# Patient Record
Sex: FEMALE | Race: WHITE | NOT HISPANIC OR LATINO | Employment: UNEMPLOYED | URBAN - NONMETROPOLITAN AREA
[De-identification: names, ages, dates, MRNs, and addresses within clinical notes are randomized per-mention and may not be internally consistent; named-entity substitution may affect disease eponyms.]

---

## 2017-01-17 LAB
EXTERNAL ABO GROUPING: NORMAL
EXTERNAL ANTIBODY SCREEN: NEGATIVE
EXTERNAL CHLAMYDIA SCREEN: NEGATIVE
EXTERNAL GONORRHEA SCREEN: NEGATIVE
EXTERNAL GROUP B STREP ANTIGEN: NORMAL
EXTERNAL HEMATOCRIT: 37 %
EXTERNAL HEMOGLOBIN: 11.4 G/DL
EXTERNAL HEPATITIS B SURFACE ANTIGEN: NEGATIVE
EXTERNAL RH FACTOR: POSITIVE
EXTERNAL RUBELLA QUALITATIVE: NORMAL
EXTERNAL SYPHILIS RPR SCREEN: NORMAL
HIV1 AB SPEC QL IA.RAPID: NEGATIVE

## 2017-02-09 ENCOUNTER — TRANSCRIBE ORDERS (OUTPATIENT)
Dept: ADMINISTRATIVE | Facility: HOSPITAL | Age: 27
End: 2017-02-09

## 2017-02-09 DIAGNOSIS — R06.02 SOB (SHORTNESS OF BREATH): Primary | ICD-10-CM

## 2017-02-10 ENCOUNTER — HOSPITAL ENCOUNTER (OUTPATIENT)
Dept: CARDIOLOGY | Facility: HOSPITAL | Age: 27
Discharge: HOME | End: 2017-02-10
Attending: OBSTETRICS & GYNECOLOGY

## 2017-02-10 DIAGNOSIS — R06.02 SOB (SHORTNESS OF BREATH): ICD-10-CM

## 2017-02-11 LAB
BH CV ECHO MEAS - % LVPW THICK: 332.1 %
BH CV ECHO MEAS - AO ROOT AREA (BSA CORRECTED): 1.6
BH CV ECHO MEAS - AO ROOT AREA: 7.8 CM^2
BH CV ECHO MEAS - AO ROOT DIAM: 3.2 CM
BH CV ECHO MEAS - BSA(HAYCOCK): 2.1 M^2
BH CV ECHO MEAS - BSA: 2 M^2
BH CV ECHO MEAS - BZI_BMI: 34.1 KILOGRAMS/M^2
BH CV ECHO MEAS - BZI_METRIC_HEIGHT: 165.1 CM
BH CV ECHO MEAS - BZI_METRIC_WEIGHT: 93 KG
BH CV ECHO MEAS - CONTRAST EF 4CH: 67.3 ML/M^2
BH CV ECHO MEAS - EDV(CUBED): 175.3 ML
BH CV ECHO MEAS - EDV(MOD-SP4): 110 ML
BH CV ECHO MEAS - EDV(TEICH): 153.4 ML
BH CV ECHO MEAS - EF(CUBED): 87.3 %
BH CV ECHO MEAS - EF(MOD-SP4): 67.3 %
BH CV ECHO MEAS - EF(TEICH): 80.5 %
BH CV ECHO MEAS - ESV(CUBED): 22.2 ML
BH CV ECHO MEAS - ESV(MOD-SP4): 36 ML
BH CV ECHO MEAS - ESV(TEICH): 29.9 ML
BH CV ECHO MEAS - FS: 49.8 %
BH CV ECHO MEAS - IVS/LVPW: 0.85
BH CV ECHO MEAS - IVSD: 0.78 CM
BH CV ECHO MEAS - LA DIMENSION: 3.2 CM
BH CV ECHO MEAS - LA/AO: 1
BH CV ECHO MEAS - LV DIASTOLIC VOL/BSA (35-75): 55 ML/M^2
BH CV ECHO MEAS - LV MASS(C)D: 177.8 GRAMS
BH CV ECHO MEAS - LV MASS(C)DI: 88.9 GRAMS/M^2
BH CV ECHO MEAS - LV SYSTOLIC VOL/BSA (12-30): 18 ML/M^2
BH CV ECHO MEAS - LVIDD: 5.6 CM
BH CV ECHO MEAS - LVIDS: 2.8 CM
BH CV ECHO MEAS - LVLD AP4: 7.9 CM
BH CV ECHO MEAS - LVLS AP4: 6.5 CM
BH CV ECHO MEAS - LVOT AREA (M): 2 CM^2
BH CV ECHO MEAS - LVOT AREA: 2.1 CM^2
BH CV ECHO MEAS - LVOT DIAM: 1.6 CM
BH CV ECHO MEAS - LVPWD: 0.92 CM
BH CV ECHO MEAS - LVPWS: 4 CM
BH CV ECHO MEAS - MV A MAX VEL: 83.4 CM/SEC
BH CV ECHO MEAS - MV E MAX VEL: 78.5 CM/SEC
BH CV ECHO MEAS - MV E/A: 0.94
BH CV ECHO MEAS - PA ACC SLOPE: 771 CM/SEC^2
BH CV ECHO MEAS - PA ACC TIME: 0.13 SEC
BH CV ECHO MEAS - PA PR(ACCEL): 18.8 MMHG
BH CV ECHO MEAS - SI(CUBED): 76.6 ML/M^2
BH CV ECHO MEAS - SI(MOD-SP4): 37 ML/M^2
BH CV ECHO MEAS - SI(TEICH): 61.8 ML/M^2
BH CV ECHO MEAS - SV(CUBED): 153 ML
BH CV ECHO MEAS - SV(MOD-SP4): 74 ML
BH CV ECHO MEAS - SV(TEICH): 123.6 ML

## 2017-02-14 ENCOUNTER — HOSPITAL ENCOUNTER (OUTPATIENT)
Dept: RESPIRATORY THERAPY | Facility: HOSPITAL | Age: 27
Discharge: HOME | End: 2017-02-14
Attending: OBSTETRICS & GYNECOLOGY | Admitting: OBSTETRICS & GYNECOLOGY

## 2017-02-14 ENCOUNTER — TRANSCRIBE ORDERS (OUTPATIENT)
Dept: ADMINISTRATIVE | Facility: HOSPITAL | Age: 27
End: 2017-02-14

## 2017-02-14 ENCOUNTER — TRANSCRIBE ORDERS (OUTPATIENT)
Dept: WOMENS IMAGING | Facility: HOSPITAL | Age: 27
End: 2017-02-14

## 2017-02-14 DIAGNOSIS — O99.282 MTHFR DEFICIENCY COMPLICATING PREGNANCY, SECOND TRIMESTER (HCC): ICD-10-CM

## 2017-02-14 DIAGNOSIS — O09.292 PRIOR PREGNANCY WITH FETAL DEMISE AND CURRENT PREGNANCY, SECOND TRIMESTER: Primary | ICD-10-CM

## 2017-02-14 DIAGNOSIS — E72.12 MTHFR DEFICIENCY COMPLICATING PREGNANCY, SECOND TRIMESTER (HCC): ICD-10-CM

## 2017-02-14 DIAGNOSIS — R55 BLACKOUT: Primary | ICD-10-CM

## 2017-02-14 DIAGNOSIS — R55 BLACKOUT: ICD-10-CM

## 2017-02-14 PROCEDURE — 93226 XTRNL ECG REC<48 HR SCAN A/R: CPT

## 2017-02-14 PROCEDURE — 93225 XTRNL ECG REC<48 HRS REC: CPT

## 2017-02-17 PROCEDURE — 93227 XTRNL ECG REC<48 HR R&I: CPT | Performed by: INTERNAL MEDICINE

## 2017-02-28 ENCOUNTER — APPOINTMENT (OUTPATIENT)
Dept: LAB | Facility: HOSPITAL | Age: 27
End: 2017-02-28

## 2017-02-28 ENCOUNTER — HOSPITAL ENCOUNTER (OUTPATIENT)
Dept: WOMENS IMAGING | Facility: HOSPITAL | Age: 27
Discharge: HOME | End: 2017-02-28
Admitting: OBSTETRICS & GYNECOLOGY

## 2017-02-28 ENCOUNTER — OFFICE VISIT (OUTPATIENT)
Dept: OBSTETRICS AND GYNECOLOGY | Facility: HOSPITAL | Age: 27
End: 2017-02-28

## 2017-02-28 VITALS — BODY MASS INDEX: 35.96 KG/M2 | SYSTOLIC BLOOD PRESSURE: 122 MMHG | WEIGHT: 203 LBS | DIASTOLIC BLOOD PRESSURE: 64 MMHG

## 2017-02-28 DIAGNOSIS — O09.292 PRIOR PREGNANCY WITH FETAL DEMISE AND CURRENT PREGNANCY, SECOND TRIMESTER: ICD-10-CM

## 2017-02-28 DIAGNOSIS — E72.12 MTHFR DEFICIENCY COMPLICATING PREGNANCY, SECOND TRIMESTER (HCC): ICD-10-CM

## 2017-02-28 DIAGNOSIS — Z34.92 NORMAL IUP (INTRAUTERINE PREGNANCY) ON PRENATAL ULTRASOUND, SECOND TRIMESTER: Primary | ICD-10-CM

## 2017-02-28 DIAGNOSIS — O99.282 MTHFR DEFICIENCY COMPLICATING PREGNANCY, SECOND TRIMESTER (HCC): ICD-10-CM

## 2017-02-28 PROCEDURE — 86777 TOXOPLASMA ANTIBODY: CPT | Performed by: OBSTETRICS & GYNECOLOGY

## 2017-02-28 PROCEDURE — 36415 COLL VENOUS BLD VENIPUNCTURE: CPT | Performed by: OBSTETRICS & GYNECOLOGY

## 2017-02-28 PROCEDURE — 99241 PR OFFICE CONSULTATION NEW/ESTAB PATIENT 15 MIN: CPT | Performed by: OBSTETRICS & GYNECOLOGY

## 2017-02-28 PROCEDURE — 86644 CMV ANTIBODY: CPT | Performed by: OBSTETRICS & GYNECOLOGY

## 2017-02-28 PROCEDURE — 86778 TOXOPLASMA ANTIBODY IGM: CPT | Performed by: OBSTETRICS & GYNECOLOGY

## 2017-02-28 PROCEDURE — 76811 OB US DETAILED SNGL FETUS: CPT | Performed by: OBSTETRICS & GYNECOLOGY

## 2017-02-28 PROCEDURE — 86645 CMV ANTIBODY IGM: CPT | Performed by: OBSTETRICS & GYNECOLOGY

## 2017-02-28 PROCEDURE — 76811 OB US DETAILED SNGL FETUS: CPT

## 2017-02-28 RX ORDER — FOLIC ACID 1 MG/1
4 TABLET ORAL DAILY
COMMUNITY
End: 2018-09-21

## 2017-02-28 RX ORDER — HEPARIN SODIUM 5000 [USP'U]/.5ML
7500 INJECTION, SOLUTION INTRAVENOUS; SUBCUTANEOUS 2 TIMES DAILY
COMMUNITY
End: 2017-09-13

## 2017-02-28 RX ORDER — PROMETHAZINE HYDROCHLORIDE 12.5 MG/1
12.5 TABLET ORAL EVERY 6 HOURS PRN
COMMUNITY
End: 2017-05-11 | Stop reason: HOSPADM

## 2017-02-28 NOTE — PROGRESS NOTES
Documentation of the ultrasound findings, images, and interpretations as well as consultation notes will be available in the patient's ViewPoint report located in the chart review imaging tab in [employer].

## 2017-03-01 LAB
CMV IGG SERPL IA-ACNC: 3.1 U/ML (ref 0–0.59)
CMV IGM SERPL IA-ACNC: <30 AU/ML (ref 0–29.9)
LABORATORY COMMENT REPORT: NORMAL
T GONDII IGG SERPL IA-ACNC: <3 IU/ML (ref 0–7.1)
T GONDII IGM SER IA-ACNC: <3 AU/ML (ref 0–7.9)

## 2017-03-02 LAB
B19V IGG SER IA-ACNC: 7 INDEX (ref 0–0.8)
B19V IGM SER IA-ACNC: 0.3 INDEX (ref 0–0.8)

## 2017-03-03 ENCOUNTER — TELEPHONE (OUTPATIENT)
Dept: WOMENS IMAGING | Facility: HOSPITAL | Age: 27
End: 2017-03-03

## 2017-03-03 NOTE — TELEPHONE ENCOUNTER
Notified pt of CMV, Parvo and toxoplasmosis labs being normal. Also notified pt that per Evi in reference lab that CF screen specimen was mistakenly placed in spinner and ruined and will need redraw. Pt v/u and willing to get lab redrawn. Pt asked if it can be done at [facility] office. Informed pt that this office will call to set up lab draw by [facility]. Pt v/u and agreeable. Per [facility]'s office-pt had negative CF screen in 2013. Pt notified on no new specimen needed.

## 2017-03-03 NOTE — TELEPHONE ENCOUNTER
"----- Message from Pramod Dominguez MD sent at 3/2/2017  9:49 AM EST -----  CMV past infection: \"immune\"  Toxoplasma, no serologic evidence of maternal infection.  "

## 2017-03-18 ENCOUNTER — HOSPITAL ENCOUNTER (OUTPATIENT)
Facility: HOSPITAL | Age: 27
Discharge: HOME | End: 2017-03-18
Attending: OBSTETRICS & GYNECOLOGY | Admitting: OBSTETRICS & GYNECOLOGY

## 2017-03-18 ENCOUNTER — HOSPITAL ENCOUNTER (EMERGENCY)
Facility: HOSPITAL | Age: 27
Discharge: ANOTHER HOSPITAL | End: 2017-03-18

## 2017-03-18 VITALS
SYSTOLIC BLOOD PRESSURE: 121 MMHG | HEART RATE: 100 BPM | HEIGHT: 63 IN | WEIGHT: 203 LBS | OXYGEN SATURATION: 99 % | TEMPERATURE: 97.9 F | BODY MASS INDEX: 35.97 KG/M2 | RESPIRATION RATE: 20 BRPM | DIASTOLIC BLOOD PRESSURE: 81 MMHG

## 2017-03-18 VITALS
SYSTOLIC BLOOD PRESSURE: 127 MMHG | BODY MASS INDEX: 35.97 KG/M2 | HEART RATE: 91 BPM | HEIGHT: 63 IN | WEIGHT: 203 LBS | RESPIRATION RATE: 18 BRPM | TEMPERATURE: 98.3 F | DIASTOLIC BLOOD PRESSURE: 87 MMHG

## 2017-03-18 PROBLEM — Z34.90 PREGNANT: Status: ACTIVE | Noted: 2017-03-18

## 2017-03-18 LAB
ANISOCYTOSIS BLD QL: ABNORMAL
BACTERIA UR QL AUTO: ABNORMAL /HPF
BILIRUB UR QL STRIP: ABNORMAL
CLARITY UR: CLEAR
COLOR UR: ABNORMAL
DEPRECATED RDW RBC AUTO: 45 FL (ref 37–54)
ELLIPTOCYTES BLD QL SMEAR: ABNORMAL
ERYTHROCYTE [DISTWIDTH] IN BLOOD BY AUTOMATED COUNT: 15.6 % (ref 11.5–14.5)
GLUCOSE UR STRIP-MCNC: NEGATIVE MG/DL
HCT VFR BLD AUTO: 33.2 % (ref 37–47)
HGB BLD-MCNC: 10.2 G/DL (ref 12–16)
HGB UR QL STRIP.AUTO: NEGATIVE
HYALINE CASTS UR QL AUTO: ABNORMAL /LPF
HYPOCHROMIA BLD QL: ABNORMAL
KETONES UR QL STRIP: ABNORMAL
LEUKOCYTE ESTERASE UR QL STRIP.AUTO: ABNORMAL
LYMPHOCYTES # BLD MANUAL: 0.54 10*3/MM3 (ref 1–3)
LYMPHOCYTES NFR BLD MANUAL: 3 % (ref 0–10)
LYMPHOCYTES NFR BLD MANUAL: 7 % (ref 21–51)
MCH RBC QN AUTO: 24.9 PG (ref 27–33)
MCHC RBC AUTO-ENTMCNC: 30.7 G/DL (ref 33–37)
MCV RBC AUTO: 81.2 FL (ref 80–94)
MONOCYTES # BLD AUTO: 0.23 10*3/MM3 (ref 0.1–0.9)
NEUTROPHILS # BLD AUTO: 6.89 10*3/MM3 (ref 1.4–6.5)
NEUTROPHILS NFR BLD MANUAL: 90 % (ref 30–70)
NITRITE UR QL STRIP: NEGATIVE
PH UR STRIP.AUTO: <=5 [PH] (ref 5–8)
PLAT MORPH BLD: NORMAL
PLATELET # BLD AUTO: 218 10*3/MM3 (ref 130–400)
PMV BLD AUTO: 11.3 FL (ref 6–10)
PROT UR QL STRIP: NEGATIVE
RBC # BLD AUTO: 4.09 10*6/MM3 (ref 4.2–5.4)
RBC # UR: ABNORMAL /HPF
REF LAB TEST METHOD: ABNORMAL
SP GR UR STRIP: 1.03 (ref 1–1.03)
SQUAMOUS #/AREA URNS HPF: ABNORMAL /HPF
UROBILINOGEN UR QL STRIP: ABNORMAL
WBC NRBC COR # BLD: 7.65 10*3/MM3 (ref 4.5–12.5)
WBC UR QL AUTO: ABNORMAL /HPF

## 2017-03-18 PROCEDURE — G0463 HOSPITAL OUTPT CLINIC VISIT: HCPCS

## 2017-03-18 PROCEDURE — 81001 URINALYSIS AUTO W/SCOPE: CPT | Performed by: OBSTETRICS & GYNECOLOGY

## 2017-03-18 PROCEDURE — 99201: CPT

## 2017-03-18 PROCEDURE — 85027 COMPLETE CBC AUTOMATED: CPT | Performed by: OBSTETRICS & GYNECOLOGY

## 2017-03-18 PROCEDURE — P9612 CATHETERIZE FOR URINE SPEC: HCPCS

## 2017-03-18 PROCEDURE — 85007 BL SMEAR W/DIFF WBC COUNT: CPT | Performed by: OBSTETRICS & GYNECOLOGY

## 2017-03-22 ENCOUNTER — TRANSCRIBE ORDERS (OUTPATIENT)
Dept: ADMINISTRATIVE | Facility: HOSPITAL | Age: 27
End: 2017-03-22

## 2017-03-22 ENCOUNTER — HOSPITAL ENCOUNTER (OUTPATIENT)
Dept: CARDIOLOGY | Facility: HOSPITAL | Age: 27
Discharge: HOME | End: 2017-03-22
Attending: OBSTETRICS & GYNECOLOGY | Admitting: OBSTETRICS & GYNECOLOGY

## 2017-03-22 DIAGNOSIS — O09.92 SUPERVISION OF HIGH RISK PREGNANCY IN SECOND TRIMESTER: ICD-10-CM

## 2017-03-22 DIAGNOSIS — O09.92 SUPERVISION OF HIGH RISK PREGNANCY IN SECOND TRIMESTER: Primary | ICD-10-CM

## 2017-03-22 PROCEDURE — 93970 EXTREMITY STUDY: CPT | Performed by: RADIOLOGY

## 2017-03-22 PROCEDURE — 93970 EXTREMITY STUDY: CPT

## 2017-04-11 ENCOUNTER — HOSPITAL ENCOUNTER (OUTPATIENT)
Dept: WOMENS IMAGING | Facility: HOSPITAL | Age: 27
Discharge: HOME | End: 2017-04-11
Attending: OBSTETRICS & GYNECOLOGY | Admitting: OBSTETRICS & GYNECOLOGY

## 2017-04-11 ENCOUNTER — OFFICE VISIT (OUTPATIENT)
Dept: OBSTETRICS AND GYNECOLOGY | Facility: HOSPITAL | Age: 27
End: 2017-04-11

## 2017-04-11 VITALS — DIASTOLIC BLOOD PRESSURE: 55 MMHG | WEIGHT: 207 LBS | SYSTOLIC BLOOD PRESSURE: 113 MMHG | BODY MASS INDEX: 36.67 KG/M2

## 2017-04-11 DIAGNOSIS — O09.899 PREVIOUS DEEP VEIN THROMBOSIS (DVT) AFFECTING PREGNANCY: ICD-10-CM

## 2017-04-11 DIAGNOSIS — O09.299 PREVIOUS STILLBIRTH OR DEMISE, ANTEPARTUM, UNSPECIFIED TRIMESTER: Primary | ICD-10-CM

## 2017-04-11 DIAGNOSIS — Z86.718 PREVIOUS DEEP VEIN THROMBOSIS (DVT) AFFECTING PREGNANCY: ICD-10-CM

## 2017-04-11 DIAGNOSIS — Z34.92 NORMAL IUP (INTRAUTERINE PREGNANCY) ON PRENATAL ULTRASOUND, SECOND TRIMESTER: ICD-10-CM

## 2017-04-11 DIAGNOSIS — Z34.90 PREGNANCY, UNSPECIFIED GESTATIONAL AGE: ICD-10-CM

## 2017-04-11 PROCEDURE — 76816 OB US FOLLOW-UP PER FETUS: CPT

## 2017-04-11 PROCEDURE — 76816 OB US FOLLOW-UP PER FETUS: CPT | Performed by: OBSTETRICS & GYNECOLOGY

## 2017-05-04 LAB — EXTERNAL GTT 1 HOUR: 124

## 2017-05-11 ENCOUNTER — HOSPITAL ENCOUNTER (OUTPATIENT)
Facility: HOSPITAL | Age: 27
Discharge: HOME | End: 2017-05-11
Attending: OBSTETRICS & GYNECOLOGY | Admitting: OBSTETRICS & GYNECOLOGY

## 2017-05-11 ENCOUNTER — APPOINTMENT (OUTPATIENT)
Dept: ULTRASOUND IMAGING | Facility: HOSPITAL | Age: 27
End: 2017-05-11

## 2017-05-11 VITALS
RESPIRATION RATE: 16 BRPM | SYSTOLIC BLOOD PRESSURE: 121 MMHG | HEIGHT: 63 IN | HEART RATE: 101 BPM | TEMPERATURE: 97.9 F | DIASTOLIC BLOOD PRESSURE: 71 MMHG | WEIGHT: 211.3 LBS | BODY MASS INDEX: 37.44 KG/M2

## 2017-05-11 LAB
BACTERIA UR QL AUTO: ABNORMAL /HPF
BASOPHILS # BLD AUTO: 0.02 10*3/MM3 (ref 0–0.3)
BASOPHILS NFR BLD AUTO: 0.2 % (ref 0–2)
BILIRUB UR QL STRIP: NEGATIVE
CLARITY UR: ABNORMAL
COLOR UR: ABNORMAL
DEPRECATED RDW RBC AUTO: 43.1 FL (ref 37–54)
EOSINOPHIL # BLD AUTO: 0.07 10*3/MM3 (ref 0–0.7)
EOSINOPHIL NFR BLD AUTO: 0.8 % (ref 0–5)
ERYTHROCYTE [DISTWIDTH] IN BLOOD BY AUTOMATED COUNT: 15.6 % (ref 11.5–14.5)
GLUCOSE UR STRIP-MCNC: NEGATIVE MG/DL
HCT VFR BLD AUTO: 29.9 % (ref 37–47)
HGB BLD-MCNC: 9.1 G/DL (ref 12–16)
HGB UR QL STRIP.AUTO: ABNORMAL
HYALINE CASTS UR QL AUTO: ABNORMAL /LPF
IMM GRANULOCYTES # BLD: 0.01 10*3/MM3 (ref 0–0.03)
IMM GRANULOCYTES NFR BLD: 0.1 % (ref 0–0.5)
KETONES UR QL STRIP: ABNORMAL
LEUKOCYTE ESTERASE UR QL STRIP.AUTO: NEGATIVE
LYMPHOCYTES # BLD AUTO: 1.96 10*3/MM3 (ref 1–3)
LYMPHOCYTES NFR BLD AUTO: 23.1 % (ref 21–51)
MCH RBC QN AUTO: 24.1 PG (ref 27–33)
MCHC RBC AUTO-ENTMCNC: 30.4 G/DL (ref 33–37)
MCV RBC AUTO: 79.1 FL (ref 80–94)
MONOCYTES # BLD AUTO: 0.71 10*3/MM3 (ref 0.1–0.9)
MONOCYTES NFR BLD AUTO: 8.4 % (ref 0–10)
MUCOUS THREADS URNS QL MICRO: ABNORMAL /HPF
NEUTROPHILS # BLD AUTO: 5.72 10*3/MM3 (ref 1.4–6.5)
NEUTROPHILS NFR BLD AUTO: 67.4 % (ref 30–70)
NITRITE UR QL STRIP: NEGATIVE
PH UR STRIP.AUTO: 5.5 [PH] (ref 5–8)
PLATELET # BLD AUTO: 241 10*3/MM3 (ref 130–400)
PMV BLD AUTO: 10.7 FL (ref 6–10)
PROT UR QL STRIP: ABNORMAL
RBC # BLD AUTO: 3.78 10*6/MM3 (ref 4.2–5.4)
RBC # UR: ABNORMAL /HPF
REF LAB TEST METHOD: ABNORMAL
SP GR UR STRIP: >1.03 (ref 1–1.03)
SQUAMOUS #/AREA URNS HPF: ABNORMAL /HPF
UROBILINOGEN UR QL STRIP: ABNORMAL
WBC NRBC COR # BLD: 8.49 10*3/MM3 (ref 4.5–12.5)
WBC UR QL AUTO: ABNORMAL /HPF

## 2017-05-11 PROCEDURE — G0463 HOSPITAL OUTPT CLINIC VISIT: HCPCS

## 2017-05-11 PROCEDURE — P9612 CATHETERIZE FOR URINE SPEC: HCPCS

## 2017-05-11 PROCEDURE — 81001 URINALYSIS AUTO W/SCOPE: CPT | Performed by: OBSTETRICS & GYNECOLOGY

## 2017-05-11 PROCEDURE — 85025 COMPLETE CBC W/AUTO DIFF WBC: CPT | Performed by: OBSTETRICS & GYNECOLOGY

## 2017-05-11 PROCEDURE — 76819 FETAL BIOPHYS PROFIL W/O NST: CPT

## 2017-05-11 PROCEDURE — 59025 FETAL NON-STRESS TEST: CPT

## 2017-05-11 PROCEDURE — 76819 FETAL BIOPHYS PROFIL W/O NST: CPT | Performed by: RADIOLOGY

## 2017-06-06 ENCOUNTER — OFFICE VISIT (OUTPATIENT)
Dept: OBSTETRICS AND GYNECOLOGY | Facility: HOSPITAL | Age: 27
End: 2017-06-06

## 2017-06-06 ENCOUNTER — HOSPITAL ENCOUNTER (OUTPATIENT)
Dept: WOMENS IMAGING | Facility: HOSPITAL | Age: 27
Discharge: HOME | End: 2017-06-06
Attending: OBSTETRICS & GYNECOLOGY | Admitting: OBSTETRICS & GYNECOLOGY

## 2017-06-06 VITALS — DIASTOLIC BLOOD PRESSURE: 66 MMHG | BODY MASS INDEX: 37.8 KG/M2 | SYSTOLIC BLOOD PRESSURE: 118 MMHG | WEIGHT: 213.4 LBS

## 2017-06-06 DIAGNOSIS — Z86.718 PREVIOUS DEEP VEIN THROMBOSIS (DVT) AFFECTING PREGNANCY: Primary | ICD-10-CM

## 2017-06-06 DIAGNOSIS — Z86.718 PREVIOUS DEEP VEIN THROMBOSIS (DVT) AFFECTING PREGNANCY: ICD-10-CM

## 2017-06-06 DIAGNOSIS — O09.899 PREVIOUS DEEP VEIN THROMBOSIS (DVT) AFFECTING PREGNANCY: Primary | ICD-10-CM

## 2017-06-06 DIAGNOSIS — Z34.90 PREGNANCY, UNSPECIFIED GESTATIONAL AGE: ICD-10-CM

## 2017-06-06 DIAGNOSIS — O09.899 PREVIOUS DEEP VEIN THROMBOSIS (DVT) AFFECTING PREGNANCY: ICD-10-CM

## 2017-06-06 DIAGNOSIS — O09.299 PREVIOUS STILLBIRTH OR DEMISE, ANTEPARTUM, UNSPECIFIED TRIMESTER: ICD-10-CM

## 2017-06-06 PROCEDURE — 76816 OB US FOLLOW-UP PER FETUS: CPT | Performed by: OBSTETRICS & GYNECOLOGY

## 2017-06-06 PROCEDURE — 76816 OB US FOLLOW-UP PER FETUS: CPT

## 2017-06-06 RX ORDER — PANTOPRAZOLE SODIUM 40 MG/1
40 TABLET, DELAYED RELEASE ORAL DAILY
COMMUNITY
End: 2017-09-13

## 2017-06-06 NOTE — PROGRESS NOTES
Denies problems. To see Dr. Posey next week. States she had abnormal glucose labs early this pregnancy and Dr. Posey does a blood test every 2 weeks but she does not have to do fingerstick glucose testing.

## 2017-06-22 ENCOUNTER — HOSPITAL ENCOUNTER (OUTPATIENT)
Facility: HOSPITAL | Age: 27
Discharge: HOME | End: 2017-06-22
Attending: OBSTETRICS & GYNECOLOGY | Admitting: OBSTETRICS & GYNECOLOGY

## 2017-06-22 VITALS
OXYGEN SATURATION: 99 % | DIASTOLIC BLOOD PRESSURE: 66 MMHG | BODY MASS INDEX: 37.74 KG/M2 | WEIGHT: 213 LBS | TEMPERATURE: 98.6 F | HEART RATE: 100 BPM | HEIGHT: 63 IN | SYSTOLIC BLOOD PRESSURE: 114 MMHG | RESPIRATION RATE: 20 BRPM

## 2017-06-22 PROBLEM — O26.899 ABDOMINAL PAIN AFFECTING PREGNANCY: Status: ACTIVE | Noted: 2017-06-22

## 2017-06-22 PROBLEM — R10.9 ABDOMINAL PAIN AFFECTING PREGNANCY: Status: ACTIVE | Noted: 2017-06-22

## 2017-06-22 PROCEDURE — 59025 FETAL NON-STRESS TEST: CPT

## 2017-07-01 ENCOUNTER — HOSPITAL ENCOUNTER (OUTPATIENT)
Facility: HOSPITAL | Age: 27
Discharge: HOME | End: 2017-07-02
Attending: OBSTETRICS & GYNECOLOGY | Admitting: OBSTETRICS & GYNECOLOGY

## 2017-07-01 LAB
BILIRUB UR QL STRIP: NEGATIVE
CLARITY UR: CLEAR
COLOR UR: YELLOW
GLUCOSE UR STRIP-MCNC: NEGATIVE MG/DL
HGB UR QL STRIP.AUTO: NEGATIVE
KETONES UR QL STRIP: NEGATIVE
LEUKOCYTE ESTERASE UR QL STRIP.AUTO: NEGATIVE
NITRITE UR QL STRIP: NEGATIVE
PH UR STRIP.AUTO: 6 [PH] (ref 5–8)
PROT UR QL STRIP: NEGATIVE
SP GR UR STRIP: 1.01 (ref 1–1.03)
UROBILINOGEN UR QL STRIP: NORMAL

## 2017-07-01 PROCEDURE — 81003 URINALYSIS AUTO W/O SCOPE: CPT | Performed by: OBSTETRICS & GYNECOLOGY

## 2017-07-02 VITALS
DIASTOLIC BLOOD PRESSURE: 68 MMHG | BODY MASS INDEX: 38.09 KG/M2 | HEIGHT: 63 IN | RESPIRATION RATE: 20 BRPM | HEART RATE: 91 BPM | WEIGHT: 215 LBS | TEMPERATURE: 97.1 F | SYSTOLIC BLOOD PRESSURE: 121 MMHG

## 2017-07-02 PROCEDURE — G0463 HOSPITAL OUTPT CLINIC VISIT: HCPCS

## 2017-07-02 PROCEDURE — 59025 FETAL NON-STRESS TEST: CPT

## 2017-07-02 PROCEDURE — P9612 CATHETERIZE FOR URINE SPEC: HCPCS

## 2017-07-02 NOTE — NURSING NOTE
35/0 pt arrives to unit via wc with c/o uc's and back pain. Pt denies lof and bleeding. Pt taken to rm 231. Pt placed on monitors. Cath ua completed. Reactive NST noted. SVE performed. UA results WNL. Dr. Posey notified. Order received to recheck cervix and may discharge if no change noted. Pt left unit ambulatory at 0022.

## 2017-07-02 NOTE — NON STRESS TEST
Theresa Maya, a  at 35w1d with an SWAPNA of 2017, by Other [employer]asis, was seen at [facility] LABOR DELIVERY for a nonstress test.    Chief Complaint   Patient presents with   • Contractions     began this am. worsening at . denies lof/bleeding. reports dfm.   • Back Pain     2030. intermittent.       Interpretation A  Nonstress Test Interpretation A: Reactive (17 : Lori Navarro RN)  Comments A: verified by CHIRAG Mcghee RN (17 : Lori Navarro RN)  Lori Navarro RN  12:26 AM  17

## 2017-07-08 ENCOUNTER — HOSPITAL ENCOUNTER (OUTPATIENT)
Facility: HOSPITAL | Age: 27
Discharge: HOME | End: 2017-07-08
Attending: OBSTETRICS & GYNECOLOGY | Admitting: OBSTETRICS & GYNECOLOGY

## 2017-07-08 PROBLEM — Z36.89 NON-STRESS TEST REACTIVE: Status: ACTIVE | Noted: 2017-07-08

## 2017-07-08 PROCEDURE — 59025 FETAL NON-STRESS TEST: CPT

## 2017-07-08 PROCEDURE — G0463 HOSPITAL OUTPT CLINIC VISIT: HCPCS

## 2017-07-08 NOTE — NON STRESS TEST
Theresa Maya, a  at 36w0d with an SWAPNA of 2017, by Other Basis, was seen at [facility] LABOR DELIVERY for a nonstress test.    Chief Complaint   Patient presents with   • Non-stress Test     abd pain       Interpretation A  Nonstress Test Interpretation A: Reactive (17 1422 : Jina Stein RN)   Glenna collett rn

## 2017-07-11 ENCOUNTER — ANESTHESIA (OUTPATIENT)
Dept: LABOR AND DELIVERY | Facility: HOSPITAL | Age: 27
End: 2017-07-11

## 2017-07-11 ENCOUNTER — HOSPITAL ENCOUNTER (INPATIENT)
Facility: HOSPITAL | Age: 27
LOS: 2 days | Discharge: HOME | End: 2017-07-13
Attending: OBSTETRICS & GYNECOLOGY | Admitting: OBSTETRICS & GYNECOLOGY

## 2017-07-11 ENCOUNTER — ANESTHESIA EVENT (OUTPATIENT)
Dept: LABOR AND DELIVERY | Facility: HOSPITAL | Age: 27
End: 2017-07-11

## 2017-07-11 PROBLEM — Z37.9 NORMAL LABOR: Status: ACTIVE | Noted: 2017-07-11

## 2017-07-11 LAB
A1 MICROGLOB PLACENTAL VAG QL: NEGATIVE
ABO GROUP BLD: NORMAL
BLD GP AB SCN SERPL QL: NEGATIVE
DEPRECATED RDW RBC AUTO: 44.7 FL (ref 37–54)
ERYTHROCYTE [DISTWIDTH] IN BLOOD BY AUTOMATED COUNT: 17.2 % (ref 11.5–14.5)
HCT VFR BLD AUTO: 29.6 % (ref 37–47)
HGB BLD-MCNC: 8.6 G/DL (ref 12–16)
MCH RBC QN AUTO: 21.7 PG (ref 27–33)
MCHC RBC AUTO-ENTMCNC: 29.1 G/DL (ref 33–37)
MCV RBC AUTO: 74.6 FL (ref 80–94)
PLATELET # BLD AUTO: 174 10*3/MM3 (ref 130–400)
PMV BLD AUTO: 12 FL (ref 6–10)
RBC # BLD AUTO: 3.97 10*6/MM3 (ref 4.2–5.4)
RH BLD: POSITIVE
WBC NRBC COR # BLD: 7.97 10*3/MM3 (ref 4.5–12.5)

## 2017-07-11 PROCEDURE — 85027 COMPLETE CBC AUTOMATED: CPT | Performed by: OBSTETRICS & GYNECOLOGY

## 2017-07-11 PROCEDURE — C1755 CATHETER, INTRASPINAL: HCPCS | Performed by: ANESTHESIOLOGY

## 2017-07-11 PROCEDURE — 86901 BLOOD TYPING SEROLOGIC RH(D): CPT | Performed by: OBSTETRICS & GYNECOLOGY

## 2017-07-11 PROCEDURE — 25010000003 CEFAZOLIN PER 500 MG: Performed by: OBSTETRICS & GYNECOLOGY

## 2017-07-11 PROCEDURE — 59025 FETAL NON-STRESS TEST: CPT

## 2017-07-11 PROCEDURE — C1755 CATHETER, INTRASPINAL: HCPCS

## 2017-07-11 PROCEDURE — 86850 RBC ANTIBODY SCREEN: CPT | Performed by: OBSTETRICS & GYNECOLOGY

## 2017-07-11 PROCEDURE — 86900 BLOOD TYPING SEROLOGIC ABO: CPT | Performed by: OBSTETRICS & GYNECOLOGY

## 2017-07-11 PROCEDURE — 84112 EVAL AMNIOTIC FLUID PROTEIN: CPT | Performed by: OBSTETRICS & GYNECOLOGY

## 2017-07-11 RX ORDER — ACETAMINOPHEN 325 MG/1
650 TABLET ORAL EVERY 4 HOURS PRN
Status: DISCONTINUED | OUTPATIENT
Start: 2017-07-11 | End: 2017-07-13 | Stop reason: HOSPADM

## 2017-07-11 RX ORDER — DOCUSATE SODIUM 100 MG/1
100 CAPSULE, LIQUID FILLED ORAL DAILY
Status: DISCONTINUED | OUTPATIENT
Start: 2017-07-12 | End: 2017-07-13 | Stop reason: HOSPADM

## 2017-07-11 RX ORDER — ROPIVACAINE HYDROCHLORIDE 2 MG/ML
14 INJECTION, SOLUTION EPIDURAL; INFILTRATION; PERINEURAL CONTINUOUS
Status: DISCONTINUED | OUTPATIENT
Start: 2017-07-11 | End: 2017-07-11

## 2017-07-11 RX ORDER — ZOLPIDEM TARTRATE 5 MG/1
5 TABLET ORAL NIGHTLY PRN
Status: DISCONTINUED | OUTPATIENT
Start: 2017-07-11 | End: 2017-07-13 | Stop reason: HOSPADM

## 2017-07-11 RX ORDER — ONDANSETRON 2 MG/ML
4 INJECTION INTRAMUSCULAR; INTRAVENOUS EVERY 6 HOURS PRN
Status: DISCONTINUED | OUTPATIENT
Start: 2017-07-11 | End: 2017-07-11 | Stop reason: HOSPADM

## 2017-07-11 RX ORDER — IBUPROFEN 600 MG/1
600 TABLET ORAL 3 TIMES DAILY
Status: DISCONTINUED | OUTPATIENT
Start: 2017-07-11 | End: 2017-07-11 | Stop reason: SDUPTHER

## 2017-07-11 RX ORDER — SODIUM CHLORIDE 0.9 % (FLUSH) 0.9 %
1-10 SYRINGE (ML) INJECTION AS NEEDED
Status: DISCONTINUED | OUTPATIENT
Start: 2017-07-11 | End: 2017-07-11 | Stop reason: HOSPADM

## 2017-07-11 RX ORDER — PANTOPRAZOLE SODIUM 40 MG/1
40 TABLET, DELAYED RELEASE ORAL DAILY
Status: DISCONTINUED | OUTPATIENT
Start: 2017-07-11 | End: 2017-07-12

## 2017-07-11 RX ORDER — BUPIVACAINE HYDROCHLORIDE 2.5 MG/ML
INJECTION, SOLUTION EPIDURAL; INFILTRATION; INTRACAUDAL
Status: DISCONTINUED
Start: 2017-07-11 | End: 2017-07-13 | Stop reason: HOSPADM

## 2017-07-11 RX ORDER — PRENATAL VIT/IRON FUM/FOLIC AC 27MG-0.8MG
1 TABLET ORAL DAILY
Status: DISCONTINUED | OUTPATIENT
Start: 2017-07-11 | End: 2017-07-12

## 2017-07-11 RX ORDER — TERBUTALINE SULFATE 1 MG/ML
0.25 INJECTION, SOLUTION SUBCUTANEOUS AS NEEDED
Status: DISCONTINUED | OUTPATIENT
Start: 2017-07-11 | End: 2017-07-11 | Stop reason: HOSPADM

## 2017-07-11 RX ORDER — PROMETHAZINE HYDROCHLORIDE 12.5 MG/1
12.5 SUPPOSITORY RECTAL EVERY 6 HOURS PRN
Status: DISCONTINUED | OUTPATIENT
Start: 2017-07-11 | End: 2017-07-11 | Stop reason: HOSPADM

## 2017-07-11 RX ORDER — ONDANSETRON 4 MG/1
4 TABLET, ORALLY DISINTEGRATING ORAL EVERY 6 HOURS PRN
Status: DISCONTINUED | OUTPATIENT
Start: 2017-07-11 | End: 2017-07-13 | Stop reason: HOSPADM

## 2017-07-11 RX ORDER — FOLIC ACID 1 MG/1
4 TABLET ORAL DAILY
Status: DISCONTINUED | OUTPATIENT
Start: 2017-07-11 | End: 2017-07-12

## 2017-07-11 RX ORDER — CLINDAMYCIN PHOSPHATE 900 MG/50ML
900 INJECTION INTRAVENOUS EVERY 8 HOURS
Status: DISCONTINUED | OUTPATIENT
Start: 2017-07-11 | End: 2017-07-11

## 2017-07-11 RX ORDER — BUPIVACAINE HYDROCHLORIDE 2.5 MG/ML
INJECTION, SOLUTION EPIDURAL; INFILTRATION; INTRACAUDAL AS NEEDED
Status: DISCONTINUED | OUTPATIENT
Start: 2017-07-11 | End: 2017-07-11 | Stop reason: SURG

## 2017-07-11 RX ORDER — METHYLERGONOVINE MALEATE 0.2 MG/ML
200 INJECTION INTRAVENOUS AS NEEDED
Status: DISCONTINUED | OUTPATIENT
Start: 2017-07-11 | End: 2017-07-13 | Stop reason: HOSPADM

## 2017-07-11 RX ORDER — EPHEDRINE SULFATE 50 MG/ML
10 INJECTION, SOLUTION INTRAVENOUS
Status: DISCONTINUED | OUTPATIENT
Start: 2017-07-11 | End: 2017-07-11 | Stop reason: HOSPADM

## 2017-07-11 RX ORDER — BISACODYL 10 MG
10 SUPPOSITORY, RECTAL RECTAL DAILY PRN
Status: DISCONTINUED | OUTPATIENT
Start: 2017-07-12 | End: 2017-07-13 | Stop reason: HOSPADM

## 2017-07-11 RX ORDER — LANOLIN 100 %
OINTMENT (GRAM) TOPICAL
Status: DISCONTINUED | OUTPATIENT
Start: 2017-07-11 | End: 2017-07-13 | Stop reason: HOSPADM

## 2017-07-11 RX ORDER — MISOPROSTOL 100 UG/1
600 TABLET ORAL AS NEEDED
Status: DISCONTINUED | OUTPATIENT
Start: 2017-07-11 | End: 2017-07-13 | Stop reason: HOSPADM

## 2017-07-11 RX ORDER — IBUPROFEN 800 MG/1
800 TABLET ORAL EVERY 8 HOURS SCHEDULED
Status: DISCONTINUED | OUTPATIENT
Start: 2017-07-11 | End: 2017-07-13 | Stop reason: HOSPADM

## 2017-07-11 RX ORDER — PROMETHAZINE HYDROCHLORIDE 25 MG/1
12.5 TABLET ORAL EVERY 6 HOURS PRN
Status: DISCONTINUED | OUTPATIENT
Start: 2017-07-11 | End: 2017-07-11 | Stop reason: HOSPADM

## 2017-07-11 RX ORDER — PROMETHAZINE HYDROCHLORIDE 25 MG/ML
12.5 INJECTION, SOLUTION INTRAMUSCULAR; INTRAVENOUS EVERY 6 HOURS PRN
Status: DISCONTINUED | OUTPATIENT
Start: 2017-07-11 | End: 2017-07-11 | Stop reason: HOSPADM

## 2017-07-11 RX ORDER — ONDANSETRON 4 MG/1
4 TABLET, FILM COATED ORAL EVERY 6 HOURS PRN
Status: DISCONTINUED | OUTPATIENT
Start: 2017-07-11 | End: 2017-07-11 | Stop reason: HOSPADM

## 2017-07-11 RX ORDER — FAMOTIDINE 10 MG/ML
20 INJECTION, SOLUTION INTRAVENOUS ONCE AS NEEDED
Status: DISCONTINUED | OUTPATIENT
Start: 2017-07-11 | End: 2017-07-11 | Stop reason: HOSPADM

## 2017-07-11 RX ORDER — HYDROCODONE BITARTRATE AND ACETAMINOPHEN 5; 325 MG/1; MG/1
1 TABLET ORAL EVERY 4 HOURS PRN
Status: DISCONTINUED | OUTPATIENT
Start: 2017-07-11 | End: 2017-07-13 | Stop reason: HOSPADM

## 2017-07-11 RX ORDER — ROPIVACAINE HYDROCHLORIDE 2 MG/ML
INJECTION, SOLUTION EPIDURAL; INFILTRATION; PERINEURAL
Status: DISCONTINUED
Start: 2017-07-11 | End: 2017-07-13 | Stop reason: HOSPADM

## 2017-07-11 RX ORDER — MISOPROSTOL 100 UG/1
TABLET ORAL
Status: COMPLETED
Start: 2017-07-11 | End: 2017-07-11

## 2017-07-11 RX ORDER — SODIUM CHLORIDE 0.9 % (FLUSH) 0.9 %
1-10 SYRINGE (ML) INJECTION AS NEEDED
Status: DISCONTINUED | OUTPATIENT
Start: 2017-07-11 | End: 2017-07-13 | Stop reason: HOSPADM

## 2017-07-11 RX ORDER — METOCLOPRAMIDE 10 MG/1
10 TABLET ORAL ONCE
Status: DISCONTINUED | OUTPATIENT
Start: 2017-07-11 | End: 2017-07-13 | Stop reason: HOSPADM

## 2017-07-11 RX ORDER — LIDOCAINE HYDROCHLORIDE 10 MG/ML
5 INJECTION, SOLUTION INFILTRATION; PERINEURAL AS NEEDED
Status: DISCONTINUED | OUTPATIENT
Start: 2017-07-11 | End: 2017-07-11 | Stop reason: HOSPADM

## 2017-07-11 RX ORDER — CARBOPROST TROMETHAMINE 250 UG/ML
250 INJECTION, SOLUTION INTRAMUSCULAR AS NEEDED
Status: DISCONTINUED | OUTPATIENT
Start: 2017-07-11 | End: 2017-07-13 | Stop reason: HOSPADM

## 2017-07-11 RX ORDER — HEPARIN SODIUM 5000 [USP'U]/ML
7500 INJECTION, SOLUTION INTRAVENOUS; SUBCUTANEOUS 2 TIMES DAILY
Status: CANCELLED | OUTPATIENT
Start: 2017-07-11

## 2017-07-11 RX ORDER — ONDANSETRON 2 MG/ML
4 INJECTION INTRAMUSCULAR; INTRAVENOUS EVERY 6 HOURS PRN
Status: DISCONTINUED | OUTPATIENT
Start: 2017-07-11 | End: 2017-07-13 | Stop reason: HOSPADM

## 2017-07-11 RX ORDER — ACETAMINOPHEN 325 MG/1
650 TABLET ORAL EVERY 4 HOURS PRN
Status: DISCONTINUED | OUTPATIENT
Start: 2017-07-11 | End: 2017-07-11 | Stop reason: HOSPADM

## 2017-07-11 RX ORDER — ONDANSETRON 4 MG/1
4 TABLET, FILM COATED ORAL EVERY 6 HOURS PRN
Status: DISCONTINUED | OUTPATIENT
Start: 2017-07-11 | End: 2017-07-13 | Stop reason: HOSPADM

## 2017-07-11 RX ORDER — SODIUM CHLORIDE, SODIUM LACTATE, POTASSIUM CHLORIDE, CALCIUM CHLORIDE 600; 310; 30; 20 MG/100ML; MG/100ML; MG/100ML; MG/100ML
125 INJECTION, SOLUTION INTRAVENOUS CONTINUOUS
Status: DISCONTINUED | OUTPATIENT
Start: 2017-07-11 | End: 2017-07-12

## 2017-07-11 RX ORDER — ONDANSETRON 4 MG/1
4 TABLET, ORALLY DISINTEGRATING ORAL EVERY 6 HOURS PRN
Status: DISCONTINUED | OUTPATIENT
Start: 2017-07-11 | End: 2017-07-11 | Stop reason: HOSPADM

## 2017-07-11 RX ORDER — ONDANSETRON 2 MG/ML
4 INJECTION INTRAMUSCULAR; INTRAVENOUS ONCE AS NEEDED
Status: DISCONTINUED | OUTPATIENT
Start: 2017-07-11 | End: 2017-07-11 | Stop reason: HOSPADM

## 2017-07-11 RX ORDER — OXYTOCIN/RINGER'S LACTATE 20/1000 ML
2-20 PLASTIC BAG, INJECTION (ML) INTRAVENOUS
Status: DISCONTINUED | OUTPATIENT
Start: 2017-07-11 | End: 2017-07-11 | Stop reason: HOSPADM

## 2017-07-11 RX ADMIN — IBUPROFEN 800 MG: 800 TABLET ORAL at 21:27

## 2017-07-11 RX ADMIN — CEFAZOLIN 1 G: 1 INJECTION, POWDER, FOR SOLUTION INTRAMUSCULAR; INTRAVENOUS; PARENTERAL at 16:57

## 2017-07-11 RX ADMIN — CEFAZOLIN SODIUM 2 G: 2 SOLUTION INTRAVENOUS at 10:38

## 2017-07-11 RX ADMIN — OXYTOCIN 2 MILLI-UNITS/MIN: 10 INJECTION INTRAVENOUS at 14:26

## 2017-07-11 RX ADMIN — SODIUM CHLORIDE, POTASSIUM CHLORIDE, SODIUM LACTATE AND CALCIUM CHLORIDE 125 ML/HR: 600; 310; 30; 20 INJECTION, SOLUTION INTRAVENOUS at 10:12

## 2017-07-11 RX ADMIN — SODIUM CHLORIDE, POTASSIUM CHLORIDE, SODIUM LACTATE AND CALCIUM CHLORIDE 125 ML/HR: 600; 310; 30; 20 INJECTION, SOLUTION INTRAVENOUS at 11:55

## 2017-07-11 RX ADMIN — MISOPROSTOL 600 MCG: 100 TABLET ORAL at 18:35

## 2017-07-11 RX ADMIN — BUPIVACAINE HYDROCHLORIDE 10 ML: 2.5 INJECTION, SOLUTION EPIDURAL; INFILTRATION; INTRACAUDAL; PERINEURAL at 11:46

## 2017-07-11 RX ADMIN — HYDROCODONE BITARTRATE AND ACETAMINOPHEN 1 TABLET: 5; 325 TABLET ORAL at 22:33

## 2017-07-11 NOTE — L&D DELIVERY NOTE
Francesco  Vaginal Delivery Note    Delivery     Delivery: Vaginal, Spontaneous Delivery     YOB: 2017    Time of Birth: 6:17 PM      Anesthesia: Epidural     Delivering clinician: Kris Burciaga    Forceps?   No   Vacuum? No    Shoulder dystocia present: No        Delivery narrative:      Infant    Findings: male  infant     Infant observations: Weight: No birth weight on file.   Length:    in  Observations/Comments:         Apgars:    @ 1 minute /       @ 5 minutes   Infant Name:      Placenta, Cord, and Fluid    Placenta delivered     at        Cord:    present.   Nuchal Cord?  no   Cord blood obtained:                     Repair    Episiotomy: Not recorded    Lacerations: No   Estimated Blood Loss:   300 mls.   Suture used for repair:      Complications  none    Disposition  Mother to postpartum in stable condition.    Kris Burciaga DO  07/11/17  6:38 PM

## 2017-07-11 NOTE — PLAN OF CARE
Problem: Patient Care Overview (Adult)  Goal: Plan of Care Review  Outcome: Ongoing (interventions implemented as appropriate)    07/11/17 0945 07/11/17 1408   Patient Care Overview   Progress --  progress toward functional goals as expected   Outcome Evaluation   Outcome Summary/Follow up Plan --  cont. monitoring pt and baby, mother and baby tolerating labor   Coping/Psychosocial Response Interventions   Plan Of Care Reviewed With patient;spouse --        Goal: Adult Individualization and Mutuality  Outcome: Ongoing (interventions implemented as appropriate)    07/11/17 1408   Individualization   Patient Specific Goals safe delivery and healthy baby   Patient Specific Interventions cont monitoring       Goal: Discharge Needs Assessment  Outcome: Ongoing (interventions implemented as appropriate)    07/11/17 0912   Discharge Needs Assessment   Concerns To Be Addressed no discharge needs identified   Readmission Within The Last 30 Days no previous admission in last 30 days   Equipment Needed After Discharge none   Discharge Disposition home or self-care   Current Health   Anticipated Changes Related to Illness none   Living Environment   Transportation Available car   Self-Care   Equipment Currently Used at Home none         Problem: Labor (Cervical Ripen, Induct, Augment) (Adult,Obstetrics,Pediatric)  Goal: Signs and Symptoms of Listed Potential Problems Will be Absent or Manageable (Labor)  Outcome: Ongoing (interventions implemented as appropriate)    07/11/17 1408   Labor (Cervical Ripen, Induct, Augment)   Problems Assessed (Labor) all   Problems Present (Labor) none

## 2017-07-11 NOTE — NON STRESS TEST
Theresa Maya, a  at 36w3d with an SWAPNA of 2017, by Other Basis, was seen at [facility] LABOR DELIVERY for a nonstress test.    Chief Complaint   Patient presents with   • Contractions   • Vaginal Pressure   • Leaking of Fluid     Since yesterday   • Decreased Fetal Movement   • Vaginal Bleeding     yesterday        Interpretation A  Nonstress Test Interpretation A: Reactive (17 0433 : Dulce Sagastume RN)  Comments A: Verified by HIPOLITO Connolly RN (17 : Dulce Sagastume, RICKY)        Reason for test: OB Triage (Vagiinal bleeding, Contractions, LOF, & Decreased fetal moveement)  Date of Test: 2017  Time frame of test: 20 minutes  RN NST Interpretation: Reactive

## 2017-07-11 NOTE — ANESTHESIA PROCEDURE NOTES
Labor Epidural    Patient location during procedure: OB  Start Time: 7/11/2017 11:42 AM  Stop Time: 7/11/2017 11:53 AM  Performed By  Anesthesiologist: VERONICA CAMPBELL  Preanesthetic Checklist  Completed: patient identified, site marked, surgical consent, pre-op evaluation, timeout performed, IV checked, risks and benefits discussed and monitors and equipment checked  Epidural Block Prep:  Pt Position:sitting  Sterile Tech:gloves, mask, sterile barrier and cap  Prep:povidone-iodine 7.5% surgical scrub  Monitoring:blood pressure monitoring  Epidural Block Procedure:  Approach:midline  Guidance:landmark technique and palpation technique  Location:L3-L4  Needle Type:Tuohy  Needle Gauge:17 G  Loss of Resistance Medium: air  Loss of Resistance: 9cm  Cath Depth at skin:11 cm  Paresthesia: none  Aspiration:negative  Test Dose:negative  Post Assessment:  Dressing:occlusive dressing applied and secured with tape  Pt Tolerance:patient tolerated the procedure well with no apparent complications  Complications:no

## 2017-07-11 NOTE — NURSING NOTE
G8, P3 patient of Dr. Posey GA=36+3 arrived to L&D unit via wheelchair with complaints of vaginal pressure, contractions, LOF, and vaginal bleeding since yesterday, also complaints of decreased fetal movement patient triaged and taken to room 230

## 2017-07-11 NOTE — ANESTHESIA PREPROCEDURE EVALUATION
Anesthesia Evaluation     NPO Solid Status: > 8 hours  NPO Liquid Status: > 8 hours     Airway   Mallampati: II  TM distance: >3 FB  Neck ROM: full  Dental - normal exam     Pulmonary - normal exam   Cardiovascular - normal exam    (+) DVT,       Neuro/Psych  (+) headaches,    GI/Hepatic/Renal/Endo    (+)  GERD,     Musculoskeletal     Abdominal  - normal exam   Substance History      OB/GYN          Other                                        Anesthesia Plan    ASA 3     epidural     Anesthetic plan and risks discussed with patient.

## 2017-07-12 LAB
BASOPHILS # BLD AUTO: 0.02 10*3/MM3 (ref 0–0.3)
BASOPHILS NFR BLD AUTO: 0.2 % (ref 0–2)
DEPRECATED RDW RBC AUTO: 44.4 FL (ref 37–54)
EOSINOPHIL # BLD AUTO: 0.08 10*3/MM3 (ref 0–0.7)
EOSINOPHIL NFR BLD AUTO: 0.8 % (ref 0–5)
ERYTHROCYTE [DISTWIDTH] IN BLOOD BY AUTOMATED COUNT: 17 % (ref 11.5–14.5)
HCT VFR BLD AUTO: 27.1 % (ref 37–47)
HGB BLD-MCNC: 7.8 G/DL (ref 12–16)
IMM GRANULOCYTES # BLD: 0.02 10*3/MM3 (ref 0–0.03)
IMM GRANULOCYTES NFR BLD: 0.2 % (ref 0–0.5)
LYMPHOCYTES # BLD AUTO: 1.81 10*3/MM3 (ref 1–3)
LYMPHOCYTES NFR BLD AUTO: 19 % (ref 21–51)
MCH RBC QN AUTO: 21.7 PG (ref 27–33)
MCHC RBC AUTO-ENTMCNC: 28.8 G/DL (ref 33–37)
MCV RBC AUTO: 75.5 FL (ref 80–94)
MONOCYTES # BLD AUTO: 0.97 10*3/MM3 (ref 0.1–0.9)
MONOCYTES NFR BLD AUTO: 10.2 % (ref 0–10)
NEUTROPHILS # BLD AUTO: 6.61 10*3/MM3 (ref 1.4–6.5)
NEUTROPHILS NFR BLD AUTO: 69.6 % (ref 30–70)
PLATELET # BLD AUTO: 194 10*3/MM3 (ref 130–400)
PMV BLD AUTO: 11.8 FL (ref 6–10)
RBC # BLD AUTO: 3.59 10*6/MM3 (ref 4.2–5.4)
WBC NRBC COR # BLD: 9.51 10*3/MM3 (ref 4.5–12.5)

## 2017-07-12 PROCEDURE — 25010000002 ENOXAPARIN PER 10 MG: Performed by: OBSTETRICS & GYNECOLOGY

## 2017-07-12 PROCEDURE — 25010000002 IRON SUCROSE PER 1 MG: Performed by: PHYSICIAN ASSISTANT

## 2017-07-12 PROCEDURE — 85025 COMPLETE CBC W/AUTO DIFF WBC: CPT | Performed by: OBSTETRICS & GYNECOLOGY

## 2017-07-12 RX ADMIN — HYDROCODONE BITARTRATE AND ACETAMINOPHEN 1 TABLET: 5; 325 TABLET ORAL at 08:30

## 2017-07-12 RX ADMIN — IBUPROFEN 800 MG: 800 TABLET ORAL at 14:45

## 2017-07-12 RX ADMIN — IBUPROFEN 800 MG: 800 TABLET ORAL at 05:02

## 2017-07-12 RX ADMIN — IRON SUCROSE 100 MG: 20 INJECTION, SOLUTION INTRAVENOUS at 11:22

## 2017-07-12 RX ADMIN — IBUPROFEN 800 MG: 800 TABLET ORAL at 21:02

## 2017-07-12 RX ADMIN — DOCUSATE SODIUM 100 MG: 100 CAPSULE, LIQUID FILLED ORAL at 08:30

## 2017-07-12 RX ADMIN — ENOXAPARIN SODIUM 40 MG: 40 INJECTION SUBCUTANEOUS at 08:30

## 2017-07-12 NOTE — PLAN OF CARE
Problem: Patient Care Overview (Adult)  Goal: Plan of Care Review  Outcome: Ongoing (interventions implemented as appropriate)    07/12/17 0337   Patient Care Overview   Progress progress toward functional goals as expected   Coping/Psychosocial Response Interventions   Plan Of Care Reviewed With patient       Goal: Adult Individualization and Mutuality  Outcome: Ongoing (interventions implemented as appropriate)  Goal: Discharge Needs Assessment  Outcome: Ongoing (interventions implemented as appropriate)    Problem: Postpartum, Vaginal Delivery (Adult)  Goal: Signs and Symptoms of Listed Potential Problems Will be Absent or Manageable (Postpartum, Vaginal Delivery)  Outcome: Ongoing (interventions implemented as appropriate)

## 2017-07-12 NOTE — H&P
H&P updated. The patient was examined and pt presented with labor and was found to have cervical change from 3-5 cm.  No change in prenatal course.  GBS unknown.  Baby with reassuring status.  Will progress with labor and anticipate delivery.

## 2017-07-12 NOTE — PAYOR COMM NOTE
"CONTACT:  RUT NARVAEZ RN, BSN  UTILIZATION MANAGEMENT DEPT.  [facility]  [address]  [place], [address]  PHONE:  [phone]  FAX: [phone]    REQUEST FOR INPATIENT AUTHORIZATION. PLEASE FAX AUTHORIZATION -[phone]    PT ADMITTED 17, DELIVERED VAGINALLY 17, WELL BABY BOY, REGULAR NURSERY, WEIGHT 2944 GRAMS, APGARS 9/9, EDC 17, GA 36/3,     Theresa Maya (26 y.o. Female)     Date of Birth Social Security Number Address Home Phone MRN    1990  [address]  [place] [address] [phone] [id number]    Temple Marital Status          Spiritism        Admission Date Admission Type Admitting Provider Attending Provider Department, Room/Bed    17 Elective Kris Burciaga DO Thompson, Misty Danielle, DO [facility], W243/    Discharge Date Discharge Disposition Discharge Destination                      Attending Provider: Tika Neff DO     Allergies:  Penicillins, Latex    Isolation:  None   Infection:  None   Code Status:  FULL    Ht:  63\" (160 cm)   Wt:  215 lb (97.5 kg)    Admission Cmt:  None   Principal Problem:  None                Active Insurance as of 2017     Primary Coverage     Payor Plan Insurance Group Employer/Plan Group    [employer]HE[employer] BLUE CROSS ANTHEM BLUE CROSS BLUE SHIELD PPO [id number]     Payor Plan Address Payor Plan Phone Number Effective From Effective To    [address] [phone] [address]     [place] [address]       Subscriber Name Subscriber Birth Date Member ID       [place]JEFF[place] 1991 [id number]           Secondary Coverage     Payor Plan Insurance Group Employer/Plan Group    [facility] MEDICAID      Payor Plan Address Payor Plan Phone Number Effective From Effective To    [address] [address] [phone] 2016     [place] [address]       Subscriber Name Subscriber Birth Date Member ID       THERESA MAYA 1990 [id number]           "       Emergency Contacts      (Rel.) Home Phone Work Phone Mobile Phone    Juve Maya (Spouse) [phone] -- --               History & Physical      H&P filed by Florentin Tavarez MD at 7/11/2017 10:31 AM      Scan on 7/11/2017 : [employer]  07/11/2017 (below)              Electronically signed by Wesley, Scans Incoming at 7/11/2017 10:31 AM           Operative/Procedure Notes (all)      Kris Burciaga DO at 7/11/2017  6:38 PM  Version 1 of 1         [facility]  Vaginal Delivery Note    Delivery     Delivery: Vaginal, Spontaneous Delivery     YOB: 2017    Time of Birth: 6:17 PM      Anesthesia: Epidural     Delivering clinician: Kris Burciaga    Forceps?   No   Vacuum? No    Shoulder dystocia present: No        Delivery narrative:      Infant    Findings: male  infant     Infant observations: Weight: No birth weight on file.   Length:    in  Observations/Comments:         Apgars:    @ 1 minute /       @ 5 minutes   Infant Name:      Placenta, Cord, and Fluid    Placenta delivered     at        Cord:    present.   Nuchal Cord?  no   Cord blood obtained:                     Repair    Episiotomy: Not recorded    Lacerations: No   Estimated Blood Loss:   300 mls.   Suture used for repair:      Complications  none    Disposition  Mother to postpartum in stable condition.    Kris Burciaga DO  07/11/17  6:38 PM         Electronically signed by Kris Burciaga DO at 7/11/2017  6:39 PM           Physician Progress Notes (all)      JONEL Tran at 7/12/2017  6:56 AM  Version 1 of 1          [facility]  Vaginal Delivery Progress Note    Subjective   Subjective  Postpartum Day 1: Vaginal Delivery    The patient feels well.  Her pain is well controlled with nonsteroidal anti-inflammatory drugs.   She is ambulating well.  Patient describes her bleeding as moderate lochia.    Breastfeeding: declines.    Objective     Objective   Vital Signs Range  "for the last 24 hours  Temperature: Temp:  [96.6 °F (35.9 °C)-98.5 °F (36.9 °C)] 98.5 °F (36.9 °C)   Temp Source: Temp src: Oral   BP: BP: ()/(51-85) 128/76   Pulse: Heart Rate:  [] 112   Respirations: Resp:  [18] 18   Weight:       Admit Height:  Height: 63\" (160 cm)    Physical Exam:  General:  no acute distresss.  Abdomen: Fundus: appropriate, firm, non tender  Extremities: normal, atraumatic, no cyanosis, and trace edema.       [unfilled]       Lab Results   Component Value Date    ABO A 07/11/2017    RH Positive 07/11/2017        Lab Results   Component Value Date    HGB 8.6 (L) 07/11/2017    HCT 29.6 (L) 07/11/2017         Assessment/Plan   Assessment & Plan  Active Problems:    Pregnancy    Normal labor      Theresa Maya is Day 1  post-partum  Vaginal, Spontaneous Delivery    .      Plan:  Continue current care.      JONEL Tran  7/12/2017  6:56 AM       Electronically signed by JONEL Tran at 7/12/2017  6:57 AM        "

## 2017-07-12 NOTE — PROGRESS NOTES
"MRAK Maya  Vaginal Delivery Progress Note    Subjective   Subjective  Postpartum Day 1: Vaginal Delivery    The patient feels well.  Her pain is moderated controlled with nonsteroidal anti-inflammatory drugs.   +++ cramping especially after breastfeeding educated on norco as needed.  She is ambulating well.  Patient describes her bleeding as moderate lochia. Denies dizziness + fatigue denies SOA no palpitation 1+ LE edema    Breastfeeding: declines.    Objective     Objective   Vital Signs Range for the last 24 hours  Temperature: Temp:  [96.6 °F (35.9 °C)-98.5 °F (36.9 °C)] 98.5 °F (36.9 °C)   Temp Source: Temp src: Oral   BP: BP: ()/(51-85) 128/76   Pulse: Heart Rate:  [] 112   Respirations: Resp:  [18] 18   Weight:       Admit Height:  Height: 63\" (160 cm)    Physical Exam:  General:  no acute distresss.  Abdomen: Fundus: appropriate, firm, non tender  Extremities: normal, atraumatic, no cyanosis, and trace edema.       [unfilled]       Lab Results   Component Value Date    ABO A 07/11/2017    RH Positive 07/11/2017        Lab Results   Component Value Date    HGB 7.8 (L) 07/12/2017    HCT 27.1 (L) 07/12/2017         Assessment/Plan   Assessment & Plan  Active Problems:    Pregnancy    Normal labor      Theresa Maya is Day 1  post-partum  Vaginal, Spontaneous Delivery    .  Pt came in 8.6 dropped to 7.8 will start venofer today and tomorrow prior to d/c home.     Plan:  Continue current care.      JONEL Tran  7/12/2017  9:05 AM    "

## 2017-07-13 VITALS
TEMPERATURE: 97 F | SYSTOLIC BLOOD PRESSURE: 135 MMHG | HEIGHT: 63 IN | HEART RATE: 83 BPM | WEIGHT: 215 LBS | BODY MASS INDEX: 38.09 KG/M2 | RESPIRATION RATE: 18 BRPM | OXYGEN SATURATION: 100 % | DIASTOLIC BLOOD PRESSURE: 72 MMHG

## 2017-07-13 PROBLEM — Z37.9 NORMAL LABOR: Status: RESOLVED | Noted: 2017-07-11 | Resolved: 2017-07-13

## 2017-07-13 PROCEDURE — 25010000002 ENOXAPARIN PER 10 MG: Performed by: OBSTETRICS & GYNECOLOGY

## 2017-07-13 RX ORDER — FERROUS SULFATE 325(65) MG
325 TABLET ORAL 2 TIMES DAILY WITH MEALS
Qty: 60 TABLET | Refills: 1 | Status: SHIPPED | OUTPATIENT
Start: 2017-07-13 | End: 2017-09-13

## 2017-07-13 RX ORDER — FERROUS SULFATE 325(65) MG
325 TABLET ORAL 2 TIMES DAILY WITH MEALS
Status: DISCONTINUED | OUTPATIENT
Start: 2017-07-13 | End: 2017-07-13 | Stop reason: HOSPADM

## 2017-07-13 RX ORDER — IBUPROFEN 800 MG/1
800 TABLET ORAL EVERY 8 HOURS PRN
Qty: 90 TABLET | Refills: 0 | Status: SHIPPED | OUTPATIENT
Start: 2017-07-13 | End: 2017-09-13

## 2017-07-13 RX ADMIN — HYDROCODONE BITARTRATE AND ACETAMINOPHEN 1 TABLET: 5; 325 TABLET ORAL at 13:40

## 2017-07-13 RX ADMIN — FERROUS SULFATE TAB 325 MG (65 MG ELEMENTAL FE) 325 MG: 325 (65 FE) TAB at 11:44

## 2017-07-13 RX ADMIN — IBUPROFEN 800 MG: 800 TABLET ORAL at 05:09

## 2017-07-13 RX ADMIN — HYDROCODONE BITARTRATE AND ACETAMINOPHEN 1 TABLET: 5; 325 TABLET ORAL at 03:54

## 2017-07-13 RX ADMIN — HYDROCODONE BITARTRATE AND ACETAMINOPHEN 1 TABLET: 5; 325 TABLET ORAL at 08:28

## 2017-07-13 RX ADMIN — ENOXAPARIN SODIUM 40 MG: 40 INJECTION SUBCUTANEOUS at 08:22

## 2017-07-13 RX ADMIN — DOCUSATE SODIUM 100 MG: 100 CAPSULE, LIQUID FILLED ORAL at 08:22

## 2017-07-13 RX ADMIN — IBUPROFEN 800 MG: 800 TABLET ORAL at 13:39

## 2017-07-13 NOTE — PLAN OF CARE
Problem: Patient Care Overview (Adult)  Goal: Plan of Care Review  Outcome: Outcome(s) achieved Date Met:  07/13/17 07/13/17 0941   Patient Care Overview   Progress progress toward functional goals as expected   Outcome Evaluation   Outcome Summary/Follow up Plan patient is doing well, being discharged home able to care for self and infant.    Coping/Psychosocial Response Interventions   Plan Of Care Reviewed With patient       Goal: Adult Individualization and Mutuality  Outcome: Outcome(s) achieved Date Met:  07/13/17  Goal: Discharge Needs Assessment  Outcome: Outcome(s) achieved Date Met:  07/13/17    Problem: Postpartum, Vaginal Delivery (Adult)  Goal: Signs and Symptoms of Listed Potential Problems Will be Absent or Manageable (Postpartum, Vaginal Delivery)  Outcome: Outcome(s) achieved Date Met:  07/13/17

## 2017-07-13 NOTE — DISCHARGE SUMMARY
Discharge Summary: Vaginal Delivery     Admit Date: 2017  4:06 AM    Admit Diagnosis: Pregnancy [Z33.1]  Normal labor [O80, Z37.9]    Date of Discharge:  2017    Discharge Diagnosis: Same        Hospital Course  Patient is a 26 y.o. female, G 8, now P 1,1,4,4. S/P . PPD#2. Patient doing well. No complaints. Patient tolerating a regular diet and ambulating without difficulty. Will discharge to home today.     Procedures Performed  Normal Spontaneous Vaginal Delivery         Vital Signs  Temp:  [97.7 °F (36.5 °C)] 97.7 °F (36.5 °C)  Heart Rate:  [73] 73  Resp:  [16] 16  BP: (122)/(73) 122/73    Review of Systems    The following systems were reviewed and negative;  ENT, respiratory, cardiovascular, gastrointestinal, genitourinary, breast, endocrine and allergies / immunologic.      Physical Exam:      General Appearance:    Alert, cooperative, in no acute distress   Head:    Normocephalic, without obvious abnormality, atraumatic   Eyes:            Lids and lashes normal, conjunctivae and sclerae normal, no   icterus, no pallor, corneas clear, PERRLA   Ears:    Ears appear intact with no abnormalities noted   Throat:   No oral lesions, no thrush, oral mucosa moist   Neck:   No adenopathy, supple, trachea midline, no thyromegaly, no     carotid bruit, no JVD   Back:     No kyphosis present, no scoliosis present, no skin lesions,       erythema or scars, no tenderness to percussion or                   palpation,   range of motion normal   Lungs:     Clear to auscultation,respirations regular, even and                   unlabored    Heart:    Regular rhythm and normal rate, normal S1 and S2, no            murmur, no gallop, no rub, no click   Breast Exam:    Deferred   Abdomen:     Normal bowel sounds, no masses, no organomegaly, soft        non-tender, non-distended, no guarding, no rebound                 tenderness   Genitalia:    Deferred   Extremities:   Moves all extremities well, no edema, no  cyanosis, no              redness   Pulses:   Pulses palpable and equal bilaterally   Skin:   No bleeding, bruising or rash   Lymph nodes:   No palpable adenopathy   Neurologic:   Cranial nerves 2 - 12 grossly intact, sensation intact, DTR        present and equal bilaterally             Condition on Discharge:  Stable    Urine Output Good    Discharge Diet: Regular    Discharge Medications  Motrin 800 mg q 6 #30    Activity at Discharge: No driving x 2 weeks. Nothing per vagina until cleared by a physician. Patient expected to return to work or school in 6 weeks.     Follow-up Appointments  Patient will follow up with Dr. Posey in 2 weeks.        Leo Posey MD.   07/13/17  8:15 AM

## 2017-07-13 NOTE — PLAN OF CARE
Problem: Patient Care Overview (Adult)  Goal: Plan of Care Review  Outcome: Ongoing (interventions implemented as appropriate)    07/13/17 0527   Patient Care Overview   Progress progress toward functional goals as expected   Coping/Psychosocial Response Interventions   Plan Of Care Reviewed With patient       Goal: Adult Individualization and Mutuality  Outcome: Ongoing (interventions implemented as appropriate)  Goal: Discharge Needs Assessment  Outcome: Ongoing (interventions implemented as appropriate)    Problem: Postpartum, Vaginal Delivery (Adult)  Goal: Signs and Symptoms of Listed Potential Problems Will be Absent or Manageable (Postpartum, Vaginal Delivery)  Outcome: Ongoing (interventions implemented as appropriate)

## 2017-07-14 NOTE — PAYOR COMM NOTE
"CONTACT:  RUT NARVAEZ RN, BSN  UTILIZATION MANAGEMENT DEPT.  [facility]  [address]  [place], [address]  PHONE:  [phone]  FAX: [phone]    MOM AND BABY D/C'D HOME 7/13/17. REFER TO AUTH # [id number]    Theresa Maya (26 y.o. Female)     Date of Birth Social Security Number Address Home Phone MRN    1990  [address] [facility] [address]  [place] [address] [phone] [id number]    [employer] Marital Status          [employer]        Admission Date Admission Type Admitting Provider Attending Provider Department, Room/Bed    7/11/17 Elective Kris Burciaga,   Psychiatric, W243/1    Discharge Date Discharge Disposition Discharge Destination        7/13/2017 Home or Self Care Home            Attending Provider: (none)    Allergies:  Penicillins, Latex    Isolation:  None   Infection:  None   Code Status:  Prior    Ht:  63\" (160 cm)   Wt:  215 lb (97.5 kg)    Admission Cmt:  None   Principal Problem:  None                Active Insurance as of 7/11/2017     Primary Coverage     Payor Plan Insurance Group Employer/Plan Group    [employer]HEM BLUE CROSS ANTHEM BLUE CROSS BLUE SHIELD PPO [id number]     Payor Plan Address Payor Plan Phone Number Effective From Effective To    [address] [phone] 9/19/2016     [place] [address]       Subscriber Name Subscriber Birth Date Member ID       JEFFREY MAYA 1/19/1991 [id number]           Secondary Coverage     Payor Plan Insurance Group Employer/Plan Group    [employer] [place] MEDICAID      Payor Plan Address Payor Plan Phone Number Effective From Effective To    [address] [address] [phone] 6/1/2016     [place] [address]       Subscriber Name Subscriber Birth Date Member ID       THERESA MAYA 1990 [id number]                 Emergency Contacts      (Rel.) Home Phone Work Phone Mobile Phone    Jeffrey Maya (Spouse) [phone] -- --               Discharge Summary      Leo Posey " III, MD at 2017  8:15 AM          Discharge Summary: Vaginal Delivery     Admit Date: 2017  4:06 AM    Admit Diagnosis: Pregnancy [Z33.1]  Normal labor [O80, Z37.9]    Date of Discharge:  2017    Discharge Diagnosis: Same        Hospital Course  Patient is a 26 y.o. female, G 8, now P 1,1,4,4. S/P . PPD#2. Patient doing well. No complaints. Patient tolerating a regular diet and ambulating without difficulty. Will discharge to home today.     Procedures Performed  Normal Spontaneous Vaginal Delivery         Vital Signs  Temp:  [97.7 °F (36.5 °C)] 97.7 °F (36.5 °C)  Heart Rate:  [73] 73  Resp:  [16] 16  BP: (122)/(73) 122/73    Review of Systems    The following systems were reviewed and negative;  ENT, respiratory, cardiovascular, gastrointestinal, genitourinary, breast, endocrine and allergies / immunologic.      Physical Exam:      General Appearance:    Alert, cooperative, in no acute distress   Head:    Normocephalic, without obvious abnormality, atraumatic   Eyes:            Lids and lashes normal, conjunctivae and sclerae normal, no   icterus, no pallor, corneas clear, PERRLA   Ears:    Ears appear intact with no abnormalities noted   Throat:   No oral lesions, no thrush, oral mucosa moist   Neck:   No adenopathy, supple, trachea midline, no thyromegaly, no     carotid bruit, no JVD   Back:     No kyphosis present, no scoliosis present, no skin lesions,       erythema or scars, no tenderness to percussion or                   palpation,   range of motion normal   Lungs:     Clear to auscultation,respirations regular, even and                   unlabored    Heart:    Regular rhythm and normal rate, normal S1 and S2, no            murmur, no gallop, no rub, no click   Breast Exam:    Deferred   Abdomen:     Normal bowel sounds, no masses, no organomegaly, soft        non-tender, non-distended, no guarding, no rebound                 tenderness   Genitalia:    Deferred   Extremities:   Moves all  extremities well, no edema, no cyanosis, no              redness   Pulses:   Pulses palpable and equal bilaterally   Skin:   No bleeding, bruising or rash   Lymph nodes:   No palpable adenopathy   Neurologic:   Cranial nerves 2 - 12 grossly intact, sensation intact, DTR        present and equal bilaterally             Condition on Discharge:  Stable    Urine Output Good    Discharge Diet: Regular    Discharge Medications  Motrin 800 mg q 6 #30    Activity at Discharge: No driving x 2 weeks. Nothing per vagina until cleared by a physician. Patient expected to return to work or school in 6 weeks.     Follow-up Appointments  Patient will follow up with Dr. Posey in 2 weeks.        Leo Posey MD.   07/13/17  8:15 AM               Electronically signed by Leo Posey III, MD at 7/13/2017  8:16 AM

## 2017-09-13 ENCOUNTER — APPOINTMENT (OUTPATIENT)
Dept: PREADMISSION TESTING | Facility: HOSPITAL | Age: 27
End: 2017-09-13

## 2017-09-13 VITALS — BODY MASS INDEX: 35.97 KG/M2 | WEIGHT: 203 LBS | HEIGHT: 63 IN

## 2017-09-13 DIAGNOSIS — Z30.2 STERILIZATION: ICD-10-CM

## 2017-09-13 LAB
B-HCG UR QL: NEGATIVE
BASOPHILS # BLD AUTO: 0.02 10*3/MM3 (ref 0–0.3)
BASOPHILS NFR BLD AUTO: 0.4 % (ref 0–2)
DEPRECATED RDW RBC AUTO: 56.6 FL (ref 37–54)
EOSINOPHIL # BLD AUTO: 0.1 10*3/MM3 (ref 0–0.7)
EOSINOPHIL NFR BLD AUTO: 2.1 % (ref 0–5)
ERYTHROCYTE [DISTWIDTH] IN BLOOD BY AUTOMATED COUNT: 19.2 % (ref 11.5–14.5)
HCT VFR BLD AUTO: 36.9 % (ref 37–47)
HGB BLD-MCNC: 11.2 G/DL (ref 12–16)
IMM GRANULOCYTES # BLD: 0.01 10*3/MM3 (ref 0–0.03)
IMM GRANULOCYTES NFR BLD: 0.2 % (ref 0–0.5)
LYMPHOCYTES # BLD AUTO: 1.49 10*3/MM3 (ref 1–3)
LYMPHOCYTES NFR BLD AUTO: 31.3 % (ref 21–51)
MCH RBC QN AUTO: 24.9 PG (ref 27–33)
MCHC RBC AUTO-ENTMCNC: 30.4 G/DL (ref 33–37)
MCV RBC AUTO: 82 FL (ref 80–94)
MONOCYTES # BLD AUTO: 0.42 10*3/MM3 (ref 0.1–0.9)
MONOCYTES NFR BLD AUTO: 8.8 % (ref 0–10)
NEUTROPHILS # BLD AUTO: 2.72 10*3/MM3 (ref 1.4–6.5)
NEUTROPHILS NFR BLD AUTO: 57.2 % (ref 30–70)
PLATELET # BLD AUTO: 297 10*3/MM3 (ref 130–400)
PMV BLD AUTO: 11 FL (ref 6–10)
RBC # BLD AUTO: 4.5 10*6/MM3 (ref 4.2–5.4)
WBC NRBC COR # BLD: 4.76 10*3/MM3 (ref 4.5–12.5)

## 2017-09-13 PROCEDURE — 85025 COMPLETE CBC W/AUTO DIFF WBC: CPT | Performed by: OBSTETRICS & GYNECOLOGY

## 2017-09-13 PROCEDURE — 81025 URINE PREGNANCY TEST: CPT | Performed by: OBSTETRICS & GYNECOLOGY

## 2017-09-13 NOTE — DISCHARGE INSTRUCTIONS
0930---9/15/17  ARRIVAL TIME  TAKE the following medications the morning of surgery:  All heart or blood pressure medications    HOLD all diabetic medications the morning of surgery as ordered by physician.    Please discontinue all blood thinners and anticoagulants (except aspirin) prior to surgery as per your surgeon and cardiologist instructions.  Aspirin may be continued up to the day prior to surgery.     CHLORHEXIDINE CLOTHS GIVEN WITH INSTRUCTIONS AND FORM TO RETURN TO HOSPITAL    General Instructions:  · Do not eat or drink after midnight: includes water, mints, or gum. You may brush your teeth.  Dental appliances that are removable must be taken out day of surgery.  · Do not smoke, chew tobacco, or drink alcohol.  · Bring medications in original bottles, any inhalers and if applicable your C-PAP/BI-PAP machine.  · Bring any papers given to you in the doctor's office.  · Wear clean comfortable clothes and socks.  · Do not wear contact lenses or make-up. Bring a case for your glasses if applicable.  · Bring crutches or walker if applicable.  · Leave all other valuables and jewelry at home.    If you were given a blood bank ID arm band remember to bring it with you the day of surgery.    Preventing a Surgical Site Infection:  Shower on the morning of surgery using a fresh bar of anti-bacterial soap (such as Dial) and clean washcloth. Dry with a clean towel and dress in clean clothing.  For 2 to 3 days before surgery, avoid shaving with a razor near where you will have surgery because the razor can irritate skin and make it easier to develop an infection. Ask your surgeon if you will be receiving antibiotics prior to surgery.  Make sure you, your family, and all healthcare providers clear their hands with soap and water or an alcohol-based hand  before caring for you or your wound.  If at all possible, quit smoking as many days before surgery as you can.    Day of surgery:  Upon arrival, a Pre-op nurse  and Anesthesiologist will review your health history, obtain vital signs, and answer questions you may have. The only belongings needed at this time will be your home medications and if applicable your C-PAP/BI-PAP machine. If you are staying overnight your family can leave the rest of your belongings in the car and bring them to your room later. A Pre-op nurse will start an IV and you may receive medication in preparation for surgery, including something to help you relax. Your family will be able to see you in the Pre-op area. While you are in surgery your family should notify the waiting room  if they leave the waiting room area and provide a contact phone number.    Please be aware that surgery does come with discomfort. We want to make every effort to control your discomfort so please discuss any uncontrolled symptoms with your nurse. Your doctor will most likely have prescribed pain medications.    If you are going home after surgery you will receive individualized written care instructions before being discharged. A responsible adult must drive you to and from the hospital on the day of surgery and stay with you for 24 hours.    If you are staying overnight following surgery, you will be transported to your hospital room following the recovery period.  [facility] has all private rooms.    If you have any questions please call Pre-Admission Testing at [phone].  Deductibles and co-payments are collected on the day of service. Please be prepared to pay the required co-pay, deductible or deposit on the day of service as defined by your plan.

## 2017-09-15 ENCOUNTER — HOSPITAL ENCOUNTER (OUTPATIENT)
Facility: HOSPITAL | Age: 27
Setting detail: HOSPITAL OUTPATIENT SURGERY
Discharge: HOME | End: 2017-09-15
Attending: OBSTETRICS & GYNECOLOGY | Admitting: OBSTETRICS & GYNECOLOGY

## 2017-09-15 ENCOUNTER — ANESTHESIA (OUTPATIENT)
Dept: PERIOP | Facility: HOSPITAL | Age: 27
End: 2017-09-15

## 2017-09-15 ENCOUNTER — ANESTHESIA EVENT (OUTPATIENT)
Dept: PERIOP | Facility: HOSPITAL | Age: 27
End: 2017-09-15

## 2017-09-15 VITALS
DIASTOLIC BLOOD PRESSURE: 88 MMHG | SYSTOLIC BLOOD PRESSURE: 134 MMHG | OXYGEN SATURATION: 98 % | HEIGHT: 63 IN | BODY MASS INDEX: 36.14 KG/M2 | TEMPERATURE: 98.2 F | RESPIRATION RATE: 16 BRPM | HEART RATE: 59 BPM | WEIGHT: 204 LBS

## 2017-09-15 DIAGNOSIS — Z30.2 STERILIZATION: ICD-10-CM

## 2017-09-15 PROCEDURE — 25010000002 FENTANYL CITRATE (PF) 100 MCG/2ML SOLUTION: Performed by: NURSE ANESTHETIST, CERTIFIED REGISTERED

## 2017-09-15 PROCEDURE — 25010000002 DEXAMETHASONE PER 1 MG: Performed by: NURSE ANESTHETIST, CERTIFIED REGISTERED

## 2017-09-15 PROCEDURE — 25010000002 MIDAZOLAM PER 1 MG: Performed by: NURSE ANESTHETIST, CERTIFIED REGISTERED

## 2017-09-15 PROCEDURE — 25010000002 NEOSTIGMINE 10 MG/10ML SOLUTION: Performed by: NURSE ANESTHETIST, CERTIFIED REGISTERED

## 2017-09-15 PROCEDURE — 25010000002 PROPOFOL 10 MG/ML EMULSION: Performed by: NURSE ANESTHETIST, CERTIFIED REGISTERED

## 2017-09-15 PROCEDURE — 25010000002 ONDANSETRON PER 1 MG: Performed by: NURSE ANESTHETIST, CERTIFIED REGISTERED

## 2017-09-15 DEVICE — CLIP FALLOP FILSHIE TI PR STRL BX/20: Type: IMPLANTABLE DEVICE | Site: FALLOPIAN TUBE | Status: FUNCTIONAL

## 2017-09-15 RX ORDER — OXYCODONE HYDROCHLORIDE AND ACETAMINOPHEN 5; 325 MG/1; MG/1
1 TABLET ORAL ONCE AS NEEDED
Status: DISCONTINUED | OUTPATIENT
Start: 2017-09-15 | End: 2017-09-15 | Stop reason: HOSPADM

## 2017-09-15 RX ORDER — SODIUM CHLORIDE 0.9 % (FLUSH) 0.9 %
1-10 SYRINGE (ML) INJECTION AS NEEDED
Status: DISCONTINUED | OUTPATIENT
Start: 2017-09-15 | End: 2017-09-15 | Stop reason: HOSPADM

## 2017-09-15 RX ORDER — NEOSTIGMINE METHYLSULFATE 1 MG/ML
INJECTION, SOLUTION INTRAVENOUS AS NEEDED
Status: DISCONTINUED | OUTPATIENT
Start: 2017-09-15 | End: 2017-09-15 | Stop reason: SURG

## 2017-09-15 RX ORDER — GLYCOPYRROLATE 0.2 MG/ML
INJECTION INTRAMUSCULAR; INTRAVENOUS AS NEEDED
Status: DISCONTINUED | OUTPATIENT
Start: 2017-09-15 | End: 2017-09-15 | Stop reason: SURG

## 2017-09-15 RX ORDER — MIDAZOLAM HYDROCHLORIDE 1 MG/ML
INJECTION INTRAMUSCULAR; INTRAVENOUS AS NEEDED
Status: DISCONTINUED | OUTPATIENT
Start: 2017-09-15 | End: 2017-09-15 | Stop reason: SURG

## 2017-09-15 RX ORDER — LIDOCAINE HYDROCHLORIDE 20 MG/ML
INJECTION, SOLUTION INFILTRATION; PERINEURAL AS NEEDED
Status: DISCONTINUED | OUTPATIENT
Start: 2017-09-15 | End: 2017-09-15 | Stop reason: SURG

## 2017-09-15 RX ORDER — SODIUM CHLORIDE, SODIUM LACTATE, POTASSIUM CHLORIDE, CALCIUM CHLORIDE 600; 310; 30; 20 MG/100ML; MG/100ML; MG/100ML; MG/100ML
125 INJECTION, SOLUTION INTRAVENOUS CONTINUOUS
Status: DISCONTINUED | OUTPATIENT
Start: 2017-09-15 | End: 2017-09-15 | Stop reason: HOSPADM

## 2017-09-15 RX ORDER — MAGNESIUM HYDROXIDE 1200 MG/15ML
LIQUID ORAL AS NEEDED
Status: DISCONTINUED | OUTPATIENT
Start: 2017-09-15 | End: 2017-09-15 | Stop reason: HOSPADM

## 2017-09-15 RX ORDER — ROCURONIUM BROMIDE 10 MG/ML
INJECTION, SOLUTION INTRAVENOUS AS NEEDED
Status: DISCONTINUED | OUTPATIENT
Start: 2017-09-15 | End: 2017-09-15 | Stop reason: SURG

## 2017-09-15 RX ORDER — ONDANSETRON 2 MG/ML
4 INJECTION INTRAMUSCULAR; INTRAVENOUS ONCE AS NEEDED
Status: DISCONTINUED | OUTPATIENT
Start: 2017-09-15 | End: 2017-09-15 | Stop reason: HOSPADM

## 2017-09-15 RX ORDER — PROPOFOL 10 MG/ML
VIAL (ML) INTRAVENOUS AS NEEDED
Status: DISCONTINUED | OUTPATIENT
Start: 2017-09-15 | End: 2017-09-15 | Stop reason: SURG

## 2017-09-15 RX ORDER — FENTANYL CITRATE 50 UG/ML
50 INJECTION, SOLUTION INTRAMUSCULAR; INTRAVENOUS
Status: DISCONTINUED | OUTPATIENT
Start: 2017-09-15 | End: 2017-09-15 | Stop reason: HOSPADM

## 2017-09-15 RX ORDER — ONDANSETRON 2 MG/ML
INJECTION INTRAMUSCULAR; INTRAVENOUS AS NEEDED
Status: DISCONTINUED | OUTPATIENT
Start: 2017-09-15 | End: 2017-09-15 | Stop reason: SURG

## 2017-09-15 RX ORDER — FENTANYL CITRATE 50 UG/ML
INJECTION, SOLUTION INTRAMUSCULAR; INTRAVENOUS AS NEEDED
Status: DISCONTINUED | OUTPATIENT
Start: 2017-09-15 | End: 2017-09-15 | Stop reason: SURG

## 2017-09-15 RX ORDER — IPRATROPIUM BROMIDE AND ALBUTEROL SULFATE 2.5; .5 MG/3ML; MG/3ML
3 SOLUTION RESPIRATORY (INHALATION) ONCE AS NEEDED
Status: DISCONTINUED | OUTPATIENT
Start: 2017-09-15 | End: 2017-09-15 | Stop reason: HOSPADM

## 2017-09-15 RX ORDER — MEPERIDINE HYDROCHLORIDE 25 MG/ML
12.5 INJECTION INTRAMUSCULAR; INTRAVENOUS; SUBCUTANEOUS
Status: DISCONTINUED | OUTPATIENT
Start: 2017-09-15 | End: 2017-09-15 | Stop reason: HOSPADM

## 2017-09-15 RX ORDER — DEXAMETHASONE SODIUM PHOSPHATE 10 MG/ML
INJECTION INTRAMUSCULAR; INTRAVENOUS AS NEEDED
Status: DISCONTINUED | OUTPATIENT
Start: 2017-09-15 | End: 2017-09-15 | Stop reason: SURG

## 2017-09-15 RX ADMIN — LIDOCAINE HYDROCHLORIDE 60 MG: 20 INJECTION, SOLUTION INFILTRATION; PERINEURAL at 10:37

## 2017-09-15 RX ADMIN — FENTANYL CITRATE 50 MCG: 50 INJECTION INTRAMUSCULAR; INTRAVENOUS at 10:59

## 2017-09-15 RX ADMIN — ONDANSETRON 4 MG: 2 INJECTION, SOLUTION INTRAMUSCULAR; INTRAVENOUS at 10:37

## 2017-09-15 RX ADMIN — ROCURONIUM BROMIDE 20 MG: 10 INJECTION INTRAVENOUS at 10:37

## 2017-09-15 RX ADMIN — FENTANYL CITRATE 50 MCG: 50 INJECTION INTRAMUSCULAR; INTRAVENOUS at 11:01

## 2017-09-15 RX ADMIN — MEPERIDINE HYDROCHLORIDE 12.5 MG: 25 INJECTION, SOLUTION INTRAMUSCULAR; INTRAVENOUS; SUBCUTANEOUS at 11:16

## 2017-09-15 RX ADMIN — FENTANYL CITRATE 100 MCG: 50 INJECTION INTRAMUSCULAR; INTRAVENOUS at 10:34

## 2017-09-15 RX ADMIN — PROPOFOL 150 MG: 10 INJECTION, EMULSION INTRAVENOUS at 10:37

## 2017-09-15 RX ADMIN — MIDAZOLAM HYDROCHLORIDE 2 MG: 1 INJECTION, SOLUTION INTRAMUSCULAR; INTRAVENOUS at 10:34

## 2017-09-15 RX ADMIN — DEXAMETHASONE SODIUM PHOSPHATE 4 MG: 10 INJECTION INTRAMUSCULAR; INTRAVENOUS at 10:37

## 2017-09-15 RX ADMIN — GLYCOPYRROLATE 0.4 MG: 0.2 INJECTION, SOLUTION INTRAMUSCULAR; INTRAVENOUS at 10:53

## 2017-09-15 RX ADMIN — NEOSTIGMINE METHYLSULFATE 3 MG: 1 INJECTION, SOLUTION INTRAVENOUS at 10:53

## 2017-09-15 RX ADMIN — SODIUM CHLORIDE, POTASSIUM CHLORIDE, SODIUM LACTATE AND CALCIUM CHLORIDE 125 ML/HR: 600; 310; 30; 20 INJECTION, SOLUTION INTRAVENOUS at 10:03

## 2017-09-15 NOTE — ANESTHESIA PROCEDURE NOTES
Airway  Urgency: elective    Date/Time: 9/15/2017 10:38 AM  Airway not difficult    General Information and Staff    Patient location during procedure: OR  Anesthesiologist: VERONICA CAMPBELL  CRNA: YADIEL ROBERT    Indications and Patient Condition  Indications for airway management: airway protection    Preoxygenated: yes  MILS maintained throughout  Mask difficulty assessment: 0 - not attempted    Final Airway Details  Final airway type: endotracheal airway      Successful airway: ETT  Cuffed: yes   Successful intubation technique: direct laryngoscopy  Facilitating devices/methods: intubating stylet  Endotracheal tube insertion site: oral  Blade: Tonia  Blade size: #3  ETT size: 7.0 mm  Cormack-Lehane Classification: grade I - full view of glottis  Placement verified by: chest auscultation, capnometry and palpation of cuff   Cuff volume (mL): 10  Measured from: lips  ETT to lips (cm): 21  Number of attempts at approach: 1    Additional Comments  Dentition and lips same as preop

## 2017-09-15 NOTE — H&P (VIEW-ONLY)
Chief complaint Undesired Fertility    History of Present Illness: Patient is a 27 y.o. female who presents for a laparoscopic BTL       Past Medical History:   Diagnosis Date   • Abnormal Pap smear of cervix    • Acid reflux    • Allergic    • Anxiety    • Clotting disorder    • Deep vein thrombosis    • Depression    • Gallbladder abscess    • Hyperemesis gravidarum    • Migraines    • Ovarian cyst    • PMS (premenstrual syndrome)    • Recurrent pregnancy loss, antepartum condition or complication    • Urinary tract infection    Missed Ab x5  MTHFR  G7, P2, A5    Past Surgical History:   Procedure Laterality Date   • CHOLECYSTECTOMY     • NASAL RECONSTRUCTION Bilateral 06/2009   • SINUS SURGERY         Family History   Problem Relation Age of Onset   • Asthma Mother    • Thyroid disease Mother    • Lupus Sister    • Ovarian cancer Sister    • Asthma Sister    • Lung cancer Paternal Grandmother    • Breast cancer Maternal Grandmother    • Lung cancer Maternal Grandmother    • Asthma Maternal Grandmother    • Hypertension Maternal Grandmother    • Lupus Maternal Grandmother    • Thyroid disease Maternal Grandmother    • Hypertension Maternal Grandfather        Social History   Substance Use Topics   • Smoking status: Never Smoker   • Smokeless tobacco: Never Used   • Alcohol use No         (Not in a hospital admission)    Allergies:  Penicillins and Latex      Vital Signs  Height 63 in  Weight 200 lbs  BMI 35  /80  Pulse 71  Temp 97.7  Pulse Ox 98    Review of Systems    The following systems were reviewed and negative;  ENT, respiratory, cardiovascular, gastrointestinal, genitourinary, breast, endocrine and allergies / immunologic.      Physical Exam:      General Appearance:    Alert, cooperative, in no acute distress   Head:    Normocephalic, without obvious abnormality, atraumatic   Eyes:            Lids and lashes normal, conjunctivae and sclerae normal, no   icterus, no pallor, corneas clear, PERRLA    Ears:    Ears appear intact with no abnormalities noted   Throat:   No oral lesions, no thrush, oral mucosa moist   Neck:   No adenopathy, supple, trachea midline, no thyromegaly, no     carotid bruit, no JVD   Back:     No kyphosis present, no scoliosis present, no skin lesions,       erythema or scars, no tenderness to percussion or                   palpation,   range of motion normal   Lungs:     Clear to auscultation,respirations regular, even and                   unlabored    Heart:    Regular rhythm and normal rate, normal S1 and S2, no            murmur, no gallop, no rub, no click   Breast Exam:    Deferred   Abdomen:     Normal bowel sounds, no masses, no organomegaly, soft        non-tender, non-distended, no guarding, no rebound                 tenderness   Genitalia:    Deferred   Extremities:   Moves all extremities well, no edema, no cyanosis, no              redness   Pulses:   Pulses palpable and equal bilaterally   Skin:   No bleeding, bruising or rash   Lymph nodes:   No palpable adenopathy   Neurologic:   Cranial nerves 2 - 12 grossly intact, sensation intact, DTR        present and equal bilaterally         Assessment: Patient is a 27 y.o. female who presents with undesired fertility.    Plan of Care: Proceed with laparoscopic BTL      Leo Posey III, MD  08/16/17  5:43 PM

## 2017-09-15 NOTE — PLAN OF CARE
Problem: Patient Care Overview (Adult)  Goal: Plan of Care Review  Outcome: Ongoing (interventions implemented as appropriate)    09/15/17 1007   Coping/Psychosocial Response Interventions   Plan Of Care Reviewed With patient   Patient Care Overview   Progress progress toward functional goals as expected

## 2017-09-15 NOTE — ANESTHESIA PREPROCEDURE EVALUATION
Anesthesia Evaluation     NPO Solid Status: > 8 hours  NPO Liquid Status: > 8 hours     Airway   Mallampati: II  TM distance: >3 FB  Neck ROM: full  Dental - normal exam     Pulmonary - normal exam   Cardiovascular - normal exam    (+) DVT,       Neuro/Psych  (+) headaches,    GI/Hepatic/Renal/Endo    (+)  GERD,     Musculoskeletal     Abdominal  - normal exam   Substance History      OB/GYN          Other                                        Anesthesia Plan    ASA 3     general     intravenous induction   Anesthetic plan and risks discussed with patient.

## 2017-09-15 NOTE — PLAN OF CARE
Problem: Patient Care Overview (Adult)  Goal: Discharge Needs Assessment  Outcome: Ongoing (interventions implemented as appropriate)    09/15/17 1006   Discharge Needs Assessment   Concerns To Be Addressed no discharge needs identified   Readmission Within The Last 30 Days no previous admission in last 30 days   Equipment Needed After Discharge none   Discharge Disposition home or self-care   Current Health   Anticipated Changes Related to Illness none   Self-Care   Equipment Currently Used at Home none   Living Environment   Transportation Available car

## 2017-09-15 NOTE — OP NOTE
Bilateral Tubal Ligation Operation Note    Theresa Maya  9/15/2017    Pre-op Diagnosis:   Z30.2    Post-op Diagnosis:     Same    Procedure(s):  BILATERAL TUBAL FALLOPE FILSHIE CLIPPING LAPAROSCOPIC     Surgeon(s):  Leo Posey III, MD    Anesthesia: General    Staff:   Circulator: Brooklyn Simon RN  Scrub Person: Sun Wooten  Assistant: Sallie Lynn CSA    Estimated Blood Loss: *No blood loss documented*    Specimens:                * No orders in the log *      Procedure     The patient was prepped and draped in normal sterile fashion. Umbillical incision was made with a knife and carried down to the underlying facia. The facia was nicked. A nonbladed trocar was placed under direct visualization. A suprapubic port was also placed. Two Filshie clips were placed on each fallopian tube. Both tubes were noted to be totally occluded. The fascia was closed with 0 Dexon. The skin was closed with 4-0 Monocryl. The patient tolerated the procedure and went to recovery room in stable condition.         Leo Posey III, MD     Date: 9/15/2017  Time: 11:07 AM

## 2017-09-15 NOTE — ANESTHESIA POSTPROCEDURE EVALUATION
Patient: Theresa Maya    Procedure Summary     Date Anesthesia Start Anesthesia Stop Room / Location    09/15/17 1034 1101  [facility] OR 04 / [facility] OR       Procedure Diagnosis Surgeon Provider    BILATERAL TUBAL FALLOPE FILSHIE CLIPPING LAPAROSCOPIC (Bilateral Vagina) (Z30.2) MD Jerry Calderón III, MD          Anesthesia Type: general  Last vitals  BP        Temp        Pulse       Resp        SpO2          Post Anesthesia Care and Evaluation    Patient location during evaluation: bedside  Patient participation: complete - patient participated  Level of consciousness: awake and alert  Pain score: 1  Pain management: adequate  Airway patency: patent  Anesthetic complications: No anesthetic complications  PONV Status: none  Cardiovascular status: acceptable  Respiratory status: acceptable  Hydration status: acceptable

## 2017-09-15 NOTE — PLAN OF CARE
Problem: Perioperative Period (Adult)  Goal: Signs and Symptoms of Listed Potential Problems Will be Absent or Manageable (Perioperative Period)  Outcome: Ongoing (interventions implemented as appropriate)    09/15/17 1006   Perioperative Period   Problems Assessed (Perioperative Period) pain   Problems Present (Perioperative Period) none

## 2017-10-07 ENCOUNTER — OFFICE VISIT (OUTPATIENT)
Dept: RETAIL CLINIC | Facility: CLINIC | Age: 27
End: 2017-10-07

## 2017-10-07 VITALS — BODY MASS INDEX: 36.25 KG/M2 | HEIGHT: 63 IN | TEMPERATURE: 98.2 F | WEIGHT: 204.6 LBS

## 2017-10-07 DIAGNOSIS — J02.9 SORE THROAT: Primary | ICD-10-CM

## 2017-10-07 LAB
EXPIRATION DATE: NORMAL
INTERNAL CONTROL: NORMAL
Lab: NORMAL
S PYO AG THROAT QL: NEGATIVE

## 2017-10-07 PROCEDURE — 87880 STREP A ASSAY W/OPTIC: CPT | Performed by: NURSE PRACTITIONER

## 2017-10-07 PROCEDURE — 99213 OFFICE O/P EST LOW 20 MIN: CPT | Performed by: NURSE PRACTITIONER

## 2017-10-07 RX ORDER — FLUTICASONE PROPIONATE 50 MCG
2 SPRAY, SUSPENSION (ML) NASAL DAILY
Qty: 1 BOTTLE | Refills: 0 | Status: SHIPPED | OUTPATIENT
Start: 2017-10-07 | End: 2017-11-06

## 2017-10-07 NOTE — PROGRESS NOTES
"Subjective   Theresa Maya is a 27 y.o. female.   Chief Complaint   Patient presents with   • Sore Throat      Sore Throat    This is a new problem. The current episode started in the past 7 days (3-4 days). Associated symptoms include congestion and coughing. Pertinent negatives include no shortness of breath. She has tried nothing for the symptoms.        Theresa presents to [facility] with cc of sore throat  and congestionfor 3-4 days and low grade fever for 2 days.  Reviewed PMFSH.  See ROS.        The following portions of the patient's history were reviewed and updated as appropriate: allergies, current medications, past family history, past medical history, past social history, past surgical history and problem list.    Review of Systems   Constitutional: Negative for fatigue.   HENT: Positive for congestion and sore throat.    Respiratory: Positive for cough. Negative for shortness of breath.    Cardiovascular: Negative for chest pain.   Endocrine: Negative for cold intolerance.   Skin: Negative for rash.   Hematological: Negative for adenopathy.     Temp 98.2 °F (36.8 °C)  Ht 63\" (160 cm)  Wt 204 lb 9.6 oz (92.8 kg)  LMP 09/18/2016  Breastfeeding? Yes Comment: has a 3 month old  BMI 36.24 kg/m2    Objective     Current Outpatient Prescriptions:   •  folic acid (FOLVITE) 1 MG tablet, Take 4 mg by mouth Daily., Disp: , Rfl:   •  Prenatal Vit-Fe Fumarate-FA (PRENATAL PLUS PO), Take 1 tablet by mouth daily., Disp: , Rfl:   •  fluticasone (FLONASE) 50 MCG/ACT nasal spray, 2 sprays into each nostril Daily for 30 days., Disp: 1 bottle, Rfl: 0  Allergies   Allergen Reactions   • Penicillins Anaphylaxis   • Latex Itching       Physical Exam   Constitutional: She is oriented to person, place, and time. She appears well-developed and well-nourished. No distress.   HENT:   Head: Normocephalic.   Right Ear: Tympanic membrane and external ear normal.   Left Ear: Tympanic membrane and external ear normal.   Nose: " Mucosal edema present. Right sinus exhibits no maxillary sinus tenderness and no frontal sinus tenderness. Left sinus exhibits no maxillary sinus tenderness and no frontal sinus tenderness.   Mouth/Throat: Uvula is midline and mucous membranes are normal. Posterior oropharyngeal erythema present. No oropharyngeal exudate. Tonsils are 2+ on the right. Tonsils are 2+ on the left. No tonsillar exudate.   Eyes: Conjunctivae and EOM are normal. Pupils are equal, round, and reactive to light.   Neck: Normal range of motion. Neck supple.   Cardiovascular: Normal rate, regular rhythm and normal heart sounds.    Pulmonary/Chest: Effort normal and breath sounds normal. No respiratory distress.   Abdominal: Soft. Bowel sounds are normal.   Musculoskeletal: Normal range of motion.   Lymphadenopathy:     She has no cervical adenopathy.   Neurological: She is alert and oriented to person, place, and time.   Skin: Skin is warm and dry. No rash noted.   Psychiatric: She has a normal mood and affect. Her behavior is normal. Judgment and thought content normal.   Nursing note and vitals reviewed.      Assessment/Plan   Theresa was seen today for sore throat.    Diagnoses and all orders for this visit:    Sore throat  -     fluticasone (FLONASE) 50 MCG/ACT nasal spray; 2 sprays into each nostril Daily for 30 days.  -     POCT rapid strep A      Results for orders placed or performed in visit on 10/07/17   POCT rapid strep A   Result Value Ref Range    Rapid Strep A Screen Negative Negative, VALID, INVALID, Not Performed    Internal Control Passed Passed    Lot Number [id number]     Expiration Date 2/19

## 2017-10-07 NOTE — PATIENT INSTRUCTIONS
Sore Throat  A sore throat is pain, burning, irritation, or scratchiness in the throat. When you have a sore throat, you may feel pain or tenderness in your throat when you swallow or talk.  Many things can cause a sore throat, including:  · An infection.  · Seasonal allergies.  · Dryness in the air.  · Irritants, such as smoke or pollution.  · Gastroesophageal reflux disease (GERD).  · A tumor.  A sore throat is often the first sign of another sickness. It may happen with other symptoms, such as coughing, sneezing, fever, and swollen neck glands. Most sore throats go away without medical treatment.  HOME CARE INSTRUCTIONS  · Take over-the-counter medicines only as told by your health care provider.  · Drink enough fluids to keep your urine clear or pale yellow.  · Rest as needed.  · To help with pain, try:    Sipping warm liquids, such as broth, herbal tea, or warm water.    Eating or drinking cold or frozen liquids, such as frozen ice pops.    Gargling with a salt-water mixture 3-4 times a day or as needed. To make a salt-water mixture, completely dissolve ½-1 tsp of salt in 1 cup of warm water.    Sucking on hard candy or throat lozenges.    Putting a cool-mist humidifier in your bedroom at night to moisten the air.    Sitting in the bathroom with the door closed for 5-10 minutes while you run hot water in the shower.  · Do not use any tobacco products, such as cigarettes, chewing tobacco, and e-cigarettes. If you need help quitting, ask your health care provider.  SEEK MEDICAL CARE IF:  · You have a fever for more than 2-3 days.  · You have symptoms that last (are persistent) for more than 2-3 days.  · Your throat does not get better within 7 days.  · You have a fever and your symptoms suddenly get worse.  SEEK IMMEDIATE MEDICAL CARE IF:  · You have difficulty breathing.  · You cannot swallow fluids, soft foods, or your saliva.  · You have increased swelling in your throat or neck.  · You have persistent nausea  and vomiting.     This information is not intended to replace advice given to you by your health care provider. Make sure you discuss any questions you have with your health care provider.     Document Released: 01/25/2006 Document Revised: 04/10/2017 Document Reviewed: 10/07/2016  [employer]vier Interactive Patient Education ©2017 [employer]

## 2017-11-02 ENCOUNTER — OFFICE VISIT (OUTPATIENT)
Dept: RETAIL CLINIC | Facility: CLINIC | Age: 27
End: 2017-11-02

## 2017-11-02 VITALS
TEMPERATURE: 97.8 F | BODY MASS INDEX: 36.28 KG/M2 | RESPIRATION RATE: 18 BRPM | HEART RATE: 92 BPM | OXYGEN SATURATION: 98 % | WEIGHT: 204.8 LBS

## 2017-11-02 DIAGNOSIS — R05.9 COUGH: ICD-10-CM

## 2017-11-02 DIAGNOSIS — J01.00 ACUTE MAXILLARY SINUSITIS, RECURRENCE NOT SPECIFIED: Primary | ICD-10-CM

## 2017-11-02 PROCEDURE — 99213 OFFICE O/P EST LOW 20 MIN: CPT | Performed by: NURSE PRACTITIONER

## 2017-11-02 RX ORDER — ALBUTEROL SULFATE 90 UG/1
2 AEROSOL, METERED RESPIRATORY (INHALATION) EVERY 4 HOURS PRN
Qty: 1 INHALER | Refills: 0 | Status: SHIPPED | OUTPATIENT
Start: 2017-11-02 | End: 2017-12-02

## 2017-11-02 RX ORDER — CEPHALEXIN 500 MG/1
500 CAPSULE ORAL 3 TIMES DAILY
Qty: 30 CAPSULE | Refills: 0 | Status: SHIPPED | OUTPATIENT
Start: 2017-11-02 | End: 2017-11-12

## 2017-11-02 NOTE — PATIENT INSTRUCTIONS
Sinusitis, Adult  Sinusitis is soreness and inflammation of your sinuses. Sinuses are hollow spaces in the bones around your face. They are located:  · Around your eyes.  · In the middle of your forehead.  · Behind your nose.  · In your cheekbones.  Your sinuses and nasal passages are lined with a stringy fluid (mucus). Mucus normally drains out of your sinuses. When your nasal tissues get inflamed or swollen, the mucus can get trapped or blocked so air cannot flow through your sinuses. This lets bacteria, viruses, and funguses grow, and that leads to infection.  HOME CARE  Medicines  · Take, use, or apply over-the-counter and prescription medicines only as told by your doctor. These may include nasal sprays.  · If you were prescribed an antibiotic medicine, take it as told by your doctor. Do not stop taking the antibiotic even if you start to feel better.  Hydrate and Humidify  · Drink enough water to keep your pee (urine) clear or pale yellow.  · Use a cool mist humidifier to keep the humidity level in your home above 50%.  · Breathe in steam for 10-15 minutes, 3-4 times a day or as told by your doctor. You can do this in the bathroom while a hot shower is running.  · Try not to spend time in cool or dry air.  Rest  · Rest as much as possible.    · Sleep with your head raised (elevated).  · Make sure to get enough sleep each night.  General Instructions  · Put a warm, moist washcloth on your face 3-4 times a day or as told by your doctor. This will help with discomfort.  · Wash your hands often with soap and water. If there is no soap and water, use hand .  · Do not smoke. Avoid being around people who are smoking (secondhand smoke).  · Keep all follow-up visits as told by your doctor. This is important.  GET HELP IF:  · You have a fever.  · Your symptoms get worse.  · Your symptoms do not get better within 10 days.  GET HELP RIGHT AWAY IF:  · You have a very bad headache.  · You cannot stop throwing up  (vomiting).  · You have pain or swelling around your face or eyes.  · You have trouble seeing.  · You feel confused.  · Your neck is stiff.  · You have trouble breathing.     This information is not intended to replace advice given to you by your health care provider. Make sure you discuss any questions you have with your health care provider.     Document Released: 06/05/2009 Document Revised: 04/10/2017 Document Reviewed: 10/12/2016  [employer] Interactive Patient Education ©2017 [employer]    Cough, Adult  A cough helps to clear your throat and lungs. A cough may last only 2-3 weeks (acute), or it may last longer than 8 weeks (chronic). Many different things can cause a cough. A cough may be a sign of an illness or another medical condition.  HOME CARE  · Pay attention to any changes in your cough.  · Take medicines only as told by your doctor.    If you were prescribed an antibiotic medicine, take it as told by your doctor. Do not stop taking it even if you start to feel better.    Talk with your doctor before you try using a cough medicine.  · Drink enough fluid to keep your pee (urine) clear or pale yellow.  · If the air is dry, use a cold steam vaporizer or humidifier in your home.  · Stay away from things that make you cough at work or at home.  · If your cough is worse at night, try using extra pillows to raise your head up higher while you sleep.  · Do not smoke, and try not to be around smoke. If you need help quitting, ask your doctor.  · Do not have caffeine.  · Do not drink alcohol.  · Rest as needed.  GET HELP IF:  · You have new problems (symptoms).  · You cough up yellow fluid (pus).  · Your cough does not get better after 2-3 weeks, or your cough gets worse.  · Medicine does not help your cough and you are not sleeping well.  · You have pain that gets worse or pain that is not helped with medicine.  · You have a fever.  · You are losing weight and you do not know why.  · You have night  sweats.  GET HELP RIGHT AWAY IF:  · You cough up blood.  · You have trouble breathing.  · Your heartbeat is very fast.     This information is not intended to replace advice given to you by your health care provider. Make sure you discuss any questions you have with your health care provider.     Document Released: 08/30/2012 Document Revised: 09/07/2016 Document Reviewed: 02/24/2016  [employer] Interactive Patient Education ©2017 [employer] Inc.

## 2017-11-02 NOTE — PROGRESS NOTES
Subjective   Theresa Maya is a 27 y.o. female.   Chief Complaint   Patient presents with   • URI      URI    This is a new problem. The current episode started in the past 7 days. The problem has been gradually worsening. There has been no fever. Associated symptoms include congestion, coughing, headaches and sinus pain. She has tried nothing for the symptoms. The treatment provided no relief.        The following portions of the patient's history were reviewed and updated as appropriate: allergies, current medications, past family history, past medical history, past social history, past surgical history and problem list.    Review of Systems   Constitutional: Positive for fatigue.   HENT: Positive for congestion, postnasal drip, sinus pain and sinus pressure.    Eyes: Negative.    Respiratory: Positive for cough and chest tightness. Negative for shortness of breath.    Gastrointestinal: Negative.    Genitourinary: Negative.    Skin: Negative.    Allergic/Immunologic: Negative.    Neurological: Positive for headaches.   Psychiatric/Behavioral: Negative.    All other systems reviewed and are negative.      Objective   Allergies   Allergen Reactions   • Penicillins Anaphylaxis   • Latex Itching       Physical Exam   Constitutional: She is oriented to person, place, and time. She appears well-developed and well-nourished.   HENT:   Nose: Mucosal edema present. Right sinus exhibits maxillary sinus tenderness. Left sinus exhibits maxillary sinus tenderness.   Mouth/Throat: Oropharynx is clear and moist.   Eyes: Pupils are equal, round, and reactive to light.   Neck: Neck supple.   Cardiovascular: Normal rate and regular rhythm.    Pulmonary/Chest: Effort normal and breath sounds normal. She has no wheezes. She has no rhonchi.   Coarse/tight breath sounds   Abdominal: Soft. Bowel sounds are normal.   Musculoskeletal: Normal range of motion.   Neurological: She is alert and oriented to person, place, and time.    Skin: Skin is warm and dry.   Psychiatric: She has a normal mood and affect.   Vitals reviewed.      Assessment/Plan   Theresa was seen today for uri.    Diagnoses and all orders for this visit:    Acute maxillary sinusitis, recurrence not specified    Cough    Other orders  -     cephalexin (KEFLEX) 500 MG capsule; Take 1 capsule by mouth 3 (Three) Times a Day for 10 days.  -     albuterol (PROVENTIL HFA;VENTOLIN HFA) 108 (90 Base) MCG/ACT inhaler; Inhale 2 puffs Every 4 (Four) Hours As Needed for Wheezing or Shortness of Air (cough) for up to 30 days.                 This document has been electronically signed by PHYLICIA Avendano November 2, 2017 4:16 PM

## 2018-01-27 ENCOUNTER — APPOINTMENT (OUTPATIENT)
Dept: GENERAL RADIOLOGY | Facility: HOSPITAL | Age: 28
End: 2018-01-27

## 2018-01-27 ENCOUNTER — HOSPITAL ENCOUNTER (EMERGENCY)
Facility: HOSPITAL | Age: 28
Discharge: HOME | End: 2018-01-27
Admitting: NURSE PRACTITIONER

## 2018-01-27 VITALS
SYSTOLIC BLOOD PRESSURE: 132 MMHG | TEMPERATURE: 97.6 F | OXYGEN SATURATION: 98 % | BODY MASS INDEX: 40.48 KG/M2 | WEIGHT: 220 LBS | DIASTOLIC BLOOD PRESSURE: 84 MMHG | RESPIRATION RATE: 18 BRPM | HEART RATE: 99 BPM | HEIGHT: 62 IN

## 2018-01-27 DIAGNOSIS — M54.32 SCIATICA OF LEFT SIDE: Primary | ICD-10-CM

## 2018-01-27 LAB
ALBUMIN SERPL-MCNC: 4.4 G/DL (ref 3.5–5)
ALBUMIN/GLOB SERPL: 1.6 G/DL (ref 1.5–2.5)
ALP SERPL-CCNC: 71 U/L (ref 35–104)
ALT SERPL W P-5'-P-CCNC: 20 U/L (ref 10–36)
ANION GAP SERPL CALCULATED.3IONS-SCNC: 4 MMOL/L (ref 3.6–11.2)
AST SERPL-CCNC: 17 U/L (ref 10–30)
BACTERIA UR QL AUTO: ABNORMAL /HPF
BASOPHILS # BLD AUTO: 0.04 10*3/MM3 (ref 0–0.3)
BASOPHILS NFR BLD AUTO: 0.7 % (ref 0–2)
BILIRUB SERPL-MCNC: 0.2 MG/DL (ref 0.2–1.8)
BILIRUB UR QL STRIP: NEGATIVE
BUN BLD-MCNC: 11 MG/DL (ref 7–21)
BUN/CREAT SERPL: 17.2 (ref 7–25)
CALCIUM SPEC-SCNC: 8.8 MG/DL (ref 7.7–10)
CHLORIDE SERPL-SCNC: 107 MMOL/L (ref 99–112)
CLARITY UR: ABNORMAL
CO2 SERPL-SCNC: 29 MMOL/L (ref 24.3–31.9)
COLOR UR: YELLOW
CREAT BLD-MCNC: 0.64 MG/DL (ref 0.43–1.29)
DEPRECATED RDW RBC AUTO: 46.1 FL (ref 37–54)
EOSINOPHIL # BLD AUTO: 0.1 10*3/MM3 (ref 0–0.7)
EOSINOPHIL NFR BLD AUTO: 1.7 % (ref 0–5)
ERYTHROCYTE [DISTWIDTH] IN BLOOD BY AUTOMATED COUNT: 15.2 % (ref 11.5–14.5)
GFR SERPL CREATININE-BSD FRML MDRD: 111 ML/MIN/1.73
GLOBULIN UR ELPH-MCNC: 2.8 GM/DL
GLUCOSE BLD-MCNC: 97 MG/DL (ref 70–110)
GLUCOSE UR STRIP-MCNC: NEGATIVE MG/DL
HCT VFR BLD AUTO: 36.2 % (ref 37–47)
HGB BLD-MCNC: 11.2 G/DL (ref 12–16)
HGB UR QL STRIP.AUTO: NEGATIVE
HYALINE CASTS UR QL AUTO: ABNORMAL /LPF
IMM GRANULOCYTES # BLD: 0 10*3/MM3 (ref 0–0.03)
IMM GRANULOCYTES NFR BLD: 0 % (ref 0–0.5)
KETONES UR QL STRIP: NEGATIVE
LEUKOCYTE ESTERASE UR QL STRIP.AUTO: ABNORMAL
LYMPHOCYTES # BLD AUTO: 1.78 10*3/MM3 (ref 1–3)
LYMPHOCYTES NFR BLD AUTO: 30.5 % (ref 21–51)
MCH RBC QN AUTO: 25.8 PG (ref 27–33)
MCHC RBC AUTO-ENTMCNC: 30.9 G/DL (ref 33–37)
MCV RBC AUTO: 83.4 FL (ref 80–94)
MONOCYTES # BLD AUTO: 0.51 10*3/MM3 (ref 0.1–0.9)
MONOCYTES NFR BLD AUTO: 8.7 % (ref 0–10)
NEUTROPHILS # BLD AUTO: 3.41 10*3/MM3 (ref 1.4–6.5)
NEUTROPHILS NFR BLD AUTO: 58.4 % (ref 30–70)
NITRITE UR QL STRIP: NEGATIVE
OSMOLALITY SERPL CALC.SUM OF ELEC: 278.7 MOSM/KG (ref 273–305)
PH UR STRIP.AUTO: 7.5 [PH] (ref 5–8)
PLATELET # BLD AUTO: 238 10*3/MM3 (ref 130–400)
PMV BLD AUTO: 10.6 FL (ref 6–10)
POTASSIUM BLD-SCNC: 3.8 MMOL/L (ref 3.5–5.3)
PROT SERPL-MCNC: 7.2 G/DL (ref 6–8)
PROT UR QL STRIP: NEGATIVE
RBC # BLD AUTO: 4.34 10*6/MM3 (ref 4.2–5.4)
RBC # UR: ABNORMAL /HPF
REF LAB TEST METHOD: ABNORMAL
SODIUM BLD-SCNC: 140 MMOL/L (ref 135–153)
SP GR UR STRIP: 1.02 (ref 1–1.03)
SQUAMOUS #/AREA URNS HPF: ABNORMAL /HPF
UROBILINOGEN UR QL STRIP: ABNORMAL
WBC NRBC COR # BLD: 5.84 10*3/MM3 (ref 4.5–12.5)
WBC UR QL AUTO: ABNORMAL /HPF

## 2018-01-27 PROCEDURE — 85025 COMPLETE CBC W/AUTO DIFF WBC: CPT | Performed by: NURSE PRACTITIONER

## 2018-01-27 PROCEDURE — 72110 X-RAY EXAM L-2 SPINE 4/>VWS: CPT | Performed by: RADIOLOGY

## 2018-01-27 PROCEDURE — 87086 URINE CULTURE/COLONY COUNT: CPT | Performed by: NURSE PRACTITIONER

## 2018-01-27 PROCEDURE — 99283 EMERGENCY DEPT VISIT LOW MDM: CPT

## 2018-01-27 PROCEDURE — 72110 X-RAY EXAM L-2 SPINE 4/>VWS: CPT

## 2018-01-27 PROCEDURE — 81001 URINALYSIS AUTO W/SCOPE: CPT | Performed by: NURSE PRACTITIONER

## 2018-01-27 PROCEDURE — 80053 COMPREHEN METABOLIC PANEL: CPT | Performed by: NURSE PRACTITIONER

## 2018-01-27 PROCEDURE — 36415 COLL VENOUS BLD VENIPUNCTURE: CPT

## 2018-01-27 RX ORDER — PREDNISONE 20 MG/1
40 TABLET ORAL DAILY
Qty: 10 TABLET | Refills: 0 | Status: SHIPPED | OUTPATIENT
Start: 2018-01-27 | End: 2018-09-21

## 2018-01-27 RX ORDER — ACETAMINOPHEN AND CODEINE PHOSPHATE 300; 30 MG/1; MG/1
1-2 TABLET ORAL EVERY 4 HOURS PRN
Qty: 16 TABLET | Refills: 0 | Status: SHIPPED | OUTPATIENT
Start: 2018-01-27 | End: 2018-09-21

## 2018-01-29 LAB — BACTERIA SPEC AEROBE CULT: NORMAL

## 2018-08-09 ENCOUNTER — APPOINTMENT (OUTPATIENT)
Dept: CT IMAGING | Facility: HOSPITAL | Age: 28
End: 2018-08-09

## 2018-08-09 ENCOUNTER — APPOINTMENT (OUTPATIENT)
Dept: GENERAL RADIOLOGY | Facility: HOSPITAL | Age: 28
End: 2018-08-09

## 2018-08-09 ENCOUNTER — HOSPITAL ENCOUNTER (EMERGENCY)
Facility: HOSPITAL | Age: 28
Discharge: HOME | End: 2018-08-09
Attending: EMERGENCY MEDICINE | Admitting: EMERGENCY MEDICINE

## 2018-08-09 VITALS
RESPIRATION RATE: 16 BRPM | HEART RATE: 62 BPM | TEMPERATURE: 98.2 F | SYSTOLIC BLOOD PRESSURE: 102 MMHG | WEIGHT: 237 LBS | BODY MASS INDEX: 41.99 KG/M2 | OXYGEN SATURATION: 98 % | DIASTOLIC BLOOD PRESSURE: 62 MMHG | HEIGHT: 63 IN
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
## 2018-08-09 DIAGNOSIS — M79.18 MUSCULOSKELETAL PAIN: Primary | ICD-10-CM

## 2018-08-09 LAB
ALBUMIN SERPL-MCNC: 4.4 G/DL (ref 3.5–5)
ALBUMIN/GLOB SERPL: 1.4 G/DL (ref 1.5–2.5)
ALP SERPL-CCNC: 73 U/L (ref 35–104)
ALT SERPL W P-5'-P-CCNC: 27 U/L (ref 10–36)
AMYLASE SERPL-CCNC: 48 U/L (ref 28–100)
ANION GAP SERPL CALCULATED.3IONS-SCNC: 7.3 MMOL/L (ref 3.6–11.2)
AST SERPL-CCNC: 25 U/L (ref 10–30)
BASOPHILS # BLD AUTO: 0.03 10*3/MM3 (ref 0–0.3)
BASOPHILS NFR BLD AUTO: 0.5 % (ref 0–2)
BILIRUB SERPL-MCNC: 0.2 MG/DL (ref 0.2–1.8)
BUN BLD-MCNC: 12 MG/DL (ref 7–21)
BUN/CREAT SERPL: 13.2 (ref 7–25)
CALCIUM SPEC-SCNC: 9.4 MG/DL (ref 7.7–10)
CHLORIDE SERPL-SCNC: 107 MMOL/L (ref 99–112)
CO2 SERPL-SCNC: 25.7 MMOL/L (ref 24.3–31.9)
CREAT BLD-MCNC: 0.91 MG/DL (ref 0.43–1.29)
DEPRECATED RDW RBC AUTO: 44 FL (ref 37–54)
EOSINOPHIL # BLD AUTO: 0.08 10*3/MM3 (ref 0–0.7)
EOSINOPHIL NFR BLD AUTO: 1.3 % (ref 0–5)
ERYTHROCYTE [DISTWIDTH] IN BLOOD BY AUTOMATED COUNT: 14.6 % (ref 11.5–14.5)
GFR SERPL CREATININE-BSD FRML MDRD: 74 ML/MIN/1.73
GLOBULIN UR ELPH-MCNC: 3.2 GM/DL
GLUCOSE BLD-MCNC: 92 MG/DL (ref 70–110)
HCG SERPL QL: NEGATIVE
HCT VFR BLD AUTO: 39.5 % (ref 37–47)
HGB BLD-MCNC: 12.7 G/DL (ref 12–16)
IMM GRANULOCYTES # BLD: 0.01 10*3/MM3 (ref 0–0.03)
IMM GRANULOCYTES NFR BLD: 0.2 % (ref 0–0.5)
LIPASE SERPL-CCNC: 56 U/L (ref 13–60)
LYMPHOCYTES # BLD AUTO: 1.71 10*3/MM3 (ref 1–3)
LYMPHOCYTES NFR BLD AUTO: 27.4 % (ref 21–51)
MCH RBC QN AUTO: 26.9 PG (ref 27–33)
MCHC RBC AUTO-ENTMCNC: 32.2 G/DL (ref 33–37)
MCV RBC AUTO: 83.7 FL (ref 80–94)
MONOCYTES # BLD AUTO: 0.44 10*3/MM3 (ref 0.1–0.9)
MONOCYTES NFR BLD AUTO: 7.1 % (ref 0–10)
NEUTROPHILS # BLD AUTO: 3.97 10*3/MM3 (ref 1.4–6.5)
NEUTROPHILS NFR BLD AUTO: 63.5 % (ref 30–70)
OSMOLALITY SERPL CALC.SUM OF ELEC: 278.8 MOSM/KG (ref 273–305)
PLATELET # BLD AUTO: 266 10*3/MM3 (ref 130–400)
PMV BLD AUTO: 11.8 FL (ref 6–10)
POTASSIUM BLD-SCNC: 4 MMOL/L (ref 3.5–5.3)
PROT SERPL-MCNC: 7.6 G/DL (ref 6–8)
RBC # BLD AUTO: 4.72 10*6/MM3 (ref 4.2–5.4)
SODIUM BLD-SCNC: 140 MMOL/L (ref 135–153)
WBC NRBC COR # BLD: 6.24 10*3/MM3 (ref 4.5–12.5)

## 2018-08-09 PROCEDURE — 85025 COMPLETE CBC W/AUTO DIFF WBC: CPT | Performed by: PHYSICIAN ASSISTANT

## 2018-08-09 PROCEDURE — 72125 CT NECK SPINE W/O DYE: CPT

## 2018-08-09 PROCEDURE — 73610 X-RAY EXAM OF ANKLE: CPT

## 2018-08-09 PROCEDURE — 73630 X-RAY EXAM OF FOOT: CPT | Performed by: RADIOLOGY

## 2018-08-09 PROCEDURE — 74177 CT ABD & PELVIS W/CONTRAST: CPT

## 2018-08-09 PROCEDURE — 25010000002 IOPAMIDOL 61 % SOLUTION: Performed by: EMERGENCY MEDICINE

## 2018-08-09 PROCEDURE — 84703 CHORIONIC GONADOTROPIN ASSAY: CPT | Performed by: PHYSICIAN ASSISTANT

## 2018-08-09 PROCEDURE — 96374 THER/PROPH/DIAG INJ IV PUSH: CPT

## 2018-08-09 PROCEDURE — 71275 CT ANGIOGRAPHY CHEST: CPT | Performed by: RADIOLOGY

## 2018-08-09 PROCEDURE — 73030 X-RAY EXAM OF SHOULDER: CPT

## 2018-08-09 PROCEDURE — 25010000002 KETOROLAC TROMETHAMINE PER 15 MG

## 2018-08-09 PROCEDURE — 99284 EMERGENCY DEPT VISIT MOD MDM: CPT

## 2018-08-09 PROCEDURE — 72128 CT CHEST SPINE W/O DYE: CPT

## 2018-08-09 PROCEDURE — 74177 CT ABD & PELVIS W/CONTRAST: CPT | Performed by: RADIOLOGY

## 2018-08-09 PROCEDURE — 83690 ASSAY OF LIPASE: CPT | Performed by: PHYSICIAN ASSISTANT

## 2018-08-09 PROCEDURE — 73630 X-RAY EXAM OF FOOT: CPT

## 2018-08-09 PROCEDURE — 73090 X-RAY EXAM OF FOREARM: CPT | Performed by: RADIOLOGY

## 2018-08-09 PROCEDURE — 70450 CT HEAD/BRAIN W/O DYE: CPT | Performed by: RADIOLOGY

## 2018-08-09 PROCEDURE — 73610 X-RAY EXAM OF ANKLE: CPT | Performed by: RADIOLOGY

## 2018-08-09 PROCEDURE — 72125 CT NECK SPINE W/O DYE: CPT | Performed by: RADIOLOGY

## 2018-08-09 PROCEDURE — 25010000002 ONDANSETRON PER 1 MG: Performed by: PHYSICIAN ASSISTANT

## 2018-08-09 PROCEDURE — 82150 ASSAY OF AMYLASE: CPT | Performed by: PHYSICIAN ASSISTANT

## 2018-08-09 PROCEDURE — 96375 TX/PRO/DX INJ NEW DRUG ADDON: CPT

## 2018-08-09 PROCEDURE — 80053 COMPREHEN METABOLIC PANEL: CPT | Performed by: PHYSICIAN ASSISTANT

## 2018-08-09 PROCEDURE — 25010000002 HYDROMORPHONE PER 4 MG

## 2018-08-09 PROCEDURE — 73090 X-RAY EXAM OF FOREARM: CPT

## 2018-08-09 PROCEDURE — 96361 HYDRATE IV INFUSION ADD-ON: CPT

## 2018-08-09 PROCEDURE — 73110 X-RAY EXAM OF WRIST: CPT

## 2018-08-09 PROCEDURE — 0 IOPAMIDOL PER 1 ML: Performed by: EMERGENCY MEDICINE

## 2018-08-09 PROCEDURE — 72131 CT LUMBAR SPINE W/O DYE: CPT | Performed by: RADIOLOGY

## 2018-08-09 PROCEDURE — 25010000002 MORPHINE PER 10 MG: Performed by: EMERGENCY MEDICINE

## 2018-08-09 PROCEDURE — 70450 CT HEAD/BRAIN W/O DYE: CPT

## 2018-08-09 PROCEDURE — 73030 X-RAY EXAM OF SHOULDER: CPT | Performed by: RADIOLOGY

## 2018-08-09 PROCEDURE — 71275 CT ANGIOGRAPHY CHEST: CPT

## 2018-08-09 PROCEDURE — 73110 X-RAY EXAM OF WRIST: CPT | Performed by: RADIOLOGY

## 2018-08-09 PROCEDURE — 72128 CT CHEST SPINE W/O DYE: CPT | Performed by: RADIOLOGY

## 2018-08-09 PROCEDURE — 72131 CT LUMBAR SPINE W/O DYE: CPT

## 2018-08-09 RX ORDER — SODIUM CHLORIDE 0.9 % (FLUSH) 0.9 %
10 SYRINGE (ML) INJECTION AS NEEDED
Status: DISCONTINUED | OUTPATIENT
Start: 2018-08-09 | End: 2018-08-10 | Stop reason: HOSPADM

## 2018-08-09 RX ORDER — HYDROMORPHONE HCL 110MG/55ML
PATIENT CONTROLLED ANALGESIA SYRINGE INTRAVENOUS
Status: COMPLETED
Start: 2018-08-09 | End: 2018-08-09

## 2018-08-09 RX ORDER — KETOROLAC TROMETHAMINE 30 MG/ML
30 INJECTION, SOLUTION INTRAMUSCULAR; INTRAVENOUS ONCE
Status: DISCONTINUED | OUTPATIENT
Start: 2018-08-09 | End: 2018-08-10 | Stop reason: HOSPADM

## 2018-08-09 RX ORDER — IBUPROFEN 800 MG/1
800 TABLET ORAL EVERY 6 HOURS PRN
Qty: 28 TABLET | Refills: 0 | Status: SHIPPED | OUTPATIENT
Start: 2018-08-09 | End: 2018-09-21

## 2018-08-09 RX ORDER — ONDANSETRON 2 MG/ML
4 INJECTION INTRAMUSCULAR; INTRAVENOUS ONCE
Status: COMPLETED | OUTPATIENT
Start: 2018-08-09 | End: 2018-08-09

## 2018-08-09 RX ORDER — KETOROLAC TROMETHAMINE 30 MG/ML
INJECTION, SOLUTION INTRAMUSCULAR; INTRAVENOUS
Status: COMPLETED
Start: 2018-08-09 | End: 2018-08-09

## 2018-08-09 RX ORDER — HYDROCODONE BITARTRATE AND ACETAMINOPHEN 5; 325 MG/1; MG/1
1 TABLET ORAL EVERY 6 HOURS PRN
Qty: 12 TABLET | Refills: 0 | Status: SHIPPED | OUTPATIENT
Start: 2018-08-09 | End: 2018-09-21

## 2018-08-09 RX ORDER — ORPHENADRINE CITRATE 100 MG/1
100 TABLET, EXTENDED RELEASE ORAL 2 TIMES DAILY PRN
Qty: 14 TABLET | Refills: 0 | Status: SHIPPED | OUTPATIENT
Start: 2018-08-09 | End: 2018-09-21

## 2018-08-09 RX ORDER — SODIUM CHLORIDE 9 MG/ML
125 INJECTION, SOLUTION INTRAVENOUS CONTINUOUS
Status: DISCONTINUED | OUTPATIENT
Start: 2018-08-09 | End: 2018-08-10 | Stop reason: HOSPADM

## 2018-08-09 RX ORDER — HYDROMORPHONE HYDROCHLORIDE 1 MG/ML
0.5 INJECTION, SOLUTION INTRAMUSCULAR; INTRAVENOUS; SUBCUTANEOUS ONCE
Status: DISCONTINUED | OUTPATIENT
Start: 2018-08-09 | End: 2018-08-10 | Stop reason: HOSPADM

## 2018-08-09 RX ADMIN — KETOROLAC TROMETHAMINE 30 MG: 60 INJECTION, SOLUTION INTRAMUSCULAR at 20:33

## 2018-08-09 RX ADMIN — HYDROMORPHONE HYDROCHLORIDE 0.5 MG: 2 INJECTION, SOLUTION INTRAMUSCULAR; INTRAVENOUS; SUBCUTANEOUS at 20:33

## 2018-08-09 RX ADMIN — MORPHINE SULFATE 4 MG: 4 INJECTION, SOLUTION INTRAMUSCULAR; INTRAVENOUS at 18:03

## 2018-08-09 RX ADMIN — SODIUM CHLORIDE 125 ML/HR: 9 INJECTION, SOLUTION INTRAVENOUS at 18:04

## 2018-08-09 RX ADMIN — ONDANSETRON 4 MG: 2 INJECTION, SOLUTION INTRAMUSCULAR; INTRAVENOUS at 18:03

## 2018-08-09 RX ADMIN — IOPAMIDOL 100 ML: 755 INJECTION, SOLUTION INTRAVENOUS at 20:00

## 2018-08-09 RX ADMIN — IOPAMIDOL 100 ML: 612 INJECTION, SOLUTION INTRAVENOUS at 20:01

## 2018-08-09 NOTE — ED PROVIDER NOTES
Subjective   28-year-old female presents to the ED today due to a fall.  She states approximately 30 minutes ago she fell down approximately 16 stairs.  She states she missed a step which caused her to fall.  She denies any loss of consciousness.  She is complaining of a headache with neck pain and lower back pain.  She states her left hand feels tingling.  She is also having decreased sensation to both legs from the knees down.  She states she is hurting in the anterior lower chest and upper abdomen.  She is complaining of left forearm and wrist pain.  She is also complaining of left ankle pain.  She was ambulatory on scene.        History provided by:  Patient  Fall   Mechanism of injury: fall    Incident location:  Home  Time since incident:  30 minutes  Arrived directly from scene: yes    Fall:     Fall occurred:  Down stairs    Height of fall:  16 stairs    Impact surface:  Hard floor  Suspicion of alcohol use: no    Suspicion of drug use: no    Prior to arrival data:     Patient ambulatory at scene: yes      Blood loss:  None    Responsiveness at scene:  Alert    Orientation at scene:  Person, place, situation and time    Loss of consciousness: no      Amnesic to event: no    Associated symptoms: abdominal pain, back pain, chest pain, headaches and neck pain    Associated symptoms: no blindness, no difficulty breathing, no hearing loss, no loss of consciousness, no nausea, no seizures and no vomiting        Review of Systems   Constitutional: Negative.    HENT: Negative for hearing loss.    Eyes: Negative.  Negative for blindness.   Respiratory: Negative for shortness of breath and wheezing.    Cardiovascular: Positive for chest pain.   Gastrointestinal: Positive for abdominal pain. Negative for nausea and vomiting.   Genitourinary: Negative.    Musculoskeletal: Positive for arthralgias, back pain, myalgias and neck pain.   Skin: Negative.    Neurological: Positive for headaches. Negative for seizures and loss  of consciousness.   Psychiatric/Behavioral: Negative.    All other systems reviewed and are negative.      Past Medical History:   Diagnosis Date   • Abnormal Pap smear of cervix    • Acid reflux    • Allergic    • Anxiety    • Clotting disorder (CMS/HCC)    • Deep vein thrombosis (CMS/HCC)    • Depression    • Gallbladder abscess    • Hyperemesis gravidarum    • Migraines    • MTHFR deficiency complicating pregnancy (CMS/HCC)    • Ovarian cyst    • PMS (premenstrual syndrome)    • Recurrent pregnancy loss, antepartum condition or complication     had a [facility] and lost the pregnancy at 6 months   • Sinusitis    • Strep pharyngitis    • Urinary tract infection        Allergies   Allergen Reactions   • Penicillins Anaphylaxis   • Latex Itching       Past Surgical History:   Procedure Laterality Date   • ABDOMINAL SURGERY     • CHOLECYSTECTOMY  06/2016   • NASAL RECONSTRUCTION Bilateral 06/2009   • SINUS SURGERY Bilateral 06/2016   • TUBAL ABDOMINAL LIGATION Bilateral 09/15/2017   • TUBAL COAGULATION LAPAROSCOPIC Bilateral 9/15/2017    Procedure: BILATERAL TUBAL FALLOPE FILSHIE CLIPPING LAPAROSCOPIC;  Surgeon: Leo Posey III, MD;  Location: [facility];  Service:    • VAGINAL DELIVERY  14; 16; 17    female,male, male       Family History   Problem Relation Age of Onset   • Asthma Mother    • Thyroid disease Mother    • Lupus Sister    • Ovarian cancer Sister    • Asthma Sister    • Lung cancer Paternal Grandmother    • Breast cancer Maternal Grandmother    • Lung cancer Maternal Grandmother    • Asthma Maternal Grandmother    • Hypertension Maternal Grandmother    • Lupus Maternal Grandmother    • Thyroid disease Maternal Grandmother    • Hypertension Maternal Grandfather        Social History     Social History   • Marital status:      Social History Main Topics   • Smoking status: Never Smoker   • Smokeless tobacco: Never Used   • Alcohol use No   • Drug use: No   • Sexual activity: Yes     Partners: Male      Birth control/ protection: Surgical      Comment: -Juve, and has 3 children-homemaker     Other Topics Concern   • Not on file           Objective   Physical Exam   Constitutional: She is oriented to person, place, and time. She appears well-developed and well-nourished. No distress. Cervical collar in place.   HENT:   Head: Normocephalic and atraumatic.   Right Ear: External ear normal.   Left Ear: External ear normal.   Nose: Nose normal.   Mouth/Throat: Oropharynx is clear and moist.   Scalp is mildly tender to palpation, no hematomas or abrasions or laceration   Eyes: Pupils are equal, round, and reactive to light. Conjunctivae and EOM are normal.   Neck: No tracheal deviation present.   Mildly tender to C5-7 area with palpation   Cardiovascular: Normal rate, regular rhythm, normal heart sounds and intact distal pulses.    Pulmonary/Chest: Effort normal and breath sounds normal. No respiratory distress. She has no wheezes. She exhibits tenderness (mild to lower anterior chest wall).   Abdominal: Soft. Bowel sounds are normal. She exhibits no distension. There is tenderness (mild to upper abdomen). There is no rebound and no guarding.   Musculoskeletal: She exhibits tenderness (left shoulder, forearm and wrist, left ankle). She exhibits no deformity.   She has full ROM of all extremities   Neurological: She is alert and oriented to person, place, and time. No cranial nerve deficit or sensory deficit. She exhibits normal muscle tone. Coordination normal.   Skin: Skin is warm and dry. Capillary refill takes less than 2 seconds.   Psychiatric: She has a normal mood and affect. Her behavior is normal. Judgment and thought content normal.   Nursing note and vitals reviewed.      Procedures           ED Course  ED Course as of Aug 09 2251   Thu Aug 09, 2018   2021 Per [facility], no acute intracranial hemorrhage or other acute CT abnormality. Opacification of a right ethmoid air cell is noted. No air fluid levels  are seen in the sinuses. CT Head Without Contrast [TK]   2025 Per [facility], no acute findings. CT Cervical Spine Without Contrast [TK]   2025 Per [facility], no acute fracture or subluxation. No acute CT abnormality is revealed. CT Lumbar Spine Without Contrast [TK]   2035 Per [facility], no acute fracture or subluxation or other acute CT findings. CT Thoracic Spine Without Contrast [TK]   2119 Per [facility], there is no acute CT findings. CT Trauma Chest [TK]   2233 Still awaiting CT abdomen. Called CT, suppose to fax report over.  [TK]   2247 Per [facility], no acute findings. CT Abdomen Pelvis With Contrast [TK]      ED Course User Index  [TK] Tim Finley PA-C                  MDM  Number of Diagnoses or Management Options  Fall, initial encounter: new and requires workup  Musculoskeletal pain: new and requires workup     Amount and/or Complexity of Data Reviewed  Clinical lab tests: reviewed and ordered  Tests in the radiology section of CPT®: reviewed and ordered  Discuss the patient with other providers: yes    Risk of Complications, Morbidity, and/or Mortality  Presenting problems: moderate  Diagnostic procedures: moderate  Management options: moderate    Patient Progress  Patient progress: stable        Final diagnoses:   Musculoskeletal pain   Fall, initial encounter            Tim Finley PA-C  08/09/18 4451

## 2018-08-10 NOTE — ED NOTES
Pt states she is hurting 7/10, that the morphine did not help earlier, Tim made aware .     Mechelle Balbuena, RICKY  08/09/18 2027

## 2018-09-21 ENCOUNTER — OFFICE VISIT (OUTPATIENT)
Dept: RETAIL CLINIC | Facility: CLINIC | Age: 28
End: 2018-09-21

## 2018-09-21 VITALS
TEMPERATURE: 99.3 F | HEART RATE: 77 BPM | WEIGHT: 228.4 LBS | BODY MASS INDEX: 40.46 KG/M2 | DIASTOLIC BLOOD PRESSURE: 80 MMHG | RESPIRATION RATE: 18 BRPM | OXYGEN SATURATION: 98 % | SYSTOLIC BLOOD PRESSURE: 112 MMHG

## 2018-09-21 DIAGNOSIS — N39.0 URINARY TRACT INFECTION WITH HEMATURIA, SITE UNSPECIFIED: Primary | ICD-10-CM

## 2018-09-21 DIAGNOSIS — R31.9 URINARY TRACT INFECTION WITH HEMATURIA, SITE UNSPECIFIED: Primary | ICD-10-CM

## 2018-09-21 LAB
BILIRUB BLD-MCNC: NEGATIVE MG/DL
CLARITY, POC: CLEAR
COLOR UR: YELLOW
GLUCOSE UR STRIP-MCNC: NEGATIVE MG/DL
KETONES UR QL: NEGATIVE
LEUKOCYTE EST, POC: ABNORMAL
NITRITE UR-MCNC: NEGATIVE MG/ML
PH UR: 6 [PH] (ref 5–8)
PROT UR STRIP-MCNC: NEGATIVE MG/DL
RBC # UR STRIP: ABNORMAL /UL
SP GR UR: 1.03 (ref 1–1.03)
UROBILINOGEN UR QL: NORMAL

## 2018-09-21 PROCEDURE — 99203 OFFICE O/P NEW LOW 30 MIN: CPT | Performed by: NURSE PRACTITIONER

## 2018-09-21 RX ORDER — SULFAMETHOXAZOLE AND TRIMETHOPRIM 800; 160 MG/1; MG/1
1 TABLET ORAL 2 TIMES DAILY
Qty: 10 TABLET | Refills: 0 | Status: SHIPPED | OUTPATIENT
Start: 2018-09-21 | End: 2018-09-26

## 2018-09-21 RX ORDER — PHENAZOPYRIDINE HYDROCHLORIDE 200 MG/1
200 TABLET, FILM COATED ORAL 3 TIMES DAILY PRN
Qty: 9 TABLET | Refills: 0 | Status: SHIPPED | OUTPATIENT
Start: 2018-09-21 | End: 2018-09-23

## 2018-09-21 RX ORDER — BUPROPION HYDROCHLORIDE 150 MG/1
1 TABLET ORAL
COMMUNITY
Start: 2018-02-27 | End: 2020-02-25

## 2018-09-21 NOTE — PROGRESS NOTES
Subjective   Theresa Maya is a 28 y.o. female.   Chief Complaint   Patient presents with   • URI      URI    This is a new problem. The current episode started in the past 7 days (2 days ). Associated symptoms include congestion, coughing, diarrhea, dysuria (after voiding), headaches, nausea and vomiting. She has tried acetaminophen (has not took any Tylenol today) for the symptoms. The treatment provided mild relief.        Theresa Maya  presents to [facility] with cc of nausea and vomiting for 2 days, yesterday started coughing, had headache and fever of 102.1 (orally) and took Tylenol for this.Reviiewed the PMFSH. See ROS.    The following portions of the patient's history were reviewed and updated as appropriate: allergies, current medications, past family history, past medical history, past social history, past surgical history and problem list.    Review of Systems   Constitutional: Positive for fever.   HENT: Positive for congestion.    Respiratory: Positive for cough.    Gastrointestinal: Positive for diarrhea, nausea and vomiting.   Genitourinary: Positive for dysuria (after voiding) and vaginal discharge (says her PCP had cultured this and says it was fine).   Neurological: Positive for headaches.       Visit Vitals  /80 (BP Location: Left arm, Patient Position: Sitting)   Pulse 77   Temp 99.3 °F (37.4 °C) (Temporal Artery )   Resp 18   Wt 104 kg (228 lb 6.4 oz)   SpO2 98%   Breastfeeding? No   BMI 40.46 kg/m²       Objective     Current Outpatient Prescriptions:   •  buPROPion XL (WELLBUTRIN XL) 150 MG 24 hr tablet, Take 1 tablet by mouth., Disp: , Rfl:   •  phenazopyridine (PYRIDIUM) 200 MG tablet, Take 1 tablet by mouth 3 (Three) Times a Day As Needed for bladder spasms for up to 2 days., Disp: 9 tablet, Rfl: 0  •  sulfamethoxazole-trimethoprim (BACTRIM DS) 800-160 MG per tablet, Take 1 tablet by mouth 2 (Two) Times a Day for 5 days., Disp: 10 tablet, Rfl: 0  Allergies   Allergen  Reactions   • Penicillins Anaphylaxis   • Latex Itching   • Amoxicillin Rash       Physical Exam   Constitutional: She is oriented to person, place, and time. She appears well-developed and well-nourished. No distress.   HENT:   Head: Normocephalic and atraumatic.   Right Ear: Tympanic membrane, external ear and ear canal normal.   Left Ear: Tympanic membrane, external ear and ear canal normal.   Nose: Nose normal. Right sinus exhibits no maxillary sinus tenderness and no frontal sinus tenderness. Left sinus exhibits no maxillary sinus tenderness and no frontal sinus tenderness.   Mouth/Throat: Uvula is midline, oropharynx is clear and moist and mucous membranes are normal. No oropharyngeal exudate.   Eyes: Pupils are equal, round, and reactive to light. Conjunctivae are normal.   Neck: Normal range of motion. Neck supple.   Cardiovascular: Normal rate and regular rhythm.    Pulmonary/Chest: Effort normal and breath sounds normal. No respiratory distress.   Abdominal: Soft. Normal appearance. She exhibits no distension and no mass. Bowel sounds are increased. There is no hepatosplenomegaly. There is tenderness. There is no rebound, no guarding and no CVA tenderness.   Musculoskeletal: Normal range of motion.   Lymphadenopathy:     She has no cervical adenopathy.   Neurological: She is alert and oriented to person, place, and time.   Skin: Skin is warm and dry. No rash noted.   Psychiatric: She has a normal mood and affect. Her behavior is normal. Judgment and thought content normal.   Nursing note and vitals reviewed.      Lab Results (last 24 hours)     Procedure Component Value Units Date/Time    POC Urinalysis Dipstick, Automated [[id number]]  (Abnormal) Collected:  09/21/18 1522    Specimen:  Urine Updated:  09/21/18 1524     Color Yellow     Clarity, UA Clear     Specific Gravity  1.030     pH, Urine 6.0     Leukocytes Moderate (2+) (A)     Comment: 125        Nitrite, UA Negative     Protein, POC Negative mg/dL       Glucose, UA Negative mg/dL      Ketones, UA Negative     Urobilinogen, UA Normal     Bilirubin Negative     Blood, UA Trace (A)     Comment: 10             Assessment/Plan   Theresa was seen today for uri.    Diagnoses and all orders for this visit:    Urinary tract infection with hematuria, site unspecified  -     POC Urinalysis Dipstick, Automated  -     sulfamethoxazole-trimethoprim (BACTRIM DS) 800-160 MG per tablet; Take 1 tablet by mouth 2 (Two) Times a Day for 5 days.  -     phenazopyridine (PYRIDIUM) 200 MG tablet; Take 1 tablet by mouth 3 (Three) Times a Day As Needed for bladder spasms for up to 2 days.

## 2019-06-24 ENCOUNTER — APPOINTMENT (OUTPATIENT)
Dept: ULTRASOUND IMAGING | Facility: HOSPITAL | Age: 29
End: 2019-06-24

## 2019-06-24 ENCOUNTER — HOSPITAL ENCOUNTER (EMERGENCY)
Facility: HOSPITAL | Age: 29
Discharge: HOME | End: 2019-06-24
Attending: EMERGENCY MEDICINE | Admitting: EMERGENCY MEDICINE

## 2019-06-24 ENCOUNTER — APPOINTMENT (OUTPATIENT)
Dept: CT IMAGING | Facility: HOSPITAL | Age: 29
End: 2019-06-24

## 2019-06-24 VITALS
HEART RATE: 69 BPM | BODY MASS INDEX: 43.79 KG/M2 | SYSTOLIC BLOOD PRESSURE: 143 MMHG | OXYGEN SATURATION: 100 % | DIASTOLIC BLOOD PRESSURE: 94 MMHG | HEIGHT: 62 IN | WEIGHT: 238 LBS | TEMPERATURE: 98.2 F | RESPIRATION RATE: 18 BRPM

## 2019-06-24 DIAGNOSIS — S80.11XA CONTUSION OF RIGHT LOWER EXTREMITY, INITIAL ENCOUNTER: Primary | ICD-10-CM

## 2019-06-24 LAB
ALBUMIN SERPL-MCNC: 3.93 G/DL (ref 3.5–5.2)
ALBUMIN/GLOB SERPL: 1.2 G/DL
ALP SERPL-CCNC: 62 U/L (ref 39–117)
ALT SERPL W P-5'-P-CCNC: 12 U/L (ref 1–33)
ANION GAP SERPL CALCULATED.3IONS-SCNC: 11.1 MMOL/L
APTT PPP: 25.6 SECONDS (ref 23.8–36.1)
AST SERPL-CCNC: 19 U/L (ref 1–32)
BASOPHILS # BLD AUTO: 0.01 10*3/MM3 (ref 0–0.2)
BASOPHILS NFR BLD AUTO: 0.1 % (ref 0–1.5)
BILIRUB SERPL-MCNC: <0.2 MG/DL (ref 0.2–1.2)
BUN BLD-MCNC: 10 MG/DL (ref 6–20)
BUN/CREAT SERPL: 13.9 (ref 7–25)
CALCIUM SPEC-SCNC: 8.9 MG/DL (ref 8.6–10.5)
CHLORIDE SERPL-SCNC: 105 MMOL/L (ref 98–107)
CO2 SERPL-SCNC: 27.9 MMOL/L (ref 22–29)
CREAT BLD-MCNC: 0.72 MG/DL (ref 0.57–1)
DEPRECATED RDW RBC AUTO: 44.9 FL (ref 37–54)
EOSINOPHIL # BLD AUTO: 0.04 10*3/MM3 (ref 0–0.4)
EOSINOPHIL NFR BLD AUTO: 0.5 % (ref 0.3–6.2)
ERYTHROCYTE [DISTWIDTH] IN BLOOD BY AUTOMATED COUNT: 14.1 % (ref 12.3–15.4)
GFR SERPL CREATININE-BSD FRML MDRD: 96 ML/MIN/1.73
GLOBULIN UR ELPH-MCNC: 3.2 GM/DL
GLUCOSE BLD-MCNC: 78 MG/DL (ref 65–99)
HCG SERPL QL: NEGATIVE
HCT VFR BLD AUTO: 38.1 % (ref 34–46.6)
HGB BLD-MCNC: 12 G/DL (ref 12–15.9)
IMM GRANULOCYTES # BLD AUTO: 0.01 10*3/MM3 (ref 0–0.05)
IMM GRANULOCYTES NFR BLD AUTO: 0.1 % (ref 0–0.5)
INR PPP: 0.93 (ref 0.9–1.1)
LYMPHOCYTES # BLD AUTO: 2.9 10*3/MM3 (ref 0.7–3.1)
LYMPHOCYTES NFR BLD AUTO: 35.8 % (ref 19.6–45.3)
MCH RBC QN AUTO: 28 PG (ref 26.6–33)
MCHC RBC AUTO-ENTMCNC: 31.5 G/DL (ref 31.5–35.7)
MCV RBC AUTO: 89 FL (ref 79–97)
MONOCYTES # BLD AUTO: 0.67 10*3/MM3 (ref 0.1–0.9)
MONOCYTES NFR BLD AUTO: 8.3 % (ref 5–12)
NEUTROPHILS # BLD AUTO: 4.47 10*3/MM3 (ref 1.7–7)
NEUTROPHILS NFR BLD AUTO: 55.2 % (ref 42.7–76)
NT-PROBNP SERPL-MCNC: 130.2 PG/ML (ref 5–450)
PLATELET # BLD AUTO: 259 10*3/MM3 (ref 140–450)
PMV BLD AUTO: 11.1 FL (ref 6–12)
POTASSIUM BLD-SCNC: 3.5 MMOL/L (ref 3.5–5.2)
PROT SERPL-MCNC: 7.1 G/DL (ref 6–8.5)
PROTHROMBIN TIME: 13 SECONDS (ref 11–15.4)
RBC # BLD AUTO: 4.28 10*6/MM3 (ref 3.77–5.28)
SODIUM BLD-SCNC: 144 MMOL/L (ref 136–145)
TROPONIN T SERPL-MCNC: <0.01 NG/ML (ref 0–0.03)
WBC NRBC COR # BLD: 8.1 10*3/MM3 (ref 3.4–10.8)

## 2019-06-24 PROCEDURE — 85025 COMPLETE CBC W/AUTO DIFF WBC: CPT | Performed by: PHYSICIAN ASSISTANT

## 2019-06-24 PROCEDURE — 93971 EXTREMITY STUDY: CPT

## 2019-06-24 PROCEDURE — 0 IOVERSOL 74 % SOLUTION: Performed by: EMERGENCY MEDICINE

## 2019-06-24 PROCEDURE — 84484 ASSAY OF TROPONIN QUANT: CPT | Performed by: PHYSICIAN ASSISTANT

## 2019-06-24 PROCEDURE — 96360 HYDRATION IV INFUSION INIT: CPT

## 2019-06-24 PROCEDURE — 85730 THROMBOPLASTIN TIME PARTIAL: CPT | Performed by: PHYSICIAN ASSISTANT

## 2019-06-24 PROCEDURE — 80053 COMPREHEN METABOLIC PANEL: CPT | Performed by: PHYSICIAN ASSISTANT

## 2019-06-24 PROCEDURE — 99284 EMERGENCY DEPT VISIT MOD MDM: CPT

## 2019-06-24 PROCEDURE — 83880 ASSAY OF NATRIURETIC PEPTIDE: CPT | Performed by: PHYSICIAN ASSISTANT

## 2019-06-24 PROCEDURE — 93005 ELECTROCARDIOGRAM TRACING: CPT | Performed by: PHYSICIAN ASSISTANT

## 2019-06-24 PROCEDURE — 71275 CT ANGIOGRAPHY CHEST: CPT

## 2019-06-24 PROCEDURE — 85610 PROTHROMBIN TIME: CPT | Performed by: PHYSICIAN ASSISTANT

## 2019-06-24 PROCEDURE — 93971 EXTREMITY STUDY: CPT | Performed by: RADIOLOGY

## 2019-06-24 PROCEDURE — 84703 CHORIONIC GONADOTROPIN ASSAY: CPT | Performed by: PHYSICIAN ASSISTANT

## 2019-06-24 PROCEDURE — 93010 ELECTROCARDIOGRAM REPORT: CPT | Performed by: INTERNAL MEDICINE

## 2019-06-24 PROCEDURE — 71275 CT ANGIOGRAPHY CHEST: CPT | Performed by: RADIOLOGY

## 2019-06-24 RX ORDER — SODIUM CHLORIDE 9 MG/ML
125 INJECTION, SOLUTION INTRAVENOUS CONTINUOUS
Status: DISCONTINUED | OUTPATIENT
Start: 2019-06-24 | End: 2019-06-24 | Stop reason: HOSPADM

## 2019-06-24 RX ORDER — SODIUM CHLORIDE 0.9 % (FLUSH) 0.9 %
10 SYRINGE (ML) INJECTION AS NEEDED
Status: DISCONTINUED | OUTPATIENT
Start: 2019-06-24 | End: 2019-06-24 | Stop reason: HOSPADM

## 2019-06-24 RX ADMIN — SODIUM CHLORIDE 125 ML/HR: 9 INJECTION, SOLUTION INTRAVENOUS at 12:28

## 2019-06-24 RX ADMIN — IOVERSOL 100 ML: 741 INJECTION INTRA-ARTERIAL; INTRAVENOUS at 15:14

## 2019-12-06 ENCOUNTER — APPOINTMENT (OUTPATIENT)
Dept: ULTRASOUND IMAGING | Facility: HOSPITAL | Age: 29
End: 2019-12-06

## 2019-12-06 ENCOUNTER — HOSPITAL ENCOUNTER (EMERGENCY)
Facility: HOSPITAL | Age: 29
Discharge: HOME | End: 2019-12-06
Attending: EMERGENCY MEDICINE | Admitting: EMERGENCY MEDICINE

## 2019-12-06 VITALS
SYSTOLIC BLOOD PRESSURE: 128 MMHG | RESPIRATION RATE: 18 BRPM | HEIGHT: 63 IN | HEART RATE: 74 BPM | OXYGEN SATURATION: 98 % | TEMPERATURE: 97.6 F | DIASTOLIC BLOOD PRESSURE: 75 MMHG | BODY MASS INDEX: 40.4 KG/M2 | WEIGHT: 228 LBS

## 2019-12-06 DIAGNOSIS — M79.605 PAIN OF LEFT LOWER EXTREMITY: ICD-10-CM

## 2019-12-06 DIAGNOSIS — N39.0 ACUTE UTI: Primary | ICD-10-CM

## 2019-12-06 LAB
6-ACETYL MORPHINE: NEGATIVE
ALBUMIN SERPL-MCNC: 4.23 G/DL (ref 3.5–5.2)
ALBUMIN/GLOB SERPL: 1.3 G/DL
ALP SERPL-CCNC: 69 U/L (ref 39–117)
ALT SERPL W P-5'-P-CCNC: 19 U/L (ref 1–33)
AMPHET+METHAMPHET UR QL: NEGATIVE
AMYLASE SERPL-CCNC: 43 U/L (ref 28–100)
ANION GAP SERPL CALCULATED.3IONS-SCNC: 12.2 MMOL/L (ref 5–15)
AST SERPL-CCNC: 21 U/L (ref 1–32)
B-HCG UR QL: NEGATIVE
BACTERIA UR QL AUTO: ABNORMAL /HPF
BARBITURATES UR QL SCN: NEGATIVE
BASOPHILS # BLD AUTO: 0.03 10*3/MM3 (ref 0–0.2)
BASOPHILS NFR BLD AUTO: 0.6 % (ref 0–1.5)
BENZODIAZ UR QL SCN: NEGATIVE
BILIRUB SERPL-MCNC: 0.2 MG/DL (ref 0.2–1.2)
BILIRUB UR QL STRIP: NEGATIVE
BUN BLD-MCNC: 7 MG/DL (ref 6–20)
BUN/CREAT SERPL: 10.1 (ref 7–25)
BUPRENORPHINE SERPL-MCNC: NEGATIVE NG/ML
CALCIUM SPEC-SCNC: 9.4 MG/DL (ref 8.6–10.5)
CANNABINOIDS SERPL QL: NEGATIVE
CHLORIDE SERPL-SCNC: 104 MMOL/L (ref 98–107)
CLARITY UR: ABNORMAL
CO2 SERPL-SCNC: 28.8 MMOL/L (ref 22–29)
COCAINE UR QL: NEGATIVE
COLOR UR: ABNORMAL
CREAT BLD-MCNC: 0.69 MG/DL (ref 0.57–1)
DEPRECATED RDW RBC AUTO: 41.5 FL (ref 37–54)
EOSINOPHIL # BLD AUTO: 0.06 10*3/MM3 (ref 0–0.4)
EOSINOPHIL NFR BLD AUTO: 1.1 % (ref 0.3–6.2)
ERYTHROCYTE [DISTWIDTH] IN BLOOD BY AUTOMATED COUNT: 13.1 % (ref 12.3–15.4)
FLUAV AG NPH QL: NEGATIVE
FLUBV AG NPH QL IA: NEGATIVE
GFR SERPL CREATININE-BSD FRML MDRD: 101 ML/MIN/1.73
GLOBULIN UR ELPH-MCNC: 3.2 GM/DL
GLUCOSE BLD-MCNC: 96 MG/DL (ref 65–99)
GLUCOSE UR STRIP-MCNC: NEGATIVE MG/DL
HCT VFR BLD AUTO: 39.7 % (ref 34–46.6)
HGB BLD-MCNC: 12.8 G/DL (ref 12–15.9)
HGB UR QL STRIP.AUTO: NEGATIVE
HYALINE CASTS UR QL AUTO: ABNORMAL /LPF
IMM GRANULOCYTES # BLD AUTO: 0 10*3/MM3 (ref 0–0.05)
IMM GRANULOCYTES NFR BLD AUTO: 0 % (ref 0–0.5)
KETONES UR QL STRIP: NEGATIVE
LEUKOCYTE ESTERASE UR QL STRIP.AUTO: ABNORMAL
LIPASE SERPL-CCNC: 37 U/L (ref 13–60)
LYMPHOCYTES # BLD AUTO: 1.72 10*3/MM3 (ref 0.7–3.1)
LYMPHOCYTES NFR BLD AUTO: 32 % (ref 19.6–45.3)
MCH RBC QN AUTO: 28.1 PG (ref 26.6–33)
MCHC RBC AUTO-ENTMCNC: 32.2 G/DL (ref 31.5–35.7)
MCV RBC AUTO: 87.1 FL (ref 79–97)
METHADONE UR QL SCN: NEGATIVE
MONOCYTES # BLD AUTO: 0.43 10*3/MM3 (ref 0.1–0.9)
MONOCYTES NFR BLD AUTO: 8 % (ref 5–12)
NEUTROPHILS # BLD AUTO: 3.13 10*3/MM3 (ref 1.7–7)
NEUTROPHILS NFR BLD AUTO: 58.3 % (ref 42.7–76)
NITRITE UR QL STRIP: NEGATIVE
NRBC BLD AUTO-RTO: 0 /100 WBC (ref 0–0.2)
OPIATES UR QL: NEGATIVE
OXYCODONE UR QL SCN: NEGATIVE
PCP UR QL SCN: NEGATIVE
PH UR STRIP.AUTO: 8 [PH] (ref 5–8)
PLATELET # BLD AUTO: 253 10*3/MM3 (ref 140–450)
PMV BLD AUTO: 11.4 FL (ref 6–12)
POTASSIUM BLD-SCNC: 3.7 MMOL/L (ref 3.5–5.2)
PROT SERPL-MCNC: 7.4 G/DL (ref 6–8.5)
PROT UR QL STRIP: ABNORMAL
RBC # BLD AUTO: 4.56 10*6/MM3 (ref 3.77–5.28)
RBC # UR: ABNORMAL /HPF
REF LAB TEST METHOD: ABNORMAL
SODIUM BLD-SCNC: 145 MMOL/L (ref 136–145)
SP GR UR STRIP: >1.03 (ref 1–1.03)
SQUAMOUS #/AREA URNS HPF: ABNORMAL /HPF
UROBILINOGEN UR QL STRIP: ABNORMAL
WBC NRBC COR # BLD: 5.37 10*3/MM3 (ref 3.4–10.8)
WBC UR QL AUTO: ABNORMAL /HPF

## 2019-12-06 PROCEDURE — 80053 COMPREHEN METABOLIC PANEL: CPT | Performed by: NURSE PRACTITIONER

## 2019-12-06 PROCEDURE — 93971 EXTREMITY STUDY: CPT

## 2019-12-06 PROCEDURE — 80307 DRUG TEST PRSMV CHEM ANLYZR: CPT | Performed by: NURSE PRACTITIONER

## 2019-12-06 PROCEDURE — 87804 INFLUENZA ASSAY W/OPTIC: CPT | Performed by: NURSE PRACTITIONER

## 2019-12-06 PROCEDURE — 25010000002 ONDANSETRON PER 1 MG: Performed by: NURSE PRACTITIONER

## 2019-12-06 PROCEDURE — 96361 HYDRATE IV INFUSION ADD-ON: CPT

## 2019-12-06 PROCEDURE — 82150 ASSAY OF AMYLASE: CPT | Performed by: NURSE PRACTITIONER

## 2019-12-06 PROCEDURE — 96374 THER/PROPH/DIAG INJ IV PUSH: CPT

## 2019-12-06 PROCEDURE — 87086 URINE CULTURE/COLONY COUNT: CPT | Performed by: NURSE PRACTITIONER

## 2019-12-06 PROCEDURE — 83690 ASSAY OF LIPASE: CPT | Performed by: NURSE PRACTITIONER

## 2019-12-06 PROCEDURE — 93971 EXTREMITY STUDY: CPT | Performed by: RADIOLOGY

## 2019-12-06 PROCEDURE — 81025 URINE PREGNANCY TEST: CPT | Performed by: NURSE PRACTITIONER

## 2019-12-06 PROCEDURE — 81001 URINALYSIS AUTO W/SCOPE: CPT | Performed by: NURSE PRACTITIONER

## 2019-12-06 PROCEDURE — 99283 EMERGENCY DEPT VISIT LOW MDM: CPT

## 2019-12-06 PROCEDURE — 85025 COMPLETE CBC W/AUTO DIFF WBC: CPT | Performed by: NURSE PRACTITIONER

## 2019-12-06 RX ORDER — ONDANSETRON 4 MG/1
4 TABLET, ORALLY DISINTEGRATING ORAL EVERY 6 HOURS PRN
Qty: 10 TABLET | Refills: 0 | OUTPATIENT
Start: 2019-12-06 | End: 2020-01-26 | Stop reason: HOSPADM

## 2019-12-06 RX ORDER — NITROFURANTOIN 25; 75 MG/1; MG/1
100 CAPSULE ORAL 2 TIMES DAILY
Qty: 14 CAPSULE | Refills: 0 | Status: SHIPPED | OUTPATIENT
Start: 2019-12-06 | End: 2019-12-13

## 2019-12-06 RX ORDER — ONDANSETRON 2 MG/ML
4 INJECTION INTRAMUSCULAR; INTRAVENOUS ONCE
Status: COMPLETED | OUTPATIENT
Start: 2019-12-06 | End: 2019-12-06

## 2019-12-06 RX ORDER — SODIUM CHLORIDE 0.9 % (FLUSH) 0.9 %
10 SYRINGE (ML) INJECTION AS NEEDED
Status: DISCONTINUED | OUTPATIENT
Start: 2019-12-06 | End: 2019-12-06 | Stop reason: HOSPADM

## 2019-12-06 RX ADMIN — ONDANSETRON 4 MG: 2 INJECTION, SOLUTION INTRAMUSCULAR; INTRAVENOUS at 13:15

## 2019-12-06 RX ADMIN — SODIUM CHLORIDE 1000 ML: 9 INJECTION, SOLUTION INTRAVENOUS at 13:15

## 2019-12-06 NOTE — ED NOTES
Pt reports lower abd pain, left lower leg swollen and tender, chest discomfort x 1 day. Hx of DVT.  No swollen or redness noted on left lower leg, pt has no signs of distress, vs stable, sitting on stretcher, watching tv at this time.      Cindy Carrillo, RN  12/06/19 1662

## 2019-12-07 LAB — BACTERIA SPEC AEROBE CULT: NO GROWTH

## 2019-12-12 NOTE — ED PROVIDER NOTES
Subjective     History provided by:  Patient   used: No    Abdominal Pain   Pain location:  Suprapubic  Pain quality: aching    Pain radiates to:  Does not radiate  Pain severity:  Moderate  Onset quality:  Gradual  Duration:  4 days  Timing:  Constant  Progression:  Waxing and waning  Chronicity:  New  Context: not alcohol use, not awakening from sleep, not previous surgeries, not recent illness, not sick contacts, not suspicious food intake and not trauma    Context comment:  Patient also complaints of left leg swelling/pain  Relieved by:  Nothing  Worsened by:  Nothing  Ineffective treatments:  None tried  Associated symptoms: no anorexia, no belching, no chest pain, no chills, no constipation, no dysuria, no fatigue, no flatus, no nausea, no sore throat and no vaginal bleeding        Review of Systems   Constitutional: Negative.  Negative for chills and fatigue.   HENT: Negative.  Negative for sore throat.    Eyes: Negative.    Respiratory: Negative.    Cardiovascular: Negative.  Negative for chest pain.   Gastrointestinal: Positive for abdominal pain. Negative for anorexia, constipation, flatus and nausea.   Endocrine: Negative.    Genitourinary: Negative.  Negative for dysuria and vaginal bleeding.   Musculoskeletal: Negative.    Skin: Negative.    Allergic/Immunologic: Negative.    Neurological: Negative.    Hematological: Negative.    Psychiatric/Behavioral: Negative.        Past Medical History:   Diagnosis Date   • Abnormal Pap smear of cervix    • Acid reflux    • Allergic    • Anxiety    • Clotting disorder (CMS/HCC)    • Deep vein thrombosis (CMS/HCC)    • Depression    • Gallbladder abscess    • Hyperemesis gravidarum    • Migraines    • MTHFR deficiency complicating pregnancy (CMS/HCC)    • Ovarian cyst    • PMS (premenstrual syndrome)    • Recurrent pregnancy loss, antepartum condition or complication     had a [facility] and lost the pregnancy at 6 months   • Sinusitis    • Strep  pharyngitis    • Urinary tract infection        Allergies   Allergen Reactions   • Penicillins Anaphylaxis   • Latex Itching   • Amoxicillin Rash       Past Surgical History:   Procedure Laterality Date   • ABDOMINAL SURGERY     • CHOLECYSTECTOMY  06/2016   • NASAL RECONSTRUCTION Bilateral 06/2009   • SINUS SURGERY Bilateral 06/2016   • TUBAL ABDOMINAL LIGATION Bilateral 09/15/2017   • TUBAL COAGULATION LAPAROSCOPIC Bilateral 9/15/2017    Procedure: BILATERAL TUBAL FALLOPE FILSHIE CLIPPING LAPAROSCOPIC;  Surgeon: Leo Posey III, MD;  Location: [facility];  Service:    • VAGINAL DELIVERY  14; 16; 17    female,male, male       Family History   Problem Relation Age of Onset   • Asthma Mother    • Thyroid disease Mother    • Lupus Sister    • Ovarian cancer Sister    • Asthma Sister    • Lung cancer Paternal Grandmother    • Breast cancer Maternal Grandmother    • Lung cancer Maternal Grandmother    • Asthma Maternal Grandmother    • Hypertension Maternal Grandmother    • Lupus Maternal Grandmother    • Thyroid disease Maternal Grandmother    • Hypertension Maternal Grandfather        Social History     Socioeconomic History   • Marital status:      Spouse name: Not on file   • Number of children: Not on file   • Years of education: Not on file   • Highest education level: Not on file   Tobacco Use   • Smoking status: Never Smoker   • Smokeless tobacco: Never Used   Substance and Sexual Activity   • Alcohol use: No   • Drug use: No   • Sexual activity: Yes     Partners: Male     Birth control/protection: Surgical     Comment: -Juve, and has 3 children-homemaker           Objective   Physical Exam   Constitutional: She is oriented to person, place, and time. She appears well-developed and well-nourished.   HENT:   Head: Normocephalic.   Right Ear: External ear normal.   Left Ear: External ear normal.   Mouth/Throat: Oropharynx is clear and moist.   Eyes: Pupils are equal, round, and reactive to light.  Conjunctivae and EOM are normal.   Neck: Normal range of motion. Neck supple.   Cardiovascular: Normal rate, regular rhythm, normal heart sounds and intact distal pulses.   Pulmonary/Chest: Effort normal and breath sounds normal.   Abdominal: Soft. Bowel sounds are normal.   Musculoskeletal: Normal range of motion.   Neurological: She is alert and oriented to person, place, and time.   Skin: Skin is warm and dry. Capillary refill takes less than 2 seconds.   Psychiatric: She has a normal mood and affect. Her behavior is normal. Thought content normal.   Nursing note and vitals reviewed.      Procedures           ED Course                      No data recorded                        MDM    Final diagnoses:   Acute UTI   Pain of left lower extremity              Rodney Gonzales, APRN  12/12/19 4997

## 2020-01-26 ENCOUNTER — HOSPITAL ENCOUNTER (EMERGENCY)
Facility: HOSPITAL | Age: 30
Discharge: HOME | End: 2020-01-26
Attending: EMERGENCY MEDICINE | Admitting: EMERGENCY MEDICINE

## 2020-01-26 VITALS
DIASTOLIC BLOOD PRESSURE: 91 MMHG | TEMPERATURE: 97.4 F | OXYGEN SATURATION: 98 % | BODY MASS INDEX: 42.51 KG/M2 | SYSTOLIC BLOOD PRESSURE: 134 MMHG | HEART RATE: 72 BPM | RESPIRATION RATE: 16 BRPM | HEIGHT: 62 IN | WEIGHT: 231 LBS

## 2020-01-26 DIAGNOSIS — B34.9 VIRAL ILLNESS: Primary | ICD-10-CM

## 2020-01-26 LAB
ALBUMIN SERPL-MCNC: 4.29 G/DL (ref 3.5–5.2)
ALBUMIN/GLOB SERPL: 1.2 G/DL
ALP SERPL-CCNC: 74 U/L (ref 39–117)
ALT SERPL W P-5'-P-CCNC: 11 U/L (ref 1–33)
ANION GAP SERPL CALCULATED.3IONS-SCNC: 13.1 MMOL/L (ref 5–15)
AST SERPL-CCNC: 16 U/L (ref 1–32)
BACTERIA UR QL AUTO: ABNORMAL /HPF
BASOPHILS # BLD AUTO: 0.05 10*3/MM3 (ref 0–0.2)
BASOPHILS NFR BLD AUTO: 0.7 % (ref 0–1.5)
BILIRUB SERPL-MCNC: 0.3 MG/DL (ref 0.2–1.2)
BILIRUB UR QL STRIP: NEGATIVE
BUN BLD-MCNC: 10 MG/DL (ref 6–20)
BUN/CREAT SERPL: 14.5 (ref 7–25)
CALCIUM SPEC-SCNC: 9.1 MG/DL (ref 8.6–10.5)
CHLORIDE SERPL-SCNC: 104 MMOL/L (ref 98–107)
CLARITY UR: CLEAR
CO2 SERPL-SCNC: 24.9 MMOL/L (ref 22–29)
COLOR UR: YELLOW
CREAT BLD-MCNC: 0.69 MG/DL (ref 0.57–1)
DEPRECATED RDW RBC AUTO: 42.3 FL (ref 37–54)
EOSINOPHIL # BLD AUTO: 0.06 10*3/MM3 (ref 0–0.4)
EOSINOPHIL NFR BLD AUTO: 0.9 % (ref 0.3–6.2)
ERYTHROCYTE [DISTWIDTH] IN BLOOD BY AUTOMATED COUNT: 13.2 % (ref 12.3–15.4)
FLUAV AG NPH QL: NEGATIVE
FLUBV AG NPH QL IA: NEGATIVE
GFR SERPL CREATININE-BSD FRML MDRD: 101 ML/MIN/1.73
GLOBULIN UR ELPH-MCNC: 3.6 GM/DL
GLUCOSE BLD-MCNC: 109 MG/DL (ref 65–99)
GLUCOSE UR STRIP-MCNC: NEGATIVE MG/DL
HCT VFR BLD AUTO: 40.1 % (ref 34–46.6)
HGB BLD-MCNC: 12.9 G/DL (ref 12–15.9)
HGB UR QL STRIP.AUTO: NEGATIVE
HOLD SPECIMEN: NORMAL
HOLD SPECIMEN: NORMAL
HYALINE CASTS UR QL AUTO: ABNORMAL /LPF
IMM GRANULOCYTES # BLD AUTO: 0.01 10*3/MM3 (ref 0–0.05)
IMM GRANULOCYTES NFR BLD AUTO: 0.1 % (ref 0–0.5)
KETONES UR QL STRIP: NEGATIVE
LEUKOCYTE ESTERASE UR QL STRIP.AUTO: ABNORMAL
LYMPHOCYTES # BLD AUTO: 1.93 10*3/MM3 (ref 0.7–3.1)
LYMPHOCYTES NFR BLD AUTO: 28 % (ref 19.6–45.3)
MCH RBC QN AUTO: 28.2 PG (ref 26.6–33)
MCHC RBC AUTO-ENTMCNC: 32.2 G/DL (ref 31.5–35.7)
MCV RBC AUTO: 87.6 FL (ref 79–97)
MONOCYTES # BLD AUTO: 0.49 10*3/MM3 (ref 0.1–0.9)
MONOCYTES NFR BLD AUTO: 7.1 % (ref 5–12)
NEUTROPHILS # BLD AUTO: 4.35 10*3/MM3 (ref 1.7–7)
NEUTROPHILS NFR BLD AUTO: 63.2 % (ref 42.7–76)
NITRITE UR QL STRIP: NEGATIVE
NRBC BLD AUTO-RTO: 0 /100 WBC (ref 0–0.2)
PH UR STRIP.AUTO: 5.5 [PH] (ref 5–8)
PLATELET # BLD AUTO: 243 10*3/MM3 (ref 140–450)
PMV BLD AUTO: 11 FL (ref 6–12)
POTASSIUM BLD-SCNC: 3.4 MMOL/L (ref 3.5–5.2)
PROT SERPL-MCNC: 7.9 G/DL (ref 6–8.5)
PROT UR QL STRIP: NEGATIVE
RBC # BLD AUTO: 4.58 10*6/MM3 (ref 3.77–5.28)
RBC # UR: ABNORMAL /HPF
REF LAB TEST METHOD: ABNORMAL
S PYO AG THROAT QL: NEGATIVE
SODIUM BLD-SCNC: 142 MMOL/L (ref 136–145)
SP GR UR STRIP: >1.03 (ref 1–1.03)
SQUAMOUS #/AREA URNS HPF: ABNORMAL /HPF
UROBILINOGEN UR QL STRIP: ABNORMAL
WBC NRBC COR # BLD: 6.89 10*3/MM3 (ref 3.4–10.8)
WBC UR QL AUTO: ABNORMAL /HPF
WHOLE BLOOD HOLD SPECIMEN: NORMAL
WHOLE BLOOD HOLD SPECIMEN: NORMAL

## 2020-01-26 PROCEDURE — 87804 INFLUENZA ASSAY W/OPTIC: CPT | Performed by: NURSE PRACTITIONER

## 2020-01-26 PROCEDURE — 87081 CULTURE SCREEN ONLY: CPT | Performed by: NURSE PRACTITIONER

## 2020-01-26 PROCEDURE — 25010000002 ONDANSETRON PER 1 MG: Performed by: NURSE PRACTITIONER

## 2020-01-26 PROCEDURE — 99284 EMERGENCY DEPT VISIT MOD MDM: CPT

## 2020-01-26 PROCEDURE — 85025 COMPLETE CBC W/AUTO DIFF WBC: CPT | Performed by: NURSE PRACTITIONER

## 2020-01-26 PROCEDURE — 96374 THER/PROPH/DIAG INJ IV PUSH: CPT

## 2020-01-26 PROCEDURE — 87880 STREP A ASSAY W/OPTIC: CPT | Performed by: NURSE PRACTITIONER

## 2020-01-26 PROCEDURE — 80053 COMPREHEN METABOLIC PANEL: CPT | Performed by: NURSE PRACTITIONER

## 2020-01-26 PROCEDURE — 81001 URINALYSIS AUTO W/SCOPE: CPT | Performed by: NURSE PRACTITIONER

## 2020-01-26 RX ORDER — DULOXETIN HYDROCHLORIDE 20 MG/1
20 CAPSULE, DELAYED RELEASE ORAL DAILY
COMMUNITY
End: 2020-12-28

## 2020-01-26 RX ORDER — SODIUM CHLORIDE 0.9 % (FLUSH) 0.9 %
10 SYRINGE (ML) INJECTION AS NEEDED
Status: DISCONTINUED | OUTPATIENT
Start: 2020-01-26 | End: 2020-01-26 | Stop reason: HOSPADM

## 2020-01-26 RX ORDER — ONDANSETRON 2 MG/ML
4 INJECTION INTRAMUSCULAR; INTRAVENOUS ONCE
Status: COMPLETED | OUTPATIENT
Start: 2020-01-26 | End: 2020-01-26

## 2020-01-26 RX ORDER — ONDANSETRON 4 MG/1
4 TABLET, ORALLY DISINTEGRATING ORAL EVERY 6 HOURS PRN
Qty: 15 TABLET | Refills: 0 | Status: SHIPPED | OUTPATIENT
Start: 2020-01-26 | End: 2020-02-25 | Stop reason: SDUPTHER

## 2020-01-26 RX ORDER — PROMETHAZINE HYDROCHLORIDE 25 MG/1
25 TABLET ORAL EVERY 6 HOURS PRN
Qty: 12 TABLET | Refills: 0 | Status: SHIPPED | OUTPATIENT
Start: 2020-01-26 | End: 2020-02-25 | Stop reason: SDUPTHER

## 2020-01-26 RX ADMIN — ONDANSETRON 4 MG: 2 INJECTION INTRAMUSCULAR; INTRAVENOUS at 16:03

## 2020-01-26 RX ADMIN — SODIUM CHLORIDE 1000 ML: 9 INJECTION, SOLUTION INTRAVENOUS at 16:03

## 2020-01-26 NOTE — ED PROVIDER NOTES
Subjective     Vomiting   The primary symptoms include fever, nausea and vomiting. Primary symptoms do not include abdominal pain or dysuria. The illness began today. The onset was gradual. The problem has not changed since onset.  The illness is also significant for chills.       Review of Systems   Constitutional: Positive for chills and fever.   HENT: Negative.    Respiratory: Negative.    Cardiovascular: Negative.  Negative for chest pain.   Gastrointestinal: Positive for nausea and vomiting. Negative for abdominal pain.   Endocrine: Negative.    Genitourinary: Negative.  Negative for dysuria.   Skin: Negative.    Neurological: Negative.    Psychiatric/Behavioral: Negative.    All other systems reviewed and are negative.      Past Medical History:   Diagnosis Date   • Abnormal Pap smear of cervix    • Acid reflux    • Allergic    • Anxiety    • Clotting disorder (CMS/HCC)    • Deep vein thrombosis (CMS/HCC)    • Depression    • Gallbladder abscess    • Hyperemesis gravidarum    • Migraines    • MTHFR deficiency complicating pregnancy (CMS/HCC)    • Ovarian cyst    • PMS (premenstrual syndrome)    • Recurrent pregnancy loss, antepartum condition or complication     [facility] and lost the pregnancy at 6 months   • Sinusitis    • Strep pharyngitis    • Urinary tract infection        Allergies   Allergen Reactions   • Penicillins Anaphylaxis   • Latex Itching   • Amoxicillin Rash       Past Surgical History:   Procedure Laterality Date   • ABDOMINAL SURGERY     • CHOLECYSTECTOMY  06/2016   • NASAL RECONSTRUCTION Bilateral 06/2009   • SINUS SURGERY Bilateral 06/2016   • TUBAL ABDOMINAL LIGATION Bilateral 09/15/2017   • TUBAL COAGULATION LAPAROSCOPIC Bilateral 9/15/2017    Procedure: BILATERAL TUBAL FALLOPE FILSHIE CLIPPING LAPAROSCOPIC;  Surgeon: Leo Posey III, MD;  Location: [facility];  Service:    • VAGINAL DELIVERY  14; 16; 17    female,male, male       Family History   Problem Relation Age of Onset   • Asthma  Mother    • Thyroid disease Mother    • Lupus Sister    • Ovarian cancer Sister    • Asthma Sister    • Lung cancer Paternal Grandmother    • Breast cancer Maternal Grandmother    • Lung cancer Maternal Grandmother    • Asthma Maternal Grandmother    • Hypertension Maternal Grandmother    • Lupus Maternal Grandmother    • Thyroid disease Maternal Grandmother    • Hypertension Maternal Grandfather        Social History     Socioeconomic History   • Marital status:      Spouse name: Not on file   • Number of children: Not on file   • Years of education: Not on file   • Highest education level: Not on file   Tobacco Use   • Smoking status: Never Smoker   • Smokeless tobacco: Never Used   Substance and Sexual Activity   • Alcohol use: No   • Drug use: No   • Sexual activity: Yes     Partners: Male     Birth control/protection: Surgical     Comment: -Juve, and has 3 children-homemaker           Objective   Physical Exam   Constitutional: She is oriented to person, place, and time. She appears well-developed and well-nourished. No distress.   HENT:   Head: Normocephalic and atraumatic.   Right Ear: External ear normal.   Left Ear: External ear normal.   Nose: Nose normal.   Eyes: Pupils are equal, round, and reactive to light. Conjunctivae and EOM are normal.   Neck: Normal range of motion. Neck supple. No JVD present. No tracheal deviation present.   Cardiovascular: Normal rate, regular rhythm and normal heart sounds.   No murmur heard.  Pulmonary/Chest: Effort normal and breath sounds normal. No respiratory distress. She has no wheezes.   Abdominal: Soft. Bowel sounds are normal. There is no tenderness.   Musculoskeletal: Normal range of motion. She exhibits no edema or deformity.   Neurological: She is alert and oriented to person, place, and time. No cranial nerve deficit.   Skin: Skin is warm and dry. No rash noted. She is not diaphoretic. No erythema. No pallor.   Psychiatric: She has a normal mood  and affect. Her behavior is normal. Thought content normal.   Nursing note and vitals reviewed.      Procedures           ED Course                                               MDM  Number of Diagnoses or Management Options  Viral illness: new and does not require workup     Amount and/or Complexity of Data Reviewed  Clinical lab tests: reviewed    Risk of Complications, Morbidity, and/or Mortality  Presenting problems: low  Diagnostic procedures: low  Management options: low        Final diagnoses:   Viral illness            Zahida Mendoza, PHYLICIA  01/26/20 2017

## 2020-01-26 NOTE — ED NOTES
"Patient states \"I peed before I came in. I cant go right now.\" Fluid bolus ordered. Will administer per order.     Rica Garay RN  01/26/20 2979    "

## 2020-01-29 LAB — BACTERIA SPEC AEROBE CULT: NORMAL

## 2020-02-25 ENCOUNTER — HOSPITAL ENCOUNTER (EMERGENCY)
Facility: HOSPITAL | Age: 30
Discharge: HOME | End: 2020-02-25
Attending: EMERGENCY MEDICINE | Admitting: EMERGENCY MEDICINE

## 2020-02-25 ENCOUNTER — APPOINTMENT (OUTPATIENT)
Dept: GENERAL RADIOLOGY | Facility: HOSPITAL | Age: 30
End: 2020-02-25

## 2020-02-25 VITALS
HEIGHT: 62 IN | RESPIRATION RATE: 18 BRPM | TEMPERATURE: 97 F | BODY MASS INDEX: 42.88 KG/M2 | WEIGHT: 233 LBS | HEART RATE: 86 BPM | DIASTOLIC BLOOD PRESSURE: 72 MMHG | OXYGEN SATURATION: 99 % | SYSTOLIC BLOOD PRESSURE: 128 MMHG

## 2020-02-25 DIAGNOSIS — J06.9 VIRAL URI: Primary | ICD-10-CM

## 2020-02-25 LAB
ALBUMIN SERPL-MCNC: 4.45 G/DL (ref 3.5–5.2)
ALBUMIN/GLOB SERPL: 1.3 G/DL
ALP SERPL-CCNC: 74 U/L (ref 39–117)
ALT SERPL W P-5'-P-CCNC: 19 U/L (ref 1–33)
ANION GAP SERPL CALCULATED.3IONS-SCNC: 12.3 MMOL/L (ref 5–15)
AST SERPL-CCNC: 18 U/L (ref 1–32)
BASOPHILS # BLD AUTO: 0.03 10*3/MM3 (ref 0–0.2)
BASOPHILS NFR BLD AUTO: 0.7 % (ref 0–1.5)
BILIRUB SERPL-MCNC: 0.2 MG/DL (ref 0.2–1.2)
BILIRUB UR QL STRIP: NEGATIVE
BUN BLD-MCNC: 6 MG/DL (ref 6–20)
BUN/CREAT SERPL: 7.6 (ref 7–25)
CALCIUM SPEC-SCNC: 9.5 MG/DL (ref 8.6–10.5)
CHLORIDE SERPL-SCNC: 103 MMOL/L (ref 98–107)
CLARITY UR: CLEAR
CO2 SERPL-SCNC: 27.7 MMOL/L (ref 22–29)
COLOR UR: YELLOW
CREAT BLD-MCNC: 0.79 MG/DL (ref 0.57–1)
DEPRECATED RDW RBC AUTO: 42.6 FL (ref 37–54)
EOSINOPHIL # BLD AUTO: 0.11 10*3/MM3 (ref 0–0.4)
EOSINOPHIL NFR BLD AUTO: 2.5 % (ref 0.3–6.2)
ERYTHROCYTE [DISTWIDTH] IN BLOOD BY AUTOMATED COUNT: 13.2 % (ref 12.3–15.4)
FLUAV AG NPH QL: NEGATIVE
FLUBV AG NPH QL IA: NEGATIVE
GFR SERPL CREATININE-BSD FRML MDRD: 86 ML/MIN/1.73
GLOBULIN UR ELPH-MCNC: 3.6 GM/DL
GLUCOSE BLD-MCNC: 104 MG/DL (ref 65–99)
GLUCOSE UR STRIP-MCNC: NEGATIVE MG/DL
HCG SERPL QL: NEGATIVE
HCT VFR BLD AUTO: 42.6 % (ref 34–46.6)
HGB BLD-MCNC: 13.5 G/DL (ref 12–15.9)
HGB UR QL STRIP.AUTO: NEGATIVE
HOLD SPECIMEN: NORMAL
HOLD SPECIMEN: NORMAL
IMM GRANULOCYTES # BLD AUTO: 0.01 10*3/MM3 (ref 0–0.05)
IMM GRANULOCYTES NFR BLD AUTO: 0.2 % (ref 0–0.5)
KETONES UR QL STRIP: NEGATIVE
LEUKOCYTE ESTERASE UR QL STRIP.AUTO: NEGATIVE
LIPASE SERPL-CCNC: 44 U/L (ref 13–60)
LYMPHOCYTES # BLD AUTO: 1.43 10*3/MM3 (ref 0.7–3.1)
LYMPHOCYTES NFR BLD AUTO: 32.4 % (ref 19.6–45.3)
MCH RBC QN AUTO: 28.3 PG (ref 26.6–33)
MCHC RBC AUTO-ENTMCNC: 31.7 G/DL (ref 31.5–35.7)
MCV RBC AUTO: 89.3 FL (ref 79–97)
MONOCYTES # BLD AUTO: 0.32 10*3/MM3 (ref 0.1–0.9)
MONOCYTES NFR BLD AUTO: 7.2 % (ref 5–12)
NEUTROPHILS # BLD AUTO: 2.52 10*3/MM3 (ref 1.7–7)
NEUTROPHILS NFR BLD AUTO: 57 % (ref 42.7–76)
NITRITE UR QL STRIP: NEGATIVE
NRBC BLD AUTO-RTO: 0 /100 WBC (ref 0–0.2)
PH UR STRIP.AUTO: <=5 [PH] (ref 5–8)
PLATELET # BLD AUTO: 228 10*3/MM3 (ref 140–450)
PMV BLD AUTO: 10.7 FL (ref 6–12)
POTASSIUM BLD-SCNC: 3.3 MMOL/L (ref 3.5–5.2)
PROT SERPL-MCNC: 8 G/DL (ref 6–8.5)
PROT UR QL STRIP: NEGATIVE
RBC # BLD AUTO: 4.77 10*6/MM3 (ref 3.77–5.28)
S PYO AG THROAT QL: NEGATIVE
SODIUM BLD-SCNC: 143 MMOL/L (ref 136–145)
SP GR UR STRIP: 1.03 (ref 1–1.03)
UROBILINOGEN UR QL STRIP: NORMAL
WBC NRBC COR # BLD: 4.42 10*3/MM3 (ref 3.4–10.8)
WHOLE BLOOD HOLD SPECIMEN: NORMAL
WHOLE BLOOD HOLD SPECIMEN: NORMAL

## 2020-02-25 PROCEDURE — 87880 STREP A ASSAY W/OPTIC: CPT | Performed by: EMERGENCY MEDICINE

## 2020-02-25 PROCEDURE — 96361 HYDRATE IV INFUSION ADD-ON: CPT

## 2020-02-25 PROCEDURE — 87804 INFLUENZA ASSAY W/OPTIC: CPT | Performed by: EMERGENCY MEDICINE

## 2020-02-25 PROCEDURE — 96374 THER/PROPH/DIAG INJ IV PUSH: CPT

## 2020-02-25 PROCEDURE — 87081 CULTURE SCREEN ONLY: CPT | Performed by: EMERGENCY MEDICINE

## 2020-02-25 PROCEDURE — 85025 COMPLETE CBC W/AUTO DIFF WBC: CPT | Performed by: EMERGENCY MEDICINE

## 2020-02-25 PROCEDURE — 71045 X-RAY EXAM CHEST 1 VIEW: CPT

## 2020-02-25 PROCEDURE — 71045 X-RAY EXAM CHEST 1 VIEW: CPT | Performed by: RADIOLOGY

## 2020-02-25 PROCEDURE — 80053 COMPREHEN METABOLIC PANEL: CPT | Performed by: EMERGENCY MEDICINE

## 2020-02-25 PROCEDURE — 99284 EMERGENCY DEPT VISIT MOD MDM: CPT

## 2020-02-25 PROCEDURE — 84703 CHORIONIC GONADOTROPIN ASSAY: CPT | Performed by: EMERGENCY MEDICINE

## 2020-02-25 PROCEDURE — 25010000002 ONDANSETRON PER 1 MG: Performed by: EMERGENCY MEDICINE

## 2020-02-25 PROCEDURE — 81003 URINALYSIS AUTO W/O SCOPE: CPT | Performed by: EMERGENCY MEDICINE

## 2020-02-25 PROCEDURE — 83690 ASSAY OF LIPASE: CPT | Performed by: EMERGENCY MEDICINE

## 2020-02-25 RX ORDER — GUAIFENESIN/DEXTROMETHORPHAN 100-10MG/5
10 SYRUP ORAL 4 TIMES DAILY PRN
Qty: 118 ML | Refills: 0 | OUTPATIENT
Start: 2020-02-25 | End: 2020-08-01

## 2020-02-25 RX ORDER — PROMETHAZINE HYDROCHLORIDE 25 MG/1
25 TABLET ORAL EVERY 6 HOURS PRN
Qty: 10 TABLET | Refills: 0 | OUTPATIENT
Start: 2020-02-25 | End: 2020-08-01

## 2020-02-25 RX ORDER — ONDANSETRON 4 MG/1
4 TABLET, ORALLY DISINTEGRATING ORAL EVERY 6 HOURS PRN
Qty: 15 TABLET | Refills: 0 | OUTPATIENT
Start: 2020-02-25 | End: 2020-08-01

## 2020-02-25 RX ORDER — POTASSIUM CHLORIDE 20 MEQ/1
20 TABLET, EXTENDED RELEASE ORAL ONCE
Status: COMPLETED | OUTPATIENT
Start: 2020-02-25 | End: 2020-02-25

## 2020-02-25 RX ORDER — ONDANSETRON 2 MG/ML
4 INJECTION INTRAMUSCULAR; INTRAVENOUS ONCE
Status: COMPLETED | OUTPATIENT
Start: 2020-02-25 | End: 2020-02-25

## 2020-02-25 RX ORDER — SODIUM CHLORIDE 9 MG/ML
125 INJECTION, SOLUTION INTRAVENOUS CONTINUOUS
Status: DISCONTINUED | OUTPATIENT
Start: 2020-02-25 | End: 2020-02-25 | Stop reason: HOSPADM

## 2020-02-25 RX ADMIN — POTASSIUM CHLORIDE 20 MEQ: 1500 TABLET, EXTENDED RELEASE ORAL at 10:37

## 2020-02-25 RX ADMIN — SODIUM CHLORIDE 1000 ML: 9 INJECTION, SOLUTION INTRAVENOUS at 08:10

## 2020-02-25 RX ADMIN — ONDANSETRON 4 MG: 2 INJECTION INTRAMUSCULAR; INTRAVENOUS at 08:11

## 2020-02-25 RX ADMIN — SODIUM CHLORIDE 125 ML/HR: 9 INJECTION, SOLUTION INTRAVENOUS at 09:14

## 2020-02-25 NOTE — DISCHARGE INSTRUCTIONS
Home to rest.  Drink plenty fluids.  Tylenol/ibuprofen as directed as needed.  Take your medication as prescribed.  See Franca Mccallum in the office in 2 days for recheck.  Return emergency department right away if symptoms worsen/any problems.

## 2020-02-25 NOTE — ED NOTES
Warm blanket given as requested, temp checked prior with result of 97.6 oral, pt updated, denies any complaints at this time     Greta Lopez, RN  02/25/20 0932

## 2020-02-25 NOTE — ED PROVIDER NOTES
Subjective   Patient is a 29-year-old female who complains of a 3-day history of illness... Feeling feverish, nasal congestion, cough productive of small amounts of green sputum, nausea with one episode of emesis, discomfort in her bilateral lower lumbar areas.  She denies headaches, neck pain, chest pain, shortness of breath, abdominal pain, diarrhea, syncope or near syncope, other symptoms or other complaints.  She reports that she is in general healthy with no major medical problems, states that her only medication is Cymbalta.  States that she does not smoke, never has.          Review of Systems   Constitutional: Positive for fatigue. Negative for diaphoresis.   HENT: Positive for congestion. Negative for ear pain, sore throat and trouble swallowing.    Eyes: Negative for photophobia and pain.   Respiratory: Positive for cough. Negative for shortness of breath, wheezing and stridor.    Cardiovascular: Negative for chest pain and palpitations.   Gastrointestinal: Positive for nausea and vomiting. Negative for abdominal distention, abdominal pain, blood in stool and diarrhea.   Endocrine: Negative for polydipsia and polyphagia.   Genitourinary: Negative for difficulty urinating and flank pain.   Musculoskeletal: Negative for neck pain and neck stiffness.   Skin: Negative for color change and pallor.   Neurological: Negative for seizures, syncope and speech difficulty.   Psychiatric/Behavioral: Negative for confusion.   All other systems reviewed and are negative.      Past Medical History:   Diagnosis Date   • Abnormal Pap smear of cervix    • Acid reflux    • Allergic    • Anxiety    • Clotting disorder (CMS/HCC)    • Deep vein thrombosis (CMS/HCC)    • Depression    • Gallbladder abscess    • Hyperemesis gravidarum    • Migraines    • MTHFR deficiency complicating pregnancy (CMS/HCC)    • Ovarian cyst    • PMS (premenstrual syndrome)    • Recurrent pregnancy loss, antepartum condition or complication     had a  [facility] and lost the pregnancy at 6 months   • Sinusitis    • Strep pharyngitis    • Urinary tract infection        Allergies   Allergen Reactions   • Penicillins Anaphylaxis   • Latex Itching   • Amoxicillin Rash       Past Surgical History:   Procedure Laterality Date   • ABDOMINAL SURGERY     • CHOLECYSTECTOMY  06/2016   • NASAL RECONSTRUCTION Bilateral 06/2009   • SINUS SURGERY Bilateral 06/2016   • TUBAL ABDOMINAL LIGATION Bilateral 09/15/2017   • TUBAL COAGULATION LAPAROSCOPIC Bilateral 9/15/2017    Procedure: BILATERAL TUBAL FALLOPE FILSHIE CLIPPING LAPAROSCOPIC;  Surgeon: Leo Posey III, MD;  Location: [facility];  Service:    • VAGINAL DELIVERY  14; 16; 17    female,male, male       Family History   Problem Relation Age of Onset   • Asthma Mother    • Thyroid disease Mother    • Lupus Sister    • Ovarian cancer Sister    • Asthma Sister    • Lung cancer Paternal Grandmother    • Breast cancer Maternal Grandmother    • Lung cancer Maternal Grandmother    • Asthma Maternal Grandmother    • Hypertension Maternal Grandmother    • Lupus Maternal Grandmother    • Thyroid disease Maternal Grandmother    • Hypertension Maternal Grandfather        Social History     Socioeconomic History   • Marital status:      Spouse name: Not on file   • Number of children: Not on file   • Years of education: Not on file   • Highest education level: Not on file   Tobacco Use   • Smoking status: Never Smoker   • Smokeless tobacco: Never Used   Substance and Sexual Activity   • Alcohol use: No   • Drug use: No   • Sexual activity: Yes     Partners: Male     Birth control/protection: Surgical     Comment: -Juve, and has 3 children-homemaker           Objective   Physical Exam   Constitutional: She is oriented to person, place, and time. She appears well-developed and well-nourished. No distress.   HENT:   Head: Normocephalic and atraumatic.   Eyes: Pupils are equal, round, and reactive to light. EOM are normal. No  scleral icterus.   Neck: Normal range of motion. Neck supple. No neck rigidity. No tracheal deviation present.   Cardiovascular: Normal rate, regular rhythm and intact distal pulses.   Pulmonary/Chest: Effort normal and breath sounds normal. No respiratory distress. She exhibits no tenderness.   Abdominal: Soft. Bowel sounds are normal. There is no tenderness. There is no rebound and no guarding.   Musculoskeletal: Normal range of motion. She exhibits no edema or tenderness.   Neurological: She is alert and oriented to person, place, and time. She has normal strength. No cranial nerve deficit or sensory deficit. She exhibits normal muscle tone. Coordination normal. GCS eye subscore is 4. GCS verbal subscore is 5. GCS motor subscore is 6.   Skin: Skin is warm and dry. Capillary refill takes less than 2 seconds. She is not diaphoretic. No cyanosis. No pallor.   Psychiatric: She has a normal mood and affect. Her behavior is normal.   Nursing note and vitals reviewed.      Procedures  Results for orders placed or performed during the hospital encounter of 02/25/20   Influenza Antigen, Rapid - Swab, Nasopharynx   Result Value Ref Range    Influenza A Ag, EIA Negative Negative    Influenza B Ag, EIA Negative Negative   Rapid Strep A Screen - Swab, Throat   Result Value Ref Range    Strep A Ag Negative Negative   Comprehensive Metabolic Panel   Result Value Ref Range    Glucose 104 (H) 65 - 99 mg/dL    BUN 6 6 - 20 mg/dL    Creatinine 0.79 0.57 - 1.00 mg/dL    Sodium 143 136 - 145 mmol/L    Potassium 3.3 (L) 3.5 - 5.2 mmol/L    Chloride 103 98 - 107 mmol/L    CO2 27.7 22.0 - 29.0 mmol/L    Calcium 9.5 8.6 - 10.5 mg/dL    Total Protein 8.0 6.0 - 8.5 g/dL    Albumin 4.45 3.50 - 5.20 g/dL    ALT (SGPT) 19 1 - 33 U/L    AST (SGOT) 18 1 - 32 U/L    Alkaline Phosphatase 74 39 - 117 U/L    Total Bilirubin 0.2 0.2 - 1.2 mg/dL    eGFR Non African Amer 86 >60 mL/min/1.73    Globulin 3.6 gm/dL    A/G Ratio 1.3 g/dL    BUN/Creatinine  Ratio 7.6 7.0 - 25.0    Anion Gap 12.3 5.0 - 15.0 mmol/L   Lipase   Result Value Ref Range    Lipase 44 13 - 60 U/L   hCG, Serum, Qualitative   Result Value Ref Range    HCG Qualitative Negative Negative   Urinalysis With Microscopic If Indicated (No Culture) - Urine, Clean Catch   Result Value Ref Range    Color, UA Yellow Yellow, Straw    Appearance, UA Clear Clear    pH, UA <=5.0 5.0 - 8.0    Specific Gravity, UA 1.027 1.005 - 1.030    Glucose, UA Negative Negative    Ketones, UA Negative Negative    Bilirubin, UA Negative Negative    Blood, UA Negative Negative    Protein, UA Negative Negative    Leuk Esterase, UA Negative Negative    Nitrite, UA Negative Negative    Urobilinogen, UA 0.2 E.U./dL 0.2 - 1.0 E.U./dL   CBC Auto Differential   Result Value Ref Range    WBC 4.42 3.40 - 10.80 10*3/mm3    RBC 4.77 3.77 - 5.28 10*6/mm3    Hemoglobin 13.5 12.0 - 15.9 g/dL    Hematocrit 42.6 34.0 - 46.6 %    MCV 89.3 79.0 - 97.0 fL    MCH 28.3 26.6 - 33.0 pg    MCHC 31.7 31.5 - 35.7 g/dL    RDW 13.2 12.3 - 15.4 %    RDW-SD 42.6 37.0 - 54.0 fl    MPV 10.7 6.0 - 12.0 fL    Platelets 228 140 - 450 10*3/mm3    Neutrophil % 57.0 42.7 - 76.0 %    Lymphocyte % 32.4 19.6 - 45.3 %    Monocyte % 7.2 5.0 - 12.0 %    Eosinophil % 2.5 0.3 - 6.2 %    Basophil % 0.7 0.0 - 1.5 %    Immature Grans % 0.2 0.0 - 0.5 %    Neutrophils, Absolute 2.52 1.70 - 7.00 10*3/mm3    Lymphocytes, Absolute 1.43 0.70 - 3.10 10*3/mm3    Monocytes, Absolute 0.32 0.10 - 0.90 10*3/mm3    Eosinophils, Absolute 0.11 0.00 - 0.40 10*3/mm3    Basophils, Absolute 0.03 0.00 - 0.20 10*3/mm3    Immature Grans, Absolute 0.01 0.00 - 0.05 10*3/mm3    nRBC 0.0 0.0 - 0.2 /100 WBC   Light Blue Top   Result Value Ref Range    Extra Tube hold for add-on    Green Top (Gel)   Result Value Ref Range    Extra Tube Hold for add-ons.    Lavender Top   Result Value Ref Range    Extra Tube hold for add-on    Gold Top - SST   Result Value Ref Range    Extra Tube Hold for add-ons.       XR Chest 1 View   Final Result   No radiographic evidence of acute cardiac or pulmonary   disease.       This report was finalized on 2/25/2020 9:16 AM by Dr. Fortino Carter MD.                       ED Course  ED Course as of Feb 25 0942   Tue Feb 25, 2020 0942 Patient's emergency department stay has been uneventful.  Never has she shown any signs of distress.    [CM]      ED Course User Index  [CM] Lam Rea MD                                           [facility]    Final diagnoses:   Viral URI             Please note that portions of this note were completed with a voice recognition program.        Lam Rea MD  02/25/20 0942

## 2020-02-25 NOTE — ED NOTES
Pt left er ambulatory with spouse, no complaints voiced, stable, nad noted     Greta Lopez, RN  02/25/20 3393

## 2020-02-25 NOTE — ED NOTES
[facility]  02/25/20 0837

## 2020-02-27 LAB — BACTERIA SPEC AEROBE CULT: NORMAL

## 2020-06-02 ENCOUNTER — HOSPITAL ENCOUNTER (EMERGENCY)
Facility: HOSPITAL | Age: 30
Discharge: LEFT BEFORE DISCHARGE | End: 2020-06-02

## 2020-06-02 VITALS
TEMPERATURE: 97.7 F | OXYGEN SATURATION: 98 % | DIASTOLIC BLOOD PRESSURE: 67 MMHG | HEART RATE: 95 BPM | RESPIRATION RATE: 20 BRPM | HEIGHT: 62 IN | WEIGHT: 227 LBS | BODY MASS INDEX: 41.77 KG/M2 | SYSTOLIC BLOOD PRESSURE: 128 MMHG

## 2020-06-03 ENCOUNTER — HOSPITAL ENCOUNTER (OUTPATIENT)
Dept: ULTRASOUND IMAGING | Facility: HOSPITAL | Age: 30
Discharge: HOME | End: 2020-06-03
Admitting: RADIOLOGY

## 2020-06-03 DIAGNOSIS — N63.25 BREAST LUMP ON LEFT SIDE AT 3 O'CLOCK POSITION: ICD-10-CM

## 2020-06-03 PROCEDURE — 76642 ULTRASOUND BREAST LIMITED: CPT

## 2020-06-03 PROCEDURE — 76642 ULTRASOUND BREAST LIMITED: CPT | Performed by: RADIOLOGY

## 2020-08-01 ENCOUNTER — APPOINTMENT (OUTPATIENT)
Dept: ULTRASOUND IMAGING | Facility: HOSPITAL | Age: 30
End: 2020-08-01

## 2020-08-01 ENCOUNTER — HOSPITAL ENCOUNTER (EMERGENCY)
Facility: HOSPITAL | Age: 30
Discharge: HOME | End: 2020-08-01
Attending: EMERGENCY MEDICINE | Admitting: EMERGENCY MEDICINE

## 2020-08-01 VITALS
BODY MASS INDEX: 44.72 KG/M2 | TEMPERATURE: 97.8 F | WEIGHT: 243 LBS | HEART RATE: 66 BPM | HEIGHT: 62 IN | RESPIRATION RATE: 18 BRPM | OXYGEN SATURATION: 98 % | DIASTOLIC BLOOD PRESSURE: 89 MMHG | SYSTOLIC BLOOD PRESSURE: 133 MMHG

## 2020-08-01 DIAGNOSIS — M79.89 LEG SWELLING: Primary | ICD-10-CM

## 2020-08-01 PROCEDURE — 99283 EMERGENCY DEPT VISIT LOW MDM: CPT

## 2020-08-01 PROCEDURE — 93971 EXTREMITY STUDY: CPT

## 2020-08-01 RX ORDER — HYDROCODONE BITARTRATE AND ACETAMINOPHEN 7.5; 325 MG/1; MG/1
1 TABLET ORAL ONCE
Status: COMPLETED | OUTPATIENT
Start: 2020-08-01 | End: 2020-08-01

## 2020-08-01 RX ORDER — DICLOFENAC SODIUM 75 MG/1
75 TABLET, DELAYED RELEASE ORAL 2 TIMES DAILY PRN
Qty: 20 TABLET | Refills: 0 | Status: SHIPPED | OUTPATIENT
Start: 2020-08-01 | End: 2020-09-23

## 2020-08-01 RX ADMIN — HYDROCODONE BITARTRATE AND ACETAMINOPHEN 1 TABLET: 7.5; 325 TABLET ORAL at 21:50

## 2020-08-02 NOTE — ED PROVIDER NOTES
Subjective   History of Present Illness    Review of Systems    Past Medical History:   Diagnosis Date   • Abnormal Pap smear of cervix    • Acid reflux    • Allergic    • Anxiety    • Clotting disorder (CMS/HCC)    • Deep vein thrombosis (CMS/HCC)    • Depression    • Gallbladder abscess    • Hyperemesis gravidarum    • Migraines    • MTHFR deficiency complicating pregnancy (CMS/HCC)    • Ovarian cyst    • PMS (premenstrual syndrome)    • Recurrent pregnancy loss, antepartum condition or complication     had a [facility] and lost the pregnancy at 6 months   • Sinusitis    • Strep pharyngitis    • Urinary tract infection        Allergies   Allergen Reactions   • Penicillins Anaphylaxis   • Latex Itching   • Amoxicillin Rash       Past Surgical History:   Procedure Laterality Date   • ABDOMINAL SURGERY     • CHOLECYSTECTOMY  06/2016   • NASAL RECONSTRUCTION Bilateral 06/2009   • SINUS SURGERY Bilateral 06/2016   • TUBAL ABDOMINAL LIGATION Bilateral 09/15/2017   • TUBAL COAGULATION LAPAROSCOPIC Bilateral 9/15/2017    Procedure: BILATERAL TUBAL FALLOPE FILSHIE CLIPPING LAPAROSCOPIC;  Surgeon: Leo Posey III, MD;  Location: [facility];  Service:    • VAGINAL DELIVERY  14; 16; 17    female,male, male       Family History   Problem Relation Age of Onset   • Asthma Mother    • Thyroid disease Mother    • Lupus Sister    • Ovarian cancer Sister    • Asthma Sister    • Lung cancer Paternal Grandmother    • Breast cancer Maternal Grandmother    • Lung cancer Maternal Grandmother    • Asthma Maternal Grandmother    • Hypertension Maternal Grandmother    • Lupus Maternal Grandmother    • Thyroid disease Maternal Grandmother    • Hypertension Maternal Grandfather        Social History     Socioeconomic History   • Marital status:      Spouse name: Not on file   • Number of children: Not on file   • Years of education: Not on file   • Highest education level: Not on file   Tobacco Use   • Smoking status: Never Smoker   •  Smokeless tobacco: Never Used   Substance and Sexual Activity   • Alcohol use: No   • Drug use: No   • Sexual activity: Yes     Partners: Male     Birth control/protection: Surgical     Comment: -Juve, and has 3 children-homemaker           Objective   Physical Exam    Procedures           ED Course  ED Course as of Aug 01 2332   Sat Aug 01, 2020   2328 IMPRESSION:  No deep venous thrombus in the right lower extremity.    US Venous Doppler Lower Extremity Right (duplex) [KK]      ED Course User Index  [KK] Teodoro Burris APRN                                           MDM  Number of Diagnoses or Management Options  Leg swelling: new and requires workup     Amount and/or Complexity of Data Reviewed  Tests in the radiology section of CPT®: ordered and reviewed    Risk of Complications, Morbidity, and/or Mortality  Presenting problems: moderate  Diagnostic procedures: moderate  Management options: moderate    Patient Progress  Patient progress: improved      Final diagnoses:   Leg swelling            Teodoro Burris APRN  08/01/20 3487

## 2020-08-02 NOTE — DISCHARGE INSTRUCTIONS
No evidence of DVT. Rest and elevate extremity. Take NSAIDS for pain and swelling. Follow up with PCP as needed.

## 2020-08-02 NOTE — ED PROVIDER NOTES
Subjective   Patient presents to ER with pain and swelling right leg of two days duration.She has history of DVT.      Leg Swelling   Severity:  Mild  Onset quality:  Gradual  Duration:  2 days  Progression:  Worsening  Chronicity:  Recurrent  Associated symptoms: fatigue    Associated symptoms: no shortness of breath        Review of Systems   Constitutional: Positive for activity change and fatigue.   HENT: Negative.    Eyes: Negative.    Respiratory: Negative for shortness of breath.    Cardiovascular: Positive for leg swelling.   Gastrointestinal: Negative.    Endocrine: Negative.    Genitourinary: Negative.    Musculoskeletal: Positive for joint swelling.   Skin: Negative.    Allergic/Immunologic: Negative.    Neurological: Negative.    Hematological: Negative.    Psychiatric/Behavioral: Negative.        Past Medical History:   Diagnosis Date   • Abnormal Pap smear of cervix    • Acid reflux    • Allergic    • Anxiety    • Clotting disorder (CMS/HCC)    • Deep vein thrombosis (CMS/HCC)    • Depression    • Gallbladder abscess    • Hyperemesis gravidarum    • Migraines    • MTHFR deficiency complicating pregnancy (CMS/HCC)    • Ovarian cyst    • PMS (premenstrual syndrome)    • Recurrent pregnancy loss, antepartum condition or complication     had a [facility] and lost the pregnancy at 6 months   • Sinusitis    • Strep pharyngitis    • Urinary tract infection        Allergies   Allergen Reactions   • Penicillins Anaphylaxis   • Latex Itching   • Amoxicillin Rash       Past Surgical History:   Procedure Laterality Date   • ABDOMINAL SURGERY     • CHOLECYSTECTOMY  06/2016   • NASAL RECONSTRUCTION Bilateral 06/2009   • SINUS SURGERY Bilateral 06/2016   • TUBAL ABDOMINAL LIGATION Bilateral 09/15/2017   • TUBAL COAGULATION LAPAROSCOPIC Bilateral 9/15/2017    Procedure: BILATERAL TUBAL FALLOPE FILSHIE CLIPPING LAPAROSCOPIC;  Surgeon: Leo Posey III, MD;  Location: [facility];  Service:    • VAGINAL DELIVERY  14; 16; 17     female,male, male       Family History   Problem Relation Age of Onset   • Asthma Mother    • Thyroid disease Mother    • Lupus Sister    • Ovarian cancer Sister    • Asthma Sister    • Lung cancer Paternal Grandmother    • Breast cancer Maternal Grandmother    • Lung cancer Maternal Grandmother    • Asthma Maternal Grandmother    • Hypertension Maternal Grandmother    • Lupus Maternal Grandmother    • Thyroid disease Maternal Grandmother    • Hypertension Maternal Grandfather        Social History     Socioeconomic History   • Marital status:      Spouse name: Not on file   • Number of children: Not on file   • Years of education: Not on file   • Highest education level: Not on file   Tobacco Use   • Smoking status: Never Smoker   • Smokeless tobacco: Never Used   Substance and Sexual Activity   • Alcohol use: No   • Drug use: No   • Sexual activity: Yes     Partners: Male     Birth control/protection: Surgical     Comment: -Juve, and has 3 children-homemaker           Objective   Physical Exam   Constitutional: She appears well-developed and well-nourished.   HENT:   Head: Normocephalic and atraumatic.   Eyes: Pupils are equal, round, and reactive to light. EOM are normal.   Neck: Normal range of motion.   Cardiovascular: Normal rate.   Pulmonary/Chest: Effort normal.   Abdominal: Soft.   Musculoskeletal:   Right leg: some swelling noted, tender posteriorly to palpation   Neurological: She is alert.   Skin: Capillary refill takes less than 2 seconds.   Psychiatric: She has a normal mood and affect.   Nursing note and vitals reviewed.      Procedures           ED Course  ED Course as of Aug 05 1532   Sat Aug 01, 2020   8408 IMPRESSION:  No deep venous thrombus in the right lower extremity.    US Venous Doppler Lower Extremity Right (duplex) [KK]      ED Course User Index  [KK] Teodoro Burris, APRN                                           MDM    Final diagnoses:   Leg swelling             Trino Harrell MD  08/01/20 [id number]       Trino Harrell MD  08/05/20 0813

## 2020-09-23 ENCOUNTER — LAB (OUTPATIENT)
Dept: LAB | Facility: HOSPITAL | Age: 30
End: 2020-09-23

## 2020-09-23 ENCOUNTER — OFFICE VISIT (OUTPATIENT)
Dept: INTERNAL MEDICINE | Facility: CLINIC | Age: 30
End: 2020-09-23

## 2020-09-23 VITALS
WEIGHT: 245.6 LBS | DIASTOLIC BLOOD PRESSURE: 88 MMHG | HEART RATE: 82 BPM | OXYGEN SATURATION: 99 % | BODY MASS INDEX: 43.52 KG/M2 | HEIGHT: 63 IN | TEMPERATURE: 97.1 F | SYSTOLIC BLOOD PRESSURE: 130 MMHG

## 2020-09-23 DIAGNOSIS — G89.29 CHRONIC BILATERAL LOW BACK PAIN WITH LEFT-SIDED SCIATICA: ICD-10-CM

## 2020-09-23 DIAGNOSIS — M54.42 CHRONIC BILATERAL LOW BACK PAIN WITH LEFT-SIDED SCIATICA: ICD-10-CM

## 2020-09-23 DIAGNOSIS — F32.A ANXIETY AND DEPRESSION: Primary | ICD-10-CM

## 2020-09-23 DIAGNOSIS — R25.2 LEG CRAMPS: ICD-10-CM

## 2020-09-23 DIAGNOSIS — F41.9 ANXIETY AND DEPRESSION: Primary | ICD-10-CM

## 2020-09-23 DIAGNOSIS — R53.82 CHRONIC FATIGUE: ICD-10-CM

## 2020-09-23 DIAGNOSIS — E66.01 MORBID (SEVERE) OBESITY DUE TO EXCESS CALORIES (HCC): ICD-10-CM

## 2020-09-23 DIAGNOSIS — Z11.59 NEED FOR HEPATITIS C SCREENING TEST: ICD-10-CM

## 2020-09-23 DIAGNOSIS — Z13.220 LIPID SCREENING: ICD-10-CM

## 2020-09-23 LAB
BASOPHILS # BLD AUTO: 0.04 10*3/MM3 (ref 0–0.2)
BASOPHILS NFR BLD AUTO: 0.8 % (ref 0–1.5)
DEPRECATED RDW RBC AUTO: 40.6 FL (ref 37–54)
EOSINOPHIL # BLD AUTO: 0.1 10*3/MM3 (ref 0–0.4)
EOSINOPHIL NFR BLD AUTO: 2 % (ref 0.3–6.2)
ERYTHROCYTE [DISTWIDTH] IN BLOOD BY AUTOMATED COUNT: 13.2 % (ref 12.3–15.4)
HCT VFR BLD AUTO: 39.7 % (ref 34–46.6)
HGB BLD-MCNC: 13.5 G/DL (ref 12–15.9)
IMM GRANULOCYTES # BLD AUTO: 0.01 10*3/MM3 (ref 0–0.05)
IMM GRANULOCYTES NFR BLD AUTO: 0.2 % (ref 0–0.5)
LYMPHOCYTES # BLD AUTO: 1.32 10*3/MM3 (ref 0.7–3.1)
LYMPHOCYTES NFR BLD AUTO: 26.3 % (ref 19.6–45.3)
MCH RBC QN AUTO: 28.6 PG (ref 26.6–33)
MCHC RBC AUTO-ENTMCNC: 34 G/DL (ref 31.5–35.7)
MCV RBC AUTO: 84.1 FL (ref 79–97)
MONOCYTES # BLD AUTO: 0.36 10*3/MM3 (ref 0.1–0.9)
MONOCYTES NFR BLD AUTO: 7.2 % (ref 5–12)
NEUTROPHILS NFR BLD AUTO: 3.18 10*3/MM3 (ref 1.7–7)
NEUTROPHILS NFR BLD AUTO: 63.5 % (ref 42.7–76)
NRBC BLD AUTO-RTO: 0 /100 WBC (ref 0–0.2)
PLATELET # BLD AUTO: 252 10*3/MM3 (ref 140–450)
PMV BLD AUTO: 11.6 FL (ref 6–12)
RBC # BLD AUTO: 4.72 10*6/MM3 (ref 3.77–5.28)
WBC # BLD AUTO: 5.01 10*3/MM3 (ref 3.4–10.8)

## 2020-09-23 PROCEDURE — 80061 LIPID PANEL: CPT | Performed by: FAMILY MEDICINE

## 2020-09-23 PROCEDURE — 99204 OFFICE O/P NEW MOD 45 MIN: CPT | Performed by: FAMILY MEDICINE

## 2020-09-23 PROCEDURE — 86803 HEPATITIS C AB TEST: CPT | Performed by: FAMILY MEDICINE

## 2020-09-23 PROCEDURE — 80053 COMPREHEN METABOLIC PANEL: CPT | Performed by: FAMILY MEDICINE

## 2020-09-23 PROCEDURE — 85025 COMPLETE CBC W/AUTO DIFF WBC: CPT | Performed by: FAMILY MEDICINE

## 2020-09-23 NOTE — PROGRESS NOTES
Subjective     Theresa Maya is a 30 y.o. female.     Chief Complaint   Patient presents with   • Establish Care   • Depression   • Back Pain     lower back pain 1.5 year    • Obesity     gained 40 pound in 3 months       Depression  Visit Type: initial  Onset of symptoms: more than 5 years ago  Progression since onset: waxing and waning  Patient presents with the following symptoms: decreased concentration, depressed mood, excessive worry, fatigue, hyperventilation, insomnia, irritability, nausea, nervousness/anxiety, panic and weight gain.  Patient is not experiencing: chest pain, compulsions, confusion, dizziness, dry mouth, feelings of hopelessness, feelings of worthlessness, malaise, memory impairment, palpitations, shortness of breath, suicidal ideas, suicidal planning, thoughts of death and weight loss.  Frequency of symptoms: most days   Aggravated by: work stress, social activities and family issues  Sleep per night: 4 hours  Sleep quality: poor  Nighttime awakenings: occasional  Risk factors: stress.  Patient has a history of: anxiety/panic attacks and depression  Treatment tried: non-SSRI antidepressants  Compliance with treatment: good  Improvement on treatment: mild      Obesity  This is a chronic problem. The current episode started more than 1 year ago. The problem occurs constantly. Associated symptoms include arthralgias, fatigue, myalgias and numbness. Pertinent negatives include no abdominal pain, chest pain, chills, congestion, coughing, fever, headaches, joint swelling, nausea, rash, sore throat, vomiting or weakness. Associated symptoms comments: Feeling fatigue with leg cramps. The symptoms are aggravated by stress. She has tried eating (she was on Keto, lost ~ 18 Ibs) for the symptoms.   Back Pain  This is a chronic problem. The current episode started more than 1 year ago. The problem occurs constantly. The problem has been gradually worsening since onset. The pain is present in the  lumbar spine. The pain radiates to the left foot. The pain is at a severity of 6/10. The pain is worse during the day. The symptoms are aggravated by standing and twisting. Associated symptoms include numbness and tingling. Pertinent negatives include no abdominal pain, bladder incontinence, bowel incontinence, chest pain, dysuria, fever, headaches, paresis, paresthesias, pelvic pain, perianal numbness, weakness or weight loss. Risk factors include poor posture, sedentary lifestyle and obesity. She has tried home exercises, muscle relaxant and NSAIDs for the symptoms. The treatment provided no relief.        The following portions of the patient's history were reviewed and updated as appropriate: allergies, current medications, past family history, past medical history, past social history, past surgical history and problem list.    Review of Systems   Constitutional: Positive for fatigue, irritability and unexpected weight gain. Negative for activity change, appetite change, chills, fever and unexpected weight loss.   HENT: Negative for congestion, ear discharge, ear pain, rhinorrhea, sore throat and trouble swallowing.    Eyes: Negative for blurred vision, double vision, discharge and visual disturbance.   Respiratory: Negative for apnea, cough, chest tightness, shortness of breath, wheezing and stridor.    Cardiovascular: Negative for chest pain, palpitations and leg swelling.   Gastrointestinal: Negative for abdominal distention, abdominal pain, anal bleeding, blood in stool, bowel incontinence, constipation, diarrhea, nausea, vomiting and GERD.   Genitourinary: Negative for decreased urine volume, difficulty urinating, dysuria, pelvic pain, pelvic pressure, urinary incontinence, vaginal bleeding, vaginal discharge and vaginal pain.   Musculoskeletal: Positive for arthralgias, back pain and myalgias. Negative for gait problem and joint swelling.   Skin: Negative for color change, dry skin, pallor, rash and skin  lesions.   Neurological: Positive for tingling and numbness. Negative for seizures, speech difficulty, weakness, headache, paresthesias and confusion.   Psychiatric/Behavioral: Positive for decreased concentration, depressed mood and stress. Negative for agitation, behavioral problems, self-injury, sleep disturbance, suicidal ideas and negative for hyperactivity. The patient is nervous/anxious and has insomnia.    All other systems reviewed and are negative.      Vitals:    09/23/20 0947   BP: 130/88   Pulse: 82   Temp: 97.1 °F (36.2 °C)   SpO2: 99%           09/23/20 0947   Weight: 111 kg (245 lb 9.6 oz)         Body mass index is 43.16 kg/m².      Current Outpatient Medications   Medication Sig Dispense Refill   • DULoxetine (CYMBALTA) 20 MG capsule Take 20 mg by mouth Daily.       No current facility-administered medications for this visit.                 Objective   Physical Exam  Vitals signs and nursing note reviewed.   Constitutional:       Appearance: She is well-developed. She is obese. She is not ill-appearing or diaphoretic.   HENT:      Head: Normocephalic.      Mouth/Throat:      Mouth: Mucous membranes are moist.      Pharynx: Oropharynx is clear.   Eyes:      General: No scleral icterus.     Conjunctiva/sclera: Conjunctivae normal.   Neck:      Musculoskeletal: Normal range of motion and neck supple. No neck rigidity.      Thyroid: No thyromegaly.   Cardiovascular:      Rate and Rhythm: Normal rate and regular rhythm.      Heart sounds: Normal heart sounds. No murmur. No friction rub. No gallop.    Pulmonary:      Effort: Pulmonary effort is normal. No respiratory distress.      Breath sounds: Normal breath sounds. No wheezing or rales.   Chest:      Chest wall: No tenderness.   Abdominal:      General: Bowel sounds are normal. There is no distension.      Palpations: Abdomen is soft. There is no mass.      Tenderness: There is no abdominal tenderness. There is no guarding or rebound.      Hernia:  No hernia is present.   Musculoskeletal: Normal range of motion.         General: Tenderness present. No swelling or signs of injury.      Right lower leg: No edema.      Left lower leg: No edema.      Comments: Mild TTP over L spin with mild limitation in ROM 2/2 pain   Skin:     General: Skin is warm.      Coloration: Skin is not jaundiced or pale.      Findings: No erythema or rash.   Neurological:      Mental Status: She is alert and oriented to person, place, and time.      Motor: No abnormal muscle tone.      Deep Tendon Reflexes: Reflexes normal.   Psychiatric:         Mood and Affect: Mood normal.         Behavior: Behavior normal.         Thought Content: Thought content normal.         Judgment: Judgment normal.           Assessment/Plan   Theresa was seen today for establish care, depression, back pain and obesity.    Diagnoses and all orders for this visit:    Anxiety and depression  -     Ambulatory Referral to Psychiatry    Chronic bilateral low back pain with left-sided sciatica  -     MRI Lumbar Spine Without Contrast; Future    Morbid (severe) obesity due to excess calories (CMS/HCC);  - Discussed in length about lifestyle modification , wt loss, eating healthy , daily exercise     Leg cramps  -     Comprehensive Metabolic Panel    Need for hepatitis C screening test  -     Hepatitis C Antibody    Chronic fatigue;  - DDx; anemia vs lyte disorder, vs depression vs thyroid dis vs others like;  Fatigue, malaise, vague symptoms Don’t miss Often missed   Obstructive sleep apnea     Depression, anxiety     Deconditioning     Drugs (beta blocker, clonidine)     Chronic fatigue syndrome, fibromyalgia     Infections (infectious mononucleosis, hepatitis, pneumonia, mastitis, urosepsis, cytomegalovirus, tuberculosis, endocarditis, human immunodeficiency syndrome, Lyme, Coccidiomycosis, Giardia)     Pregnancy     Anemia      Vitamin D deficiency     Hypothyroidism, hyperthyroidism     Hypokalemia,  hyponatremia     Restless legs syndrome     Peripheral neuropathy (diabetes)     Cancer, lymphoma     Pulmonary hypertension     Disturbance of calcium, phosphorus, magnesium     Polymyalgia rheumatica/Temporal arteritis     Dolores Danlos syndrome     Heart failure, myocarditis     Multiple sclerosis     Carbon monoxide     Myocardial infarction     Adrenal insufficiency, Arlington’s disease     Parkinson disease     Hypogonadism     Celiac disease     Pulmonary, hepatic, renal failure     Vitamin B12 deficiency     Guillain-Waverly syndrome     Post-Lyme disease syndrome     Myasthenia gravis      Wegener’s granulomatosis     Lupus     Delirium     Hypopituitarism     Botulism     Rheumatoid arthritis     Polyarteritis nodosa     Sarcoidosis     Amyloidosis     Black  spider bite     Lead poisoning       -     CBC & Differential  -     CBC Auto Differential    Lipid screening  -     Lipid Panel      I have fully discussed the nature of the medical condition(s) risks, complications, management, safe and proper use of medications.   I have discussed the SIDE EFFECT OF MEDICATION and importance TO report any side effect , the patient expressed good understanding.  Encouraged medication compliance and the importance of keeping scheduled follow up appointments with me and any other providers.    Patient instructed to follow up with our office for results on any labs/imaging ordered during this visit.    Home care discussed  Screening for Depression done , please see [facility] PHQ 2/9  All questions answered  Patient verbalizes understanding and agrees to treatment plan.

## 2020-09-23 NOTE — PATIENT INSTRUCTIONS
Obesity, Adult  Obesity is having too much body fat. Being obese means that your weight is more than what is healthy for you.  BMI is a number that explains how much body fat you have. If you have a BMI of 30 or more, you are obese. Obesity is often caused by eating or drinking more calories than your body uses. Changing your lifestyle can help you lose weight.  Obesity can cause serious health problems, such as:  · Stroke.  · Coronary artery disease (CAD).  · Type 2 diabetes.  · Some types of cancer, including cancers of the colon, breast, uterus, and gallbladder.  · Osteoarthritis.  · High blood pressure (hypertension).  · High cholesterol.  · Sleep apnea.  · Gallbladder stones.  · Infertility problems.  What are the causes?  · Eating meals each day that are high in calories, sugar, and fat.  · Being born with genes that may make you more likely to become obese.  · Having a medical condition that causes obesity.  · Taking certain medicines.  · Sitting a lot (having a sedentary lifestyle).  · Not getting enough sleep.  · Drinking a lot of drinks that have sugar in them.  What increases the risk?  · Having a family history of obesity.  · Being an  woman.  · Being a  man.  · Living in an area with limited access to:  ? [place], recreation centers, or sidewalks.  ? Healthy food choices, such as grocery stores and boo-box markets.  What are the signs or symptoms?  The main sign is having too much body fat.  How is this treated?  · Treatment for this condition often includes changing your lifestyle. Treatment may include:  ? Changing your diet. This may include making a healthy meal plan.  ? Exercise. This may include activity that causes your heart to beat faster (aerobic exercise) and strength training. Work with your doctor to design a program that works for you.  ? Medicine to help you lose weight. This may be used if you are not able to lose 1 pound a week after 6 weeks of healthy eating and  more exercise.  ? Treating conditions that cause the obesity.  ? Surgery. Options may include gastric banding and gastric bypass. This may be done if:  § Other treatments have not helped to improve your condition.  § You have a BMI of 40 or higher.  § You have life-threatening health problems related to obesity.  Follow these instructions at home:  Eating and drinking    · Follow advice from your doctor about what to eat and drink. Your doctor may tell you to:  ? Limit fast food, sweets, and processed snack foods.  ? Choose low-fat options. For example, choose low-fat milk instead of whole milk.  ? Eat 5 or more servings of fruits or vegetables each day.  ? Eat at home more often. This gives you more control over what you eat.  ? Choose healthy foods when you eat out.  ? Learn to read food labels. This will help you learn how much food is in 1 serving.  ? Keep low-fat snacks available.  ? Avoid drinks that have a lot of sugar in them. These include soda, fruit juice, iced tea with sugar, and flavored milk.  · Drink enough water to keep your pee (urine) pale yellow.  · Do not go on fad diets.  Physical activity  · Exercise often, as told by your doctor. Most adults should get up to 150 minutes of moderate-intensity exercise every week.Ask your doctor:  ? What types of exercise are safe for you.  ? How often you should exercise.  · Warm up and stretch before being active.  · Do slow stretching after being active (cool down).  · Rest between times of being active.  Lifestyle  · Work with your doctor and a food expert (dietitian) to set a weight-loss goal that is best for you.  · Limit your screen time.  · Find ways to reward yourself that do not involve food.  · Do not drink alcohol if:  ? Your doctor tells you not to drink.  ? You are pregnant, may be pregnant, or are planning to become pregnant.  · If you drink alcohol:  ? Limit how much you use to:  § 0-1 drink a day for women.  § 0-2 drinks a day for men.  ? Be  aware of how much alcohol is in your drink. In the [place] one drink equals one 12 oz bottle of beer (355 mL), one 5 oz glass of wine (148 mL), or one 1½ oz glass of hard liquor (44 mL).  General instructions  · Keep a weight-loss journal. This can help you keep track of:  ? The food that you eat.  ? How much exercise you get.  · Take over-the-counter and prescription medicines only as told by your doctor.  · Take vitamins and supplements only as told by your doctor.  · Think about joining a support group.  · Keep all follow-up visits as told by your doctor. This is important.  Contact a doctor if:  · You cannot meet your weight loss goal after you have changed your diet and lifestyle for 6 weeks.  Get help right away if you:  · Are having trouble breathing.  · Are having thoughts of harming yourself.  Summary  · Obesity is having too much body fat.  · Being obese means that your weight is more than what is healthy for you.  · Work with your doctor to set a weight-loss goal.  · Get regular exercise as told by your doctor.  This information is not intended to replace advice given to you by your health care provider. Make sure you discuss any questions you have with your health care provider.  Document Released: 03/11/2013 Document Revised: 08/22/2019 Document Reviewed: 08/22/2019  Elsevier Patient Education © 2020 [employer]

## 2020-09-24 LAB
ALBUMIN SERPL-MCNC: 4.3 G/DL (ref 3.5–5.2)
ALBUMIN/GLOB SERPL: 1.4 G/DL
ALP SERPL-CCNC: 74 U/L (ref 39–117)
ALT SERPL W P-5'-P-CCNC: 14 U/L (ref 1–33)
ANION GAP SERPL CALCULATED.3IONS-SCNC: 9 MMOL/L (ref 5–15)
AST SERPL-CCNC: 19 U/L (ref 1–32)
BILIRUB SERPL-MCNC: 0.2 MG/DL (ref 0–1.2)
BUN SERPL-MCNC: 9 MG/DL (ref 6–20)
BUN/CREAT SERPL: 12.5 (ref 7–25)
CALCIUM SPEC-SCNC: 9.4 MG/DL (ref 8.6–10.5)
CHLORIDE SERPL-SCNC: 104 MMOL/L (ref 98–107)
CHOLEST SERPL-MCNC: 164 MG/DL (ref 0–200)
CO2 SERPL-SCNC: 28 MMOL/L (ref 22–29)
CREAT SERPL-MCNC: 0.72 MG/DL (ref 0.57–1)
GFR SERPL CREATININE-BSD FRML MDRD: 95 ML/MIN/1.73
GLOBULIN UR ELPH-MCNC: 3.1 GM/DL
GLUCOSE SERPL-MCNC: 79 MG/DL (ref 65–99)
HCV AB SER DONR QL: NORMAL
HDLC SERPL-MCNC: 37 MG/DL (ref 40–60)
LDLC SERPL CALC-MCNC: 84 MG/DL (ref 0–100)
LDLC/HDLC SERPL: 2.26 {RATIO}
POTASSIUM SERPL-SCNC: 4.1 MMOL/L (ref 3.5–5.2)
PROT SERPL-MCNC: 7.4 G/DL (ref 6–8.5)
SODIUM SERPL-SCNC: 141 MMOL/L (ref 136–145)
TRIGL SERPL-MCNC: 216 MG/DL (ref 0–150)
VLDLC SERPL-MCNC: 43.2 MG/DL (ref 5–40)

## 2020-10-15 ENCOUNTER — APPOINTMENT (OUTPATIENT)
Dept: MRI IMAGING | Facility: HOSPITAL | Age: 30
End: 2020-10-15

## 2020-12-08 ENCOUNTER — OFFICE VISIT (OUTPATIENT)
Dept: INTERNAL MEDICINE | Facility: CLINIC | Age: 30
End: 2020-12-08

## 2020-12-08 VITALS
TEMPERATURE: 96.9 F | HEART RATE: 80 BPM | RESPIRATION RATE: 16 BRPM | SYSTOLIC BLOOD PRESSURE: 136 MMHG | WEIGHT: 242 LBS | BODY MASS INDEX: 42.88 KG/M2 | HEIGHT: 63 IN | OXYGEN SATURATION: 99 % | DIASTOLIC BLOOD PRESSURE: 80 MMHG

## 2020-12-08 DIAGNOSIS — R21 SKIN RASH: ICD-10-CM

## 2020-12-08 DIAGNOSIS — J30.2 SEASONAL ALLERGIES: ICD-10-CM

## 2020-12-08 DIAGNOSIS — J01.01 ACUTE RECURRENT MAXILLARY SINUSITIS: Primary | ICD-10-CM

## 2020-12-08 PROCEDURE — 99214 OFFICE O/P EST MOD 30 MIN: CPT | Performed by: FAMILY MEDICINE

## 2020-12-08 RX ORDER — NYSTATIN 100000 [USP'U]/G
POWDER TOPICAL 3 TIMES DAILY
Qty: 45 G | Refills: 1 | Status: SHIPPED | OUTPATIENT
Start: 2020-12-08 | End: 2021-04-07

## 2020-12-08 RX ORDER — LORATADINE 10 MG/1
10 TABLET ORAL DAILY
Qty: 30 TABLET | Refills: 2 | Status: SHIPPED | OUTPATIENT
Start: 2020-12-08 | End: 2021-04-07 | Stop reason: SDUPTHER

## 2020-12-08 RX ORDER — AZITHROMYCIN 250 MG/1
TABLET, FILM COATED ORAL
Qty: 6 TABLET | Refills: 0 | Status: SHIPPED | OUTPATIENT
Start: 2020-12-08 | End: 2020-12-28

## 2020-12-08 NOTE — PATIENT INSTRUCTIONS
Sinusitis, Adult  Sinusitis is soreness and swelling (inflammation) of your sinuses. Sinuses are hollow spaces in the bones around your face. They are located:  · Around your eyes.  · In the middle of your forehead.  · Behind your nose.  · In your cheekbones.  Your sinuses and nasal passages are lined with a fluid called mucus. Mucus drains out of your sinuses. Swelling can trap mucus in your sinuses. This lets germs (bacteria, virus, or fungus) grow, which leads to infection. Most of the time, this condition is caused by a virus.  What are the causes?  This condition is caused by:  · Allergies.  · Asthma.  · Germs.  · Things that block your nose or sinuses.  · Growths in the nose (nasal polyps).  · Chemicals or irritants in the air.  · Fungus (rare).  What increases the risk?  You are more likely to develop this condition if:  · You have a weak body defense system (immune system).  · You do a lot of swimming or diving.  · You use nasal sprays too much.  · You smoke.  What are the signs or symptoms?  The main symptoms of this condition are pain and a feeling of pressure around the sinuses. Other symptoms include:  · Stuffy nose (congestion).  · Runny nose (drainage).  · Swelling and warmth in the sinuses.  · Headache.  · Toothache.  · A cough that may get worse at night.  · Mucus that collects in the throat or the back of the nose (postnasal drip).  · Being unable to smell and taste.  · Being very tired (fatigue).  · A fever.  · Sore throat.  · Bad breath.  How is this diagnosed?  This condition is diagnosed based on:  · Your symptoms.  · Your medical history.  · A physical exam.  · Tests to find out if your condition is short-term (acute) or long-term (chronic). Your doctor may:  ? Check your nose for growths (polyps).  ? Check your sinuses using a tool that has a light (endoscope).  ? Check for allergies or germs.  ? Do imaging tests, such as an MRI or CT scan.  How is this treated?  Treatment for this condition  depends on the cause and whether it is short-term or long-term.  · If caused by a virus, your symptoms should go away on their own within 10 days. You may be given medicines to relieve symptoms. They include:  ? Medicines that shrink swollen tissue in the nose.  ? Medicines that treat allergies (antihistamines).  ? A spray that treats swelling of the nostrils.   ? Rinses that help get rid of thick mucus in your nose (nasal saline washes).  · If caused by bacteria, your doctor may wait to see if you will get better without treatment. You may be given antibiotic medicine if you have:  ? A very bad infection.  ? A weak body defense system.  · If caused by growths in the nose, you may need to have surgery.  Follow these instructions at home:  Medicines  · Take, use, or apply over-the-counter and prescription medicines only as told by your doctor. These may include nasal sprays.  · If you were prescribed an antibiotic medicine, take it as told by your doctor. Do not stop taking the antibiotic even if you start to feel better.  Hydrate and humidify    · Drink enough water to keep your pee (urine) pale yellow.  · Use a cool mist humidifier to keep the humidity level in your home above 50%.  · Breathe in steam for 10-15 minutes, 3-4 times a day, or as told by your doctor. You can do this in the bathroom while a hot shower is running.  · Try not to spend time in cool or dry air.  Rest  · Rest as much as you can.  · Sleep with your head raised (elevated).  · Make sure you get enough sleep each night.  General instructions    · Put a warm, moist washcloth on your face 3-4 times a day, or as often as told by your doctor. This will help with discomfort.  · Wash your hands often with soap and water. If there is no soap and water, use hand .  · Do not smoke. Avoid being around people who are smoking (secondhand smoke).  · Keep all follow-up visits as told by your doctor. This is important.  Contact a doctor if:  · You  have a fever.  · Your symptoms get worse.  · Your symptoms do not get better within 10 days.  Get help right away if:  · You have a very bad headache.  · You cannot stop throwing up (vomiting).  · You have very bad pain or swelling around your face or eyes.  · You have trouble seeing.  · You feel confused.  · Your neck is stiff.  · You have trouble breathing.  Summary  · Sinusitis is swelling of your sinuses. Sinuses are hollow spaces in the bones around your face.  · This condition is caused by tissues in your nose that become inflamed or swollen. This traps germs. These can lead to infection.  · If you were prescribed an antibiotic medicine, take it as told by your doctor. Do not stop taking it even if you start to feel better.  · Keep all follow-up visits as told by your doctor. This is important.  This information is not intended to replace advice given to you by your health care provider. Make sure you discuss any questions you have with your health care provider.  Document Revised: 05/20/2019 Document Reviewed: 05/20/2019  Elsevier Patient Education © 2020 [employer]

## 2020-12-08 NOTE — PROGRESS NOTES
Subjective     Theresa Maya is a 30 y.o. female.     Chief Complaint   Patient presents with   • Sinus Problem     pressure behind eyes, mucus that's sometimes bloody, drainage       Sinus Problem  This is a new problem. The current episode started 1 to 4 weeks ago. The problem has been gradually worsening since onset. There has been no fever. Her pain is at a severity of 6/10. Associated symptoms include congestion, coughing, headaches, a hoarse voice, sinus pressure and sneezing. Pertinent negatives include no chills, ear pain, neck pain, shortness of breath, sore throat or swollen glands. (Green nasal drainage ) Past treatments include saline sprays. The treatment provided no relief.        Skin rash under the breast , Lt > Rt , itchy for 2 weeks     The following portions of the patient's history were reviewed and updated as appropriate: allergies, current medications, past family history, past medical history, past social history, past surgical history and problem list.    Review of Systems   Constitutional: Negative for activity change, appetite change, chills and fever.   HENT: Positive for congestion, hoarse voice, sinus pressure, sneezing and voice change. Negative for ear discharge, ear pain, rhinorrhea, sore throat and swollen glands.    Eyes: Negative for blurred vision, double vision, discharge and visual disturbance.   Respiratory: Positive for cough. Negative for apnea, chest tightness, shortness of breath, wheezing and stridor.    Cardiovascular: Negative for chest pain, palpitations and leg swelling.   Gastrointestinal: Negative for abdominal distention, abdominal pain, nausea and vomiting.   Genitourinary: Negative for decreased urine volume, difficulty urinating and dysuria.   Musculoskeletal: Negative for gait problem, joint swelling, myalgias, neck pain and neck stiffness.   Skin: Positive for color change and rash. Negative for pallor, skin lesions and bruise.   Neurological: Negative for  dizziness, tremors, seizures, syncope, speech difficulty, weakness, headache and confusion.   Hematological: Does not bruise/bleed easily.   Psychiatric/Behavioral: Negative for agitation, behavioral problems, decreased concentration, sleep disturbance, suicidal ideas, depressed mood and stress.   All other systems reviewed and are negative.      Vitals:    12/08/20 1538   BP: 136/80   Pulse: 80   Resp: 16   Temp: 96.9 °F (36.1 °C)   SpO2: 99%           12/08/20  1538   Weight: 110 kg (242 lb)         Body mass index is 42.53 kg/m².      Current Outpatient Medications   Medication Sig Dispense Refill   • DULoxetine (CYMBALTA) 20 MG capsule Take 20 mg by mouth Daily.     • azithromycin (ZITHROMAX) 250 MG tablet TAKE 500 mg on D1, then 1 pill /day 6 tablet 0   • loratadine (Claritin) 10 MG tablet Take 1 tablet by mouth Daily. 30 tablet 2   • nystatin (MYCOSTATIN) 299764 UNIT/GM powder Apply  topically to the appropriate area as directed 3 (Three) Times a Day. 45 g 1     No current facility-administered medications for this visit.                 Objective   Physical Exam  Vitals signs and nursing note reviewed.   Constitutional:       Appearance: She is well-developed. She is not ill-appearing or diaphoretic.   HENT:      Head: Normocephalic.      Right Ear: Tympanic membrane, ear canal and external ear normal.      Left Ear: Tympanic membrane, ear canal and external ear normal.      Nose: Congestion and rhinorrhea present.      Right Sinus: Maxillary sinus tenderness present. No frontal sinus tenderness.      Left Sinus: Maxillary sinus tenderness present. No frontal sinus tenderness.      Mouth/Throat:      Mouth: Mucous membranes are moist.      Pharynx: Oropharynx is clear.   Eyes:      General: No scleral icterus.     Conjunctiva/sclera: Conjunctivae normal.   Neck:      Musculoskeletal: Neck supple.      Thyroid: No thyromegaly.   Cardiovascular:      Rate and Rhythm: Normal rate and regular rhythm.      Heart  sounds: Normal heart sounds. No murmur. No friction rub. No gallop.    Pulmonary:      Effort: Pulmonary effort is normal. No respiratory distress.      Breath sounds: Normal breath sounds. No stridor. No wheezing, rhonchi or rales.   Abdominal:      General: Bowel sounds are normal. There is no distension.      Palpations: Abdomen is soft. There is no mass.      Tenderness: There is no abdominal tenderness. There is no guarding or rebound.      Hernia: No hernia is present.   Musculoskeletal: Normal range of motion.   Skin:     General: Skin is warm.      Coloration: Skin is not jaundiced or pale.      Findings: Erythema and rash present.      Comments: Erythematous rash under the breasts    Neurological:      Mental Status: She is alert and oriented to person, place, and time.      Motor: No abnormal muscle tone.      Deep Tendon Reflexes: Reflexes normal.   Psychiatric:         Mood and Affect: Mood normal.         Behavior: Behavior normal.         Thought Content: Thought content normal.         Judgment: Judgment normal.           Assessment/Plan   Diagnoses and all orders for this visit:    1. Acute recurrent maxillary sinusitis (Primary)  -     azithromycin (ZITHROMAX) 250 MG tablet; TAKE 500 mg on D1, then 1 pill /day  Dispense: 6 tablet; Refill: 0    2. Seasonal allergies  -     loratadine (Claritin) 10 MG tablet; Take 1 tablet by mouth Daily.  Dispense: 30 tablet; Refill: 2    3. Skin rash  -     nystatin (MYCOSTATIN) [id number] UNIT/GM powder; Apply  topically to the appropriate area as directed 3 (Three) Times a Day.  Dispense: 45 g; Refill: 1  - Advised to keep area dry and clean       I have fully discussed the nature of the medical condition(s) risks, complications, management, safe and proper use of medications.   I have discussed the SIDE EFFECT OF MEDICATION and importance TO report any side effect , the patient expressed good understanding.  Encouraged medication compliance and the importance of  keeping scheduled follow up appointments with me and any other providers.    Patient instructed to follow up with our office for results on any labs/imaging ordered during this visit.    Home care discussed  All questions answered  Patient verbalizes understanding and agrees to treatment plan.     Follow up: Return for if no better or worsening symptoms.

## 2020-12-26 PROCEDURE — U0004 COV-19 TEST NON-CDC HGH THRU: HCPCS | Performed by: EMERGENCY MEDICINE

## 2020-12-28 ENCOUNTER — OFFICE VISIT (OUTPATIENT)
Dept: INTERNAL MEDICINE | Facility: CLINIC | Age: 30
End: 2020-12-28

## 2020-12-28 VITALS
BODY MASS INDEX: 43.12 KG/M2 | OXYGEN SATURATION: 98 % | HEIGHT: 63 IN | TEMPERATURE: 96.8 F | DIASTOLIC BLOOD PRESSURE: 76 MMHG | SYSTOLIC BLOOD PRESSURE: 126 MMHG | WEIGHT: 243.4 LBS | HEART RATE: 93 BPM

## 2020-12-28 DIAGNOSIS — Z00.00 ENCOUNTER FOR ANNUAL PHYSICAL EXAM: Primary | ICD-10-CM

## 2020-12-28 DIAGNOSIS — Z01.419 WOMEN'S ANNUAL ROUTINE GYNECOLOGICAL EXAMINATION: ICD-10-CM

## 2020-12-28 DIAGNOSIS — Z23 NEED FOR TDAP VACCINATION: ICD-10-CM

## 2020-12-28 DIAGNOSIS — Z87.42 H/O ABNORMAL CERVICAL PAPANICOLAOU SMEAR: ICD-10-CM

## 2020-12-28 DIAGNOSIS — N89.8 VAGINAL DISCHARGE: ICD-10-CM

## 2020-12-28 PROCEDURE — 99395 PREV VISIT EST AGE 18-39: CPT | Performed by: FAMILY MEDICINE

## 2020-12-28 PROCEDURE — 90715 TDAP VACCINE 7 YRS/> IM: CPT | Performed by: FAMILY MEDICINE

## 2020-12-28 PROCEDURE — 90471 IMMUNIZATION ADMIN: CPT | Performed by: FAMILY MEDICINE

## 2020-12-28 NOTE — PROGRESS NOTES
Patient Care Team:  Winston Kc MD as PCP - General (Family Medicine)     Chief complaint: Patient is in today for a physical          Patient is a 30 y.o. female who presents for her yearly physical exam.     HPI      Doing well      H/o abnormal PAP , no further w/u needed per pt   C/o vag discharge / white watery for many months , with no smell , no itchy , no h/o STD         Health maintenance/lifestyle:  Immunization History   Administered Date(s) Administered   • Flu Vaccine Quad PF >36MO 10/24/2016, 10/02/2018   • HPV Quadrivalent 02/13/2009   • Influenza TIV (IM) 10/10/2013, 12/10/2014   • Influenza, Unspecified 10/01/2019, 08/15/2020   • Pneumococcal Polysaccharide (PPSV23) 01/01/2014   • Tdap 01/01/1993, 12/28/2020       Pap: due    Dental exam: x 2 /year      PHQ-2 Depression Screening  Little interest or pleasure in doing things? 0   Feeling down, depressed, or hopeless? 0   PHQ-2 Total Score 0     Social History     Tobacco Use   Smoking Status Never Smoker   Smokeless Tobacco Never Used     Social History     Substance and Sexual Activity   Alcohol Use No         Review of Systems   Constitutional: Negative for activity change, appetite change, chills and fever.   HENT: Negative for congestion, ear discharge, ear pain, rhinorrhea, sinus pressure, sneezing, sore throat, swollen glands and trouble swallowing.    Eyes: Negative for blurred vision, double vision, discharge and visual disturbance.   Respiratory: Negative for apnea, cough, chest tightness, shortness of breath, wheezing and stridor.    Cardiovascular: Negative for chest pain, palpitations and leg swelling.   Gastrointestinal: Negative for abdominal distention, abdominal pain, nausea and vomiting.   Genitourinary: Positive for vaginal discharge. Negative for decreased urine volume, difficulty urinating, dysuria, flank pain, genital sores, hematuria, menstrual problem, urgency, urinary incontinence, vaginal bleeding and vaginal pain.    Musculoskeletal: Negative for arthralgias, back pain, gait problem, joint swelling, myalgias, neck pain and neck stiffness.   Skin: Negative for color change, pallor, rash, skin lesions and bruise.   Neurological: Positive for headache. Negative for dizziness, tremors, seizures, syncope, speech difficulty, weakness and confusion.   Hematological: Does not bruise/bleed easily.   Psychiatric/Behavioral: Negative for agitation, behavioral problems, decreased concentration, sleep disturbance, suicidal ideas, depressed mood and stress. The patient is not nervous/anxious.    All other systems reviewed and are negative.        History  Past Medical History:   Diagnosis Date   • Abnormal Pap smear of cervix    • Acid reflux    • Allergic    • Anxiety    • Arthritis    • Clotting disorder (CMS/HCC)     DVT right leg   • Deep vein thrombosis (CMS/HCC)    • Depression    • Gallbladder abscess    • Hyperemesis gravidarum    • Migraines    • MTHFR deficiency complicating pregnancy (CMS/HCC)    • Obesity    • Ovarian cyst    • Pap smear for cervical cancer screening 08/2019    abnormal pap   • PMS (premenstrual syndrome)    • Recurrent pregnancy loss, antepartum condition or complication     had a [facility] and lost the pregnancy at 6 months   • Sinusitis    • Strep pharyngitis    • Urinary tract infection       Past Surgical History:   Procedure Laterality Date   • ABDOMINAL SURGERY     • CHOLECYSTECTOMY  06/2016   • NASAL RECONSTRUCTION Bilateral 06/2009   • SINUS SURGERY Bilateral 06/2016   • TUBAL ABDOMINAL LIGATION Bilateral 09/15/2017   • TUBAL COAGULATION LAPAROSCOPIC Bilateral 9/15/2017    Procedure: BILATERAL TUBAL FALLOPE FILSHIE CLIPPING LAPAROSCOPIC;  Surgeon: Leo Posey III, MD;  Location: [facility];  Service:    • VAGINAL DELIVERY  14; 16; 17    female,male, male      Allergies   Allergen Reactions   • Penicillins Anaphylaxis   • Latex Itching   • Amoxicillin Rash      Family History   Problem Relation Age of Onset  "  • Asthma Mother    • Thyroid disease Mother    • Heart attack Mother    • Obesity Mother    • Migraines Mother    • Lupus Sister    • Ovarian cancer Sister    • Asthma Sister    • Lung cancer Paternal Grandmother    • Breast cancer Maternal Grandmother    • Lung cancer Maternal Grandmother    • Asthma Maternal Grandmother    • Hypertension Maternal Grandmother    • Lupus Maternal Grandmother    • Thyroid disease Maternal Grandmother    • Hypertension Maternal Grandfather      Social History     Socioeconomic History   • Marital status:      Spouse name: Not on file   • Number of children: Not on file   • Years of education: Not on file   • Highest education level: Not on file   Tobacco Use   • Smoking status: Never Smoker   • Smokeless tobacco: Never Used   Substance and Sexual Activity   • Alcohol use: No   • Drug use: No   • Sexual activity: Yes     Partners: Male     Birth control/protection: Surgical     Comment: -Juve, and has 3 children-homemaker        Current Outpatient Medications:   •  loratadine (Claritin) 10 MG tablet, Take 1 tablet by mouth Daily., Disp: 30 tablet, Rfl: 2  •  nystatin (MYCOSTATIN) 316053 UNIT/GM powder, Apply  topically to the appropriate area as directed 3 (Three) Times a Day., Disp: 45 g, Rfl: 1                  /76 (BP Location: Right arm, Patient Position: Sitting)   Pulse 93   Temp 96.8 °F (36 °C) (Infrared)   Ht 160.7 cm (63.25\")   Wt 110 kg (243 lb 6.4 oz)   SpO2 98%   BMI 42.78 kg/m²       Physical Exam  Vitals signs and nursing note reviewed. Exam conducted with a chaperone present.   Constitutional:       General: She is not in acute distress.     Appearance: She is well-developed. She is obese. She is not ill-appearing, toxic-appearing or diaphoretic.   HENT:      Head: Normocephalic.      Mouth/Throat:      Mouth: Mucous membranes are moist.      Pharynx: Oropharynx is clear.   Eyes:      General: No scleral icterus.     Conjunctiva/sclera: " Conjunctivae normal.   Neck:      Musculoskeletal: Neck supple.      Thyroid: No thyromegaly.   Cardiovascular:      Rate and Rhythm: Normal rate and regular rhythm.      Heart sounds: Normal heart sounds. No murmur. No friction rub. No gallop.    Pulmonary:      Effort: Pulmonary effort is normal. No respiratory distress.      Breath sounds: Normal breath sounds. No stridor. No wheezing, rhonchi or rales.   Abdominal:      General: Bowel sounds are normal. There is no distension.      Palpations: Abdomen is soft. There is no mass.      Tenderness: There is no abdominal tenderness. There is no guarding or rebound.      Hernia: No hernia is present. There is no hernia in the left inguinal area or right inguinal area.   Genitourinary:     General: Normal vulva.      Exam position: Lithotomy position.      Labia:         Right: No tenderness or lesion.         Left: No tenderness or lesion.       Urethra: No urethral lesion.      Vagina: Vaginal discharge present. No tenderness or bleeding.      Cervix: Discharge, friability, lesion, erythema and cervical bleeding present. No cervical motion tenderness.      Uterus: Not enlarged and not tender.       Adnexa:         Right: No tenderness.          Left: No tenderness.              Comments: Erosion with erythema *of the cx around the os  Musculoskeletal: Normal range of motion.         General: No swelling, tenderness, deformity or signs of injury.      Right lower leg: No edema.      Left lower leg: No edema.   Lymphadenopathy:      Lower Body: No right inguinal adenopathy. No left inguinal adenopathy.   Skin:     General: Skin is warm.      Coloration: Skin is not jaundiced or pale.      Findings: No erythema or rash.   Neurological:      General: No focal deficit present.      Mental Status: She is alert and oriented to person, place, and time.      Cranial Nerves: No cranial nerve deficit.      Sensory: No sensory deficit.      Motor: No weakness or abnormal muscle  tone.      Coordination: Coordination normal.      Gait: Gait normal.      Deep Tendon Reflexes: Reflexes normal.   Psychiatric:         Mood and Affect: Mood normal.         Behavior: Behavior normal.         Thought Content: Thought content normal.         Judgment: Judgment normal.                   Diagnoses and all orders for this visit:    1. Encounter for annual physical exam (Primary)    2. Women's annual routine gynecological examination  -     Liquid-based Pap Smear, Screening    3. Vaginal discharge  -     Chlamydia trachomatis, Neisseria gonorrhoeae, Trichomonas vaginalis, PCR - Urine, Urine, Clean Catch    4. Need for Tdap vaccination  -     Tdap Vaccine Greater Than or Equal To 8yo IM    5. H/O abnormal cervical Papanicolaou smear          Discussed with pt; Regular exercise, healthy diet. Calcium intake, Sunscreen use encouraged.     Follow up: Return in about 3 months (around 3/28/2021) for follow up on current illness.  Plan of care discussed with pt. They verbalized understanding and agreement.     Winston Kc MD   12/28/2020   21:28 EST

## 2020-12-28 NOTE — PATIENT INSTRUCTIONS

## 2020-12-31 ENCOUNTER — TELEPHONE (OUTPATIENT)
Dept: INTERNAL MEDICINE | Facility: CLINIC | Age: 30
End: 2020-12-31

## 2020-12-31 NOTE — TELEPHONE ENCOUNTER
----- Message from Winston Kc MD sent at 12/30/2020  5:22 PM EST -----  PLEASE call for normal results

## 2021-04-07 ENCOUNTER — OFFICE VISIT (OUTPATIENT)
Dept: INTERNAL MEDICINE | Facility: CLINIC | Age: 31
End: 2021-04-07

## 2021-04-07 ENCOUNTER — LAB (OUTPATIENT)
Dept: LAB | Facility: HOSPITAL | Age: 31
End: 2021-04-07

## 2021-04-07 VITALS
HEIGHT: 63 IN | BODY MASS INDEX: 43.8 KG/M2 | DIASTOLIC BLOOD PRESSURE: 82 MMHG | WEIGHT: 247.2 LBS | OXYGEN SATURATION: 98 % | TEMPERATURE: 97.8 F | HEART RATE: 91 BPM | SYSTOLIC BLOOD PRESSURE: 136 MMHG

## 2021-04-07 DIAGNOSIS — R68.89 COLD INTOLERANCE: ICD-10-CM

## 2021-04-07 DIAGNOSIS — R73.9 ELEVATED BLOOD SUGAR: ICD-10-CM

## 2021-04-07 DIAGNOSIS — M79.89 LEG SWELLING: ICD-10-CM

## 2021-04-07 DIAGNOSIS — M79.89 BILATERAL HAND SWELLING: Primary | ICD-10-CM

## 2021-04-07 DIAGNOSIS — Z83.3 FAMILY HISTORY OF DIABETES MELLITUS: ICD-10-CM

## 2021-04-07 DIAGNOSIS — J30.2 SEASONAL ALLERGIES: ICD-10-CM

## 2021-04-07 DIAGNOSIS — M79.89 BILATERAL HAND SWELLING: ICD-10-CM

## 2021-04-07 LAB
ALBUMIN SERPL-MCNC: 4.3 G/DL (ref 3.5–5.2)
ALBUMIN/GLOB SERPL: 1.3 G/DL
ALP SERPL-CCNC: 64 U/L (ref 39–117)
ALT SERPL W P-5'-P-CCNC: 16 U/L (ref 1–33)
ANION GAP SERPL CALCULATED.3IONS-SCNC: 10.6 MMOL/L (ref 5–15)
AST SERPL-CCNC: 17 U/L (ref 1–32)
BILIRUB SERPL-MCNC: 0.3 MG/DL (ref 0–1.2)
BUN SERPL-MCNC: 11 MG/DL (ref 6–20)
BUN/CREAT SERPL: 16.2 (ref 7–25)
CALCIUM SPEC-SCNC: 9 MG/DL (ref 8.6–10.5)
CHLORIDE SERPL-SCNC: 105 MMOL/L (ref 98–107)
CO2 SERPL-SCNC: 24.4 MMOL/L (ref 22–29)
CREAT SERPL-MCNC: 0.68 MG/DL (ref 0.57–1)
DEPRECATED RDW RBC AUTO: 41.1 FL (ref 37–54)
ERYTHROCYTE [DISTWIDTH] IN BLOOD BY AUTOMATED COUNT: 13.2 % (ref 12.3–15.4)
GFR SERPL CREATININE-BSD FRML MDRD: 102 ML/MIN/1.73
GLOBULIN UR ELPH-MCNC: 3.3 GM/DL
GLUCOSE SERPL-MCNC: 91 MG/DL (ref 65–99)
HBA1C MFR BLD: 5.14 % (ref 4.8–5.6)
HCT VFR BLD AUTO: 42.9 % (ref 34–46.6)
HGB BLD-MCNC: 14.4 G/DL (ref 12–15.9)
MCH RBC QN AUTO: 29 PG (ref 26.6–33)
MCHC RBC AUTO-ENTMCNC: 33.6 G/DL (ref 31.5–35.7)
MCV RBC AUTO: 86.3 FL (ref 79–97)
PLATELET # BLD AUTO: 270 10*3/MM3 (ref 140–450)
PMV BLD AUTO: 11.4 FL (ref 6–12)
POTASSIUM SERPL-SCNC: 3.8 MMOL/L (ref 3.5–5.2)
PROT SERPL-MCNC: 7.6 G/DL (ref 6–8.5)
RBC # BLD AUTO: 4.97 10*6/MM3 (ref 3.77–5.28)
SODIUM SERPL-SCNC: 140 MMOL/L (ref 136–145)
TSH SERPL DL<=0.05 MIU/L-ACNC: 2.79 UIU/ML (ref 0.27–4.2)
WBC # BLD AUTO: 4.42 10*3/MM3 (ref 3.4–10.8)

## 2021-04-07 PROCEDURE — 83036 HEMOGLOBIN GLYCOSYLATED A1C: CPT | Performed by: FAMILY MEDICINE

## 2021-04-07 PROCEDURE — 80050 GENERAL HEALTH PANEL: CPT | Performed by: FAMILY MEDICINE

## 2021-04-07 PROCEDURE — 99213 OFFICE O/P EST LOW 20 MIN: CPT | Performed by: FAMILY MEDICINE

## 2021-04-07 RX ORDER — CETIRIZINE HYDROCHLORIDE 10 MG/1
10 TABLET ORAL DAILY
Qty: 30 TABLET | Refills: 1 | Status: SHIPPED | OUTPATIENT
Start: 2021-04-07 | End: 2021-04-25

## 2021-04-07 RX ORDER — MONTELUKAST SODIUM 10 MG/1
10 TABLET ORAL NIGHTLY
Qty: 30 TABLET | Refills: 2 | OUTPATIENT
Start: 2021-04-07 | End: 2021-08-15

## 2021-04-07 RX ORDER — DULOXETIN HYDROCHLORIDE 60 MG/1
CAPSULE, DELAYED RELEASE ORAL
COMMUNITY
Start: 2021-02-19 | End: 2021-08-09 | Stop reason: SDUPTHER

## 2021-04-07 NOTE — PATIENT INSTRUCTIONS

## 2021-04-07 NOTE — PROGRESS NOTES
Subjective     Theresa Maya is a 30 y.o. female.     Chief Complaint   Patient presents with   • Leg Swelling     hands swelling, decrease in apetite    • Headache       History of Present Illness     She is c/o bilateral Foot and hands swelling for the last 2 weeks , hard to wear the shoes , she took off her ring due to the swelling   +ve FHx for thyroid dis , heart dis and DM   Had elevated BG in the past without dx of DM  Intolerant to cold   Seasonal allergies ; her sx getting worse with weather changes , c/o cough with congestion and sneezing     The following portions of the patient's history were reviewed and updated as appropriate: allergies, current medications, past family history, past medical history, past social history, past surgical history and problem list.        Review of Systems   HENT: Positive for congestion, rhinorrhea and sneezing. Negative for sinus pressure, sore throat, swollen glands, trouble swallowing and voice change.    Respiratory: Positive for cough.    Cardiovascular: Positive for leg swelling. Negative for chest pain and palpitations.       Vitals:    04/07/21 0917   BP: 136/82   Pulse: 91   Temp: 97.8 °F (36.6 °C)   SpO2: 98%           04/07/21 0917   Weight: 112 kg (247 lb 3.2 oz)         Body mass index is 43.8 kg/m².      Current Outpatient Medications   Medication Sig Dispense Refill   • DULoxetine (CYMBALTA) 60 MG capsule      • triamcinolone (KENALOG) 0.1 % ointment      • cetirizine (zyrTEC) 10 MG tablet Take 1 tablet by mouth Daily. 30 tablet 1   • montelukast (Singulair) 10 MG tablet Take 1 tablet by mouth Every Night. 30 tablet 2     No current facility-administered medications for this visit.                Objective   Physical Exam  Vitals and nursing note reviewed.   Constitutional:       Appearance: She is obese. She is not ill-appearing or diaphoretic.   HENT:      Right Ear: Tympanic membrane, ear canal and external ear normal.      Left Ear: Tympanic  membrane, ear canal and external ear normal.      Nose: Congestion and rhinorrhea present.      Mouth/Throat:      Mouth: Mucous membranes are moist.      Pharynx: No oropharyngeal exudate or posterior oropharyngeal erythema.   Eyes:      Conjunctiva/sclera: Conjunctivae normal.   Neck:      Comments: No enlarged thyroid    Cardiovascular:      Rate and Rhythm: Normal rate and regular rhythm.      Heart sounds: Normal heart sounds. No murmur heard.   No friction rub. No gallop.    Pulmonary:      Effort: Pulmonary effort is normal. No respiratory distress.      Breath sounds: Normal breath sounds. No stridor. No wheezing or rhonchi.   Musculoskeletal:      Cervical back: Neck supple.      Comments: Mild hand and foot swelling   Skin:     General: Skin is warm.      Coloration: Skin is not pale.      Findings: No erythema.   Neurological:      Mental Status: She is alert and oriented to person, place, and time.   Psychiatric:         Mood and Affect: Mood normal.         Behavior: Behavior normal.         Thought Content: Thought content normal.         Judgment: Judgment normal.           Assessment/Plan   Diagnoses and all orders for this visit:    1. Bilateral hand swelling (Primary)  - Advised to minimize salt intake   -     Comprehensive Metabolic Panel; Future  -     CBC (No Diff); Future    2. Leg swelling  - Advised to minimize salt intake   -     Comprehensive Metabolic Panel; Future  -     CBC (No Diff); Future    3. Cold intolerance  -     TSH Rfx On Abnormal To Free T4; Future    4. Family history of diabetes mellitus  -     Hemoglobin A1c; Future    5. Elevated blood sugar  -     Hemoglobin A1c; Future    6. Seasonal allergies;  - Will start on montelukast (Singulair) 10 MG tablet; Take 1 tablet by mouth Every Night.  Dispense: 30 tablet; Refill: 2  -     cetirizine (zyrTEC) 10 MG tablet; Take 1 tablet by mouth Daily.  Dispense: 30 tablet; Refill: 1      I have fully discussed the nature of the medical  condition(s) risks, complications, management, safe and proper use of medications.   I have discussed the SIDE EFFECT OF MEDICATION and importance TO report any side effect , the patient expressed good understanding.  Encouraged medication compliance and the importance of keeping scheduled follow up appointments with me and any other providers.    Patient instructed to follow up with our office for results on any labs/imaging ordered during this visit.    Home care discussed  Screening for Depression done , please see [facility] PHQ 2/9  All questions answered  Patient verbalizes understanding and agrees to treatment plan.     Follow up: Return for WILL CALL YOU FOR LBAS/XR RESULT.

## 2021-04-08 ENCOUNTER — TELEPHONE (OUTPATIENT)
Dept: INTERNAL MEDICINE | Facility: CLINIC | Age: 31
End: 2021-04-08

## 2021-04-08 NOTE — TELEPHONE ENCOUNTER
Informed pt of normal lab results ad advised lessening salt in take in meals. She verbalized understanding.

## 2021-04-08 NOTE — TELEPHONE ENCOUNTER
----- Message from Winston Kc MD sent at 4/8/2021  8:55 AM EDT -----  PLEASE call for normal results  She needs to minimize salt in her diet

## 2021-04-15 ENCOUNTER — TELEPHONE (OUTPATIENT)
Dept: INTERNAL MEDICINE | Facility: CLINIC | Age: 31
End: 2021-04-15

## 2021-04-15 RX ORDER — AZITHROMYCIN 250 MG/1
TABLET, FILM COATED ORAL
Qty: 6 TABLET | Refills: 0 | Status: SHIPPED | OUTPATIENT
Start: 2021-04-15 | End: 2021-04-19 | Stop reason: SDUPTHER

## 2021-04-15 NOTE — TELEPHONE ENCOUNTER
Called pt to see why she needed azithromycin because of a pharmacy request rec'd.Pt stated she needs azithromycin for drainage and sinuses because singular hasn't worked that was given to her last week at her appt

## 2021-04-19 RX ORDER — AZITHROMYCIN 250 MG/1
TABLET, FILM COATED ORAL
Qty: 6 TABLET | Refills: 0 | OUTPATIENT
Start: 2021-04-19 | End: 2021-04-25

## 2021-08-09 ENCOUNTER — OFFICE VISIT (OUTPATIENT)
Dept: INTERNAL MEDICINE | Facility: CLINIC | Age: 31
End: 2021-08-09

## 2021-08-09 ENCOUNTER — LAB (OUTPATIENT)
Dept: LAB | Facility: HOSPITAL | Age: 31
End: 2021-08-09

## 2021-08-09 VITALS
SYSTOLIC BLOOD PRESSURE: 114 MMHG | HEART RATE: 85 BPM | WEIGHT: 246.4 LBS | TEMPERATURE: 97.1 F | HEIGHT: 61 IN | BODY MASS INDEX: 46.52 KG/M2 | DIASTOLIC BLOOD PRESSURE: 82 MMHG | OXYGEN SATURATION: 98 %

## 2021-08-09 DIAGNOSIS — IMO0002 FH: LUPUS: ICD-10-CM

## 2021-08-09 DIAGNOSIS — E66.01 MORBID (SEVERE) OBESITY DUE TO EXCESS CALORIES (HCC): ICD-10-CM

## 2021-08-09 DIAGNOSIS — G89.29 CHRONIC PAIN OF MULTIPLE JOINTS: ICD-10-CM

## 2021-08-09 DIAGNOSIS — M54.50 CHRONIC MIDLINE LOW BACK PAIN WITHOUT SCIATICA: ICD-10-CM

## 2021-08-09 DIAGNOSIS — M79.89 RIGHT LEG SWELLING: Primary | ICD-10-CM

## 2021-08-09 DIAGNOSIS — G89.29 CHRONIC MIDLINE LOW BACK PAIN WITHOUT SCIATICA: ICD-10-CM

## 2021-08-09 DIAGNOSIS — M79.604 LEG PAIN, LATERAL, RIGHT: ICD-10-CM

## 2021-08-09 DIAGNOSIS — M25.50 CHRONIC PAIN OF MULTIPLE JOINTS: ICD-10-CM

## 2021-08-09 PROCEDURE — 99214 OFFICE O/P EST MOD 30 MIN: CPT | Performed by: FAMILY MEDICINE

## 2021-08-09 PROCEDURE — 87661 TRICHOMONAS VAGINALIS AMPLIF: CPT | Performed by: FAMILY MEDICINE

## 2021-08-09 PROCEDURE — 87591 N.GONORRHOEAE DNA AMP PROB: CPT | Performed by: FAMILY MEDICINE

## 2021-08-09 PROCEDURE — 86038 ANTINUCLEAR ANTIBODIES: CPT | Performed by: FAMILY MEDICINE

## 2021-08-09 PROCEDURE — 87491 CHLMYD TRACH DNA AMP PROBE: CPT | Performed by: FAMILY MEDICINE

## 2021-08-09 RX ORDER — IBUPROFEN 800 MG/1
TABLET ORAL
COMMUNITY
Start: 2021-07-26 | End: 2021-08-15

## 2021-08-09 RX ORDER — DULOXETIN HYDROCHLORIDE 60 MG/1
60 CAPSULE, DELAYED RELEASE ORAL DAILY
Qty: 90 CAPSULE | Refills: 1 | Status: SHIPPED | OUTPATIENT
Start: 2021-08-09 | End: 2022-05-19

## 2021-08-09 RX ORDER — HYDROCODONE BITARTRATE AND ACETAMINOPHEN 5; 325 MG/1; MG/1
TABLET ORAL
COMMUNITY
Start: 2021-07-26 | End: 2021-08-15

## 2021-08-09 NOTE — PROGRESS NOTES
Subjective     Theresa Maya is a 31 y.o. female.     Chief Complaint   Patient presents with   • Leg Swelling   • leg tenderness   • Med Refill     duloxetine,ibu, hydrocodone acetaminophen       History of Present Illness     C/o Rt leg swelling for 2 weeks , 5/10, mainly medial side of the leg hurt,worse with walking   She Cut down on salt and fast food   Hot pad helps the pain   H/o Rt DVT in 2013 while she was 4 months pregnant, was on Lovenox till delivery . The next 2 pregnancy was on blood thinner .  Takes Tylenol dose not help 2000 mg /day with no help . Also taking Ibu 800 mg TID with no better   Her mother had Rt leg DVT x 3 , she is on blood thinner long life   Her sister has lupus   GM has lupus and blood clot     Her tooth was broken 2 months ago, was on opoid     Obesity; she is on HD , do daily exercise with no better , she is gaining wt not losing   She is thinking about gastric surgery     Chronic back and joints pain for many years , worse with walking , better with rest . Taking Cymbalta helps a lot         The following portions of the patient's history were reviewed and updated as appropriate: allergies, current medications, past family history, past medical history, past social history, past surgical history and problem list.        Review of Systems   Musculoskeletal: Positive for arthralgias and back pain.       Vitals:    08/09/21 1504   BP: 114/82   Pulse: 85   Temp: 97.1 °F (36.2 °C)   SpO2: 98%           08/09/21  1504   Weight: 112 kg (246 lb 6.4 oz)         Body mass index is 46.58 kg/m².      Current Outpatient Medications   Medication Sig Dispense Refill   • DULoxetine (CYMBALTA) 60 MG capsule Take 1 capsule by mouth Daily. 90 capsule 1   • HYDROcodone-acetaminophen (NORCO) 5-325 MG per tablet      • ibuprofen (ADVIL,MOTRIN) 800 MG tablet      • montelukast (Singulair) 10 MG tablet Take 1 tablet by mouth Every Night. 30 tablet 2     No current facility-administered medications  for this visit.                Objective   Physical Exam  Vitals and nursing note reviewed.   Constitutional:       Appearance: Normal appearance. She is obese. She is not ill-appearing or diaphoretic.   HENT:      Mouth/Throat:      Mouth: Mucous membranes are moist.      Pharynx: Oropharynx is clear.   Neck:      Comments: No enlarged thyroid    Cardiovascular:      Rate and Rhythm: Normal rate and regular rhythm.      Heart sounds: Normal heart sounds. No murmur heard.     Pulmonary:      Effort: Pulmonary effort is normal. No respiratory distress.      Breath sounds: Normal breath sounds. No stridor. No wheezing or rhonchi.   Musculoskeletal:      Cervical back: Neck supple.   Neurological:      Mental Status: She is alert and oriented to person, place, and time.   Psychiatric:         Mood and Affect: Mood normal.         Behavior: Behavior normal.         Thought Content: Thought content normal.           Assessment/Plan   Diagnoses and all orders for this visit:    1. Right leg swelling (Primary);  - Concern for DVT ?  -     Duplex Venous Lower Extremity - Right CAR; Future    2. Leg pain, lateral, right  -     Duplex Venous Lower Extremity - Right CAR; Future    3. Chronic pain of multiple joints;  - To R/O Lupus as she has +ve FHX  -     RANJAN by IFA, Reflex 9-biomarkers profile; Future  -     RANJAN by IFA, Reflex 9-biomarkers profile    4. FH: lupus  -     RANJAN by IFA, Reflex 9-biomarkers profile; Future  -     RANJAN by IFA, Reflex 9-biomarkers profile    5. Morbid (severe) obesity due to excess calories (CMS/[facility])  -     Ambulatory Referral to Bariatric Surgery    6. Chronic midline low back pain without sciatica  -     DULoxetine (CYMBALTA) 60 MG capsule; Take 1 capsule by mouth Daily.  Dispense: 90 capsule; Refill: 1           I have fully discussed the nature of the medical condition(s) risks, complications, management, safe and proper use of medications.   I have discussed the SIDE EFFECT OF MEDICATION and  importance TO report any side effect , the patient expressed good understanding.  Encouraged medication compliance and the importance of keeping scheduled follow up appointments with me and any other providers.    Patient instructed to follow up with our office for results on any labs/imaging ordered during this visit.    Home care discussed  All questions answered  Patient verbalizes understanding and agrees to treatment plan.     Follow up: Return for WILL CALL YOU FOR LBAS/XR RESULT.

## 2021-08-09 NOTE — PATIENT INSTRUCTIONS

## 2021-08-12 ENCOUNTER — TELEPHONE (OUTPATIENT)
Dept: INTERNAL MEDICINE | Facility: CLINIC | Age: 31
End: 2021-08-12

## 2021-08-12 LAB
ANA TITR SER IF: NEGATIVE {TITER}
C TRACH RRNA SPEC QL NAA+PROBE: NEGATIVE
LABORATORY COMMENT REPORT: NORMAL
N GONORRHOEA RRNA SPEC QL NAA+PROBE: NEGATIVE
T VAGINALIS DNA SPEC QL NAA+PROBE: NEGATIVE

## 2021-08-12 NOTE — TELEPHONE ENCOUNTER
----- Message from Winston Kc MD sent at 8/12/2021  3:48 PM EDT -----  PLEASE call for normal blood results, no concern for lupus

## 2021-08-15 ENCOUNTER — APPOINTMENT (OUTPATIENT)
Dept: CT IMAGING | Facility: HOSPITAL | Age: 31
End: 2021-08-15

## 2021-08-15 ENCOUNTER — HOSPITAL ENCOUNTER (EMERGENCY)
Facility: HOSPITAL | Age: 31
Discharge: HOME | End: 2021-08-15
Attending: EMERGENCY MEDICINE | Admitting: EMERGENCY MEDICINE

## 2021-08-15 ENCOUNTER — APPOINTMENT (OUTPATIENT)
Dept: GENERAL RADIOLOGY | Facility: HOSPITAL | Age: 31
End: 2021-08-15

## 2021-08-15 ENCOUNTER — APPOINTMENT (OUTPATIENT)
Dept: CARDIOLOGY | Facility: HOSPITAL | Age: 31
End: 2021-08-15

## 2021-08-15 VITALS
OXYGEN SATURATION: 99 % | WEIGHT: 253 LBS | DIASTOLIC BLOOD PRESSURE: 89 MMHG | RESPIRATION RATE: 18 BRPM | HEART RATE: 64 BPM | HEIGHT: 63 IN | TEMPERATURE: 98.2 F | BODY MASS INDEX: 44.83 KG/M2 | SYSTOLIC BLOOD PRESSURE: 138 MMHG

## 2021-08-15 DIAGNOSIS — Z86.718 HISTORY OF DVT (DEEP VEIN THROMBOSIS): ICD-10-CM

## 2021-08-15 DIAGNOSIS — E66.01 CLASS 3 SEVERE OBESITY DUE TO EXCESS CALORIES WITHOUT SERIOUS COMORBIDITY WITH BODY MASS INDEX (BMI) OF 40.0 TO 44.9 IN ADULT (HCC): ICD-10-CM

## 2021-08-15 DIAGNOSIS — R07.9 RIGHT-SIDED CHEST PAIN: Primary | ICD-10-CM

## 2021-08-15 DIAGNOSIS — M79.604 RIGHT LEG PAIN: ICD-10-CM

## 2021-08-15 DIAGNOSIS — R03.0 ELEVATED BLOOD PRESSURE READING: ICD-10-CM

## 2021-08-15 LAB
ALBUMIN SERPL-MCNC: 4.4 G/DL (ref 3.5–5.2)
ALBUMIN/GLOB SERPL: 1.4 G/DL
ALP SERPL-CCNC: 70 U/L (ref 39–117)
ALT SERPL W P-5'-P-CCNC: 16 U/L (ref 1–33)
ANION GAP SERPL CALCULATED.3IONS-SCNC: 13 MMOL/L (ref 5–15)
AST SERPL-CCNC: 17 U/L (ref 1–32)
BASOPHILS # BLD AUTO: 0.04 10*3/MM3 (ref 0–0.2)
BASOPHILS NFR BLD AUTO: 0.7 % (ref 0–1.5)
BH CV ECHO MEAS - BSA(HAYCOCK): 2.3 M^2
BH CV ECHO MEAS - BSA: 2.1 M^2
BH CV ECHO MEAS - BZI_BMI: 44.8 KILOGRAMS/M^2
BH CV ECHO MEAS - BZI_METRIC_HEIGHT: 160 CM
BH CV ECHO MEAS - BZI_METRIC_WEIGHT: 114.8 KG
BH CV LOWER VASCULAR LEFT COMMON FEMORAL AUGMENT: NORMAL
BH CV LOWER VASCULAR LEFT COMMON FEMORAL COMPRESS: NORMAL
BH CV LOWER VASCULAR LEFT COMMON FEMORAL PHASIC: NORMAL
BH CV LOWER VASCULAR LEFT COMMON FEMORAL SPONT: NORMAL
BH CV LOWER VASCULAR RIGHT COMMON FEMORAL AUGMENT: NORMAL
BH CV LOWER VASCULAR RIGHT COMMON FEMORAL COMPRESS: NORMAL
BH CV LOWER VASCULAR RIGHT COMMON FEMORAL PHASIC: NORMAL
BH CV LOWER VASCULAR RIGHT COMMON FEMORAL SPONT: NORMAL
BH CV LOWER VASCULAR RIGHT DISTAL FEMORAL COMPRESS: NORMAL
BH CV LOWER VASCULAR RIGHT GASTRONEMIUS COMPRESS: NORMAL
BH CV LOWER VASCULAR RIGHT GREATER SAPH AK COMPRESS: NORMAL
BH CV LOWER VASCULAR RIGHT GREATER SAPH BK COMPRESS: NORMAL
BH CV LOWER VASCULAR RIGHT LESSER SAPH COMPRESS: NORMAL
BH CV LOWER VASCULAR RIGHT MID FEMORAL AUGMENT: NORMAL
BH CV LOWER VASCULAR RIGHT MID FEMORAL COMPRESS: NORMAL
BH CV LOWER VASCULAR RIGHT MID FEMORAL PHASIC: NORMAL
BH CV LOWER VASCULAR RIGHT MID FEMORAL SPONT: NORMAL
BH CV LOWER VASCULAR RIGHT PERONEAL COMPRESS: NORMAL
BH CV LOWER VASCULAR RIGHT POPLITEAL AUGMENT: NORMAL
BH CV LOWER VASCULAR RIGHT POPLITEAL COMPRESS: NORMAL
BH CV LOWER VASCULAR RIGHT POPLITEAL PHASIC: NORMAL
BH CV LOWER VASCULAR RIGHT POPLITEAL SPONT: NORMAL
BH CV LOWER VASCULAR RIGHT POSTERIOR TIBIAL COMPRESS: NORMAL
BH CV LOWER VASCULAR RIGHT PROFUNDA FEMORAL COMPRESS: NORMAL
BH CV LOWER VASCULAR RIGHT PROXIMAL FEMORAL COMPRESS: NORMAL
BH CV LOWER VASCULAR RIGHT SAPHENOFEMORAL JUNCTION COMPRESS: NORMAL
BILIRUB SERPL-MCNC: 0.2 MG/DL (ref 0–1.2)
BUN SERPL-MCNC: 8 MG/DL (ref 6–20)
BUN/CREAT SERPL: 9.6 (ref 7–25)
CALCIUM SPEC-SCNC: 9.4 MG/DL (ref 8.6–10.5)
CHLORIDE SERPL-SCNC: 105 MMOL/L (ref 98–107)
CO2 SERPL-SCNC: 25 MMOL/L (ref 22–29)
CREAT SERPL-MCNC: 0.83 MG/DL (ref 0.57–1)
D DIMER PPP FEU-MCNC: 0.38 MCGFEU/ML (ref 0–0.56)
DEPRECATED RDW RBC AUTO: 40.9 FL (ref 37–54)
EOSINOPHIL # BLD AUTO: 0.09 10*3/MM3 (ref 0–0.4)
EOSINOPHIL NFR BLD AUTO: 1.5 % (ref 0.3–6.2)
ERYTHROCYTE [DISTWIDTH] IN BLOOD BY AUTOMATED COUNT: 13.1 % (ref 12.3–15.4)
GFR SERPL CREATININE-BSD FRML MDRD: 80 ML/MIN/1.73
GLOBULIN UR ELPH-MCNC: 3.1 GM/DL
GLUCOSE SERPL-MCNC: 89 MG/DL (ref 65–99)
HCT VFR BLD AUTO: 40.4 % (ref 34–46.6)
HGB BLD-MCNC: 13.3 G/DL (ref 12–15.9)
HOLD SPECIMEN: NORMAL
IMM GRANULOCYTES # BLD AUTO: 0.01 10*3/MM3 (ref 0–0.05)
IMM GRANULOCYTES NFR BLD AUTO: 0.2 % (ref 0–0.5)
LIPASE SERPL-CCNC: 58 U/L (ref 13–60)
LYMPHOCYTES # BLD AUTO: 1.8 10*3/MM3 (ref 0.7–3.1)
LYMPHOCYTES NFR BLD AUTO: 29.4 % (ref 19.6–45.3)
MAXIMAL PREDICTED HEART RATE: 189 BPM
MCH RBC QN AUTO: 28.5 PG (ref 26.6–33)
MCHC RBC AUTO-ENTMCNC: 32.9 G/DL (ref 31.5–35.7)
MCV RBC AUTO: 86.7 FL (ref 79–97)
MONOCYTES # BLD AUTO: 0.47 10*3/MM3 (ref 0.1–0.9)
MONOCYTES NFR BLD AUTO: 7.7 % (ref 5–12)
NEUTROPHILS NFR BLD AUTO: 3.71 10*3/MM3 (ref 1.7–7)
NEUTROPHILS NFR BLD AUTO: 60.5 % (ref 42.7–76)
NRBC BLD AUTO-RTO: 0 /100 WBC (ref 0–0.2)
NT-PROBNP SERPL-MCNC: 70.5 PG/ML (ref 0–450)
PLATELET # BLD AUTO: 322 10*3/MM3 (ref 140–450)
PMV BLD AUTO: 11.2 FL (ref 6–12)
POTASSIUM SERPL-SCNC: 3.9 MMOL/L (ref 3.5–5.2)
PROT SERPL-MCNC: 7.5 G/DL (ref 6–8.5)
RBC # BLD AUTO: 4.66 10*6/MM3 (ref 3.77–5.28)
SODIUM SERPL-SCNC: 143 MMOL/L (ref 136–145)
STRESS TARGET HR: 161 BPM
TROPONIN T SERPL-MCNC: <0.01 NG/ML (ref 0–0.03)
WBC # BLD AUTO: 6.12 10*3/MM3 (ref 3.4–10.8)
WHOLE BLOOD HOLD SPECIMEN: NORMAL

## 2021-08-15 PROCEDURE — 93971 EXTREMITY STUDY: CPT | Performed by: INTERNAL MEDICINE

## 2021-08-15 PROCEDURE — 0 IOPAMIDOL PER 1 ML: Performed by: EMERGENCY MEDICINE

## 2021-08-15 PROCEDURE — 83690 ASSAY OF LIPASE: CPT | Performed by: EMERGENCY MEDICINE

## 2021-08-15 PROCEDURE — 93005 ELECTROCARDIOGRAM TRACING: CPT

## 2021-08-15 PROCEDURE — 85025 COMPLETE CBC W/AUTO DIFF WBC: CPT | Performed by: EMERGENCY MEDICINE

## 2021-08-15 PROCEDURE — 93971 EXTREMITY STUDY: CPT

## 2021-08-15 PROCEDURE — 83880 ASSAY OF NATRIURETIC PEPTIDE: CPT | Performed by: EMERGENCY MEDICINE

## 2021-08-15 PROCEDURE — 71275 CT ANGIOGRAPHY CHEST: CPT

## 2021-08-15 PROCEDURE — 71046 X-RAY EXAM CHEST 2 VIEWS: CPT

## 2021-08-15 PROCEDURE — 84484 ASSAY OF TROPONIN QUANT: CPT | Performed by: EMERGENCY MEDICINE

## 2021-08-15 PROCEDURE — 85379 FIBRIN DEGRADATION QUANT: CPT | Performed by: EMERGENCY MEDICINE

## 2021-08-15 PROCEDURE — 93005 ELECTROCARDIOGRAM TRACING: CPT | Performed by: EMERGENCY MEDICINE

## 2021-08-15 PROCEDURE — 80053 COMPREHEN METABOLIC PANEL: CPT | Performed by: EMERGENCY MEDICINE

## 2021-08-15 PROCEDURE — 99283 EMERGENCY DEPT VISIT LOW MDM: CPT

## 2021-08-15 RX ORDER — SODIUM CHLORIDE 0.9 % (FLUSH) 0.9 %
10 SYRINGE (ML) INJECTION AS NEEDED
Status: DISCONTINUED | OUTPATIENT
Start: 2021-08-15 | End: 2021-08-15 | Stop reason: HOSPADM

## 2021-08-15 RX ADMIN — IOPAMIDOL 75 ML: 755 INJECTION, SOLUTION INTRAVENOUS at 19:46

## 2021-08-15 RX ADMIN — SODIUM CHLORIDE 1000 ML: 9 INJECTION, SOLUTION INTRAVENOUS at 18:47

## 2021-08-15 NOTE — ED PROVIDER NOTES
Subjective   Ms. Theresa Maya is a 31 y.o. female who presents to the ED with c/o chest pain. She reports for the past 3 days she has been experiencing chest pain and right lower extremity pain. She also had right lower extremity swelling with tenderness which has resolved. She describes her chest pain as sharp midsternal chest pain which radiates to her back. She denies nausea, vomiting, diarrhea, shortness of breath, pleurisy, or diaphoresis. She has history of DVT while she was pregnant. There are no other acute symptoms at this time.      History provided by:  Patient  Chest Pain  Pain location:  Substernal area  Pain quality: sharp    Pain radiates to:  Upper back  Pain severity:  Moderate  Onset quality:  Sudden  Duration:  3 days  Timing:  Constant  Progression:  Unchanged  Chronicity:  New  Relieved by:  Nothing  Worsened by:  Nothing  Associated symptoms: no diaphoresis, no nausea, no shortness of breath and no vomiting        Review of Systems   Constitutional: Negative for diaphoresis.   Respiratory: Negative for shortness of breath.    Cardiovascular: Positive for chest pain and leg swelling.   Gastrointestinal: Negative for diarrhea, nausea and vomiting.   All other systems reviewed and are negative.      Past Medical History:   Diagnosis Date   • Abnormal Pap smear of cervix    • Acid reflux    • Allergic    • Anxiety    • Arthritis    • Clotting disorder (CMS/HCC)     DVT right leg   • Deep vein thrombosis (CMS/HCC)    • Depression    • Gallbladder abscess    • Hyperemesis gravidarum    • Migraines    • MTHFR deficiency complicating pregnancy (CMS/HCC)    • Obesity    • Ovarian cyst    • Pap smear for cervical cancer screening 08/2019    abnormal pap   • PMS (premenstrual syndrome)    • Recurrent pregnancy loss, antepartum condition or complication     had a [facility] and lost the pregnancy at 6 months   • Sinusitis    • Strep pharyngitis    • Urinary tract infection        Allergies   Allergen  Reactions   • Penicillins Anaphylaxis   • Latex Itching   • Amoxicillin Rash       Past Surgical History:   Procedure Laterality Date   • ABDOMINAL SURGERY     • CHOLECYSTECTOMY  06/2016   • NASAL RECONSTRUCTION Bilateral 06/2009   • SINUS SURGERY Bilateral 06/2016   • TUBAL ABDOMINAL LIGATION Bilateral 09/15/2017   • TUBAL COAGULATION LAPAROSCOPIC Bilateral 9/15/2017    Procedure: BILATERAL TUBAL FALLOPE FILSHIE CLIPPING LAPAROSCOPIC;  Surgeon: Leo Posey III, MD;  Location: [facility];  Service:    • VAGINAL DELIVERY  14; 16; 17    female,male, male       Family History   Problem Relation Age of Onset   • Asthma Mother    • Thyroid disease Mother    • Heart attack Mother    • Obesity Mother    • Migraines Mother    • Lupus Sister    • Ovarian cancer Sister    • Asthma Sister    • Lung cancer Paternal Grandmother    • Breast cancer Maternal Grandmother    • Lung cancer Maternal Grandmother    • Asthma Maternal Grandmother    • Hypertension Maternal Grandmother    • Lupus Maternal Grandmother    • Thyroid disease Maternal Grandmother    • Hypertension Maternal Grandfather        Social History     Socioeconomic History   • Marital status:      Spouse name: Not on file   • Number of children: Not on file   • Years of education: Not on file   • Highest education level: Not on file   Tobacco Use   • Smoking status: Never Smoker   • Smokeless tobacco: Never Used   Substance and Sexual Activity   • Alcohol use: No   • Drug use: No   • Sexual activity: Yes     Partners: Male     Birth control/protection: Surgical     Comment: -Juve, and has 3 children-homemaker         Objective   Physical Exam  Vitals and nursing note reviewed.   Constitutional:       General: She is not in acute distress.     Appearance: She is well-developed.   HENT:      Head: Normocephalic and atraumatic.      Nose: Nose normal.   Eyes:      General: No scleral icterus.     Conjunctiva/sclera: Conjunctivae normal.   Cardiovascular:       Rate and Rhythm: Normal rate and regular rhythm.      Heart sounds: Normal heart sounds. No murmur heard.     Pulmonary:      Effort: Pulmonary effort is normal. No respiratory distress.      Breath sounds: Normal breath sounds.   Abdominal:      Palpations: Abdomen is soft.      Tenderness: There is no abdominal tenderness.   Musculoskeletal:         General: Normal range of motion.      Cervical back: Normal range of motion and neck supple.      Right lower leg: Edema present.   Skin:     General: Skin is warm and dry.   Neurological:      Mental Status: She is alert and oriented to person, place, and time.   Psychiatric:         Behavior: Behavior normal.         Procedures         ED Course  ED Course as of Aug 15 2225   Sun Aug 15, 2021   1820 Personally reviewed 2 views of the chest demonstrating no focal infiltrate or emergent findings.  See report for radiology for details.   XR Chest 2 View [RS]   1851 Initial report by the echo technician is negative for compression abnormality or evidence of DVT.  Final report pending.   Duplex Venous Lower Extremity - Right CAR [RS]   1933 D-Dimer, Quant: 0.38 [RS]   2021 No emergent findings on her evaluation here in the ER.  No evidence of DVT or pulmonary embolism.  We will plan to discharge the patient home with symptomatic management follow-up with her doctor for ongoing evaluation as indicated.  She is to return to the ER for any concerns or worsening. I had a discussion with the patient/family regarding diagnosis, diagnostic results, treatment plan, and medications.  The patient/family indicated understanding of these instructions.  I spent adequate time at the bedside proceeding discharge necessary to personally discuss the aftercare instructions, giving patient education, providing explanations of the results of our evaluations/findings, and my decision making to assure that the patient/family understand the plan of care.  Time was allotted to answer  questions at that time and throughout the ED course.  Emphasis was placed on timely follow-up after discharge.  I also discussed the potential for the development of an acute emergent condition requiring further evaluation, admission, or even surgical intervention. I discussed that we found nothing during the visit today indicating the need for further workup, admission, or the presence of an unstable medical condition.  I encouraged the patient to return to the emergency department immediately for ANY concerns, worsening, new complaints, or if symptoms persist and unable to seek follow-up in a timely fashion.  The patient/family expressed understanding and agreement with this plan.     [RS]      ED Course User Index  [RS] Socrates Peacock MD     Recent Results (from the past 24 hour(s))   Green Top (Gel)    Collection Time: 08/15/21  5:54 PM   Result Value Ref Range    Extra Tube Hold for add-ons.    Lavender Top    Collection Time: 08/15/21  5:54 PM   Result Value Ref Range    Extra Tube hold for add-on    Gold Top - SST    Collection Time: 08/15/21  5:54 PM   Result Value Ref Range    Extra Tube Hold for add-ons.    Gray Top    Collection Time: 08/15/21  5:54 PM   Result Value Ref Range    Extra Tube Hold for add-ons.    Comprehensive Metabolic Panel    Collection Time: 08/15/21  5:54 PM    Specimen: Blood   Result Value Ref Range    Glucose 89 65 - 99 mg/dL    BUN 8 6 - 20 mg/dL    Creatinine 0.83 0.57 - 1.00 mg/dL    Sodium 143 136 - 145 mmol/L    Potassium 3.9 3.5 - 5.2 mmol/L    Chloride 105 98 - 107 mmol/L    CO2 25.0 22.0 - 29.0 mmol/L    Calcium 9.4 8.6 - 10.5 mg/dL    Total Protein 7.5 6.0 - 8.5 g/dL    Albumin 4.40 3.50 - 5.20 g/dL    ALT (SGPT) 16 1 - 33 U/L    AST (SGOT) 17 1 - 32 U/L    Alkaline Phosphatase 70 39 - 117 U/L    Total Bilirubin 0.2 0.0 - 1.2 mg/dL    eGFR Non African Amer 80 >60 mL/min/1.73    Globulin 3.1 gm/dL    A/G Ratio 1.4 g/dL    BUN/Creatinine Ratio 9.6 7.0 - 25.0    Anion  Gap 13.0 5.0 - 15.0 mmol/L   Lipase    Collection Time: 08/15/21  5:54 PM    Specimen: Blood   Result Value Ref Range    Lipase 58 13 - 60 U/L   BNP    Collection Time: 08/15/21  5:54 PM    Specimen: Blood   Result Value Ref Range    proBNP 70.5 0.0 - 450.0 pg/mL   Troponin    Collection Time: 08/15/21  5:54 PM    Specimen: Blood   Result Value Ref Range    Troponin T <0.010 0.000 - 0.030 ng/mL   CBC Auto Differential    Collection Time: 08/15/21  5:54 PM    Specimen: Blood   Result Value Ref Range    WBC 6.12 3.40 - 10.80 10*3/mm3    RBC 4.66 3.77 - 5.28 10*6/mm3    Hemoglobin 13.3 12.0 - 15.9 g/dL    Hematocrit 40.4 34.0 - 46.6 %    MCV 86.7 79.0 - 97.0 fL    MCH 28.5 26.6 - 33.0 pg    MCHC 32.9 31.5 - 35.7 g/dL    RDW 13.1 12.3 - 15.4 %    RDW-SD 40.9 37.0 - 54.0 fl    MPV 11.2 6.0 - 12.0 fL    Platelets 322 140 - 450 10*3/mm3    Neutrophil % 60.5 42.7 - 76.0 %    Lymphocyte % 29.4 19.6 - 45.3 %    Monocyte % 7.7 5.0 - 12.0 %    Eosinophil % 1.5 0.3 - 6.2 %    Basophil % 0.7 0.0 - 1.5 %    Immature Grans % 0.2 0.0 - 0.5 %    Neutrophils, Absolute 3.71 1.70 - 7.00 10*3/mm3    Lymphocytes, Absolute 1.80 0.70 - 3.10 10*3/mm3    Monocytes, Absolute 0.47 0.10 - 0.90 10*3/mm3    Eosinophils, Absolute 0.09 0.00 - 0.40 10*3/mm3    Basophils, Absolute 0.04 0.00 - 0.20 10*3/mm3    Immature Grans, Absolute 0.01 0.00 - 0.05 10*3/mm3    nRBC 0.0 0.0 - 0.2 /100 WBC   D-dimer, Quantitative    Collection Time: 08/15/21  5:55 PM    Specimen: Blood   Result Value Ref Range    D-Dimer, Quantitative 0.38 0.00 - 0.56 MCGFEU/mL   Duplex Venous Lower Extremity - Right CAR    Collection Time: 08/15/21  6:55 PM   Result Value Ref Range    BSA 2.1 m^2    BH CV ECHO KULWANT - BZI_BMI 44.8 kilograms/m^2     CV ECHO KULWANT - BSA(HAYCOCK) 2.3 m^2     CV ECHO KULWANT - BZI_METRIC_WEIGHT 114.8 kg     CV ECHO KULWANT - BZI_METRIC_HEIGHT 160.0 cm    Right Common Femoral Spont Y     Right Common Femoral Phasic Y     Right Common Femoral Augment Y      Right Common Femoral Compress C     Right Saphenofemoral Junction Compress C     Right Profunda Femoral Compress C     Right Proximal Femoral Compress C     Right Mid Femoral Spont Y     Right Mid Femoral Phasic Y     Right Mid Femoral Augment Y     Right Mid Femoral Compress C     Right Distal Femoral Compress C     Right Popliteal Spont Y     Right Popliteal Phasic Y     Right Popliteal Augment Y     Right Popliteal Compress C     Right Posterior Tibial Compress C     Right Peroneal Compress C     Right GastronemiusSoleal Compress C     Right Greater Saph AK Compress C     Right Greater Saph BK Compress C     Right Lesser Saph Compress C     Left Common Femoral Spont Y     Left Common Femoral Phasic Y     Left Common Femoral Augment Y     Left Common Femoral Compress C     Target HR (85%) 161 bpm    Max. Pred. HR (100%) 189 bpm     Note: In addition to lab results from this visit, the labs listed above may include labs taken at another facility or during a different encounter within the last 24 hours. Please correlate lab times with ED admission and discharge times for further clarification of the services performed during this visit.    CT Angiogram Chest   Final Result   1. Negative for pulmonary embolus.      2. No acute findings in the chest.      Signer Name: ESCOBAR Marley MD    Signed: 8/15/2021 7:57 PM    Workstation Name: [facility]     [facility]      XR Chest 2 View   Preliminary Result   No acute cardiopulmonary process                Vitals:    08/15/21 2004 08/15/21 2005 08/15/21 2006 08/15/21 2007   BP: 138/89      BP Location:       Patient Position:       Pulse: 70 68 64 64   Resp:       Temp:       TempSrc:       SpO2: 97% 99% 100% 99%   Weight:       Height:         Medications   sodium chloride 0.9 % flush 10 mL (has no administration in time range)   sodium chloride 0.9 % bolus 1,000 mL (0 mL Intravenous Stopped 8/15/21 2040)   iopamidol (ISOVUE-370) 76 %  injection 100 mL (75 mL Intravenous Given 8/15/21 1946)     ECG/EMG Results (last 24 hours)     Procedure Component Value Units Date/Time    ECG 12 Lead [[id number]] Collected: 08/15/21 1737     Updated: 08/15/21 1736        ECG 12 Lead                                                    MDM  Number of Diagnoses or Management Options     Amount and/or Complexity of Data Reviewed  Clinical lab tests: reviewed  Tests in the radiology section of CPT®: reviewed  Independent visualization of images, tracings, or specimens: yes        Final diagnoses:   Right-sided chest pain   History of DVT (deep vein thrombosis)   Elevated blood pressure reading   Right leg pain   Class 3 severe obesity due to excess calories without serious comorbidity with body mass index (BMI) of 40.0 to 44.9 in adult (CMS/[facility])     DISCHARGE    Patient discharged in stable condition.    Reviewed implications of results, diagnosis, meds, responsibility to follow up, warning signs and symptoms of possible worsening, potential complications and reasons to return to ER.    Patient/Family voiced understanding of above instructions.    Discussed plan for discharge, as there is no emergent indication for admission.  Pt/family is agreeable and understands need for follow up and possible repeat testing.  Pt/family is aware that discharge does not mean that nothing is wrong but that it indicates no emergency is currently present that requires admission and they must continue care with follow-up as given below or with a physician of their choice.     FOLLOW-UP  Winston Kc MD  [address] [address]  [phone]    Schedule an appointment as soon as possible for a visit       [facility] Emergency Department  [address] [facility] [address] [phone]    As needed, If symptoms worsen or ANY concerns.         Medication List      New Prescriptions    diclofenac 50 MG EC tablet  Commonly known  as: VOLTAREN  Take 1 tablet by mouth 3 (Three) Times a Day As Needed (pain).        Stop    HYDROcodone-acetaminophen 5-325 MG per tablet  Commonly known as: NORCO     ibuprofen 800 MG tablet  Commonly known as: ADVIL,MOTRIN     montelukast 10 MG tablet  Commonly known as: Singulair           Where to Get Your Medications      These medications were sent to [address] - [address] [facility] [address] [facility] - [phone]  - [phone] FX  [address] [facility] [address] [place] [address]    Phone: [phone]   · diclofenac 50 MG EC tablet       Documentation assistance provided by seferino Barnett.  Information recorded by the seferino was done at my direction and has been verified and validated by me.     Saw Barnett  08/15/21 1927       Socrates Peacock MD  08/15/21 [id number]

## 2021-08-20 LAB
QT INTERVAL: 388 MS
QTC INTERVAL: 436 MS

## 2021-09-21 ENCOUNTER — OFFICE VISIT (OUTPATIENT)
Dept: INTERNAL MEDICINE | Facility: CLINIC | Age: 31
End: 2021-09-21

## 2021-09-21 ENCOUNTER — LAB (OUTPATIENT)
Dept: LAB | Facility: HOSPITAL | Age: 31
End: 2021-09-21

## 2021-09-21 VITALS
SYSTOLIC BLOOD PRESSURE: 126 MMHG | HEIGHT: 63 IN | DIASTOLIC BLOOD PRESSURE: 94 MMHG | BODY MASS INDEX: 43.41 KG/M2 | WEIGHT: 245 LBS | HEART RATE: 67 BPM | TEMPERATURE: 97.1 F | OXYGEN SATURATION: 98 %

## 2021-09-21 DIAGNOSIS — H10.33 ACUTE CONJUNCTIVITIS OF BOTH EYES, UNSPECIFIED ACUTE CONJUNCTIVITIS TYPE: ICD-10-CM

## 2021-09-21 DIAGNOSIS — Z80.1 FHX: LUNG CANCER: ICD-10-CM

## 2021-09-21 DIAGNOSIS — Z91.89 AT HIGH RISK FOR BREAST CANCER: ICD-10-CM

## 2021-09-21 DIAGNOSIS — N89.8 VAGINAL DISCHARGE: ICD-10-CM

## 2021-09-21 DIAGNOSIS — Z80.41 FHX: CANCER OF OVARY: ICD-10-CM

## 2021-09-21 DIAGNOSIS — Z12.31 BREAST CANCER SCREENING BY MAMMOGRAM: ICD-10-CM

## 2021-09-21 DIAGNOSIS — Z80.3 FHX: BREAST CANCER: ICD-10-CM

## 2021-09-21 DIAGNOSIS — N89.8 VAGINAL DISCHARGE: Primary | ICD-10-CM

## 2021-09-21 LAB
BILIRUB BLD-MCNC: NEGATIVE MG/DL
CLARITY, POC: CLEAR
COLOR UR: YELLOW
GLUCOSE UR STRIP-MCNC: NEGATIVE MG/DL
KETONES UR QL: NEGATIVE
LEUKOCYTE EST, POC: ABNORMAL
NITRITE UR-MCNC: NEGATIVE MG/ML
PH UR: 5.5 [PH] (ref 5–8)
PROT UR STRIP-MCNC: ABNORMAL MG/DL
RBC # UR STRIP: NEGATIVE /UL
SP GR UR: 1.03 (ref 1–1.03)
UROBILINOGEN UR QL: NORMAL

## 2021-09-21 PROCEDURE — 87798 DETECT AGENT NOS DNA AMP: CPT | Performed by: FAMILY MEDICINE

## 2021-09-21 PROCEDURE — 87801 DETECT AGNT MULT DNA AMPLI: CPT | Performed by: FAMILY MEDICINE

## 2021-09-21 PROCEDURE — 87661 TRICHOMONAS VAGINALIS AMPLIF: CPT | Performed by: FAMILY MEDICINE

## 2021-09-21 PROCEDURE — 99214 OFFICE O/P EST MOD 30 MIN: CPT | Performed by: FAMILY MEDICINE

## 2021-09-21 PROCEDURE — 87491 CHLMYD TRACH DNA AMP PROBE: CPT | Performed by: FAMILY MEDICINE

## 2021-09-21 PROCEDURE — 87591 N.GONORRHOEAE DNA AMP PROB: CPT | Performed by: FAMILY MEDICINE

## 2021-09-21 RX ORDER — DOXYCYCLINE HYCLATE 100 MG/1
100 CAPSULE ORAL 2 TIMES DAILY
Qty: 20 CAPSULE | Refills: 0 | Status: SHIPPED | OUTPATIENT
Start: 2021-09-21 | End: 2021-10-28

## 2021-09-21 RX ORDER — CIPROFLOXACIN HYDROCHLORIDE 3.5 MG/ML
1 SOLUTION/ DROPS TOPICAL EVERY 8 HOURS
Qty: 10 ML | Refills: 0 | Status: SHIPPED | OUTPATIENT
Start: 2021-09-21 | End: 2021-10-28

## 2021-09-21 NOTE — PROGRESS NOTES
Subjective     Theresa Maya is a 31 y.o. female.     Chief Complaint   Patient presents with   • Eye Problem     Wipes eye and it is like snot    • Diarrhea     states very smelly    • Vaginal Discharge     stated it is like snot        History of Present Illness     She is c/o Vag discharge for 3 months, sx get worse during last few days after she had sex, green in color , no itching , no bad smell   No urinary sx . No h/o STD   She is  , has one partner     Also c/o Diarrhea with bad odder for 24 hrs , with no blood, no F,C     Has eye discharge same color like her vag discharge which is green , sx for 3 days , no change in vision    She had Lt breast nodule 3 months ago, she had US breast which was normal     Mother ; had ovarian cancer , lung  Cancer ,   Sister has breast cancer at age at age 32   Materna GM breast and lung cancer , dx both at age 44     The following portions of the patient's history were reviewed and updated as appropriate: allergies, current medications, past family history, past medical history, past social history, past surgical history and problem list.        Review of Systems   Eyes: Positive for discharge. Negative for photophobia and visual disturbance.   Gastrointestinal: Positive for diarrhea. Negative for nausea, GERD and indigestion.   Genitourinary: Positive for vaginal discharge. Negative for vaginal bleeding.       Vitals:    09/21/21 1626   BP: 126/94   Pulse: 67   Temp: 97.1 °F (36.2 °C)   SpO2: 98%           09/21/21  1626   Weight: 111 kg (245 lb)         Body mass index is 43.41 kg/m².      Current Outpatient Medications   Medication Sig Dispense Refill   • diclofenac (VOLTAREN) 50 MG EC tablet Take 1 tablet by mouth 3 (Three) Times a Day As Needed (pain). 15 tablet 0   • DULoxetine (CYMBALTA) 60 MG capsule Take 1 capsule by mouth Daily. 90 capsule 1   • ciprofloxacin (Ciloxan) 0.3 % ophthalmic solution Administer 1 drop to both eyes Every 8 (Eight) Hours. 10  mL 0   • doxycycline (VIBRAMYCIN) 100 MG capsule Take 1 capsule by mouth 2 (Two) Times a Day. 20 capsule 0     No current facility-administered medications for this visit.                Objective   Physical Exam  Vitals and nursing note reviewed. Exam conducted with a chaperone present.   Constitutional:       Appearance: She is not ill-appearing or diaphoretic.   HENT:      Mouth/Throat:      Mouth: Mucous membranes are moist.   Cardiovascular:      Rate and Rhythm: Normal rate and regular rhythm.      Heart sounds: Normal heart sounds. No murmur heard.     Pulmonary:      Effort: Pulmonary effort is normal. No respiratory distress.      Breath sounds: Normal breath sounds. No stridor. No wheezing or rhonchi.   Abdominal:      General: Bowel sounds are normal. There is no distension.      Palpations: Abdomen is soft. There is no mass.      Tenderness: There is no abdominal tenderness. There is no guarding or rebound.      Hernia: No hernia is present.   Genitourinary:     General: Normal vulva.      Labia:         Right: No tenderness or lesion.         Left: No tenderness or lesion.       Urethra: No urethral lesion.      Vagina: Vaginal discharge and erythema present.      Cervix: Discharge and erythema present.      Uterus: Not enlarged.       Adnexa:         Right: No tenderness.          Left: No tenderness.     Lymphadenopathy:      Lower Body: No right inguinal adenopathy. No left inguinal adenopathy.   Skin:     General: Skin is warm.      Findings: No erythema or rash.   Neurological:      Mental Status: She is alert and oriented to person, place, and time.   Psychiatric:         Mood and Affect: Mood normal.         Behavior: Behavior normal.         Thought Content: Thought content normal.           Assessment/Plan   Diagnoses and all orders for this visit:    1. Vaginal discharge (Primary)  -     NuSwab VG+ - Swab, Vagina; Future  -     doxycycline (VIBRAMYCIN) 100 MG capsule; Take 1 capsule by mouth 2  (Two) Times a Day.  Dispense: 20 capsule; Refill: 0  -     POC Urinalysis Dipstick, Automated    2. Acute conjunctivitis of both eyes, unspecified acute conjunctivitis type  -     ciprofloxacin (Ciloxan) 0.3 % ophthalmic solution; Administer 1 drop to both eyes Every 8 (Eight) Hours.  Dispense: 10 mL; Refill: 0    3. At high risk for breast cancer  -     Mammo Screening Digital Tomosynthesis Bilateral With CAD; Future    4. FHx: cancer of ovary  -     Ambulatory Referral to Genetic Counseling/Testing - [facility]    5. FHx: breast cancer  -     Ambulatory Referral to Genetic Counseling/Testing - [facility]  -     Mammo Screening Digital Tomosynthesis Bilateral With CAD; Future    6. FHx: lung cancer  -     Ambulatory Referral to Genetic Counseling/Testing - [facility]    7. Breast cancer screening by mammogram  -     Mammo Screening Digital Tomosynthesis Bilateral With CAD; Future         I have fully discussed the nature of the medical condition(s) risks, complications, management, safe and proper use of medications.   I have discussed the SIDE EFFECT OF MEDICATION and importance TO report any side effect , the patient expressed good understanding.  Encouraged medication compliance and the importance of keeping scheduled follow up appointments with me and any other providers.    Patient instructed to follow up with our office for results on any labs/imaging ordered during this visit.    Home care discussed  All questions answered  Patient verbalizes understanding and agrees to treatment plan.     Follow up: Return for WILL CALL YOU FOR LBAS/XR RESULT.

## 2021-09-21 NOTE — PATIENT INSTRUCTIONS
Diarrhea, Adult  Diarrhea is when you pass loose and watery poop (stool) often. Diarrhea can make you feel weak and cause you to lose water in your body (get dehydrated). Losing water in your body can cause you to:  · Feel tired and thirsty.  · Have a dry mouth.  · Go pee (urinate) less often.  Diarrhea often lasts 2-3 days. However, it can last longer if it is a sign of something more serious. It is important to treat your diarrhea as told by your doctor.  Follow these instructions at home:  Eating and drinking         Follow these instructions as told by your doctor:  · Take an ORS (oral rehydration solution). This is a drink that helps you replace fluids and minerals your body lost. It is sold at pharmacies and stores.  · Drink plenty of fluids, such as:  ? Water.  ? Ice chips.  ? Diluted fruit juice.  ? Low-calorie sports drinks.  ? Milk, if you want.  · Avoid drinking fluids that have a lot of sugar or caffeine in them.  · Eat bland, easy-to-digest foods in small amounts as you are able. These foods include:  ? Bananas.  ? Applesauce.  ? Rice.  ? Low-fat (lean) meats.  ? Toast.  ? Crackers.  · Avoid alcohol.  · Avoid spicy or fatty foods.    Medicines  · Take over-the-counter and prescription medicines only as told by your doctor.  · If you were prescribed an antibiotic medicine, take it as told by your doctor. Do not stop using the antibiotic even if you start to feel better.  General instructions    · Wash your hands often using soap and water. If soap and water are not available, use a hand . Others in your home should wash their hands as well. Hands should be washed:  ? After using the toilet or changing a diaper.  ? Before preparing, cooking, or serving food.  ? While caring for a sick person.  ? While visiting someone in a hospital.  · Drink enough fluid to keep your pee (urine) pale yellow.  · Rest at home while you get better.  · Watch your condition for any changes.  · Take a warm bath to help  with any burning or pain from having diarrhea.  · Keep all follow-up visits as told by your doctor. This is important.    Contact a doctor if:  · You have a fever.  · Your diarrhea gets worse.  · You have new symptoms.  · You cannot keep fluids down.  · You feel light-headed or dizzy.  · You have a headache.  · You have muscle cramps.  Get help right away if:  · You have chest pain.  · You feel very weak or you pass out (faint).  · You have bloody or black poop or poop that looks like tar.  · You have very bad pain, cramping, or bloating in your belly (abdomen).  · You have trouble breathing or you are breathing very quickly.  · Your heart is beating very quickly.  · Your skin feels cold and clammy.  · You feel confused.  · You have signs of losing too much water in your body, such as:  ? Dark pee, very little pee, or no pee.  ? Cracked lips.  ? Dry mouth.  ? Sunken eyes.  ? Sleepiness.  ? Weakness.  Summary  · Diarrhea is when you pass loose and watery poop (stool) often.  · Diarrhea can make you feel weak and cause you to lose water in your body (get dehydrated).  · Take an ORS (oral rehydration solution). This is a drink that is sold at pharmacies and stores.  · Eat bland, easy-to-digest foods in small amounts as you are able.  · Contact a doctor if your condition gets worse. Get help right away if you have signs that you have lost too much water in your body.  This information is not intended to replace advice given to you by your health care provider. Make sure you discuss any questions you have with your health care provider.  Document Revised: 05/24/2019 Document Reviewed: 05/24/2019  [employer] Patient Education © 2021 [employer]

## 2021-09-24 LAB
A VAGINAE DNA VAG QL NAA+PROBE: NORMAL SCORE
BVAB2 DNA VAG QL NAA+PROBE: NORMAL SCORE
C ALBICANS DNA VAG QL NAA+PROBE: NEGATIVE
C GLABRATA DNA VAG QL NAA+PROBE: NEGATIVE
C TRACH DNA VAG QL NAA+PROBE: NEGATIVE
MEGA1 DNA VAG QL NAA+PROBE: NORMAL SCORE
N GONORRHOEA DNA VAG QL NAA+PROBE: NEGATIVE
T VAGINALIS DNA VAG QL NAA+PROBE: NEGATIVE

## 2021-09-27 ENCOUNTER — TELEPHONE (OUTPATIENT)
Dept: INTERNAL MEDICINE | Facility: CLINIC | Age: 31
End: 2021-09-27

## 2021-09-27 NOTE — TELEPHONE ENCOUNTER
Pn pt expressed understanding     ----- Message from Winston cK MD sent at 9/27/2021  8:19 AM EDT -----  PLEASE call for normal results, hopes she feels better with Rx

## 2021-10-28 ENCOUNTER — DOCUMENTATION (OUTPATIENT)
Dept: BARIATRICS/WEIGHT MGMT | Facility: CLINIC | Age: 31
End: 2021-10-28

## 2021-10-28 ENCOUNTER — OFFICE VISIT (OUTPATIENT)
Dept: BEHAVIORAL HEALTH | Facility: CLINIC | Age: 31
End: 2021-10-28

## 2021-10-28 ENCOUNTER — OFFICE VISIT (OUTPATIENT)
Dept: BARIATRICS/WEIGHT MGMT | Facility: CLINIC | Age: 31
End: 2021-10-28

## 2021-10-28 VITALS
HEIGHT: 64 IN | WEIGHT: 241 LBS | TEMPERATURE: 97.8 F | OXYGEN SATURATION: 99 % | SYSTOLIC BLOOD PRESSURE: 116 MMHG | RESPIRATION RATE: 18 BRPM | HEART RATE: 83 BPM | DIASTOLIC BLOOD PRESSURE: 74 MMHG | BODY MASS INDEX: 41.15 KG/M2

## 2021-10-28 DIAGNOSIS — E66.01 MORBID OBESITY (HCC): Primary | ICD-10-CM

## 2021-10-28 DIAGNOSIS — E66.01 OBESITY, CLASS III, BMI 40-49.9 (MORBID OBESITY) (HCC): Primary | ICD-10-CM

## 2021-10-28 DIAGNOSIS — R12 HEARTBURN: ICD-10-CM

## 2021-10-28 DIAGNOSIS — Z71.89 ENCOUNTER FOR PSYCHOLOGICAL ASSESSMENT PRIOR TO BARIATRIC SURGERY: ICD-10-CM

## 2021-10-28 DIAGNOSIS — R53.83 FATIGUE, UNSPECIFIED TYPE: ICD-10-CM

## 2021-10-28 DIAGNOSIS — Z15.89 MTHFR GENE MUTATION: ICD-10-CM

## 2021-10-28 DIAGNOSIS — F43.21 SITUATIONAL DEPRESSION: ICD-10-CM

## 2021-10-28 PROCEDURE — 99204 OFFICE O/P NEW MOD 45 MIN: CPT | Performed by: PHYSICIAN ASSISTANT

## 2021-10-28 PROCEDURE — 90791 PSYCH DIAGNOSTIC EVALUATION: CPT | Performed by: PSYCHOLOGIST

## 2021-10-28 RX ORDER — LORATADINE 10 MG/1
10 CAPSULE, LIQUID FILLED ORAL DAILY
COMMUNITY
End: 2022-09-24

## 2021-10-28 RX ORDER — OFLOXACIN 3 MG/ML
SOLUTION/ DROPS OPHTHALMIC
COMMUNITY
Start: 2021-10-14 | End: 2021-10-28

## 2021-10-28 NOTE — PROGRESS NOTES
"Weight Loss Surgery  Presurgical Nutrition Assessment     Theresa Maya  10/28/2021  [id number]  [id number]  1990  female    Surgery desired: Sleeve Gastrectomy    Ht 161.3 cm (63.5\"); Wt 109 kg (241 #); BMI 42.02  Past Medical History:   Diagnosis Date   • Abnormal Pap smear of cervix 2019   • Anxiety    • Arthritis    • Deep vein thrombosis (HCC)     2014, (R) leg while pregnant, dx w/ MTHFR   • Depression    • Dyspepsia    • Dyspnea on exertion    • Fatigue    • Gallbladder abscess     s/p lap nicolas 2006   • Heartburn     chronic/episodic, depends on what she eats, takes Pepcid prn, denies prior eval   • Hyperemesis gravidarum    • Migraines    • MTHFR deficiency complicating pregnancy (HCC)     says clotting d/o only an issue when pregnant, advised to use heparin shots during pregnancy   • Obesity    • Ovarian cyst    • PMS (premenstrual syndrome)    • Recurrent pregnancy loss, antepartum condition or complication     had a [facility] and lost the pregnancy at 6 months   • Sinusitis    • Strep pharyngitis    • Urinary tract infection      Past Surgical History:   Procedure Laterality Date   • LAPAROSCOPIC CHOLECYSTECTOMY  2006    no stones, was told she had \"three gallbladders\"- all removed   • SINUS SURGERY Bilateral 2006   • TUBAL COAGULATION LAPAROSCOPIC Bilateral 9/15/2017    Procedure: BILATERAL TUBAL FALLOPE FILSHIE CLIPPING LAPAROSCOPIC;  Surgeon: Leo Posey III, MD;  Location: [facility];  Service:    • VAGINAL DELIVERY  14; 16; 17    female,male, male     Allergies   Allergen Reactions   • Amoxicillin Diarrhea and GI Intolerance   • Latex Hives and Itching   • Penicillins GI Intolerance     Says Keflex OK as long as she takes w/ food.        Current Outpatient Medications:   •  DULoxetine (CYMBALTA) 60 MG capsule, Take 1 capsule by mouth Daily., Disp: 90 capsule, Rfl: 1  •  Loratadine (Claritin) 10 MG capsule, Take 10 mg by mouth Daily., Disp: , Rfl:   •  Probiotic Product (PROBIOTIC-10 PO), Take " 10 mg by mouth Daily., Disp: , Rfl:       Nutrition Assessment    Estimated energy needs:  1780 kcal    Estimated calories for weight loss:  1500 kcal    IBW (Pounds):  145 #        Excess body weight (Pounds):  95 #       Nutrition Recall  24 Hour recall: (B) (L) (D) -  Reviewed and discussed with patient & her .  Breakfast @ 6 am = 1 cup whole milk /c 1 slice wheat cinnamon toast.  Lunch @ 11 am = 8 [employer] grilled chicken nuggets with 1 cup Caesar salad /c 4 tomatoes & no dressing.  Supper @ 6 pm = homemade meat loaf (1 cup) /c 2 cups mashed potatoes & 1 cup sweet tea which she had purchased @ [employer].  Snack in the evening = 1- 12 oz can Dr Pepper /c 1-3 pack container of white donuts. Pt to wean self off of all soda & sweet tea. Diet is low in protein & contains excessive amount of highly processed carbohydrate.  She will focus on ingesting adequate protein for successful weight management in 3 regular balanced meals /c fewer sugary foods and will ingest less processed carbohydrate.        Exercise  Twice weekly 1/2 mile walks and calesthentics       Education    Provided information packet re:  Sleeve Gastrectomy  1. Reviewed guidelines for higher protein, limited carbohydrate diet to promote weight loss.  Encouraged patient to incorporate these principles of healthy eating from now until approximately 2 weeks prior to bariatric surgery date, when an even lower carbohydrate “liver-shrinking” regimen will be followed. (Information sheet re pre-op diet given).  Explained that after recovery from surgery this diet will again be followed to ensure further loss and for weight maintenance.    2. Encouraged patient to choose an acceptable protein supplement powder or shake for post-surgery liquid diet.  Provided product guidelines and examples.    3. Explained importance of goal setting to help in changing eating behaviors that are not conducive to weight loss.  Targeted several on a worksheet which  also included spaces for patient to work on issues specific to them.  4. Provided follow-up options for support, including contact information for dietitians here, if desired.  Web-based support information and apps for smart phones and computers given.  Noted that monthly support group is offered at this clinic, and that support is associated with successful weight loss.    Recommend that team proceed with surgery and follow per protocol.      Nutrition Goals   Dietary Guidelines per information packet as described above  Protein goal:  grams per day   Carbohydrate goal:  100-140 grams per day  Eliminate soda, sweet tea, etc.     Exercise Goals  Continue current exercise routine   Add 15-30 minutes of activity per day as tolerated      Hailey Parra, KAMRAN  10/28/2021  10:33 EDT

## 2021-10-28 NOTE — PROGRESS NOTES
PROGRESS NOTE    Data:    Theresa Maya is a 31 y.o. female who met with the undersigned for a scheduled individual outpatient therapy session from 8:55 - 9:35am.      Clinical Maneuvering/Intervention:      Chief complaint and history of presenting illness/Problems: struggling with obesity for several years. Despite trying different weight loss plans and diets, the pt reported being unsuccessful in losing weight. A psychological evaluation was conducted in order to assess past and current level of functioning. Areas assessed included, but were not limited to: perception of social support, perception of ability to face and deal with challenges in life (positive functioning), anxiety symptoms, depressive symptoms, perspective on beliefs/belief system, coping skills for stress, intelligence level, addiction issues, etc. Therapeutic rapport was established. Interventions conducted today were geared towards assessing the pt's readiness for weight loss surgery and identifying and psychological contraindications for undergoing such a major life change. Social support was deemed strong (specific to weight loss surgery/weight loss in this manner and in a general sense): , doctor, friends, and co-workers. Current psychological struggles were described as low, but included: depression situational to being overweight. Coping skills for distress and related to undergoing a major life change such as weight loss surgery/weight loss were deemed strong and included good energy, believing in herself that she will be successful with weight loss surgery, easily finding gratitude in life, appreciating being an organized person, and good at following directions. The pt endorsed having characteristics of readiness to undergo major life changes inherent in the journey of weight loss surgery. She could speak to having 'suffered enough,' and the decision to have weight loss surgery has 'clicked' for her. She has been exercising  regularly, cutting out pop, and cutting out junk food more and more over time. The pt expressed gratitude for today's visit.     Past Family and Social History:      History of family mental health problems: none identified    Psychosocial history: treatment of psychiatric care in the past (individual counseling, once per week for 9 months; couples counseling twice per month for about 4 months), alcohol/substance abuse treatment in the past (N/A) , alcohol/substance abuse problems (N/A), inpatient psychiatric care (N/A).    Mental Status Exam (MSE):  Hygiene:  good  Dress: normal  Attitude:  cooperative and proactive  Motor Activity: normal  Speech: normal  Mood:   excited  Affect:  congruent  Thought Processes: normal  Thought Content:  normal  Suicidal Thoughts:  not endorsed  Homicidal Thoughts:  not endorsed  Crisis Safety Plan: not needed   Hallucinations:  none      Patient's Support Network Includes:  family, friends      Progress toward goal: there is evidence to suggest that she is taking measures to improve the quality of her life including seeking weight loss surgery.       Functional Status: moderate to high      Prognosis: good with weight loss surgery    Evaluation, Diagnoses, and Ability/Capacity to Respond to Treatment:      The pt presented to be struggling with depression situational to obesity. Results of MSE demonstrated a functional status of moderate to high. Strengths: belief in self that she will be successful with weight loss surgery, etc (see detailed list of coping skills above). Needed for growth (CPT code requirement for Weaknesses): weight loss.      From a psychological standpoint, the pt presents as a good candidate for bariatric surgery. She is motivated for the surgery, has showed readiness for the lifestyle change in terms of starting to adjust her eating habits, and seems to have appropriate expectations of how to prepare and how to live after surgery in order to lose weight  successfully.    Treatment Plan:      Short term goals: Start improving her health by following up with her bariatric surgeon in order to receive weight loss surgery as soon as feasible/appropriate and demonstrate success with compliance to adhering to the recommended diet. Long term goals: reach a healthy weight and remittance of depression.     Bridgette Lara, PhD, LP

## 2021-10-28 NOTE — PROGRESS NOTES
[facility] BARIATRIC SURGERY  [address]  [address]  [facility] [id number] [phone]      Patient  Name:  Theresa Maya  :  1990      Date of Visit: 10/28/2021      Chief Complaint:  weight gain; unable to maintain weight loss      History of Present Illness:  Theresa Maya is a 31 y.o. female who presents today for evaluation, education and consultation regarding metabolic and bariatric surgery with Dr. Ricardo.     Theresa has been overweight for at least 5 years, has been 35 pounds or more overweight for at least 5 years, has been 100 pounds or more overweight for 3 or more years and started dieting at age 29.  Previous diet attempts include: High Protein, Fasting and Slim Fast; Ionamin/Adipex.  The most weight Theresa lost was 15 pounds but has been unable to maintain / lose further.  Her maximum lifetime weight is 260 pounds.       Complete history has been obtained and discussed today, as pertinent to metabolic/ bariatric surgery.     Patient denies current pregnancy and does not plan to become pregnant within 18 months of surgery.       Past Medical History:   Diagnosis Date   • Abnormal Pap smear of cervix    • Anxiety    • Arthritis    • Deep vein thrombosis (HCC)     , (R) leg while pregnant, dx w/ MTHFR   • Depression    • Dyspepsia    • Dyspnea on exertion    • Fatigue    • Gallbladder abscess     s/p lap nicolas    • Heartburn     chronic/episodic, depends on what she eats, takes Pepcid prn, denies prior eval   • Hyperemesis gravidarum    • Migraines    • MTHFR deficiency complicating pregnancy (HCC)     says clotting d/o only an issue when pregnant, advised to use heparin shots during pregnancy   • Obesity    • Ovarian cyst    • PMS (premenstrual syndrome)    • Recurrent pregnancy loss, antepartum condition or complication     had a [facility] and lost the pregnancy at 6 months   • Sinusitis    • Strep pharyngitis    • Urinary tract infection   "    Past Surgical History:   Procedure Laterality Date   • LAPAROSCOPIC CHOLECYSTECTOMY  2006    no stones, was told she had \"three gallbladders\"- all removed   • SINUS SURGERY Bilateral 2006   • TUBAL COAGULATION LAPAROSCOPIC Bilateral 9/15/2017    Procedure: BILATERAL TUBAL FALLOPE FILSHIE CLIPPING LAPAROSCOPIC;  Surgeon: Leo Posey III, MD;  Location: [facility];  Service:    • VAGINAL DELIVERY  14; 16; 17    female,male, male       Allergies   Allergen Reactions   • Amoxicillin Diarrhea and GI Intolerance   • Latex Hives and Itching   • Penicillins GI Intolerance     Says Keflex OK as long as she takes w/ food.        Current Outpatient Medications:   •  DULoxetine (CYMBALTA) 60 MG capsule, Take 1 capsule by mouth Daily., Disp: 90 capsule, Rfl: 1  •  Loratadine (Claritin) 10 MG capsule, Take 10 mg by mouth Daily., Disp: , Rfl:   •  Probiotic Product (PROBIOTIC-10 PO), Take 10 mg by mouth Daily., Disp: , Rfl:     Social History     Socioeconomic History   • Marital status:    Tobacco Use   • Smoking status: Never Smoker   • Smokeless tobacco: Never Used   Substance and Sexual Activity   • Alcohol use: Not Currently     Comment: occ   • Drug use: No   • Sexual activity: Yes     Partners: Male     Birth control/protection: Surgical     Comment: -Juve, and has 3 children-homemaker     Social History     Social History Narrative    Lives in [place].   with three children.  She works full time at [employer].        Family History   Problem Relation Age of Onset   • Asthma Mother    • Thyroid disease Mother    • Heart attack Mother    • Obesity Mother    • Migraines Mother    • Hypertension Mother    • Lupus Sister    • Ovarian cancer Sister    • Asthma Sister    • Lung cancer Paternal Grandmother    • Obesity Paternal Grandmother    • Breast cancer Maternal Grandmother    • Lung cancer Maternal Grandmother    • Asthma Maternal Grandmother    • Hypertension Maternal Grandmother "    • Lupus Maternal Grandmother    • Thyroid disease Maternal Grandmother    • Diabetes Maternal Grandmother    • Stroke Maternal Grandmother    • Hypertension Maternal Grandfather    • Prostate cancer Paternal Grandfather        Review of Systems:  Constitutional:  reports fatigue, weight gain and denies fevers, chills.  HEENT:  denies headache, ear pain or loss of hearing, blurred or double vision, nasal discharge or sore throat.  Cardiovascular:   denies HTN, hx heart disease, hx MI, chest pain, palpitations.  Respiratory:   denies cough , wheezing, sleep apnea, asthma, COPD, hx PE.  Gastrointestinal:  reports heartburn and denies dysphagia, nausea, vomiting, abdominal pain, IBS, liver disease.  Genitourinary:   denies history of  frequent UTI, incontinence, hematuria, dysuria, polyuria, polydipsia, renal insufficiency.    Musculoskeletal:   denies fibromyalgia, arthritis and autoimmune disease.  Neurological:  denies numbness /tingling, dizziness, confusion, seizure.  Psychiatric:  reports hx depression, hx anxiety and denies bipolar disorder.  Endocrine:   denies PCOS, endometriosis, glucose intolerance, diabetes, thyroid disease.  Hematologic:  denies bruising, bleeding disorder, hx anemia, hx blood transfusion.  Skin:  denies rashes, hx MRSA.      COVID-19 Questionnaire:    1.  Have you previously been tested for COVID-19?    []  No  [x]  Yes  2.  Were you ever positive for COVID-19?    []  No  [x]  Yes  3.  Are you employed in a healthcare setting?    [x]  No  []  Yes  4.  Are you symptomatic for COVID-19 as defined by the CDC (fever, cough)?  If so, when did symptoms begin?    [x]  No  []  Yes, symptoms began **  5.  Have you been hospitalized for COVID-19?  If so, were you in the ICU?  [x]  No  []  Yes, but not in the ICU  []  Yes, and I was in the ICU  6.  Are you a resident in a congregate (group care setting?)    [x]  No  []  Yes  7.  Are you pregnant?  [x]  No  []  Yes      Physical Exam:  Vital  Signs:  Weight: 109 kg (241 lb)   Body mass index is 42.02 kg/m².  Temp: 97.8 °F (36.6 °C)   Heart Rate: 83   BP: 116/74     Physical Exam  Vitals reviewed.   Constitutional:       Appearance: She is well-developed.      Comments: wearing a mask   HENT:      Head: Normocephalic and atraumatic.   Eyes:      General: No scleral icterus.     Conjunctiva/sclera: Conjunctivae normal.   Neck:      Thyroid: No thyromegaly.   Cardiovascular:      Rate and Rhythm: Normal rate and regular rhythm.      Heart sounds: No murmur heard.      Pulmonary:      Effort: Pulmonary effort is normal. No respiratory distress.      Breath sounds: Normal breath sounds. No wheezing or rales.   Abdominal:      General: Bowel sounds are normal. There is no distension.      Palpations: Abdomen is soft. There is no mass.      Tenderness: There is no abdominal tenderness.      Hernia: No hernia is present.      Comments: scars: lap nicolas, periumbilical   Musculoskeletal:         General: Normal range of motion.      Cervical back: Neck supple.   Skin:     General: Skin is warm and dry.      Findings: No rash.   Neurological:      Mental Status: She is alert and oriented to person, place, and time.      Gait: Gait normal.   Psychiatric:         Judgment: Judgment normal.         Patient Active Problem List   Diagnosis   • Previous stillbirth or demise, antepartum   • Previous deep vein thrombosis (DVT) affecting pregnancy   • Morbid (severe) obesity due to excess calories (HCC)   • H/O abnormal cervical Papanicolaou smear   • Depression   • Anxiety   • MTHFR gene mutation   • Heartburn   • Fatigue   • Dyspepsia   • Dyspnea on exertion       Assessment:  31 y.o. female with medically complicated obesity pursuing sleeve gastrectomy.    Metabolic & Bariatric Surgery is deemed medically necessary given the following: Patient's Body mass index is 42.02 kg/m². indicating that she is obese (BMI >30). Obesity-related health conditions include the  following: heartburn, fatigue, anxiety, depression. Obesity is worsening. BMI is is above average; BMI management plan is completed. We discussed consulting a Bariatric surgeon.        Plan:  Further evaluation will include: CBC, CMP, Lipids, TSH, HgA1C, H.Pylori UBT and EGD.    No additional clearances needed prior to surgery at this time.     Patient understands that bariatric surgery is not cosmetic surgery but rather a tool to help make a lifelong commitment to lifestyle changes including diet, exercise and behavior modifications.  The patient has been educated today on those expected postoperative lifestyle changes.  Psychological and Nutritional consultations will be completed prior to surgery.  Instructions on how to access [employer] (an internet based site w/ educational surgical videos) were given to the patient.  Recommended perioperative vitamin supplementation was reviewed.  The importance of avoiding ASA/ NSAIDS/ steroids/ tobacco/nicotine/ hormones/ immunomodulators perioperatively was discussed in detail.  All questions/concerns have been addressed.      Further input to follow pending the above.           JONLE Sumner

## 2021-10-29 LAB
ALBUMIN SERPL-MCNC: 4.6 G/DL (ref 3.5–5.2)
ALBUMIN/GLOB SERPL: 1.8 G/DL
ALP SERPL-CCNC: 72 U/L (ref 39–117)
ALT SERPL-CCNC: 12 U/L (ref 1–33)
AST SERPL-CCNC: 12 U/L (ref 1–32)
BASOPHILS # BLD AUTO: 0.04 10*3/MM3 (ref 0–0.2)
BASOPHILS NFR BLD AUTO: 0.8 % (ref 0–1.5)
BILIRUB SERPL-MCNC: 0.2 MG/DL (ref 0–1.2)
BUN SERPL-MCNC: 8 MG/DL (ref 6–20)
BUN/CREAT SERPL: 11 (ref 7–25)
CALCIUM SERPL-MCNC: 9.5 MG/DL (ref 8.6–10.5)
CHLORIDE SERPL-SCNC: 105 MMOL/L (ref 98–107)
CHOLEST SERPL-MCNC: 198 MG/DL (ref 0–200)
CO2 SERPL-SCNC: 27.3 MMOL/L (ref 22–29)
CREAT SERPL-MCNC: 0.73 MG/DL (ref 0.57–1)
EOSINOPHIL # BLD AUTO: 0.08 10*3/MM3 (ref 0–0.4)
EOSINOPHIL NFR BLD AUTO: 1.5 % (ref 0.3–6.2)
ERYTHROCYTE [DISTWIDTH] IN BLOOD BY AUTOMATED COUNT: 13.3 % (ref 12.3–15.4)
GLOBULIN SER CALC-MCNC: 2.5 GM/DL
GLUCOSE SERPL-MCNC: 87 MG/DL (ref 65–99)
HBA1C MFR BLD: 5.7 % (ref 4.8–5.6)
HCT VFR BLD AUTO: 40 % (ref 34–46.6)
HDLC SERPL-MCNC: 43 MG/DL (ref 40–60)
HGB BLD-MCNC: 13.2 G/DL (ref 12–15.9)
IMM GRANULOCYTES # BLD AUTO: 0.02 10*3/MM3 (ref 0–0.05)
IMM GRANULOCYTES NFR BLD AUTO: 0.4 % (ref 0–0.5)
LDLC SERPL CALC-MCNC: 122 MG/DL (ref 0–100)
LYMPHOCYTES # BLD AUTO: 1.53 10*3/MM3 (ref 0.7–3.1)
LYMPHOCYTES NFR BLD AUTO: 29.5 % (ref 19.6–45.3)
MCH RBC QN AUTO: 28.2 PG (ref 26.6–33)
MCHC RBC AUTO-ENTMCNC: 33 G/DL (ref 31.5–35.7)
MCV RBC AUTO: 85.5 FL (ref 79–97)
MONOCYTES # BLD AUTO: 0.36 10*3/MM3 (ref 0.1–0.9)
MONOCYTES NFR BLD AUTO: 6.9 % (ref 5–12)
NEUTROPHILS # BLD AUTO: 3.15 10*3/MM3 (ref 1.7–7)
NEUTROPHILS NFR BLD AUTO: 60.9 % (ref 42.7–76)
NRBC BLD AUTO-RTO: 0 /100 WBC (ref 0–0.2)
PLATELET # BLD AUTO: 278 10*3/MM3 (ref 140–450)
POTASSIUM SERPL-SCNC: 4.1 MMOL/L (ref 3.5–5.2)
PROT SERPL-MCNC: 7.1 G/DL (ref 6–8.5)
RBC # BLD AUTO: 4.68 10*6/MM3 (ref 3.77–5.28)
SODIUM SERPL-SCNC: 140 MMOL/L (ref 136–145)
TRIGL SERPL-MCNC: 188 MG/DL (ref 0–150)
TSH SERPL DL<=0.005 MIU/L-ACNC: 2 UIU/ML (ref 0.27–4.2)
UREA BREATH TEST QL: NEGATIVE
VLDLC SERPL CALC-MCNC: 33 MG/DL (ref 5–40)
WBC # BLD AUTO: 5.18 10*3/MM3 (ref 3.4–10.8)

## 2021-11-04 ENCOUNTER — OFFICE VISIT (OUTPATIENT)
Dept: INTERNAL MEDICINE | Facility: CLINIC | Age: 31
End: 2021-11-04

## 2021-11-04 VITALS
HEART RATE: 87 BPM | WEIGHT: 243 LBS | BODY MASS INDEX: 41.48 KG/M2 | OXYGEN SATURATION: 99 % | RESPIRATION RATE: 16 BRPM | SYSTOLIC BLOOD PRESSURE: 122 MMHG | HEIGHT: 64 IN | DIASTOLIC BLOOD PRESSURE: 78 MMHG

## 2021-11-04 DIAGNOSIS — E78.2 MIXED HYPERLIPIDEMIA: ICD-10-CM

## 2021-11-04 DIAGNOSIS — E66.01 CLASS 3 SEVERE OBESITY DUE TO EXCESS CALORIES WITH SERIOUS COMORBIDITY AND BODY MASS INDEX (BMI) OF 40.0 TO 44.9 IN ADULT (HCC): Primary | ICD-10-CM

## 2021-11-04 DIAGNOSIS — R73.09 ELEVATED HEMOGLOBIN A1C: ICD-10-CM

## 2021-11-04 PROCEDURE — 99213 OFFICE O/P EST LOW 20 MIN: CPT | Performed by: FAMILY MEDICINE

## 2021-11-04 NOTE — PATIENT INSTRUCTIONS

## 2021-11-04 NOTE — PROGRESS NOTES
Subjective     Theresa Maya is a 31 y.o. female.     Chief Complaint   Patient presents with   • Follow-up     For bariatric surgery, review lab results        History of Present Illness     Obesity; she is following with wt loss program  She has been doing Work up 4 x /week , going to the gyn 2 x /week  Eats more healthy diet, she stopped eating carb and sugar   she lost many Ibs since last OV  She feels better   Labs reviewed and discussed with pt.  Her A1c and LDL slightly elevated    Lab Results   Component Value Date    CHOL 164 09/23/2020    CHLPL 198 10/28/2021    TRIG 188 (H) 10/28/2021    HDL 43 10/28/2021     (H) 10/28/2021       Lab Results   Component Value Date    HGBA1C 5.70 (H) 10/28/2021     Lab Results   Component Value Date    GLUCOSE 87 10/28/2021    BUN 8 10/28/2021    CREATININE 0.73 10/28/2021    EGFRIFNONA 93 10/28/2021    EGFRIFAFRI 113 10/28/2021    BCR 11.0 10/28/2021    K 4.1 10/28/2021    CO2 27.3 10/28/2021    CALCIUM 9.5 10/28/2021    PROTENTOTREF 7.1 10/28/2021    ALBUMIN 4.60 10/28/2021    LABIL2 1.8 10/28/2021    AST 12 10/28/2021    ALT 12 10/28/2021     Lab Results   Component Value Date    WBC 5.18 10/28/2021    HGB 13.2 10/28/2021    HCT 40.0 10/28/2021    MCV 85.5 10/28/2021     10/28/2021         The following portions of the patient's history were reviewed and updated as appropriate: allergies, current medications, past family history, past medical history, past social history, past surgical history and problem list.        Review of Systems   Cardiovascular: Negative for chest pain, palpitations and leg swelling.       Vitals:    11/04/21 1550   BP: 122/78   Pulse: 87   Resp: 16   SpO2: 99%           11/04/21  1550   Weight: 110 kg (243 lb)         Body mass index is 42.37 kg/m².      Current Outpatient Medications   Medication Sig Dispense Refill   • DULoxetine (CYMBALTA) 60 MG capsule Take 1 capsule by mouth Daily. 90 capsule 1   • Loratadine (Claritin)  10 MG capsule Take 10 mg by mouth Daily.     • Probiotic Product (PROBIOTIC-10 PO) Take 10 mg by mouth Daily.       No current facility-administered medications for this visit.                Objective   Physical Exam  Vitals and nursing note reviewed.   Constitutional:       General: She is not in acute distress.     Appearance: She is obese. She is not ill-appearing, toxic-appearing or diaphoretic.   HENT:      Mouth/Throat:      Mouth: Mucous membranes are moist.   Cardiovascular:      Rate and Rhythm: Normal rate and regular rhythm.      Heart sounds: Normal heart sounds. No murmur heard.      Pulmonary:      Effort: Pulmonary effort is normal. No respiratory distress.      Breath sounds: Normal breath sounds. No stridor. No wheezing or rhonchi.   Skin:     General: Skin is warm.   Neurological:      Mental Status: She is alert and oriented to person, place, and time.   Psychiatric:         Mood and Affect: Mood normal.         Behavior: Behavior normal.         Thought Content: Thought content normal.           Assessment/Plan   Diagnoses and all orders for this visit:    1. Class 3 severe obesity due to excess calories with serious comorbidity and body mass index (BMI) of 40.0 to 44.9 in adult (HCC) (Primary);  - Doing well with exercise and eating HD , continue lifestyle modification , wt loss, eating healthy , daily exercise     2. HLD;  - As above, will continue with lifestyle modification and wt loss, no need for Rx at this time     3. ELEVATED A1C;  - As above, will continue with lifestyle modification and wt loss, no need for Rx at this time     I have fully discussed the nature of the medical condition(s) risks, complications, management, safe and proper use of medications.   I have discussed the SIDE EFFECT OF MEDICATION and importance TO report any side effect , the patient expressed good understanding.  Encouraged medication compliance and the importance of keeping scheduled follow up appointments  with me and any other providers.    Patient instructed to follow up with our office for results on any labs/imaging ordered during this visit.    Home care discussed  All questions answered  Patient verbalizes understanding and agrees to treatment plan.     Follow up: Return in about 4 weeks (around 12/2/2021) for follow up on current illness.

## 2021-11-12 ENCOUNTER — LAB (OUTPATIENT)
Dept: PREADMISSION TESTING | Facility: HOSPITAL | Age: 31
End: 2021-11-12

## 2021-11-12 DIAGNOSIS — R12 HEARTBURN: ICD-10-CM

## 2021-11-12 LAB — SARS-COV-2 RNA PNL SPEC NAA+PROBE: NOT DETECTED

## 2021-11-12 PROCEDURE — U0004 COV-19 TEST NON-CDC HGH THRU: HCPCS | Performed by: SURGERY

## 2021-11-15 ENCOUNTER — LAB REQUISITION (OUTPATIENT)
Dept: LAB | Facility: HOSPITAL | Age: 31
End: 2021-11-15

## 2021-11-15 ENCOUNTER — OUTSIDE FACILITY SERVICE (OUTPATIENT)
Dept: BARIATRICS/WEIGHT MGMT | Facility: CLINIC | Age: 31
End: 2021-11-15

## 2021-11-15 DIAGNOSIS — R12 HEARTBURN: ICD-10-CM

## 2021-11-15 PROCEDURE — 43239 EGD BIOPSY SINGLE/MULTIPLE: CPT | Performed by: SURGERY

## 2021-11-15 PROCEDURE — 88305 TISSUE EXAM BY PATHOLOGIST: CPT | Performed by: SURGERY

## 2021-11-15 PROCEDURE — 88342 IMHCHEM/IMCYTCHM 1ST ANTB: CPT | Performed by: SURGERY

## 2021-11-17 LAB
CYTO UR: NORMAL
LAB AP CASE REPORT: NORMAL
LAB AP CLINICAL INFORMATION: NORMAL
PATH REPORT.FINAL DX SPEC: NORMAL
PATH REPORT.GROSS SPEC: NORMAL

## 2021-12-02 ENCOUNTER — OFFICE VISIT (OUTPATIENT)
Dept: INTERNAL MEDICINE | Facility: CLINIC | Age: 31
End: 2021-12-02

## 2021-12-02 VITALS
HEART RATE: 61 BPM | HEIGHT: 63 IN | TEMPERATURE: 96.9 F | WEIGHT: 240.2 LBS | OXYGEN SATURATION: 99 % | BODY MASS INDEX: 42.56 KG/M2 | DIASTOLIC BLOOD PRESSURE: 80 MMHG | SYSTOLIC BLOOD PRESSURE: 116 MMHG

## 2021-12-02 DIAGNOSIS — E66.01 CLASS 3 SEVERE OBESITY DUE TO EXCESS CALORIES WITH SERIOUS COMORBIDITY AND BODY MASS INDEX (BMI) OF 40.0 TO 44.9 IN ADULT (HCC): Primary | ICD-10-CM

## 2021-12-02 PROCEDURE — 99213 OFFICE O/P EST LOW 20 MIN: CPT | Performed by: FAMILY MEDICINE

## 2021-12-02 RX ORDER — MONTELUKAST SODIUM 10 MG/1
10 TABLET ORAL NIGHTLY
COMMUNITY
End: 2022-03-18

## 2021-12-02 NOTE — PROGRESS NOTES
Subjective     Theresa Maya is a 31 y.o. female.     Chief Complaint   Patient presents with   • Obesity   • Med Refill     singular, loratadine        History of Present Illness      Obesity; she is following with wt loss program  Today her 3rd visit to fill out her monthly forms   Eats more healthy diet, doing daily exercise with her family. Her family very supportive. She lost 2 Ibs in 1 month. She is very motivated.    The following portions of the patient's history were reviewed and updated as appropriate: allergies, current medications, past family history, past medical history, past social history, past surgical history and problem list.        Review of Systems   Respiratory: Negative for cough, shortness of breath, wheezing and stridor.    Cardiovascular: Negative for chest pain, palpitations and leg swelling.       Vitals:    12/02/21 0942   BP: 116/80   Pulse: 61   Temp: 96.9 °F (36.1 °C)   SpO2: 99%           12/02/21  0942   Weight: 109 kg (240 lb 3.2 oz)         Body mass index is 42.56 kg/m².      Current Outpatient Medications   Medication Sig Dispense Refill   • DULoxetine (CYMBALTA) 60 MG capsule Take 1 capsule by mouth Daily. 90 capsule 1   • Loratadine (Claritin) 10 MG capsule Take 10 mg by mouth Daily.     • montelukast (Singulair) 10 MG tablet Take 10 mg by mouth Every Night.     • Probiotic Product (PROBIOTIC-10 PO) Take 10 mg by mouth Daily.       No current facility-administered medications for this visit.                Objective   Physical Exam  Vitals and nursing note reviewed.   Constitutional:       Appearance: She is obese. She is not ill-appearing or diaphoretic.   HENT:      Mouth/Throat:      Mouth: Mucous membranes are moist.      Pharynx: Oropharynx is clear.   Neck:      Comments: No enlarged thyroid    Cardiovascular:      Rate and Rhythm: Normal rate and regular rhythm.      Heart sounds: Normal heart sounds. No murmur heard.      Pulmonary:      Effort: Pulmonary effort  is normal. No respiratory distress.      Breath sounds: Normal breath sounds. No stridor. No wheezing or rhonchi.   Musculoskeletal:         General: Normal range of motion.   Neurological:      Mental Status: She is alert and oriented to person, place, and time.   Psychiatric:         Mood and Affect: Mood normal.         Behavior: Behavior normal.         Thought Content: Thought content normal.           Assessment/Plan   Diagnoses and all orders for this visit:    1. Class 3 severe obesity due to excess calories with serious comorbidity and body mass index (BMI) of 40.0 to 44.9 in adult (HCC) (Primary)  - Continue lifestyle modification , wt loss, eating healthy , daily exercise     I have fully discussed the nature of the medical condition(s) risks, complications, management, safe and proper use of medications.   I have discussed the SIDE EFFECT OF MEDICATION and importance TO report any side effect , the patient expressed good understanding.  Encouraged medication compliance and the importance of keeping scheduled follow up appointments with me and any other providers.    Patient instructed to follow up with our office for results on any labs/imaging ordered during this visit.    Home care discussed  All questions answered  Patient verbalizes understanding and agrees to treatment plan.     Follow up: Return in about 1 month (around 1/3/2022) for follow up on current illness.

## 2022-01-08 PROCEDURE — U0004 COV-19 TEST NON-CDC HGH THRU: HCPCS | Performed by: NURSE PRACTITIONER

## 2022-01-31 ENCOUNTER — OFFICE VISIT (OUTPATIENT)
Dept: INTERNAL MEDICINE | Facility: CLINIC | Age: 32
End: 2022-01-31

## 2022-01-31 VITALS
HEART RATE: 93 BPM | SYSTOLIC BLOOD PRESSURE: 100 MMHG | WEIGHT: 240 LBS | TEMPERATURE: 96.9 F | HEIGHT: 63 IN | OXYGEN SATURATION: 98 % | BODY MASS INDEX: 42.52 KG/M2 | DIASTOLIC BLOOD PRESSURE: 76 MMHG

## 2022-01-31 DIAGNOSIS — E66.01 CLASS 3 SEVERE OBESITY DUE TO EXCESS CALORIES WITH SERIOUS COMORBIDITY AND BODY MASS INDEX (BMI) OF 40.0 TO 44.9 IN ADULT: Primary | ICD-10-CM

## 2022-01-31 PROCEDURE — 99213 OFFICE O/P EST LOW 20 MIN: CPT | Performed by: FAMILY MEDICINE

## 2022-01-31 NOTE — PROGRESS NOTES
Subjective     Theresa Maya is a 31 y.o. female.     Chief Complaint   Patient presents with   • Weight Loss     f/u        History of Present Illness       Obesity; she is following with wt loss program.  She is eating HD , doing daily exercise with her family. Her family very supportive.   Lately she is not losing wt since last OV.  She is very motivated.       The following portions of the patient's history were reviewed and updated as appropriate: allergies, current medications, past family history, past medical history, past social history, past surgical history and problem list.        Review of Systems   Cardiovascular: Negative for chest pain, palpitations and leg swelling.       Vitals:    01/31/22 1528   BP: 100/76   Pulse: 93   Temp: 96.9 °F (36.1 °C)   SpO2: 98%           01/31/22  1528   Weight: 109 kg (240 lb)         Body mass index is 42.52 kg/m².      Current Outpatient Medications   Medication Sig Dispense Refill   • DULoxetine (CYMBALTA) 60 MG capsule Take 1 capsule by mouth Daily. 90 capsule 1   • Loratadine (Claritin) 10 MG capsule Take 10 mg by mouth Daily.     • montelukast (Singulair) 10 MG tablet Take 10 mg by mouth Every Night.     • Probiotic Product (PROBIOTIC-10 PO) Take 10 mg by mouth Daily.     • azithromycin (Zithromax Z-Andres) 250 MG tablet Take 2 tablets the first day, then 1 tablet daily for 4 days. 6 tablet 0     No current facility-administered medications for this visit.                Objective   Physical Exam  Vitals and nursing note reviewed.   Constitutional:       General: She is not in acute distress.     Appearance: She is obese. She is not ill-appearing, toxic-appearing or diaphoretic.   HENT:      Mouth/Throat:      Mouth: Mucous membranes are moist.   Eyes:      Conjunctiva/sclera: Conjunctivae normal.   Neck:      Comments: No enlarged thyroid    Cardiovascular:      Rate and Rhythm: Normal rate and regular rhythm.      Heart sounds: Normal heart sounds. No murmur  heard.      Pulmonary:      Effort: Pulmonary effort is normal. No respiratory distress.      Breath sounds: Normal breath sounds. No stridor. No wheezing or rhonchi.   Musculoskeletal:      Cervical back: Neck supple.   Neurological:      Mental Status: She is alert and oriented to person, place, and time.   Psychiatric:         Mood and Affect: Mood normal.         Behavior: Behavior normal.         Thought Content: Thought content normal.           Assessment/Plan   Diagnoses and all orders for this visit:    1. Class 3 severe obesity due to excess calories with serious comorbidity and body mass index (BMI) of 40.0 to 44.9 in adult (HCC) (Primary)  - Continue about lifestyle modification , wt loss, eating healthy , daily exercise       I was wearing N95 mask and eye protection during the entire duration of the visit.   Patient was masked the whole entire time.   Minimum social distance of 6 ft maintained through entire visit except for physical exam as documented.          I have fully discussed the nature of the medical condition(s) risks, complications, management, safe and proper use of medications.   I have discussed the SIDE EFFECT OF MEDICATION and importance TO report any side effect , the patient expressed good understanding.  Encouraged medication compliance and the importance of keeping scheduled follow up appointments with me and any other providers.    Patient instructed to follow up with our office for results on any labs/imaging ordered during this visit.    Home care discussed  All questions answered  Patient verbalizes understanding and agrees to treatment plan.     Follow up: Return for if you have any concern or illness.

## 2022-02-17 ENCOUNTER — OFFICE VISIT (OUTPATIENT)
Dept: INTERNAL MEDICINE | Facility: CLINIC | Age: 32
End: 2022-02-17

## 2022-02-17 VITALS
TEMPERATURE: 97.1 F | BODY MASS INDEX: 42.7 KG/M2 | DIASTOLIC BLOOD PRESSURE: 70 MMHG | HEIGHT: 63 IN | HEART RATE: 71 BPM | OXYGEN SATURATION: 97 % | WEIGHT: 241 LBS | SYSTOLIC BLOOD PRESSURE: 122 MMHG

## 2022-02-17 DIAGNOSIS — E66.01 CLASS 3 SEVERE OBESITY DUE TO EXCESS CALORIES WITH SERIOUS COMORBIDITY AND BODY MASS INDEX (BMI) OF 40.0 TO 44.9 IN ADULT: ICD-10-CM

## 2022-02-17 DIAGNOSIS — E78.2 MIXED HYPERLIPIDEMIA: Primary | ICD-10-CM

## 2022-02-17 PROCEDURE — 99213 OFFICE O/P EST LOW 20 MIN: CPT | Performed by: FAMILY MEDICINE

## 2022-02-18 ENCOUNTER — LAB (OUTPATIENT)
Dept: LAB | Facility: HOSPITAL | Age: 32
End: 2022-02-18

## 2022-02-18 DIAGNOSIS — E78.2 MIXED HYPERLIPIDEMIA: ICD-10-CM

## 2022-02-18 LAB
CHOLEST SERPL-MCNC: 184 MG/DL (ref 0–200)
HDLC SERPL-MCNC: 45 MG/DL (ref 40–60)
LDLC SERPL CALC-MCNC: 108 MG/DL (ref 0–100)
LDLC/HDLC SERPL: 2.3 {RATIO}
TRIGL SERPL-MCNC: 177 MG/DL (ref 0–150)
VLDLC SERPL-MCNC: 31 MG/DL (ref 5–40)

## 2022-02-18 PROCEDURE — 80061 LIPID PANEL: CPT | Performed by: FAMILY MEDICINE

## 2022-02-21 ENCOUNTER — TELEPHONE (OUTPATIENT)
Dept: INTERNAL MEDICINE | Facility: CLINIC | Age: 32
End: 2022-02-21

## 2022-02-21 NOTE — TELEPHONE ENCOUNTER
----- Message from Winston Kc MD sent at 2/19/2022  3:03 PM EST -----  PLEASE call for lab results  Her LDL improved from 122 to 108 , no need for medication , needs only diet and exercise.   Her Triglyceride also improved from 188 to 177 , continue with diet and exercise   Letter sent to her Bariatric dr

## 2022-03-09 DIAGNOSIS — R06.00 DYSPNEA, UNSPECIFIED TYPE: Primary | ICD-10-CM

## 2022-03-09 DIAGNOSIS — R53.83 FATIGUE, UNSPECIFIED TYPE: ICD-10-CM

## 2022-03-18 PROCEDURE — 87086 URINE CULTURE/COLONY COUNT: CPT | Performed by: NURSE PRACTITIONER

## 2022-04-06 ENCOUNTER — CLINICAL SUPPORT (OUTPATIENT)
Dept: OTHER | Facility: HOSPITAL | Age: 32
End: 2022-04-06

## 2022-04-06 DIAGNOSIS — Z13.79 GENETIC TESTING: Primary | ICD-10-CM

## 2022-04-06 DIAGNOSIS — Z80.3 FAMILY HISTORY OF BREAST CANCER: ICD-10-CM

## 2022-04-06 DIAGNOSIS — Z80.41 FAMILY HISTORY OF OVARIAN CANCER: ICD-10-CM

## 2022-04-06 NOTE — PROGRESS NOTES
Theresa Maya, a 31-year-old female, was referred for genetic counseling due to a family history of ovarian and breast cancer.  Genetic counseling was provided via telephone. Ms. Maya has no personal history of cancer. She was 11 years old at menarche and had her first child at age 23. Ms. Maya is premenopausal and retains her uterus and ovaries. She has annual clinical breast exams that have always been normal. Ms. Maya has not had a colonoscopy but had an EGD in December 2021. She reported this was due to weight loss concerns, and everything looked normal. Ms. Maya was interested in discussing her risk for a hereditary cancer syndrome. She opted to pursue testing via the CancerNext panel through [facility]. This panel evaluates 36 genes associated with an increased risk for cancer. Maryam will send her a saliva collection kit. Results are expected in 2-3 weeks after they have received the sample.     PERTINENT FAMILY HISTORY (see attached pedigree):    Mother:   Ovarian cancer, 22  Sister:   Ovarian cancer, 29  Mat aunt:  Fallopian tube cancer, 27  Mat Grandmother: Breast cancer, 46     Lung cancer, 46  Father:   Prostate cancer, 57  Pat Grandfather: Prostate cancer, 72      We do not have medical records confirming the diagnoses in Ms. Maya’s family.    RISK ASSESSMENT:  Ms. Maya’s family history of various cancers led to concern regarding a hereditary cancer syndrome. NCCN criteria for genetic testing for BRCA1/2 states that someone with a close relative (first-, second-, or third-degree) with ovarian cancer at any age may consider genetic testing. She meets this criterion based on her mother’s and sister’s diagnoses. We discussed the option of a multi-gene panel. Ms. Maya opted to pursue testing via the CancerNext panel through [facility]. These risk assessments are based on the family history information provided at the time of the appointment.  The assessments could change in the  future should new information be obtained.    GENETIC COUNSELING (30 minutes):  We reviewed the family history information in detail. Cases of cancer follow three general patterns: sporadic, familial, and hereditary.  While most cancer is sporadic, some cases appear to occur in family clusters.  These cases are said to be familial and account for 10-20% of cancer cases.  Familial cases may be due to a combination of shared genes and environmental factors among family members.  In even fewer families, the risk for cancer is inherited, and the genes responsible for the increased cancer risk are known.      Family histories typical of hereditary cancer syndromes usually include multiple first- and second-degree relatives diagnosed with cancer types that define a syndrome.  These cases tend to be diagnosed at younger-than-expected ages and can be bilateral or multifocal.  The cancer in these families follows an autosomal dominant inheritance pattern, which indicates the likely presence of a mutation in a cancer susceptibility gene.  Children and siblings of an individual believed to carry this mutation have a 50% chance of inheriting that mutation, thereby inheriting the increased risk to develop cancer.  These mutations can be passed down from the maternal or the paternal lineage.    Based on Ms. Maya’s family history of ovarian cancer, we discussed that hereditary ovarian cancer accounts for about 10% of these cancers.  A significant proportion of these hereditary cancers can be attributed to mutations in the BRCA1 and BRCA2 genes.  Mutations in these genes confer an increased risk for breast cancer, ovarian cancer, male breast cancer, prostate cancer, and pancreatic cancer.      There are also other genes associated with breast cancer, ovarian cancer, and other cancers.  We discussed that risks vary based on gene, and that screening recommendations also vary.  To obtain as much information as possible for herself  and her family, Ms. Maya opted to pursue testing via a multi-gene panel.    GENETIC TESTING:  The risks, benefits and limitations of genetic testing and implications for clinical management following testing were reviewed. DNA test results can influence decisions regarding screening and prevention.  Genetic testing can have significant psychological implications for both individuals and families. Also discussed was the possibility of employment and insurance discrimination based on genetic test results and the federal and states laws that are in place to prevent this.         We discussed panel testing, which would involve multiple genes associated with increased cancer risk. The benefits and limitations of genetic testing were discussed.  The implications of a positive or negative test result were discussed. We discussed the utility of testing an affected relative and how a negative result doesn’t mean the cancers in her family weren’t due to a genetic mutation she did not inherit. We discussed the possibility that, in some cases, genetic test results may be ambiguous due to the identification of a genetic variant. These variants may or may not be associated with an increased cancer risk. With multigene panel testing, it is not uncommon for a variant of uncertain significance (VUS) to be identified.  If a VUS is identified, testing family members is not recommended and screening recommendations are made based on the family history.  The laboratories that perform genetic testing work to reclassify the VUS and send out an amended report if and when a VUS is reclassified.  The majority of variant findings are ultimately reclassified to a negative result. Given her family history, a negative test result does not eliminate all cancer risk, although the risk would not be as high as it would with positive genetic testing.     PLAN:  Genetic testing was ordered through [facility]. They will send her a saliva  collection kit. Results are expected in 2-3 weeks once Mary[facility] receives the sample and will be called out the patient at that time. If she has any questions or concerns in the meantime, she is welcome to give me a call at [phone].      Natalie Robbins MS, [facility] [place], [facility]  Licensed Certified Genetic Counselor

## 2022-04-18 ENCOUNTER — HOSPITAL ENCOUNTER (OUTPATIENT)
Dept: GENERAL RADIOLOGY | Facility: HOSPITAL | Age: 32
Discharge: HOME | End: 2022-04-18

## 2022-04-18 ENCOUNTER — LAB (OUTPATIENT)
Dept: LAB | Facility: HOSPITAL | Age: 32
End: 2022-04-18

## 2022-04-18 DIAGNOSIS — R53.83 FATIGUE, UNSPECIFIED TYPE: ICD-10-CM

## 2022-04-18 DIAGNOSIS — R06.00 DYSPNEA, UNSPECIFIED TYPE: ICD-10-CM

## 2022-04-18 LAB
DEPRECATED RDW RBC AUTO: 40.3 FL (ref 37–54)
ERYTHROCYTE [DISTWIDTH] IN BLOOD BY AUTOMATED COUNT: 13.4 % (ref 12.3–15.4)
HCT VFR BLD AUTO: 37.9 % (ref 34–46.6)
HGB BLD-MCNC: 12.2 G/DL (ref 12–15.9)
MCH RBC QN AUTO: 26.8 PG (ref 26.6–33)
MCHC RBC AUTO-ENTMCNC: 32.2 G/DL (ref 31.5–35.7)
MCV RBC AUTO: 83.1 FL (ref 79–97)
PLATELET # BLD AUTO: 295 10*3/MM3 (ref 140–450)
PMV BLD AUTO: 11.2 FL (ref 6–12)
QT INTERVAL: 384 MS
QTC INTERVAL: 423 MS
RBC # BLD AUTO: 4.56 10*6/MM3 (ref 3.77–5.28)
WBC NRBC COR # BLD: 5.45 10*3/MM3 (ref 3.4–10.8)

## 2022-04-18 PROCEDURE — 93005 ELECTROCARDIOGRAM TRACING: CPT

## 2022-04-18 PROCEDURE — 80053 COMPREHEN METABOLIC PANEL: CPT

## 2022-04-18 PROCEDURE — 85027 COMPLETE CBC AUTOMATED: CPT

## 2022-04-18 PROCEDURE — 93010 ELECTROCARDIOGRAM REPORT: CPT | Performed by: INTERNAL MEDICINE

## 2022-04-18 PROCEDURE — 36415 COLL VENOUS BLD VENIPUNCTURE: CPT

## 2022-04-18 PROCEDURE — 71046 X-RAY EXAM CHEST 2 VIEWS: CPT

## 2022-04-19 ENCOUNTER — CONSULT (OUTPATIENT)
Dept: BARIATRICS/WEIGHT MGMT | Facility: CLINIC | Age: 32
End: 2022-04-19

## 2022-04-19 VITALS
TEMPERATURE: 98.5 F | SYSTOLIC BLOOD PRESSURE: 128 MMHG | OXYGEN SATURATION: 98 % | WEIGHT: 242.5 LBS | BODY MASS INDEX: 42.97 KG/M2 | HEIGHT: 63 IN | DIASTOLIC BLOOD PRESSURE: 80 MMHG | HEART RATE: 103 BPM

## 2022-04-19 DIAGNOSIS — K44.9 HIATAL HERNIA: ICD-10-CM

## 2022-04-19 DIAGNOSIS — E66.01 MORBID OBESITY WITH BODY MASS INDEX OF 40.0-44.9 IN ADULT: Primary | ICD-10-CM

## 2022-04-19 LAB
ALBUMIN SERPL-MCNC: 4.6 G/DL (ref 3.5–5.2)
ALBUMIN/GLOB SERPL: 1.6 G/DL
ALP SERPL-CCNC: 62 U/L (ref 39–117)
ALT SERPL W P-5'-P-CCNC: 18 U/L (ref 1–33)
ANION GAP SERPL CALCULATED.3IONS-SCNC: 12 MMOL/L (ref 5–15)
AST SERPL-CCNC: 18 U/L (ref 1–32)
BILIRUB SERPL-MCNC: <0.2 MG/DL (ref 0–1.2)
BUN SERPL-MCNC: 7 MG/DL (ref 6–20)
BUN/CREAT SERPL: 8.1 (ref 7–25)
CALCIUM SPEC-SCNC: 9.2 MG/DL (ref 8.6–10.5)
CHLORIDE SERPL-SCNC: 105 MMOL/L (ref 98–107)
CO2 SERPL-SCNC: 25 MMOL/L (ref 22–29)
CREAT SERPL-MCNC: 0.86 MG/DL (ref 0.57–1)
EGFRCR SERPLBLD CKD-EPI 2021: 92.8 ML/MIN/1.73
GLOBULIN UR ELPH-MCNC: 2.8 GM/DL
GLUCOSE SERPL-MCNC: 83 MG/DL (ref 65–99)
POTASSIUM SERPL-SCNC: 3.7 MMOL/L (ref 3.5–5.2)
PROT SERPL-MCNC: 7.4 G/DL (ref 6–8.5)
SODIUM SERPL-SCNC: 142 MMOL/L (ref 136–145)

## 2022-04-19 PROCEDURE — 99214 OFFICE O/P EST MOD 30 MIN: CPT | Performed by: SURGERY

## 2022-04-19 RX ORDER — PANTOPRAZOLE SODIUM 40 MG/10ML
40 INJECTION, POWDER, LYOPHILIZED, FOR SOLUTION INTRAVENOUS ONCE
Status: CANCELLED | OUTPATIENT
Start: 2022-04-19 | End: 2022-04-19

## 2022-04-19 RX ORDER — SODIUM CHLORIDE, SODIUM LACTATE, POTASSIUM CHLORIDE, CALCIUM CHLORIDE 600; 310; 30; 20 MG/100ML; MG/100ML; MG/100ML; MG/100ML
150 INJECTION, SOLUTION INTRAVENOUS CONTINUOUS
Status: CANCELLED | OUTPATIENT
Start: 2022-04-19

## 2022-04-19 RX ORDER — SODIUM CHLORIDE 0.9 % (FLUSH) 0.9 %
3-10 SYRINGE (ML) INJECTION AS NEEDED
Status: CANCELLED | OUTPATIENT
Start: 2022-04-19

## 2022-04-19 RX ORDER — SODIUM CHLORIDE 0.9 % (FLUSH) 0.9 %
3 SYRINGE (ML) INJECTION EVERY 12 HOURS SCHEDULED
Status: CANCELLED | OUTPATIENT
Start: 2022-04-19

## 2022-04-19 RX ORDER — GABAPENTIN 100 MG/1
600 CAPSULE ORAL ONCE
Status: CANCELLED | OUTPATIENT
Start: 2022-04-19 | End: 2022-04-19

## 2022-04-19 RX ORDER — SCOLOPAMINE TRANSDERMAL SYSTEM 1 MG/1
1 PATCH, EXTENDED RELEASE TRANSDERMAL ONCE
Status: CANCELLED | OUTPATIENT
Start: 2022-04-19 | End: 2022-04-19

## 2022-04-19 RX ORDER — CHLORHEXIDINE GLUCONATE 0.12 MG/ML
30 RINSE ORAL
Status: CANCELLED | OUTPATIENT
Start: 2022-04-19 | End: 2022-04-19

## 2022-04-19 RX ORDER — ACETAMINOPHEN 500 MG
1000 TABLET ORAL ONCE
Status: CANCELLED | OUTPATIENT
Start: 2022-04-19 | End: 2022-04-19

## 2022-04-28 PROBLEM — K44.9 HIATAL HERNIA: Status: ACTIVE | Noted: 2022-04-28

## 2022-04-29 ENCOUNTER — TELEPHONE (OUTPATIENT)
Dept: ONCOLOGY | Facility: HOSPITAL | Age: 32
End: 2022-04-29

## 2022-05-01 NOTE — PROGRESS NOTES
"[facility] BARIATRIC SURGERY  [address]  [address]  [place] [address] [phone]      Patient  Name:  Theresa Maya  :  1990      Date of Visit: 22    Chief Complaint:  weight gain; unable to maintain weight loss.   Evaluate for possible metabolic and bariatric surgery    History of Present Illness:  Theresa Maya is a 31 y.o. female who presents today for evaluation, education and consultation regarding metabolic and bariatric surgery (MBS).  Since last seen 4 days ago she has lost 8-1/2 pounds, since seen 2022 she has gained 1-1/2 pounds.  The patient returns for final visit prior to metabolic and bariatric surgery specifically the sleeve gastrectomy.  Original intake evaluation Sallie Rutherford PA-C dated 10/28/2021 reviewed.    She noted the patient's maximum lifetime weight was 260 pounds.      The patient has had issues with morbid obesity for years and only temporary success with non-surgical methods of weight loss.  The patient is seeking LSG to help with the morbid obesity related conditions of anxiety and depression, arthritis, history of DVT with MTHFR mutation, dyspnea exertion, fatigue, hiatal hernia with GE reflux disease, migraine headaches, history of ovarian cyst, history of sinusitis, hyperlipidemia, incomplete right bundle branch block, elevated hemoglobin A1c.      31-year-old morbidly obese female from [facility].  She says her  is a nurse at [employer].  She said after my informed consent she cleaned out her pantry of all high fructose corn syrup products 2 weeks ago and already feels better and feels it was a \"game changer\".      Past Medical History:   Diagnosis Date   • Abnormal Pap smear of cervix    • Anxiety    • Arthritis    • Deep vein thrombosis (HCC)     , (R) leg while pregnant, dx w/ MTHFR   • Depression    • Dyspepsia    • Dyspnea on exertion    • Elevated hemoglobin A1c    • Fatigue    • " "Gallbladder abscess     s/p lap nicolas 2006   • Heartburn     chronic/episodic, depends on what she eats, takes Pepcid prn, denies prior eval   • Hyperemesis gravidarum    • Hyperlipidemia    • Incomplete right bundle branch block    • Migraines    • MTHFR deficiency complicating pregnancy (HCC)     says clotting d/o only an issue when pregnant, advised to use heparin shots during pregnancy   • Obesity    • Ovarian cyst    • PMS (premenstrual syndrome)    • Recurrent pregnancy loss, antepartum condition or complication     had a [facility] and lost the pregnancy at 6 months   • Sinusitis    • Strep pharyngitis    • Urinary tract infection      Past Surgical History:   Procedure Laterality Date   • LAPAROSCOPIC CHOLECYSTECTOMY  2006    no stones, was told she had \"three gallbladders\"- all removed   • SINUS SURGERY Bilateral 2006   • TUBAL COAGULATION LAPAROSCOPIC Bilateral 09/15/2017    Procedure: BILATERAL TUBAL FALLOPE FILSHIE CLIPPING LAPAROSCOPIC;  Surgeon: Leo Posey III, MD;  Location: [facility];  Service:    • VAGINAL DELIVERY  14; 16; 17    female,male, male.  She said she had subcutaneous Lovenox injections throughout the second and third pregnancies until delivery       Allergies   Allergen Reactions   • Amoxicillin Diarrhea and GI Intolerance   • Latex Hives and Itching   • Penicillins GI Intolerance     Says Keflex OK as long as she takes w/ food.        Current Outpatient Medications:   •  DULoxetine (CYMBALTA) 60 MG capsule, Take 1 capsule by mouth Daily., Disp: 90 capsule, Rfl: 1  •  Loratadine 10 MG capsule, Take 10 mg by mouth Daily., Disp: , Rfl:   •  enoxaparin (Lovenox) 40 MG/0.4ML solution syringe, Inject 0.4 mL under the skin into the appropriate area as directed Daily for 21 doses., Disp: 11.2 mL, Rfl: 0    Social History     Socioeconomic History   • Marital status:    Tobacco Use   • Smoking status: Never Smoker   • Smokeless tobacco: Never Used   Vaping Use   • Vaping Use: Never used "   Substance and Sexual Activity   • Alcohol use: Not Currently     Comment: occ   • Drug use: No   • Sexual activity: Yes     Partners: Male     Birth control/protection: Surgical     Comment: -Juve, and has 3 children-homemaker     Family History   Problem Relation Age of Onset   • Asthma Mother    • Thyroid disease Mother    • Heart attack Mother    • Obesity Mother    • Migraines Mother    • Hypertension Mother    • Lupus Sister    • Ovarian cancer Sister    • Asthma Sister    • Lung cancer Paternal Grandmother    • Obesity Paternal Grandmother    • Breast cancer Maternal Grandmother    • Lung cancer Maternal Grandmother    • Asthma Maternal Grandmother    • Hypertension Maternal Grandmother    • Lupus Maternal Grandmother    • Thyroid disease Maternal Grandmother    • Diabetes Maternal Grandmother    • Stroke Maternal Grandmother    • Hypertension Maternal Grandfather    • Prostate cancer Paternal Grandfather        Review of Systems   Constitutional: Positive for fatigue and unexpected weight gain. Negative for chills, diaphoresis, fever and unexpected weight loss.   HENT: Negative for congestion and facial swelling.    Eyes: Negative for blurred vision, double vision and discharge.   Respiratory: Negative for chest tightness, shortness of breath and stridor.    Cardiovascular: Negative for chest pain, palpitations and leg swelling.   Gastrointestinal: Positive for GERD. Negative for blood in stool.   Endocrine: Negative for polydipsia.   Genitourinary: Negative for hematuria.   Musculoskeletal: Positive for arthralgias.   Skin: Negative for color change.   Allergic/Immunologic: Negative for immunocompromised state.   Neurological: Negative for confusion.   Psychiatric/Behavioral: Negative for self-injury.       I have reviewed the ROS and confirm that it's accurate today.    Physical Exam:  Vital Signs:  Weight: 110 kg (242 lb 8 oz)   Body mass index is 42.7 kg/m².  Temp: 98.5 °F (36.9 °C)   Heart  Rate: 103   BP: 128/80     Physical Exam  Vitals reviewed.   Constitutional:       Appearance: She is well-developed.   HENT:      Head: Normocephalic and atraumatic.      Comments: Mild alopecia     Nose:      Comments: mask  Eyes:      Conjunctiva/sclera: Conjunctivae normal.      Pupils: Pupils are equal, round, and reactive to light.   Neck:      Thyroid: No thyromegaly.      Vascular: No carotid bruit.      Trachea: No tracheal deviation.   Cardiovascular:      Rate and Rhythm: Regular rhythm. Tachycardia present.      Heart sounds: Normal heart sounds.   Pulmonary:      Effort: Pulmonary effort is normal. No respiratory distress.      Breath sounds: Normal breath sounds.   Abdominal:      General: There is no distension.      Palpations: Abdomen is soft.      Tenderness: There is no abdominal tenderness.      Comments: Lap nicolas scars   Musculoskeletal:         General: No deformity. Normal range of motion.      Cervical back: Normal range of motion and neck supple.   Skin:     General: Skin is warm and dry.      Findings: No rash.   Neurological:      Mental Status: She is alert and oriented to person, place, and time.      Cranial Nerves: No cranial nerve deficit.      Coordination: Coordination normal.   Psychiatric:         Behavior: Behavior normal.         Thought Content: Thought content normal.         Judgment: Judgment normal.         Patient Active Problem List   Diagnosis   • Previous stillbirth or demise, antepartum   • Previous deep vein thrombosis (DVT) affecting pregnancy   • Morbid (severe) obesity due to excess calories (HCC)   • H/O abnormal cervical Papanicolaou smear   • Depression   • Anxiety   • MTHFR gene mutation   • Heartburn   • Fatigue   • Dyspepsia   • Dyspnea on exertion   • Morbid obesity with body mass index (BMI) of 40.0 to 44.9 in adult (HCC)   • Hiatal hernia       Assessment:    Theresa Maya is a 31 y.o. year old female with medically complicated obesity.    Metabolic  and bariatric surgery is deemed medically necessary given the following obesity related comorbidities including anxiety and depression, arthritis, history of DVT with MTHFR mutation, dyspnea exertion, fatigue, hiatal hernia with GE reflux disease, migraine headaches, history of ovarian cyst, history of sinusitis, hyperlipidemia, incomplete right bundle branch block, elevated hemoglobin A1c with current Weight: 110 kg (242 lb 8 oz) and Body mass index is 42.7 kg/m²..    Encounter Diagnoses   Name Primary?   • Morbid obesity with body mass index of 40.0-44.9 in adult (HCC) Yes   • Hiatal hernia       Patient is aware that surgery is a tool, and that weight loss and improvement in comorbidities is not guaranteed but only seen in the context of appropriate use, follow up and physical activity.    The patient was present for an approximately a 2.5 hour discussion of the purpose of MBS, how MBS is a tool to assist in achieving weight loss goals, the most common complications and how best to avoid them, and the strategies for short and long term weight loss and improvement in comorbidities.  Ample opportunity to discuss questions was available both in group and during the time of individual examination.    I reviewed her Carlos Alberto report showing hydrocodone x1.  Labs dated 4/18/2022 showing a normal CBC of note hemoglobin 12.2 normal CMP.  Chest x-ray dated 4/18/2022 no active process with mild thoracic spondylosis.  EKG dated 4/18/2022 normal sinus rhythm with incomplete right bundle branch block and when compared to EKG of 8/15/2021 T wave inversion no longer evident in the inferior leads confirmed by Dr. Andrae Whitfield.  Cycle social evaluation dated 10/28/2021 Bridgette Lara, PhD good candidate.  Dietitian evaluation dated 10/28/2021 Hailey lopez RD noting the patient snacks in the evenings and usually has a 12 ounce can of Dr. Pepper and a 3 pack container of white doughnuts and that her diet is low in protein and contains  "excessive amounts of processed carbohydrate.  EGD dated 11/15/2021 showing focal areas of erythematous gastritis in the antrum and body some bile-stained secretions throughout the stomach small sliding hiatal hernia with some changes of eosinophilic esophagitis Z-line at 35 cm.  At that time her  was with her and she said she seen a dietary specialist but no one had ever mentioned avoiding high fructose corn syrup and that she said she has a history of heartburn for which she takes Tums about once a week usually if she eats Mexican food and has occasional cervical dysphagia for bread and pasta no prior upper GI evaluation.  Pathology of the antrum showed chronic gastritis with lymphoid aggregate and reactive epithelial changes negative for H. pylori distal and mid esophageal biopsies showed reactive squamous mucosa.  Labs dated 10/28/2021 shows H. pylori breath test negative, normal CMP, elevated hemoglobin A1c 5.70 elevated triglycerides 188 and  normal TSH.  Please see scanned records that I have reviewed and signed off on today.  All of this in addition to the patient's unique history and exam has been taken into consideration in determining their appropriate candidacy for MBS.    Complications  of laparoscopic/possible robotic gastric sleeve were discussed. The patient is well aware of the potential complications of surgery that include but not limited to bleeding, infections, deep venous thrombosis, pulmonary embolism, pulmonary complications such as pneumonia, cardiac events, hernias, small bowel obstruction, damage to the spleen or other organs, bowel injury, disfiguring scars, failure to lose weight, need for additional surgery, conversion to an open procedure, and death. Patient is also aware of complications which apply in this particular procedure that can include but are not limited to a \"leak\" at the staple line which in some instances may require conversion to gastric bypass.    The " patient is aware if a hiatal hernia is encountered, it likely will be repaired.  R/B/A Rx to hiatal hernia repair were discussed as outlined in our long consent form.  Briefly risks in addition to those for LSG include recurrent hernia, DAVID, dysphagia, esophageal injury, pneumothorax, injury to the vagus nerves, injury to the thoracic duct, aorta or vena cava.    I discussed avoiding all tobacco products, nicotine,  and second hand smoke at least 2 weeks pre-operatively and 6 weeks post-operatively to minimize the risk of sleeve leak.  This included discussing the importance of avoiding even secondhand smoke as the risk of leak is increased.  Examples discussed:  Avoid going in a house or riding in a car where someone has previously smoked in the last 2 weeks and for 6 weeks postoperatively.  Avoid living in a house where someone smokes (even if it's in a separate room/patio/attached garage, etc.).   Avoid congregating with a group of people who are smoking even if it's outside.  It is OK to be around wood burning fires and barbecue.  I explained that I do not know if marijuana has a same effects but my overall recommendation is to avoid it for 2 weeks prior in 6 weeks after surgery.     Discussed the risks, benefits and alternative therapies at great length as outlined in our extensive consent forms, consent videos, and educational teaching process under the direction of the center's .    A copy of the patient's signed informed consent is on file.    R/B/A Rx discussed to postop anticoagulation incl but not limited to bleeding, drug reaction, venothromboembolic events, etc. and agreeable to taking post op   lovenox.  Prescription 40 mg SQ Q AM #21 ([facility] does not cover Eliquis).      Plan: After evaluation today I think the patient is a reasonable candidate for laparoscopic sleeve gastrectomy, hiatal hernia pair, and EGD.  Type and screen.  Ancef.  Other issues include anxiety and depression,  arthritis, history of DVT with MTHFR mutation, dyspnea exertion, fatigue, hiatal hernia with GE reflux disease, migraine headaches, history of ovarian cyst, history of sinusitis, hyperlipidemia, incomplete right bundle branch block, elevated hemoglobin A1c'    Thank you Dr. Kc for the opportunity evaluate Alessandra Francesco.        Cristiano Ricardo MD

## 2022-05-01 NOTE — H&P (VIEW-ONLY)
"[facility] BARIATRIC SURGERY  [address]  [address]  [facility] [address] [phone]      Patient  Name:  Theresa Maya  :  1990      Date of Visit: 22    Chief Complaint:  weight gain; unable to maintain weight loss.   Evaluate for possible metabolic and bariatric surgery    History of Present Illness:  Theresa Maya is a 31 y.o. female who presents today for evaluation, education and consultation regarding metabolic and bariatric surgery (MBS).  Since last seen 4 days ago she has lost 8-1/2 pounds, since seen 2022 she has gained 1-1/2 pounds.  The patient returns for final visit prior to metabolic and bariatric surgery specifically the sleeve gastrectomy.  Original intake evaluation Sallie Rutherford PA-C dated 10/28/2021 reviewed.    She noted the patient's maximum lifetime weight was 260 pounds.      The patient has had issues with morbid obesity for years and only temporary success with non-surgical methods of weight loss.  The patient is seeking LSG to help with the morbid obesity related conditions of anxiety and depression, arthritis, history of DVT with MTHFR mutation, dyspnea exertion, fatigue, hiatal hernia with GE reflux disease, migraine headaches, history of ovarian cyst, history of sinusitis, hyperlipidemia, incomplete right bundle branch block, elevated hemoglobin A1c.      31-year-old morbidly obese female from [facility].  She says her  is a nurse at [employer].  She said after my informed consent she cleaned out her pantry of all high fructose corn syrup products 2 weeks ago and already feels better and feels it was a \"game changer\".      Past Medical History:   Diagnosis Date   • Abnormal Pap smear of cervix    • Anxiety    • Arthritis    • Deep vein thrombosis (HCC)     , (R) leg while pregnant, dx w/ MTHFR   • Depression    • Dyspepsia    • Dyspnea on exertion    • Elevated hemoglobin A1c    • Fatigue    • " "Gallbladder abscess     s/p lap nicolas 2006   • Heartburn     chronic/episodic, depends on what she eats, takes Pepcid prn, denies prior eval   • Hyperemesis gravidarum    • Hyperlipidemia    • Incomplete right bundle branch block    • Migraines    • MTHFR deficiency complicating pregnancy (HCC)     says clotting d/o only an issue when pregnant, advised to use heparin shots during pregnancy   • Obesity    • Ovarian cyst    • PMS (premenstrual syndrome)    • Recurrent pregnancy loss, antepartum condition or complication     had a [facility] and lost the pregnancy at 6 months   • Sinusitis    • Strep pharyngitis    • Urinary tract infection      Past Surgical History:   Procedure Laterality Date   • LAPAROSCOPIC CHOLECYSTECTOMY  2006    no stones, was told she had \"three gallbladders\"- all removed   • SINUS SURGERY Bilateral 2006   • TUBAL COAGULATION LAPAROSCOPIC Bilateral 09/15/2017    Procedure: BILATERAL TUBAL FALLOPE FILSHIE CLIPPING LAPAROSCOPIC;  Surgeon: Leo Posey III, MD;  Location: [facility];  Service:    • VAGINAL DELIVERY  14; 16; 17    female,male, male.  She said she had subcutaneous Lovenox injections throughout the second and third pregnancies until delivery       Allergies   Allergen Reactions   • Amoxicillin Diarrhea and GI Intolerance   • Latex Hives and Itching   • Penicillins GI Intolerance     Says Keflex OK as long as she takes w/ food.        Current Outpatient Medications:   •  DULoxetine (CYMBALTA) 60 MG capsule, Take 1 capsule by mouth Daily., Disp: 90 capsule, Rfl: 1  •  Loratadine 10 MG capsule, Take 10 mg by mouth Daily., Disp: , Rfl:   •  enoxaparin (Lovenox) 40 MG/0.4ML solution syringe, Inject 0.4 mL under the skin into the appropriate area as directed Daily for 21 doses., Disp: 11.2 mL, Rfl: 0    Social History     Socioeconomic History   • Marital status:    Tobacco Use   • Smoking status: Never Smoker   • Smokeless tobacco: Never Used   Vaping Use   • Vaping Use: Never used "   Substance and Sexual Activity   • Alcohol use: Not Currently     Comment: occ   • Drug use: No   • Sexual activity: Yes     Partners: Male     Birth control/protection: Surgical     Comment: -Juve, and has 3 children-homemaker     Family History   Problem Relation Age of Onset   • Asthma Mother    • Thyroid disease Mother    • Heart attack Mother    • Obesity Mother    • Migraines Mother    • Hypertension Mother    • Lupus Sister    • Ovarian cancer Sister    • Asthma Sister    • Lung cancer Paternal Grandmother    • Obesity Paternal Grandmother    • Breast cancer Maternal Grandmother    • Lung cancer Maternal Grandmother    • Asthma Maternal Grandmother    • Hypertension Maternal Grandmother    • Lupus Maternal Grandmother    • Thyroid disease Maternal Grandmother    • Diabetes Maternal Grandmother    • Stroke Maternal Grandmother    • Hypertension Maternal Grandfather    • Prostate cancer Paternal Grandfather        Review of Systems   Constitutional: Positive for fatigue and unexpected weight gain. Negative for chills, diaphoresis, fever and unexpected weight loss.   HENT: Negative for congestion and facial swelling.    Eyes: Negative for blurred vision, double vision and discharge.   Respiratory: Negative for chest tightness, shortness of breath and stridor.    Cardiovascular: Negative for chest pain, palpitations and leg swelling.   Gastrointestinal: Positive for GERD. Negative for blood in stool.   Endocrine: Negative for polydipsia.   Genitourinary: Negative for hematuria.   Musculoskeletal: Positive for arthralgias.   Skin: Negative for color change.   Allergic/Immunologic: Negative for immunocompromised state.   Neurological: Negative for confusion.   Psychiatric/Behavioral: Negative for self-injury.       I have reviewed the ROS and confirm that it's accurate today.    Physical Exam:  Vital Signs:  Weight: 110 kg (242 lb 8 oz)   Body mass index is 42.7 kg/m².  Temp: 98.5 °F (36.9 °C)   Heart  Rate: 103   BP: 128/80     Physical Exam  Vitals reviewed.   Constitutional:       Appearance: She is well-developed.   HENT:      Head: Normocephalic and atraumatic.      Comments: Mild alopecia     Nose:      Comments: mask  Eyes:      Conjunctiva/sclera: Conjunctivae normal.      Pupils: Pupils are equal, round, and reactive to light.   Neck:      Thyroid: No thyromegaly.      Vascular: No carotid bruit.      Trachea: No tracheal deviation.   Cardiovascular:      Rate and Rhythm: Regular rhythm. Tachycardia present.      Heart sounds: Normal heart sounds.   Pulmonary:      Effort: Pulmonary effort is normal. No respiratory distress.      Breath sounds: Normal breath sounds.   Abdominal:      General: There is no distension.      Palpations: Abdomen is soft.      Tenderness: There is no abdominal tenderness.      Comments: Lap nicolas scars   Musculoskeletal:         General: No deformity. Normal range of motion.      Cervical back: Normal range of motion and neck supple.   Skin:     General: Skin is warm and dry.      Findings: No rash.   Neurological:      Mental Status: She is alert and oriented to person, place, and time.      Cranial Nerves: No cranial nerve deficit.      Coordination: Coordination normal.   Psychiatric:         Behavior: Behavior normal.         Thought Content: Thought content normal.         Judgment: Judgment normal.         Patient Active Problem List   Diagnosis   • Previous stillbirth or demise, antepartum   • Previous deep vein thrombosis (DVT) affecting pregnancy   • Morbid (severe) obesity due to excess calories (HCC)   • H/O abnormal cervical Papanicolaou smear   • Depression   • Anxiety   • MTHFR gene mutation   • Heartburn   • Fatigue   • Dyspepsia   • Dyspnea on exertion   • Morbid obesity with body mass index (BMI) of 40.0 to 44.9 in adult (HCC)   • Hiatal hernia       Assessment:    Theresa Maya is a 31 y.o. year old female with medically complicated obesity.    Metabolic  and bariatric surgery is deemed medically necessary given the following obesity related comorbidities including anxiety and depression, arthritis, history of DVT with MTHFR mutation, dyspnea exertion, fatigue, hiatal hernia with GE reflux disease, migraine headaches, history of ovarian cyst, history of sinusitis, hyperlipidemia, incomplete right bundle branch block, elevated hemoglobin A1c with current Weight: 110 kg (242 lb 8 oz) and Body mass index is 42.7 kg/m²..    Encounter Diagnoses   Name Primary?   • Morbid obesity with body mass index of 40.0-44.9 in adult (HCC) Yes   • Hiatal hernia       Patient is aware that surgery is a tool, and that weight loss and improvement in comorbidities is not guaranteed but only seen in the context of appropriate use, follow up and physical activity.    The patient was present for an approximately a 2.5 hour discussion of the purpose of MBS, how MBS is a tool to assist in achieving weight loss goals, the most common complications and how best to avoid them, and the strategies for short and long term weight loss and improvement in comorbidities.  Ample opportunity to discuss questions was available both in group and during the time of individual examination.    I reviewed her Carlos Alberto report showing hydrocodone x1.  Labs dated 4/18/2022 showing a normal CBC of note hemoglobin 12.2 normal CMP.  Chest x-ray dated 4/18/2022 no active process with mild thoracic spondylosis.  EKG dated 4/18/2022 normal sinus rhythm with incomplete right bundle branch block and when compared to EKG of 8/15/2021 T wave inversion no longer evident in the inferior leads confirmed by Dr. Andrae Whitfield.  Cycle social evaluation dated 10/28/2021 Bridgette Lara, PhD good candidate.  Dietitian evaluation dated 10/28/2021 Hailey lopez RD noting the patient snacks in the evenings and usually has a 12 ounce can of Dr. Pepper and a 3 pack container of white doughnuts and that her diet is low in protein and contains  "excessive amounts of processed carbohydrate.  EGD dated 11/15/2021 showing focal areas of erythematous gastritis in the antrum and body some bile-stained secretions throughout the stomach small sliding hiatal hernia with some changes of eosinophilic esophagitis Z-line at 35 cm.  At that time her  was with her and she said she seen a dietary specialist but no one had ever mentioned avoiding high fructose corn syrup and that she said she has a history of heartburn for which she takes Tums about once a week usually if she eats Mexican food and has occasional cervical dysphagia for bread and pasta no prior upper GI evaluation.  Pathology of the antrum showed chronic gastritis with lymphoid aggregate and reactive epithelial changes negative for H. pylori distal and mid esophageal biopsies showed reactive squamous mucosa.  Labs dated 10/28/2021 shows H. pylori breath test negative, normal CMP, elevated hemoglobin A1c 5.70 elevated triglycerides 188 and  normal TSH.  Please see scanned records that I have reviewed and signed off on today.  All of this in addition to the patient's unique history and exam has been taken into consideration in determining their appropriate candidacy for MBS.    Complications  of laparoscopic/possible robotic gastric sleeve were discussed. The patient is well aware of the potential complications of surgery that include but not limited to bleeding, infections, deep venous thrombosis, pulmonary embolism, pulmonary complications such as pneumonia, cardiac events, hernias, small bowel obstruction, damage to the spleen or other organs, bowel injury, disfiguring scars, failure to lose weight, need for additional surgery, conversion to an open procedure, and death. Patient is also aware of complications which apply in this particular procedure that can include but are not limited to a \"leak\" at the staple line which in some instances may require conversion to gastric bypass.    The " patient is aware if a hiatal hernia is encountered, it likely will be repaired.  R/B/A Rx to hiatal hernia repair were discussed as outlined in our long consent form.  Briefly risks in addition to those for LSG include recurrent hernia, DAVID, dysphagia, esophageal injury, pneumothorax, injury to the vagus nerves, injury to the thoracic duct, aorta or vena cava.    I discussed avoiding all tobacco products, nicotine,  and second hand smoke at least 2 weeks pre-operatively and 6 weeks post-operatively to minimize the risk of sleeve leak.  This included discussing the importance of avoiding even secondhand smoke as the risk of leak is increased.  Examples discussed:  Avoid going in a house or riding in a car where someone has previously smoked in the last 2 weeks and for 6 weeks postoperatively.  Avoid living in a house where someone smokes (even if it's in a separate room/patio/attached garage, etc.).   Avoid congregating with a group of people who are smoking even if it's outside.  It is OK to be around wood burning fires and barbecue.  I explained that I do not know if marijuana has a same effects but my overall recommendation is to avoid it for 2 weeks prior in 6 weeks after surgery.     Discussed the risks, benefits and alternative therapies at great length as outlined in our extensive consent forms, consent videos, and educational teaching process under the direction of the center's .    A copy of the patient's signed informed consent is on file.    R/B/A Rx discussed to postop anticoagulation incl but not limited to bleeding, drug reaction, venothromboembolic events, etc. and agreeable to taking post op   lovenox.  Prescription 40 mg SQ Q AM #21 ([facility] does not cover Eliquis).      Plan: After evaluation today I think the patient is a reasonable candidate for laparoscopic sleeve gastrectomy, hiatal hernia pair, and EGD.  Type and screen.  Ancef.  Other issues include anxiety and depression,  arthritis, history of DVT with MTHFR mutation, dyspnea exertion, fatigue, hiatal hernia with GE reflux disease, migraine headaches, history of ovarian cyst, history of sinusitis, hyperlipidemia, incomplete right bundle branch block, elevated hemoglobin A1c'    Thank you Dr. Kc for the opportunity evaluate Alessandra Francesco.        Cristiano Ricardo MD

## 2022-05-03 ENCOUNTER — DOCUMENTATION (OUTPATIENT)
Dept: OTHER | Facility: HOSPITAL | Age: 32
End: 2022-05-03

## 2022-05-03 NOTE — PROGRESS NOTES
Theresa Maya, a 31-year-old female, was referred for genetic counseling due to a family history of ovarian and breast cancer.  Genetic counseling was provided via telephone. Ms. Maya has no personal history of cancer. She was 11 years old at menarche and had her first child at age 23. Ms. Maya is premenopausal and retains her uterus and ovaries. She has annual clinical breast exams that have always been normal. Ms. Maya has not had a colonoscopy but had an EGD in December 2021. She reported this was due to weight loss concerns, and everything looked normal. Ms. Maya was interested in discussing her risk for a hereditary cancer syndrome. She opted to pursue testing via the CancerNext panel through [facility]. This panel evaluates 36 genes associated with an increased risk for cancer. Genetic testing was negative for pathogenic mutations in BRCA1/2 and 34 additional genes included on the CancerNext panel. These normal results were discussed with Ms. Maya by telephone on 04/29/22.    PERTINENT FAMILY HISTORY (see attached pedigree):    Mother:   Ovarian cancer, 22  Sister:   Ovarian cancer, 29  Mat aunt:  Fallopian tube cancer, 27  Mat Grandmother: Breast cancer, 46     Lung cancer, 46  Father:   Prostate cancer, 57  Pat Grandfather: Prostate cancer, 72      We do not have medical records confirming the diagnoses in Ms. Maya’s family.    RISK ASSESSMENT:  Ms. Maya’s family history of various cancers led to concern regarding a hereditary cancer syndrome. NCCN criteria for genetic testing for BRCA1/2 states that someone with a close relative (first-, second-, or third-degree) with ovarian cancer at any age may consider genetic testing. She meets this criterion based on her mother’s and sister’s diagnoses. We discussed the option of a multi-gene panel. Ms. Maya opted to pursue testing via the CancerNext panel through [facility]. These risk assessments are based on the family history information  provided at the time of the appointment.  The assessments could change in the future should new information be obtained.    At this time, Ms. Maya’s management should be guided by a family history-based risk assessment. Tyrer-Cuzick, version 8 is able to take into account personal factors (age at menarche, age at first live birth, breast density, etc) and family history when calculating risk for breast cancer.  Computer modeling estimates that Ms. Kates lifetime personal risk for developing breast cancer is up to 14.1% (Boomer-Carlyzick, v8), compared to the average 31-year-old female’s risk of 11.2%. In general, a lifetime risk above 20% is considered to be “high risk” where increased screening is warranted; Ms. Maldoando risk does not fall into that category. This risk assessment is based on the family history information provided at the time of the appointment and could change in the future should new information be obtained.    GENETIC COUNSELING (30 minutes):  We reviewed the family history information in detail. Cases of cancer follow three general patterns: sporadic, familial, and hereditary.  While most cancer is sporadic, some cases appear to occur in family clusters.  These cases are said to be familial and account for 10-20% of cancer cases.  Familial cases may be due to a combination of shared genes and environmental factors among family members.  In even fewer families, the risk for cancer is inherited, and the genes responsible for the increased cancer risk are known.      Family histories typical of hereditary cancer syndromes usually include multiple first- and second-degree relatives diagnosed with cancer types that define a syndrome.  These cases tend to be diagnosed at younger-than-expected ages and can be bilateral or multifocal.  The cancer in these families follows an autosomal dominant inheritance pattern, which indicates the likely presence of a mutation in a cancer susceptibility gene.   Children and siblings of an individual believed to carry this mutation have a 50% chance of inheriting that mutation, thereby inheriting the increased risk to develop cancer.  These mutations can be passed down from the maternal or the paternal lineage.    Based on Ms. Maya’s family history of ovarian cancer, we discussed that hereditary ovarian cancer accounts for about 10% of these cancers.  A significant proportion of these hereditary cancers can be attributed to mutations in the BRCA1 and BRCA2 genes.  Mutations in these genes confer an increased risk for breast cancer, ovarian cancer, male breast cancer, prostate cancer, and pancreatic cancer.      There are also other genes associated with breast cancer, ovarian cancer, and other cancers.  We discussed that risks vary based on gene, and that screening recommendations also vary.  To obtain as much information as possible for herself and her family, Ms. Maya opted to pursue testing via a multi-gene panel.    GENETIC TESTING:  The risks, benefits and limitations of genetic testing and implications for clinical management following testing were reviewed. DNA test results can influence decisions regarding screening and prevention.  Genetic testing can have significant psychological implications for both individuals and families. Also discussed was the possibility of employment and insurance discrimination based on genetic test results and the federal and states laws that are in place to prevent this.         We discussed panel testing, which would involve multiple genes associated with increased cancer risk. The benefits and limitations of genetic testing were discussed.  The implications of a positive or negative test result were discussed. We discussed the utility of testing an affected relative and how a negative result doesn’t mean the cancers in her family weren’t due to a genetic mutation she did not inherit. We discussed the possibility that, in some  cases, genetic test results may be ambiguous due to the identification of a genetic variant. These variants may or may not be associated with an increased cancer risk. With multigene panel testing, it is not uncommon for a variant of uncertain significance (VUS) to be identified.  If a VUS is identified, testing family members is not recommended and screening recommendations are made based on the family history.  The laboratories that perform genetic testing work to reclassify the VUS and send out an amended report if and when a VUS is reclassified.  The majority of variant findings are ultimately reclassified to a negative result. Given her family history, a negative test result does not eliminate all cancer risk, although the risk would not be as high as it would with positive genetic testing.     TEST RESULTS:  Genetic testing was negative for pathogenic mutations by sequencing, rearrangement testing, and RNA analysis for the 36 genes on the CancerNext panel.  The negative result greatly lowers the risk of a hereditary cancer syndrome for Ms. Maya.  It is possible that the family history is due to a hereditary cancer syndrome which Ms. Maya did not happen to inherit. This assessment is based on the information provided at the time of the consultation.    CANCER PREVENTION:  Options available to individuals with an elevated lifetime risk for breast and/or ovarian cancer were briefly discussed.  Based on computer modeling, Ms. Maya’s lifetime risk for breast cancer would not be considered “high risk” (>20%).   Per NCCN guidelines, it is appropriate for her to follow general population screening guidelines for her breast cancer risk including annual clinical breast exam and annual mammography. These assessments are based on the information provided at the time of consultation.    PLAN:  Genetic counseling remains available to Ms. Maay and her family.  If she has questions or concerns in the future, she is  welcome to give me a call at [phone].      Natalie Robbins MS, [facility], [facility]  Licensed Certified Genetic Counselor      Cc: Theresa Kc MD

## 2022-05-04 ENCOUNTER — ANESTHESIA EVENT (OUTPATIENT)
Dept: PERIOP | Facility: HOSPITAL | Age: 32
End: 2022-05-04

## 2022-05-04 ENCOUNTER — TELEPHONE (OUTPATIENT)
Dept: BARIATRICS/WEIGHT MGMT | Facility: CLINIC | Age: 32
End: 2022-05-04

## 2022-05-04 ENCOUNTER — PRE-ADMISSION TESTING (OUTPATIENT)
Dept: PREADMISSION TESTING | Facility: HOSPITAL | Age: 32
End: 2022-05-04

## 2022-05-04 DIAGNOSIS — K44.9 HIATAL HERNIA: ICD-10-CM

## 2022-05-04 DIAGNOSIS — E66.01 MORBID OBESITY WITH BODY MASS INDEX OF 40.0-44.9 IN ADULT: ICD-10-CM

## 2022-05-04 LAB
ABO GROUP BLD: NORMAL
ANTIBODY TO LOW FREQUENCY ANTIGEN: NORMAL
BLD GP AB SCN SERPL QL: POSITIVE
DEPRECATED RDW RBC AUTO: 42.8 FL (ref 37–54)
ERYTHROCYTE [DISTWIDTH] IN BLOOD BY AUTOMATED COUNT: 14.1 % (ref 12.3–15.4)
HBA1C MFR BLD: 5.4 % (ref 4.8–5.6)
HCT VFR BLD AUTO: 39.4 % (ref 34–46.6)
HGB BLD-MCNC: 12.6 G/DL (ref 12–15.9)
MCH RBC QN AUTO: 26.9 PG (ref 26.6–33)
MCHC RBC AUTO-ENTMCNC: 32 G/DL (ref 31.5–35.7)
MCV RBC AUTO: 84.2 FL (ref 79–97)
MRSA DNA SPEC QL NAA+PROBE: NEGATIVE
PLATELET # BLD AUTO: 235 10*3/MM3 (ref 140–450)
PMV BLD AUTO: 10.8 FL (ref 6–12)
RBC # BLD AUTO: 4.68 10*6/MM3 (ref 3.77–5.28)
RH BLD: POSITIVE
SARS-COV-2 RNA PNL SPEC NAA+PROBE: NOT DETECTED
T&S EXPIRATION DATE: NORMAL
WBC NRBC COR # BLD: 3.24 10*3/MM3 (ref 3.4–10.8)

## 2022-05-04 PROCEDURE — 86900 BLOOD TYPING SEROLOGIC ABO: CPT

## 2022-05-04 PROCEDURE — 86850 RBC ANTIBODY SCREEN: CPT

## 2022-05-04 PROCEDURE — 36415 COLL VENOUS BLD VENIPUNCTURE: CPT

## 2022-05-04 PROCEDURE — 86920 COMPATIBILITY TEST SPIN: CPT

## 2022-05-04 PROCEDURE — 86901 BLOOD TYPING SEROLOGIC RH(D): CPT

## 2022-05-04 PROCEDURE — C9803 HOPD COVID-19 SPEC COLLECT: HCPCS

## 2022-05-04 PROCEDURE — 87641 MR-STAPH DNA AMP PROBE: CPT

## 2022-05-04 PROCEDURE — 86902 BLOOD TYPE ANTIGEN DONOR EA: CPT

## 2022-05-04 PROCEDURE — U0004 COV-19 TEST NON-CDC HGH THRU: HCPCS

## 2022-05-04 PROCEDURE — 86922 COMPATIBILITY TEST ANTIGLOB: CPT

## 2022-05-04 PROCEDURE — 85027 COMPLETE CBC AUTOMATED: CPT

## 2022-05-04 PROCEDURE — 83036 HEMOGLOBIN GLYCOSYLATED A1C: CPT

## 2022-05-04 PROCEDURE — 86870 RBC ANTIBODY IDENTIFICATION: CPT

## 2022-05-04 RX ORDER — FAMOTIDINE 10 MG/ML
20 INJECTION, SOLUTION INTRAVENOUS ONCE
Status: CANCELLED | OUTPATIENT
Start: 2022-05-04 | End: 2022-05-04

## 2022-05-04 RX ORDER — SODIUM CHLORIDE 0.9 % (FLUSH) 0.9 %
10 SYRINGE (ML) INJECTION AS NEEDED
Status: CANCELLED | OUTPATIENT
Start: 2022-05-04

## 2022-05-04 RX ORDER — FAMOTIDINE 20 MG/1
20 TABLET, FILM COATED ORAL ONCE
Status: CANCELLED | OUTPATIENT
Start: 2022-05-04 | End: 2022-05-04

## 2022-05-04 RX ORDER — SODIUM CHLORIDE 0.9 % (FLUSH) 0.9 %
10 SYRINGE (ML) INJECTION EVERY 12 HOURS SCHEDULED
Status: CANCELLED | OUTPATIENT
Start: 2022-05-04

## 2022-05-04 NOTE — PAT
Blood bank called to inform PAT staff that patient had a positive antibody.  Paged Tere Portillo and order received to hold two units of blood for surgery.  Order entered for units and notified blood bank.

## 2022-05-04 NOTE — TELEPHONE ENCOUNTER
Pushpa with [facility] called and stated patient blood work came back and patient has a  positive  antibody.  They want to know if they should have units of blood held back for patients surgery tomorrow.

## 2022-05-04 NOTE — PAT
An arrival time for procedure was not given during PAT visit. If patient had any questions or concerns about their arrival time, they were instructed to contact their surgeon/physician.  Additionally, if the patient referred to an arrival time that was acquired from their my chart account, patient was encouraged to verify that time with their surgeon/physician.  NO arrival times given in Pre Admission Testing Department.    Patient to apply Chlorhexadine wipes  to surgical area (as instructed) the night before procedure and the AM of procedure. Wipes provided.    Patient instructed to drink 20 ounces (or until full) of Gatorade and it needs to be completed 1 hour (for Main OR patients) or 2 hours (scheduled  section patients) before given arrival time for procedure (NO RED Gatorade)    Patient verbalized understanding.    Patient denies any current skin issues.     Blood bank bracelet applied to patient during Pre Admission Testing visit.  Patient instructed not to remove from arm until after procedure and they are discharged from the hospital.  Explained to patient that they may shower and get the bracelet wet, but not to immerse under water for longer periods (bathing, swimming, hand dishwashing, etc).  Patient verbalized understanding.    Patient viewed general PAT education video as instructed in their preoperative information received from their surgeon.  Patient stated the general PAT education video was viewed in its entirety and survey completed.  Copies of PAT general education handouts (Incentive Spirometry, Meds to Beds Program, Patient Belongings, Pre-op skin preparation instructions, Blood Glucose testing, Visitor policy, Surgery FAQ, Code H) distributed to patient if not printed. Education related to the PAT pass and skin preparation for surgery (if applicable) completed in PAT as a reinforcement to PAT education video. Patient instructed to return PAT pass provided today as well as completed  skin preparation sheet (if applicable) on the day of procedure.     Additionally if patient had not viewed video yet but intended to view it at home or in our waiting area, then referred them to the handout with QR code/link provided during PAT visit.  Instructed patient to complete survey after viewing the video in its entirety.  Encouraged patient/family to read PAT general education handouts thoroughly and notify PAT staff with any questions or concerns. Patient verbalized understanding of all information and priority content.

## 2022-05-05 ENCOUNTER — ANESTHESIA (OUTPATIENT)
Dept: PERIOP | Facility: HOSPITAL | Age: 32
End: 2022-05-05

## 2022-05-05 ENCOUNTER — ANESTHESIA EVENT CONVERTED (OUTPATIENT)
Dept: ANESTHESIOLOGY | Facility: HOSPITAL | Age: 32
End: 2022-05-05

## 2022-05-05 ENCOUNTER — HOSPITAL ENCOUNTER (INPATIENT)
Facility: HOSPITAL | Age: 32
LOS: 1 days | Discharge: HOME | End: 2022-05-06
Attending: SURGERY | Admitting: SURGERY

## 2022-05-05 DIAGNOSIS — E66.01 MORBID OBESITY WITH BODY MASS INDEX (BMI) OF 40.0 TO 44.9 IN ADULT: Primary | ICD-10-CM

## 2022-05-05 DIAGNOSIS — E66.01 MORBID OBESITY WITH BODY MASS INDEX OF 40.0-44.9 IN ADULT: ICD-10-CM

## 2022-05-05 DIAGNOSIS — K44.9 HIATAL HERNIA: ICD-10-CM

## 2022-05-05 LAB
B-HCG UR QL: NEGATIVE
EXPIRATION DATE: NORMAL
INTERNAL NEGATIVE CONTROL: NORMAL
INTERNAL POSITIVE CONTROL: NORMAL
Lab: NORMAL

## 2022-05-05 PROCEDURE — 94799 UNLISTED PULMONARY SVC/PX: CPT

## 2022-05-05 PROCEDURE — 25010000002 FENTANYL CITRATE (PF) 50 MCG/ML SOLUTION: Performed by: NURSE ANESTHETIST, CERTIFIED REGISTERED

## 2022-05-05 PROCEDURE — 94761 N-INVAS EAR/PLS OXIMETRY MLT: CPT

## 2022-05-05 PROCEDURE — 25010000002 GLUCAGON (HUMAN RECOMBINANT) 1 MG RECONSTITUTED SOLUTION: Performed by: NURSE ANESTHETIST, CERTIFIED REGISTERED

## 2022-05-05 PROCEDURE — 0DB64Z3 EXCISION OF STOMACH, PERCUTANEOUS ENDOSCOPIC APPROACH, VERTICAL: ICD-10-PCS | Performed by: SURGERY

## 2022-05-05 PROCEDURE — 0BQT4ZZ REPAIR DIAPHRAGM, PERCUTANEOUS ENDOSCOPIC APPROACH: ICD-10-PCS | Performed by: SURGERY

## 2022-05-05 PROCEDURE — 25010000002 ENOXAPARIN PER 10 MG: Performed by: SURGERY

## 2022-05-05 PROCEDURE — 25010000002 DEXAMETHASONE SODIUM PHOSPHATE 10 MG/ML SOLUTION: Performed by: NURSE ANESTHETIST, CERTIFIED REGISTERED

## 2022-05-05 PROCEDURE — 81025 URINE PREGNANCY TEST: CPT | Performed by: ANESTHESIOLOGY

## 2022-05-05 PROCEDURE — 25010000002 FENTANYL CITRATE (PF) 50 MCG/ML SOLUTION

## 2022-05-05 PROCEDURE — 43775 LAP SLEEVE GASTRECTOMY: CPT | Performed by: SURGERY

## 2022-05-05 PROCEDURE — 25010000002 ONDANSETRON PER 1 MG: Performed by: NURSE ANESTHETIST, CERTIFIED REGISTERED

## 2022-05-05 PROCEDURE — 25010000002 ONDANSETRON PER 1 MG: Performed by: SURGERY

## 2022-05-05 PROCEDURE — 0DJ08ZZ INSPECTION OF UPPER INTESTINAL TRACT, VIA NATURAL OR ARTIFICIAL OPENING ENDOSCOPIC: ICD-10-PCS | Performed by: SURGERY

## 2022-05-05 PROCEDURE — 25010000002 NEOSTIGMINE 10 MG/10ML SOLUTION: Performed by: NURSE ANESTHETIST, CERTIFIED REGISTERED

## 2022-05-05 PROCEDURE — 25010000002 CEFAZOLIN IN DEXTROSE 2-4 GM/100ML-% SOLUTION: Performed by: SURGERY

## 2022-05-05 PROCEDURE — 25010000002 HYDROMORPHONE 1 MG/ML SOLUTION: Performed by: SURGERY

## 2022-05-05 PROCEDURE — 25010000002 PROPOFOL 10 MG/ML EMULSION: Performed by: NURSE ANESTHETIST, CERTIFIED REGISTERED

## 2022-05-05 PROCEDURE — 25010000002 DEXAMETHASONE PER 1 MG: Performed by: NURSE ANESTHETIST, CERTIFIED REGISTERED

## 2022-05-05 PROCEDURE — 88307 TISSUE EXAM BY PATHOLOGIST: CPT | Performed by: SURGERY

## 2022-05-05 DEVICE — IMPLANTABLE DEVICE
Type: IMPLANTABLE DEVICE | Site: STOMACH | Status: FUNCTIONAL
Brand: TITAN SGS STANDARD GASTRIC STAPLER

## 2022-05-05 DEVICE — SEALANT WND FIBRIN TISSEEL PREFIL/SYR/PRIMAFZ 4ML: Type: IMPLANTABLE DEVICE | Site: ABDOMEN | Status: FUNCTIONAL

## 2022-05-05 DEVICE — ABSORBABLE WOUND CLOSURE DEVICE
Type: IMPLANTABLE DEVICE | Site: ABDOMEN | Status: FUNCTIONAL
Brand: SYNETURE

## 2022-05-05 RX ORDER — LABETALOL HYDROCHLORIDE 5 MG/ML
INJECTION, SOLUTION INTRAVENOUS AS NEEDED
Status: DISCONTINUED | OUTPATIENT
Start: 2022-05-05 | End: 2022-05-05 | Stop reason: SURG

## 2022-05-05 RX ORDER — NEOSTIGMINE METHYLSULFATE 1 MG/ML
INJECTION, SOLUTION INTRAVENOUS AS NEEDED
Status: DISCONTINUED | OUTPATIENT
Start: 2022-05-05 | End: 2022-05-05 | Stop reason: SURG

## 2022-05-05 RX ORDER — DIPHENHYDRAMINE HYDROCHLORIDE 50 MG/ML
25 INJECTION INTRAMUSCULAR; INTRAVENOUS EVERY 4 HOURS PRN
Status: DISCONTINUED | OUTPATIENT
Start: 2022-05-05 | End: 2022-05-06 | Stop reason: HOSPADM

## 2022-05-05 RX ORDER — PROCHLORPERAZINE MALEATE 10 MG
10 TABLET ORAL EVERY 6 HOURS PRN
Status: DISCONTINUED | OUTPATIENT
Start: 2022-05-05 | End: 2022-05-06 | Stop reason: HOSPADM

## 2022-05-05 RX ORDER — MAGNESIUM HYDROXIDE 1200 MG/15ML
LIQUID ORAL AS NEEDED
Status: DISCONTINUED | OUTPATIENT
Start: 2022-05-05 | End: 2022-05-05 | Stop reason: HOSPADM

## 2022-05-05 RX ORDER — FENTANYL CITRATE 50 UG/ML
INJECTION, SOLUTION INTRAMUSCULAR; INTRAVENOUS
Status: COMPLETED
Start: 2022-05-05 | End: 2022-05-05

## 2022-05-05 RX ORDER — FENTANYL CITRATE 50 UG/ML
INJECTION, SOLUTION INTRAMUSCULAR; INTRAVENOUS AS NEEDED
Status: DISCONTINUED | OUTPATIENT
Start: 2022-05-05 | End: 2022-05-05 | Stop reason: SURG

## 2022-05-05 RX ORDER — NALOXONE HCL 0.4 MG/ML
0.4 VIAL (ML) INJECTION
Status: DISCONTINUED | OUTPATIENT
Start: 2022-05-05 | End: 2022-05-06 | Stop reason: HOSPADM

## 2022-05-05 RX ORDER — GABAPENTIN 300 MG/1
600 CAPSULE ORAL ONCE
Status: COMPLETED | OUTPATIENT
Start: 2022-05-05 | End: 2022-05-05

## 2022-05-05 RX ORDER — LIDOCAINE HYDROCHLORIDE 10 MG/ML
INJECTION, SOLUTION EPIDURAL; INFILTRATION; INTRACAUDAL; PERINEURAL AS NEEDED
Status: DISCONTINUED | OUTPATIENT
Start: 2022-05-05 | End: 2022-05-05 | Stop reason: SURG

## 2022-05-05 RX ORDER — SODIUM CHLORIDE, SODIUM LACTATE, POTASSIUM CHLORIDE, CALCIUM CHLORIDE 600; 310; 30; 20 MG/100ML; MG/100ML; MG/100ML; MG/100ML
9 INJECTION, SOLUTION INTRAVENOUS CONTINUOUS
Status: DISCONTINUED | OUTPATIENT
Start: 2022-05-05 | End: 2022-05-05

## 2022-05-05 RX ORDER — HYDRALAZINE HYDROCHLORIDE 20 MG/ML
10 INJECTION INTRAMUSCULAR; INTRAVENOUS
Status: DISCONTINUED | OUTPATIENT
Start: 2022-05-05 | End: 2022-05-06 | Stop reason: HOSPADM

## 2022-05-05 RX ORDER — SODIUM CHLORIDE AND POTASSIUM CHLORIDE 150; 450 MG/100ML; MG/100ML
125 INJECTION, SOLUTION INTRAVENOUS CONTINUOUS
Status: DISCONTINUED | OUTPATIENT
Start: 2022-05-06 | End: 2022-05-06 | Stop reason: HOSPADM

## 2022-05-05 RX ORDER — ACETAMINOPHEN 160 MG/5ML
1000 SOLUTION ORAL EVERY 8 HOURS SCHEDULED
Status: DISCONTINUED | OUTPATIENT
Start: 2022-05-05 | End: 2022-05-06 | Stop reason: HOSPADM

## 2022-05-05 RX ORDER — ONDANSETRON 2 MG/ML
4 INJECTION INTRAMUSCULAR; INTRAVENOUS EVERY 6 HOURS PRN
Status: DISCONTINUED | OUTPATIENT
Start: 2022-05-05 | End: 2022-05-06 | Stop reason: HOSPADM

## 2022-05-05 RX ORDER — ENOXAPARIN SODIUM 100 MG/ML
INJECTION SUBCUTANEOUS AS NEEDED
Status: DISCONTINUED | OUTPATIENT
Start: 2022-05-05 | End: 2022-05-05 | Stop reason: HOSPADM

## 2022-05-05 RX ORDER — DULOXETIN HYDROCHLORIDE 60 MG/1
60 CAPSULE, DELAYED RELEASE ORAL DAILY
Status: DISCONTINUED | OUTPATIENT
Start: 2022-05-05 | End: 2022-05-06 | Stop reason: HOSPADM

## 2022-05-05 RX ORDER — PROMETHAZINE HYDROCHLORIDE 12.5 MG/1
12.5 TABLET ORAL EVERY 6 HOURS PRN
Status: DISCONTINUED | OUTPATIENT
Start: 2022-05-05 | End: 2022-05-06 | Stop reason: HOSPADM

## 2022-05-05 RX ORDER — SCOLOPAMINE TRANSDERMAL SYSTEM 1 MG/1
1 PATCH, EXTENDED RELEASE TRANSDERMAL ONCE
Status: DISCONTINUED | OUTPATIENT
Start: 2022-05-05 | End: 2022-05-05

## 2022-05-05 RX ORDER — ONDANSETRON 4 MG/1
4 TABLET, FILM COATED ORAL EVERY 4 HOURS PRN
Status: DISCONTINUED | OUTPATIENT
Start: 2022-05-05 | End: 2022-05-06 | Stop reason: HOSPADM

## 2022-05-05 RX ORDER — MORPHINE SULFATE 4 MG/ML
4 INJECTION, SOLUTION INTRAMUSCULAR; INTRAVENOUS
Status: DISCONTINUED | OUTPATIENT
Start: 2022-05-05 | End: 2022-05-06 | Stop reason: HOSPADM

## 2022-05-05 RX ORDER — PANTOPRAZOLE SODIUM 40 MG/10ML
40 INJECTION, POWDER, LYOPHILIZED, FOR SOLUTION INTRAVENOUS ONCE
Status: COMPLETED | OUTPATIENT
Start: 2022-05-05 | End: 2022-05-05

## 2022-05-05 RX ORDER — ALPRAZOLAM 0.25 MG/1
0.25 TABLET ORAL ONCE AS NEEDED
Status: DISCONTINUED | OUTPATIENT
Start: 2022-05-05 | End: 2022-05-06 | Stop reason: HOSPADM

## 2022-05-05 RX ORDER — DEXAMETHASONE SODIUM PHOSPHATE 4 MG/ML
INJECTION, SOLUTION INTRA-ARTICULAR; INTRALESIONAL; INTRAMUSCULAR; INTRAVENOUS; SOFT TISSUE AS NEEDED
Status: DISCONTINUED | OUTPATIENT
Start: 2022-05-05 | End: 2022-05-05 | Stop reason: SURG

## 2022-05-05 RX ORDER — ALBUTEROL SULFATE 2.5 MG/3ML
2.5 SOLUTION RESPIRATORY (INHALATION) EVERY 4 HOURS PRN
Status: DISCONTINUED | OUTPATIENT
Start: 2022-05-05 | End: 2022-05-06 | Stop reason: HOSPADM

## 2022-05-05 RX ORDER — CEFAZOLIN SODIUM 2 G/100ML
2 INJECTION, SOLUTION INTRAVENOUS EVERY 8 HOURS
Status: COMPLETED | OUTPATIENT
Start: 2022-05-05 | End: 2022-05-05

## 2022-05-05 RX ORDER — CETIRIZINE HYDROCHLORIDE 10 MG/1
10 TABLET ORAL DAILY
Status: DISCONTINUED | OUTPATIENT
Start: 2022-05-06 | End: 2022-05-06 | Stop reason: HOSPADM

## 2022-05-05 RX ORDER — ROCURONIUM BROMIDE 10 MG/ML
INJECTION, SOLUTION INTRAVENOUS AS NEEDED
Status: DISCONTINUED | OUTPATIENT
Start: 2022-05-05 | End: 2022-05-05 | Stop reason: SURG

## 2022-05-05 RX ORDER — PROPOFOL 10 MG/ML
VIAL (ML) INTRAVENOUS AS NEEDED
Status: DISCONTINUED | OUTPATIENT
Start: 2022-05-05 | End: 2022-05-05 | Stop reason: SURG

## 2022-05-05 RX ORDER — SODIUM CHLORIDE, SODIUM LACTATE, POTASSIUM CHLORIDE, CALCIUM CHLORIDE 600; 310; 30; 20 MG/100ML; MG/100ML; MG/100ML; MG/100ML
150 INJECTION, SOLUTION INTRAVENOUS CONTINUOUS
Status: DISCONTINUED | OUTPATIENT
Start: 2022-05-05 | End: 2022-05-05

## 2022-05-05 RX ORDER — GABAPENTIN 100 MG/1
100 CAPSULE ORAL 3 TIMES DAILY
Status: DISCONTINUED | OUTPATIENT
Start: 2022-05-05 | End: 2022-05-06 | Stop reason: HOSPADM

## 2022-05-05 RX ORDER — SIMETHICONE 80 MG
80 TABLET,CHEWABLE ORAL 4 TIMES DAILY PRN
Status: DISCONTINUED | OUTPATIENT
Start: 2022-05-05 | End: 2022-05-06 | Stop reason: HOSPADM

## 2022-05-05 RX ORDER — ONDANSETRON 2 MG/ML
INJECTION INTRAMUSCULAR; INTRAVENOUS AS NEEDED
Status: DISCONTINUED | OUTPATIENT
Start: 2022-05-05 | End: 2022-05-05 | Stop reason: SURG

## 2022-05-05 RX ORDER — ACETAMINOPHEN 500 MG
1000 TABLET ORAL EVERY 8 HOURS SCHEDULED
Status: DISCONTINUED | OUTPATIENT
Start: 2022-05-05 | End: 2022-05-06 | Stop reason: HOSPADM

## 2022-05-05 RX ORDER — HYDROMORPHONE HYDROCHLORIDE 1 MG/ML
0.5 INJECTION, SOLUTION INTRAMUSCULAR; INTRAVENOUS; SUBCUTANEOUS
Status: DISCONTINUED | OUTPATIENT
Start: 2022-05-05 | End: 2022-05-05 | Stop reason: HOSPADM

## 2022-05-05 RX ORDER — NALOXONE HCL 0.4 MG/ML
0.1 VIAL (ML) INJECTION
Status: DISCONTINUED | OUTPATIENT
Start: 2022-05-05 | End: 2022-05-06 | Stop reason: HOSPADM

## 2022-05-05 RX ORDER — ACETAMINOPHEN 500 MG
1000 TABLET ORAL ONCE
Status: COMPLETED | OUTPATIENT
Start: 2022-05-05 | End: 2022-05-05

## 2022-05-05 RX ORDER — LORAZEPAM 1 MG/1
1 TABLET ORAL EVERY 12 HOURS PRN
Status: DISCONTINUED | OUTPATIENT
Start: 2022-05-05 | End: 2022-05-06 | Stop reason: HOSPADM

## 2022-05-05 RX ORDER — FENTANYL CITRATE 50 UG/ML
50 INJECTION, SOLUTION INTRAMUSCULAR; INTRAVENOUS
Status: DISCONTINUED | OUTPATIENT
Start: 2022-05-05 | End: 2022-05-05 | Stop reason: HOSPADM

## 2022-05-05 RX ORDER — LIDOCAINE HYDROCHLORIDE 10 MG/ML
0.5 INJECTION, SOLUTION EPIDURAL; INFILTRATION; INTRACAUDAL; PERINEURAL ONCE AS NEEDED
Status: COMPLETED | OUTPATIENT
Start: 2022-05-05 | End: 2022-05-05

## 2022-05-05 RX ORDER — MIDAZOLAM HYDROCHLORIDE 1 MG/ML
1 INJECTION INTRAMUSCULAR; INTRAVENOUS
Status: DISCONTINUED | OUTPATIENT
Start: 2022-05-05 | End: 2022-05-05 | Stop reason: HOSPADM

## 2022-05-05 RX ORDER — ENOXAPARIN SODIUM 100 MG/ML
40 INJECTION SUBCUTANEOUS DAILY
Status: DISCONTINUED | OUTPATIENT
Start: 2022-05-06 | End: 2022-05-06 | Stop reason: HOSPADM

## 2022-05-05 RX ORDER — LORAZEPAM 2 MG/ML
0.5 INJECTION INTRAMUSCULAR EVERY 12 HOURS PRN
Status: DISCONTINUED | OUTPATIENT
Start: 2022-05-05 | End: 2022-05-06 | Stop reason: HOSPADM

## 2022-05-05 RX ORDER — CYANOCOBALAMIN 1000 UG/ML
1000 INJECTION, SOLUTION INTRAMUSCULAR; SUBCUTANEOUS ONCE
Status: COMPLETED | OUTPATIENT
Start: 2022-05-06 | End: 2022-05-06

## 2022-05-05 RX ORDER — GABAPENTIN 250 MG/5ML
100 SOLUTION ORAL 3 TIMES DAILY
Status: DISCONTINUED | OUTPATIENT
Start: 2022-05-05 | End: 2022-05-06 | Stop reason: HOSPADM

## 2022-05-05 RX ORDER — BUPIVACAINE HYDROCHLORIDE 2.5 MG/ML
INJECTION, SOLUTION EPIDURAL; INFILTRATION; INTRACAUDAL
Status: COMPLETED | OUTPATIENT
Start: 2022-05-05 | End: 2022-05-05

## 2022-05-05 RX ORDER — PANTOPRAZOLE SODIUM 40 MG/10ML
40 INJECTION, POWDER, LYOPHILIZED, FOR SOLUTION INTRAVENOUS
Status: DISCONTINUED | OUTPATIENT
Start: 2022-05-06 | End: 2022-05-06 | Stop reason: HOSPADM

## 2022-05-05 RX ORDER — CHLORHEXIDINE GLUCONATE 0.12 MG/ML
30 RINSE ORAL
Status: COMPLETED | OUTPATIENT
Start: 2022-05-05 | End: 2022-05-05

## 2022-05-05 RX ORDER — GLYCOPYRROLATE 0.2 MG/ML
INJECTION INTRAMUSCULAR; INTRAVENOUS AS NEEDED
Status: DISCONTINUED | OUTPATIENT
Start: 2022-05-05 | End: 2022-05-05 | Stop reason: SURG

## 2022-05-05 RX ORDER — ONDANSETRON 2 MG/ML
4 INJECTION INTRAMUSCULAR; INTRAVENOUS ONCE AS NEEDED
Status: DISCONTINUED | OUTPATIENT
Start: 2022-05-05 | End: 2022-05-05 | Stop reason: HOSPADM

## 2022-05-05 RX ORDER — SODIUM CHLORIDE, SODIUM LACTATE, POTASSIUM CHLORIDE, CALCIUM CHLORIDE 600; 310; 30; 20 MG/100ML; MG/100ML; MG/100ML; MG/100ML
150 INJECTION, SOLUTION INTRAVENOUS CONTINUOUS
Status: DISCONTINUED | OUTPATIENT
Start: 2022-05-05 | End: 2022-05-06 | Stop reason: HOSPADM

## 2022-05-05 RX ORDER — HYDROMORPHONE HYDROCHLORIDE 2 MG/1
2 TABLET ORAL EVERY 4 HOURS PRN
Status: DISCONTINUED | OUTPATIENT
Start: 2022-05-05 | End: 2022-05-06 | Stop reason: HOSPADM

## 2022-05-05 RX ORDER — CEFAZOLIN SODIUM 2 G/100ML
2 INJECTION, SOLUTION INTRAVENOUS ONCE
Status: COMPLETED | OUTPATIENT
Start: 2022-05-05 | End: 2022-05-05

## 2022-05-05 RX ORDER — DEXAMETHASONE SODIUM PHOSPHATE 10 MG/ML
INJECTION, SOLUTION INTRAMUSCULAR; INTRAVENOUS
Status: COMPLETED | OUTPATIENT
Start: 2022-05-05 | End: 2022-05-05

## 2022-05-05 RX ORDER — OXYCODONE HYDROCHLORIDE 5 MG/1
5 TABLET ORAL EVERY 6 HOURS PRN
Status: DISCONTINUED | OUTPATIENT
Start: 2022-05-05 | End: 2022-05-06 | Stop reason: HOSPADM

## 2022-05-05 RX ORDER — ENOXAPARIN SODIUM 100 MG/ML
40 INJECTION SUBCUTANEOUS ONCE
Status: DISCONTINUED | OUTPATIENT
Start: 2022-05-05 | End: 2022-05-05 | Stop reason: HOSPADM

## 2022-05-05 RX ORDER — METOCLOPRAMIDE HYDROCHLORIDE 5 MG/ML
10 INJECTION INTRAMUSCULAR; INTRAVENOUS EVERY 6 HOURS PRN
Status: DISCONTINUED | OUTPATIENT
Start: 2022-05-05 | End: 2022-05-06 | Stop reason: HOSPADM

## 2022-05-05 RX ORDER — ONDANSETRON 4 MG/1
4 TABLET, FILM COATED ORAL EVERY 6 HOURS PRN
Status: DISCONTINUED | OUTPATIENT
Start: 2022-05-09 | End: 2022-05-06 | Stop reason: HOSPADM

## 2022-05-05 RX ADMIN — DEXAMETHASONE SODIUM PHOSPHATE 4 MG: 10 INJECTION, SOLUTION INTRAMUSCULAR; INTRAVENOUS at 07:22

## 2022-05-05 RX ADMIN — ROCURONIUM BROMIDE 10 MG: 10 INJECTION, SOLUTION INTRAVENOUS at 08:00

## 2022-05-05 RX ADMIN — GABAPENTIN 600 MG: 300 CAPSULE ORAL at 06:25

## 2022-05-05 RX ADMIN — CEFAZOLIN SODIUM 2 G: 2 INJECTION, SOLUTION INTRAVENOUS at 14:43

## 2022-05-05 RX ADMIN — ONDANSETRON 4 MG: 2 INJECTION INTRAMUSCULAR; INTRAVENOUS at 11:52

## 2022-05-05 RX ADMIN — OXYCODONE 5 MG: 5 TABLET ORAL at 21:28

## 2022-05-05 RX ADMIN — BUPIVACAINE HYDROCHLORIDE 60 ML: 2.5 INJECTION, SOLUTION EPIDURAL; INFILTRATION; INTRACAUDAL; PERINEURAL at 07:22

## 2022-05-05 RX ADMIN — PROPOFOL 200 MG: 10 INJECTION, EMULSION INTRAVENOUS at 07:36

## 2022-05-05 RX ADMIN — LIDOCAINE HYDROCHLORIDE 50 MG: 10 INJECTION, SOLUTION EPIDURAL; INFILTRATION; INTRACAUDAL; PERINEURAL at 07:36

## 2022-05-05 RX ADMIN — FENTANYL CITRATE 50 MCG: 50 INJECTION, SOLUTION INTRAMUSCULAR; INTRAVENOUS at 09:47

## 2022-05-05 RX ADMIN — GABAPENTIN 100 MG: 100 CAPSULE ORAL at 16:59

## 2022-05-05 RX ADMIN — SODIUM CHLORIDE, POTASSIUM CHLORIDE, SODIUM LACTATE AND CALCIUM CHLORIDE 150 ML/HR: 600; 310; 30; 20 INJECTION, SOLUTION INTRAVENOUS at 07:00

## 2022-05-05 RX ADMIN — GLYCOPYRROLATE 0.4 MG: 0.2 INJECTION INTRAMUSCULAR; INTRAVENOUS at 09:07

## 2022-05-05 RX ADMIN — SODIUM CHLORIDE, POTASSIUM CHLORIDE, SODIUM LACTATE AND CALCIUM CHLORIDE 150 ML/HR: 600; 310; 30; 20 INJECTION, SOLUTION INTRAVENOUS at 14:45

## 2022-05-05 RX ADMIN — ROCURONIUM BROMIDE 50 MG: 10 INJECTION, SOLUTION INTRAVENOUS at 07:36

## 2022-05-05 RX ADMIN — SODIUM CHLORIDE, POTASSIUM CHLORIDE, SODIUM LACTATE AND CALCIUM CHLORIDE 1000 ML: 600; 310; 30; 20 INJECTION, SOLUTION INTRAVENOUS at 06:12

## 2022-05-05 RX ADMIN — SODIUM CHLORIDE, POTASSIUM CHLORIDE, SODIUM LACTATE AND CALCIUM CHLORIDE: 600; 310; 30; 20 INJECTION, SOLUTION INTRAVENOUS at 09:07

## 2022-05-05 RX ADMIN — ACETAMINOPHEN 1000 MG: 500 TABLET ORAL at 06:25

## 2022-05-05 RX ADMIN — CHLORHEXIDINE GLUCONATE 0.12% ORAL RINSE 30 ML: 1.2 LIQUID ORAL at 06:25

## 2022-05-05 RX ADMIN — FENTANYL CITRATE 100 MCG: 50 INJECTION, SOLUTION INTRAMUSCULAR; INTRAVENOUS at 07:36

## 2022-05-05 RX ADMIN — GABAPENTIN 100 MG: 100 CAPSULE ORAL at 21:17

## 2022-05-05 RX ADMIN — PROPOFOL 25 MCG/KG/MIN: 10 INJECTION, EMULSION INTRAVENOUS at 07:36

## 2022-05-05 RX ADMIN — ONDANSETRON 4 MG: 2 INJECTION INTRAMUSCULAR; INTRAVENOUS at 09:07

## 2022-05-05 RX ADMIN — CHLORHEXIDINE GLUCONATE 0.12% ORAL RINSE 30 ML: 1.2 LIQUID ORAL at 06:29

## 2022-05-05 RX ADMIN — LABETALOL HYDROCHLORIDE 3 MG: 5 INJECTION, SOLUTION INTRAVENOUS at 08:27

## 2022-05-05 RX ADMIN — NEOSTIGMINE METHYLSULFATE 2.5 MG: 0.5 INJECTION INTRAVENOUS at 09:07

## 2022-05-05 RX ADMIN — ACETAMINOPHEN 1000 MG: 500 TABLET ORAL at 14:42

## 2022-05-05 RX ADMIN — DEXAMETHASONE SODIUM PHOSPHATE 8 MG: 4 INJECTION, SOLUTION INTRA-ARTICULAR; INTRALESIONAL; INTRAMUSCULAR; INTRAVENOUS; SOFT TISSUE at 07:43

## 2022-05-05 RX ADMIN — CEFAZOLIN SODIUM 2 G: 2 INJECTION, SOLUTION INTRAVENOUS at 07:35

## 2022-05-05 RX ADMIN — HYDROMORPHONE HYDROCHLORIDE 1 MG: 1 INJECTION, SOLUTION INTRAMUSCULAR; INTRAVENOUS; SUBCUTANEOUS at 11:51

## 2022-05-05 RX ADMIN — GLUCAGON HYDROCHLORIDE 1 MG: KIT at 08:27

## 2022-05-05 RX ADMIN — ACETAMINOPHEN 1000 MG: 500 TABLET ORAL at 21:17

## 2022-05-05 RX ADMIN — CEFAZOLIN SODIUM 2 G: 2 INJECTION, SOLUTION INTRAVENOUS at 23:02

## 2022-05-05 RX ADMIN — PANTOPRAZOLE SODIUM 40 MG: 40 INJECTION, POWDER, FOR SOLUTION INTRAVENOUS at 06:25

## 2022-05-05 RX ADMIN — SCOPALAMINE 1 PATCH: 1 PATCH, EXTENDED RELEASE TRANSDERMAL at 06:25

## 2022-05-05 RX ADMIN — SODIUM CHLORIDE, POTASSIUM CHLORIDE, SODIUM LACTATE AND CALCIUM CHLORIDE 150 ML/HR: 600; 310; 30; 20 INJECTION, SOLUTION INTRAVENOUS at 11:51

## 2022-05-05 RX ADMIN — LIDOCAINE HYDROCHLORIDE 0.5 ML: 10 INJECTION, SOLUTION EPIDURAL; INFILTRATION; INTRACAUDAL; PERINEURAL at 06:12

## 2022-05-05 RX ADMIN — SODIUM CHLORIDE, POTASSIUM CHLORIDE, SODIUM LACTATE AND CALCIUM CHLORIDE 150 ML/HR: 600; 310; 30; 20 INJECTION, SOLUTION INTRAVENOUS at 21:46

## 2022-05-05 NOTE — CASE MANAGEMENT/SOCIAL WORK
Discharge Planning Assessment  [facility]     Patient Name: Theresa Maya  MRN: [id number]  Today's Date: 5/5/2022    Admit Date: 5/5/2022     Discharge Needs Assessment     Row Name 05/05/22 1522       Living Environment    People in Home child(maninder), dependent;child(maninder), adult;parent(s);spouse  Pt resides in [facility]    Name(s) of People in Home - Charlene, mother- Tete Barrera and 4 children    Current Living Arrangements home    Primary Care Provided by self    Provides Primary Care For child(maninder)    Family Caregiver if Needed spouse;parent(s)    Family Caregiver Names Juve and Tete    Quality of Family Relationships helpful;involved;supportive    Able to Return to Prior Arrangements yes       Resource/Environmental Concerns    Resource/Environmental Concerns none    Home Accessibility Concerns stairs to enter home  pt has 3 steps into her home without railing    Transportation Concerns none       Transition Planning    Patient/Family Anticipates Transition to home with family    Patient/Family Anticipated Services at Transition none    Transportation Anticipated family or friend will provide       Discharge Needs Assessment    Readmission Within the Last 30 Days no previous admission in last 30 days    Equipment Currently Used at Home none    Concerns to be Addressed denies needs/concerns at this time    Anticipated Changes Related to Illness none    Equipment Needed After Discharge none               Discharge Plan     Row Name 05/05/22 1524       Plan    Plan home    Provided Post Acute Provider List? N/A    Provided Post Acute Provider Quality & Resource List? N/A    Patient/Family in Agreement with Plan yes    Plan Comments CM spoke with pt at bedside. Pt resides in [facility] with her  Juve, mother Tete and 4 children. Pt is independent of adls and works full time at [employer]. Pt denies use of DME and is not current with home health or outpatient  medical services. Pt denies having a living will or legal POA. Pt has not received her covid vaccinations.Pt has [employer] Medicaid, denies concerns or disruption in coverage. Pt has prescription drug coverage and denies issues obtaining or affording current medications. Pt plans to return home and denies needs at this time. Pt will have private transportation home. CM will continue to follow.    Final Discharge Disposition Code 01 - home or self-care              Continued Care and Services - Admitted Since 5/5/2022    Coordination has not been started for this encounter.          Demographic Summary     Row Name 05/05/22 1520       General Information    Referral Source admission list    Reason for Consult discharge planning    Preferred Language English    General Information Comments PCP- Winston Kc       Contact Information    Permission Granted to Share Info With                Functional Status     Row Name 05/05/22 1521       Functional Status    Usual Activity Tolerance good    Current Activity Tolerance moderate       Functional Status, IADL    Medications independent    Meal Preparation independent    Housekeeping independent    Laundry independent    Shopping independent       Mental Status    General Appearance WDL WDL       Mental Status Summary    Recent Changes in Mental Status/Cognitive Functioning no changes       Employment/    Employment Status employed full-time    Employment/ Comments Pt has [employer] Medicaid, denies concerns or disruption in coverage. Pt has prescription drug coverage and denies issues obtaining or affording current medications.               Psychosocial    No documentation.                Abuse/Neglect    No documentation.                Legal    No documentation.                Substance Abuse    No documentation.                Patient Forms    No documentation.                   Brooklyn Layton RN

## 2022-05-05 NOTE — ANESTHESIA PROCEDURE NOTES
Airway  Urgency: elective    Date/Time: 5/5/2022 7:54 AM  Airway not difficult    General Information and Staff    Patient location during procedure: OR    Indications and Patient Condition  Indications for airway management: airway protection    Preoxygenated: yes  MILS not maintained throughout  Mask difficulty assessment: 1 - vent by mask    Final Airway Details  Final airway type: endotracheal airway      Successful airway: ETT  Cuffed: yes   Successful intubation technique: direct laryngoscopy  Endotracheal tube insertion site: oral  Blade: Tonia  Blade size: 3  ETT size (mm): 7.5  Cormack-Lehane Classification: grade I - full view of glottis  Placement verified by: chest auscultation and capnometry   Measured from: lips  ETT/EBT  to lips (cm): 20  Number of attempts at approach: 1  Assessment: lips, teeth, and gum same as pre-op and atraumatic intubation    Additional Comments  Negative epigastric sounds, Breath sound equal bilaterally with symmetric chest rise and fall

## 2022-05-05 NOTE — OP NOTE
Preoperative Diagnosis:   Morbid obesity (110 kg/BMI 42.70) with Multiple Co-Morbidities, hiatal hernia    Postoperative Diagnosis:   Same    Procedure:                                                      Laparoscopic Sleeve Gastrectomy (85% subtotal vertical gastrectomy, Titan (59D68)                                  Laparoscopic hiatal hernia repair (not paraesophageal)                                                                                                                     EGD                                                                       Surgeon:                                                       BRUNA Ricardo MD    Anesthesia:                                                   GETA    EBL:                                                              Minimal    Fluids:                                                           Crystalloid    Specimens:                                                   Subtotal gastrectomy    Drains:                                                           None    Counts:                                                          Correct    Complications:                                               None    Indications:   This is a 31 year-old morbidly obese female who presents for elective laparoscopic sleeve gastrectomy, hiatal hernia repair, and EGD.  She's undergone our extensive preoperative education teaching and consent process everything's in order and she wishes to proceed.      Operative technique:     The patient was brought to the operating room, and placed supine upon the operating room table. SCD hose were placed, she underwent uneventful general endotracheal anesthesia per the anesthesiology staff, she received IV Ancef (her penicillin allergy noted) and subcutaneous Lovenox, the anesthesiology staff performed a tap block, and her abdomen was prepped and draped with ChloraPrep in a sterile fashion, an Ioban was used as well, a Cali catheter  was not placed.    The peritoneal cavity was entered in the high in the left midclavicular line using a 5 mm trocar utilizing an Optiview technique and the abdomen was insufflated to a pressure of 15 mmHg with CO2 gas.  Exploratory laparoscopy revealed no evidence of injury from the entrance technique, a normal-appearing liver, status post cholecystectomy, mild rectus diastases.    Remaining trocars were placed under direct visualization including 5 mm trochars in the right mid and left lateral abdomen and a 19 mm trocar below and to the right of the umbilicus.    Through a stab incision in the epigastrium a Flavio retractor was used to elevate the left lobe of the liver.  The hiatal hernia was not readily apparent from the anterior view.  Beginning approximately two thirds of the way around the greater curvature the stomach, the gastrocolic vessels were divided using the Enseal device.  This proceeded proximally taking down all the short gastric vessels and exposing the left bishop.  Excessive oozing noted.  We checked with pharmacy and it was felt given her MTHFR mutation IV TXA was contraindicated.  The hiatal hernia remains subtle from the posterior lateral view however it appeared the cardia was clearly herniating into the mediastinum and photodocumentation of the hiatus was obtained.    The hernia sac was incised along the base of the left bishop and extended up and across the phrenoesophageal membrane.  The pars flaccida was divided, there was not a replaced hepatic vessel.  The hernia sac was incised along the base of the right bishop and also extended up and across the phrenoesophageal membrane.  The hernia sac and its contents were dissected out of the mediastinum and reduced below the level of the crura.  There was not a paraesophageal component.  A latex free drain was used temporarily for esophageal retraction.  The GE junction was lengthened to well below the level of the crura by dissecting loose areolar  tissue well into the mediastinum.  The crura were dissected to their meeting point inferiorly.  The anterior and posterior vagus nerves were preserved.  The hiatal hernia repair was performed posteriorly using a running nonabsorbable 2-0  V-Loc suture with good result, photodocumentation obtained before and after repair.    Gastrocolic vessels were then divided medially to a few cm proximal to the pylorus.  Adhesions of the posterior stomach to the pancreas and retroperitoneum were divided.  The stomach was marked with a Kitner saturated with a marking pen 1 cm lateral to the angle of His, 3 cm away from the angularis, and 6 cm from the pylorus.  1 mg glucagon IV given.  The previously placed 38 Bengali balloon bougie was advanced into the distal antrum and the balloon insufflated with 15 cc of saline.    The Titan stapler was positioned along the markings with the calibration balloon at the angularis and the stapler was closed.  The calibration bougie was desufflated and removed.  The 85% subtotal vertical sleeve gastrectomy was then performed with a single firing using the Titan stapler (59D68).     The Titan stapler was removed.  The subtotal gastrectomy specimen was retrieved through the 19 mm trocar site incision, inspected, and sent unopened to pathology for permanent section.  It was an average size but much thicker than average specimen.  The sleeve was submerged under saline.  Upper endoscopy was performed, and the endoscope was advanced into the duodenal bulb.  No air bubbles or leak seen, no bleeding at the staple line, no narrowing at the angularis, no pyloric spasm or deformity, no gastritis, no hiatal hernia or Caldwell's esophagus, Z-line approximately 37 cm, and the endoscope was withdrawn.  Endoscopic photodocumentation obtained of the GE junction and widely patent pylorus.  Irrigation fluid was suctioned free.  The sleeve was resting nicely and hemostatic as well as the hiatus and other areas of  dissection.  The sleeve staple line were treated with a total of 4 cc of aerosolized Tisseel fibrin glue.  Photodocumentation of the sleeve obtained before and after endoscopy.  The Flavio retractor was removed.  Fascia at the 19 mm trocar site incision was closed with a horizontal mattress 0 Vicryl suture placed with a suture passer under direct visualization and tying the knot extracorporeally.  Remaining trocars were removed under direct visualization, no bleeding noted from their sites.  Skin in each incision was closed using 3-0 Monocryl plus in an interrupted subcuticular stitch followed by skin glue.  The patient tolerated the procedure well without complication, was taken to the recovery room in stable condition.

## 2022-05-05 NOTE — ANESTHESIA PROCEDURE NOTES
Peripheral Block      Patient reassessed immediately prior to procedure    Patient location during procedure: OR  Reason for block: at surgeon's request and post-op pain management  Performed by  CRNA/CAA: Junior MICHAEL Schafer, CRNA  Preanesthetic Checklist  Completed: patient identified, IV checked, site marked, risks and benefits discussed, surgical consent, monitors and equipment checked, pre-op evaluation and timeout performed  Prep:  Pt Position: supine  Sterile barriers:cap, gloves, sterile barriers and mask  Prep: ChloraPrep  Patient monitoring: blood pressure monitoring, continuous pulse oximetry and EKG  Procedure    Sedation: yes  Performed under: general  Guidance:ultrasound guided  Images:still images obtained, printed/placed on chart    Laterality:Bilateral  Block Type:TAP  Injection Technique:single-shot  Needle Type:short-bevel and echogenic  Needle Gauge:20 G  Resistance on Injection: none    Medications Used: dexamethasone sodium phosphate injection, 4 mg  bupivacaine PF (MARCAINE) 0.25 % injection, 60 mL      Medications  Comment:Block Injection:  LA dose divided between Right and Left block        Post Assessment  Injection Assessment: negative aspiration for heme, incremental injection and no paresthesia on injection  Patient Tolerance:comfortable throughout block  Complications:no  Additional Notes  Subcostal tap blocks bilaterally    Under Ultrasound guidance, a BBraun 4inch 360 degree needle was advanced with Normal Saline hydro dissection of tissue.  The Internal Oblique and Transversus Abdominus muscles where visualized.  At or before the aponeurosis of Internal Oblique, local anesthetic spread was visualized in the Transversus Abdominus Plane. Injection was made incrementally with aspiration every 5 mls.  There was no  intravascular injection,  injection pressure was normal, there was no neural injection, and the procedure was completed without difficulty.  Thank You.

## 2022-05-05 NOTE — ANESTHESIA PREPROCEDURE EVALUATION
Anesthesia Evaluation     Patient summary reviewed and Nursing notes reviewed   no history of anesthetic complications:  NPO Solid Status: > 8 hours  NPO Liquid Status: > 2 hours           Airway   Mallampati: II  TM distance: >3 FB  Neck ROM: full  No difficulty expected  Dental - normal exam     Pulmonary - negative pulmonary ROS and normal exam    breath sounds clear to auscultation  Cardiovascular - negative cardio ROS and normal exam    ECG reviewed  Rhythm: regular  Rate: normal      ROS comment: 2017 - Echo:  · Normal left ventricular cavity size and wall thickness noted. All left ventricular wall segments contract normally.  · Estimated EF appears to be in the range of 61 - 65%.  · Left ventricular diastolic dysfunction is noted (grade I) consistent with impaired relaxation.  · The aortic valve is structurally normal. No aortic valve regurgitation is present. No aortic valve stenosis is present.  · The mitral valve is normal in structure. No mitral valve regurgitation is present. No significant mitral valve stenosis is present.  · The tricuspid valve is normal. No tricuspid valve stenosis is present. No tricuspid valve regurgitation is present.  · There is no evidence of pericardial effusion.    Neuro/Psych- negative ROS  GI/Hepatic/Renal/Endo    (+) morbid obesity, GERD well controlled,      Musculoskeletal     Abdominal    Substance History      OB/GYN          Other   arthritis,                      Anesthesia Plan    ASA 3     general with block   (Bilateral TAP blocks post-induction for post-operative analgesia per request of Dr. Ricardo)  intravenous induction     Anesthetic plan, all risks, benefits, and alternatives have been provided, discussed and informed consent has been obtained with: patient.    Plan discussed with CRNA.        CODE STATUS:

## 2022-05-05 NOTE — BRIEF OP NOTE
GASTRIC SLEEVE LAPAROSCOPIC, HIATAL HERNIA REPAIR LAPAROSCOPIC, ESOPHAGOGASTRODUODENOSCOPY  Progress Note    Theresa Maya  5/5/2022    Pre-op Diagnosis:   Morbid obesity with body mass index of 40.0-44.9 in adult ([facility]) [E66.01, Z68.41]  Hiatal hernia [K44.9]       Post-Op Diagnosis Codes:     * Morbid obesity with body mass index of 40.0-44.9 in adult ([facility]) [E66.01, Z68.41]     * Hiatal hernia [K44.9]    Procedure/CPT® Codes:  NE LAP, HUMERA RESTRICT PROC, LONGITUDINAL GASTRECTOMY [14038]  NE LAP,ESOPHAGOGAST FUNDOPLASTY [19338]  NE ESOPHAGOGASTRODUODENOSCOPY TRANSORAL DIAGNOSTIC [16512]      Procedure(s):  GASTRIC SLEEVE LAPAROSCOPIC  HIATAL HERNIA REPAIR LAPAROSCOPIC  ESOPHAGOGASTRODUODENOSCOPY    Surgeon(s):  Cristiano Ricardo MD    Anesthesia: General with Block    Staff:   Circulator: Marie Palma RN  Scrub Person: Carol Hensley  Nursing Assistant: Maria Ines Bazan CNA         Estimated Blood Loss: 100ml    Urine Voided: * No values recorded between 5/5/2022  7:24 AM and 5/5/2022  9:07 AM *    Specimens:                Specimens     ID Source Type Tests Collected By Collected At Frozen?    A Stomach Tissue · TISSUE PATHOLOGY EXAM   Cristiano Ricardo MD 5/5/22 0893 No    Description: SUB-TOTAL GASTRECTOMY    This specimen was not marked as sent.                Drains: * No LDAs found *    Findings:         Complications: none          Cristiano Ricardo MD     Date: 5/5/2022  Time: 09:07 EDT

## 2022-05-05 NOTE — ANESTHESIA POSTPROCEDURE EVALUATION
Patient: Theresa Maya    Procedure Summary     Date: 05/05/22 Room / [facility]:  [facility]    Anesthesia Start: 0725 Anesthesia Stop:     Procedures:       GASTRIC SLEEVE LAPAROSCOPIC (N/A Abdomen)      HIATAL HERNIA REPAIR LAPAROSCOPIC (N/A Abdomen)      ESOPHAGOGASTRODUODENOSCOPY (N/A Esophagus) Diagnosis:       Morbid obesity with body mass index of 40.0-44.9 in adult ([facility])      Hiatal hernia      (Morbid obesity with body mass index of 40.0-44.9 in adult ([facility]) [E66.01, Z68.41])      (Hiatal hernia [K44.9])    Surgeons: Cristiano Ricrado MD Provider: Stevie Ortega MD    Anesthesia Type: general with block ASA Status: 3          Anesthesia Type: general with block    Vitals  Vitals Value Taken Time   BP     Temp     Pulse     Resp     SpO2 92 % 05/05/22 0922   Vitals shown include unvalidated device data.        Post Anesthesia Care and Evaluation    Patient location during evaluation: PACU  Patient participation: complete - patient participated  Level of consciousness: awake and alert  Pain management: adequate  Airway patency: patent  Anesthetic complications: No anesthetic complications  PONV Status: none  Cardiovascular status: hemodynamically stable and acceptable  Respiratory status: nonlabored ventilation, acceptable and nasal cannula  Hydration status: acceptable    Comments: 58 hr 91% rr12 140/92 97.1

## 2022-05-05 NOTE — PLAN OF CARE
Goal Outcome Evaluation:  Plan of Care Reviewed With: patient        Progress: improving  Outcome Evaluation: VSS on RA. Ambulating in the halls. Voiding. Pain controlled with PRN meds. Discussed plan of care with patient who verbalizes understanding.

## 2022-05-06 ENCOUNTER — APPOINTMENT (OUTPATIENT)
Dept: GENERAL RADIOLOGY | Facility: HOSPITAL | Age: 32
End: 2022-05-06

## 2022-05-06 ENCOUNTER — READMISSION MANAGEMENT (OUTPATIENT)
Dept: CALL CENTER | Facility: HOSPITAL | Age: 32
End: 2022-05-06

## 2022-05-06 VITALS
OXYGEN SATURATION: 91 % | RESPIRATION RATE: 18 BRPM | HEIGHT: 63 IN | TEMPERATURE: 98.5 F | HEART RATE: 107 BPM | SYSTOLIC BLOOD PRESSURE: 132 MMHG | WEIGHT: 242.5 LBS | BODY MASS INDEX: 42.97 KG/M2 | DIASTOLIC BLOOD PRESSURE: 86 MMHG

## 2022-05-06 LAB
ALBUMIN SERPL-MCNC: 3.8 G/DL (ref 3.5–5.2)
ALBUMIN/GLOB SERPL: 1.6 G/DL
ALP SERPL-CCNC: 56 U/L (ref 39–117)
ALT SERPL W P-5'-P-CCNC: 13 U/L (ref 1–33)
ANION GAP SERPL CALCULATED.3IONS-SCNC: 11 MMOL/L (ref 5–15)
AST SERPL-CCNC: 19 U/L (ref 1–32)
BASOPHILS # BLD AUTO: 0.01 10*3/MM3 (ref 0–0.2)
BASOPHILS NFR BLD AUTO: 0.2 % (ref 0–1.5)
BILIRUB SERPL-MCNC: 0.2 MG/DL (ref 0–1.2)
BUN SERPL-MCNC: 5 MG/DL (ref 6–20)
BUN/CREAT SERPL: 8.1 (ref 7–25)
CALCIUM SPEC-SCNC: 8.6 MG/DL (ref 8.6–10.5)
CHLORIDE SERPL-SCNC: 104 MMOL/L (ref 98–107)
CO2 SERPL-SCNC: 27 MMOL/L (ref 22–29)
CREAT SERPL-MCNC: 0.62 MG/DL (ref 0.57–1)
CYTO UR: NORMAL
DEPRECATED RDW RBC AUTO: 44.2 FL (ref 37–54)
EGFRCR SERPLBLD CKD-EPI 2021: 122.3 ML/MIN/1.73
EOSINOPHIL # BLD AUTO: 0 10*3/MM3 (ref 0–0.4)
EOSINOPHIL NFR BLD AUTO: 0 % (ref 0.3–6.2)
ERYTHROCYTE [DISTWIDTH] IN BLOOD BY AUTOMATED COUNT: 14 % (ref 12.3–15.4)
GLOBULIN UR ELPH-MCNC: 2.4 GM/DL
GLUCOSE SERPL-MCNC: 95 MG/DL (ref 65–99)
HCT VFR BLD AUTO: 35.4 % (ref 34–46.6)
HGB BLD-MCNC: 11.2 G/DL (ref 12–15.9)
IMM GRANULOCYTES # BLD AUTO: 0.02 10*3/MM3 (ref 0–0.05)
IMM GRANULOCYTES NFR BLD AUTO: 0.3 % (ref 0–0.5)
IRON 24H UR-MRATE: 20 MCG/DL (ref 37–145)
LAB AP CASE REPORT: NORMAL
LAB AP CLINICAL INFORMATION: NORMAL
LYMPHOCYTES # BLD AUTO: 1.18 10*3/MM3 (ref 0.7–3.1)
LYMPHOCYTES NFR BLD AUTO: 18.4 % (ref 19.6–45.3)
MCH RBC QN AUTO: 27.3 PG (ref 26.6–33)
MCHC RBC AUTO-ENTMCNC: 31.6 G/DL (ref 31.5–35.7)
MCV RBC AUTO: 86.1 FL (ref 79–97)
MONOCYTES # BLD AUTO: 0.52 10*3/MM3 (ref 0.1–0.9)
MONOCYTES NFR BLD AUTO: 8.1 % (ref 5–12)
NEUTROPHILS NFR BLD AUTO: 4.7 10*3/MM3 (ref 1.7–7)
NEUTROPHILS NFR BLD AUTO: 73 % (ref 42.7–76)
NRBC BLD AUTO-RTO: 0 /100 WBC (ref 0–0.2)
PATH REPORT.FINAL DX SPEC: NORMAL
PATH REPORT.GROSS SPEC: NORMAL
PLATELET # BLD AUTO: 246 10*3/MM3 (ref 140–450)
PMV BLD AUTO: 10.9 FL (ref 6–12)
POTASSIUM SERPL-SCNC: 4 MMOL/L (ref 3.5–5.2)
PROT SERPL-MCNC: 6.2 G/DL (ref 6–8.5)
RBC # BLD AUTO: 4.11 10*6/MM3 (ref 3.77–5.28)
SODIUM SERPL-SCNC: 142 MMOL/L (ref 136–145)
WBC NRBC COR # BLD: 6.43 10*3/MM3 (ref 3.4–10.8)

## 2022-05-06 PROCEDURE — 85025 COMPLETE CBC W/AUTO DIFF WBC: CPT | Performed by: SURGERY

## 2022-05-06 PROCEDURE — 25010000002 CYANOCOBALAMIN PER 1000 MCG: Performed by: SURGERY

## 2022-05-06 PROCEDURE — 0 DIATRIZOATE MEGLUMINE & SODIUM PER 1 ML: Performed by: SURGERY

## 2022-05-06 PROCEDURE — 99024 POSTOP FOLLOW-UP VISIT: CPT | Performed by: SURGERY

## 2022-05-06 PROCEDURE — 25010000002 NA FERRIC GLUC CPLX PER 12.5 MG: Performed by: SURGERY

## 2022-05-06 PROCEDURE — 25010000002 THIAMINE PER 100 MG: Performed by: SURGERY

## 2022-05-06 PROCEDURE — 83540 ASSAY OF IRON: CPT | Performed by: SURGERY

## 2022-05-06 PROCEDURE — 25010000002 ENOXAPARIN PER 10 MG: Performed by: SURGERY

## 2022-05-06 PROCEDURE — 80053 COMPREHEN METABOLIC PANEL: CPT | Performed by: SURGERY

## 2022-05-06 PROCEDURE — 74240 X-RAY XM UPR GI TRC 1CNTRST: CPT

## 2022-05-06 RX ORDER — OMEPRAZOLE 40 MG/1
40 CAPSULE, DELAYED RELEASE ORAL DAILY
Qty: 60 CAPSULE | Refills: 0 | Status: SHIPPED | OUTPATIENT
Start: 2022-05-06 | End: 2022-07-05

## 2022-05-06 RX ORDER — ONDANSETRON 4 MG/1
4 TABLET, FILM COATED ORAL EVERY 4 HOURS PRN
Qty: 10 TABLET | Refills: 0 | Status: SHIPPED | OUTPATIENT
Start: 2022-05-06 | End: 2022-05-12

## 2022-05-06 RX ORDER — OXYCODONE HYDROCHLORIDE 5 MG/1
5 TABLET ORAL EVERY 4 HOURS PRN
Qty: 10 TABLET | Refills: 0 | Status: SHIPPED | OUTPATIENT
Start: 2022-05-06 | End: 2022-05-19

## 2022-05-06 RX ADMIN — PANTOPRAZOLE SODIUM 40 MG: 40 INJECTION, POWDER, FOR SOLUTION INTRAVENOUS at 06:37

## 2022-05-06 RX ADMIN — ALPRAZOLAM 0.25 MG: 0.25 TABLET ORAL at 08:18

## 2022-05-06 RX ADMIN — CYANOCOBALAMIN 1000 MCG: 1000 INJECTION, SOLUTION INTRAMUSCULAR; SUBCUTANEOUS at 08:18

## 2022-05-06 RX ADMIN — ACETAMINOPHEN 1000 MG: 500 TABLET ORAL at 06:37

## 2022-05-06 RX ADMIN — GABAPENTIN 100 MG: 100 CAPSULE ORAL at 08:18

## 2022-05-06 RX ADMIN — THIAMINE HYDROCHLORIDE 100 ML/HR: 100 INJECTION, SOLUTION INTRAMUSCULAR; INTRAVENOUS at 08:18

## 2022-05-06 RX ADMIN — ENOXAPARIN SODIUM 40 MG: 40 INJECTION SUBCUTANEOUS at 08:18

## 2022-05-06 RX ADMIN — CETIRIZINE HYDROCHLORIDE 10 MG: 10 TABLET, FILM COATED ORAL at 08:18

## 2022-05-06 RX ADMIN — DULOXETINE HYDROCHLORIDE 60 MG: 60 CAPSULE, DELAYED RELEASE ORAL at 08:18

## 2022-05-06 NOTE — CASE MANAGEMENT/SOCIAL WORK
Continued Stay Note  [facility]     Patient Name: Theresa Maya  MRN: [id number]  Today's Date: 5/6/2022    Admit Date: 5/5/2022     Discharge Plan     Row Name 05/06/22 0910       Plan    Plan Home with family    Patient/Family in Agreement with Plan yes    Plan Comments Spoke with patient at bedside. Plan is home with family. Patient denies any discharge needs at this time. CM will continue to follow.    Final Discharge Disposition Code 01 - home or self-care               Discharge Codes    No documentation.                     Jovani Hoang RN

## 2022-05-06 NOTE — PLAN OF CARE
Problem: Adult Inpatient Plan of Care  Goal: Plan of Care Review  Outcome: Met  Goal: Patient-Specific Goal (Individualized)  Outcome: Met  Goal: Absence of Hospital-Acquired Illness or Injury  Outcome: Met  Intervention: Identify and Manage Fall Risk  Recent Flowsheet Documentation  Taken 5/6/2022 0800 by Christiana Ruiz RN  Safety Promotion/Fall Prevention:   activity supervised   assistive device/personal items within reach   clutter free environment maintained   fall prevention program maintained   nonskid shoes/slippers when out of bed   room organization consistent   safety round/check completed  Intervention: Prevent Skin Injury  Recent Flowsheet Documentation  Taken 5/6/2022 0800 by Christiana Ruiz RN  Body Position: position changed independently  Intervention: Prevent and Manage VTE (Venous Thromboembolism) Risk  Recent Flowsheet Documentation  Taken 5/6/2022 0800 by Christiana Ruiz RN  Activity Management: activity adjusted per tolerance  Goal: Optimal Comfort and Wellbeing  Outcome: Met  Goal: Readiness for Transition of Care  Outcome: Met     Problem: Bariatric Care Environmental Safety (Bariatric Surgery)  Goal: Safety Maintained with Care  Outcome: Met     Problem: Bleeding (Bariatric Surgery)  Goal: Absence of Bleeding  Outcome: Met     Problem: Bowel Motility Impaired (Bariatric Surgery)  Goal: Effective Bowel Motility and Elimination  Outcome: Met     Problem: Fluid and Electrolyte Imbalance (Bariatric Surgery)  Goal: Fluid and Electrolyte Balance  Outcome: Met     Problem: Glycemic Control Impaired (Bariatric Surgery)  Goal: Blood Glucose Level Within Desired Range  Outcome: Met     Problem: Infection (Bariatric Surgery)  Goal: Absence of Infection Signs and Symptoms  Outcome: Met     Problem: Ongoing Anesthesia Effects (Bariatric Surgery)  Goal: Anesthesia/Sedation Recovery  Outcome: Met  Intervention: Optimize Anesthesia Recovery  Recent Flowsheet Documentation  Taken 5/6/2022 0800 by Christiana Ruiz  RN  Patient Tolerance (IS): good  Safety Promotion/Fall Prevention:   activity supervised   assistive device/personal items within reach   clutter free environment maintained   fall prevention program maintained   nonskid shoes/slippers when out of bed   room organization consistent   safety round/check completed     Problem: Pain (Bariatric Surgery)  Goal: Acceptable Pain Control  Outcome: Met     Problem: Postoperative Nausea and Vomiting (Bariatric Surgery)  Goal: Nausea and Vomiting Relief  Outcome: Met     Problem: Postoperative Urinary Retention (Bariatric Surgery)  Goal: Effective Urinary Elimination  Outcome: Met     Problem: Respiratory Compromise (Bariatric Surgery)  Goal: Effective Oxygenation and Ventilation  Outcome: Met  Intervention: Optimize Oxygenation and Ventilation  Recent Flowsheet Documentation  Taken 5/6/2022 0800 by Christiana Ruiz, RN  Head of Bed (HOB) Positioning: HOB elevated   Goal Outcome Evaluation:

## 2022-05-06 NOTE — CASE MANAGEMENT/SOCIAL WORK
Case Management Discharge Note      Final Note: Plan is home with family. Family will transport by car. Patient denies any discharge needs.    Provided Post Acute Provider List?: N/A  Provided Post Acute Provider Quality & Resource List?: N/A    Selected Continued Care - Admitted Since 5/5/2022     Destination    No services have been selected for the patient.              Durable Medical Equipment    No services have been selected for the patient.              Dialysis/Infusion    No services have been selected for the patient.              Home Medical Care    No services have been selected for the patient.              Therapy    No services have been selected for the patient.              Community Resources    No services have been selected for the patient.              Community & DME    No services have been selected for the patient.                       Final Discharge Disposition Code: 01 - home or self-care

## 2022-05-06 NOTE — PROGRESS NOTES
"Cc: POD#1 LSG/HHR  \"feel ok\"    She is in bed alone in the room.  She looks and feels well would like to go home later if possible.  She is ambulating and voiding well.  No pulmonary complaints, spirometer 2000.  No bowel movement or flatus.  She is wondering if she can use an abdominal binder and I told her that is fine.  They have not brought her lunch tray yet but she is tolerating ice chips and water without difficulty.  She says she has no nausea but would like some antiemetics for discharge in case.  No fever or tachycardia pulse 72 respirations 18 blood pressure 132/86 saturation 91% UO 2300 - NR she is in no apparent distress.  Lungs clear to auscultation.  Heart regular rate and rhythm.  Abdomen soft, nontender/appropriate, nondistended, bowel sounds hypoactive.  Wounds look okay.  No significant ecchymosis.  CMP normal except for BUN of 5 iron is 20.  White blood count 6.43 with 73 segs 18 lymphs 8 monocytes no bands H&H 11.2 and 35.4.  Hemoglobin A1c normal 5.40 preoperative H&H 12.6 and 39.4.  Upper GI images reviewed, no leak or obstruction seen, report pending    Impression: Postop day #1 laparoscopic sleeve gastrectomy and hiatal hernia repair doing well clinically.  Iron deficiency anemia without evidence of bleeding on Lovenox.  MTHFR mutation.    Plan: Discharge home if tolerates her diet.  Discharge instructions discussed.  She says she has her Lovenox at home.  See orders  "

## 2022-05-06 NOTE — PAYOR COMM NOTE
"Theresa Maya (31 y.o. Female)     Bre GARCÍA -[phone], fax [phone]            Date of Birth   1990    Social Security Number       Address   [facility] [address] [place] [id number]    Home Phone   [phone]    MRN   [id number]       [facility]    Marital Status                               Admission Date   5/5/22    Admission Type   Elective    Admitting Provider   Cristiano Ricardo MD    Attending Provider   Cristiano Ricardo MD    Department, Room/Bed   [facility] 2F, S213/1       Discharge Date       Discharge Disposition       Discharge Destination                               Attending Provider: Cristiano Ricardo MD    Allergies: Amoxicillin, Latex, Penicillins    Isolation: None   Infection: None   Code Status: CPR   Advance Care Planning Activity    Ht: 160.5 cm (63.19\")   Wt: 110 kg (242 lb 8 oz)    Admission Cmt: None   Principal Problem: None                Active Insurance as of 5/5/2022     Primary Coverage     Payor Plan Insurance Group Employer/Plan Group    [employer] [place] [employer] [place]      Payor Plan Address Payor Plan Phone Number Payor Plan Fax Number Effective Dates    [address] [address] [phone]  6/2/2020 - None Entered    [facility] [address]       Subscriber Name Subscriber Birth Date Member ID       FRANCESCOREMIGIOTHERESA D 1990 [id number]                 Emergency Contacts      (Rel.) Home Phone Work Phone Mobile Phone    FrancescoJuve (Spouse) [phone] -- [phone]              Lab Results (last 24 hours)     Procedure Component Value Units Date/Time    Iron [[id number]]  (Abnormal) Collected: 05/06/22 0658    Specimen: Blood Updated: 05/06/22 1009     Iron 20 mcg/dL     Comprehensive Metabolic Panel [[id number]]  (Abnormal) Collected: 05/06/22 0658    Specimen: Blood Updated: 05/06/22 0807     Glucose 95 mg/dL      BUN 5 mg/dL      Creatinine 0.62 mg/dL      Sodium 142 mmol/L      Potassium 4.0 " mmol/L      Chloride 104 mmol/L      CO2 27.0 mmol/L      Calcium 8.6 mg/dL      Total Protein 6.2 g/dL      Albumin 3.80 g/dL      ALT (SGPT) 13 U/L      AST (SGOT) 19 U/L      Alkaline Phosphatase 56 U/L      Total Bilirubin 0.2 mg/dL      Globulin 2.4 gm/dL      Comment: Calculated Result        A/G Ratio 1.6 g/dL      BUN/Creatinine Ratio 8.1     Anion Gap 11.0 mmol/L      eGFR 122.3 mL/min/1.73      Comment: National Kidney Foundation and American Society of Nephrology (ASN) Task Force recommended calculation based on the Chronic Kidney Disease Epidemiology Collaboration (CKD-EPI) equation refit without adjustment for race.       Narrative:      GFR Normal >60  Chronic Kidney Disease <60  Kidney Failure <15      CBC & Differential [[id number]]  (Abnormal) Collected: 05/06/22 0658    Specimen: Blood Updated: 05/06/22 0754    Narrative:      The following orders were created for panel order CBC & Differential.  Procedure                               Abnormality         Status                     ---------                               -----------         ------                     CBC Auto Differential[[id number]]        Abnormal            Final result                 Please view results for these tests on the individual orders.    CBC Auto Differential [[id number]]  (Abnormal) Collected: 05/06/22 0658    Specimen: Blood Updated: 05/06/22 0754     WBC 6.43 10*3/mm3      RBC 4.11 10*6/mm3      Hemoglobin 11.2 g/dL      Hematocrit 35.4 %      MCV 86.1 fL      MCH 27.3 pg      MCHC 31.6 g/dL      RDW 14.0 %      RDW-SD 44.2 fl      MPV 10.9 fL      Platelets 246 10*3/mm3      Neutrophil % 73.0 %      Lymphocyte % 18.4 %      Monocyte % 8.1 %      Eosinophil % 0.0 %      Basophil % 0.2 %      Immature Grans % 0.3 %      Neutrophils, Absolute 4.70 10*3/mm3      Lymphocytes, Absolute 1.18 10*3/mm3      Monocytes, Absolute 0.52 10*3/mm3      Eosinophils, Absolute 0.00 10*3/mm3      Basophils, Absolute 0.01 10*3/mm3       Immature Grans, Absolute 0.02 10*3/mm3      nRBC 0.0 /100 WBC         Imaging Results (Last 24 Hours)     Procedure Component Value Units Date/Time    FL Upper GI Single Contrast With KUB [[id number]] Resulted: 05/06/22 0927     Updated: 05/06/22 0945        Operative/Procedure Notes (last 24 hours)  Notes from 05/05/22 1110 through 05/06/22 1110   No notes of this type exist for this encounter.         Physician Progress Notes (last 24 hours)  Notes from 05/05/22 1110 through 05/06/22 1110   No notes of this type exist for this encounter.         Consult Notes (last 24 hours)  Notes from 05/05/22 1110 through 05/06/22 1110   No notes of this type exist for this encounter.

## 2022-05-06 NOTE — OUTREACH NOTE
Prep Survey    Flowsheet Row Responses   Sabianist facility patient discharged from? [facility]   Is LACE score < 7 ? Yes   Emergency Room discharge w/ pulse ox? No   Eligibility [facility]   Date of Admission 05/05/22   Date of Discharge 05/06/22   Discharge Disposition Home or Self Care   Discharge diagnosis HIATAL HERNIA REPAIR  GASTRECTOMY   Does the patient have one of the following disease processes/diagnoses(primary or secondary)? General Surgery   Does the patient have Home health ordered? No   Is there a DME ordered? No   Prep survey completed? Yes          ERIN ESCOTO - Registered Nurse

## 2022-05-06 NOTE — PLAN OF CARE
Problem: Adult Inpatient Plan of Care  Goal: Plan of Care Review  Outcome: Ongoing, Progressing  Flowsheets  Taken 5/6/2022 0801 by Dylan Sharma RN  Plan of Care Reviewed With: patient  Outcome Evaluation: Pt. had a good night. Pt. was up and walking once before going to sleep last night. Pt. needed no nausea medication and pain medication only once. Pt. wainting on swallow study currently.  Taken 5/5/2022 1459 by Clifford Gallegos RN  Progress: improving  Goal: Patient-Specific Goal (Individualized)  Outcome: Ongoing, Progressing  Goal: Absence of Hospital-Acquired Illness or Injury  Outcome: Ongoing, Progressing  Intervention: Identify and Manage Fall Risk  Recent Flowsheet Documentation  Taken 5/6/2022 0600 by Dylan Sharma RN  Safety Promotion/Fall Prevention:   activity supervised   clutter free environment maintained   assistive device/personal items within reach   toileting scheduled   safety round/check completed   room organization consistent   nonskid shoes/slippers when out of bed   fall prevention program maintained   gait belt   lighting adjusted  Taken 5/6/2022 0400 by Dylan Sharma RN  Safety Promotion/Fall Prevention:   activity supervised   assistive device/personal items within reach   clutter free environment maintained   toileting scheduled   safety round/check completed   room organization consistent   nonskid shoes/slippers when out of bed   lighting adjusted   fall prevention program maintained  Taken 5/6/2022 0200 by Dylan Sharma RN  Safety Promotion/Fall Prevention:   toileting scheduled   safety round/check completed   room organization consistent   activity supervised   assistive device/personal items within reach   clutter free environment maintained   fall prevention program maintained   lighting adjusted   nonskid shoes/slippers when out of bed  Taken 5/6/2022 0000 by Dylan Sharma, RN  Safety Promotion/Fall Prevention:   toileting scheduled   safety round/check completed    room organization consistent   nonskid shoes/slippers when out of bed   lighting adjusted   fall prevention program maintained   activity supervised   assistive device/personal items within reach   clutter free environment maintained  Taken 5/5/2022 2200 by Dylan Sharma RN  Safety Promotion/Fall Prevention:   activity supervised   assistive device/personal items within reach   clutter free environment maintained   toileting scheduled   safety round/check completed   room organization consistent   nonskid shoes/slippers when out of bed   lighting adjusted   fall prevention program maintained  Taken 5/5/2022 2000 by Dylan Sharma RN  Safety Promotion/Fall Prevention:   activity supervised   toileting scheduled   safety round/check completed   room organization consistent   nonskid shoes/slippers when out of bed   lighting adjusted   fall prevention program maintained   clutter free environment maintained   assistive device/personal items within reach  Intervention: Prevent Skin Injury  Recent Flowsheet Documentation  Taken 5/6/2022 0600 by Dylan Sharma RN  Body Position: position changed independently  Taken 5/6/2022 0400 by Dylan Sharma RN  Body Position: position changed independently  Taken 5/6/2022 0200 by Dylan Sharma RN  Body Position: position changed independently  Taken 5/6/2022 0000 by Dylan Sharma RN  Body Position: position changed independently  Taken 5/5/2022 2200 by Dylan Sharma RN  Body Position: position changed independently  Taken 5/5/2022 2000 by Dylan Sharma RN  Body Position: position changed independently  Intervention: Prevent and Manage VTE (Venous Thromboembolism) Risk  Recent Flowsheet Documentation  Taken 5/6/2022 0600 by Dylan Sharma RN  Activity Management: up ad gail  Taken 5/6/2022 0400 by Dylan Sharma RN  Activity Management: activity adjusted per tolerance  Taken 5/6/2022 0200 by Dylan Sharma RN  Activity Management: activity adjusted per  tolerance  Taken 5/6/2022 0000 by Dylan Sharma RN  Activity Management: activity adjusted per tolerance  Taken 5/5/2022 2200 by Dylan Sharma RN  Activity Management: activity adjusted per tolerance  Taken 5/5/2022 2000 by Dylan Sharma RN  Activity Management: activity adjusted per tolerance  VTE Prevention/Management:   bilateral   sequential compression devices off   dorsiflexion/plantar flexion performed  Intervention: Prevent Infection  Recent Flowsheet Documentation  Taken 5/6/2022 0600 by Dylan Sharma RN  Infection Prevention:   visitors restricted/screened   single patient room provided   rest/sleep promoted   personal protective equipment utilized   hand hygiene promoted   equipment surfaces disinfected  Taken 5/6/2022 0400 by Dylan Sharam RN  Infection Prevention:   visitors restricted/screened   single patient room provided   rest/sleep promoted   personal protective equipment utilized   hand hygiene promoted   equipment surfaces disinfected  Taken 5/6/2022 0200 by Dylan Sharma RN  Infection Prevention:   visitors restricted/screened   single patient room provided   rest/sleep promoted   personal protective equipment utilized   hand hygiene promoted   equipment surfaces disinfected  Taken 5/6/2022 0000 by Dylan Sharma RN  Infection Prevention:   visitors restricted/screened   single patient room provided   rest/sleep promoted   personal protective equipment utilized   hand hygiene promoted   equipment surfaces disinfected  Taken 5/5/2022 2200 by Dylan Sharma RN  Infection Prevention:   visitors restricted/screened   single patient room provided   rest/sleep promoted   personal protective equipment utilized   hand hygiene promoted   equipment surfaces disinfected  Taken 5/5/2022 2000 by Dylan Sharma RN  Infection Prevention:   visitors restricted/screened   rest/sleep promoted   single patient room provided   personal protective equipment utilized   hand hygiene promoted    equipment surfaces disinfected  Goal: Optimal Comfort and Wellbeing  Outcome: Ongoing, Progressing  Intervention: Monitor Pain and Promote Comfort  Recent Flowsheet Documentation  Taken 5/6/2022 0600 by Dylan Sharma RN  Pain Management Interventions:   see MAR   quiet environment facilitated   position adjusted   pillow support provided  Taken 5/6/2022 0400 by Dylan Sharma RN  Pain Management Interventions:   see MAR   quiet environment facilitated   position adjusted   pillow support provided  Taken 5/6/2022 0200 by Dylan Sharma RN  Pain Management Interventions:   see MAR   quiet environment facilitated   pillow support provided   position adjusted  Taken 5/6/2022 0000 by Dylan Sharma RN  Pain Management Interventions: see MAR  Taken 5/5/2022 2200 by Dylan Sharma RN  Pain Management Interventions:   see MAR   quiet environment facilitated   position adjusted   pillow support provided   no interventions per patient request  Taken 5/5/2022 2000 by Dylan Sharma RN  Pain Management Interventions:   see MAR   quiet environment facilitated   pillow support provided   position adjusted  Intervention: Provide Person-Centered Care  Recent Flowsheet Documentation  Taken 5/5/2022 2000 by Dylan Sharma RN  Trust Relationship/Rapport:   care explained   questions answered  Goal: Readiness for Transition of Care  Outcome: Ongoing, Progressing     Problem: Bariatric Care Environmental Safety (Bariatric Surgery)  Goal: Safety Maintained with Care  Outcome: Ongoing, Progressing     Problem: Bleeding (Bariatric Surgery)  Goal: Absence of Bleeding  Outcome: Ongoing, Progressing     Problem: Bowel Motility Impaired (Bariatric Surgery)  Goal: Effective Bowel Motility and Elimination  Outcome: Ongoing, Progressing  Intervention: Enhance Bowel Motility and Elimination  Recent Flowsheet Documentation  Taken 5/5/2022 2000 by Dylan Sharma RN  Bowel Motility Enhancement: ambulation promoted     Problem: Fluid and  Electrolyte Imbalance (Bariatric Surgery)  Goal: Fluid and Electrolyte Balance  Outcome: Ongoing, Progressing     Problem: Glycemic Control Impaired (Bariatric Surgery)  Goal: Blood Glucose Level Within Desired Range  Outcome: Ongoing, Progressing     Problem: Infection (Bariatric Surgery)  Goal: Absence of Infection Signs and Symptoms  Outcome: Ongoing, Progressing     Problem: Ongoing Anesthesia Effects (Bariatric Surgery)  Goal: Anesthesia/Sedation Recovery  Outcome: Ongoing, Progressing  Intervention: Optimize Anesthesia Recovery  Recent Flowsheet Documentation  Taken 5/6/2022 0600 by Dylan Sharma RN  Safety Promotion/Fall Prevention:   activity supervised   clutter free environment maintained   assistive device/personal items within reach   toileting scheduled   safety round/check completed   room organization consistent   nonskid shoes/slippers when out of bed   fall prevention program maintained   gait belt   lighting adjusted  Taken 5/6/2022 0400 by Dylan Sharma RN  Safety Promotion/Fall Prevention:   activity supervised   assistive device/personal items within reach   clutter free environment maintained   toileting scheduled   safety round/check completed   room organization consistent   nonskid shoes/slippers when out of bed   lighting adjusted   fall prevention program maintained  Taken 5/6/2022 0200 by Dylan Sharma, RN  Safety Promotion/Fall Prevention:   toileting scheduled   safety round/check completed   room organization consistent   activity supervised   assistive device/personal items within reach   clutter free environment maintained   fall prevention program maintained   lighting adjusted   nonskid shoes/slippers when out of bed  Taken 5/6/2022 0000 by Dylan Sharma, RN  Safety Promotion/Fall Prevention:   toileting scheduled   safety round/check completed   room organization consistent   nonskid shoes/slippers when out of bed   lighting adjusted   fall prevention program maintained    activity supervised   assistive device/personal items within reach   clutter free environment maintained  Taken 5/5/2022 2200 by Dylan Sharma RN  Safety Promotion/Fall Prevention:   activity supervised   assistive device/personal items within reach   clutter free environment maintained   toileting scheduled   safety round/check completed   room organization consistent   nonskid shoes/slippers when out of bed   lighting adjusted   fall prevention program maintained  Taken 5/5/2022 2000 by Dylan Sharma RN  Patient Tolerance (IS): good  Safety Promotion/Fall Prevention:   activity supervised   toileting scheduled   safety round/check completed   room organization consistent   nonskid shoes/slippers when out of bed   lighting adjusted   fall prevention program maintained   clutter free environment maintained   assistive device/personal items within reach  Administration (IS):   instruction provided, follow-up   proper technique demonstrated  Level Incentive Spirometer (mL): 1800  Incentive Spirometer Predicted Level (mL): 2000  Number of Repetitions (IS): 8     Problem: Pain (Bariatric Surgery)  Goal: Acceptable Pain Control  Outcome: Ongoing, Progressing  Intervention: Prevent or Manage Pain  Recent Flowsheet Documentation  Taken 5/6/2022 0600 by Dylna Sharma RN  Pain Management Interventions:   see MAR   quiet environment facilitated   position adjusted   pillow support provided  Taken 5/6/2022 0400 by Dylan Sharma RN  Pain Management Interventions:   see MAR   quiet environment facilitated   position adjusted   pillow support provided  Taken 5/6/2022 0200 by Dylan Sharma RN  Pain Management Interventions:   see MAR   quiet environment facilitated   pillow support provided   position adjusted  Taken 5/6/2022 0000 by Dylan Sharma RN  Pain Management Interventions: see MAR  Taken 5/5/2022 2200 by Dylan Sharma RN  Pain Management Interventions:   see MAR   quiet environment facilitated   position  adjusted   pillow support provided   no interventions per patient request  Taken 5/5/2022 2000 by Dylan Sharma RN  Pain Management Interventions:   see MAR   quiet environment facilitated   pillow support provided   position adjusted  Diversional Activities:   television   smartphone     Problem: Postoperative Nausea and Vomiting (Bariatric Surgery)  Goal: Nausea and Vomiting Relief  Outcome: Ongoing, Progressing     Problem: Postoperative Urinary Retention (Bariatric Surgery)  Goal: Effective Urinary Elimination  Outcome: Ongoing, Progressing  Intervention: Monitor and Manage Urinary Retention  Recent Flowsheet Documentation  Taken 5/5/2022 2000 by Dylan Sharma RN  Urinary Elimination Promotion:   toileting offered   frequent voiding encouraged     Problem: Respiratory Compromise (Bariatric Surgery)  Goal: Effective Oxygenation and Ventilation  Outcome: Ongoing, Progressing  Intervention: Optimize Oxygenation and Ventilation  Recent Flowsheet Documentation  Taken 5/6/2022 0600 by Dylan Sharma RN  Head of Bed (HOB) Positioning: HOB elevated  Taken 5/6/2022 0400 by Dylan Sharma RN  Head of Bed (HOB) Positioning: HOB elevated  Taken 5/6/2022 0200 by Dylan Sharma RN  Head of Bed (HOB) Positioning: HOB elevated  Taken 5/5/2022 2200 by Dylan Sharma RN  Head of Bed (HOB) Positioning: HOB elevated  Taken 5/5/2022 2000 by Dylan Sharma RN  Head of Bed (HOB) Positioning: HOB elevated   Goal Outcome Evaluation:  Plan of Care Reviewed With: patient           Outcome Evaluation: Pt. had a good night. Pt. was up and walking once before going to sleep last night. Pt. needed no nausea medication and pain medication only once. Pt. wainting on swallow study currently.

## 2022-05-08 LAB
BH BB BLOOD EXPIRATION DATE: NORMAL
BH BB BLOOD EXPIRATION DATE: NORMAL
BH BB BLOOD TYPE BARCODE: 6200
BH BB BLOOD TYPE BARCODE: 6200
BH BB DISPENSE STATUS: NORMAL
BH BB DISPENSE STATUS: NORMAL
BH BB PRODUCT CODE: NORMAL
BH BB PRODUCT CODE: NORMAL
BH BB UNIT NUMBER: NORMAL
BH BB UNIT NUMBER: NORMAL
CROSSMATCH INTERPRETATION: NORMAL
CROSSMATCH INTERPRETATION: NORMAL
UNIT  ABO: NORMAL
UNIT  ABO: NORMAL
UNIT  RH: NORMAL
UNIT  RH: NORMAL

## 2022-05-09 ENCOUNTER — HOSPITAL ENCOUNTER (EMERGENCY)
Facility: HOSPITAL | Age: 32
Discharge: HOME | End: 2022-05-09
Attending: EMERGENCY MEDICINE | Admitting: EMERGENCY MEDICINE

## 2022-05-09 ENCOUNTER — APPOINTMENT (OUTPATIENT)
Dept: GENERAL RADIOLOGY | Facility: HOSPITAL | Age: 32
End: 2022-05-09

## 2022-05-09 ENCOUNTER — TELEPHONE (OUTPATIENT)
Dept: BARIATRICS/WEIGHT MGMT | Facility: CLINIC | Age: 32
End: 2022-05-09

## 2022-05-09 ENCOUNTER — TRANSITIONAL CARE MANAGEMENT TELEPHONE ENCOUNTER (OUTPATIENT)
Dept: CALL CENTER | Facility: HOSPITAL | Age: 32
End: 2022-05-09

## 2022-05-09 VITALS
HEART RATE: 104 BPM | BODY MASS INDEX: 42.52 KG/M2 | SYSTOLIC BLOOD PRESSURE: 119 MMHG | DIASTOLIC BLOOD PRESSURE: 75 MMHG | RESPIRATION RATE: 21 BRPM | OXYGEN SATURATION: 96 % | WEIGHT: 240 LBS | HEIGHT: 63 IN | TEMPERATURE: 98.3 F

## 2022-05-09 DIAGNOSIS — Z15.89 MTHFR MUTATION: ICD-10-CM

## 2022-05-09 DIAGNOSIS — U07.1 PNEUMONIA DUE TO COVID-19 VIRUS: ICD-10-CM

## 2022-05-09 DIAGNOSIS — R06.00 DYSPNEA, UNSPECIFIED TYPE: ICD-10-CM

## 2022-05-09 DIAGNOSIS — J12.82 PNEUMONIA DUE TO COVID-19 VIRUS: ICD-10-CM

## 2022-05-09 DIAGNOSIS — J06.9 UPPER RESPIRATORY TRACT INFECTION, UNSPECIFIED TYPE: ICD-10-CM

## 2022-05-09 DIAGNOSIS — R07.9 CHEST PAIN, UNSPECIFIED TYPE: Primary | ICD-10-CM

## 2022-05-09 DIAGNOSIS — U07.1 COVID-19: Primary | ICD-10-CM

## 2022-05-09 LAB
ALBUMIN SERPL-MCNC: 4.1 G/DL (ref 3.5–5.2)
ALBUMIN/GLOB SERPL: 1.1 G/DL
ALP SERPL-CCNC: 67 U/L (ref 39–117)
ALT SERPL W P-5'-P-CCNC: 15 U/L (ref 1–33)
ANION GAP SERPL CALCULATED.3IONS-SCNC: 14 MMOL/L (ref 5–15)
AST SERPL-CCNC: 20 U/L (ref 1–32)
B-HCG UR QL: NEGATIVE
BASOPHILS # BLD AUTO: 0.01 10*3/MM3 (ref 0–0.2)
BASOPHILS NFR BLD AUTO: 0.1 % (ref 0–1.5)
BILIRUB SERPL-MCNC: 0.5 MG/DL (ref 0–1.2)
BUN SERPL-MCNC: 6 MG/DL (ref 6–20)
BUN/CREAT SERPL: 8.7 (ref 7–25)
CALCIUM SPEC-SCNC: 9.4 MG/DL (ref 8.6–10.5)
CHLORIDE SERPL-SCNC: 94 MMOL/L (ref 98–107)
CO2 SERPL-SCNC: 27 MMOL/L (ref 22–29)
CREAT SERPL-MCNC: 0.69 MG/DL (ref 0.57–1)
DEPRECATED RDW RBC AUTO: 40.8 FL (ref 37–54)
EGFRCR SERPLBLD CKD-EPI 2021: 119.2 ML/MIN/1.73
EOSINOPHIL # BLD AUTO: 0.02 10*3/MM3 (ref 0–0.4)
EOSINOPHIL NFR BLD AUTO: 0.2 % (ref 0.3–6.2)
ERYTHROCYTE [DISTWIDTH] IN BLOOD BY AUTOMATED COUNT: 13.8 % (ref 12.3–15.4)
EXPIRATION DATE: NORMAL
FLUAV SUBTYP SPEC NAA+PROBE: NOT DETECTED
FLUBV RNA ISLT QL NAA+PROBE: NOT DETECTED
GLOBULIN UR ELPH-MCNC: 3.9 GM/DL
GLUCOSE SERPL-MCNC: 100 MG/DL (ref 65–99)
HCT VFR BLD AUTO: 38.7 % (ref 34–46.6)
HGB BLD-MCNC: 12.7 G/DL (ref 12–15.9)
HOLD SPECIMEN: NORMAL
IMM GRANULOCYTES # BLD AUTO: 0.14 10*3/MM3 (ref 0–0.05)
IMM GRANULOCYTES NFR BLD AUTO: 1.4 % (ref 0–0.5)
INTERNAL NEGATIVE CONTROL: NEGATIVE
INTERNAL POSITIVE CONTROL: POSITIVE
LIPASE SERPL-CCNC: 54 U/L (ref 13–60)
LYMPHOCYTES # BLD AUTO: 1.26 10*3/MM3 (ref 0.7–3.1)
LYMPHOCYTES NFR BLD AUTO: 12.2 % (ref 19.6–45.3)
Lab: NORMAL
MCH RBC QN AUTO: 26.8 PG (ref 26.6–33)
MCHC RBC AUTO-ENTMCNC: 32.8 G/DL (ref 31.5–35.7)
MCV RBC AUTO: 81.6 FL (ref 79–97)
MONOCYTES # BLD AUTO: 0.75 10*3/MM3 (ref 0.1–0.9)
MONOCYTES NFR BLD AUTO: 7.3 % (ref 5–12)
NEUTROPHILS NFR BLD AUTO: 78.8 % (ref 42.7–76)
NEUTROPHILS NFR BLD AUTO: 8.11 10*3/MM3 (ref 1.7–7)
NRBC BLD AUTO-RTO: 0 /100 WBC (ref 0–0.2)
NT-PROBNP SERPL-MCNC: 73.9 PG/ML (ref 0–450)
PLATELET # BLD AUTO: 322 10*3/MM3 (ref 140–450)
PMV BLD AUTO: 10.8 FL (ref 6–12)
POTASSIUM SERPL-SCNC: 3.4 MMOL/L (ref 3.5–5.2)
PROT SERPL-MCNC: 8 G/DL (ref 6–8.5)
QT INTERVAL: 334 MS
QTC INTERVAL: 462 MS
RBC # BLD AUTO: 4.74 10*6/MM3 (ref 3.77–5.28)
SARS-COV-2 RNA PNL SPEC NAA+PROBE: DETECTED
SODIUM SERPL-SCNC: 135 MMOL/L (ref 136–145)
TROPONIN T SERPL-MCNC: <0.01 NG/ML (ref 0–0.03)
WBC NRBC COR # BLD: 10.29 10*3/MM3 (ref 3.4–10.8)
WHOLE BLOOD HOLD SPECIMEN: NORMAL
WHOLE BLOOD HOLD SPECIMEN: NORMAL

## 2022-05-09 PROCEDURE — 93005 ELECTROCARDIOGRAM TRACING: CPT

## 2022-05-09 PROCEDURE — 87636 SARSCOV2 & INF A&B AMP PRB: CPT | Performed by: EMERGENCY MEDICINE

## 2022-05-09 PROCEDURE — 93005 ELECTROCARDIOGRAM TRACING: CPT | Performed by: EMERGENCY MEDICINE

## 2022-05-09 PROCEDURE — 71045 X-RAY EXAM CHEST 1 VIEW: CPT

## 2022-05-09 PROCEDURE — 85025 COMPLETE CBC W/AUTO DIFF WBC: CPT | Performed by: EMERGENCY MEDICINE

## 2022-05-09 PROCEDURE — 83690 ASSAY OF LIPASE: CPT | Performed by: EMERGENCY MEDICINE

## 2022-05-09 PROCEDURE — 80053 COMPREHEN METABOLIC PANEL: CPT | Performed by: EMERGENCY MEDICINE

## 2022-05-09 PROCEDURE — 83880 ASSAY OF NATRIURETIC PEPTIDE: CPT | Performed by: EMERGENCY MEDICINE

## 2022-05-09 PROCEDURE — 99284 EMERGENCY DEPT VISIT MOD MDM: CPT

## 2022-05-09 PROCEDURE — 84484 ASSAY OF TROPONIN QUANT: CPT | Performed by: EMERGENCY MEDICINE

## 2022-05-09 PROCEDURE — 99283 EMERGENCY DEPT VISIT LOW MDM: CPT

## 2022-05-09 PROCEDURE — 36415 COLL VENOUS BLD VENIPUNCTURE: CPT

## 2022-05-09 PROCEDURE — 81025 URINE PREGNANCY TEST: CPT | Performed by: EMERGENCY MEDICINE

## 2022-05-09 RX ORDER — SODIUM CHLORIDE 0.9 % (FLUSH) 0.9 %
10 SYRINGE (ML) INJECTION AS NEEDED
Status: DISCONTINUED | OUTPATIENT
Start: 2022-05-09 | End: 2022-05-09 | Stop reason: HOSPADM

## 2022-05-09 RX ORDER — DOXYCYCLINE 100 MG/1
100 CAPSULE ORAL 2 TIMES DAILY
Qty: 14 CAPSULE | Refills: 0 | Status: SHIPPED | OUTPATIENT
Start: 2022-05-09 | End: 2022-05-16

## 2022-05-09 RX ORDER — ASPIRIN 81 MG/1
324 TABLET, CHEWABLE ORAL ONCE
Status: DISCONTINUED | OUTPATIENT
Start: 2022-05-09 | End: 2022-05-09

## 2022-05-09 NOTE — ED PROVIDER NOTES
Subjective   This patient is a very pleasant 31-year-old female who comes in with fever, chills, cough and congestion over the last couple of days.  Temperature is 98.3.  She had a gastric sleeve performed last week and she was concerned that she might be coming down with a virus or bacterial infection.  She was most concerned about a potential for pneumonia and came in for evaluation accordingly.  Patient denies any objective fevers today.  Temperature here was 98.3 as mentioned.  She has had no hemoptysis or hematemesis.  She tells me she is bringing up a relatively thick sputum when she coughs.  Reports mild headaches and fevers, muscle aches and chills.  She tells me she feels like she did when she has had COVID-19 in the past.  No known sick contacts.  Does not believe she is pregnant.  Denies any abdominal pain.  Denies any diarrhea.  She tells me that she has been resting relatively comfortably other than these viral symptoms over the last couple of days.  She is pleasant, articulate, oriented x4.    Past medical history  Migraines, gallbladder abscess status post cholecystectomy, DVT with pregnancy, hyperlipidemia    Family history  Negative for CVA or CAD per patient          Review of Systems   Constitutional: Positive for appetite change, chills and fever. Negative for fatigue and unexpected weight change.   HENT: Negative for dental problem, ear pain, hearing loss and sinus pressure.    Eyes: Negative for pain and visual disturbance.   Respiratory: Positive for cough and shortness of breath. Negative for chest tightness.    Cardiovascular: Negative for chest pain, palpitations and leg swelling.   Gastrointestinal: Negative for blood in stool, diarrhea, nausea and vomiting.   Genitourinary: Negative for difficulty urinating, dysuria, frequency, hematuria and urgency.   Musculoskeletal: Positive for myalgias. Negative for neck pain and neck stiffness.   Neurological: Negative for seizures, syncope, speech  "difficulty, light-headedness and headaches.   Psychiatric/Behavioral: Negative for confusion.   All other systems reviewed and are negative.      Past Medical History:   Diagnosis Date   • Abnormal Pap smear of cervix 2019   • Anxiety    • Arthritis    • Deep vein thrombosis (HCC)     2014, (R) leg while pregnant, dx w/ MTHFR   • Depression    • Dyspepsia    • Dyspnea on exertion    • Elevated hemoglobin A1c    • Fatigue    • Gallbladder abscess     s/p lap nicolas 2006   • GERD (gastroesophageal reflux disease)    • Heartburn     chronic/episodic, depends on what she eats, takes Pepcid prn, EGD Dr. Ricardo 11/21   • Hyperemesis gravidarum    • Hyperlipidemia    • Incomplete right bundle branch block    • Migraines    • MTHFR deficiency complicating pregnancy (HCC)     says clotting d/o only an issue when pregnant, advised to use heparin shots during pregnancy   • Obesity    • Ovarian cyst    • Recurrent pregnancy loss, antepartum condition or complication     had a [facility] and lost the pregnancy at 6 months   • Strep pharyngitis    • Urinary tract infection        Allergies   Allergen Reactions   • Amoxicillin Diarrhea and GI Intolerance   • Latex Hives and Itching   • Penicillins GI Intolerance     Says Keflex OK as long as she takes w/ food.        Past Surgical History:   Procedure Laterality Date   • ENDOSCOPY N/A 5/5/2022    Procedure: ESOPHAGOGASTRODUODENOSCOPY;  Surgeon: Cristiano Ricardo MD;  Location:  [facility];  Service: Bariatric;  Laterality: N/A;   • ESOPHAGOSCOPY / EGD     • GASTRECTOMY N/A 5/5/2022    Procedure: GASTRECTOMY LAPAROSCOPIC;  Surgeon: Cristiano Ricardo MD;  Location:  [facility];  Service: Bariatric;  Laterality: N/A;   • HIATAL HERNIA REPAIR N/A 5/5/2022    Procedure: HIATAL HERNIA REPAIR LAPAROSCOPIC;  Surgeon: Cristiano Ricardo MD;  Location:  [facility];  Service: Bariatric;  Laterality: N/A;   • LAPAROSCOPIC CHOLECYSTECTOMY  2006    no stones, was told she had \"three gallbladders\"- all " removed   • SINUS SURGERY Bilateral 2006   • TUBAL COAGULATION LAPAROSCOPIC Bilateral 09/15/2017    Procedure: BILATERAL TUBAL FALLOPE FILSHIE CLIPPING LAPAROSCOPIC;  Surgeon: Leo Posey III, MD;  Location: [facility];  Service:    • VAGINAL DELIVERY  14; 16; 17    female,male, male.  She said she had subcutaneous Lovenox injections throughout the second and third pregnancies until delivery       Family History   Problem Relation Age of Onset   • Asthma Mother    • Thyroid disease Mother    • Heart attack Mother    • Obesity Mother    • Migraines Mother    • Hypertension Mother    • Lupus Sister    • Ovarian cancer Sister    • Asthma Sister    • Lung cancer Paternal Grandmother    • Obesity Paternal Grandmother    • Breast cancer Maternal Grandmother    • Lung cancer Maternal Grandmother    • Asthma Maternal Grandmother    • Hypertension Maternal Grandmother    • Lupus Maternal Grandmother    • Thyroid disease Maternal Grandmother    • Diabetes Maternal Grandmother    • Stroke Maternal Grandmother    • Hypertension Maternal Grandfather    • Prostate cancer Paternal Grandfather        Social History     Socioeconomic History   • Marital status:    Tobacco Use   • Smoking status: Never Smoker   • Smokeless tobacco: Never Used   Vaping Use   • Vaping Use: Never used   Substance and Sexual Activity   • Alcohol use: Not Currently     Comment: occ   • Drug use: No   • Sexual activity: Yes     Partners: Male     Birth control/protection: Surgical     Comment: -Juve, and has 3 children-homemaker           Objective   Physical Exam  Vitals and nursing note reviewed.   Constitutional:       General: She is not in acute distress.     Appearance: Normal appearance. She is normal weight. She is not toxic-appearing.   HENT:      Head: Normocephalic and atraumatic.      Right Ear: External ear normal.      Left Ear: External ear normal.      Nose: Nose normal.      Mouth/Throat:      Mouth: Mucous membranes are  moist.      Pharynx: Oropharynx is clear.   Eyes:      General:         Right eye: No discharge.         Left eye: No discharge.      Extraocular Movements: Extraocular movements intact.      Conjunctiva/sclera: Conjunctivae normal.      Pupils: Pupils are equal, round, and reactive to light.   Cardiovascular:      Rate and Rhythm: Normal rate and regular rhythm.      Pulses: Normal pulses.   Pulmonary:      Effort: Pulmonary effort is normal. No respiratory distress.      Breath sounds: No wheezing.   Abdominal:      General: Abdomen is flat. There is no distension.      Palpations: Abdomen is soft.      Tenderness: There is no guarding.   Musculoskeletal:         General: Normal range of motion.      Cervical back: Normal range of motion.   Skin:     General: Skin is warm and dry.      Capillary Refill: Capillary refill takes less than 2 seconds.   Neurological:      General: No focal deficit present.      Mental Status: She is alert and oriented to person, place, and time. Mental status is at baseline.   Psychiatric:         Mood and Affect: Mood normal.         Behavior: Behavior normal.         Thought Content: Thought content normal.         Procedures           ED Course  ED Course as of 05/09/22 1541   Mon May 09, 2022   1309 Patient's x-ray has been reviewed independently by me and also reviewed by radiology, Dr. Bateman.  I have cut/pasted the results below for completeness.    FINDINGS:  Faint opacity is seen at the left lung base concerning for airspace  disease given provided history. There is otherwise no focal  consolidation, effusion or pneumothorax. Unremarkable heart and  mediastinal contours.      IMPRESSION:  Faint opacity is seen at the left lung base concerning for airspace  disease given provided history.      This report was finalized on 5/9/2022 12:44 PM by Hugo Bateman. [DAISY]   1309 UPT is negative which is encouraging.  Troponin is less than 0.010.  CBC is unremarkable.  COVID-19 test  is positive but influenza studies negative.  Lipase is normal, BNP is unremarkable and CMP shows mild derangement including a glucose of 100, sodium of 135, potassium of 3.4 and chloride of 94. [DAISY]   1309 EKG was reviewed independently by me.  Patient has sinus tachycardia with a rate of 115  and age-indeterminate anterior and inferior changes. [DAISY]   1310 At this point, the patient has no chest pain whatsoever.  She has fevers, chills, myalgias, and a positive COVID test.  I am concerned about the potential for pneumonia based on x-ray and symptoms and will start the patient on doxycycline and have discussed this with her.  Patient is resting comfortably at this time and very appreciative for care.  Impression will include myalgias, upper respiratory infection, pneumonia, COVID-19.  We have talked about quarantine, antibiotics, and the need for close follow-up.  I told the patient to come back immediately for new or change concerns and she has promised to do so.  Patient is resting comfortably, appreciative for care and without question or complaint will be discharged home accordingly. [DAISY]   1324 I had a great conversation with the patient at the time of discharge.  She was very appreciative for care.  I talked to her about time off from work and let her know that she would need to connect with her employer and her PCP to determine their protocol regarding COVID-19 and she promised to do so. [DAISY]      ED Course User Index  [DAISY] Francisco Javier Mueller MD     Recent Results (from the past 24 hour(s))   ECG 12 Lead    Collection Time: 05/09/22 10:37 AM   Result Value Ref Range    QT Interval 334 ms    QTC Interval 462 ms   Troponin    Collection Time: 05/09/22 11:29 AM    Specimen: Blood   Result Value Ref Range    Troponin T <0.010 0.000 - 0.030 ng/mL   Comprehensive Metabolic Panel    Collection Time: 05/09/22 11:29 AM    Specimen: Blood   Result Value Ref Range    Glucose 100 (H) 65 - 99 mg/dL    BUN 6 6 - 20 mg/dL     Creatinine 0.69 0.57 - 1.00 mg/dL    Sodium 135 (L) 136 - 145 mmol/L    Potassium 3.4 (L) 3.5 - 5.2 mmol/L    Chloride 94 (L) 98 - 107 mmol/L    CO2 27.0 22.0 - 29.0 mmol/L    Calcium 9.4 8.6 - 10.5 mg/dL    Total Protein 8.0 6.0 - 8.5 g/dL    Albumin 4.10 3.50 - 5.20 g/dL    ALT (SGPT) 15 1 - 33 U/L    AST (SGOT) 20 1 - 32 U/L    Alkaline Phosphatase 67 39 - 117 U/L    Total Bilirubin 0.5 0.0 - 1.2 mg/dL    Globulin 3.9 gm/dL    A/G Ratio 1.1 g/dL    BUN/Creatinine Ratio 8.7 7.0 - 25.0    Anion Gap 14.0 5.0 - 15.0 mmol/L    eGFR 119.2 >60.0 mL/min/1.73   Lipase    Collection Time: 05/09/22 11:29 AM    Specimen: Blood   Result Value Ref Range    Lipase 54 13 - 60 U/L   BNP    Collection Time: 05/09/22 11:29 AM    Specimen: Blood   Result Value Ref Range    proBNP 73.9 0.0 - 450.0 pg/mL   Green Top (Gel)    Collection Time: 05/09/22 11:29 AM   Result Value Ref Range    Extra Tube Hold for add-ons.    Lavender Top    Collection Time: 05/09/22 11:29 AM   Result Value Ref Range    Extra Tube hold for add-on    Gold Top - SST    Collection Time: 05/09/22 11:29 AM   Result Value Ref Range    Extra Tube Hold for add-ons.    Gray Top    Collection Time: 05/09/22 11:29 AM   Result Value Ref Range    Extra Tube Hold for add-ons.    Light Blue Top    Collection Time: 05/09/22 11:29 AM   Result Value Ref Range    Extra Tube hold for add-on    CBC Auto Differential    Collection Time: 05/09/22 11:29 AM    Specimen: Blood   Result Value Ref Range    WBC 10.29 3.40 - 10.80 10*3/mm3    RBC 4.74 3.77 - 5.28 10*6/mm3    Hemoglobin 12.7 12.0 - 15.9 g/dL    Hematocrit 38.7 34.0 - 46.6 %    MCV 81.6 79.0 - 97.0 fL    MCH 26.8 26.6 - 33.0 pg    MCHC 32.8 31.5 - 35.7 g/dL    RDW 13.8 12.3 - 15.4 %    RDW-SD 40.8 37.0 - 54.0 fl    MPV 10.8 6.0 - 12.0 fL    Platelets 322 140 - 450 10*3/mm3    Neutrophil % 78.8 (H) 42.7 - 76.0 %    Lymphocyte % 12.2 (L) 19.6 - 45.3 %    Monocyte % 7.3 5.0 - 12.0 %    Eosinophil % 0.2 (L) 0.3 - 6.2 %     "Basophil % 0.1 0.0 - 1.5 %    Immature Grans % 1.4 (H) 0.0 - 0.5 %    Neutrophils, Absolute 8.11 (H) 1.70 - 7.00 10*3/mm3    Lymphocytes, Absolute 1.26 0.70 - 3.10 10*3/mm3    Monocytes, Absolute 0.75 0.10 - 0.90 10*3/mm3    Eosinophils, Absolute 0.02 0.00 - 0.40 10*3/mm3    Basophils, Absolute 0.01 0.00 - 0.20 10*3/mm3    Immature Grans, Absolute 0.14 (H) 0.00 - 0.05 10*3/mm3    nRBC 0.0 0.0 - 0.2 /100 WBC   COVID-19 and FLU A/B PCR - Swab, Nasopharynx    Collection Time: 05/09/22 11:32 AM    Specimen: Nasopharynx; Swab   Result Value Ref Range    COVID19 Detected (C) Not Detected - Ref. Range    Influenza A PCR Not Detected Not Detected    Influenza B PCR Not Detected Not Detected   POCT pregnancy, urine    Collection Time: 05/09/22 12:28 PM    Specimen: Urine   Result Value Ref Range    HCG, Urine, QL Negative Negative    Lot Number seepkg     Internal Positive Control Positive Positive, Passed    Internal Negative Control Negative Negative, Passed    Expiration Date seepkg      Note: In addition to lab results from this visit, the labs listed above may include labs taken at another facility or during a different encounter within the last 24 hours. Please correlate lab times with ED admission and discharge times for further clarification of the services performed during this visit.    XR Chest 1 View   Final Result   Faint opacity is seen at the left lung base concerning for airspace   disease given provided history.        This report was finalized on 5/9/2022 12:44 PM by Hugo Bateman.            Vitals:    05/09/22 1024 05/09/22 1132   BP: 133/74 119/75   BP Location: Left arm Right arm   Patient Position: Sitting Lying   Pulse: 118 104   Resp: 16 21   Temp: 98.3 °F (36.8 °C)    TempSrc: Oral    SpO2: 97% 96%   Weight: 109 kg (240 lb)    Height: 160 cm (63\")      Medications - No data to display  ECG/EMG Results (last 24 hours)     Procedure Component Value Units Date/Time    ECG 12 Lead [[id number]] " Collected: 05/09/22 1037     Updated: 05/09/22 1311     QT Interval 334 ms      QTC Interval 462 ms     Narrative:      Test Reason : chest pain  Blood Pressure :   */*   mmHG  Vent. Rate : 115 BPM     Atrial Rate : 115 BPM     P-R Int : 138 ms          QRS Dur :  86 ms      QT Int : 334 ms       P-R-T Axes :  33 -23  40 degrees     QTc Int : 462 ms    Sinus tachycardia  Minimal voltage criteria for LVH, may be normal variant ( R in aVL )  Possible Inferior infarct , age undetermined  Cannot rule out Anterior infarct , age undetermined  Abnormal ECG  When compared with ECG of 18-APR-2022 13:56,  Vent. rate has increased BY  42 BPM  Incomplete right bundle branch block is no longer present  Minimal criteria for Anterior infarct are now present  Borderline criteria for Inferior infarct are now present  Confirmed by SJ MASON MD ([id number]) on 5/9/2022 1:11:13 PM    Referred By:            Confirmed By: SJ MASON MD        ECG 12 Lead   Final Result   Test Reason : chest pain   Blood Pressure :   */*   mmHG   Vent. Rate : 115 BPM     Atrial Rate : 115 BPM      P-R Int : 138 ms          QRS Dur :  86 ms       QT Int : 334 ms       P-R-T Axes :  33 -23  40 degrees      QTc Int : 462 ms      Sinus tachycardia   Minimal voltage criteria for LVH, may be normal variant ( R in aVL )   Possible Inferior infarct , age undetermined   Cannot rule out Anterior infarct , age undetermined   Abnormal ECG   When compared with ECG of 18-APR-2022 13:56,   Vent. rate has increased BY  42 BPM   Incomplete right bundle branch block is no longer present   Minimal criteria for Anterior infarct are now present   Borderline criteria for Inferior infarct are now present   Confirmed by SJ MASON MD ([id number]) on 5/9/2022 1:11:13 PM      Referred By:            Confirmed By: SJ MASON MD                                                        [facility]    Final diagnoses:   COVID-19   Upper respiratory tract infection, unspecified type    Pneumonia due to COVID-19 virus       ED Disposition  ED Disposition     ED Disposition   Discharge    Condition   Stable    Comment   --             Winston Kc MD  [address]  [address]  [facility] [address]  [phone]    In 1 week           Medication List      New Prescriptions    doxycycline 100 MG capsule  Commonly known as: MONODOX  Take 1 capsule by mouth 2 (Two) Times a Day for 7 days.           Where to Get Your Medications      These medications were sent to [address] - [address] [facility] [address] [facility] - [phone]  - [phone]  [address] [facility] [address], [place] [id number]    Phone: [phone]   · doxycycline 100 MG capsule          Francisco Javier Mueller MD  05/09/22 3394

## 2022-05-09 NOTE — TELEPHONE ENCOUNTER
Patient states that she has chest pain, pain to lower abdomen, trouble coughing, producing white foamy mucus after coughing,  Patient denies fever.  Onset last night. Please advise.

## 2022-05-09 NOTE — DISCHARGE INSTRUCTIONS
Follow-up with your PCP in the next week.    Take antibiotics as prescribed and as we discussed in detail.    Return to the emergency department immediately for new or changing concerns.    Please review the medications you are supposed to be taking according to prior physician recommendations. I have not changed your home medications during this visit. If your discharge instructions indicate that I have changed your home medications, this is not the case, and you should disregard. If you have any questions about the medication you should be taking at home, please call your physician.

## 2022-05-09 NOTE — TELEPHONE ENCOUNTER
Patient called and left  that  stated she had surgery on 5/5/22.  She has a hard time coughing and when she is able to she has white foam come up.  Patient is concerned she has pneumonia.      I called patient back, no answer, left .

## 2022-05-09 NOTE — OUTREACH NOTE
Call Center TCM Note    Flowsheet Row Responses   [facility] patient discharged from? [facility]   Does the patient have one of the following disease processes/diagnoses(primary or secondary)? General Surgery   TCM attempt successful? No  [in ED at time of call]   Unsuccessful attempts Attempt 1          SARTHAK ASTORGA RN    5/9/2022, 11:28 EDT

## 2022-05-09 NOTE — OUTREACH NOTE
Call Center TCM Note    Flowsheet Row Responses   [facility] patient discharged from? [place]   Does the patient have one of the following disease processes/diagnoses(primary or secondary)? General Surgery   TCM attempt successful? Yes   Call start time 1617   Call end time 1621   Discharge diagnosis HIATAL HERNIA REPAIR  GASTRECTOMY   Meds reviewed with patient/caregiver? Yes   Is the patient having any side effects they believe may be caused by any medication additions or changes? No   Does the patient have all medications related to this admission filled (includes all antibiotics, pain medications, etc.) Yes   Is the patient taking all medications as directed (includes completed medication regime)? Yes   Medication comments New ABX ordered after ED visit   Does the patient have a follow up appointment scheduled with their surgeon? Yes  [5/12/22]   Has the patient kept scheduled appointments due by today? N/A   Comments Hospital d/c f/u appt on 5/29/22   Has home health visited the patient within 72 hours of discharge? N/A   Psychosocial issues? No   Did the patient receive a copy of their discharge instructions? Yes   Nursing interventions Reviewed instructions with patient   What is the patient's perception of their health status since discharge? Same   Nursing interventions Nurse provided patient education   Is the patient /caregiver able to teach back basic post-op care? Continue use of incentive spirometry at least 1 week post discharge, Take showers only when approved by MD-sponge bathe until then, Drive as instructed by MD in discharge instructions, No tub bath, swimming, or hot tub until instructed by MD, Keep incision areas clean,dry and protected, Lifting as instructed by MD in discharge instructions   Is the patient/caregiver able to teach back signs and symptoms of incisional infection? Increased redness, swelling or pain at the incisonal site, Increased drainage or bleeding, Fever, Pus or odor  from incision   Is the patient/caregiver able to teach back steps to recovery at home? Set small, achievable goals for return to baseline health, Rest and rebuild strength, gradually increase activity, Eat a well-balance diet   If the patient is a current smoker, are they able to teach back resources for cessation? Not a smoker   Is the patient/caregiver able to teach back the hierarchy of who to call/visit for symptoms/problems? PCP, Specialist, Home health nurse, Urgent Care, ED, 911 Yes   TCM call completed? Yes          SARTHAK ASTORGA RN    5/9/2022, 16:21 EDT

## 2022-05-12 ENCOUNTER — TELEMEDICINE (OUTPATIENT)
Dept: BARIATRICS/WEIGHT MGMT | Facility: CLINIC | Age: 32
End: 2022-05-12

## 2022-05-12 DIAGNOSIS — E66.01 OBESITY, CLASS III, BMI 40-49.9 (MORBID OBESITY): Primary | ICD-10-CM

## 2022-05-12 DIAGNOSIS — Z98.84 STATUS POST BARIATRIC SURGERY: ICD-10-CM

## 2022-05-12 PROCEDURE — 99024 POSTOP FOLLOW-UP VISIT: CPT | Performed by: PHYSICIAN ASSISTANT

## 2022-05-12 RX ORDER — ONDANSETRON 4 MG/1
4 TABLET, ORALLY DISINTEGRATING ORAL EVERY 8 HOURS PRN
Qty: 21 TABLET | Refills: 0 | Status: SHIPPED | OUTPATIENT
Start: 2022-05-12 | End: 2022-05-19

## 2022-05-12 RX ORDER — POTASSIUM CHLORIDE 1500 MG/1
20 TABLET, FILM COATED, EXTENDED RELEASE ORAL 2 TIMES DAILY
Qty: 30 TABLET | Refills: 0 | Status: SHIPPED | OUTPATIENT
Start: 2022-05-12 | End: 2022-09-24

## 2022-05-12 RX ORDER — PROMETHAZINE HYDROCHLORIDE 12.5 MG/1
12.5 TABLET ORAL EVERY 6 HOURS PRN
Qty: 30 TABLET | Refills: 0 | Status: SHIPPED | OUTPATIENT
Start: 2022-05-12 | End: 2022-09-24

## 2022-05-12 NOTE — PROGRESS NOTES
[facility] Bariatric Surgery  [address]  [address]  [facility] [id number] [phone]      Patient Name:  Theresa Maya.  :  1990      Reason for Visit:  POD #7    HPI:  Theresa Maya is a 31 y.o. female s/p LSG/ HHR by  on 22    D/c POD#1- home lovenox    Went to ED  with fever, cough, congestion. CXR with faint opacity of L lung base concerning for PNA. Rx doxycycline. Covid positive. K+  3.4    Says she is doing okay. She is taking doxycycline BID as Rx. Biggest issue is coughing, productive, is using IS to 2500.   T101.9 max.  Has sinus drainage with excess mucus that she feels is contributing to nausea. Things are coming back up minimally. Is out of antiemetics but feels they were helping. Has pain around incisions/ mostly the R sided periumbilical incision with coughing/ straining.  Denies dysphagia and reflux.  Tolerating diet progression - on stage 1.  Getting 45-55g prot/day. Slim fast, unflavored powder.  Drinking 60 fluid oz/day.  Taking MVI.  On Omeprazole  and Lovenox .  Holding ASA , NSAIDs , Tramadol, Hormones, Diuretics , Steroids and Immunologics and tobacco use/ exposure.  Ambulating.     Presurgery weight: 242 pounds.         Final Diagnosis   STOMACH, SUBTOTAL GASTRECTOMY:                Gastric tissue with no signficant histopathologic change.  Negative for intestinal metaplasia and dysplasia.  No Helicobacter pylori-like organisms identified on routine stains.         Past Medical History:   Diagnosis Date   • Abnormal Pap smear of cervix    • Anxiety    • Arthritis    • Deep vein thrombosis (HCC)     , (R) leg while pregnant, dx w/ MTHFR   • Depression    • Dyspepsia    • Dyspnea on exertion    • Elevated hemoglobin A1c    • Fatigue    • Gallbladder abscess     s/p lap nicolas    • GERD (gastroesophageal reflux disease)    • Heartburn     chronic/episodic, depends on what she eats, takes Pepcid prn, EGD Dr. Ricardo   "  • Hyperemesis gravidarum    • Hyperlipidemia    • Incomplete right bundle branch block    • Migraines    • MTHFR deficiency complicating pregnancy ([facility])     says clotting d/o only an issue when pregnant, advised to use heparin shots during pregnancy   • Obesity    • Ovarian cyst    • Recurrent pregnancy loss, antepartum condition or complication     had a [facility] and lost the pregnancy at 6 months   • Strep pharyngitis    • Urinary tract infection      Past Surgical History:   Procedure Laterality Date   • ENDOSCOPY N/A 5/5/2022    Procedure: ESOPHAGOGASTRODUODENOSCOPY;  Surgeon: Cristiano Ricardo MD;  Location:  [facility];  Service: Bariatric;  Laterality: N/A;   • ESOPHAGOSCOPY / EGD     • GASTRECTOMY N/A 5/5/2022    Procedure: GASTRECTOMY LAPAROSCOPIC;  Surgeon: Cristiano Ricardo MD;  Location:  [facility];  Service: Bariatric;  Laterality: N/A;   • HIATAL HERNIA REPAIR N/A 5/5/2022    Procedure: HIATAL HERNIA REPAIR LAPAROSCOPIC;  Surgeon: Cristiano Ricardo MD;  Location:  [facility];  Service: Bariatric;  Laterality: N/A;   • LAPAROSCOPIC CHOLECYSTECTOMY  2006    no stones, was told she had \"three gallbladders\"- all removed   • SINUS SURGERY Bilateral 2006   • TUBAL COAGULATION LAPAROSCOPIC Bilateral 09/15/2017    Procedure: BILATERAL TUBAL FALLOPE FILSHIE CLIPPING LAPAROSCOPIC;  Surgeon: Leo Posey III, MD;  [facility]:  [facility];  Service:    • VAGINAL DELIVERY  14; 16; 17    female,male, male.  She said she had subcutaneous Lovenox injections throughout the second and third pregnancies until delivery     Outpatient Medications Marked as Taking for the 5/12/22 encounter (Telemedicine) with Brooklyn Camejo PA-C   Medication Sig Dispense Refill   • doxycycline (MONODOX) 100 MG capsule Take 1 capsule by mouth 2 (Two) Times a Day for 7 days. 14 capsule 0   • DULoxetine (CYMBALTA) 60 MG capsule Take 1 capsule by mouth Daily. 90 capsule 1   • Loratadine 10 MG capsule Take 10 mg by mouth Daily.     • omeprazole " (priLOSEC) 40 MG capsule Take 1 capsule by mouth Daily for 60 days. 60 capsule 0     Allergies   Allergen Reactions   • Amoxicillin Diarrhea and GI Intolerance   • Latex Hives and Itching   • Penicillins GI Intolerance     Says Keflex OK as long as she takes w/ food.        Social History     Socioeconomic History   • Marital status:    Tobacco Use   • Smoking status: Never Smoker   • Smokeless tobacco: Never Used   Vaping Use   • Vaping Use: Never used   Substance and Sexual Activity   • Alcohol use: Not Currently     Comment: occ   • Drug use: No   • Sexual activity: Yes     Partners: Male     Birth control/protection: Surgical     Comment: -Juve, and has 3 children-homemaker       LMP 05/05/2022 (Exact Date)   Physical Exam  Constitutional:       Appearance: Normal appearance. She is well-developed.   HENT:      Head: Normocephalic and atraumatic.   Pulmonary:      Effort: Pulmonary effort is normal.      Comments: Not coughing  Neurological:      Mental Status: She is alert and oriented to person, place, and time.   Psychiatric:         Thought Content: Thought content normal.           Assessment:   POD #7  s/p LSG/ HHR by  on 5/5/22    ICD-10-CM ICD-9-CM   1. Obesity, Class III, BMI 40-49.9 (morbid obesity) ([facility])  E66.01 278.01   2. Status post bariatric surgery  Z98.84 V45.86           Plan: Will send in antiemetics for nausea, encouraged increasing protein intake. Potassium low on ED labs, sent Rx for 20meQ BID #30.  Continue lovenox. If symptoms worsen, let us know/ go to ED. Continue to advance diet per manual.  Increase protein intake to 100g/day.  Increase exercise/activity as tolerated.  Reviewed lifting restrictions, nothing >25 lbs x 2 more weeks.  Continue vitamins, add iron and B1.  Continue PPI.  Continue to avoid ASA/NSAIDs/tramadol/tobacco x 6 weeks postop, steroids x 8 weeks postop.  Call w/ problems/concerns.    Class 3 Severe Obesity (BMI >=40). Obesity-related health  conditions include the following: as asbove. Obesity is improving with treatment. BMI is is above average; BMI management plan is completed. We discussed low calorie, low carb based diet program, portion control and increasing exercise.        The patient was instructed to follow up in 3 weeks, sooner if needed.

## 2022-05-17 ENCOUNTER — TELEPHONE (OUTPATIENT)
Dept: BARIATRICS/WEIGHT MGMT | Facility: CLINIC | Age: 32
End: 2022-05-17

## 2022-05-19 ENCOUNTER — OFFICE VISIT (OUTPATIENT)
Dept: INTERNAL MEDICINE | Facility: CLINIC | Age: 32
End: 2022-05-19

## 2022-05-19 VITALS
OXYGEN SATURATION: 99 % | TEMPERATURE: 96.8 F | SYSTOLIC BLOOD PRESSURE: 110 MMHG | WEIGHT: 231.2 LBS | HEART RATE: 82 BPM | DIASTOLIC BLOOD PRESSURE: 82 MMHG | BODY MASS INDEX: 40.96 KG/M2 | HEIGHT: 63 IN

## 2022-05-19 DIAGNOSIS — E66.01 CLASS 3 SEVERE OBESITY DUE TO EXCESS CALORIES WITH SERIOUS COMORBIDITY AND BODY MASS INDEX (BMI) OF 40.0 TO 44.9 IN ADULT: ICD-10-CM

## 2022-05-19 DIAGNOSIS — Z00.00 ANNUAL PHYSICAL EXAM: Primary | ICD-10-CM

## 2022-05-19 DIAGNOSIS — F32.A ANXIETY AND DEPRESSION: ICD-10-CM

## 2022-05-19 DIAGNOSIS — R46.89 BEHAVIOR SYMPTOM: ICD-10-CM

## 2022-05-19 DIAGNOSIS — F41.9 ANXIETY AND DEPRESSION: ICD-10-CM

## 2022-05-19 LAB
BILIRUB BLD-MCNC: NEGATIVE MG/DL
CLARITY, POC: CLEAR
COLOR UR: ABNORMAL
EXPIRATION DATE: ABNORMAL
GLUCOSE UR STRIP-MCNC: NEGATIVE MG/DL
KETONES UR QL: NEGATIVE
LEUKOCYTE EST, POC: NEGATIVE
Lab: ABNORMAL
NITRITE UR-MCNC: NEGATIVE MG/ML
PH UR: 5.5 [PH] (ref 5–8)
PROT UR STRIP-MCNC: ABNORMAL MG/DL
RBC # UR STRIP: NEGATIVE /UL
SP GR UR: 1.02 (ref 1–1.03)
UROBILINOGEN UR QL: NORMAL

## 2022-05-19 PROCEDURE — 2014F MENTAL STATUS ASSESS: CPT | Performed by: FAMILY MEDICINE

## 2022-05-19 PROCEDURE — 3008F BODY MASS INDEX DOCD: CPT | Performed by: FAMILY MEDICINE

## 2022-05-19 PROCEDURE — 81003 URINALYSIS AUTO W/O SCOPE: CPT | Performed by: FAMILY MEDICINE

## 2022-05-19 PROCEDURE — 99395 PREV VISIT EST AGE 18-39: CPT | Performed by: FAMILY MEDICINE

## 2022-05-19 RX ORDER — ENOXAPARIN SODIUM 100 MG/ML
INJECTION SUBCUTANEOUS EVERY 12 HOURS SCHEDULED
COMMUNITY
End: 2022-09-24

## 2022-05-19 NOTE — PROGRESS NOTES
Patient Care Team:  Winston Kc MD as PCP - General (Family Medicine)     Chief complaint: Patient is in today for a physical          Patient is a 31 y.o. female who presents for her yearly physical exam.     HPI      Had bariatric surgery in 5/5/2022. Few days after surgery had COVID and PN , sx resolved. She is on Lovenox as DVT ppx for 21 days after surgery.  She lost ~ 15 Ibs in 2 weeks, doing well. Has NORMAL BM 3 x /day  H/o abnormal PAP , she never had normal PAP, following with GYN .  Mother and sister had ovarian cancer , she had the genetic testing was neg   GM had lung cancer , was heavy smoker   She run out of Cymbalta for > 2 weeks, feels easily distracted, mood swing for 4 months even when she was on Cymbalta . Denies SI/SA. Sh is concerning for ADHD.    Health maintenance/lifestyle:  Immunization History   Administered Date(s) Administered   • Flu Vaccine Quad PF >36MO 10/24/2016, 10/02/2018   • HPV Quadrivalent 02/13/2009   • Influenza TIV (IM) 10/10/2013, 12/10/2014   • Influenza, Unspecified 10/01/2019, 08/15/2020   • Pneumococcal Polysaccharide (PPSV23) 01/01/2014   • Tdap 01/01/1993, 12/28/2020     PHQ-2 Depression Screening  Little interest or pleasure in doing things?  0   Feeling down, depressed, or hopeless?  0   PHQ-2 Total Score  0     Social History     Tobacco Use   Smoking Status Never Smoker   Smokeless Tobacco Never Used     Social History     Substance and Sexual Activity   Alcohol Use Not Currently    Comment: occ         Review of Systems   Constitutional: Negative for activity change and appetite change.   Respiratory: Negative for apnea, choking, chest tightness, shortness of breath, wheezing and stridor.    Cardiovascular: Negative for chest pain, palpitations and leg swelling.   Psychiatric/Behavioral: Positive for behavioral problems and decreased concentration. Negative for agitation, sleep disturbance, suicidal ideas, negative for hyperactivity, depressed mood and  "stress.   All other systems reviewed and are negative.        History  Past Medical History:   Diagnosis Date   • Abnormal Pap smear of cervix 2019   • Anxiety    • Arthritis    • Deep vein thrombosis (HCC)     2014, (R) leg while pregnant, dx w/ MTHFR   • Depression    • Dyspepsia    • Dyspnea on exertion    • Elevated hemoglobin A1c    • Fatigue    • Gallbladder abscess     s/p lap nicolas 2006   • GERD (gastroesophageal reflux disease)    • Heartburn     chronic/episodic, depends on what she eats, takes Pepcid prn, EGD Dr. Ricardo 11/21   • Hyperemesis gravidarum    • Hyperlipidemia    • Incomplete right bundle branch block    • Migraines    • MTHFR deficiency complicating pregnancy (HCC)     says clotting d/o only an issue when pregnant, advised to use heparin shots during pregnancy   • Obesity    • Ovarian cyst    • Recurrent pregnancy loss, antepartum condition or complication     had a [facility] and lost the pregnancy at 6 months   • Strep pharyngitis    • Urinary tract infection       Past Surgical History:   Procedure Laterality Date   • ENDOSCOPY N/A 5/5/2022    Procedure: ESOPHAGOGASTRODUODENOSCOPY;  Surgeon: Cristiano Ricardo MD;  Location:  [facility];  Service: Bariatric;  Laterality: N/A;   • ESOPHAGOSCOPY / EGD     • GASTRECTOMY N/A 5/5/2022    Procedure: GASTRECTOMY LAPAROSCOPIC;  Surgeon: Cristiano Ricardo MD;  Location:  [facility];  Service: Bariatric;  Laterality: N/A;   • HIATAL HERNIA REPAIR N/A 5/5/2022    Procedure: HIATAL HERNIA REPAIR LAPAROSCOPIC;  Surgeon: Cristiano Ricardo MD;  Location:  [facility];  Service: Bariatric;  Laterality: N/A;   • LAPAROSCOPIC CHOLECYSTECTOMY  2006    no stones, was told she had \"three gallbladders\"- all removed   • SINUS SURGERY Bilateral 2006   • TUBAL COAGULATION LAPAROSCOPIC Bilateral 09/15/2017    Procedure: BILATERAL TUBAL FALLOPE FILSHIE CLIPPING LAPAROSCOPIC;  Surgeon: Leo Posey III, MD;  [facility]  [facility];  Service:    • VAGINAL DELIVERY  14; 16; 17 "    female,male, male.  She said she had subcutaneous Lovenox injections throughout the second and third pregnancies until delivery      Allergies   Allergen Reactions   • Amoxicillin Diarrhea and GI Intolerance   • Latex Hives and Itching   • Penicillins GI Intolerance     Says Keflex OK as long as she takes w/ food.       Family History   Problem Relation Age of Onset   • Asthma Mother    • Thyroid disease Mother    • Heart attack Mother    • Obesity Mother    • Migraines Mother    • Hypertension Mother    • Lupus Sister    • Ovarian cancer Sister    • Asthma Sister    • Lung cancer Paternal Grandmother    • Obesity Paternal Grandmother    • Breast cancer Maternal Grandmother    • Lung cancer Maternal Grandmother    • Asthma Maternal Grandmother    • Hypertension Maternal Grandmother    • Lupus Maternal Grandmother    • Thyroid disease Maternal Grandmother    • Diabetes Maternal Grandmother    • Stroke Maternal Grandmother    • Hypertension Maternal Grandfather    • Prostate cancer Paternal Grandfather      Social History     Socioeconomic History   • Marital status:    Tobacco Use   • Smoking status: Never Smoker   • Smokeless tobacco: Never Used   Vaping Use   • Vaping Use: Never used   Substance and Sexual Activity   • Alcohol use: Not Currently     Comment: occ   • Drug use: No   • Sexual activity: Yes     Partners: Male     Birth control/protection: Surgical     Comment: -Juve, and has 3 children-homemaker        Current Outpatient Medications:   •  Enoxaparin Sodium (LOVENOX) 40 MG/0.4ML solution prefilled syringe syringe, Inject  under the skin into the appropriate area as directed Every 12 (Twelve) Hours., Disp: , Rfl:   •  Loratadine 10 MG capsule, Take 10 mg by mouth Daily., Disp: , Rfl:   •  omeprazole (priLOSEC) 40 MG capsule, Take 1 capsule by mouth Daily for 60 days., Disp: 60 capsule, Rfl: 0  •  potassium chloride ER (K-TAB) 20 MEQ tablet controlled-release ER tablet, Take 1 tablet  "by mouth 2 (Two) Times a Day., Disp: 30 tablet, Rfl: 0  •  promethazine (PHENERGAN) 12.5 MG tablet, Take 1 tablet by mouth Every 6 (Six) Hours As Needed for Nausea or Vomiting., Disp: 30 tablet, Rfl: 0                  /82   Pulse 82   Temp 96.8 °F (36 °C) (Temporal)   Ht 158.8 cm (62.5\")   Wt 105 kg (231 lb 3.2 oz)   LMP 05/05/2022 (Exact Date)   SpO2 99%   BMI 41.61 kg/m²       Physical Exam  Vitals and nursing note reviewed.   Constitutional:       General: She is not in acute distress.     Appearance: She is well-developed. She is not ill-appearing, toxic-appearing or diaphoretic.      Comments: Morbidly obese    HENT:      Head: Normocephalic.      Right Ear: Tympanic membrane, ear canal and external ear normal.      Left Ear: Tympanic membrane, ear canal and external ear normal.      Nose: Nose normal. No congestion or rhinorrhea.      Mouth/Throat:      Mouth: Mucous membranes are moist.      Pharynx: Oropharynx is clear. No oropharyngeal exudate or posterior oropharyngeal erythema.   Eyes:      General: No scleral icterus.        Right eye: No discharge.         Left eye: No discharge.      Extraocular Movements: Extraocular movements intact.      Conjunctiva/sclera: Conjunctivae normal.      Pupils: Pupils are equal, round, and reactive to light.   Neck:      Thyroid: No thyromegaly.      Comments: No enlarged thyroid    Cardiovascular:      Rate and Rhythm: Normal rate and regular rhythm.      Heart sounds: Normal heart sounds. No murmur heard.    No friction rub. No gallop.   Pulmonary:      Effort: Pulmonary effort is normal. No respiratory distress.      Breath sounds: Normal breath sounds. No stridor. No wheezing, rhonchi or rales.   Abdominal:      General: Bowel sounds are normal. There is no distension.      Palpations: Abdomen is soft. There is no mass.      Tenderness: There is no abdominal tenderness. There is no guarding or rebound.      Hernia: No hernia is present.      Comments: " Well healed surgical wounds   Musculoskeletal:         General: Normal range of motion.      Cervical back: Normal range of motion and neck supple.   Lymphadenopathy:      Cervical: No cervical adenopathy.   Skin:     General: Skin is warm.      Findings: No erythema or rash.   Neurological:      General: No focal deficit present.      Mental Status: She is alert and oriented to person, place, and time.      Cranial Nerves: No cranial nerve deficit.      Sensory: No sensory deficit.      Motor: No weakness or abnormal muscle tone.      Coordination: Coordination normal.      Gait: Gait normal.      Deep Tendon Reflexes: Reflexes normal.   Psychiatric:         Mood and Affect: Mood normal.         Behavior: Behavior normal.         Thought Content: Thought content normal.         Judgment: Judgment normal.                   Diagnoses and all orders for this visit:    1. Annual physical exam (Primary)  -     POCT urinalysis dipstick, automated    2. Behavior symptom  -     Ambulatory Referral to Behavioral Health    3. Anxiety and depression  -     Ambulatory Referral to Behavioral Health    4. Class 3 severe obesity due to excess calories with serious comorbidity and body mass index (BMI) of 40.0 to 44.9 in adult (HCC);  -Patient's (Body mass index is 41.61 kg/m².) indicates that they are morbidly obese (BMI > 40 or > 35 with obesity - related health condition) with health related conditions that include back pain, feels depressd about her wt . Weight is improving after bariatric surgery . BMI is is above average; no BMI management plan is appropriate. We discussed portion control and increasing exercise. We expect to lose more wt.      Discussed with pt; Regular exercise, healthy diet. Calcium intake, Sunscreen use encouraged.   Declined COVID vaccine     Follow up: Return in about 6 months (around 11/19/2022) for follow up on current illness.  Plan of care discussed with pt. They verbalized understanding and  agreement.     Winston Kc MD   5/19/2022   10:49 EDT

## 2022-05-24 ENCOUNTER — OFFICE VISIT (OUTPATIENT)
Dept: BEHAVIORAL HEALTH | Facility: CLINIC | Age: 32
End: 2022-05-24

## 2022-05-24 ENCOUNTER — TELEPHONE (OUTPATIENT)
Dept: INTERNAL MEDICINE | Facility: CLINIC | Age: 32
End: 2022-05-24

## 2022-05-24 VITALS
RESPIRATION RATE: 16 BRPM | DIASTOLIC BLOOD PRESSURE: 80 MMHG | WEIGHT: 232.4 LBS | OXYGEN SATURATION: 99 % | BODY MASS INDEX: 41.18 KG/M2 | SYSTOLIC BLOOD PRESSURE: 132 MMHG | HEIGHT: 63 IN | HEART RATE: 65 BPM

## 2022-05-24 DIAGNOSIS — F31.4 BIPOLAR DISORDER, CURRENT EPISODE DEPRESSED, SEVERE, WITHOUT PSYCHOTIC FEATURES: Primary | ICD-10-CM

## 2022-05-24 DIAGNOSIS — F99 INSOMNIA DUE TO OTHER MENTAL DISORDER: ICD-10-CM

## 2022-05-24 DIAGNOSIS — F51.05 INSOMNIA DUE TO OTHER MENTAL DISORDER: ICD-10-CM

## 2022-05-24 PROBLEM — F31.9 AFFECTIVE PSYCHOSIS, BIPOLAR (HCC): Status: ACTIVE | Noted: 2022-05-24

## 2022-05-24 PROCEDURE — 90792 PSYCH DIAG EVAL W/MED SRVCS: CPT

## 2022-05-25 ENCOUNTER — PRIOR AUTHORIZATION (OUTPATIENT)
Dept: INTERNAL MEDICINE | Facility: CLINIC | Age: 32
End: 2022-05-25

## 2022-05-25 NOTE — PROGRESS NOTES
"     New Patient Office Visit      Patient Name: Theresa Maya  : 1990   MRN: [id number]     Referring Provider: Winston Kc MD    Chief Complaint:  Psychiatric evaluation related to:     ICD-10-CM ICD-9-CM   1. Bipolar disorder, current episode depressed, severe, without psychotic features ([facility])  F31.4 296.53   2. Insomnia due to other mental disorder  F51.05 300.9    F99 327.02        History of Present Illness:   Theresa Maya is a 31 y.o. female who is here today for psychiatric evaluation on referral from primary care provider.  Patient presents today worried she may have ADHD, she reports not being able to focus, irritability and impulsive spending.    Bipolar disorder:  Selene-patient reports periods lasting 2 weeks or more in which she cannot sleep, has racing thoughts, elevated mood, feels invincible, and feels as if everything she does is perfect.  She reports during this time she impulsively spends significant portions of money that she does not have.  She reports 2 years ago she was evicted from her apartment due to her spending.  She reports these episodes occur approximately 1-2 times monthly.  Additionally, she endorses visual/auditory hallucinations during manic episodes.  She reports she will frequently have realistic seeming situations/concentrations occur with family members that do not occur.  She also endorses paranoia during these times, typically related to her .  She reports periods in which she believes she is he is cheating on her or living a double life despite no evidence of these behaviors.    Depression-she reports following manic periods are followed by a crash in which she feels \"disgusting\", has low mood, hypersomnia and feelings of worthlessness.  She also reports anger/irritability, associated with having a short temper with her family during these times.  Depressive episodes last approximately 1 to 2 weeks.     Patient reports mood have been present at " "least the past 5 years and are getting worse.  She reports mood episodes are interfering with her family and work...    Suicidal ideation:  Patient reports active suicidal ideation which occurs daily.  She reports she has thought of ways she may do so.  Denies intents or specific plan, states \"I cannot do that to my kids\".  She has a history of suicide attempt in 2016, took an entire bottle of duloxetine, attempt was nonfatal while at the hospital for this attempt, she found out she was pregnant with her middle child.    Subjective      Review of Systems:   Review of Systems    PHQ-9 Depression Screening Score:       SUICIDE RISK ASSESSMENT    Does patient have thoughts of suicide?   Yes  Does patient have intent for suicide?  No  Does patient have a current plan for suicide?  No    Access to lethal means: Yes medications, sharp objects  History of suicide attempts: Yes, 2016  Family history of suicide or attempts: No  Protective factors: Children    Risk Level: Moderate    SAFETY PLAN    Patient agrees to call 911/go to the nearest emergency department if he/she begins having worsening SI, or develops intent or plan to act on these thoughts. Patient is agreeable to all elements of safety plan and verbalizes understanding.       Psychiatric History:   Patient denies history of outpatient psychiatric treatment by psychiatric provider.  Denies inpatient admissions.  History of suicide attempt in 2016, took entire a bottle of duloxetine.  Previous medications: Duloxetine, poor pain.    Social History:   Living with her 5 children, (3 biological, 2 adopted).   is a travel nurse, is living in a camper outside their home due to marital issues.  She reports he has been having emotional affairs.  She works full-time.  Is currently pursuing bachelor's degree in [employer], has been working on this degree for the past 8 years, 60 credit hours completed.  Long-term goal to become an .  Denies illicit " "substance use, denies alcohol use, endorses caffeine intake, sweet tea 8 ounces daily.    Past Medical History:   Past Medical History:   Diagnosis Date   • Abnormal Pap smear of cervix 2019   • Anxiety    • Arthritis    • Deep vein thrombosis (HCC)     2014, (R) leg while pregnant, dx w/ MTHFR   • Depression    • Dyspepsia    • Dyspnea on exertion    • Elevated hemoglobin A1c    • Fatigue    • Gallbladder abscess     s/p lap nicolas 2006   • GERD (gastroesophageal reflux disease)    • Heartburn     chronic/episodic, depends on what she eats, takes Pepcid prn, EGD Dr. Ricardo 11/21   • Hyperemesis gravidarum    • Hyperlipidemia    • Incomplete right bundle branch block    • Migraines    • MTHFR deficiency complicating pregnancy (HCC)     says clotting d/o only an issue when pregnant, advised to use heparin shots during pregnancy   • Obesity    • Ovarian cyst    • Recurrent pregnancy loss, antepartum condition or complication     had a [facility] and lost the pregnancy at 6 months   • Strep pharyngitis    • Urinary tract infection        Past Surgical History:   Past Surgical History:   Procedure Laterality Date   • ENDOSCOPY N/A 5/5/2022    Procedure: ESOPHAGOGASTRODUODENOSCOPY;  Surgeon: Cristiano Ricardo MD;  Location:  [facility];  Service: Bariatric;  Laterality: N/A;   • ESOPHAGOSCOPY / EGD     • GASTRECTOMY N/A 5/5/2022    Procedure: GASTRECTOMY LAPAROSCOPIC;  Surgeon: Cristiano Ricardo MD;  Location:  [facility];  Service: Bariatric;  Laterality: N/A;   • HIATAL HERNIA REPAIR N/A 5/5/2022    Procedure: HIATAL HERNIA REPAIR LAPAROSCOPIC;  Surgeon: Cristiano Ricardo MD;  Location:  [facility];  Service: Bariatric;  Laterality: N/A;   • LAPAROSCOPIC CHOLECYSTECTOMY  2006    no stones, was told she had \"three gallbladders\"- all removed   • SINUS SURGERY Bilateral 2006   • TUBAL COAGULATION LAPAROSCOPIC Bilateral 09/15/2017    Procedure: BILATERAL TUBAL FALLOPE FILSHIE CLIPPING LAPAROSCOPIC;  Surgeon: Leo Posey III, " MD;  Location: [facility];  Service:    • VAGINAL DELIVERY  14; 16; 17    female,male, male.  She said she had subcutaneous Lovenox injections throughout the second and third pregnancies until delivery       Family History:   Family History   Problem Relation Age of Onset   • Asthma Mother    • Thyroid disease Mother    • Heart attack Mother    • Obesity Mother    • Migraines Mother    • Hypertension Mother    • Lupus Sister    • Ovarian cancer Sister    • Asthma Sister    • Lung cancer Paternal Grandmother    • Obesity Paternal Grandmother    • Breast cancer Maternal Grandmother    • Lung cancer Maternal Grandmother    • Asthma Maternal Grandmother    • Hypertension Maternal Grandmother    • Lupus Maternal Grandmother    • Thyroid disease Maternal Grandmother    • Diabetes Maternal Grandmother    • Stroke Maternal Grandmother    • Hypertension Maternal Grandfather    • Prostate cancer Paternal Grandfather        Family Psychiatric History:  None known    Medications:     Current Outpatient Medications:   •  Enoxaparin Sodium (LOVENOX) 40 MG/0.4ML solution prefilled syringe syringe, Inject  under the skin into the appropriate area as directed Every 12 (Twelve) Hours., Disp: , Rfl:   •  Loratadine 10 MG capsule, Take 10 mg by mouth Daily., Disp: , Rfl:   •  omeprazole (priLOSEC) 40 MG capsule, Take 1 capsule by mouth Daily for 60 days., Disp: 60 capsule, Rfl: 0  •  potassium chloride ER (K-TAB) 20 MEQ tablet controlled-release ER tablet, Take 1 tablet by mouth 2 (Two) Times a Day., Disp: 30 tablet, Rfl: 0  •  promethazine (PHENERGAN) 12.5 MG tablet, Take 1 tablet by mouth Every 6 (Six) Hours As Needed for Nausea or Vomiting., Disp: 30 tablet, Rfl: 0  •  Cariprazine HCl (Vraylar) 1.5 MG capsule capsule, Take 1 capsule by mouth Daily., Disp: 7 capsule, Rfl: 0  •  Cariprazine HCl (Vraylar) 3 MG capsule capsule, Take 1 capsule by mouth Daily., Disp: 30 capsule, Rfl: 1    Allergies:   Allergies   Allergen Reactions   •  "Amoxicillin Diarrhea and GI Intolerance   • Latex Hives and Itching   • Penicillins GI Intolerance     Says Keflex OK as long as she takes w/ food.          Objective     Physical Exam:  Vital Signs:   Vitals:    05/24/22 0836   BP: 132/80   Pulse: 65   Resp: 16   SpO2: 99%   Weight: 105 kg (232 lb 6.4 oz)   Height: 158.8 cm (62.52\")     Body mass index is 41.8 kg/m².     Physical Exam      Mental Status Exam:   Appearance: Obese adult female, appears stated age, dressed appropriately to situation and weather  Hygiene: Good  Cooperation: Good  Eye Contact: Good  Psychomotor Behavior: Normal  Mood: Depressed  Affect: Congruent  Speech: Normal rate, tone and prosody  Thought Process: Linear, goal directed  Thought Content: Mood congruent  Suicidal: Endorses active suicidal ideation, has thought of ways she may do this, denies intent or specific plan, states \"I cannot do that to my children\".  Homicidal: Denies  Hallucinations: Endorses visual and auditory hallucinations during manic episodes  Delusion: Endorses paranoia with manic episodes  Memory: Intact  Orientation: X4  Reliability: Good  Insight: Fair  Judgement: Concern for impulsive spending during manic episodes  Impulse Control: Concern for impulsive spending during manic episodes    Assessment / Plan      Visit Diagnosis/Orders Placed This Visit:  Diagnoses and all orders for this visit:    1. Bipolar disorder, current episode depressed, severe, without psychotic features (HCC) (Primary)  -     Cariprazine HCl (Vraylar) 1.5 MG capsule capsule; Take 1 capsule by mouth Daily.  Dispense: 7 capsule; Refill: 0  -     Cariprazine HCl (Vraylar) 3 MG capsule capsule; Take 1 capsule by mouth Daily.  Dispense: 30 capsule; Refill: 1    2. Insomnia due to other mental disorder         Differential:   Rule out schizoaffective disorder, more likely bipolar    Plan:   1. Start cariprazine 1.5 mg daily for 3 days then increase to 3 mg daily    Continue supportive " psychotherapy efforts and medications as indicated. Treatment and medication options discussed during today's visit. Patient ackowledged and verbally consented to continue with current treatment plan and was educated on the importance of compliance with treatment and follow-up appointments. Patient seems reasonably able to adhere to treatment plan.      Medication Considerations:  Discussed medication options and treatment plan of prescribed medication(s) as well as the risks, benefits, and potential side effects. Patient is agreeable to call the office with any worsening of symptoms or onset of side effects. Patient is agreeable to call 911 or go to the nearest ER should he/she begin having SI/HI.    Treatment Goals:    Bipolar disorder: Patient will experience improvement in bipolar depression and decreased severity/frequency of manic episodes with cariprazine    Quality Measures:     Tobacco Use/Cessation:   Never smoker    I advised Theresa Maya of the risks of tobacco use.     Follow Up:   Return in about 2 weeks (around 6/7/2022) for Medication Mangement Follow Up.      PHYLICIA Reyes, PMHNP-BC

## 2022-05-27 ENCOUNTER — TELEPHONE (OUTPATIENT)
Dept: INTERNAL MEDICINE | Facility: CLINIC | Age: 32
End: 2022-05-27

## 2022-06-01 ENCOUNTER — TELEPHONE (OUTPATIENT)
Dept: INTERNAL MEDICINE | Facility: CLINIC | Age: 32
End: 2022-06-01

## 2022-06-01 NOTE — TELEPHONE ENCOUNTER
Pt called stating she is having side effects to Vraylar; Pt states she is fidgety, arms feel heavy, & cannot sit still.

## 2022-06-06 ENCOUNTER — OFFICE VISIT (OUTPATIENT)
Dept: BARIATRICS/WEIGHT MGMT | Facility: CLINIC | Age: 32
End: 2022-06-06

## 2022-06-06 VITALS
RESPIRATION RATE: 16 BRPM | SYSTOLIC BLOOD PRESSURE: 132 MMHG | BODY MASS INDEX: 39.51 KG/M2 | TEMPERATURE: 97.8 F | DIASTOLIC BLOOD PRESSURE: 82 MMHG | HEART RATE: 70 BPM | WEIGHT: 223 LBS | HEIGHT: 63 IN | OXYGEN SATURATION: 100 %

## 2022-06-06 DIAGNOSIS — E66.01 OBESITY, CLASS III, BMI 40-49.9 (MORBID OBESITY): Primary | ICD-10-CM

## 2022-06-06 DIAGNOSIS — K91.2 POSTGASTRECTOMY MALABSORPTION: ICD-10-CM

## 2022-06-06 DIAGNOSIS — Z90.3 POSTGASTRECTOMY MALABSORPTION: ICD-10-CM

## 2022-06-06 DIAGNOSIS — R53.83 FATIGUE, UNSPECIFIED TYPE: ICD-10-CM

## 2022-06-06 DIAGNOSIS — Z13.0 SCREENING, IRON DEFICIENCY ANEMIA: ICD-10-CM

## 2022-06-06 DIAGNOSIS — Z13.21 MALNUTRITION SCREEN: ICD-10-CM

## 2022-06-06 DIAGNOSIS — E55.9 HYPOVITAMINOSIS D: ICD-10-CM

## 2022-06-06 PROCEDURE — 99024 POSTOP FOLLOW-UP VISIT: CPT | Performed by: PHYSICIAN ASSISTANT

## 2022-06-06 RX ORDER — ONDANSETRON 4 MG/1
TABLET, ORALLY DISINTEGRATING ORAL
COMMUNITY
Start: 2022-05-27 | End: 2022-09-24

## 2022-06-06 NOTE — PROGRESS NOTES
[facility]ariatric [facility]  [address]  [address]  [facility] [id number] [phone]      Patient Name:  Theresa Maya.  :  1990      Reason for Visit:  1 month postop    HPI:  Theresa Maya is a 31 y.o. female  s/p LSG/ HHR by  on 22    Dx with covid .     Doing well. feeling really good. Notes she had to buy more clothes.  No issues/concerns. Denies dysphagia, reflux, nausea, vomiting and abdominal pain.  Tolerating diet progression - on stage 5.  Getting 80g prot/day.  Drinking 64+ fluid oz/day.  Taking MVI and B12.  On Omeprazole . She is taking potassium supplements x 30 days from d/c.   Still holding ASA , NSAIDs , Tramadol, Hormones, Diuretics , Steroids and Immunologics.  Exercising- stair climber, walking,wanting to start swimming.       Presurgery weight:  242 pounds. Today's weight is 101 kg (223 lb) pounds, today's Body mass index is 40.11 kg/m²., and@ weight loss since surgery is 19 pounds.       Past Medical History:   Diagnosis Date   • Abnormal Pap smear of cervix    • Anxiety    • Arthritis    • Deep vein thrombosis (HCC)     , (R) leg while pregnant, dx w/ MTHFR   • Depression    • Dyspepsia    • Dyspnea on exertion    • Elevated hemoglobin A1c    • Fatigue    • Gallbladder abscess     s/p lap nicolas    • GERD (gastroesophageal reflux disease)    • Heartburn     chronic/episodic, depends on what she eats, takes Pepcid prn, EGD Dr. Ricardo    • Hyperemesis gravidarum    • Hyperlipidemia    • Incomplete right bundle branch block    • Migraines    • MTHFR deficiency complicating pregnancy (HCC)     says clotting d/o only an issue when pregnant, advised to use heparin shots during pregnancy   • Obesity    • Ovarian cyst    • Recurrent pregnancy loss, antepartum condition or complication     had a [facility] and lost the pregnancy at 6 months   • Strep pharyngitis    • Urinary tract infection      Past Surgical History:   Procedure  "Laterality Date   • ENDOSCOPY N/A 5/5/2022    Procedure: ESOPHAGOGASTRODUODENOSCOPY;  Surgeon: Cristiano Ricardo MD;  Location:  [facility];  Service: Bariatric;  Laterality: N/A;   • ESOPHAGOSCOPY / EGD     • GASTRECTOMY N/A 5/5/2022    Procedure: GASTRECTOMY LAPAROSCOPIC;  Surgeon: Cristiano Ricardo MD;  Location:  [facility];  Service: Bariatric;  Laterality: N/A;   • HIATAL HERNIA REPAIR N/A 5/5/2022    Procedure: HIATAL HERNIA REPAIR LAPAROSCOPIC;  Surgeon: Cristiano Ricardo MD;  Location:  [facility];  Service: Bariatric;  Laterality: N/A;   • LAPAROSCOPIC CHOLECYSTECTOMY  2006    no stones, was told she had \"three gallbladders\"- all removed   • SINUS SURGERY Bilateral 2006   • TUBAL COAGULATION LAPAROSCOPIC Bilateral 09/15/2017    Procedure: BILATERAL TUBAL FALLOPE FILSHIE CLIPPING LAPAROSCOPIC;  Surgeon: Leo Posey III, MD;  Location:  [facility];  Service:    • VAGINAL DELIVERY  14; 16; 17    female,male, male.  She said she had subcutaneous Lovenox injections throughout the second and third pregnancies until delivery     Outpatient Medications Marked as Taking for the 6/6/22 encounter (Office Visit) with Brooklyn Camejo PA-C   Medication Sig Dispense Refill   • Cariprazine HCl (Vraylar) 1.5 MG capsule capsule Take 1 capsule by mouth Daily. 7 capsule 0   • Cariprazine HCl (Vraylar) 3 MG capsule capsule Take 1 capsule by mouth Daily. 30 capsule 1   • Loratadine 10 MG capsule Take 10 mg by mouth Daily.     • omeprazole (priLOSEC) 40 MG capsule Take 1 capsule by mouth Daily for 60 days. 60 capsule 0   • potassium chloride ER (K-TAB) 20 MEQ tablet controlled-release ER tablet Take 1 tablet by mouth 2 (Two) Times a Day. 30 tablet 0     Allergies   Allergen Reactions   • Amoxicillin Diarrhea and GI Intolerance   • Latex Hives and Itching   • Penicillins GI Intolerance     Says Keflex OK as long as she takes w/ food.        Social History     Socioeconomic History   • Marital status:    Tobacco Use " "  • Smoking status: Never Smoker   • Smokeless tobacco: Never Used   Vaping Use   • Vaping Use: Never used   Substance and Sexual Activity   • Alcohol use: Not Currently     Comment: occ   • Drug use: No   • Sexual activity: Yes     Partners: Male     Birth control/protection: Surgical     Comment: -Juve, and has 3 children-homemaker       /82 (BP Location: Left arm, Patient Position: Sitting, Cuff Size: Adult)   Pulse 70   Temp 97.8 °F (36.6 °C) (Tympanic)   Resp 16   Ht 158.8 cm (62.52\")   Wt 101 kg (223 lb)   SpO2 100%   BMI 40.11 kg/m²     Physical Exam  Constitutional:       Appearance: She is well-developed.   HENT:      Head: Normocephalic and atraumatic.   Cardiovascular:      Rate and Rhythm: Normal rate and regular rhythm.   Pulmonary:      Effort: Pulmonary effort is normal.      Breath sounds: Normal breath sounds.   Abdominal:      General: Bowel sounds are normal.      Palpations: Abdomen is soft.      Comments: Incisions healing well   Skin:     General: Skin is warm and dry.   Neurological:      Mental Status: She is alert.   Psychiatric:         Behavior: Behavior normal.           Assessment:  1 month  s/p LSG/ HHR by  on 5/5/22    ICD-10-CM ICD-9-CM   1. Obesity, Class III, BMI 40-49.9 (morbid obesity) ([facility])  E66.01 278.01   2. Fatigue, unspecified type  R53.83 780.79   3. Screening, iron deficiency anemia  Z13.0 V78.0   4. Malnutrition screen  Z13.21 V77.2   5. Postgastrectomy malabsorption  K91.2 579.3    Z90.3    6. Hypovitaminosis D  E55.9 268.9         Plan:  Doing well.  Continue to advance diet per manual.  Continue protein 70-100g/day.  Encouraged good food choices - high protein, low carb.  Continue fluids 64+oz per day. Continue routine exercise, lifting restrictions lifted.  Continue PPI. Continue to avoid NSAIDS, ASA, tramadol, tobacco x 6 weeks postop, steroids x 8 weeks postop.  Routine bariatric labs ordered.  Continue vitamins w/ adjustments pending " lab results.  Call w/ problems/concerns.    BMI has not been calculated during today's encounter.         The patient was instructed to follow up in 2 months, sooner if needed.

## 2022-06-07 ENCOUNTER — TELEPHONE (OUTPATIENT)
Dept: BEHAVIORAL HEALTH | Facility: CLINIC | Age: 32
End: 2022-06-07

## 2022-06-07 ENCOUNTER — TELEPHONE (OUTPATIENT)
Dept: INTERNAL MEDICINE | Facility: CLINIC | Age: 32
End: 2022-06-07

## 2022-06-07 DIAGNOSIS — F31.4 BIPOLAR DISORDER, CURRENT EPISODE DEPRESSED, SEVERE, WITHOUT PSYCHOTIC FEATURES: Primary | ICD-10-CM

## 2022-06-07 RX ORDER — ARIPIPRAZOLE 5 MG/1
5 TABLET ORAL DAILY
Qty: 30 TABLET | Refills: 0 | Status: SHIPPED | OUTPATIENT
Start: 2022-06-07 | End: 2022-06-22 | Stop reason: DRUGHIGH

## 2022-06-07 NOTE — TELEPHONE ENCOUNTER
Pt called and rescheduled her appointment for today. Pt stated that she wanted you to know that they Vraylar is making her want to sleep most of the day. Pt has small children and can not sleep like that.

## 2022-06-07 NOTE — TELEPHONE ENCOUNTER
Called patient to discuss medication concerns.  The patient reports that since starting Vraylar she has been extremely tired throughout the day.  She reports she has been sleeping approximately 12 hours nightly and has been falling asleep at her desk during the day.  This is led to her getting sent home from work.  She stopped taking the Vraylar on Friday due to side effects.  She does report while taking Vraylar she noticed significant improvements in her irritability and depression.  Since stopping she has noticed a return in her irritability.      Plan:  1. Discontinue Vraylar  2.  Start aripiprazole 5 mg daily

## 2022-06-07 NOTE — TELEPHONE ENCOUNTER
Pt called stating that she is requesting a letter so she can medically withdrawal from school. Pt stated that she needed letter to stated that because of medication side effects she was unable to attend classes for a number of days. Please advise

## 2022-06-08 NOTE — TELEPHONE ENCOUNTER
Spoke to Pt and she stated the dates are 5/26/22 through 6/2/22. Pt has requested that the letter also state that the stress from taking 3 classes is too much and that she should take 2 classes instead. Please advise

## 2022-06-12 LAB
25(OH)D3+25(OH)D2 SERPL-MCNC: 33 NG/ML (ref 30–100)
ALBUMIN SERPL-MCNC: 4.1 G/DL (ref 3.5–5.2)
ALBUMIN/GLOB SERPL: 1.5 G/DL
ALP SERPL-CCNC: 67 U/L (ref 39–117)
ALT SERPL-CCNC: 21 U/L (ref 1–33)
AST SERPL-CCNC: 19 U/L (ref 1–32)
BASOPHILS # BLD AUTO: 0.03 10*3/MM3 (ref 0–0.2)
BASOPHILS NFR BLD AUTO: 0.8 % (ref 0–1.5)
BILIRUB SERPL-MCNC: 0.3 MG/DL (ref 0–1.2)
BUN SERPL-MCNC: 8 MG/DL (ref 6–20)
BUN/CREAT SERPL: 11.1 (ref 7–25)
CALCIUM SERPL-MCNC: 9.3 MG/DL (ref 8.6–10.5)
CHLORIDE SERPL-SCNC: 107 MMOL/L (ref 98–107)
CO2 SERPL-SCNC: 26.6 MMOL/L (ref 22–29)
CREAT SERPL-MCNC: 0.72 MG/DL (ref 0.57–1)
EGFRCR SERPLBLD CKD-EPI 2021: 114.8 ML/MIN/1.73
EOSINOPHIL # BLD AUTO: 0.06 10*3/MM3 (ref 0–0.4)
EOSINOPHIL NFR BLD AUTO: 1.5 % (ref 0.3–6.2)
ERYTHROCYTE [DISTWIDTH] IN BLOOD BY AUTOMATED COUNT: 15.3 % (ref 12.3–15.4)
FERRITIN SERPL-MCNC: 31.6 NG/ML (ref 13–150)
FOLATE SERPL-MCNC: 11 NG/ML (ref 4.78–24.2)
GLOBULIN SER CALC-MCNC: 2.8 GM/DL
GLUCOSE SERPL-MCNC: 92 MG/DL (ref 65–99)
HCT VFR BLD AUTO: 40.5 % (ref 34–46.6)
HGB BLD-MCNC: 13.1 G/DL (ref 12–15.9)
IMM GRANULOCYTES # BLD AUTO: 0.01 10*3/MM3 (ref 0–0.05)
IMM GRANULOCYTES NFR BLD AUTO: 0.3 % (ref 0–0.5)
IRON SERPL-MCNC: 64 MCG/DL (ref 37–145)
LYMPHOCYTES # BLD AUTO: 1.33 10*3/MM3 (ref 0.7–3.1)
LYMPHOCYTES NFR BLD AUTO: 33.8 % (ref 19.6–45.3)
MCH RBC QN AUTO: 27.5 PG (ref 26.6–33)
MCHC RBC AUTO-ENTMCNC: 32.3 G/DL (ref 31.5–35.7)
MCV RBC AUTO: 84.9 FL (ref 79–97)
METHYLMALONATE SERPL-SCNC: 127 NMOL/L (ref 0–378)
MONOCYTES # BLD AUTO: 0.33 10*3/MM3 (ref 0.1–0.9)
MONOCYTES NFR BLD AUTO: 8.4 % (ref 5–12)
NEUTROPHILS # BLD AUTO: 2.18 10*3/MM3 (ref 1.7–7)
NEUTROPHILS NFR BLD AUTO: 55.2 % (ref 42.7–76)
NRBC BLD AUTO-RTO: 0 /100 WBC (ref 0–0.2)
PLATELET # BLD AUTO: 198 10*3/MM3 (ref 140–450)
POTASSIUM SERPL-SCNC: 4 MMOL/L (ref 3.5–5.2)
PREALB SERPL-MCNC: 25 MG/DL (ref 14–35)
PROT SERPL-MCNC: 6.9 G/DL (ref 6–8.5)
RBC # BLD AUTO: 4.77 10*6/MM3 (ref 3.77–5.28)
SODIUM SERPL-SCNC: 145 MMOL/L (ref 136–145)
VIT B1 BLD-SCNC: 77.1 NMOL/L (ref 66.5–200)
WBC # BLD AUTO: 3.94 10*3/MM3 (ref 3.4–10.8)

## 2022-06-14 NOTE — TELEPHONE ENCOUNTER
Spoke to Pt and informed her of [place] being [place] [employer]. Pt voiced understanding.

## 2022-06-15 ENCOUNTER — OFFICE VISIT (OUTPATIENT)
Dept: BEHAVIORAL HEALTH | Facility: CLINIC | Age: 32
End: 2022-06-15

## 2022-06-15 VITALS
BODY MASS INDEX: 39.69 KG/M2 | DIASTOLIC BLOOD PRESSURE: 82 MMHG | OXYGEN SATURATION: 98 % | HEIGHT: 63 IN | WEIGHT: 224 LBS | HEART RATE: 81 BPM | SYSTOLIC BLOOD PRESSURE: 132 MMHG | RESPIRATION RATE: 16 BRPM

## 2022-06-15 DIAGNOSIS — F99 INSOMNIA DUE TO OTHER MENTAL DISORDER: ICD-10-CM

## 2022-06-15 DIAGNOSIS — E66.01 MORBID OBESITY: ICD-10-CM

## 2022-06-15 DIAGNOSIS — F31.32 BIPOLAR AFFECTIVE DISORDER, CURRENTLY DEPRESSED, MODERATE: Primary | ICD-10-CM

## 2022-06-15 DIAGNOSIS — F51.05 INSOMNIA DUE TO OTHER MENTAL DISORDER: ICD-10-CM

## 2022-06-15 PROCEDURE — 99214 OFFICE O/P EST MOD 30 MIN: CPT

## 2022-06-22 RX ORDER — ARIPIPRAZOLE 10 MG/1
10 TABLET ORAL DAILY
Qty: 30 TABLET | Refills: 1 | Status: SHIPPED | OUTPATIENT
Start: 2022-06-29 | End: 2022-07-11

## 2022-06-22 NOTE — PROGRESS NOTES
Office  Follow Up Visit      Patient Name: Theresa Maya  : 1990   MRN: [id number]     Referring Provider: Winston Kc MD    Chief Complaint:    ICD-10-CM ICD-9-CM   1. Bipolar disorder, current episode depressed, severe, without psychotic features ([facility])  F31.4 296.53   2. Insomnia due to other mental disorder  F51.05 300.9    F99 327.02   3. Morbid obesity ([facility])  E66.01 278.01        HPI/Interval History:   Theresa Maya is a 31 y.o. female who is here today for follow up related to bipolar disorder.  The patient reports that since beginning Abilify she has noticed improvements in mood and racing thoughts.  She is experiencing more stable mood with less symptoms of depression.  She still is experiencing some depression and feelings of worthlessness.  Of note, she recently began marriage counseling with her  of 12 years due to ongoing issues in their relationship.  Still experiencing some suicidal ideation, denies intents or plans.  She is requesting a letter requesting decreasing courseload related to stress, and impacts on mental health.    Subjective      Review of Systems:   Review of Systems   Psychiatric/Behavioral: Positive for dysphoric mood, suicidal ideas and depressed mood. Negative for self-injury.       PHQ-9 Depression Screening Score: 8     Patient History:   The following portions of the patient's history were reviewed and updated as appropriate: allergies, current medications, past family history, past medical history, past social history, past surgical history and problem list.     Social:  Currently enrolled in college coursework.  Plans to become an .    Medications:     Current Outpatient Medications:   •  ARIPiprazole (Abilify) 5 MG tablet, Take 1 tablet by mouth Daily., Disp: 30 tablet, Rfl: 0  •  Loratadine 10 MG capsule, Take 10 mg by mouth Daily., Disp: , Rfl:   •  omeprazole (priLOSEC) 40 MG capsule, Take 1 capsule by mouth Daily for 60 days.,  "Disp: 60 capsule, Rfl: 0  •  potassium chloride ER (K-TAB) 20 MEQ tablet controlled-release ER tablet, Take 1 tablet by mouth 2 (Two) Times a Day., Disp: 30 tablet, Rfl: 0  •  Enoxaparin Sodium (LOVENOX) 40 MG/0.4ML solution prefilled syringe syringe, Inject  under the skin into the appropriate area as directed Every 12 (Twelve) Hours., Disp: , Rfl:   •  ondansetron ODT (ZOFRAN-ODT) 4 MG disintegrating tablet, , Disp: , Rfl:   •  promethazine (PHENERGAN) 12.5 MG tablet, Take 1 tablet by mouth Every 6 (Six) Hours As Needed for Nausea or Vomiting., Disp: 30 tablet, Rfl: 0    Objective     Physical Exam:  Vital Signs:   Vitals:    06/15/22 1630   BP: 132/82   Pulse: 81   Resp: 16   SpO2: 98%   Weight: 102 kg (224 lb)   Height: 158.8 cm (62.52\")     Body mass index is 40.29 kg/m².       Mental Status Exam:   Appearance: Obese young adult female, appears stated age, dressed appropriately to situation and weather  Hygiene: Good  Cooperation: Good  Eye Contact: Within normal limits  Psychomotor Behavior: Within normal limits  Mood: Depressed  Affect: Congruent, full range  Speech: Normal rate, tone and prosody  Thought Process: Linear, goal directed  Thought Content: Mood congruent  Suicidal: Positive for SI, denies intents or plans  Homicidal: Denies  Hallucinations: Did not assess  Delusion: Did not assess  Memory: Intact  Orientation: X4  Reliability: Good  Insight: Good  Judgement: Good  Impulse Control: Good    Assessment / Plan      Visit Diagnosis/Orders Placed This Visit:  Diagnoses and all orders for this visit:    1. Bipolar disorder, current episode depressed, severe, without psychotic features (HCC) (Primary)    2. Insomnia due to other mental disorder    3. Morbid obesity (HCC)         Differential:   None current    Plan:   1. Increase aripiprazole to 10 mg daily in 2 weeks    Continue supportive psychotherapy efforts and medications as indicated. Treatment and medication options discussed during today's " visit. Patient ackowledged and verbally consented to continue with current treatment plan and was educated on the importance of compliance with treatment and follow-up appointments. Patient seems reasonably able to adhere to treatment plan.      Medication Considerations:  Discussed medication options and treatment plan of prescribed medication(s) as well as the risks, benefits, and potential side effects. Patient is agreeable to call the office with any worsening of symptoms or onset of side effects. Patient is agreeable to call 911 or go to the nearest ER should he/she begin having SI/HI.    Quality Measures:     Tobacco Use/Cessation:  Never smoker    I advised Theresa Maya of the risks of tobacco use.     Follow Up:   Return in about 4 weeks (around 7/13/2022) for Medication Mangement Follow Up.      PHYLICIA Reyes, PMHNP-BC

## 2022-06-29 NOTE — TELEPHONE ENCOUNTER
PATIENT WAS CALLING IN TO LET OSMIN PORTILLO KNOW HO SHE IS DOING WITH HER NEW RX:    SHE SAID THAT SHE IS NO LONGER FEELING THE EXTREME DROWSINESS OR TIREDNESS    SHE SAID SHE FEELS MORE FOCUSED, BUT THAT SHE SOMETIMES GET OVERLY FOCUSED TO THE POINT OF BOREDOM.    SHE ALSO WANTED TO LET HIM KNOW THAT SHE HAS ALRDY UPPED HER DOSAGE TO 10MG A DAY ON Monday, 06/27/2022.    CONFIRMED CALL BACK NUMBER.  TONIRIN MEJIAS  [phone]

## 2022-07-11 ENCOUNTER — OFFICE VISIT (OUTPATIENT)
Dept: BEHAVIORAL HEALTH | Facility: CLINIC | Age: 32
End: 2022-07-11

## 2022-07-11 VITALS
SYSTOLIC BLOOD PRESSURE: 132 MMHG | DIASTOLIC BLOOD PRESSURE: 80 MMHG | HEIGHT: 63 IN | BODY MASS INDEX: 38.27 KG/M2 | WEIGHT: 216 LBS

## 2022-07-11 DIAGNOSIS — F51.05 INSOMNIA DUE TO OTHER MENTAL DISORDER: ICD-10-CM

## 2022-07-11 DIAGNOSIS — F31.32 BIPOLAR AFFECTIVE DISORDER, CURRENTLY DEPRESSED, MODERATE: Primary | ICD-10-CM

## 2022-07-11 DIAGNOSIS — F99 INSOMNIA DUE TO OTHER MENTAL DISORDER: ICD-10-CM

## 2022-07-11 PROCEDURE — 99214 OFFICE O/P EST MOD 30 MIN: CPT

## 2022-07-11 RX ORDER — ARIPIPRAZOLE 15 MG/1
15 TABLET ORAL DAILY
Qty: 30 TABLET | Refills: 2 | Status: SHIPPED | OUTPATIENT
Start: 2022-07-11 | End: 2022-09-24

## 2022-07-11 NOTE — PROGRESS NOTES
Office  Follow Up Visit      Patient Name: Theresa Maya  : 1990   MRN: [id number]     Referring Provider: Winston Kc MD    Chief Complaint:    ICD-10-CM ICD-9-CM   1. Bipolar affective disorder, currently depressed, moderate (HCC)  F31.32 296.52   2. Insomnia due to other mental disorder  F51.05 300.9    F99 327.02        HPI/Interval History:   Theresa Maya is a 31 y.o. female who is here today for follow up related to bipolar disorder and insomnia.  Patient reports she has recently unintentionally increased medication to 15 mg daily for the past 3 days thinking she was taking two 5 mg tablets.  She reports she has noticed some improvement since doing so.  She reports mood continues to be improved and she has had less racing thoughts.  She also reports no impulsive spending.  She also reports she has had no suicidal ideation in the past 3 weeks.  She is sleeping well, getting approximately 7.5 hours nightly.  She does report she is having some issues staying still but reports this is more mental than physical.  She denies specific symptoms of akathisia.  We will continue to monitor.  She also reports some continued auditory/visual hallucinations.  She reports she has specific memories of events that have not happened.  These typically involve her  and anxiety related to his behaviors.  On that note, things with  are going well.  She does report she is in marriage counseling, but endorses some issues with that therapist.    Subjective      Review of Systems:   Review of Systems   Psychiatric/Behavioral: Negative for dysphoric mood, self-injury, sleep disturbance, suicidal ideas and depressed mood. The patient is not nervous/anxious.        PHQ-9 Depression Screening Score: 11     Patient History:   The following portions of the patient's history were reviewed and updated as appropriate: allergies, current medications, past family history, past medical history, past social  "history, past surgical history and problem list.     Social:  No significant update    Medications:     Current Outpatient Medications:   •  Loratadine 10 MG capsule, Take 10 mg by mouth Daily., Disp: , Rfl:   •  potassium chloride ER (K-TAB) 20 MEQ tablet controlled-release ER tablet, Take 1 tablet by mouth 2 (Two) Times a Day., Disp: 30 tablet, Rfl: 0  •  ARIPiprazole (ABILIFY) 15 MG tablet, Take 1 tablet by mouth Daily., Disp: 30 tablet, Rfl: 2  •  Enoxaparin Sodium (LOVENOX) 40 MG/0.4ML solution prefilled syringe syringe, Inject  under the skin into the appropriate area as directed Every 12 (Twelve) Hours., Disp: , Rfl:   •  ondansetron ODT (ZOFRAN-ODT) 4 MG disintegrating tablet, , Disp: , Rfl:   •  promethazine (PHENERGAN) 12.5 MG tablet, Take 1 tablet by mouth Every 6 (Six) Hours As Needed for Nausea or Vomiting., Disp: 30 tablet, Rfl: 0    Objective     Physical Exam:  Vital Signs:   Vitals:    07/11/22 1356   BP: 132/80  Comment: 98/79   Weight: 98 kg (216 lb)   Height: 158.8 cm (62.52\")     Body mass index is 38.85 kg/m².       Mental Status Exam:   Appearance: Obese  female, appears stated age, dressed appropriately to situation and weather  Hygiene: Good  Cooperation: Good  Eye Contact: Good  Psychomotor Behavior: Within normal limits  Mood: Euthymic   Affect: Congruent, full range  Speech: Normal rate, tone and prosody  Thought Process: Linear, goal directed  Thought Content: Mood congruent  Suicidal: Denies  Homicidal: Denies  Hallucinations: Possible auditory/visual hallucinations, false memory  Delusion: Denies  Memory: Intact, some falls memory noted  Orientation: X4  Reliability: Good  Insight: Good  Judgement: Good  Impulse Control: No current issues    Assessment / Plan      Visit Diagnosis/Orders Placed This Visit:  Diagnoses and all orders for this visit:    1. Bipolar affective disorder, currently depressed, moderate (HCC) (Primary)  -     ARIPiprazole (ABILIFY) 15 MG tablet; Take 1 " tablet by mouth Daily.  Dispense: 30 tablet; Refill: 2    2. Insomnia due to other mental disorder         Differential:   None current    Plan:   1. Increase aripiprazole to 15 mg daily    Continue supportive psychotherapy efforts and medications as indicated. Treatment and medication options discussed during today's visit. Patient ackowledged and verbally consented to continue with current treatment plan and was educated on the importance of compliance with treatment and follow-up appointments. Patient seems reasonably able to adhere to treatment plan.      Medication Considerations:  Discussed medication options and treatment plan of prescribed medication(s) as well as the risks, benefits, and potential side effects. Patient is agreeable to call the office with any worsening of symptoms or onset of side effects. Patient is agreeable to call 911 or go to the nearest ER should he/she begin having SI/HI.    Quality Measures:     Tobacco Use/Cessation:  Never smoker    I advised Theresa Maya of the risks of tobacco use.     Follow Up:   Return in about 2 months (around 9/11/2022) for Medication Mangement Follow Up.      PHYLICIA Reyes, PMHNP-BC

## 2022-10-11 ENCOUNTER — OFFICE VISIT (OUTPATIENT)
Dept: INTERNAL MEDICINE | Facility: CLINIC | Age: 32
End: 2022-10-11

## 2022-10-11 ENCOUNTER — LAB (OUTPATIENT)
Dept: LAB | Facility: HOSPITAL | Age: 32
End: 2022-10-11

## 2022-10-11 VITALS
RESPIRATION RATE: 18 BRPM | TEMPERATURE: 97.6 F | DIASTOLIC BLOOD PRESSURE: 66 MMHG | SYSTOLIC BLOOD PRESSURE: 112 MMHG | HEART RATE: 87 BPM | WEIGHT: 203 LBS | OXYGEN SATURATION: 98 % | BODY MASS INDEX: 37.36 KG/M2 | HEIGHT: 62 IN

## 2022-10-11 DIAGNOSIS — L65.9 HAIR LOSS: ICD-10-CM

## 2022-10-11 DIAGNOSIS — Z23 NEED FOR INFLUENZA VACCINATION: ICD-10-CM

## 2022-10-11 DIAGNOSIS — R53.83 FATIGUE, UNSPECIFIED TYPE: ICD-10-CM

## 2022-10-11 DIAGNOSIS — R12 HEARTBURN: ICD-10-CM

## 2022-10-11 DIAGNOSIS — E66.09 CLASS 2 OBESITY DUE TO EXCESS CALORIES WITHOUT SERIOUS COMORBIDITY WITH BODY MASS INDEX (BMI) OF 37.0 TO 37.9 IN ADULT: ICD-10-CM

## 2022-10-11 DIAGNOSIS — R53.83 FATIGUE, UNSPECIFIED TYPE: Primary | ICD-10-CM

## 2022-10-11 PROBLEM — E66.01 MORBID (SEVERE) OBESITY DUE TO EXCESS CALORIES: Status: RESOLVED | Noted: 2020-09-23 | Resolved: 2022-10-11

## 2022-10-11 PROBLEM — E66.01 MORBID OBESITY WITH BODY MASS INDEX (BMI) OF 40.0 TO 44.9 IN ADULT: Status: RESOLVED | Noted: 2022-04-19 | Resolved: 2022-10-11

## 2022-10-11 LAB
DEPRECATED RDW RBC AUTO: 38.7 FL (ref 37–54)
ERYTHROCYTE [DISTWIDTH] IN BLOOD BY AUTOMATED COUNT: 12.7 % (ref 12.3–15.4)
HCT VFR BLD AUTO: 33.8 % (ref 34–46.6)
HGB BLD-MCNC: 11.5 G/DL (ref 12–15.9)
MCH RBC QN AUTO: 28.5 PG (ref 26.6–33)
MCHC RBC AUTO-ENTMCNC: 34 G/DL (ref 31.5–35.7)
MCV RBC AUTO: 83.9 FL (ref 79–97)
PLATELET # BLD AUTO: 262 10*3/MM3 (ref 140–450)
PMV BLD AUTO: 11.7 FL (ref 6–12)
RBC # BLD AUTO: 4.03 10*6/MM3 (ref 3.77–5.28)
WBC NRBC COR # BLD: 5.06 10*3/MM3 (ref 3.4–10.8)

## 2022-10-11 PROCEDURE — 83540 ASSAY OF IRON: CPT | Performed by: NURSE PRACTITIONER

## 2022-10-11 PROCEDURE — 99214 OFFICE O/P EST MOD 30 MIN: CPT | Performed by: NURSE PRACTITIONER

## 2022-10-11 PROCEDURE — 90686 IIV4 VACC NO PRSV 0.5 ML IM: CPT | Performed by: NURSE PRACTITIONER

## 2022-10-11 PROCEDURE — 82306 VITAMIN D 25 HYDROXY: CPT | Performed by: NURSE PRACTITIONER

## 2022-10-11 PROCEDURE — 90471 IMMUNIZATION ADMIN: CPT | Performed by: NURSE PRACTITIONER

## 2022-10-11 PROCEDURE — 84466 ASSAY OF TRANSFERRIN: CPT | Performed by: NURSE PRACTITIONER

## 2022-10-11 PROCEDURE — 80050 GENERAL HEALTH PANEL: CPT | Performed by: NURSE PRACTITIONER

## 2022-10-11 PROCEDURE — 82728 ASSAY OF FERRITIN: CPT | Performed by: NURSE PRACTITIONER

## 2022-10-11 PROCEDURE — 82607 VITAMIN B-12: CPT | Performed by: NURSE PRACTITIONER

## 2022-10-11 NOTE — PROGRESS NOTES
Subjective   Theresa Maya is a 32 y.o. female here to establish care.  Chief Complaint   Patient presents with   • Establish Care     Offboarding from [facility]    • Alopecia          • Fatigue       History of Present Illness     She had Bariatric surgery (sleeve) with Dr. Ricardo 5/5/2022  She takes MVI, Vit D, b12 daily.     Anxiety/bipolar/depression  Follows with YOMAIRA WHATLEY with psych. Last saw him 7/11/22.  He has had Steve 15 mg.  She tells me that she thinks that this has been helping.    Has occasional HB. Uses OTC omeprazole and resolves it.    worse with certain foods such as chinese food.   Has lost 30 pounds     She is concerned about generalized hair loss/thinning.  Does have female pattern hair loss in family-her mother.  She denies any scalp lesions or itching.  Does not h thinning hair eat process hair or use any harsh products      The following portions of the patient's history were reviewed and updated as appropriate: allergies, current medications, past family history, past medical history, past social history, past surgical history and problem list.    Review of Systems   Constitutional: Positive for fatigue. Negative for activity change, appetite change, chills, diaphoresis, fever and unexpected weight loss.   HENT:        C/O thinning hair     Eyes: Negative for blurred vision, double vision and visual disturbance.   Respiratory: Negative for cough, shortness of breath and wheezing.    Cardiovascular: Negative for chest pain, palpitations and leg swelling.   Gastrointestinal: Negative for abdominal pain, constipation, diarrhea, nausea and vomiting.   Genitourinary: Negative for difficulty urinating, frequency and urgency.   Musculoskeletal: Negative for arthralgias and myalgias.   Skin: Negative for color change and rash.   Neurological: Negative for dizziness, weakness and headache.   Hematological: Negative for adenopathy. Does not bruise/bleed easily.   Psychiatric/Behavioral:  Positive for decreased concentration. Negative for self-injury and suicidal ideas. The patient is nervous/anxious.            Allergies   Allergen Reactions   • Amoxicillin Diarrhea and GI Intolerance   • Latex Hives and Itching   • Penicillins GI Intolerance     Says Keflex OK as long as she takes w/ food.      Past Medical History:   Diagnosis Date   • Abnormal Pap smear of cervix 2019   • Anxiety    • Arthritis    • Deep vein thrombosis ([facility])     2014, (R) leg while pregnant, dx w/ MTHFR   • Depression    • Dyspepsia    • Dyspnea on exertion    • Elevated hemoglobin A1c    • Fatigue    • Gallbladder abscess     s/p lap nicolas 2006   • GERD (gastroesophageal reflux disease)    • Heartburn     chronic/episodic, depends on what she eats, takes Pepcid prn, EGD Dr. Ricardo 11/21   • Hyperemesis gravidarum    • Hyperlipidemia    • Incomplete right bundle branch block    • Migraines    • Morbid obesity with body mass index (BMI) of 40.0 to 44.9 in adult ([facility]) 4/19/2022   • MTHFR deficiency complicating pregnancy ([facility])     says clotting d/o only an issue when pregnant, advised to use heparin shots during pregnancy   • Obesity    • Ovarian cyst    • Recurrent pregnancy loss, antepartum condition or complication     had a [facility] and lost the pregnancy at 6 months   • Strep pharyngitis    • Urinary tract infection      Past Surgical History:   Procedure Laterality Date   • ENDOSCOPY N/A 5/5/2022    Procedure: ESOPHAGOGASTRODUODENOSCOPY;  Surgeon: Cristiano Ricardo MD;  Location:  [facility];  Service: Bariatric;  Laterality: N/A;   • ESOPHAGOSCOPY / EGD     • GASTRECTOMY N/A 5/5/2022    Procedure: GASTRECTOMY LAPAROSCOPIC;  Surgeon: Cristiano Ricardo MD;  Location:  [facility];  Service: Bariatric;  Laterality: N/A;   • HIATAL HERNIA REPAIR N/A 5/5/2022    Procedure: HIATAL HERNIA REPAIR LAPAROSCOPIC;  Surgeon: Cristiano Ricardo MD;  Location:  [facility];  Service: Bariatric;  Laterality: N/A;   • LAPAROSCOPIC CHOLECYSTECTOMY   "2006    no stones, was told she had \"three gallbladders\"- all removed   • SINUS SURGERY Bilateral 2006   • TUBAL COAGULATION LAPAROSCOPIC Bilateral 09/15/2017    Procedure: BILATERAL TUBAL FALLOPE FILSHIE CLIPPING LAPAROSCOPIC;  Surgeon: Leo Posey III, MD;  Location: [facility];  Service:    • VAGINAL DELIVERY  14; 16; 17    female,male, male.  She said she had subcutaneous Lovenox injections throughout the second and third pregnancies until delivery     Family History   Problem Relation Age of Onset   • Asthma Mother    • Thyroid disease Mother    • Heart attack Mother    • Obesity Mother    • Migraines Mother    • Hypertension Mother    • Lupus Sister    • Ovarian cancer Sister    • Asthma Sister    • Lung cancer Paternal Grandmother    • Obesity Paternal Grandmother    • Breast cancer Maternal Grandmother    • Lung cancer Maternal Grandmother    • Asthma Maternal Grandmother    • Hypertension Maternal Grandmother    • Lupus Maternal Grandmother    • Thyroid disease Maternal Grandmother    • Diabetes Maternal Grandmother    • Stroke Maternal Grandmother    • Hypertension Maternal Grandfather    • Prostate cancer Paternal Grandfather      Social History     Socioeconomic History   • Marital status:    Tobacco Use   • Smoking status: Never   • Smokeless tobacco: Never   Vaping Use   • Vaping Use: Never used   Substance and Sexual Activity   • Alcohol use: Not Currently     Comment: occ   • Drug use: No   • Sexual activity: Yes     Partners: Male     Birth control/protection: Surgical     Comment: -Juve, and has 3 children-homemaker     Immunization History   Administered Date(s) Administered   • COVID-19 ([facility]LE) 08/19/2022   • Flu Vaccine Quad PF >36MO 10/24/2016, 10/02/2018   • FluLaval/Fluzone >6mos 10/11/2022   • HPV Quadrivalent 02/13/2009   • Influenza TIV (IM) 10/10/2013, 12/10/2014   • Influenza, Unspecified 10/01/2019, 08/15/2020   • Pneumococcal Polysaccharide (PPSV23) 01/01/2014   • " "Tdap 01/01/1993, 12/28/2020     No current outpatient medications on file.    Objective   Blood pressure 112/66, pulse 87, temperature 97.6 °F (36.4 °C), temperature source Infrared, resp. rate 18, height 157.5 cm (62\"), weight 92.1 kg (203 lb), SpO2 98 %, not currently breastfeeding.   Wt Readings from Last 3 Encounters:   10/11/22 92.1 kg (203 lb)   09/24/22 92.4 kg (203 lb 9.6 oz)   07/11/22 98 kg (216 lb)       Physical Exam  Vitals and nursing note reviewed.   Constitutional:       General: She is not in acute distress.     Appearance: Normal appearance. She is well-developed and well-groomed. She is obese. She is not diaphoretic.   HENT:      Head: Normocephalic and atraumatic. Hair is normal.   Eyes:      Conjunctiva/sclera: Conjunctivae normal.   Neck:      Vascular: No JVD.   Cardiovascular:      Rate and Rhythm: Normal rate and regular rhythm.      Heart sounds: Normal heart sounds. No murmur heard.  Pulmonary:      Effort: Pulmonary effort is normal. No respiratory distress.      Breath sounds: Normal breath sounds.   Chest:      Chest wall: No tenderness.   Abdominal:      General: Bowel sounds are normal. There is no distension.      Palpations: Abdomen is soft.      Tenderness: There is no abdominal tenderness.   Musculoskeletal:         General: Normal range of motion.      Cervical back: Normal range of motion.   Skin:     General: Skin is warm and dry.      Coloration: Skin is not pale.      Findings: No erythema.   Neurological:      Mental Status: She is alert and oriented to person, place, and time.   Psychiatric:         Behavior: Behavior normal.         Thought Content: Thought content normal.         Judgment: Judgment normal.         Assessment & Plan   Diagnoses and all orders for this visit:    1. Fatigue, unspecified type (Primary)  -     CBC (No Diff); Future  -     Ferritin; Future  -     Vitamin D 25 Hydroxy; Future  -     Comprehensive Metabolic Panel; Future  -     Iron Profile; " Future  -     Vitamin B12; Future  -     TSH Rfx On Abnormal To Free T4; Future    2. Hair loss  -     CBC (No Diff); Future  -     Ferritin; Future  -     Vitamin D 25 Hydroxy; Future  -     Comprehensive Metabolic Panel; Future  -     Iron Profile; Future  -     Vitamin B12; Future  -     TSH Rfx On Abnormal To Free T4; Future  We will check labs as above to further evaluate any nutritional deficiencies as a cause for this hair loss or her fatigue.  3. Need for influenza vaccination  -     FluLaval/Fluzone >6 mos (4916-0034)    4. Class 2 obesity due to excess calories without serious comorbidity with body mass index (BMI) of 37.0 to 37.9 in adult  Class 2 Severe Obesity (BMI >=35 and <=39.9). Obesity-related health conditions include the following: GERD. Obesity is improving with treatment. BMI is is above average; BMI management plan is completed. We discussed low calorie, low carb based diet program, portion control, increasing exercise and   Status post bariatric surgery.    5. Heartburn  Symptoms fairly well controlled.  Can continue as needed omeprazole over-the-counter.  GERD precautions advised: Weight loss, avoid alcohol and caffeine, avoid NSAIDs, consume small frequent meals, wear loosefitting clothing, avoid spicy, acidic, or trigger foods.  Elevate the head of the bed 30 degrees at night.             Return in about 7 months (around 5/11/2023) for Annual.  Plan of care discussed with pt. They verbalized understanding and agreement.     Dictated Utilizing Dragon Dictation   Please note that portions of this note were completed with a voice recognition program.   Part of this note may be an electronic transcription/translation of spoken language to printed text using the Dragon Dictation System.

## 2022-10-12 LAB
25(OH)D3 SERPL-MCNC: 33.8 NG/ML (ref 30–100)
ALBUMIN SERPL-MCNC: 4.4 G/DL (ref 3.5–5.2)
ALBUMIN/GLOB SERPL: 1.8 G/DL
ALP SERPL-CCNC: 60 U/L (ref 39–117)
ALT SERPL W P-5'-P-CCNC: 9 U/L (ref 1–33)
ANION GAP SERPL CALCULATED.3IONS-SCNC: 11 MMOL/L (ref 5–15)
AST SERPL-CCNC: 18 U/L (ref 1–32)
BILIRUB SERPL-MCNC: <0.2 MG/DL (ref 0–1.2)
BUN SERPL-MCNC: 8 MG/DL (ref 6–20)
BUN/CREAT SERPL: 10.3 (ref 7–25)
CALCIUM SPEC-SCNC: 9.3 MG/DL (ref 8.6–10.5)
CHLORIDE SERPL-SCNC: 106 MMOL/L (ref 98–107)
CO2 SERPL-SCNC: 26 MMOL/L (ref 22–29)
CREAT SERPL-MCNC: 0.78 MG/DL (ref 0.57–1)
EGFRCR SERPLBLD CKD-EPI 2021: 103.6 ML/MIN/1.73
FERRITIN SERPL-MCNC: 17.8 NG/ML (ref 13–150)
GLOBULIN UR ELPH-MCNC: 2.5 GM/DL
GLUCOSE SERPL-MCNC: 96 MG/DL (ref 65–99)
IRON 24H UR-MRATE: 19 MCG/DL (ref 37–145)
IRON SATN MFR SERPL: 6 % (ref 20–50)
POTASSIUM SERPL-SCNC: 3.8 MMOL/L (ref 3.5–5.2)
PROT SERPL-MCNC: 6.9 G/DL (ref 6–8.5)
SODIUM SERPL-SCNC: 143 MMOL/L (ref 136–145)
TIBC SERPL-MCNC: 294 MCG/DL (ref 298–536)
TRANSFERRIN SERPL-MCNC: 197 MG/DL (ref 200–360)
TSH SERPL DL<=0.05 MIU/L-ACNC: 1.7 UIU/ML (ref 0.27–4.2)
VIT B12 BLD-MCNC: 365 PG/ML (ref 211–946)

## 2022-10-27 ENCOUNTER — OFFICE VISIT (OUTPATIENT)
Dept: BARIATRICS/WEIGHT MGMT | Facility: CLINIC | Age: 32
End: 2022-10-27

## 2022-10-27 VITALS
DIASTOLIC BLOOD PRESSURE: 62 MMHG | RESPIRATION RATE: 16 BRPM | TEMPERATURE: 97.1 F | OXYGEN SATURATION: 99 % | HEART RATE: 72 BPM | HEIGHT: 62 IN | WEIGHT: 198 LBS | SYSTOLIC BLOOD PRESSURE: 108 MMHG | BODY MASS INDEX: 36.44 KG/M2

## 2022-10-27 DIAGNOSIS — Z13.0 SCREENING, IRON DEFICIENCY ANEMIA: ICD-10-CM

## 2022-10-27 DIAGNOSIS — Z98.84 STATUS POST BARIATRIC SURGERY: ICD-10-CM

## 2022-10-27 DIAGNOSIS — R53.83 FATIGUE, UNSPECIFIED TYPE: ICD-10-CM

## 2022-10-27 DIAGNOSIS — Z90.3 POSTGASTRECTOMY MALABSORPTION: ICD-10-CM

## 2022-10-27 DIAGNOSIS — K91.2 POSTGASTRECTOMY MALABSORPTION: ICD-10-CM

## 2022-10-27 DIAGNOSIS — E66.9 OBESITY, CLASS II, BMI 35-39.9: Primary | ICD-10-CM

## 2022-10-27 DIAGNOSIS — Z13.21 MALNUTRITION SCREEN: ICD-10-CM

## 2022-10-27 DIAGNOSIS — E55.9 HYPOVITAMINOSIS D: ICD-10-CM

## 2022-10-27 PROCEDURE — 99214 OFFICE O/P EST MOD 30 MIN: CPT | Performed by: PHYSICIAN ASSISTANT

## 2022-10-27 RX ORDER — MULTIPLE VITAMINS W/ MINERALS TAB 9MG-400MCG
1 TAB ORAL DAILY
COMMUNITY

## 2022-10-27 NOTE — PROGRESS NOTES
[facility] Bariatric Surgery  [address]  [address]  [facility] [id number] [phone]        Patient Name:  Theresa Maya.  :  1990        Reason for Visit:   Almost 6 months postop      HPI: Theresa Maya is a 32 y.o. female s/p LSG/ HHR by  on 22    LOV 1 month postop- doing well.        Doing well.  Pleased with progress. Has been a little down about hair loss recently. Saw PCP and had recent labs with vitamin levels borderline. Has noted abd pain after eating 4 pieces of bread at a restaurant and with drinking a lot of water at once one episode. Pain during these episodes is noted at periumbilical scar site. No pain at that site at other times. No other  issues/concerns. Denies dysphagia, reflux, nausea, vomiting, pulmonary issues and fevers.  Getting 90g prot/day. Eats mostly chicken and steak. Eating 3 meals a day. Protein shake for lunch.  Drinking 64+ fluid oz/day. Taking MVI, hair skin and nails.  Not on antacid.  Exercising- run a mile every morning.     Presurgery weight: 242 pounds.  Today's weight is 89.8 kg (198 lb) pounds, today's  Body mass index is 36.21 kg/m²., and@ weight loss since surgery is 44 pounds.      Past Medical History:   Diagnosis Date   • Abnormal Pap smear of cervix    • Anxiety    • Arthritis    • Deep vein thrombosis (HCC)     , (R) leg while pregnant, dx w/ MTHFR   • Depression    • Dyspepsia    • Dyspnea on exertion    • Elevated hemoglobin A1c    • Fatigue    • Gallbladder abscess     s/p lap nicolas    • GERD (gastroesophageal reflux disease)    • Heartburn     chronic/episodic, depends on what she eats, takes Pepcid prn, EGD Dr. Ricardo    • Hyperemesis gravidarum    • Hyperlipidemia    • Incomplete right bundle branch block    • Migraines    • Morbid obesity with body mass index (BMI) of 40.0 to 44.9 in adult ([facility]) 2022   • MTHFR deficiency complicating pregnancy ([facility])     says clotting d/o only an  "issue when pregnant, advised to use heparin shots during pregnancy   • Obesity    • Ovarian cyst    • Recurrent pregnancy loss, antepartum condition or complication     had a [facility] and lost the pregnancy at 6 months   • Strep pharyngitis    • Urinary tract infection      Past Surgical History:   Procedure Laterality Date   • ENDOSCOPY N/A 5/5/2022    Procedure: ESOPHAGOGASTRODUODENOSCOPY;  Surgeon: Crsitiano Ricardo MD;  Location:  [facility];  Service: Bariatric;  Laterality: N/A;   • ESOPHAGOSCOPY / EGD     • GASTRECTOMY N/A 5/5/2022    Procedure: GASTRECTOMY LAPAROSCOPIC;  Surgeon: Cristiano Ricardo MD;  Location:  [facility];  Service: Bariatric;  Laterality: N/A;   • HIATAL HERNIA REPAIR N/A 5/5/2022    Procedure: HIATAL HERNIA REPAIR LAPAROSCOPIC;  Surgeon: Cristiano Ricardo MD;  Location:  [facility];  Service: Bariatric;  Laterality: N/A;   • LAPAROSCOPIC CHOLECYSTECTOMY  2006    no stones, was told she had \"three gallbladders\"- all removed   • SINUS SURGERY Bilateral 2006   • TUBAL COAGULATION LAPAROSCOPIC Bilateral 09/15/2017    Procedure: BILATERAL TUBAL FALLOPE FILSHIE CLIPPING LAPAROSCOPIC;  Surgeon: Leo Posey III, MD;  Location:  [facility];  Service:    • VAGINAL DELIVERY  14; 16; 17    female,male, male.  She said she had subcutaneous Lovenox injections throughout the second and third pregnancies until delivery     Outpatient Medications Marked as Taking for the 10/27/22 encounter (Office Visit) with Brooklyn Camejo PA-C   Medication Sig Dispense Refill   • multivitamin with minerals (CENTRUM WOMEN PO) Take 1 tablet by mouth Daily.     • multivitamin with minerals (HAIR SKIN AND NAILS FORMULA PO) Take 1 tablet by mouth Daily.         Allergies   Allergen Reactions   • Amoxicillin Diarrhea and GI Intolerance   • Latex Hives and Itching   • Penicillins GI Intolerance     Says Keflex OK as long as she takes w/ food.        Social History     Socioeconomic History   • Marital status:  " "  Tobacco Use   • Smoking status: Never   • Smokeless tobacco: Never   Vaping Use   • Vaping Use: Never used   Substance and Sexual Activity   • Alcohol use: Not Currently     Comment: occ   • Drug use: No   • Sexual activity: Yes     Partners: Male     Birth control/protection: Surgical     Comment: -Juve, and has 3 children-homemaker       /62 (BP Location: Left arm, Patient Position: Sitting, Cuff Size: Adult)   Pulse 72   Temp 97.1 °F (36.2 °C) (Infrared)   Resp 16   Ht 157.5 cm (62\")   Wt 89.8 kg (198 lb)   SpO2 99%   BMI 36.21 kg/m²     Physical Exam  Constitutional:       Appearance: She is well-developed. She is obese.   HENT:      Head: Normocephalic and atraumatic.   Cardiovascular:      Rate and Rhythm: Normal rate and regular rhythm.   Pulmonary:      Effort: Pulmonary effort is normal.      Breath sounds: Normal breath sounds.   Abdominal:      General: Bowel sounds are normal.      Palpations: Abdomen is soft.      Comments: Incisions well healed  No abnormalities or TTP/ reproduced pain of previous trocar sites   Skin:     General: Skin is warm and dry.   Neurological:      Mental Status: She is alert.   Psychiatric:         Mood and Affect: Mood normal.         Behavior: Behavior normal.         Thought Content: Thought content normal.         Judgment: Judgment normal.           Assessment:  6 months s/p LSG/ HHR by  on 5/5/22      ICD-10-CM ICD-9-CM   1. Obesity, Class II, BMI 35-39.9  E66.9 278.00   2. Fatigue, unspecified type  R53.83 780.79   3. Status post bariatric surgery  Z98.84 V45.86   4. Hypovitaminosis D  E55.9 268.9   5. Screening, iron deficiency anemia  Z13.0 V78.0   6. Malnutrition screen  Z13.21 V77.2   7. Postgastrectomy malabsorption  K91.2 579.3    Z90.3          Plan:  Symptoms seem to be isolated to episodes of overeating. Advised being aware of portion sizes and behavioral modification.  No elicited pain on palpation or abnormalities at previous " trocar sites, declines trigger point injection. Continue w/ good food choices and healthy habits.  Continue to focus on high protein, low carb.  Keep tracking intake.  Continue routine exercise.  Routine bariatric labs ordered.  Start iron, Vit B12 1000mcg, D 5000IU, in addition to MVI, other  adjustments pending lab results.  Call w/ problems/concerns.     Class 2 Severe Obesity (BMI >=35 and <=39.9). Obesity-related health conditions include the following: as above. Obesity is improving with treatment. BMI is is above average; BMI management plan is completed. We discussed low calorie, low carb based diet program, portion control and increasing exercise.        The patient was instructed to follow up in 3 months, sooner if needed.

## 2022-10-30 LAB
FOLATE SERPL-MCNC: 17.1 NG/ML (ref 4.78–24.2)
PREALB SERPL-MCNC: 22 MG/DL (ref 14–35)
VIT B1 BLD-SCNC: 85.1 NMOL/L (ref 66.5–200)

## 2023-01-09 ENCOUNTER — OFFICE VISIT (OUTPATIENT)
Dept: INTERNAL MEDICINE | Facility: CLINIC | Age: 33
End: 2023-01-09
Payer: COMMERCIAL

## 2023-01-09 VITALS
WEIGHT: 196 LBS | BODY MASS INDEX: 36.07 KG/M2 | RESPIRATION RATE: 16 BRPM | DIASTOLIC BLOOD PRESSURE: 82 MMHG | OXYGEN SATURATION: 99 % | HEIGHT: 62 IN | SYSTOLIC BLOOD PRESSURE: 116 MMHG | HEART RATE: 64 BPM | TEMPERATURE: 98.6 F

## 2023-01-09 DIAGNOSIS — M54.41 ACUTE MIDLINE LOW BACK PAIN WITH RIGHT-SIDED SCIATICA: Primary | ICD-10-CM

## 2023-01-09 DIAGNOSIS — R30.0 DYSURIA: ICD-10-CM

## 2023-01-09 DIAGNOSIS — N39.0 ACUTE UTI: ICD-10-CM

## 2023-01-09 LAB
BILIRUB BLD-MCNC: ABNORMAL MG/DL
CLARITY, POC: CLEAR
COLOR UR: YELLOW
EXPIRATION DATE: ABNORMAL
GLUCOSE UR STRIP-MCNC: ABNORMAL MG/DL
KETONES UR QL: NEGATIVE
LEUKOCYTE EST, POC: ABNORMAL
Lab: ABNORMAL
NITRITE UR-MCNC: NEGATIVE MG/ML
PH UR: 8 [PH] (ref 5–8)
PROT UR STRIP-MCNC: ABNORMAL MG/DL
RBC # UR STRIP: NEGATIVE /UL
SP GR UR: 1.01 (ref 1–1.03)
UROBILINOGEN UR QL: NORMAL

## 2023-01-09 PROCEDURE — 81003 URINALYSIS AUTO W/O SCOPE: CPT | Performed by: NURSE PRACTITIONER

## 2023-01-09 PROCEDURE — 99213 OFFICE O/P EST LOW 20 MIN: CPT | Performed by: NURSE PRACTITIONER

## 2023-01-09 PROCEDURE — 87086 URINE CULTURE/COLONY COUNT: CPT | Performed by: NURSE PRACTITIONER

## 2023-01-09 RX ORDER — POTASSIUM CHLORIDE 1500 MG/1
TABLET, FILM COATED, EXTENDED RELEASE ORAL
COMMUNITY
Start: 2022-12-29

## 2023-01-09 RX ORDER — ARIPIPRAZOLE 15 MG/1
TABLET ORAL
COMMUNITY
Start: 2022-12-29

## 2023-01-09 RX ORDER — PIMECROLIMUS 1 %
CREAM (GRAM) TOPICAL
COMMUNITY
Start: 2022-12-30

## 2023-01-09 RX ORDER — NITROFURANTOIN 25; 75 MG/1; MG/1
100 CAPSULE ORAL 2 TIMES DAILY
Qty: 10 CAPSULE | Refills: 0 | Status: SHIPPED | OUTPATIENT
Start: 2023-01-09 | End: 2023-03-02

## 2023-01-09 RX ORDER — METHYLPREDNISOLONE 4 MG/1
TABLET ORAL
Qty: 21 TABLET | Refills: 0 | Status: SHIPPED | OUTPATIENT
Start: 2023-01-09 | End: 2023-02-07 | Stop reason: SDUPTHER

## 2023-01-09 NOTE — PROGRESS NOTES
Theresa Maya presents to [facility] for     Chief Complaint  Chief Complaint   Patient presents with   • Back Pain   • Extremity Weakness     Weakness from right leg and toes       PCP:  Gina Green APRN    Subjective        Back Pain  This is a new problem. The current episode started in the past 7 days. The problem occurs 2 to 4 times per day. The problem has been gradually worsening since onset. The pain is present in the lumbar spine. The quality of the pain is described as burning, shooting and aching. The pain radiates to the right foot. The pain is at a severity of 6/10. The pain is the same all the time. The symptoms are aggravated by bending, coughing, position, sitting and standing. Stiffness is present all day. Associated symptoms include bladder incontinence ( Thinks related to UTI as has dysuria), bowel incontinence ( Not new), dysuria, headaches, leg pain, numbness, paresthesias, perianal numbness, tingling and weakness. Pertinent negatives include no abdominal pain, chest pain, fever or weight loss. Risk factors include obesity. She has tried nothing for the symptoms. The treatment provided no relief.   Urinary Tract Infection   This is a new problem. The current episode started in the past 7 days. The problem occurs every urination. The quality of the pain is described as burning. The pain is mild. She is sexually active. There is no history of pyelonephritis. Associated symptoms include flank pain, frequency, hesitancy and urgency. Pertinent negatives include no chills, discharge, hematuria, nausea, possible pregnancy, sweats or vomiting. She has tried nothing for the symptoms. The treatment provided no relief. Her past medical history is significant for kidney stones (as a teenager). There is no history of catheterization, recurrent UTIs, a single kidney, urinary stasis or a urological procedure.           Review of Systems   Constitutional: Negative for  chills, fever and unexpected weight loss.   Cardiovascular: Negative for chest pain.   Gastrointestinal: Positive for bowel incontinence ( Not new). Negative for abdominal pain, nausea and vomiting.   Genitourinary: Positive for dysuria, flank pain, frequency, hesitancy, urgency and urinary incontinence ( Thinks related to UTI as has dysuria). Negative for hematuria.   Musculoskeletal: Positive for back pain and extremity weakness.   Neurological: Positive for dizziness, tingling, weakness, numbness and paresthesias.         Allergies   Allergen Reactions   • Amoxicillin Diarrhea and GI Intolerance   • Latex Hives and Itching   • Penicillins GI Intolerance     Says Keflex OK as long as she takes w/ food.      Current Outpatient Medications on File Prior to Visit   Medication Sig Dispense Refill   • ARIPiprazole (ABILIFY) 15 MG tablet      • Elidel 1 % cream      • multivitamin with minerals tablet tablet Take 1 tablet by mouth Daily.     • multivitamin with minerals tablet tablet Take 1 tablet by mouth Daily.     • potassium chloride ER (K-TAB) 20 MEQ tablet controlled-release ER tablet      • triamcinolone (KENALOG) 0.1 % ointment      • [DISCONTINUED] azithromycin (ZITHROMAX) 250 MG tablet Take 2 tablets the first day, then 1 tablet daily for 4 days. 6 tablet 0   • [DISCONTINUED] brompheniramine-pseudoephedrine-DM 30-2-10 MG/5ML syrup Take 5 mL by mouth 4 (Four) Times a Day As Needed for Allergies. 118 mL 0   • [DISCONTINUED] montelukast (SINGULAIR) 10 MG tablet Take 10 mg by mouth Every Night.       No current facility-administered medications on file prior to visit.         The following portions of the patient's history were reviewed and updated as appropriate: allergies, current medications, past family history, past medical history, past social history, past surgical history and problem list and are available for review within electronic record    Objective     Result Review :     The following data was  reviewed by: PHYLICIA Bear on 01/09/2023:    Results for orders placed or performed in visit on 01/09/23   POC Urinalysis Dipstick, Automated    Specimen: Urine   Result Value Ref Range    Color Yellow Yellow, Straw, Dark Yellow, Suzette    Clarity, UA Clear Clear    Specific Gravity  1.015 1.005 - 1.030    pH, Urine 8.0 5.0 - 8.0    Leukocytes Large (3+) (A) Negative    Nitrite, UA Negative Negative    Protein, POC 1+ (A) Negative mg/dL    Glucose, UA Trace (A) Negative mg/dL    Ketones, UA Negative Negative    Urobilinogen, UA Normal Normal, 0.2 E.U./dL    Bilirubin Small (1+) (A) Negative    Blood, UA Negative Negative    Lot Number [id number]     Expiration Date 03/25/2024                 Vital Signs:   /82 (BP Location: Right arm, Patient Position: Sitting, Cuff Size: Adult)   Pulse 64   Temp 98.6 °F (37 °C) (Temporal)   Resp 16   Ht 157.5 cm (62.01\")   Wt 88.9 kg (196 lb)   SpO2 99%   BMI 35.84 kg/m²         Physical Exam  Vitals and nursing note reviewed.   Constitutional:       Appearance: Normal appearance. She is well-developed.   HENT:      Head: Normocephalic and atraumatic.   Eyes:      General: No scleral icterus.     Conjunctiva/sclera: Conjunctivae normal.   Cardiovascular:      Rate and Rhythm: Normal rate and regular rhythm.      Heart sounds: Normal heart sounds.   Pulmonary:      Effort: Pulmonary effort is normal. No respiratory distress.      Breath sounds: Normal breath sounds.   Abdominal:      General: Bowel sounds are normal.      Palpations: Abdomen is soft.      Tenderness: There is no abdominal tenderness.   Musculoskeletal:      Cervical back: Normal and normal range of motion.      Lumbar back: Tenderness present. No swelling, edema, deformity, signs of trauma, lacerations, spasms or bony tenderness. Normal range of motion. Positive right straight leg raise test. Negative left straight leg raise test. No scoliosis.      Right lower leg: No edema.      Left lower  leg: No edema.      Comments: Normal heel and toe walks.  Normal squat and rise   Skin:     General: Skin is warm and dry.   Neurological:      General: No focal deficit present.      Mental Status: She is alert and oriented to person, place, and time.      Cranial Nerves: No cranial nerve deficit.      Sensory: No sensory deficit.      Deep Tendon Reflexes: Reflexes are normal and symmetric.   Psychiatric:         Attention and Perception: Attention normal.         Mood and Affect: Mood and affect normal.         Behavior: Behavior normal. Behavior is cooperative.         Thought Content: Thought content normal.         Cognition and Memory: Cognition normal.         Judgment: Judgment normal.                 Assessment and Plan      Diagnoses and all orders for this visit:    1. Acute midline low back pain with right-sided sciatica (Primary)  -     methylPREDNISolone (MEDROL) 4 MG dose pack; Take as directed on package instructions.  Dispense: 21 tablet; Refill: 0  -     Ambulatory Referral to Physical Therapy Evaluate and treat  She is unable to take NSAIDs due to gastric procedures historically.  We will treat with Medrol Dosepak and she is to use X strength Tylenol every 6 hours over-the-counter as needed pain.  We will also go ahead and get her set up for physical therapy    2. Dysuria  -     POC Urinalysis Dipstick, Automated  -     nitrofurantoin, macrocrystal-monohydrate, (Macrobid) 100 MG capsule; Take 1 capsule by mouth 2 (Two) Times a Day.  Dispense: 10 capsule; Refill: 0  -     Urine Culture - , Urine, Clean Catch; Future    3. Acute UTI  -     nitrofurantoin, macrocrystal-monohydrate, (Macrobid) 100 MG capsule; Take 1 capsule by mouth 2 (Two) Times a Day.  Dispense: 10 capsule; Refill: 0  -     Urine Culture - , Urine, Clean Catch; Future  #2 and 3-urine dip positive for leuks.  We will go ahead and start treatment with Macrobid and send a urine culture to verify appropriate antibiotic selection.    If  she has any worsening of symptoms, she is to follow-up sooner but otherwise follow-up in 6 weeks for reevaluation      Follow Up     Patient was given instructions and counseling regarding her condition or for health maintenance advice. Please see specific information pulled into the AVS if appropriate.   Any new medication's adverse effects have been discussed in detail with patient  Patient was encouraged to keep me informed of any acute changes, lack of improvement, or any new concerning symptoms.    Return in about 6 weeks (around 2/20/2023) for Recheck.          Dictated Utilizing Dragon Dictation   Please note that portions of this note were completed with a voice recognition program.   Part of this note may be an electronic transcription/translation of spoken language to printed text using the Dragon Dictation System.

## 2023-01-10 LAB — BACTERIA SPEC AEROBE CULT: NORMAL

## 2023-01-16 ENCOUNTER — TELEPHONE (OUTPATIENT)
Dept: INTERNAL MEDICINE | Facility: CLINIC | Age: 33
End: 2023-01-16

## 2023-04-16 DIAGNOSIS — N39.0 ACUTE UTI: ICD-10-CM

## 2023-04-16 DIAGNOSIS — R30.0 DYSURIA: ICD-10-CM

## 2023-04-17 RX ORDER — NITROFURANTOIN 25; 75 MG/1; MG/1
CAPSULE ORAL
Qty: 10 CAPSULE | Refills: 0 | OUTPATIENT
Start: 2023-04-17

## 2023-05-28 ENCOUNTER — HOSPITAL ENCOUNTER (EMERGENCY)
Facility: HOSPITAL | Age: 33
Discharge: HOME | End: 2023-05-28
Attending: EMERGENCY MEDICINE | Admitting: EMERGENCY MEDICINE
Payer: COMMERCIAL

## 2023-05-28 VITALS
DIASTOLIC BLOOD PRESSURE: 88 MMHG | TEMPERATURE: 98.7 F | OXYGEN SATURATION: 100 % | HEART RATE: 73 BPM | HEIGHT: 62 IN | SYSTOLIC BLOOD PRESSURE: 132 MMHG | WEIGHT: 194 LBS | RESPIRATION RATE: 17 BRPM | BODY MASS INDEX: 35.7 KG/M2

## 2023-05-28 DIAGNOSIS — F41.9 ANXIETY AND DEPRESSION: ICD-10-CM

## 2023-05-28 DIAGNOSIS — M79.89 PAIN AND SWELLING OF LOWER LEG, RIGHT: Primary | ICD-10-CM

## 2023-05-28 DIAGNOSIS — F32.A ANXIETY AND DEPRESSION: ICD-10-CM

## 2023-05-28 DIAGNOSIS — Z86.718 HISTORY OF DVT (DEEP VEIN THROMBOSIS): ICD-10-CM

## 2023-05-28 DIAGNOSIS — M79.661 PAIN AND SWELLING OF LOWER LEG, RIGHT: Primary | ICD-10-CM

## 2023-05-28 DIAGNOSIS — Z86.39 HISTORY OF HYPERLIPIDEMIA: ICD-10-CM

## 2023-05-28 DIAGNOSIS — Z15.89 HISTORY OF MTHFR MUTATION: ICD-10-CM

## 2023-05-28 LAB
ALBUMIN SERPL-MCNC: 4.3 G/DL (ref 3.5–5.2)
ALBUMIN/GLOB SERPL: 1.4 G/DL
ALP SERPL-CCNC: 61 U/L (ref 39–117)
ALT SERPL W P-5'-P-CCNC: 8 U/L (ref 1–33)
ANION GAP SERPL CALCULATED.3IONS-SCNC: 10 MMOL/L (ref 5–15)
AST SERPL-CCNC: 12 U/L (ref 1–32)
BASOPHILS # BLD AUTO: 0.04 10*3/MM3 (ref 0–0.2)
BASOPHILS NFR BLD AUTO: 0.8 % (ref 0–1.5)
BILIRUB SERPL-MCNC: 0.2 MG/DL (ref 0–1.2)
BUN SERPL-MCNC: 10 MG/DL (ref 6–20)
BUN/CREAT SERPL: 14.5 (ref 7–25)
CALCIUM SPEC-SCNC: 9.4 MG/DL (ref 8.6–10.5)
CHLORIDE SERPL-SCNC: 108 MMOL/L (ref 98–107)
CO2 SERPL-SCNC: 26 MMOL/L (ref 22–29)
CREAT SERPL-MCNC: 0.69 MG/DL (ref 0.57–1)
CRP SERPL-MCNC: 0.91 MG/DL (ref 0–0.5)
D DIMER PPP FEU-MCNC: 0.34 MCGFEU/ML (ref 0–0.5)
DEPRECATED RDW RBC AUTO: 44.9 FL (ref 37–54)
EGFRCR SERPLBLD CKD-EPI 2021: 118.4 ML/MIN/1.73
EOSINOPHIL # BLD AUTO: 0.02 10*3/MM3 (ref 0–0.4)
EOSINOPHIL NFR BLD AUTO: 0.4 % (ref 0.3–6.2)
ERYTHROCYTE [DISTWIDTH] IN BLOOD BY AUTOMATED COUNT: 14.1 % (ref 12.3–15.4)
ERYTHROCYTE [SEDIMENTATION RATE] IN BLOOD: 37 MM/HR (ref 0–20)
GLOBULIN UR ELPH-MCNC: 3 GM/DL
GLUCOSE SERPL-MCNC: 91 MG/DL (ref 65–99)
HCT VFR BLD AUTO: 37.2 % (ref 34–46.6)
HGB BLD-MCNC: 11.6 G/DL (ref 12–15.9)
IMM GRANULOCYTES # BLD AUTO: 0.01 10*3/MM3 (ref 0–0.05)
IMM GRANULOCYTES NFR BLD AUTO: 0.2 % (ref 0–0.5)
LYMPHOCYTES # BLD AUTO: 1.63 10*3/MM3 (ref 0.7–3.1)
LYMPHOCYTES NFR BLD AUTO: 32.1 % (ref 19.6–45.3)
MCH RBC QN AUTO: 26.9 PG (ref 26.6–33)
MCHC RBC AUTO-ENTMCNC: 31.2 G/DL (ref 31.5–35.7)
MCV RBC AUTO: 86.1 FL (ref 79–97)
MONOCYTES # BLD AUTO: 0.41 10*3/MM3 (ref 0.1–0.9)
MONOCYTES NFR BLD AUTO: 8.1 % (ref 5–12)
NEUTROPHILS NFR BLD AUTO: 2.96 10*3/MM3 (ref 1.7–7)
NEUTROPHILS NFR BLD AUTO: 58.4 % (ref 42.7–76)
NRBC BLD AUTO-RTO: 0 /100 WBC (ref 0–0.2)
PLATELET # BLD AUTO: 282 10*3/MM3 (ref 140–450)
PMV BLD AUTO: 10.4 FL (ref 6–12)
POTASSIUM SERPL-SCNC: 4.2 MMOL/L (ref 3.5–5.2)
PROT SERPL-MCNC: 7.3 G/DL (ref 6–8.5)
RBC # BLD AUTO: 4.32 10*6/MM3 (ref 3.77–5.28)
SODIUM SERPL-SCNC: 144 MMOL/L (ref 136–145)
WBC NRBC COR # BLD: 5.07 10*3/MM3 (ref 3.4–10.8)

## 2023-05-28 PROCEDURE — 85025 COMPLETE CBC W/AUTO DIFF WBC: CPT | Performed by: PHYSICIAN ASSISTANT

## 2023-05-28 PROCEDURE — 85652 RBC SED RATE AUTOMATED: CPT | Performed by: PHYSICIAN ASSISTANT

## 2023-05-28 PROCEDURE — 99282 EMERGENCY DEPT VISIT SF MDM: CPT

## 2023-05-28 PROCEDURE — 85379 FIBRIN DEGRADATION QUANT: CPT | Performed by: PHYSICIAN ASSISTANT

## 2023-05-28 PROCEDURE — 25010000002 ENOXAPARIN PER 10 MG: Performed by: PHYSICIAN ASSISTANT

## 2023-05-28 PROCEDURE — 80053 COMPREHEN METABOLIC PANEL: CPT | Performed by: PHYSICIAN ASSISTANT

## 2023-05-28 PROCEDURE — 96372 THER/PROPH/DIAG INJ SC/IM: CPT

## 2023-05-28 PROCEDURE — 36415 COLL VENOUS BLD VENIPUNCTURE: CPT

## 2023-05-28 PROCEDURE — 86140 C-REACTIVE PROTEIN: CPT | Performed by: PHYSICIAN ASSISTANT

## 2023-05-28 RX ORDER — ENOXAPARIN SODIUM 100 MG/ML
1 INJECTION SUBCUTANEOUS ONCE
Status: COMPLETED | OUTPATIENT
Start: 2023-05-28 | End: 2023-05-28

## 2023-05-28 RX ADMIN — ENOXAPARIN SODIUM 90 MG: 100 INJECTION SUBCUTANEOUS at 22:19

## 2023-05-29 ENCOUNTER — HOSPITAL ENCOUNTER (OUTPATIENT)
Dept: CARDIOLOGY | Facility: HOSPITAL | Age: 33
Discharge: HOME | End: 2023-05-29
Admitting: PHYSICIAN ASSISTANT

## 2023-05-29 VITALS — WEIGHT: 194 LBS | BODY MASS INDEX: 35.7 KG/M2 | HEIGHT: 62 IN

## 2023-05-29 DIAGNOSIS — Z15.89 HISTORY OF MTHFR MUTATION: ICD-10-CM

## 2023-05-29 DIAGNOSIS — M79.661 PAIN AND SWELLING OF LOWER LEG, RIGHT: ICD-10-CM

## 2023-05-29 DIAGNOSIS — M79.89 PAIN AND SWELLING OF LOWER LEG, RIGHT: ICD-10-CM

## 2023-05-29 DIAGNOSIS — Z86.718 HISTORY OF DVT (DEEP VEIN THROMBOSIS): ICD-10-CM

## 2023-05-29 LAB
BH CV LOWER VASCULAR LEFT COMMON FEMORAL AUGMENT: NORMAL
BH CV LOWER VASCULAR LEFT COMMON FEMORAL COMPETENT: NORMAL
BH CV LOWER VASCULAR LEFT COMMON FEMORAL PHASIC: NORMAL
BH CV LOWER VASCULAR LEFT COMMON FEMORAL SPONT: NORMAL
BH CV LOWER VASCULAR RIGHT COMMON FEMORAL AUGMENT: NORMAL
BH CV LOWER VASCULAR RIGHT COMMON FEMORAL COMPETENT: NORMAL
BH CV LOWER VASCULAR RIGHT COMMON FEMORAL COMPRESS: NORMAL
BH CV LOWER VASCULAR RIGHT COMMON FEMORAL PHASIC: NORMAL
BH CV LOWER VASCULAR RIGHT COMMON FEMORAL SPONT: NORMAL
BH CV LOWER VASCULAR RIGHT DISTAL FEMORAL COMPRESS: NORMAL
BH CV LOWER VASCULAR RIGHT GASTRONEMIUS COMPRESS: NORMAL
BH CV LOWER VASCULAR RIGHT GREATER SAPH AK COMPRESS: NORMAL
BH CV LOWER VASCULAR RIGHT GREATER SAPH BK COMPRESS: NORMAL
BH CV LOWER VASCULAR RIGHT LESSER SAPH COMPRESS: NORMAL
BH CV LOWER VASCULAR RIGHT MID FEMORAL AUGMENT: NORMAL
BH CV LOWER VASCULAR RIGHT MID FEMORAL COMPETENT: NORMAL
BH CV LOWER VASCULAR RIGHT MID FEMORAL COMPRESS: NORMAL
BH CV LOWER VASCULAR RIGHT MID FEMORAL PHASIC: NORMAL
BH CV LOWER VASCULAR RIGHT MID FEMORAL SPONT: NORMAL
BH CV LOWER VASCULAR RIGHT PERONEAL COMPRESS: NORMAL
BH CV LOWER VASCULAR RIGHT POPLITEAL AUGMENT: NORMAL
BH CV LOWER VASCULAR RIGHT POPLITEAL COMPETENT: NORMAL
BH CV LOWER VASCULAR RIGHT POPLITEAL COMPRESS: NORMAL
BH CV LOWER VASCULAR RIGHT POPLITEAL PHASIC: NORMAL
BH CV LOWER VASCULAR RIGHT POPLITEAL SPONT: NORMAL
BH CV LOWER VASCULAR RIGHT POSTERIOR TIBIAL COMPRESS: NORMAL
BH CV LOWER VASCULAR RIGHT PROXIMAL FEMORAL COMPRESS: NORMAL
BH CV LOWER VASCULAR RIGHT SAPHENOFEMORAL JUNCTION COMPRESS: NORMAL
MAXIMAL PREDICTED HEART RATE: 188 BPM
STRESS TARGET HR: 160 BPM

## 2023-05-29 PROCEDURE — 93971 EXTREMITY STUDY: CPT

## 2023-05-29 NOTE — DISCHARGE INSTRUCTIONS
Patient having 2-day history of right calf swelling and pain with history of MTHFR and previous DVT.  D-dimer is normal, however patient has tenderness along the saphenous vein and right calf measures 39 cm and left calf measures 38 cm.  We will cover patient with Lovenox 1 mg/kg subcutaneous here in the ER and we will bring her back in the morning after 9 AM for ultrasound of the right lower extremity to rule out superficial venous thrombosis or DVT.  Continue with all other current medical management.  Return to the ER if worsening symptoms.

## 2023-05-29 NOTE — ED PROVIDER NOTES
Subjective   History of Present Illness  This is a 32-year-old female that presents the ER with right lower extremity swelling x2 days.  Patient denies any known injury, trauma, or strain.  She was seen per PCP on 5/26/2023 for swelling and PCP performed right foot x-rays whichshowed no acute bony abnormality.  Patient developed pain earlier today described as burning that extended from right calf to right medial thigh.  She denies any redness or warmth.  She denies chest pain or shortness of breath.  She has no significant cluster of varicose veins.  She has history of MTHFR and had DVT to right lower extremity in 2014 during pregnancy.  She utilize heparin injections.  After she delivered, she was discontinued on chronic anticoagulation and has not had any recurrence of DVT.  She denies any oral hormone therapy, recent surgery, recent long trips in a car or airplane, or personal history of cancer.  Patient says pain is the same all the time, but it is worsened with dorsiflexion of the right foot and ambulation.  Past medical history is also significant for anxiety, MTHFR deficiency, GERD, osteoarthritis, depression, migraines, hyperlipidemia, history of DVT to right lower extremity in 2014 during pregnancy.  LMP: 5/7/2023.  No other concerns at this time.    History provided by:  Patient  Leg Swelling  Location:  Right lower extremity pain/swelling  Duration:  2 days  Timing:  Constant  Progression:  Unchanged  Chronicity:  New  Context:  Pt reports RLE swelling x 2 days.  No known injury or strain.  Today, burning to right calf that extends to right medial/posterior thigh.   Relieved by:  Nothing  Worsened by:  Dorsi-flexion right foot and ambulation/range of motion.  Ineffective treatments:  Ibuprofen BID, Rest, Ice  Associated symptoms: myalgias (Pain to right calf)    Associated symptoms: no abdominal pain, no chest pain, no fatigue, no fever, no nausea, no shortness of breath and no vomiting    Risk factors:   History of MTHFR.  Pt had DVT to RLE in 2014 during pregnancy.  She utilizied Heparin but then has not used chronic anticoagulation. No recurrent DVT.      Review of Systems   Constitutional: Negative.  Negative for activity change, appetite change, chills, diaphoresis, fatigue and fever.   Respiratory: Negative.  Negative for shortness of breath.    Cardiovascular: Positive for leg swelling (Swelling to right lower leg x2 days). Negative for chest pain and palpitations.   Gastrointestinal: Negative.  Negative for abdominal pain, nausea and vomiting.   Genitourinary:        LMP: 5/7/23   Musculoskeletal: Positive for myalgias (Pain to right calf). Negative for arthralgias, back pain, gait problem and joint swelling.   Skin: Negative.  Negative for color change and wound.   Neurological: Negative.  Negative for dizziness and numbness.   Hematological:        History of MTHFR deficiency with previous DVT to right lower extremity in 2014.   All other systems reviewed and are negative.      Past Medical History:   Diagnosis Date   • Abnormal Pap smear of cervix 2019   • Anxiety    • Arthritis    • Clotting disorder    • Deep vein thrombosis     2014, (R) leg while pregnant, dx w/ MTHFR   • Depression    • Dyspepsia    • Dyspnea on exertion    • Elevated hemoglobin A1c    • Fatigue    • Gallbladder abscess     s/p lap nicolas 2006   • GERD (gastroesophageal reflux disease)    • Heartburn     chronic/episodic, depends on what she eats, takes Pepcid prn, EGD Dr. Ricardo 11/21   • Hyperemesis gravidarum    • Hyperlipidemia    • Incomplete right bundle branch block    • Migraines    • Morbid obesity with body mass index (BMI) of 40.0 to 44.9 in adult 04/19/2022   • MTHFR deficiency complicating pregnancy     says clotting d/o only an issue when pregnant, advised to use heparin shots during pregnancy   • Obesity    • Ovarian cyst    • Recurrent pregnancy loss, antepartum condition or complication     had a [facility] and lost the  "pregnancy at 6 months   • Strep pharyngitis    • Urinary tract infection        Allergies   Allergen Reactions   • Amoxicillin Diarrhea and GI Intolerance   • Latex Hives and Itching   • Penicillins GI Intolerance     Says Keflex OK as long as she takes w/ food.        Past Surgical History:   Procedure Laterality Date   • ENDOSCOPY N/A 5/5/2022    Procedure: ESOPHAGOGASTRODUODENOSCOPY;  Surgeon: Cristiano Ricardo MD;  Location:  [facility];  Service: Bariatric;  Laterality: N/A;   • ESOPHAGOSCOPY / EGD     • GASTRECTOMY N/A 5/5/2022    Procedure: GASTRECTOMY LAPAROSCOPIC;  Surgeon: Cristiano Ricardo MD;  Location:  [facility];  Service: Bariatric;  Laterality: N/A;   • HIATAL HERNIA REPAIR N/A 5/5/2022    Procedure: HIATAL HERNIA REPAIR LAPAROSCOPIC;  Surgeon: Cristiano Ricardo MD;  Location:  [facility];  Service: Bariatric;  Laterality: N/A;   • LAPAROSCOPIC CHOLECYSTECTOMY  2006    no stones, was told she had \"three gallbladders\"- all removed   • SINUS SURGERY Bilateral 2006   • TUBAL COAGULATION LAPAROSCOPIC Bilateral 09/15/2017    Procedure: BILATERAL TUBAL FALLOPE FILSHIE CLIPPING LAPAROSCOPIC;  Surgeon: Leo Posey III, MD;  Location:  [facility];  Service:    • VAGINAL DELIVERY  14; 16; 17    female,male, male.  She said she had subcutaneous Lovenox injections throughout the second and third pregnancies until delivery       Family History   Problem Relation Age of Onset   • Asthma Mother         effected after stroke   • Thyroid disease Mother    • Heart attack Mother    • Obesity Mother    • Migraines Mother    • Hypertension Mother    • COPD Mother    • Mental illness Mother         depression   • Lupus Sister    • Ovarian cancer Sister    • Asthma Sister    • Lung cancer Paternal Grandmother    • Obesity Paternal Grandmother    • Cancer Paternal Grandmother    • Breast cancer Maternal Grandmother    • Lung cancer Maternal Grandmother    • Asthma Maternal Grandmother    • Hypertension Maternal " Grandmother    • Lupus Maternal Grandmother    • Thyroid disease Maternal Grandmother    • Diabetes Maternal Grandmother    • Stroke Maternal Grandmother    • Cancer Maternal Grandmother         lung   • Hypertension Maternal Grandfather    • Alcohol abuse Father         abused since childhood   • Prostate cancer Paternal Grandfather    • Cancer Paternal Grandfather         lung   • Miscarriages / Stillbirths Maternal Aunt        Social History     Socioeconomic History   • Marital status:    Tobacco Use   • Smoking status: Never   • Smokeless tobacco: Never   • Tobacco comments:     disgust me never touched   Vaping Use   • Vaping Use: Never used   Substance and Sexual Activity   • Alcohol use: Not Currently     Comment: do not drink weekly only on holidays/ birtdays   • Drug use: No   • Sexual activity: Yes     Partners: Male     Birth control/protection: Surgical     Comment: bilatiral tubal           Objective   Physical Exam  Vitals and nursing note reviewed.   Constitutional:       General: She is not in acute distress.     Appearance: Normal appearance. She is not ill-appearing, toxic-appearing or diaphoretic.      Comments: Patient resting comfortably.  No acute sign of pain or distress.  Nontoxic.  Friendly and talkative.   HENT:      Head: Normocephalic and atraumatic.      Mouth/Throat:      Mouth: Mucous membranes are moist.   Eyes:      Extraocular Movements: Extraocular movements intact.      Conjunctiva/sclera: Conjunctivae normal.      Pupils: Pupils are equal, round, and reactive to light.   Cardiovascular:      Rate and Rhythm: Normal rate and regular rhythm.  No extrasystoles are present.     Pulses: Normal pulses.           Dorsalis pedis pulses are 2+ on the right side and 2+ on the left side.        Posterior tibial pulses are 2+ on the right side and 2+ on the left side.      Heart sounds: Normal heart sounds.      Comments: Regular rate and rhythm.  No tachycardia.  There is edema  noted to right lower leg as compared to the left.  Left calf measures 38 cm and right calf measures 39 cm.  There is tenderness along the right posterior calf that extends to the right medial thigh following the saphenous vein.  There is no palpable cord and no evidence of varicose veins.  Strong right DP and PT pulses and brisk capillary refill.  Positive Homans' sign with dorsi flexion of the right foot.  No confluent erythema or warmth.  No wounds appreciated or skin changes.  Pulmonary:      Effort: Pulmonary effort is normal. No tachypnea or accessory muscle usage.      Breath sounds: Normal breath sounds. No decreased breath sounds, wheezing or rhonchi.      Comments: Regular respiratory effort.  Lungs are clear to auscultation bilaterally  Abdominal:      General: Bowel sounds are normal.      Palpations: Abdomen is soft.   Musculoskeletal:         General: Normal range of motion.      Cervical back: Neck supple.      Right lower leg: Edema present.      Left lower leg: No edema.   Skin:     General: Skin is warm and dry.      Findings: No abscess, bruising, erythema or rash.      Comments: No confluent erythema or warmth to right lower leg.  There is mild soft tissue swelling.  See cardiovascular exam for measurements.  No skin lesions or rash.   Neurological:      General: No focal deficit present.      Mental Status: She is alert and oriented to person, place, and time.      Cranial Nerves: Cranial nerves 2-12 are intact.      Sensory: Sensation is intact.      Motor: Motor function is intact.      Coordination: Coordination is intact.         Procedures           ED Course  ED Course as of 05/28/23 2203   Sun May 28, 2023   2158 Patient has right lower leg swelling with history of MTHFR and previous history of DVT.  We are unable to perform duplex of the right lower leg due to no cardiologist being able to read the study.  CBC and chemistries were within normal limits.  D-dimer is normal at 0.34.  CRP is  0.91 and sed rate is 37.  Wells criteria for DVT is 2.  Discussed the case with Dr. Peacock, ER attending physician.  We will cover patient with Lovenox 1mg/kg subcutaneous due to history of DVT and pain following the saphenous vein with positive Homans' sign.  Even though D-dimer is negative, one of the top differential diagnoses is DVT.  We will bring patient back in the morning for outpatient duplex to rule out SVT or DVT to right lower extremity.  Patient is agreeable with above treatment plan.  I advised her to come in at 9 AM in the morning for ultrasound.  She is agreeable. [FC]   2159 Patient denies any chest pain or shortness of breath.  O2 sat is 100% on room air and heart rate is in the 70s. [FC]      ED Course User Index  [FC] Gloria Benitez PA-C            Recent Results (from the past 24 hour(s))   Comprehensive Metabolic Panel    Collection Time: 05/28/23  8:40 PM    Specimen: Blood   Result Value Ref Range    Glucose 91 65 - 99 mg/dL    BUN 10 6 - 20 mg/dL    Creatinine 0.69 0.57 - 1.00 mg/dL    Sodium 144 136 - 145 mmol/L    Potassium 4.2 3.5 - 5.2 mmol/L    Chloride 108 (H) 98 - 107 mmol/L    CO2 26.0 22.0 - 29.0 mmol/L    Calcium 9.4 8.6 - 10.5 mg/dL    Total Protein 7.3 6.0 - 8.5 g/dL    Albumin 4.3 3.5 - 5.2 g/dL    ALT (SGPT) 8 1 - 33 U/L    AST (SGOT) 12 1 - 32 U/L    Alkaline Phosphatase 61 39 - 117 U/L    Total Bilirubin 0.2 0.0 - 1.2 mg/dL    Globulin 3.0 gm/dL    A/G Ratio 1.4 g/dL    BUN/Creatinine Ratio 14.5 7.0 - 25.0    Anion Gap 10.0 5.0 - 15.0 mmol/L    eGFR 118.4 >60.0 mL/min/1.73   C-reactive Protein    Collection Time: 05/28/23  8:40 PM    Specimen: Blood   Result Value Ref Range    C-Reactive Protein 0.91 (H) 0.00 - 0.50 mg/dL   Sedimentation Rate    Collection Time: 05/28/23  8:40 PM    Specimen: Blood   Result Value Ref Range    Sed Rate 37 (H) 0 - 20 mm/hr   D-dimer, Quantitative    Collection Time: 05/28/23  8:40 PM    Specimen: Blood   Result Value Ref Range    D-Dimer,  "Quantitative 0.34 0.00 - 0.50 MCGFEU/mL   CBC Auto Differential    Collection Time: 05/28/23  8:40 PM    Specimen: Blood   Result Value Ref Range    WBC 5.07 3.40 - 10.80 10*3/mm3    RBC 4.32 3.77 - 5.28 10*6/mm3    Hemoglobin 11.6 (L) 12.0 - 15.9 g/dL    Hematocrit 37.2 34.0 - 46.6 %    MCV 86.1 79.0 - 97.0 fL    MCH 26.9 26.6 - 33.0 pg    MCHC 31.2 (L) 31.5 - 35.7 g/dL    RDW 14.1 12.3 - 15.4 %    RDW-SD 44.9 37.0 - 54.0 fl    MPV 10.4 6.0 - 12.0 fL    Platelets 282 140 - 450 10*3/mm3    Neutrophil % 58.4 42.7 - 76.0 %    Lymphocyte % 32.1 19.6 - 45.3 %    Monocyte % 8.1 5.0 - 12.0 %    Eosinophil % 0.4 0.3 - 6.2 %    Basophil % 0.8 0.0 - 1.5 %    Immature Grans % 0.2 0.0 - 0.5 %    Neutrophils, Absolute 2.96 1.70 - 7.00 10*3/mm3    Lymphocytes, Absolute 1.63 0.70 - 3.10 10*3/mm3    Monocytes, Absolute 0.41 0.10 - 0.90 10*3/mm3    Eosinophils, Absolute 0.02 0.00 - 0.40 10*3/mm3    Basophils, Absolute 0.04 0.00 - 0.20 10*3/mm3    Immature Grans, Absolute 0.01 0.00 - 0.05 10*3/mm3    nRBC 0.0 0.0 - 0.2 /100 WBC     Note: In addition to lab results from this visit, the labs listed above may include labs taken at another facility or during a different encounter within the last 24 hours. Please correlate lab times with ED admission and discharge times for further clarification of the services performed during this visit.    No orders to display     Vitals:    05/28/23 1902   BP: 132/88   BP Location: Left arm   Patient Position: Sitting   Pulse: 73   Resp: 17   Temp: 98.7 °F (37.1 °C)   TempSrc: Oral   SpO2: 100%   Weight: 88 kg (194 lb)   Height: 157.5 cm (62\")     Medications   Enoxaparin Sodium (LOVENOX) syringe 90 mg (has no administration in time range)     ECG/EMG Results (last 24 hours)     ** No results found for the last 24 hours. **        No orders to display           Wells' Criteria for DVT - MDCalc  Calculated on May 28 2023 9:44 PM  2 points -> Moderate risk group for DVT. See Next Steps for details.     "                         MDM    Final diagnoses:   Pain and swelling of lower leg, right   History of MTHFR mutation   History of DVT (deep vein thrombosis)   History of hyperlipidemia   Anxiety and depression       ED Disposition  ED Disposition     ED Disposition   Discharge    Condition   Stable    Comment   --             Gina Green, APRN  [address] [facility]  [address]  [place] [address]  [phone]    Call in 2 days  Call Tuesday for first available [facility] Emergency Department  [address] [facility] [address] [phone]    If symptoms worsen         Medication List      No changes were made to your prescriptions during this visit.          Gloria Benitez PA-C  05/28/23 2200

## 2023-06-01 ENCOUNTER — OFFICE VISIT (OUTPATIENT)
Dept: INTERNAL MEDICINE | Facility: CLINIC | Age: 33
End: 2023-06-01

## 2023-06-01 ENCOUNTER — LAB (OUTPATIENT)
Dept: LAB | Facility: HOSPITAL | Age: 33
End: 2023-06-01
Payer: COMMERCIAL

## 2023-06-01 ENCOUNTER — HOSPITAL ENCOUNTER (OUTPATIENT)
Dept: GENERAL RADIOLOGY | Facility: HOSPITAL | Age: 33
Discharge: HOME | End: 2023-06-01
Payer: COMMERCIAL

## 2023-06-01 VITALS
WEIGHT: 204.6 LBS | TEMPERATURE: 98.4 F | SYSTOLIC BLOOD PRESSURE: 128 MMHG | DIASTOLIC BLOOD PRESSURE: 76 MMHG | HEIGHT: 62 IN | HEART RATE: 80 BPM | BODY MASS INDEX: 37.65 KG/M2 | RESPIRATION RATE: 20 BRPM

## 2023-06-01 DIAGNOSIS — M79.604 RIGHT LEG PAIN: Primary | ICD-10-CM

## 2023-06-01 DIAGNOSIS — M79.604 RIGHT LEG PAIN: ICD-10-CM

## 2023-06-01 DIAGNOSIS — M54.50 LUMBAR BACK PAIN: ICD-10-CM

## 2023-06-01 LAB
BASOPHILS # BLD AUTO: 0.03 10*3/MM3 (ref 0–0.2)
BASOPHILS NFR BLD AUTO: 0.6 % (ref 0–1.5)
D DIMER PPP FEU-MCNC: 0.38 MCGFEU/ML (ref 0–0.5)
DEPRECATED RDW RBC AUTO: 43.3 FL (ref 37–54)
EOSINOPHIL # BLD AUTO: 0.01 10*3/MM3 (ref 0–0.4)
EOSINOPHIL NFR BLD AUTO: 0.2 % (ref 0.3–6.2)
ERYTHROCYTE [DISTWIDTH] IN BLOOD BY AUTOMATED COUNT: 14.2 % (ref 12.3–15.4)
HCT VFR BLD AUTO: 33.8 % (ref 34–46.6)
HGB BLD-MCNC: 11.1 G/DL (ref 12–15.9)
IMM GRANULOCYTES # BLD AUTO: 0.01 10*3/MM3 (ref 0–0.05)
IMM GRANULOCYTES NFR BLD AUTO: 0.2 % (ref 0–0.5)
LYMPHOCYTES # BLD AUTO: 1.21 10*3/MM3 (ref 0.7–3.1)
LYMPHOCYTES NFR BLD AUTO: 24.4 % (ref 19.6–45.3)
MCH RBC QN AUTO: 27.5 PG (ref 26.6–33)
MCHC RBC AUTO-ENTMCNC: 32.8 G/DL (ref 31.5–35.7)
MCV RBC AUTO: 83.7 FL (ref 79–97)
MONOCYTES # BLD AUTO: 0.36 10*3/MM3 (ref 0.1–0.9)
MONOCYTES NFR BLD AUTO: 7.3 % (ref 5–12)
NEUTROPHILS NFR BLD AUTO: 3.33 10*3/MM3 (ref 1.7–7)
NEUTROPHILS NFR BLD AUTO: 67.3 % (ref 42.7–76)
NRBC BLD AUTO-RTO: 0 /100 WBC (ref 0–0.2)
PLATELET # BLD AUTO: 244 10*3/MM3 (ref 140–450)
PMV BLD AUTO: 11.3 FL (ref 6–12)
RBC # BLD AUTO: 4.04 10*6/MM3 (ref 3.77–5.28)
WBC NRBC COR # BLD: 4.95 10*3/MM3 (ref 3.4–10.8)

## 2023-06-01 PROCEDURE — 1159F MED LIST DOCD IN RCRD: CPT | Performed by: PHYSICIAN ASSISTANT

## 2023-06-01 PROCEDURE — 1160F RVW MEDS BY RX/DR IN RCRD: CPT | Performed by: PHYSICIAN ASSISTANT

## 2023-06-01 PROCEDURE — 72100 X-RAY EXAM L-S SPINE 2/3 VWS: CPT

## 2023-06-01 PROCEDURE — 73590 X-RAY EXAM OF LOWER LEG: CPT

## 2023-06-01 PROCEDURE — 82550 ASSAY OF CK (CPK): CPT

## 2023-06-01 PROCEDURE — 85025 COMPLETE CBC W/AUTO DIFF WBC: CPT

## 2023-06-01 PROCEDURE — 99214 OFFICE O/P EST MOD 30 MIN: CPT | Performed by: PHYSICIAN ASSISTANT

## 2023-06-01 PROCEDURE — 73610 X-RAY EXAM OF ANKLE: CPT

## 2023-06-01 PROCEDURE — 85379 FIBRIN DEGRADATION QUANT: CPT

## 2023-06-01 RX ORDER — CYCLOBENZAPRINE HCL 5 MG
5-10 TABLET ORAL 2 TIMES DAILY PRN
Qty: 30 TABLET | Refills: 1 | Status: SHIPPED | OUTPATIENT
Start: 2023-06-01

## 2023-06-01 RX ORDER — CEPHALEXIN 500 MG/1
500 CAPSULE ORAL 3 TIMES DAILY
Qty: 30 CAPSULE | Refills: 0 | Status: SHIPPED | OUTPATIENT
Start: 2023-06-01

## 2023-06-01 NOTE — PROGRESS NOTES
Chief Complaint  Leg Pain    Subjective          History of Present Illness  Theresa Maya presents to [facility] for   History of Present Illness  Right leg pain:  Went to UC on 5.26 and then ER on 5.28 for leg pain. It started in the top of her right foot and medial ankle area so she thought she twisted her ankle. UC did an xray of the foot, it was nl. She then went to the ER 2d later d/t pain worsening and spreading up her leg. They gave blood thinner x1 dose and she got doppler that was nl. She does have mild swelling in the rt leg calf compared to the left. She has taken a water pill for a few days to help swelling but the pain is still there. Hurts when she walks on her leg. No pain in knee or hip. No redness noted or rash. Does not recall an injury to the area  Hx of DVT 2014 with pregnancy.  She has hx of back pain in lower back that has not been worked up previously. The pain is sharp shooting pain. No numbness/weakness of leg.     The following portions of the patient's history were reviewed and updated as appropriate: allergies, current medications, past family history, past medical history, past social history, past surgical history and problem list.  Allergies   Allergen Reactions    Amoxicillin Diarrhea and GI Intolerance    Latex Hives and Itching    Penicillins GI Intolerance     Says Keflex OK as long as she takes w/ food.        Current Outpatient Medications:     ARIPiprazole (ABILIFY) 15 MG tablet, , Disp: , Rfl:     Elidel 1 % cream, , Disp: , Rfl:     fluticasone (FLONASE) 50 MCG/ACT nasal spray, 2 sprays into the nostril(s) as directed by provider Every Night for 30 days. Administer 2 sprays in each nostril for each dose., Disp: 18.2 mL, Rfl: 0    multivitamin with minerals tablet tablet, Take 1 tablet by mouth Daily., Disp: , Rfl:     potassium chloride ER (K-TAB) 20 MEQ tablet controlled-release ER tablet, , Disp: , Rfl:     triamcinolone (KENALOG) 0.1 %  "ointment, , Disp: , Rfl:     cephalexin (Keflex) 500 MG capsule, Take 1 capsule by mouth 3 (Three) Times a Day., Disp: 30 capsule, Rfl: 0    cyclobenzaprine (FLEXERIL) 5 MG tablet, Take 1-2 tablets by mouth 2 (Two) Times a Day As Needed for Muscle Spasms., Disp: 30 tablet, Rfl: 1    diclofenac (VOLTAREN) 50 MG EC tablet, Take 1 tablet by mouth 2 (Two) Times a Day As Needed (pain)., Disp: 60 tablet, Rfl: 0  New Medications Ordered This Visit   Medications    diclofenac (VOLTAREN) 50 MG EC tablet     Sig: Take 1 tablet by mouth 2 (Two) Times a Day As Needed (pain).     Dispense:  60 tablet     Refill:  0    cyclobenzaprine (FLEXERIL) 5 MG tablet     Sig: Take 1-2 tablets by mouth 2 (Two) Times a Day As Needed for Muscle Spasms.     Dispense:  30 tablet     Refill:  1    cephalexin (Keflex) 500 MG capsule     Sig: Take 1 capsule by mouth 3 (Three) Times a Day.     Dispense:  30 capsule     Refill:  0     Social History     Tobacco Use   Smoking Status Never   Smokeless Tobacco Never   Tobacco Comments    disgust me never touched        Objective   Vital Signs:   Vitals:    06/01/23 1043   BP: 128/76   Pulse: 80   Resp: 20   Temp: 98.4 °F (36.9 °C)   TempSrc: Temporal   Weight: 92.8 kg (204 lb 9.6 oz)   Height: 157.5 cm (62\")   PainSc: 0-No pain      Body mass index is 37.42 kg/m².  Physical Exam  Vitals reviewed.   Constitutional:       General: She is not in acute distress.     Appearance: Normal appearance.   HENT:      Head: Normocephalic and atraumatic.   Eyes:      General: No scleral icterus.     Extraocular Movements: Extraocular movements intact.      Conjunctiva/sclera: Conjunctivae normal.   Cardiovascular:      Rate and Rhythm: Normal rate and regular rhythm.      Heart sounds: Normal heart sounds. No murmur heard.  Pulmonary:      Effort: Pulmonary effort is normal. No respiratory distress.      Breath sounds: Normal breath sounds. No stridor. No wheezing or rhonchi.   Musculoskeletal:         General: " Tenderness (medial ankle and medial calf) present.      Cervical back: Normal range of motion and neck supple.      Right lower leg: Edema (trace) present.   Skin:     General: Skin is warm and dry.      Coloration: Skin is not jaundiced.   Neurological:      General: No focal deficit present.      Mental Status: She is alert and oriented to person, place, and time.      Gait: Gait normal.   Psychiatric:         Mood and Affect: Mood normal.         Behavior: Behavior normal.      Patient's last menstrual period was 05/11/2023.    Result Review :                   Assessment and Plan    Diagnoses and all orders for this visit:    1. Right leg pain (Primary)  Assessment & Plan:  Check xray of ankle and lower leg, doppler negative. If sx worsen adv to go to ER. Check labs, repeat Ddimer and add CK. Will treat pain w/nsaids and muscle relaxer. Tx with Kelfex to cover for possible cellulitis d/t pain/redness/swelling.   F/u if not have improvement over next week. Rest. Ice to area prn.    Orders:  -     XR Spine Lumbar 2 or 3 View; Future  -     XR tibia fibula 2 vw right; Future  -     XR Ankle 3+ View Right; Future  -     diclofenac (VOLTAREN) 50 MG EC tablet; Take 1 tablet by mouth 2 (Two) Times a Day As Needed (pain).  Dispense: 60 tablet; Refill: 0  -     cyclobenzaprine (FLEXERIL) 5 MG tablet; Take 1-2 tablets by mouth 2 (Two) Times a Day As Needed for Muscle Spasms.  Dispense: 30 tablet; Refill: 1  -     Ambulatory Referral to Orthopedic Surgery  -     cephalexin (Keflex) 500 MG capsule; Take 1 capsule by mouth 3 (Three) Times a Day.  Dispense: 30 capsule; Refill: 0  -     CK; Future  -     CBC w AUTO Differential; Future  -     D-dimer, Quantitative; Future    2. Lumbar back pain  -     XR Spine Lumbar 2 or 3 View; Future        Follow Up   Return if symptoms worsen or fail to improve.    Follow up if symptoms worsen or persist or has new or concerning symptoms, go to ER for severe symptoms.   Reviewed common  medication effects and side effects and advised to report side effects immediately.  Encouraged medication compliance and the importance of keeping scheduled follow up appointments with me and any other providers.  If a referral was made please contact our office if you have not heard about an appointment in the next 2 weeks.   If labs or images are ordered we will contact you with the results within the next week.  If you have not heard from us after a week please call our office to inquire about the results.   Patient was given instructions and counseling regarding her condition or for health maintenance advice. Please see specific information pulled into the AVS if appropriate.     Franca Palmer PA-C    * Please note that portions of this note were completed with a voice recognition program.

## 2023-06-02 LAB — CK SERPL-CCNC: 46 U/L (ref 20–180)

## 2023-06-03 PROBLEM — M79.604 RIGHT LEG PAIN: Status: ACTIVE | Noted: 2023-06-03

## 2023-06-03 PROBLEM — M54.50 LUMBAR BACK PAIN: Status: ACTIVE | Noted: 2023-06-03

## 2023-06-03 NOTE — ASSESSMENT & PLAN NOTE
Check xray of ankle and lower leg, doppler negative. If sx worsen adv to go to ER. Check labs, repeat Ddimer and add CK. Will treat pain w/nsaids and muscle relaxer. Tx with Kelfex to cover for possible cellulitis d/t pain/redness/swelling.   F/u if not have improvement over next week. Rest. Ice to area prn.

## 2023-06-05 ENCOUNTER — TELEPHONE (OUTPATIENT)
Dept: INTERNAL MEDICINE | Facility: CLINIC | Age: 33
End: 2023-06-05
Payer: COMMERCIAL

## 2023-06-05 NOTE — TELEPHONE ENCOUNTER
----- Message from Franca Palmer PA-C sent at 6/4/2023  1:48 PM EDT -----  Please call pt and see how her leg is feeling.   Your labs overall looked good. You were a little anemic but similar to a year ago. Increase iron rich sources of food in your diet such as red meat, leafy greens, beans, fortified cereals, and take a multivitamin that has added iron.

## 2023-06-05 NOTE — TELEPHONE ENCOUNTER
Please see if she can come back in for follow up or would she like referral do  ortho. Alternatively, she can also go to the walk in ortho clinic through [facility].

## 2023-06-05 NOTE — TELEPHONE ENCOUNTER
Pt informed. States her leg is still swollen at the top of her leg and hurting.     Pt verbalized good understanding and appreciation for lab results.

## 2023-06-08 DIAGNOSIS — M54.50 LUMBAR BACK PAIN: ICD-10-CM

## 2023-06-08 DIAGNOSIS — G62.9 NEUROPATHY: ICD-10-CM

## 2023-06-08 DIAGNOSIS — M79.604 RIGHT LEG PAIN: Primary | ICD-10-CM

## 2023-06-19 ENCOUNTER — OFFICE VISIT (OUTPATIENT)
Dept: BEHAVIORAL HEALTH | Facility: CLINIC | Age: 33
End: 2023-06-19
Payer: COMMERCIAL

## 2023-06-19 VITALS
DIASTOLIC BLOOD PRESSURE: 88 MMHG | BODY MASS INDEX: 37.8 KG/M2 | SYSTOLIC BLOOD PRESSURE: 120 MMHG | HEIGHT: 62 IN | WEIGHT: 205.4 LBS | HEART RATE: 88 BPM

## 2023-06-19 DIAGNOSIS — F31.70 BIPOLAR DISORDER IN PARTIAL REMISSION, MOST RECENT EPISODE UNSPECIFIED TYPE: Primary | ICD-10-CM

## 2023-06-19 RX ORDER — LAMOTRIGINE 25 MG/1
25 TABLET ORAL DAILY
Qty: 30 TABLET | Refills: 0 | Status: SHIPPED | OUTPATIENT
Start: 2023-06-19 | End: 2023-07-19

## 2023-06-19 RX ORDER — ARIPIPRAZOLE 15 MG/1
15 TABLET ORAL DAILY
Qty: 30 TABLET | Refills: 0 | Status: SHIPPED | OUTPATIENT
Start: 2023-06-19
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
## 2023-08-10 ENCOUNTER — OFFICE VISIT (OUTPATIENT)
Dept: BEHAVIORAL HEALTH | Facility: CLINIC | Age: 33
End: 2023-08-10
Payer: COMMERCIAL

## 2023-08-10 VITALS
WEIGHT: 202 LBS | SYSTOLIC BLOOD PRESSURE: 114 MMHG | HEIGHT: 62 IN | BODY MASS INDEX: 37.17 KG/M2 | HEART RATE: 68 BPM | DIASTOLIC BLOOD PRESSURE: 80 MMHG

## 2023-08-10 DIAGNOSIS — F31.70 BIPOLAR DISORDER IN PARTIAL REMISSION, MOST RECENT EPISODE UNSPECIFIED TYPE: ICD-10-CM

## 2023-08-10 RX ORDER — LAMOTRIGINE 25 MG/1
50 TABLET ORAL DAILY
Qty: 30 TABLET | Refills: 0 | Status: SHIPPED | OUTPATIENT
Start: 2023-08-10 | End: 2023-08-10 | Stop reason: SDUPTHER

## 2023-08-10 RX ORDER — ARIPIPRAZOLE 15 MG/1
15 TABLET ORAL DAILY
Qty: 30 TABLET | Refills: 0 | Status: SHIPPED | OUTPATIENT
Start: 2023-08-10

## 2023-08-10 RX ORDER — LAMOTRIGINE 25 MG/1
50 TABLET ORAL DAILY
Qty: 60 TABLET | Refills: 0 | Status: SHIPPED | OUTPATIENT
Start: 2023-08-10

## 2023-08-10 NOTE — PROGRESS NOTES
Follow Up Office Visit      Patient Name: Theresa Maya  : 1990   MRN: [id number]     Referring Provider: Gina Green APRN    Chief Complaint:      ICD-10-CM ICD-9-CM   1. Bipolar disorder in partial remission, most recent episode unspecified type  F31.70 296.80        History of Present Illness:   Theresa Maya is a 33 y.o. 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 She reports that she will work M-F 8-4. She reports that any extra negative thoughts that she has had in the past and nagging voices are definitely not as loud at all. She reports that she has not had depressed mood, and she has not been as fidgety or restless. She reports decreased irritibily, over the past two weeks and she feels like she has been better than she has in the last 5 years. She reports that she hasn't been snapping at the kids, and talking to the older kids has been so much easier. Denies SI/HI/AVH.    Review of Systems:   Review of Systems   Constitutional:  Negative for appetite change and unexpected weight change.   Eyes:  Negative for visual disturbance.   Respiratory:  Negative for chest tightness and shortness of breath.    Cardiovascular:  Negative for chest pain.   Musculoskeletal:  Negative for gait problem.   Skin:  Negative for rash and wound.   Neurological:  Positive for headaches. Negative for dizziness, tremors, seizures, weakness and light-headedness.        Headaches daily, sometimes intense   Psychiatric/Behavioral:  Negative for agitation, behavioral problems, confusion, decreased concentration, dysphoric mood, hallucinations, self-injury, sleep disturbance and suicidal ideas. The patient is not nervous/anxious and is not hyperactive.    Sleep pattern: sleep ok   Appetite: overeating, maybe down 3 pounds, working out 3 days a week      Screening Scores:   PHQ-9 : 4  ALICIA-7  ": 12    RISK ASSESSMENT:  Patient denies any thoughts or intent of suicide today. Patient denies any impulsive behavior today.     Medications:     Current Outpatient Medications:     ARIPiprazole (ABILIFY) 15 MG tablet, Take 1 tablet by mouth Daily., Disp: 30 tablet, Rfl: 0    cyclobenzaprine (FLEXERIL) 5 MG tablet, Take 1-2 tablets by mouth 2 (Two) Times a Day As Needed for Muscle Spasms., Disp: 30 tablet, Rfl: 1    Elidel 1 % cream, , Disp: , Rfl:     ferrous sulfate 325 (65 FE) MG tablet, Take 1 tablet by mouth Daily With Breakfast., Disp: , Rfl:     lamoTRIgine (LaMICtal) 25 MG tablet, Take 2 tablets by mouth Daily., Disp: 30 tablet, Rfl: 0    meloxicam (Mobic) 7.5 MG tablet, Take 1 tablet by mouth Daily As Needed for Moderate Pain., Disp: 30 tablet, Rfl: 0    montelukast (Singulair) 10 MG tablet, Take 1 tablet by mouth Every Night., Disp: 30 tablet, Rfl: 3    multivitamin with minerals tablet tablet, Take 1 tablet by mouth Daily., Disp: , Rfl:     potassium chloride ER (K-TAB) 20 MEQ tablet controlled-release ER tablet, , Disp: , Rfl:     triamcinolone (KENALOG) 0.1 % ointment, , Disp: , Rfl:     vitamin B-12 (CYANOCOBALAMIN) 500 MCG tablet, Take 1 tablet by mouth Daily., Disp: , Rfl:     vitamin D3 125 MCG (5000 UT) capsule capsule, Take 1 capsule by mouth Daily., Disp: , Rfl:     Medication Considerations:  ISAMAR reviewed and appropriate.      Allergies:   Allergies   Allergen Reactions    Amoxicillin Diarrhea and GI Intolerance    Latex Hives and Itching    Penicillins GI Intolerance     Says Keflex OK as long as she takes w/ food.          Objective     Physical Exam:  Vital Signs:   Vitals:    08/10/23 0932   BP: 114/80   Pulse: 68   Weight: 91.6 kg (202 lb)   Height: 157.5 cm (62.01\")     Body mass index is 36.94 kg/mý.     Mental Status Exam:   Hygiene:   good  Cooperation:  Cooperative  Eye Contact:  Good  Psychomotor Behavior:  Appropriate  Affect:  Appropriate  Mood: normal  Speech:  Normal  Thought " Process:  Goal directed and Linear  Thought Content:  Normal  Suicidal:  None  Homicidal:  None  Hallucinations:  None  Delusion:  None  Memory:  Intact  Orientation:  Person, Place, Time, and Situation  Reliability:  good  Insight:  Good  Judgement:  Good  Impulse Control:  Good  Physical/Medical Issues:   see problem list      Assessment / Plan      Visit Diagnosis/Orders Placed This Visit:  Diagnoses and all orders for this visit:    1. Bipolar disorder in partial remission, most recent episode unspecified type  -     lamoTRIgine (LaMICtal) 25 MG tablet; Take 2 tablets by mouth Daily.  Dispense: 30 tablet; Refill: 0         Functional Status: No impairment    Prognosis: Good with Ongoing Treatment     Impression/Formulation:  Patient appeared alert and oriented.  Patient is voluntarily requesting to continue outpatient psychiatric treatment at [facility].  Patient is receptive to assistance with maintaining a stable lifestyle.  Patient presents with history of     ICD-10-CM ICD-9-CM   1. Bipolar disorder in partial remission, most recent episode unspecified type  F31.70 296.80     Reviewed patient's previous provider notes. Patient meets DSM V diagnostic criteria for diagnoses. Diagnoses may be updated as more information becomes available.       Treatment Plan:   Continue abilify 15 mg daily  Continue lamotrigine 50 mg daily  Follow up in one month or sooner if needed  Patient will continue supportive psychotherapy efforts and medications as indicated. Clinic will obtain release of information for current treatment team for continuity of care as needed. Patient will contact this office, call 911 or present to the nearest emergency room should suicidal or homicidal ideations occur.  Discussed medication options and treatment plan of prescribed medication(s) as well as the risks, benefits, and potential side effects. Patient ackowledged and verbally consented to continue with current  treatment plan and was educated on the importance of compliance with treatment and follow-up appointments.     Patient instructions:  Discussed will continue with current medication at this time due to transition period with starting job and kids going back to school. Did discuss future plan to possibly increase lamotrigine and decrease abilify, but will not make any changes today.    Follow Up:   Return in about 1 month (around 9/10/2023) for Med Check.        PHYLICIA Greer, PMHNP-BC  [facility]

## 2023-08-11 ENCOUNTER — TELEPHONE (OUTPATIENT)
Dept: NEUROSURGERY | Facility: CLINIC | Age: 33
End: 2023-08-11

## 2023-09-01 ENCOUNTER — OFFICE VISIT (OUTPATIENT)
Dept: INTERNAL MEDICINE | Facility: CLINIC | Age: 33
End: 2023-09-01
Payer: COMMERCIAL

## 2023-09-01 VITALS
HEIGHT: 62 IN | WEIGHT: 200.6 LBS | BODY MASS INDEX: 36.91 KG/M2 | DIASTOLIC BLOOD PRESSURE: 82 MMHG | TEMPERATURE: 97.5 F | OXYGEN SATURATION: 98 % | SYSTOLIC BLOOD PRESSURE: 126 MMHG | HEART RATE: 72 BPM | RESPIRATION RATE: 18 BRPM

## 2023-09-01 DIAGNOSIS — R11.0 NAUSEA: Primary | ICD-10-CM

## 2023-09-01 DIAGNOSIS — R10.9 ACUTE ABDOMINAL PAIN: ICD-10-CM

## 2023-09-01 DIAGNOSIS — L03.319 CELLULITIS OF PERIUMBILICAL REGION: ICD-10-CM

## 2023-09-01 DIAGNOSIS — M25.571 ACUTE RIGHT ANKLE PAIN: ICD-10-CM

## 2023-09-01 PROCEDURE — 87070 CULTURE OTHR SPECIMN AEROBIC: CPT | Performed by: NURSE PRACTITIONER

## 2023-09-01 PROCEDURE — 87205 SMEAR GRAM STAIN: CPT | Performed by: NURSE PRACTITIONER

## 2023-09-01 RX ORDER — KETOROLAC TROMETHAMINE 30 MG/ML
60 INJECTION, SOLUTION INTRAMUSCULAR; INTRAVENOUS ONCE
Status: COMPLETED | OUTPATIENT
Start: 2023-09-01 | End: 2023-09-01

## 2023-09-01 RX ORDER — SULFAMETHOXAZOLE AND TRIMETHOPRIM 800; 160 MG/1; MG/1
1 TABLET ORAL 2 TIMES DAILY
Qty: 20 TABLET | Refills: 0 | Status: SHIPPED | OUTPATIENT
Start: 2023-09-01

## 2023-09-01 RX ORDER — KETOROLAC TROMETHAMINE 30 MG/ML
60 INJECTION, SOLUTION INTRAMUSCULAR; INTRAVENOUS ONCE
Status: DISCONTINUED | OUTPATIENT
Start: 2023-09-01 | End: 2023-09-01

## 2023-09-01 RX ORDER — CEPHALEXIN 500 MG/1
500 CAPSULE ORAL 2 TIMES DAILY
COMMUNITY
Start: 2023-08-29

## 2023-09-01 RX ORDER — ONDANSETRON 4 MG/1
4 TABLET, ORALLY DISINTEGRATING ORAL EVERY 8 HOURS PRN
Qty: 30 TABLET | Refills: 0 | Status: SHIPPED | OUTPATIENT
Start: 2023-09-01

## 2023-09-01 RX ORDER — MELOXICAM 7.5 MG/1
7.5 TABLET ORAL DAILY PRN
Qty: 30 TABLET | Refills: 0 | Status: SHIPPED | OUTPATIENT
Start: 2023-09-01

## 2023-09-01 RX ADMIN — KETOROLAC TROMETHAMINE 60 MG: 30 INJECTION, SOLUTION INTRAMUSCULAR; INTRAVENOUS at 14:14

## 2023-09-01 NOTE — PROGRESS NOTES
Subjective   Theresa Maya is a 33 y.o. female.     Chief Complaint   Patient presents with    Hospital Follow Up Visit     [facility] 8/29/23       PCP: Gina Green APRN    History of Present Illness     The patient presents today after a follow-up after visiting  ER on 08/29/2023 for abdominal pain and some bleeding around her umbilicus. She was started on 10 days of Keflex for cellulitis. She had a CT of her abdomen and pelvis. CT scan demonstrated small hiatal hernia, no abscess or fluid collections.     The patient states that there is immense pain and pulling in the stomach area. She is nauseous today. She is having diarrhea. She denies any constipation at all. She denies fevers or chills.    She had surgery 05/05/2022. She went to her 6-month checkup when she informed them of a pulling sensation. They informed her that this was normal because of weight loss. She notes it went away when she started working out.    The patient had labs completed. Metabolic panel is normal. Kidney and liver function is normal. CMP and CBC looks well. Lactic acid was normal. HIV screening was normal. Urine screening looked okay.      CT Abdomen Pelvis With Contrast (08/29/2023 23:30)   COMPREHENSIVE METABOLIC PANEL (08/29/2023 21:39)   LIPASE (08/29/2023 21:39)   HCG, SERUM, QUALITATIVE (08/29/2023 21:39)   TYPE AND SCREEN (08/29/2023 21:39)   CBC AND DIFFERENTIAL (08/29/2023 21:39)   Hepatitis C Antibody - ED (08/29/2023 21:39)   LACTIC ACID, VENOUS, WHOLE BLOOD (08/29/2023 21:39)   ED HIV 1/2 Antibody/Antigen Screen w/Reflex to HIV 1/2 Differentiation (08/29/2023 21:39)   Urinalysis With Microscopic If Indicated (No Culture) - (08/29/2023 23:02)     The following portions of the patient's history were reviewed and updated as appropriate: allergies, current medications, past family history, past medical history, past social history, past surgical history and problem list.        Review of Systems   Constitutional:   Negative for fatigue, fever and unexpected weight loss.   Eyes:  Negative for blurred vision, double vision and visual disturbance.   Respiratory:  Negative for cough, shortness of breath and wheezing.    Cardiovascular:  Negative for chest pain, palpitations and leg swelling.   Gastrointestinal:  Positive for abdominal pain, diarrhea and nausea. Negative for constipation and vomiting.   Genitourinary:  Negative for difficulty urinating, frequency and urgency.   Musculoskeletal:  Negative for arthralgias and myalgias.   Skin:  Negative for color change and rash.   Neurological:  Negative for dizziness, weakness and headache.   Hematological:  Negative for adenopathy. Does not bruise/bleed easily.         Outpatient Medications Marked as Taking for the 9/1/23 encounter (Office Visit) with Gina Green APRN   Medication Sig Dispense Refill    ARIPiprazole (ABILIFY) 15 MG tablet Take 1 tablet by mouth Daily. 30 tablet 0    cephalexin (KEFLEX) 500 MG capsule Take 1 capsule by mouth 2 (Two) Times a Day.      Elidel 1 % cream       ferrous sulfate 325 (65 FE) MG tablet Take 1 tablet by mouth Daily With Breakfast.      lamoTRIgine (LaMICtal) 25 MG tablet Take 2 tablets by mouth Daily. 60 tablet 0    meloxicam (Mobic) 7.5 MG tablet Take 1 tablet by mouth Daily As Needed for Moderate Pain. 30 tablet 0    montelukast (Singulair) 10 MG tablet Take 1 tablet by mouth Every Night. 30 tablet 3    multivitamin with minerals tablet tablet Take 1 tablet by mouth Daily.      potassium chloride ER (K-TAB) 20 MEQ tablet controlled-release ER tablet       triamcinolone (KENALOG) 0.1 % ointment       vitamin B-12 (CYANOCOBALAMIN) 500 MCG tablet Take 1 tablet by mouth Daily.      vitamin D3 125 MCG (5000 UT) capsule capsule Take 1 capsule by mouth Daily.      [DISCONTINUED] meloxicam (Mobic) 7.5 MG tablet Take 1 tablet by mouth Daily As Needed for Moderate Pain. 30 tablet 0     Allergies   Allergen Reactions    Amoxicillin Diarrhea  "and GI Intolerance    Latex Hives and Itching    Penicillins GI Intolerance     Says Keflex OK as long as she takes w/ food.            Objective   Physical Exam  Constitutional:       Appearance: Normal appearance. She is well-developed.   HENT:      Head: Normocephalic and atraumatic.   Eyes:      General: No scleral icterus.     Conjunctiva/sclera: Conjunctivae normal.   Cardiovascular:      Rate and Rhythm: Normal rate and regular rhythm.      Heart sounds: Normal heart sounds.   Pulmonary:      Effort: Pulmonary effort is normal. No respiratory distress.      Breath sounds: Normal breath sounds.   Abdominal:      General: Bowel sounds are normal.      Palpations: Abdomen is soft.      Tenderness: There is no abdominal tenderness.   Musculoskeletal:         General: Normal range of motion.      Cervical back: Normal range of motion.      Right lower leg: No edema.      Left lower leg: No edema.   Skin:     General: Skin is warm and dry.      Findings: Erythema present.          Neurological:      General: No focal deficit present.      Mental Status: She is alert and oriented to person, place, and time.   Psychiatric:         Attention and Perception: Attention normal.         Mood and Affect: Mood and affect normal.         Behavior: Behavior normal. Behavior is cooperative.         Thought Content: Thought content normal.         Cognition and Memory: Cognition normal.         Judgment: Judgment normal.       Vitals:    09/01/23 1337   BP: 126/82   BP Location: Right arm   Patient Position: Sitting   Cuff Size: Adult   Pulse: 72   Resp: 18   Temp: 97.5 °F (36.4 °C)   TempSrc: Infrared   SpO2: 98%   Weight: 91 kg (200 lb 9.6 oz)   Height: 157.5 cm (62.01\")     Body mass index is 36.68 kg/m².  Wt Readings from Last 3 Encounters:   09/01/23 91 kg (200 lb 9.6 oz)   08/10/23 91.6 kg (202 lb)   07/14/23 92.5 kg (204 lb)             Assessment & Plan   Diagnoses and all orders for this visit:    1. Nausea " (Primary)  -     ondansetron ODT (ZOFRAN-ODT) 4 MG disintegrating tablet; Place 1 tablet on the tongue Every 8 (Eight) Hours As Needed for Nausea or Vomiting.  Dispense: 30 tablet; Refill: 0  -     ketorolac (TORADOL) injection 60 mg    2. Cellulitis of periumbilical region  -     sulfamethoxazole-trimethoprim (Bactrim DS) 800-160 MG per tablet; Take 1 tablet by mouth 2 (Two) Times a Day.  Dispense: 20 tablet; Refill: 0  -     Discontinue: ketorolac (TORADOL) injection 60 mg  -     ketorolac (TORADOL) injection 60 mg  -     Wound Culture - Wound, Abdominal Wall; Future  -     Wound Culture - Wound, Abdominal Wall    3. Acute right ankle pain  -     meloxicam (Mobic) 7.5 MG tablet; Take 1 tablet by mouth Daily As Needed for Moderate Pain.  Dispense: 30 tablet; Refill: 0  -     ketorolac (TORADOL) injection 60 mg    4. Acute abdominal pain  -     Discontinue: ketorolac (TORADOL) injection 60 mg  -     ketorolac (TORADOL) injection 60 mg      We will send her in some Zofran to use for the nausea. We sent a wound culture of the umbilical drainage and we will increase her antibiotic therapy to include Bactrim as well as continue her Keflex. Reviewed labs and imaging. No concerns noted at that upon review of those, we will send in meloxicam for her to use as needed for the pain, but stop for any GI side effects as she has been tolerating ibuprofen and Tylenol fairly well. She will stop the ibuprofen. If she starts the meloxicam. We'll do a Toradol injection in office today and advised her to hold off on starting any other NSAIDs until tomorrow. Follow up p.r.n. if symptoms fail to resolve with completion of above therapies.      Return if symptoms worsen or fail to improve.    I discussed my findings,recommendations, and plan of care was with the patient. They verbalized understanding and agreement.  Patient was encouraged to keep me informed of any acute changes, lack of improvement, or any new concerning symptoms.        Transcribed from ambient dictation for PHYLICIA Bear by Meenu Roth.  09/01/23   14:53 EDT    Patient or patient representative verbalized consent to the visit recording.  I have personally performed the services described in this document as transcribed by the above individual, and it is both accurate and complete.  PHYLICIA Bear  9/6/2023  08:41 EDT

## 2023-09-04 LAB
BACTERIA SPEC AEROBE CULT: NORMAL
GRAM STN SPEC: NORMAL

## 2023-09-18 ENCOUNTER — OFFICE VISIT (OUTPATIENT)
Dept: BARIATRICS/WEIGHT MGMT | Facility: CLINIC | Age: 33
End: 2023-09-18
Payer: COMMERCIAL

## 2023-09-18 VITALS
HEIGHT: 64 IN | DIASTOLIC BLOOD PRESSURE: 72 MMHG | HEART RATE: 86 BPM | BODY MASS INDEX: 33.63 KG/M2 | WEIGHT: 197 LBS | OXYGEN SATURATION: 99 % | TEMPERATURE: 98 F | SYSTOLIC BLOOD PRESSURE: 104 MMHG

## 2023-09-18 DIAGNOSIS — R19.8 UMBILICAL DISCHARGE: ICD-10-CM

## 2023-09-18 DIAGNOSIS — R10.33 PERIUMBILICAL ABDOMINAL PAIN: Primary | ICD-10-CM

## 2023-09-18 DIAGNOSIS — R10.13 DYSPEPSIA: ICD-10-CM

## 2023-09-18 PROCEDURE — 99214 OFFICE O/P EST MOD 30 MIN: CPT | Performed by: PHYSICIAN ASSISTANT

## 2023-09-18 RX ORDER — METHYLPREDNISOLONE 4 MG/1
4 TABLET ORAL DAILY
COMMUNITY
End: 2023-09-18

## 2023-09-18 RX ORDER — OMEPRAZOLE 20 MG/1
20 CAPSULE, DELAYED RELEASE ORAL DAILY
COMMUNITY

## 2023-09-18 RX ORDER — MELOXICAM 7.5 MG/1
7.5 TABLET ORAL DAILY
COMMUNITY

## 2023-09-18 NOTE — PROGRESS NOTES
"[facility]  [address]  [address]  [facility] [address]  [phone]        Patient Name:  Theresa Maya.  :  1990        Reason for Visit:   abd pain, umbilical discharge    HPI: Theresa Maya is a 33 y.o. female s/p LSG/HHR 22 GDW     3 wks ago developed sharp acute crampy umbilical pain, doubled her over, w/ minimal relief.  Next day noted bloody discharge from umbilicus.  Subsequently went to ER for further eval.      ER eval 23 - \"34 y/o female with history of sleeve gastrectomy in May 2022, cholecystectomy and tubal ligation who presents with complaints of abdominal pain. The patient states she developed intermittent umbilical pain yesterday which she describes as a \"period cramp.\" She reports today at 1600 she had dark brown bloody discharge from the belly button and went to urgent care for further evaluation where she was given Keflex/Toradol and told to come to ED. She had has associated diarrhea which was painful but without hematochezia. She notes nausea, dry heaving, increased urinary frequency and yellow vaginal discharge. She denies fever, chills, dysuria and any new sexual partners. LNMP was 10 days ago. There are no other acute complaints at this time.    On initial evaluation, patient is in no acute distress, regular rate, afebrile and non-toxic appearing. Abdominal exam was non-tender throughout and without distension. There is a well healed surgical incision and a mild amount of dried discharge in umbilicus. Differential diagnosis includes but is not limited to umbilical hernia, umbilical fistula, gastritis, bacterial vaginal infection, PID, intraabdominal pathology, among others. Laboratory workup, including CBC, CMP, urinalysis obtained and remarkable for 6-10 wbc's in the urine. Patient is without urinary symptoms so antibiotic treatment was deferred at this time. . CT scan remarkable for small hiatal hernia but no " "abscess or fluid collections. I thought that I identified a small fluid collection in the abdominal wall so I discussed with Radiology, who felt that that finding was consistent with normal subcutaneous fat. I had an interactive discussion about discharge with the patient and she was comfortable with this plan. I instructed her to continue take her for Keflex for possible cellulitis. Patient was discharged home in stable condition.\"    CT ab/pel w/ IV only contrast 8/29/23 @ - no acute findings.     Wound Cx 9/1/23 - negative, normal skin nicole.      PCP gave her RX Bactrim after she finished Keflex RX.      Sx persist, thus she was advised to f/up with bariatric surgeon.  Continues w/ constant intense pain/pressure surrounding umbilicus.  Bloody discharge persists.  Now also having heartburn, nausea, poor appetite.  No fevers/chills.      On Medrol pack for sinus drainage.  Mobic prn for sacral pain.  Taking OTC Omeprazole x 20 days.     Of note, surgical hx includes lap tubal ligation in 2017 @[facility] w/ Dr. Leo Posey - op note reviewed, umbilical incision made.        Past Medical History:   Diagnosis Date    Abnormal Pap smear of cervix 2019    Anxiety     Arthritis     Clotting disorder     Deep vein thrombosis     2014, (R) leg while pregnant, dx w/ MTHFR    Depression     Dyspepsia     Dyspnea on exertion     Elevated hemoglobin A1c     Fatigue     Gallbladder abscess     s/p lap nicolas 2006    GERD (gastroesophageal reflux disease)     Heartburn     chronic/episodic, depends on what she eats, takes Pepcid prn, EGD Dr. Ricardo 11/21    Hyperemesis gravidarum     Hyperlipidemia     Incomplete right bundle branch block     Migraines     Morbid obesity with body mass index (BMI) of 40.0 to 44.9 in adult 04/19/2022    MTHFR deficiency complicating pregnancy     says clotting d/o only an issue when pregnant, advised to use heparin shots during pregnancy    Obesity     Ovarian cyst     Recurrent pregnancy loss, " "antepartum condition or complication     had a [facility] and lost the pregnancy at 6 months    Strep pharyngitis     Urinary tract infection      Past Surgical History:   Procedure Laterality Date    ENDOSCOPY N/A 5/5/2022    Procedure: ESOPHAGOGASTRODUODENOSCOPY;  Surgeon: Cristiano Ricardo MD;  Location:  [facility];  Service: Bariatric;  Laterality: N/A;    ESOPHAGOSCOPY / EGD      GASTRECTOMY N/A 5/5/2022    Procedure: GASTRECTOMY LAPAROSCOPIC;  Surgeon: Cristiano Ricardo MD;  Location:  [facility];  Service: Bariatric;  Laterality: N/A;    HIATAL HERNIA REPAIR N/A 5/5/2022    Procedure: HIATAL HERNIA REPAIR LAPAROSCOPIC;  Surgeon: Cristiano Ricardo MD;  Location:  [facility];  Service: Bariatric;  Laterality: N/A;    LAPAROSCOPIC CHOLECYSTECTOMY  2006    no stones, was told she had \"three gallbladders\"- all removed    SINUS SURGERY Bilateral 2006    TUBAL COAGULATION LAPAROSCOPIC Bilateral 09/15/2017    Procedure: BILATERAL TUBAL FALLOPE FILSHIE CLIPPING LAPAROSCOPIC;  Surgeon: Leo Posey III, MD;  Location:  [facility];  Service:     VAGINAL DELIVERY  14; 16; 17    female,male, male.  She said she had subcutaneous Lovenox injections throughout the second and third pregnancies until delivery     Outpatient Medications Marked as Taking for the 9/18/23 encounter (Office Visit) with Sallie Rutherford PA   Medication Sig Dispense Refill    cyclobenzaprine (FLEXERIL) 5 MG tablet Take 1-2 tablets by mouth 2 (Two) Times a Day As Needed for Muscle Spasms. 30 tablet 1    Elidel 1 % cream       lamoTRIgine (LaMICtal) 25 MG tablet Take 2 tablets by mouth Daily. 60 tablet 0    montelukast (Singulair) 10 MG tablet Take 1 tablet by mouth Every Night. 30 tablet 3    multivitamin with minerals tablet tablet Take 1 tablet by mouth Daily.      omeprazole (priLOSEC) 20 MG capsule Take 1 capsule by mouth Daily.      ondansetron ODT (ZOFRAN-ODT) 4 MG disintegrating tablet Place 1 tablet on the tongue Every 8 (Eight) Hours As Needed " "for Nausea or Vomiting. 30 tablet 0    triamcinolone (KENALOG) 0.1 % ointment       vitamin B-12 (CYANOCOBALAMIN) 500 MCG tablet Take 1 tablet by mouth Daily.      vitamin D3 125 MCG (5000 UT) capsule capsule Take 1 capsule by mouth Daily.      [DISCONTINUED] methylPREDNISolone (MEDROL) 4 MG tablet Take 1 tablet by mouth Daily.         Allergies   Allergen Reactions    Amoxicillin Diarrhea and GI Intolerance    Latex Hives and Itching    Penicillins GI Intolerance     Says Keflex OK as long as she takes w/ food.        Social History     Socioeconomic History    Marital status:    Tobacco Use    Smoking status: Never    Smokeless tobacco: Never    Tobacco comments:     disgust me never touched   Vaping Use    Vaping Use: Never used   Substance and Sexual Activity    Alcohol use: Not Currently     Comment: do not drink weekly only on holidays/ birtdays    Drug use: No    Sexual activity: Yes     Partners: Male     Birth control/protection: Surgical     Comment: bilatiral tubal       /72 (BP Location: Right arm, Patient Position: Sitting)   Pulse 86   Temp 98 °F (36.7 °C)   Ht 161.3 cm (63.5\")   Wt 89.4 kg (197 lb)   LMP 08/07/2023 (Approximate)   SpO2 99%   BMI 34.35 kg/m²     Physical Exam  Constitutional:       Appearance: She is well-developed.   Eyes:      General: No scleral icterus.  Cardiovascular:      Rate and Rhythm: Normal rate.   Pulmonary:      Effort: Pulmonary effort is normal.   Abdominal:      Palpations: Abdomen is soft.      Tenderness: There is no abdominal tenderness.      Hernia: No hernia is present.   Musculoskeletal:         General: Normal range of motion.   Skin:     General: Skin is warm and dry.      Findings: No rash.   Neurological:      Mental Status: She is alert.   Psychiatric:         Behavior: Behavior normal. Behavior is cooperative.         Judgment: Judgment normal.         Assessment:  16 months s/p LSG/HHR 5/5/22 GDW       ICD-10-CM ICD-9-CM   1. " "Periumbilical abdominal pain  R10.33 789.05   2. Umbilical discharge  R19.8 789.9           Plan:  Will discuss further w/ Dr. Ricardo.  Additional input to follow.      per Dr. Ricardo: \"Reassure her that I did not make any incisions at her umbilicus.  She previously had umbilical incisions for both her cholecystectomy and likely her tubal ligation.  She should follow-up with a general surgeon for evaluation of her umbilicus.  With regards to reflux and possible recurrent hiatal hernia, needs upper GI as next step.  Thanks!\"    See telephone encounter 9/19/23.  Patient notified.  UGI ordered.  Referral to Dr. Vegas.          "

## 2023-09-19 ENCOUNTER — TELEPHONE (OUTPATIENT)
Dept: BARIATRICS/WEIGHT MGMT | Facility: CLINIC | Age: 33
End: 2023-09-19
Payer: COMMERCIAL

## 2023-09-20 NOTE — TELEPHONE ENCOUNTER
Patient advised the referral has been sent to Dr Vegas's office. Referral forwarded to Dr Vegas's office.

## 2023-09-29 ENCOUNTER — HOSPITAL ENCOUNTER (OUTPATIENT)
Dept: GENERAL RADIOLOGY | Facility: HOSPITAL | Age: 33
Discharge: HOME | End: 2023-09-29
Admitting: PHYSICIAN ASSISTANT
Payer: COMMERCIAL

## 2023-09-29 DIAGNOSIS — R10.13 DYSPEPSIA: ICD-10-CM

## 2023-09-29 PROCEDURE — 74240 X-RAY XM UPR GI TRC 1CNTRST: CPT

## 2023-09-29 RX ADMIN — BARIUM SULFATE 183 ML: 960 POWDER, FOR SUSPENSION ORAL at 08:57

## 2023-10-06 NOTE — TELEPHONE ENCOUNTER
Patients  called to inform us that patient would like a script called in for some nutritional drinks, she is not able to keep much down.  phone number is [phone].    done

## 2023-11-01 ENCOUNTER — TRANSCRIBE ORDERS (OUTPATIENT)
Dept: ADMINISTRATIVE | Facility: HOSPITAL | Age: 33
End: 2023-11-01
Payer: COMMERCIAL

## 2023-11-01 DIAGNOSIS — R10.33 ABDOMINAL PAIN, PERIUMBILICAL: Primary | ICD-10-CM

## 2023-11-02 ENCOUNTER — TELEPHONE (OUTPATIENT)
Dept: BARIATRICS/WEIGHT MGMT | Facility: CLINIC | Age: 33
End: 2023-11-02
Payer: COMMERCIAL

## 2023-11-02 DIAGNOSIS — R10.33 PERIUMBILICAL ABDOMINAL PAIN: ICD-10-CM

## 2023-11-02 DIAGNOSIS — R10.13 DYSPEPSIA: Primary | ICD-10-CM

## 2023-11-02 NOTE — TELEPHONE ENCOUNTER
"----- Message from Cristiano Ricardo MD  -----    Yes, ty!      ----- Message -----  From: Sallie Rutherford PA  To: Cristiano Ricardo MD  Subject: RE: please advise                                Claudia w/ Dr. Ascencion walker.      UGI 9/29/23 @[facility] - mild reflux, small sliding hiatal hernia.     EGD for further eval?  Thanks!      ----- Message -----  From: Cristiano Ricardo MD  Sent: 9/19/2023  10:50 AM EDT  To: JONEL Sumner  Subject: RE: please advise                                I totally agree.  Reassure her that I did not make any incisions at her umbilicus.  She previously had umbilical incisions for both her cholecystectomy and likely her tubal ligation.  I agree she should follow-up with a general surgeon for evaluation of her umbilicus.  With regards to reflux and possible recurrent hiatal hernia I also agree with upper GI as next step.   Thanks!    ----- Message -----  From: Sallie Rutherford PA  Sent: 9/18/2023  11:29 PM EDT  To: Cristiano Ricardo MD  Subject: please advise                                    s/p LSG/HHR 5/5/22 G[facility]     3 wks ago developed sharp acute crampy umbilical pain, doubled her over, w/ minimal relief.  Next day noted bloody discharge from umbilicus.  Subsequently went to ER for further eval.      ER eval 8/29/23 - \"32 y/o female with history of sleeve gastrectomy in May 2022, cholecystectomy and tubal ligation who presents with complaints of abdominal pain. The patient states she developed intermittent umbilical pain yesterday which she describes as a \"period cramp.\" She reports today at 1600 she had dark brown bloody discharge from the belly button and went to urgent care for further evaluation where she was given Keflex/Toradol and told to come to ED. She had has associated diarrhea which was painful but without hematochezia. She notes nausea, dry heaving, increased urinary frequency and yellow vaginal discharge. She denies fever, chills, dysuria and any " "new sexual partners. LNMP was 10 days ago. There are no other acute complaints at this time.    On initial evaluation, patient is in no acute distress, regular rate, afebrile and non-toxic appearing. Abdominal exam was non-tender throughout and without distension. There is a well healed surgical incision and a mild amount of dried discharge in umbilicus. Differential diagnosis includes but is not limited to umbilical hernia, umbilical fistula, gastritis, bacterial vaginal infection, PID, intraabdominal pathology, among others. Laboratory workup, including CBC, CMP, urinalysis obtained and remarkable for 6-10 wbc's in the urine. Patient is without urinary symptoms so antibiotic treatment was deferred at this time. . CT scan remarkable for small hiatal hernia but no abscess or fluid collections. I thought that I identified a small fluid collection in the abdominal wall so I discussed with Radiology, who felt that that finding was consistent with normal subcutaneous fat. I had an interactive discussion about discharge with the patient and she was comfortable with this plan. I instructed her to continue take her for Keflex for possible cellulitis. Patient was discharged home in stable condition.\"    CT ab/pel w/ IV only contrast 8/29/23 @ - no acute findings.     Wound Cx 9/1/23 - negative, normal skin nicole.      PCP gave her RX Bactrim after she finished Keflex RX.      Sx persist, thus she was advised to f/up with bariatric surgeon.  Continues w/ constant intense pain/pressure surrounding umbilicus.  Bloody discharge persists.  Now also having heartburn, nausea, poor appetite.  No fevers/chills.      On Medrol pack for sinus drainage.  Mobic prn for sacral pain.  Taking OTC Omeprazole x 20 days.     Of note, surgical hx includes lap tubal ligation in 2017 @[facility] w/ Dr. Leo Posey - op note reviewed, umbilical incision made.      Abd exam rather unremarkable.  No obvious umbilical drainage b/c she says she had " cleaned the area prior to her visit.  No hernia.  No erythema/fluctuance.  Not terribly tender to palpation.  AVSS.    ---- Discussed UGI for further eval of reflux/nausea.  Told her the abd.pain/umbilical discharge may be a general surgery issue rather than a bariatric issue.  I don't have the CT images from the  ER eval, but I can request them for your review if needed... let me know.  Thanks!

## 2023-11-03 NOTE — TELEPHONE ENCOUNTER
Pt called back and I informed her that her UGI showed reflux and a possible hiatal hernia. Pt stated she is still having abdominal pain, nausea, and heartburn. Said her burps were burning her throat. She was agreeable to proceeding with EGD/Upper Endoscopy for evaluation. She had no further questions or concerns.

## 2023-11-21 ENCOUNTER — TELEMEDICINE (OUTPATIENT)
Dept: BARIATRICS/WEIGHT MGMT | Facility: CLINIC | Age: 33
End: 2023-11-21
Payer: COMMERCIAL

## 2023-11-21 ENCOUNTER — HOSPITAL ENCOUNTER (OUTPATIENT)
Dept: CT IMAGING | Facility: HOSPITAL | Age: 33
Discharge: HOME | End: 2023-11-21
Payer: COMMERCIAL

## 2023-11-21 VITALS — WEIGHT: 204 LBS | BODY MASS INDEX: 34.83 KG/M2 | HEIGHT: 64 IN

## 2023-11-21 DIAGNOSIS — R10.13 DYSPEPSIA: Primary | ICD-10-CM

## 2023-11-21 DIAGNOSIS — R10.33 PERIUMBILICAL ABDOMINAL PAIN: ICD-10-CM

## 2023-11-21 DIAGNOSIS — R12 HEARTBURN: ICD-10-CM

## 2023-11-21 PROCEDURE — 99214 OFFICE O/P EST MOD 30 MIN: CPT | Performed by: PHYSICIAN ASSISTANT

## 2023-11-21 RX ORDER — FERROUS SULFATE 325(65) MG
325 TABLET ORAL 2 TIMES DAILY
COMMUNITY

## 2023-11-21 RX ORDER — NITROFURANTOIN 25; 75 MG/1; MG/1
CAPSULE ORAL
COMMUNITY
Start: 2023-11-20

## 2023-11-21 NOTE — PROGRESS NOTES
"[facility] Bariatric Surgery  [address]  [address]  [facility] [address]  [phone]        Patient Name:  Theresa Maya.  :  1990        Reason for Visit:   heartburn, abd.pain      HPI: Theresa Maya is a 33 y.o. female s/p LSG/HHR 22 [facility]     LOV 23:  \"3 wks ago developed sharp acute crampy umbilical pain, doubled her over, w/ minimal relief.  Next day noted bloody discharge from umbilicus.  Subsequently went to ER for further eval.      ER eval 23 - \"34 y/o female with history of sleeve gastrectomy in May 2022, cholecystectomy and tubal ligation who presents with complaints of abdominal pain. The patient states she developed intermittent umbilical pain yesterday which she describes as a \"period cramp.\" She reports today at 1600 she had dark brown bloody discharge from the belly button and went to urgent care for further evaluation where she was given Keflex/Toradol and told to come to ED. She had has associated diarrhea which was painful but without hematochezia. She notes nausea, dry heaving, increased urinary frequency and yellow vaginal discharge. She denies fever, chills, dysuria and any new sexual partners. LNMP was 10 days ago. There are no other acute complaints at this time.    On initial evaluation, patient is in no acute distress, regular rate, afebrile and non-toxic appearing. Abdominal exam was non-tender throughout and without distension. There is a well healed surgical incision and a mild amount of dried discharge in umbilicus. Differential diagnosis includes but is not limited to umbilical hernia, umbilical fistula, gastritis, bacterial vaginal infection, PID, intraabdominal pathology, among others. Laboratory workup, including CBC, CMP, urinalysis obtained and remarkable for 6-10 wbc's in the urine. Patient is without urinary symptoms so antibiotic treatment was deferred at this time. . CT scan remarkable for small hiatal hernia but " Chronic-Stable: Reviewed and analyzed results of physiologic download from patient's machine and reviewed with patient. Supplies and parts as needed for her machine. These are medically necessary. Limit caffeine use after 3pm. Based on the analyzed data will continue with current settings. Stable on her machine at current settings, getting benefit from the use, and having minimal side effects. Encouraged her to work with her DME on mask fit and comfort. Provided resources for her to view different mask types and mask liners. Will see her back in 1 year or sooner if issues arise. Discussed the recall of [employer] devices with patient and the severity of her sleep apnea. Discussed the risks of untreated sleep apnea including but not limited to car accidents, heart attacks, strokes, and death. Alternative therapy may not exist or may be severely limited. Felt the benefits of continued usage of these devices may outweigh the risks identified in the recall notification. Advised to avoid use of unproven cleaning methods, such as ozone. Due to the unknown variables each patient will have to make a determination in his/her own. Please contact your equipment company for further instruction until an alternative is found. Encouraged patient to register her machine for the recall and provided phone number. "no abscess or fluid collections. I thought that I identified a small fluid collection in the abdominal wall so I discussed with Radiology, who felt that that finding was consistent with normal subcutaneous fat. I had an interactive discussion about discharge with the patient and she was comfortable with this plan. I instructed her to continue take her for Keflex for possible cellulitis. Patient was discharged home in stable condition.\"    CT ab/pel w/ IV only contrast 8/29/23 @ - no acute findings.     Wound Cx 9/1/23 - negative, normal skin nicole.      PCP gave her RX Bactrim after she finished Keflex RX.      Sx persist, thus she was advised to f/up with bariatric surgeon.  Continues w/ constant intense pain/pressure surrounding umbilicus.  Bloody discharge persists.  Now also having heartburn, nausea, poor appetite.  No fevers/chills.      On Medrol pack for sinus drainage.  Mobic prn for sacral pain.  Taking OTC Omeprazole x 20 days.     Of note, surgical hx includes lap tubal ligation in 2017 @[facility] w/ Dr. Leo Posey - op note reviewed, umbilical incision made.\"    Discussed w/ Dr. Ricardo - \"Reassure her that I did not make any incisions at her umbilicus.  She previously had umbilical incisions for both her cholecystectomy and likely her tubal ligation.  She should follow-up with a general surgeon for evaluation of her umbilicus.  With regards to reflux and possible recurrent hiatal hernia, needs upper GI as next step.\"    Eval w/ general surgeon pending.  Has repeat Abd/Pel CT ordered.      Also w/ ? (L) ovarian issue, f/up w/ GYN planned next week to discuss further.     UGI 9/29/23 @[facility] - mild reflux, small sliding hiatal hernia noted.     ----- Says still has \"massive heartburn\" w/ pain in the stomach.  Continues on Nexium OTC.  Currently on abx (Macrobid) for UTI.  Says umbilical drainage has slowed.  No other new issues/concerns.       Past Medical History:   Diagnosis Date    Abnormal Pap smear of " "cervix 2019    Anxiety     Arthritis     Clotting disorder     Deep vein thrombosis     2014, (R) leg while pregnant, dx w/ MTHFR    Depression     Dyspepsia     Dyspnea on exertion     Elevated hemoglobin A1c     Fatigue     Gallbladder abscess     s/p lap nicolas 2006    GERD (gastroesophageal reflux disease)     Heartburn     chronic/episodic, depends on what she eats, takes Pepcid prn, EGD Dr. Ricardo 11/21    Hyperemesis gravidarum     Hyperlipidemia     Incomplete right bundle branch block     Migraines     Morbid obesity with body mass index (BMI) of 40.0 to 44.9 in adult 04/19/2022    MTHFR deficiency complicating pregnancy     says clotting d/o only an issue when pregnant, advised to use heparin shots during pregnancy    Obesity     Ovarian cyst     Recurrent pregnancy loss, antepartum condition or complication     had a [facility] and lost the pregnancy at 6 months    Strep pharyngitis     Urinary tract infection      Past Surgical History:   Procedure Laterality Date    ENDOSCOPY N/A 05/05/2022    Procedure: ESOPHAGOGASTRODUODENOSCOPY;  Surgeon: Cristiano Ricardo MD;  Location:  [facility];  Service: Bariatric;  Laterality: N/A;    GASTRECTOMY N/A 05/05/2022    Procedure: GASTRECTOMY LAPAROSCOPIC;  Surgeon: Cristiano Ricardo MD;  Location:  [facility];  Service: Bariatric;  Laterality: N/A;    HIATAL HERNIA REPAIR N/A 05/05/2022    Procedure: HIATAL HERNIA REPAIR LAPAROSCOPIC;  Surgeon: Cristiano Ricardo MD;  Location:  [facility];  Service: Bariatric;  Laterality: N/A;    LAPAROSCOPIC CHOLECYSTECTOMY  2006    no stones, was told she had \"three gallbladders\"- all removed    SINUS SURGERY Bilateral 2006    TUBAL COAGULATION LAPAROSCOPIC Bilateral 09/15/2017    Procedure: BILATERAL TUBAL FALLOPE FILSHIE CLIPPING LAPAROSCOPIC;  Surgeon: Leo Posey III, MD;  Location:  [facility];  Service:     VAGINAL DELIVERY  14; 16; 17    female,male, male.  She said she had subcutaneous Lovenox injections throughout the second " and third pregnancies until delivery     Outpatient Medications Marked as Taking for the 11/21/23 encounter (Telemedicine) with Sallie Rutherford PA   Medication Sig Dispense Refill    cyclobenzaprine (FLEXERIL) 5 MG tablet Take 1-2 tablets by mouth 2 (Two) Times a Day As Needed for Muscle Spasms. 30 tablet 1    Elidel 1 % cream       esomeprazole (nexIUM) 20 MG capsule Take 1 capsule by mouth Every Morning Before Breakfast.      ferrous sulfate 325 (65 FE) MG tablet Take 1 tablet by mouth 2 (Two) Times a Day.      lamoTRIgine (LaMICtal) 25 MG tablet Take 2 tablets by mouth Daily. (Patient taking differently: Take 3 tablets by mouth Daily.) 60 tablet 0    montelukast (Singulair) 10 MG tablet Take 1 tablet by mouth Every Night. 30 tablet 3    multivitamin with minerals tablet tablet Take 1 tablet by mouth Daily.      nitrofurantoin, macrocrystal-monohydrate, (MACROBID) 100 MG capsule       ondansetron ODT (ZOFRAN-ODT) 4 MG disintegrating tablet Place 1 tablet on the tongue Every 8 (Eight) Hours As Needed for Nausea or Vomiting. 30 tablet 0    triamcinolone (KENALOG) 0.1 % ointment       vitamin B-12 (CYANOCOBALAMIN) 500 MCG tablet Take 1 tablet by mouth Daily.      vitamin D3 125 MCG (5000 UT) capsule capsule Take 1 capsule by mouth Daily.      [DISCONTINUED] azithromycin (Zithromax) 250 MG tablet Take 2 tablets the first day, then 1 tablet daily on days 2 through 5. 6 tablet 0       Allergies   Allergen Reactions    Amoxicillin Diarrhea and GI Intolerance    Latex Hives and Itching    Penicillins GI Intolerance     Says Keflex OK as long as she takes w/ food.        Social History     Socioeconomic History    Marital status:    Tobacco Use    Smoking status: Never    Smokeless tobacco: Never    Tobacco comments:     disgust me never touched   Vaping Use    Vaping Use: Never used   Substance and Sexual Activity    Alcohol use: Not Currently     Comment: do not drink weekly only on holidays/ birtdays    Drug  "use: No    Sexual activity: Yes     Partners: Male     Birth control/protection: Surgical     Comment: bilatiral tubal       Ht 161.3 cm (63.5\")   Wt 92.5 kg (204 lb)   BMI 35.57 kg/m²     Physical Exam  Constitutional:       General: She is not in acute distress.     Appearance: She is well-developed.   Eyes:      General: No scleral icterus.  Pulmonary:      Effort: Pulmonary effort is normal.   Neurological:      Mental Status: She is alert and oriented to person, place, and time.           Assessment:  19 months s/p LSG/HHR 5/5/22 [facility]       ICD-10-CM ICD-9-CM   1. Dyspepsia  R10.13 536.8   2. Heartburn  R12 787.1   3. Periumbilical abdominal pain  R10.33 789.05         Plan:  EGD @[facility] w/ Dr. Ricardo for further eval.  Additional input to follow.        Note: This was an audio and video enabled telemedicine encounter conducted via EPIC.  Consent was obtained prior to the visit.            "

## 2023-11-27 ENCOUNTER — LAB REQUISITION (OUTPATIENT)
Dept: LAB | Facility: HOSPITAL | Age: 33
End: 2023-11-27
Payer: COMMERCIAL

## 2023-11-27 DIAGNOSIS — K30 FUNCTIONAL DYSPEPSIA: ICD-10-CM

## 2023-11-27 PROCEDURE — 88305 TISSUE EXAM BY PATHOLOGIST: CPT | Performed by: SURGERY

## 2023-11-29 ENCOUNTER — HOSPITAL ENCOUNTER (OUTPATIENT)
Dept: CT IMAGING | Facility: HOSPITAL | Age: 33
Discharge: HOME | End: 2023-11-29
Admitting: SURGERY
Payer: COMMERCIAL

## 2023-11-29 ENCOUNTER — TELEPHONE (OUTPATIENT)
Dept: BARIATRICS/WEIGHT MGMT | Facility: CLINIC | Age: 33
End: 2023-11-29
Payer: COMMERCIAL

## 2023-11-29 DIAGNOSIS — R10.33 ABDOMINAL PAIN, PERIUMBILICAL: ICD-10-CM

## 2023-11-29 PROCEDURE — 0 DIATRIZOATE MEGLUMINE & SODIUM PER 1 ML: Performed by: SURGERY

## 2023-11-29 PROCEDURE — 25510000001 IOPAMIDOL 61 % SOLUTION: Performed by: SURGERY

## 2023-11-29 PROCEDURE — 74177 CT ABD & PELVIS W/CONTRAST: CPT

## 2023-11-29 RX ADMIN — DIATRIZOATE MEGLUMINE AND DIATRIZOATE SODIUM 15 ML: 660; 100 LIQUID ORAL; RECTAL at 11:33

## 2023-11-29 RX ADMIN — IOPAMIDOL 95 ML: 612 INJECTION, SOLUTION INTRAVENOUS at 11:34

## 2023-11-29 NOTE — TELEPHONE ENCOUNTER
----- Message from Cirstiano Ricardo MD sent at 11/27/2023  9:44 AM EST -----    Please have her see me to discuss options, thanks!

## 2023-12-04 DIAGNOSIS — R10.13 DYSPEPSIA: ICD-10-CM

## 2023-12-05 ENCOUNTER — OFFICE VISIT (OUTPATIENT)
Dept: BARIATRICS/WEIGHT MGMT | Facility: CLINIC | Age: 33
End: 2023-12-05
Payer: COMMERCIAL

## 2023-12-05 VITALS
SYSTOLIC BLOOD PRESSURE: 132 MMHG | OXYGEN SATURATION: 99 % | TEMPERATURE: 97.3 F | WEIGHT: 200.5 LBS | HEART RATE: 93 BPM | BODY MASS INDEX: 34.23 KG/M2 | HEIGHT: 64 IN | DIASTOLIC BLOOD PRESSURE: 80 MMHG

## 2023-12-05 DIAGNOSIS — Z98.84 S/P LAPAROSCOPIC SLEEVE GASTRECTOMY: ICD-10-CM

## 2023-12-05 DIAGNOSIS — K21.9 HIATAL HERNIA WITH GASTROESOPHAGEAL REFLUX: Primary | ICD-10-CM

## 2023-12-05 DIAGNOSIS — K44.9 HIATAL HERNIA WITH GASTROESOPHAGEAL REFLUX: Primary | ICD-10-CM

## 2023-12-05 DIAGNOSIS — F41.9 ANXIETY: ICD-10-CM

## 2023-12-05 DIAGNOSIS — Z15.89 MTHFR GENE MUTATION: ICD-10-CM

## 2023-12-05 PROCEDURE — 99214 OFFICE O/P EST MOD 30 MIN: CPT | Performed by: SURGERY

## 2023-12-05 PROCEDURE — 1160F RVW MEDS BY RX/DR IN RCRD: CPT | Performed by: SURGERY

## 2023-12-05 PROCEDURE — 1159F MED LIST DOCD IN RCRD: CPT | Performed by: SURGERY

## 2023-12-05 NOTE — LETTER
2023     PHYLICIA Barrett   [address]  [address]  [facility] [address]    Patient: Theresa Maya   YOB: 1990   Date of Visit: 2023     Dear PHYLICIA Barrett:       Thank you for referring Theresa Maya to me for evaluation. Below are the relevant portions of my assessment and plan of care.    If you have questions, please do not hesitate to call me. I look forward to following Theresa along with you.         Sincerely,        Cristiano Ricardo MD        CC: No Recipients    Cristiano Ricardo MD  23 [phone]  Sign when Signing Visit  [facility]  [address]  [address]  [place], located within [facility] [address] [phone]      Patient  Name:  Theresa Maya  :  1990      Date of Visit: 23    Chief Complaint:    Recurrent hiatal hernia and reflux, abdominal pain status post laparoscopic sleeve gastrectomy and hiatal hernia repair May 2022    History of Present Illness:  Theresa Maya is a 33 y.o. female who presents today for evaluation, education and consultation regarding her recurrent hiatal hernia and reflux and abdominal pain status post laparoscopic sleeve gastrectomy and hiatal hernia repair in May 2022.  Since last seen 2023 she has lost 3-1/2 pounds.  Most recent evaluation Sallie CONCEPCION PA-C dated 2023 reviewed.  She notes the last office visit was on 2023 where she quotes findings that at that time 3 weeks prior the patient developed sharp acute crampy umbilical pain that doubled her over with minimal relief and next day noted bloody discharge from her umbilicus and subsidy went to the ER at  on 2023 where they noted she went to an urgent care earlier for this and was given Keflex and Toradol and told to come to the ED and has associated diarrhea which was painful without hematochezia also nausea dry heaving increased urinary frequency and yellow vaginal discharge no fever  "chills CAT scan showed a small hiatal hernia and was instructed to continue Keflex and that wound cultures on 9/1/2023 grew normal skin nicole and her PCP gave her a treatment for Bactrim after she finished her Keflex and that her symptoms persist and she was advised to follow-up with her bariatric surgeon continues with intense pain/pressure surrounding her umbilicus with ongoing bloody discharge also having heartburn nausea poor appetite and is on a Medrol pack for sinus drainage Mobic as needed for sacral pain and took over-the-counter omeprazole for 20 days and notes that surgical history includes a laparoscopic tubal ligation in 2017 at [facility] by Dr. Leo Posey and the op note was reviewed where a periumbilical incision was made and that they discussed with Dr. Ricardo who said to reassure her that I did not make any incisions at her umbilicus and that she had previously had umbilical incisions both for her cholecystectomy and likely her tubal ligation and she should follow-up with a general surgeon for evaluation of her umbilicus and with regards to her reflux and possible recurrent hernia needs an upper GI as the next step.  She notes evaluation with a general surgeon is pending has a repeat CT of the abdomen pelvis ordered also with a possible left ovarian issue follow-up with GYN plan next week and that upper GI on 9/29/[facility] at [facility] showed mild reflux and a small sliding hiatal hernia and Sallie CONCEPCION PA-C update says she still has \"massive heartburn\" with pain in her stomach continues on Nexium OTC currently on Macrobid for UTI umbilical drainage has slowed.  EGD on 11/27/2023 I noted a recurrent hiatal hernia Z-line 35 cm distal linear erosive focal esophagitis the sleeve itself unremarkable no bile in the stomach pylorus not in spasm or deformed.  I noted that prior to her sleeve she had reflux and EGD showed a small hiatal hernia with the Z-line at 35 cm and at surgery the " hiatal hernia was subtle seen on the posterior lateral view and was repaired posteriorly the sleeve was done by Titan technique and then I reviewed the color images from surgery including a normal-appearing periumbilical region and at the time of her surgery she weighed 242-1/2 pounds and had a BMI 42.7 and currently weighs 204 pounds with a BMI of 35.6 and that she is developed recurrent reflux not well-controlled with maximal medical therapy most recently switched from omeprazole to Nexium and that I reviewed the upper GI read by Dr. Ari Moeller.  Pathology of the antrum showed minimal chronic gastritis negative for H. pylori and distal esophageal biopsies were consistent with reflux esophagitis otherwise unremarkable.    33-year-old female from [place] known to me as above.  She comes in today to discuss options.  We had briefly discussed prior to her EGD that the usual options are recurrent hiatal hernia repair with or without revision to Teresa-en-Y gastric bypass.  Her  was there at the time.  She says they have talked this over at length.  Currently her reflux symptoms or not controlled on 40 mg of Nexium.  She says she regurgitates at night and gets nausea frequently from the reflux as well.  With regards to her periumbilical pain and issues she says workup has revealed that her ovary is adhesed to her umbilicus, this was diagnosed by what she describes as an invasive ultrasound probe and that they suspect she has PCOS and she has an appointment to see her GYN in [place] on 12/14/2023.  She says she is switching insurance, her  is a transplant nurse in [place] at [employer] and she is going under his insurance.      Past Medical History:   Diagnosis Date   • Abnormal Pap smear of cervix 2019   • Anxiety    • Arthritis    • Clotting disorder    • Deep vein thrombosis     2014, (R) leg while pregnant, dx w/ MTHFR   • Depression    • Dyspepsia    • Dyspnea on exertion    • Elevated  "hemoglobin A1c    • Fatigue    • Gallbladder abscess     s/p lap nicolas 2006   • GERD (gastroesophageal reflux disease)     UGI 9/23 - small recurrent HH, EGD Dr. Ricardo  11/23 small recurrent HH   • Heartburn     chronic/episodic, depends on what she eats, takes Pepcid prn, EGD Dr. Ricardo 11/21   • Hyperemesis gravidarum    • Hyperlipidemia    • Incomplete right bundle branch block    • Migraines    • Morbid obesity with body mass index (BMI) of 40.0 to 44.9 in adult 04/19/2022   • MTHFR deficiency complicating pregnancy     says clotting d/o only an issue when pregnant, advised to use heparin shots during pregnancy   • Obesity    • Ovarian cyst    • Recurrent pregnancy loss, antepartum condition or complication     had a [facility] and lost the pregnancy at 6 months   • Strep pharyngitis    • Urinary tract infection      Past Surgical History:   Procedure Laterality Date   • ENDOSCOPY N/A 05/05/2022    Procedure: ESOPHAGOGASTRODUODENOSCOPY;  Surgeon: Cristiano Ricardo MD;  Location:  [facility];  Service: Bariatric;  Laterality: N/A;   • GASTRECTOMY N/A 05/05/2022    Procedure: GASTRECTOMY LAPAROSCOPIC;  Surgeon: Cristiano Ricardo MD;  Location:  [facility];  Service: Bariatric;  Laterality: N/A;   • HIATAL HERNIA REPAIR N/A 05/05/2022    Procedure: HIATAL HERNIA REPAIR LAPAROSCOPIC;  Surgeon: Cristiano Ricardo MD;  Location:  [facility];  Service: Bariatric;  Laterality: N/A;   • LAPAROSCOPIC CHOLECYSTECTOMY  2006    no stones, was told she had \"three gallbladders\"- all removed   • SINUS SURGERY Bilateral 2006   • TUBAL COAGULATION LAPAROSCOPIC Bilateral 09/15/2017    Procedure: BILATERAL TUBAL FALLOPE FILSHIE CLIPPING LAPAROSCOPIC;  Surgeon: Leo Posey III, MD;  Location:  [facility];  Service:    • VAGINAL DELIVERY  14; 16; 17    female,male, male.  She said she had subcutaneous Lovenox injections throughout the second and third pregnancies until delivery       Allergies   Allergen Reactions   • Amoxicillin Diarrhea " and GI Intolerance   • Latex Hives and Itching   • Penicillins GI Intolerance     Says Keflex OK as long as she takes w/ food.        Current Outpatient Medications:   •  cyclobenzaprine (FLEXERIL) 5 MG tablet, Take 1-2 tablets by mouth 2 (Two) Times a Day As Needed for Muscle Spasms., Disp: 30 tablet, Rfl: 1  •  Elidel 1 % cream, , Disp: , Rfl:   •  esomeprazole (nexIUM) 20 MG capsule, Take 1 capsule by mouth Every Morning Before Breakfast., Disp: , Rfl:   •  ferrous sulfate 325 (65 FE) MG tablet, Take 1 tablet by mouth 2 (Two) Times a Day., Disp: , Rfl:   •  lamoTRIgine (LaMICtal) 25 MG tablet, Take 2 tablets by mouth Daily. (Patient taking differently: Take 3 tablets by mouth Daily.), Disp: 60 tablet, Rfl: 0  •  montelukast (Singulair) 10 MG tablet, Take 1 tablet by mouth Every Night., Disp: 30 tablet, Rfl: 3  •  multivitamin with minerals tablet tablet, Take 1 tablet by mouth Daily., Disp: , Rfl:   •  nitrofurantoin, macrocrystal-monohydrate, (MACROBID) 100 MG capsule, , Disp: , Rfl:   •  ondansetron ODT (ZOFRAN-ODT) 4 MG disintegrating tablet, Place 1 tablet on the tongue Every 8 (Eight) Hours As Needed for Nausea or Vomiting., Disp: 30 tablet, Rfl: 0  •  triamcinolone (KENALOG) 0.1 % ointment, , Disp: , Rfl:   •  vitamin B-12 (CYANOCOBALAMIN) 500 MCG tablet, Take 1 tablet by mouth Daily., Disp: , Rfl:   •  vitamin D3 125 MCG (5000 UT) capsule capsule, Take 1 capsule by mouth Daily., Disp: , Rfl:     Social History     Socioeconomic History   • Marital status:    Tobacco Use   • Smoking status: Never   • Smokeless tobacco: Never   • Tobacco comments:     disgust me never touched   Vaping Use   • Vaping Use: Never used   Substance and Sexual Activity   • Alcohol use: Not Currently     Comment: do not drink weekly only on holidays/ birtdays   • Drug use: No   • Sexual activity: Yes     Partners: Male     Birth control/protection: Surgical     Comment: bilatiral tubal     Family History   Problem Relation  Age of Onset   • Asthma Mother         effected after stroke   • Thyroid disease Mother    • Heart attack Mother    • Obesity Mother    • Migraines Mother    • Hypertension Mother    • COPD Mother    • Mental illness Mother         depression   • Alcohol abuse Father         abused since childhood   • Lupus Sister    • Ovarian cancer Sister    • Asthma Sister    • Breast cancer Maternal Grandmother 54   • Lung cancer Maternal Grandmother    • Asthma Maternal Grandmother    • Hypertension Maternal Grandmother    • Lupus Maternal Grandmother    • Thyroid disease Maternal Grandmother    • Diabetes Maternal Grandmother    • Stroke Maternal Grandmother    • Cancer Maternal Grandmother         lung   • Hypertension Maternal Grandfather    • Lung cancer Paternal Grandmother    • Obesity Paternal Grandmother    • Cancer Paternal Grandmother    • Prostate cancer Paternal Grandfather    • Cancer Paternal Grandfather         lung   • Miscarriages / Stillbirths Maternal Aunt        Review of Systems   Constitutional:  Positive for fatigue. Negative for chills, diaphoresis, fever and unexpected weight loss.   HENT:  Negative for congestion and facial swelling.    Eyes:  Negative for blurred vision, double vision and discharge.   Respiratory:  Negative for chest tightness, shortness of breath and stridor.    Cardiovascular:  Negative for chest pain, palpitations and leg swelling.   Gastrointestinal:  Positive for abdominal pain and GERD. Negative for blood in stool.   Endocrine: Negative for polydipsia.   Genitourinary:  Negative for hematuria.   Musculoskeletal:  Positive for arthralgias.   Skin:  Negative for color change.   Allergic/Immunologic: Negative for immunocompromised state.   Neurological:  Negative for confusion.   Psychiatric/Behavioral:  Negative for self-injury. The patient is nervous/anxious.        I have reviewed the ROS and confirm that it's accurate today.    Physical Exam:  Vital Signs:  Weight: 90.9 kg (200  lb 8 oz)   Body mass index is 34.96 kg/m².  Temp: 97.3 °F (36.3 °C)   Heart Rate: 93   BP: 132/80     Physical Exam  Vitals reviewed.   Constitutional:       Appearance: She is well-developed.   HENT:      Head: Normocephalic and atraumatic.      Nose: Nose normal.   Eyes:      Conjunctiva/sclera: Conjunctivae normal.      Pupils: Pupils are equal, round, and reactive to light.   Neck:      Thyroid: No thyromegaly.      Vascular: No carotid bruit.      Trachea: No tracheal deviation.   Cardiovascular:      Rate and Rhythm: Regular rhythm. Tachycardia present.      Heart sounds: Normal heart sounds.   Pulmonary:      Effort: Pulmonary effort is normal. No respiratory distress.      Breath sounds: Normal breath sounds.   Abdominal:      General: There is no distension.      Palpations: Abdomen is soft.      Tenderness: There is no abdominal tenderness.      Comments: Laparoscopy scars   Musculoskeletal:         General: No deformity. Normal range of motion.      Cervical back: Normal range of motion and neck supple.   Skin:     General: Skin is warm and dry.      Findings: No rash.   Neurological:      Mental Status: She is alert and oriented to person, place, and time.      Cranial Nerves: No cranial nerve deficit.      Coordination: Coordination normal.   Psychiatric:         Behavior: Behavior normal.         Thought Content: Thought content normal.         Judgment: Judgment normal.         Patient Active Problem List   Diagnosis   • Previous stillbirth or demise, antepartum   • Previous deep vein thrombosis (DVT) affecting pregnancy   • H/O abnormal cervical Papanicolaou smear   • Depression   • Anxiety   • MTHFR gene mutation   • Heartburn   • Fatigue   • Dyspepsia   • Dyspnea on exertion   • Right leg pain   • Lumbar back pain   • Bipolar disorder in partial remission       Assessment:    Theresa Maya is a 33 y.o. year old female with recurrent hiatal hernia and reflux, abdominal pain status post  laparoscopic sleeve gastrectomy and hiatal hernia repair May 2022    I reviewed her Carlos Alberto report which is negative.      We went over the options to treat her recurrent hiatal hernia and reflux.  I used flip charts.  We discussed recurrent hiatal hernia repair alone as a stand-alone procedure versus recurrent hiatal hernia pair with revision to a Teresa-en-Y gastric bypass with or without a longer BP limb.  She says she has no issues with taking required lifelong vitamins and already takes a prenatal vitamin, iron, and vitamin D and B12 and understands that there will be several additional vitamins required.  She also understands there could be would be more frequent bowel movements.    Complications of laparoscopic/possible robotic gastric bypass were discussed including but not limited to bleeding, infection, deep venous thrombosis, pulmonary embolism, pulmonary complications such as pneumonia, cardiac events, hernias, small bowel obstruction, damage to the spleen or other organs, bowel injury, disfiguring scars, failure to lose weight, need for additional surgery, conversion to an open procedure, and death.  Patient is also aware of complications which apply in this particular procedure that can include but are not limited to leaking of gastric contents at the staple or suture lines which could lead to intra-abdominal abscess, or chronic complications of strictures, ulcers, or vitamin/mineral deficiencies.  If long BP limb, we discussed the likelihood of more frequent BM's,possibly malodorous, with increased risk for vitamin deficiencies, beena the fat soluble vitamins, and that this can be serious and irreversible and will require lifelong compliance with w f/u and vit/supplment recommendations.  Aware that may need an elongation procedure in the future if diarrhea and/or vitamin issues not controlled.       R/B/A Rx to recurrent hiatal hernia repair were discussed.  These include but are not limited to bleeding,  infection, death, pulmonary complications,  VTE events, splenic injury or infarction, recurrent hernia, DAVID, mesh complications, dysphagia, esophageal injury, pneumothorax, injury to the vagus nerves, injury to the thoracic duct, aorta or vena cava, diaphragmatic paralysis.       Plan:    After discussion of the risk, benefits, and alternative therapies as above she wishes to proceed with laparoscopic possible robotic assisted recurrent hiatal hernia repair with revision to Teresa-en-Y gastric to address her recurrent hiatal hernia and severe reflux status post laparoscopic sleeve gastrectomy and hiatal hernia repair in May 2022.  Based on my interaction with her today plan BP limb around 100 to 150 cm depending on bowel length.  Likely discharge on Eliquis.    Other issues include anxiety and depression, arthritis, MTHFR mutation with history of DVT right lower extremity 2014, dyspnea exertion, elevated hemoglobin A1c, fatigue, upper lipidemia, incomplete right bundle branch block, history of iron deficiency anemia, migraine headaches, ovarian cyst, history of UTIs.    Thank you Gina WHATLEY for the opportunity to reevaluate Mrs. Maya.          Cristiano Ricardo MD

## 2023-12-05 NOTE — PROGRESS NOTES
[facility] BARIATRIC SURGERY  [address]  [address]  [facility] [id number] [phone]      Patient  Name:  Theresa Maya  :  1990      Date of Visit: 23    Chief Complaint:    Recurrent hiatal hernia and reflux, abdominal pain status post laparoscopic sleeve gastrectomy and hiatal hernia repair May 2022    History of Present Illness:  Theresa Maya is a 33 y.o. female who presents today for evaluation, education and consultation regarding her recurrent hiatal hernia and reflux and abdominal pain status post laparoscopic sleeve gastrectomy and hiatal hernia repair in May 2022.  Since last seen 2023 she has lost 3-1/2 pounds.  Most recent evaluation Sallie CONCEPCION PA-C dated 2023 reviewed.  She notes the last office visit was on 2023 where she quotes findings that at that time 3 weeks prior the patient developed sharp acute crampy umbilical pain that doubled her over with minimal relief and next day noted bloody discharge from her umbilicus and subsidy went to the ER at  on 2023 where they noted she went to an urgent care earlier for this and was given Keflex and Toradol and told to come to the ED and has associated diarrhea which was painful without hematochezia also nausea dry heaving increased urinary frequency and yellow vaginal discharge no fever chills CAT scan showed a small hiatal hernia and was instructed to continue Keflex and that wound cultures on 2023 grew normal skin nicole and her PCP gave her a treatment for Bactrim after she finished her Keflex and that her symptoms persist and she was advised to follow-up with her bariatric surgeon continues with intense pain/pressure surrounding her umbilicus with ongoing bloody discharge also having heartburn nausea poor appetite and is on a Medrol pack for sinus drainage Mobic as needed for sacral pain and took over-the-counter omeprazole for 20 days and notes that surgical history includes a  "laparoscopic tubal ligation in 2017 at [facility] by Dr. Leo Posey and the op note was reviewed where a periumbilical incision was made and that they discussed with Dr. Ricardo who said to reassure her that I did not make any incisions at her umbilicus and that she had previously had umbilical incisions both for her cholecystectomy and likely her tubal ligation and she should follow-up with a general surgeon for evaluation of her umbilicus and with regards to her reflux and possible recurrent hernia needs an upper GI as the next step.  She notes evaluation with a general surgeon is pending has a repeat CT of the abdomen pelvis ordered also with a possible left ovarian issue follow-up with GYN plan next week and that upper GI on 9/29/2023 at  showed mild reflux and a small sliding hiatal hernia and Sallie pedersen says she still has \"massive heartburn\" with pain in her stomach continues on Nexium OTC currently on Macrobid for UTI umbilical drainage has slowed.  EGD on 11/27/2023 I noted a recurrent hiatal hernia Z-line 35 cm distal linear erosive focal esophagitis the sleeve itself unremarkable no bile in the stomach pylorus not in spasm or deformed.  I noted that prior to her sleeve she had reflux and EGD showed a small hiatal hernia with the Z-line at 35 cm and at surgery the hiatal hernia was subtle seen on the posterior lateral view and was repaired posteriorly the sleeve was done by Titan technique and then I reviewed the color images from surgery including a normal-appearing periumbilical region and at the time of her surgery she weighed 242-1/2 pounds and had a BMI 42.7 and currently weighs 204 pounds with a BMI of 35.6 and that she is developed recurrent reflux not well-controlled with maximal medical therapy most recently switched from omeprazole to Nexium and that I reviewed the upper GI read by Dr. Ari Moeller.  Pathology of the antrum showed minimal chronic gastritis " negative for H. pylori and distal esophageal biopsies were consistent with reflux esophagitis otherwise unremarkable.    33-year-old female from [place] known to me as above.  She comes in today to discuss options.  We had briefly discussed prior to her EGD that the usual options are recurrent hiatal hernia repair with or without revision to Teresa-en-Y gastric bypass.  Her  was there at the time.  She says they have talked this over at length.  Currently her reflux symptoms or not controlled on 40 mg of Nexium.  She says she regurgitates at night and gets nausea frequently from the reflux as well.  With regards to her periumbilical pain and issues she says workup has revealed that her ovary is adhesed to her umbilicus, this was diagnosed by what she describes as an invasive ultrasound probe and that they suspect she has PCOS and she has an appointment to see her GYN in [place] on 12/14/2023.  She says she is switching insurance, her  is a transplant nurse in [place] at [employer] and she is going under his insurance.      Past Medical History:   Diagnosis Date    Abnormal Pap smear of cervix 2019    Anxiety     Arthritis     Clotting disorder     Deep vein thrombosis     2014, (R) leg while pregnant, dx w/ MTHFR    Depression     Dyspepsia     Dyspnea on exertion     Elevated hemoglobin A1c     Fatigue     Gallbladder abscess     s/p lap nicolas 2006    GERD (gastroesophageal reflux disease)     UGI 9/23 - small recurrent HH, EGD Dr. Ricardo  11/23 small recurrent HH    Heartburn     chronic/episodic, depends on what she eats, takes Pepcid prn, EGD Dr. Ricardo 11/21    Hyperemesis gravidarum     Hyperlipidemia     Incomplete right bundle branch block     Migraines     Morbid obesity with body mass index (BMI) of 40.0 to 44.9 in adult 04/19/2022    MTHFR deficiency complicating pregnancy     says clotting d/o only an issue when pregnant, advised to use heparin shots during pregnancy    Obesity      "Ovarian cyst     Recurrent pregnancy loss, antepartum condition or complication     had a [facility] and lost the pregnancy at 6 months    Strep pharyngitis     Urinary tract infection      Past Surgical History:   Procedure Laterality Date    ENDOSCOPY N/A 05/05/2022    Procedure: ESOPHAGOGASTRODUODENOSCOPY;  Surgeon: Critsiano Ricardo MD;  Location:  [facility];  Service: Bariatric;  Laterality: N/A;    GASTRECTOMY N/A 05/05/2022    Procedure: GASTRECTOMY LAPAROSCOPIC;  Surgeon: Cristiano Ricardo MD;  Location:  [facility];  Service: Bariatric;  Laterality: N/A;    HIATAL HERNIA REPAIR N/A 05/05/2022    Procedure: HIATAL HERNIA REPAIR LAPAROSCOPIC;  Surgeon: Cristiano Ricardo MD;  Location:  [facility];  Service: Bariatric;  Laterality: N/A;    LAPAROSCOPIC CHOLECYSTECTOMY  2006    no stones, was told she had \"three gallbladders\"- all removed    SINUS SURGERY Bilateral 2006    TUBAL COAGULATION LAPAROSCOPIC Bilateral 09/15/2017    Procedure: BILATERAL TUBAL FALLOPE FILSHIE CLIPPING LAPAROSCOPIC;  Surgeon: Leo Posey III, MD;  Location:  [facility];  Service:     VAGINAL DELIVERY  14; 16; 17    female,male, male.  She said she had subcutaneous Lovenox injections throughout the second and third pregnancies until delivery       Allergies   Allergen Reactions    Amoxicillin Diarrhea and GI Intolerance    Latex Hives and Itching    Penicillins GI Intolerance     Says Keflex OK as long as she takes w/ food.        Current Outpatient Medications:     cyclobenzaprine (FLEXERIL) 5 MG tablet, Take 1-2 tablets by mouth 2 (Two) Times a Day As Needed for Muscle Spasms., Disp: 30 tablet, Rfl: 1    Elidel 1 % cream, , Disp: , Rfl:     esomeprazole (nexIUM) 20 MG capsule, Take 1 capsule by mouth Every Morning Before Breakfast., Disp: , Rfl:     ferrous sulfate 325 (65 FE) MG tablet, Take 1 tablet by mouth 2 (Two) Times a Day., Disp: , Rfl:     lamoTRIgine (LaMICtal) 25 MG tablet, Take 2 tablets by mouth Daily. (Patient taking " differently: Take 3 tablets by mouth Daily.), Disp: 60 tablet, Rfl: 0    montelukast (Singulair) 10 MG tablet, Take 1 tablet by mouth Every Night., Disp: 30 tablet, Rfl: 3    multivitamin with minerals tablet tablet, Take 1 tablet by mouth Daily., Disp: , Rfl:     nitrofurantoin, macrocrystal-monohydrate, (MACROBID) 100 MG capsule, , Disp: , Rfl:     ondansetron ODT (ZOFRAN-ODT) 4 MG disintegrating tablet, Place 1 tablet on the tongue Every 8 (Eight) Hours As Needed for Nausea or Vomiting., Disp: 30 tablet, Rfl: 0    triamcinolone (KENALOG) 0.1 % ointment, , Disp: , Rfl:     vitamin B-12 (CYANOCOBALAMIN) 500 MCG tablet, Take 1 tablet by mouth Daily., Disp: , Rfl:     vitamin D3 125 MCG (5000 UT) capsule capsule, Take 1 capsule by mouth Daily., Disp: , Rfl:     Social History     Socioeconomic History    Marital status:    Tobacco Use    Smoking status: Never    Smokeless tobacco: Never    Tobacco comments:     disgust me never touched   Vaping Use    Vaping Use: Never used   Substance and Sexual Activity    Alcohol use: Not Currently     Comment: do not drink weekly only on holidays/ birtdays    Drug use: No    Sexual activity: Yes     Partners: Male     Birth control/protection: Surgical     Comment: bilatiral tubal     Family History   Problem Relation Age of Onset    Asthma Mother         effected after stroke    Thyroid disease Mother     Heart attack Mother     Obesity Mother     Migraines Mother     Hypertension Mother     COPD Mother     Mental illness Mother         depression    Alcohol abuse Father         abused since childhood    Lupus Sister     Ovarian cancer Sister     Asthma Sister     Breast cancer Maternal Grandmother 54    Lung cancer Maternal Grandmother     Asthma Maternal Grandmother     Hypertension Maternal Grandmother     Lupus Maternal Grandmother     Thyroid disease Maternal Grandmother     Diabetes Maternal Grandmother     Stroke Maternal Grandmother     Cancer Maternal Grandmother          lung    Hypertension Maternal Grandfather     Lung cancer Paternal Grandmother     Obesity Paternal Grandmother     Cancer Paternal Grandmother     Prostate cancer Paternal Grandfather     Cancer Paternal Grandfather         lung    Miscarriages / Stillbirths Maternal Aunt        Review of Systems   Constitutional:  Positive for fatigue. Negative for chills, diaphoresis, fever and unexpected weight loss.   HENT:  Negative for congestion and facial swelling.    Eyes:  Negative for blurred vision, double vision and discharge.   Respiratory:  Negative for chest tightness, shortness of breath and stridor.    Cardiovascular:  Negative for chest pain, palpitations and leg swelling.   Gastrointestinal:  Positive for abdominal pain and GERD. Negative for blood in stool.   Endocrine: Negative for polydipsia.   Genitourinary:  Negative for hematuria.   Musculoskeletal:  Positive for arthralgias.   Skin:  Negative for color change.   Allergic/Immunologic: Negative for immunocompromised state.   Neurological:  Negative for confusion.   Psychiatric/Behavioral:  Negative for self-injury. The patient is nervous/anxious.        I have reviewed the ROS and confirm that it's accurate today.    Physical Exam:  Vital Signs:  Weight: 90.9 kg (200 lb 8 oz)   Body mass index is 34.96 kg/m².  Temp: 97.3 °F (36.3 °C)   Heart Rate: 93   BP: 132/80     Physical Exam  Vitals reviewed.   Constitutional:       Appearance: She is well-developed.   HENT:      Head: Normocephalic and atraumatic.      Nose: Nose normal.   Eyes:      Conjunctiva/sclera: Conjunctivae normal.      Pupils: Pupils are equal, round, and reactive to light.   Neck:      Thyroid: No thyromegaly.      Vascular: No carotid bruit.      Trachea: No tracheal deviation.   Cardiovascular:      Rate and Rhythm: Regular rhythm. Tachycardia present.      Heart sounds: Normal heart sounds.   Pulmonary:      Effort: Pulmonary effort is normal. No respiratory distress.       Breath sounds: Normal breath sounds.   Abdominal:      General: There is no distension.      Palpations: Abdomen is soft.      Tenderness: There is no abdominal tenderness.      Comments: Laparoscopy scars   Musculoskeletal:         General: No deformity. Normal range of motion.      Cervical back: Normal range of motion and neck supple.   Skin:     General: Skin is warm and dry.      Findings: No rash.   Neurological:      Mental Status: She is alert and oriented to person, place, and time.      Cranial Nerves: No cranial nerve deficit.      Coordination: Coordination normal.   Psychiatric:         Behavior: Behavior normal.         Thought Content: Thought content normal.         Judgment: Judgment normal.         Patient Active Problem List   Diagnosis    Previous stillbirth or demise, antepartum    Previous deep vein thrombosis (DVT) affecting pregnancy    H/O abnormal cervical Papanicolaou smear    Depression    Anxiety    MTHFR gene mutation    Heartburn    Fatigue    Dyspepsia    Dyspnea on exertion    Right leg pain    Lumbar back pain    Bipolar disorder in partial remission       Assessment:    Theresa Maya is a 33 y.o. year old female with recurrent hiatal hernia and reflux, abdominal pain status post laparoscopic sleeve gastrectomy and hiatal hernia repair May 2022    I reviewed her Carlos Alberto report which is negative.      We went over the options to treat her recurrent hiatal hernia and reflux.  I used flip charts.  We discussed recurrent hiatal hernia repair alone as a stand-alone procedure versus recurrent hiatal hernia pair with revision to a Teresa-en-Y gastric bypass with or without a longer BP limb.  She says she has no issues with taking required lifelong vitamins and already takes a prenatal vitamin, iron, and vitamin D and B12 and understands that there will be several additional vitamins required.  She also understands there could be would be more frequent bowel movements.    Complications of  laparoscopic/possible robotic gastric bypass were discussed including but not limited to bleeding, infection, deep venous thrombosis, pulmonary embolism, pulmonary complications such as pneumonia, cardiac events, hernias, small bowel obstruction, damage to the spleen or other organs, bowel injury, disfiguring scars, failure to lose weight, need for additional surgery, conversion to an open procedure, and death.  Patient is also aware of complications which apply in this particular procedure that can include but are not limited to leaking of gastric contents at the staple or suture lines which could lead to intra-abdominal abscess, or chronic complications of strictures, ulcers, or vitamin/mineral deficiencies.  If long BP limb, we discussed the likelihood of more frequent BM's,possibly malodorous, with increased risk for vitamin deficiencies, beena the fat soluble vitamins, and that this can be serious and irreversible and will require lifelong compliance with w f/u and vit/supplment recommendations.  Aware that may need an elongation procedure in the future if diarrhea and/or vitamin issues not controlled.       R/B/A Rx to recurrent hiatal hernia repair were discussed.  These include but are not limited to bleeding, infection, death, pulmonary complications,  VTE events, splenic injury or infarction, recurrent hernia, DAVID, mesh complications, dysphagia, esophageal injury, pneumothorax, injury to the vagus nerves, injury to the thoracic duct, aorta or vena cava, diaphragmatic paralysis.       Plan:    After discussion of the risk, benefits, and alternative therapies as above she wishes to proceed with laparoscopic possible robotic assisted recurrent hiatal hernia repair with revision to Teresa-en-Y gastric to address her recurrent hiatal hernia and severe reflux status post laparoscopic sleeve gastrectomy and hiatal hernia repair in May 2022.  Based on my interaction with her today plan BP limb around 100 to 150 cm  depending on bowel length.  Likely discharge on Eliquis.    Other issues include anxiety and depression, arthritis, MTHFR mutation with history of DVT right lower extremity 2014, dyspnea exertion, elevated hemoglobin A1c, fatigue, upper lipidemia, incomplete right bundle branch block, history of iron deficiency anemia, migraine headaches, ovarian cyst, history of UTIs.    Thank you Gina WHATLEY for the opportunity to reevaluate Mrs. Maya.          Cristiano Ricardo MD

## 2023-12-11 ENCOUNTER — OFFICE VISIT (OUTPATIENT)
Dept: INTERNAL MEDICINE | Facility: CLINIC | Age: 33
End: 2023-12-11
Payer: COMMERCIAL

## 2023-12-11 ENCOUNTER — LAB (OUTPATIENT)
Dept: INTERNAL MEDICINE | Facility: CLINIC | Age: 33
End: 2023-12-11
Payer: COMMERCIAL

## 2023-12-11 VITALS
DIASTOLIC BLOOD PRESSURE: 84 MMHG | BODY MASS INDEX: 34.83 KG/M2 | OXYGEN SATURATION: 100 % | HEART RATE: 68 BPM | TEMPERATURE: 97.1 F | SYSTOLIC BLOOD PRESSURE: 126 MMHG | HEIGHT: 64 IN | WEIGHT: 204 LBS

## 2023-12-11 DIAGNOSIS — Z20.2 EXPOSURE TO STD: Primary | ICD-10-CM

## 2023-12-11 DIAGNOSIS — J02.9 SORE THROAT: ICD-10-CM

## 2023-12-11 DIAGNOSIS — R10.30 LOWER ABDOMINAL PAIN: ICD-10-CM

## 2023-12-11 DIAGNOSIS — R43.2 LOSS OF TASTE: ICD-10-CM

## 2023-12-11 DIAGNOSIS — R39.15 URGENCY OF URINATION: ICD-10-CM

## 2023-12-11 DIAGNOSIS — Z20.2 EXPOSURE TO STD: ICD-10-CM

## 2023-12-11 LAB
B-HCG UR QL: NEGATIVE
BILIRUB BLD-MCNC: NEGATIVE MG/DL
CLARITY, POC: ABNORMAL
COLOR UR: YELLOW
EXPIRATION DATE: ABNORMAL
EXPIRATION DATE: NORMAL
FLUAV AG NPH QL: NEGATIVE
FLUBV AG NPH QL: NEGATIVE
GLUCOSE UR STRIP-MCNC: NEGATIVE MG/DL
INTERNAL CONTROL: NORMAL
INTERNAL NEGATIVE CONTROL: NEGATIVE
INTERNAL POSITIVE CONTROL: NORMAL
KETONES UR QL: NEGATIVE
LEUKOCYTE EST, POC: ABNORMAL
Lab: ABNORMAL
Lab: NORMAL
NITRITE UR-MCNC: NEGATIVE MG/ML
PH UR: 6 [PH] (ref 5–8)
PROT UR STRIP-MCNC: NEGATIVE MG/DL
RBC # UR STRIP: NEGATIVE /UL
S PYO AG THROAT QL: NEGATIVE
SARS-COV-2 AG UPPER RESP QL IA.RAPID: NOT DETECTED
SP GR UR: 1.01 (ref 1–1.03)
UROBILINOGEN UR QL: NORMAL

## 2023-12-11 PROCEDURE — 87661 TRICHOMONAS VAGINALIS AMPLIF: CPT

## 2023-12-11 PROCEDURE — 36415 COLL VENOUS BLD VENIPUNCTURE: CPT

## 2023-12-11 PROCEDURE — 87798 DETECT AGENT NOS DNA AMP: CPT

## 2023-12-11 PROCEDURE — 87491 CHLMYD TRACH DNA AMP PROBE: CPT

## 2023-12-11 PROCEDURE — 86592 SYPHILIS TEST NON-TREP QUAL: CPT

## 2023-12-11 PROCEDURE — 87591 N.GONORRHOEAE DNA AMP PROB: CPT

## 2023-12-11 PROCEDURE — 87086 URINE CULTURE/COLONY COUNT: CPT

## 2023-12-11 PROCEDURE — 87801 DETECT AGNT MULT DNA AMPLI: CPT

## 2023-12-11 RX ORDER — CEFTRIAXONE 1 G/1
1 INJECTION, POWDER, FOR SOLUTION INTRAMUSCULAR; INTRAVENOUS EVERY 24 HOURS
Status: COMPLETED | OUTPATIENT
Start: 2023-12-11 | End: 2023-12-11

## 2023-12-11 RX ORDER — DOXYCYCLINE HYCLATE 100 MG/1
100 CAPSULE ORAL 2 TIMES DAILY
Qty: 14 CAPSULE | Refills: 0 | Status: SHIPPED | OUTPATIENT
Start: 2023-12-11

## 2023-12-11 RX ADMIN — CEFTRIAXONE 1 G: 1 INJECTION, POWDER, FOR SOLUTION INTRAMUSCULAR; INTRAVENOUS at 12:35

## 2023-12-11 NOTE — PROGRESS NOTES
"    Office Note     Name: Theresa Maya    : 1990     MRN: [id number]     Chief Complaint  Exposure to STD (STD check she found out her  is having an affair.)    Subjective     History of Present Illness:  Theresa Maya is a 33 y.o. female who presents today for STD testing. She recently found out her  had sexual encounter with someone who was positive for chlamydia. Pt reports having lower abdominal pressure when she urinates, is having urgency and has pain/tenderness when she wipes. She has also had greenish vaginal discharge for the past week and foul smelling urine.     She also reports a sore throat, loss of taste and her tongue \"feeling weird\" for the past few days.     She states is having urinary urgency and pain/tender when wiping. Does have lower abdominal pain/pressure when she urinates. Green and mucus discharge 7-12 days.     LMP-2023, tubal 7 years ago, states her menstrual cycle is usually regular, she is not usually late.     Review of Systems   Constitutional:  Negative for chills and fever.   HENT:  Positive for sore throat.    Gastrointestinal:  Positive for abdominal pain, nausea and vomiting. Negative for constipation and diarrhea.   Genitourinary:  Positive for flank pain, frequency, pelvic pain, urgency and vaginal discharge. Negative for dysuria and hematuria.   Musculoskeletal:  Positive for back pain.   Skin:  Negative for rash.   Neurological:  Positive for headaches.       Past Medical History:   Diagnosis Date    Abnormal Pap smear of cervix 2019    Anxiety     Arthritis     Clotting disorder     Deep vein thrombosis     2014, (R) leg while pregnant, dx w/ MTHFR    Depression     Dyspepsia     Dyspnea on exertion     Elevated hemoglobin A1c     Fatigue     Gallbladder abscess     s/p lap nicolas 2006    GERD (gastroesophageal reflux disease)     UGI  - small recurrent HH, EGD Dr. Ricardo   small recurrent HH    Heartburn     chronic/episodic, " "depends on what she eats, takes Pepcid prn, EGD Dr. Ricardo 11/21    Hyperemesis gravidarum     Hyperlipidemia     Incomplete right bundle branch block     Iron deficiency anemia     Migraines     Morbid obesity with body mass index (BMI) of 40.0 to 44.9 in adult 04/19/2022    MTHFR deficiency complicating pregnancy     says clotting d/o only an issue when pregnant, advised to use heparin shots during pregnancy    Obesity     Ovarian cyst     Recurrent pregnancy loss, antepartum condition or complication     had a [facility] and lost the pregnancy at 6 months    Strep pharyngitis     Urinary tract infection      Past Surgical History:   Procedure Laterality Date    ENDOSCOPY N/A 05/05/2022    Procedure: ESOPHAGOGASTRODUODENOSCOPY;  Surgeon: Cristiano Ricardo MD;  Location:  [facility];  Service: Bariatric;  Laterality: N/A;    GASTRECTOMY N/A 05/05/2022    Procedure: GASTRECTOMY LAPAROSCOPIC;  Surgeon: Cristiano Ricardo MD;  Location:  [facility];  Service: Bariatric;  Laterality: N/A;    HIATAL HERNIA REPAIR N/A 05/05/2022    Procedure: HIATAL HERNIA REPAIR LAPAROSCOPIC;  Surgeon: Cristiano Ricardo MD;  Location:  [facility];  Service: Bariatric;  Laterality: N/A;    LAPAROSCOPIC CHOLECYSTECTOMY  2006    no stones, was told she had \"three gallbladders\"- all removed    SINUS SURGERY Bilateral 2006    TUBAL COAGULATION LAPAROSCOPIC Bilateral 09/15/2017    Procedure: BILATERAL TUBAL FALLOPE FILSHIE CLIPPING LAPAROSCOPIC;  Surgeon: Leo Posey III, MD;  Location:  [facility];  Service:     VAGINAL DELIVERY  14; 16; 17    female,male, male.  She said she had subcutaneous Lovenox injections throughout the second and third pregnancies until delivery       Current Outpatient Medications:     cyclobenzaprine (FLEXERIL) 5 MG tablet, Take 1-2 tablets by mouth 2 (Two) Times a Day As Needed for Muscle Spasms., Disp: 30 tablet, Rfl: 1    Elidel 1 % cream, , Disp: , Rfl:     esomeprazole (nexIUM) 20 MG capsule, Take 2 capsules by mouth " "2 (Two) Times a Day., Disp: , Rfl:     ferrous sulfate 325 (65 FE) MG tablet, Take 1 tablet by mouth 2 (Two) Times a Day., Disp: , Rfl:     lamoTRIgine (LaMICtal) 25 MG tablet, Take 2 tablets by mouth Daily. (Patient taking differently: Take 3 tablets by mouth Daily.), Disp: 60 tablet, Rfl: 0    montelukast (Singulair) 10 MG tablet, Take 1 tablet by mouth Every Night., Disp: 30 tablet, Rfl: 3    multivitamin with minerals tablet tablet, Take 1 tablet by mouth Daily., Disp: , Rfl:     ondansetron ODT (ZOFRAN-ODT) 4 MG disintegrating tablet, Place 1 tablet on the tongue Every 8 (Eight) Hours As Needed for Nausea or Vomiting., Disp: 30 tablet, Rfl: 0    triamcinolone (KENALOG) 0.1 % ointment, , Disp: , Rfl:     vitamin B-12 (CYANOCOBALAMIN) 500 MCG tablet, Take 1 tablet by mouth Daily., Disp: , Rfl:     vitamin D3 125 MCG (5000 UT) capsule capsule, Take 1 capsule by mouth Daily., Disp: , Rfl:     doxycycline (VIBRAMYCIN) 100 MG capsule, Take 1 capsule by mouth 2 (Two) Times a Day., Disp: 14 capsule, Rfl: 0    nitrofurantoin, macrocrystal-monohydrate, (MACROBID) 100 MG capsule, , Disp: , Rfl:   No current facility-administered medications for this visit.  Allergies   Allergen Reactions    Amoxicillin Diarrhea and GI Intolerance    Latex Hives and Itching    Penicillins GI Intolerance     Says Keflex OK as long as she takes w/ food.        Objective     Vital Signs  /84 (BP Location: Right arm, Patient Position: Sitting)   Pulse 68   Temp 97.1 °F (36.2 °C) (Infrared)   Ht 161.3 cm (63.5\")   Wt 92.5 kg (204 lb)   SpO2 100%   BMI 35.57 kg/m²   Estimated body mass index is 35.57 kg/m² as calculated from the following:    Height as of this encounter: 161.3 cm (63.5\").    Weight as of this encounter: 92.5 kg (204 lb).            Physical Exam  Vitals reviewed.   Constitutional:       Appearance: Normal appearance. She is not ill-appearing.   HENT:      Head: Normocephalic and atraumatic.      Right Ear: Tympanic " membrane, ear canal and external ear normal. There is no impacted cerumen.      Left Ear: Tympanic membrane, ear canal and external ear normal. There is no impacted cerumen.      Nose: Nose normal. No congestion or rhinorrhea.      Mouth/Throat:      Mouth: Mucous membranes are moist.      Pharynx: Oropharynx is clear. Uvula midline. No oropharyngeal exudate or posterior oropharyngeal erythema.      Tonsils: No tonsillar exudate or tonsillar abscesses. 0 on the right. 0 on the left.   Eyes:      Extraocular Movements: Extraocular movements intact.      Conjunctiva/sclera: Conjunctivae normal.      Pupils: Pupils are equal, round, and reactive to light.   Neck:      Thyroid: No thyroid mass, thyromegaly or thyroid tenderness.   Cardiovascular:      Rate and Rhythm: Normal rate and regular rhythm.      Pulses: Normal pulses.           Radial pulses are 2+ on the right side and 2+ on the left side.      Heart sounds: Normal heart sounds, S1 normal and S2 normal. No murmur heard.  Pulmonary:      Effort: Pulmonary effort is normal. No tachypnea or respiratory distress.      Breath sounds: Normal breath sounds and air entry. No decreased breath sounds, wheezing or rhonchi.   Abdominal:      General: Abdomen is flat. Bowel sounds are normal.      Palpations: Abdomen is soft.      Tenderness: There is abdominal tenderness (mild) in the right lower quadrant and left lower quadrant. There is no right CVA tenderness, left CVA tenderness, guarding or rebound. Negative signs include Palmer's sign, McBurney's sign and psoas sign.   Musculoskeletal:      Right lower leg: No edema.      Left lower leg: No edema.   Lymphadenopathy:      Cervical: No cervical adenopathy.   Skin:     General: Skin is warm and dry.      Capillary Refill: Capillary refill takes less than 2 seconds.   Neurological:      General: No focal deficit present.      Mental Status: She is alert and oriented to person, place, and time. Mental status is at  baseline.      Sensory: Sensation is intact.      Motor: Motor function is intact.      Coordination: Coordination is intact.      Gait: Gait is intact.   Psychiatric:         Attention and Perception: Attention and perception normal.         Mood and Affect: Mood and affect normal.         Speech: Speech normal.         Behavior: Behavior normal. Behavior is cooperative.         Thought Content: Thought content normal.         Cognition and Memory: Cognition and memory normal.         Judgment: Judgment normal.          Results for orders placed or performed in visit on 12/11/23   POC Urinalysis Dipstick, Automated    Specimen: Urine   Result Value Ref Range    Color Yellow Yellow, Straw, Dark Yellow, Suzette    Clarity, UA Hazy (A) Clear    Specific Gravity  1.015 1.005 - 1.030    pH, Urine 6.0 5.0 - 8.0    Leukocytes Large (3+) (A) Negative    Nitrite, UA Negative Negative    Protein, POC Negative Negative mg/dL    Glucose, UA Negative Negative mg/dL    Ketones, UA Negative Negative    Urobilinogen, UA Normal Normal, 0.2 E.U./dL    Bilirubin Negative Negative    Blood, UA Negative Negative    Lot Number [id number]     Expiration Date 01/14/2025    POC Pregnancy, Urine    Specimen: Urine   Result Value Ref Range    HCG, Urine, QL Negative Negative    Lot Number [id number]     Internal Positive Control Passed Positive, Passed    Internal Negative Control Negative Negative, Passed    Expiration Date 01/03/2025    POCT rapid strep A    Specimen: Swab   Result Value Ref Range    Rapid Strep A Screen Negative Negative, VALID, INVALID, Not Performed    Internal Control Passed Passed    Lot Number [id number]     Expiration Date 02/27/2025    POCT VERITOR SARS-CoV-2 Antigen    Specimen: Nasopharynx; Swab   Result Value Ref Range    SARS Antigen Not Detected Not Detected, Presumptive Negative    Internal Control Passed Passed    Lot Number [id number]     Expiration Date 04/16/2024    POCT Influenza A/B    Specimen: Swab    Result Value Ref Range    Rapid Influenza A Ag Negative Negative    Rapid Influenza B Ag Negative Negative    Internal Control Passed Passed    Lot Number [id number]     Expiration Date 12/01/2025               Assessment and Plan     Diagnoses and all orders for this visit:    1. Exposure to STD (Primary)  -     POC Urinalysis Dipstick, Automated  -     Chlamydia trachomatis, Neisseria gonorrhoeae, Trichomonas vaginalis, PCR - Urine, Urine, Clean Catch; Future  -     NuSwab BV & Candida - Swab, Vagina; Future  -     RPR; Future  -     doxycycline (VIBRAMYCIN) 100 MG capsule; Take 1 capsule by mouth 2 (Two) Times a Day.  Dispense: 14 capsule; Refill: 0  -     cefTRIAXone (ROCEPHIN) injection 1 g  -     Chlamydia trachomatis, Neisseria gonorrhoeae, Trichomonas vaginalis, PCR - Urine, Urine, Clean Catch  -     NuSwab BV & Candida - Swab, Vagina    2. Lower abdominal pain  -     POC Pregnancy, Urine  -     Urine Culture - Urine, Urine, Clean Catch; Future  -     Urine Culture - Urine, Urine, Clean Catch    3. Sore throat  -     Cancel: POCT SARS-CoV-2 Antigen LUKAS + Flu  -     POCT rapid strep A  -     POCT VERITOR SARS-CoV-2 Antigen  -     POCT Influenza A/B    4. Loss of taste  -     Cancel: POCT SARS-CoV-2 Antigen LUKAS + Flu  -     POCT rapid strep A  -     POCT VERITOR SARS-CoV-2 Antigen  -     POCT Influenza A/B    5. Urgency of urination  -     Urine Culture - Urine, Urine, Clean Catch; Future  -     Urine Culture - Urine, Urine, Clean Catch    -POC flu/covid/strep negative  -UA positive for large leuks. Will send urine for C&S  -urine sent for STI testing. Will cover with doxy and rocephin    If labs or images are ordered we will contact you with the results within the next week.  If you have not heard from us after a week please call our office to inquire about the results.    Follow Up  Return if symptoms worsen or fail to improve.    PHYLICIA García  Answers submitted by the patient for this  visit:  Primary Reason for Visit (Submitted on 12/11/2023)  What is the primary reason for your visit?: Vaginal Discharge/Irritation  Female Genital Questionnaire (Submitted on 12/11/2023)  Chief Complaint: Female genitourinary complaint  genital itching: No  genital lesions: No  genital odor: Yes  genital rash: No  missed menses: No  vaginal bleeding: No  Chronicity: new  Onset: 1 to 4 weeks ago  Frequency: constantly  Progression since onset: gradually worsening  Severity of pain: moderate  Affected side: both  Pregnant now?: No  anorexia: Yes  discolored urine: Yes  joint pain: No  joint swelling: No  painful intercourse: No  Aggravated by: bowel movements, heavy lifting, intercourse, urinating  Sexual activity: multiple partners  Partner with STD symptoms: unknown  Birth control: tubal ligation  Menstrual history: regular  Discharge characteristics: green, mucoid, thick  Passing clots?: Yes  Passing tissue?: No

## 2023-12-12 LAB
BACTERIA SPEC AEROBE CULT: NORMAL
RPR SER QL: NORMAL

## 2023-12-13 LAB
A VAGINAE DNA VAG QL NAA+PROBE: NORMAL SCORE
BVAB2 DNA VAG QL NAA+PROBE: NORMAL SCORE
C ALBICANS DNA VAG QL NAA+PROBE: NEGATIVE
C GLABRATA DNA VAG QL NAA+PROBE: NEGATIVE
C TRACH RRNA SPEC QL NAA+PROBE: POSITIVE
MEGA1 DNA VAG QL NAA+PROBE: NORMAL SCORE
N GONORRHOEA RRNA SPEC QL NAA+PROBE: NEGATIVE
T VAGINALIS RRNA SPEC QL NAA+PROBE: NEGATIVE

## 2023-12-18 ENCOUNTER — OFFICE VISIT (OUTPATIENT)
Dept: BEHAVIORAL HEALTH | Facility: CLINIC | Age: 33
End: 2023-12-18
Payer: COMMERCIAL

## 2023-12-18 VITALS
HEIGHT: 64 IN | SYSTOLIC BLOOD PRESSURE: 128 MMHG | BODY MASS INDEX: 35.34 KG/M2 | OXYGEN SATURATION: 100 % | WEIGHT: 207 LBS | DIASTOLIC BLOOD PRESSURE: 76 MMHG | HEART RATE: 78 BPM

## 2023-12-18 DIAGNOSIS — R45.851 SUICIDAL IDEATION: ICD-10-CM

## 2023-12-18 DIAGNOSIS — F31.70 BIPOLAR DISORDER IN PARTIAL REMISSION, MOST RECENT EPISODE UNSPECIFIED TYPE: Primary | ICD-10-CM

## 2023-12-18 DIAGNOSIS — F41.9 ANXIETY: ICD-10-CM

## 2023-12-18 RX ORDER — ARIPIPRAZOLE 5 MG/1
5 TABLET ORAL DAILY
Qty: 30 TABLET | Refills: 0 | Status: SHIPPED | OUTPATIENT
Start: 2023-12-18

## 2023-12-18 RX ORDER — LAMOTRIGINE 25 MG/1
TABLET ORAL
Qty: 56 TABLET | Refills: 0 | Status: SHIPPED | OUTPATIENT
Start: 2023-12-18

## 2023-12-18 NOTE — PROGRESS NOTES
"     Follow Up Office Visit      Patient Name: Theresa Maya  : 1990   MRN: [id number]     Referring Provider: Gina Green APRN    Chief Complaint:      ICD-10-CM ICD-9-CM   1. Bipolar disorder in partial remission, most recent episode unspecified type  F31.70 296.80   2. Anxiety  F41.9 300.00        History of Present Illness:   Theresa Maya is a 33 y.o. female who is here today for follow up and medication management    Subjective      Patient Reports: that she recently lost her job. She reports that HR told her she could quit or they would let her go. She reports that the past 2 months she feels like she is being punished. She reports her family is living in a camper, which has heat and water, but is to small for 5 people. She reports that she is out of medication, but felt like the lamotrigine was working. She reports that she has been out of lamotrigine for 5 days, and out of abilify for 3 weeks. She reports that last week she couldn't get out of bed on Thursday, and had suicidal thoughts and thought of ways that she could hurt herself. She reports that their truck got reposessed, her  is working 3 jobs, and her 20 year old upped her hours at [employer] [employer]. Patient reports that she has a hernia and a form of endometriosis and needs a hysterectomy. She reports that she feels like a failure when she looks at her kids, she can't get them to listen to her or behave. She reports that she  has woken up every day for two weeks feeling like everyone would be better off without her. She reports that she has discussed this with her . She reporst that she felt like she has been feeling a little paranoid. She states that she will not hurt herself, because that would be irrational but has had that voice saying \"if you die you die\". She reports that she has been working with a  from  to get housing, but can't take her dog with them unless she has an emotional support letter. " Denies HI/AVH.     Review of Systems:   Review of Systems   Constitutional:  Negative for appetite change and unexpected weight change.   Eyes:  Negative for visual disturbance.   Respiratory:  Negative for chest tightness and shortness of breath.    Cardiovascular:  Negative for chest pain.   Gastrointestinal:  Positive for nausea.   Musculoskeletal:  Negative for gait problem.   Skin:  Negative for rash and wound.   Neurological:  Positive for headaches. Negative for dizziness, tremors, seizures, weakness and light-headedness.   Psychiatric/Behavioral:  Positive for dysphoric mood, sleep disturbance and suicidal ideas. Negative for agitation, behavioral problems, confusion, decreased concentration, hallucinations and self-injury. The patient is not nervous/anxious and is not hyperactive.      Sleep pattern: non existent, constant running in head  Appetite: vomitting, days when she is not hungry     Screening Scores:   PHQ-9 : 24  ALICIA-7 : 20    RISK ASSESSMENT:  Patient denies any thoughts or intent of suicide today. Patient denies any impulsive behavior today.     Medications:     Current Outpatient Medications:     cyclobenzaprine (FLEXERIL) 5 MG tablet, Take 1-2 tablets by mouth 2 (Two) Times a Day As Needed for Muscle Spasms., Disp: 30 tablet, Rfl: 1    doxycycline (VIBRAMYCIN) 100 MG capsule, Take 1 capsule by mouth 2 (Two) Times a Day., Disp: 14 capsule, Rfl: 0    Elidel 1 % cream, , Disp: , Rfl:     esomeprazole (nexIUM) 20 MG capsule, Take 2 capsules by mouth 2 (Two) Times a Day., Disp: , Rfl:     ferrous sulfate 325 (65 FE) MG tablet, Take 1 tablet by mouth 2 (Two) Times a Day., Disp: , Rfl:     montelukast (Singulair) 10 MG tablet, Take 1 tablet by mouth Every Night., Disp: 30 tablet, Rfl: 3    multivitamin with minerals tablet tablet, Take 1 tablet by mouth Daily., Disp: , Rfl:     ondansetron ODT (ZOFRAN-ODT) 4 MG disintegrating tablet, Place 1 tablet on the tongue Every 8 (Eight) Hours As Needed for  "Nausea or Vomiting., Disp: 30 tablet, Rfl: 0    triamcinolone (KENALOG) 0.1 % ointment, , Disp: , Rfl:     vitamin B-12 (CYANOCOBALAMIN) 500 MCG tablet, Take 1 tablet by mouth Daily., Disp: , Rfl:     vitamin D3 125 MCG (5000 UT) capsule capsule, Take 1 capsule by mouth Daily., Disp: , Rfl:     lamoTRIgine (LaMICtal) 25 MG tablet, Take 2 tablets by mouth Daily. (Patient not taking: Reported on 12/18/2023), Disp: 60 tablet, Rfl: 0    Medication Considerations:  ISAMAR reviewed and appropriate.      Allergies:   Allergies   Allergen Reactions    Amoxicillin Diarrhea and GI Intolerance    Latex Hives and Itching    Penicillins GI Intolerance     Says Keflex OK as long as she takes w/ food.        Objective     Physical Exam:  Vital Signs:   Vitals:    12/18/23 1027 12/18/23 1028 12/18/23 1032   BP:   128/76   Pulse:   78   SpO2:   100%   Weight:  93.9 kg (207 lb)    Height: 161.3 cm (63.5\")       Body mass index is 36.09 kg/m².     Mental Status Exam:   Hygiene:   good  Cooperation:  Cooperative  Eye Contact:  Good  Psychomotor Behavior:  Restless  Affect:   tearful  Mood: sad and depressed  Speech:  Normal  Thought Process:  Goal directed and Linear  Thought Content:  Normal  Suicidal:  Suicidal Ideation  Homicidal:  None  Hallucinations:  None  Delusion:  None  Memory:  Intact  Orientation:  Person, Place, Time, and Situation  Reliability:  good  Insight:  Fair  Judgement:  Fair  Impulse Control:  Fair  Physical/Medical Issues:   see problem list      Assessment / Plan      Visit Diagnosis/Orders Placed This Visit:  Diagnoses and all orders for this visit:    1. Bipolar disorder in partial remission, most recent episode unspecified type (Primary)    2. Anxiety         Functional Status: Moderate impairment     Prognosis: Guarded with Ongoing Treatment    Impression/Formulation:  Patient appeared alert and oriented.  Patient is voluntarily requesting to continue outpatient psychiatric treatment at [facility].  Patient is receptive to assistance with maintaining a stable lifestyle.  Patient presents with history of     ICD-10-CM ICD-9-CM   1. Bipolar disorder in partial remission, most recent episode unspecified type  F31.70 296.80   2. Anxiety  F41.9 300.00     Reviewed patient's previous provider notes. Reviewed most recent labs. Patient meets DSM V diagnostic criteria for diagnoses. Diagnoses may be updated as more information becomes available.       Treatment Plan:   Patient agrees to go to the ER for evaluation and reports that she can drive her self safely and call her  to let him know that she is going and will call this provider back when she gets to the ER  Will resume meds  Resume abilify 5 mg daily  Resume lamotrigine 25 mg for 2 weeks and then 50 mg daily  Follow up after hospital stay in 2 weeks  Patient will continue supportive psychotherapy efforts and medications as indicated. Clinic will obtain release of information for current treatment team for continuity of care as needed. Patient will contact this office, call 911 or present to the nearest emergency room should suicidal or homicidal ideations occur.  Discussed medication options and treatment plan of prescribed medication(s) as well as the risks, benefits, and potential side effects. Patient ackowledged and verbally consented to continue with current treatment plan and was educated on the importance of compliance with treatment and follow-up appointments.     Patient instructions:  Instructed will take 4-6 weeks for full therapeutic effect. Medication risks and side effects discussed with patient including risk for worsening mood, changes in behavior, thoughts of suicide or homicide, induction of kati, serotonin syndrome.   If any thoughts of SI or HI, worsening mood or changes in behavior, call 911 or crisis line [phone], or go to nearest ER at once. Patient agrees to notify close family/friend of new trial of  antidepressants/info above. Pt.verbalizes understanding and consents to treatment with this medication.   All risks/benefits and side effects discussed with patient, including risk for weight gain, increased lipids, hyperprolactemia, EPS symptoms, including restlessness, muscle  stiffness or contraction of head, face, neck, trunk and limbs, and TD (which can be irreversible). Pt verbalizes understanding and consents to treatment with these medications.     Follow Up:   Return in about 3 months (around 3/18/2024) for Med Check.        PHYLICIA Greer, PMHNP-BC [facility]

## 2023-12-20 ENCOUNTER — TELEPHONE (OUTPATIENT)
Dept: INTERNAL MEDICINE | Facility: CLINIC | Age: 33
End: 2023-12-20
Payer: COMMERCIAL

## 2023-12-20 NOTE — TELEPHONE ENCOUNTER
HELIO @ [facility] CALLED AND STATED THAT TONI WOULD LIKE TO SEE EITHER BEVERLY OR KAITLYN FOR COUNSELING. PLEASE REACH OUT, TONI'S NUMBER [phone].  HELIO CALLED FROM [phone]

## 2023-12-21 ENCOUNTER — READMISSION MANAGEMENT (OUTPATIENT)
Dept: CALL CENTER | Facility: HOSPITAL | Age: 33
End: 2023-12-21
Payer: COMMERCIAL

## 2023-12-21 ENCOUNTER — TELEPHONE (OUTPATIENT)
Dept: BARIATRICS/WEIGHT MGMT | Facility: CLINIC | Age: 33
End: 2023-12-21
Payer: COMMERCIAL

## 2023-12-21 NOTE — TELEPHONE ENCOUNTER
----- Message from MARKUS Morgan sent at 12/21/2023 10:54 AM EST -----  Regarding: Possible stop process.    This patient left me a vm yesterday about having to possibly have a hysterectomy. When I was looking at her chart I noticed that on 12/18/23 she was admitted into  for suicidal ideation.     Wanting to know how we should proceed. I did get her approved for her general surgery but based on this new information not sure if that should be put off for now.       Thank you,    Joy

## 2023-12-21 NOTE — OUTREACH NOTE
Prep Survey      Flowsheet Row Responses   [facility] facility patient discharged from? Non-BH   Is LACE score < 7 ? Non-BH Discharge   Eligibility [facility]   Date of Admission 12/18/23   Date of Discharge 12/21/23   Discharge Disposition Home or Self Care   Discharge diagnosis Mood disorder, SI   Does the patient have one of the following disease processes/diagnoses(primary or secondary)? Other   Prep survey completed? Yes            Marilynn JERRY - Registered Nurse

## 2023-12-22 ENCOUNTER — TRANSITIONAL CARE MANAGEMENT TELEPHONE ENCOUNTER (OUTPATIENT)
Dept: CALL CENTER | Facility: HOSPITAL | Age: 33
End: 2023-12-22
Payer: COMMERCIAL

## 2023-12-22 ENCOUNTER — TELEPHONE (OUTPATIENT)
Dept: INTERNAL MEDICINE | Facility: CLINIC | Age: 33
End: 2023-12-22
Payer: COMMERCIAL

## 2023-12-22 NOTE — OUTREACH NOTE
Call Center TCM Note      Flowsheet Row Responses   [facility] patient discharged from? Non-  [[facility]]   Does the patient have one of the following disease processes/diagnoses(primary or secondary)? Other   TCM attempt successful? Yes  [No current VR]   Call start time 0951   Call end time 0953   Discharge diagnosis Mood disorder, SI   Meds reviewed with patient/caregiver? Yes   Is the patient having any side effects they believe may be caused by any medication additions or changes? No   Does the patient have all medications ordered at discharge? Yes   Is the patient taking all medications as directed (includes completed medication regime)? Yes   Comments HOSP DC FU appt with JONEL Schulte 12/26/23 11 am   Does the patient have an appointment with their PCP within 7-14 days of discharge? Yes   Has home health visited the patient within 72 hours of discharge? N/A   Psychosocial issues? No   Did the patient receive a copy of their discharge instructions? Yes   Nursing interventions Reviewed instructions with patient   What is the patient's perception of their health status since discharge? Improving   Is the patient/caregiver able to teach back signs and symptoms related to disease process for when to call PCP? Yes   Is the patient/caregiver able to teach back signs and symptoms related to disease process for when to call 911? Yes   Is the patient/caregiver able to teach back the hierarchy of who to call/visit for symptoms/problems? PCP, Specialist, Home health nurse, Urgent Care, ED, 911 Yes   TCM call completed? Yes   Wrap up additional comments Pt reports she is feeling better. No needs.   Call end time 0953            Poppy Richmond RN    12/22/2023, 09:53 EST

## 2023-12-22 NOTE — TELEPHONE ENCOUNTER
SUPPOSED TO HAVE A HERNIA REPAIR VIA GASTRIC BYPASS, NEEDS A PSYCH CLEARANCE LETTER. PLEASE REFER TO PATIENT Response [employer] MESSAGE FROM 12-22-23. CALL TONI IF THERE ARE ANY QUESTIONS.  INSURANCE HAS APPROVED THE PROCEDURE, JUST A CLEARANCE LETTER IS NEEDED. [phone].

## 2023-12-26 ENCOUNTER — OFFICE VISIT (OUTPATIENT)
Dept: INTERNAL MEDICINE | Facility: CLINIC | Age: 33
End: 2023-12-26
Payer: COMMERCIAL

## 2023-12-26 VITALS
HEIGHT: 64 IN | RESPIRATION RATE: 20 BRPM | SYSTOLIC BLOOD PRESSURE: 126 MMHG | HEART RATE: 76 BPM | TEMPERATURE: 98.2 F | WEIGHT: 202 LBS | BODY MASS INDEX: 34.49 KG/M2 | DIASTOLIC BLOOD PRESSURE: 74 MMHG | OXYGEN SATURATION: 97 %

## 2023-12-26 DIAGNOSIS — F31.70 BIPOLAR DISORDER IN PARTIAL REMISSION, MOST RECENT EPISODE UNSPECIFIED TYPE: Primary | ICD-10-CM

## 2023-12-26 DIAGNOSIS — Z86.59 HISTORY OF SUICIDAL IDEATION: ICD-10-CM

## 2023-12-26 DIAGNOSIS — R10.13 DYSPEPSIA: ICD-10-CM

## 2023-12-26 DIAGNOSIS — K59.00 CONSTIPATION, UNSPECIFIED CONSTIPATION TYPE: ICD-10-CM

## 2023-12-26 RX ORDER — POLYETHYLENE GLYCOL 3350 17 G/17G
17 POWDER, FOR SOLUTION ORAL DAILY
Qty: 255 G | Refills: 1 | Status: SHIPPED | OUTPATIENT
Start: 2023-12-26

## 2023-12-26 RX ORDER — AMOXICILLIN 250 MG
1-2 CAPSULE ORAL DAILY
Qty: 60 TABLET | Refills: 1 | Status: SHIPPED | OUTPATIENT
Start: 2023-12-26

## 2023-12-26 RX ORDER — ESOMEPRAZOLE MAGNESIUM 40 MG/1
40 CAPSULE, DELAYED RELEASE ORAL
Qty: 90 CAPSULE | Refills: 1 | Status: SHIPPED | OUTPATIENT
Start: 2023-12-26

## 2023-12-26 RX ORDER — PROMETHAZINE HYDROCHLORIDE 12.5 MG/1
12.5 TABLET ORAL EVERY 8 HOURS PRN
Qty: 20 TABLET | Refills: 0 | Status: SHIPPED | OUTPATIENT
Start: 2023-12-26

## 2023-12-26 RX ORDER — ESOMEPRAZOLE MAGNESIUM 40 MG/1
40 CAPSULE, DELAYED RELEASE ORAL
COMMUNITY
End: 2023-12-26 | Stop reason: SDUPTHER

## 2023-12-26 RX ORDER — ONDANSETRON 4 MG/1
4 TABLET, ORALLY DISINTEGRATING ORAL EVERY 8 HOURS PRN
Qty: 20 TABLET | Refills: 0 | Status: SHIPPED | OUTPATIENT
Start: 2023-12-26

## 2023-12-26 RX ORDER — SULFAMETHOXAZOLE AND TRIMETHOPRIM 800; 160 MG/1; MG/1
TABLET ORAL
COMMUNITY
Start: 2023-12-23

## 2023-12-26 NOTE — PROGRESS NOTES
Transitional Care Follow Up Visit  Subjective     Theresagentry Maya is a 33 y.o. female who presents for a transitional care management visit.    Within 48 business hours after discharge our office contacted her via telephone to coordinate her care and needs.      I reviewed and discussed the details of that call along with the discharge summary, hospital problems, inpatient lab results, inpatient diagnostic studies, and consultation reports with Theresa.     Current outpatient and discharge medications have been reconciled for the patient.  Reviewed by: Franca Palmer PA-C          12/21/2023     1:14 PM   Date of TCM Phone Call   [facility]   Date of Admission 12/18/2023   Date of Discharge 12/21/2023   Discharge Disposition Home or Self Care     Risk for Readmission (LACE) No data recorded    History of Present Illness  Constipation:  Has had since restarting lamictal and abilify, req med to help with this.     Hospital course:  She saw her psychiatrist and at that visit discussed her mood and was sent to [facility] ER after that for suicidal thoughts. Was hospitalized for a week.  Hx of suicidal behavior 2015 and was in hosp at that time also.  Had been well controlled on Abilify and lamictal for the last 6 months but had ran out of those and was out for 2 weeks. She had been on Wellbutrin for a few years prior to cover for possible ADHD diagnosis. She had just been dx with Bipolar earlier this year. Feels fine today, no HI/SI. She has not had any significant side effects from restarting the meds.     GERD:  Doing well on Nexium, req refill.        The following portions of the patient's history were reviewed and updated as appropriate: allergies, current medications, past family history, past medical history, past social history, past surgical history, and problem list.    Allergies   Allergen Reactions    Amoxicillin Diarrhea and GI Intolerance    Doxycycline Other (See Comments)     Vaginal swelling      Latex Hives and Itching    Penicillins GI Intolerance     Says Keflex OK as long as she takes w/ food.        Current Outpatient Medications:     ARIPiprazole (ABILIFY) 5 MG tablet, Take 1 tablet by mouth Daily., Disp: 30 tablet, Rfl: 0    esomeprazole (nexIUM) 40 MG capsule, Take 1 capsule by mouth Every Morning Before Breakfast., Disp: 90 capsule, Rfl: 1    ferrous sulfate 325 (65 FE) MG tablet, Take 1 tablet by mouth 2 (Two) Times a Day., Disp: , Rfl:     lamoTRIgine (LaMICtal) 25 MG tablet, Take 1-25 mg tablet daily for 2 weeks and then take 2-25 mg tablets daily for 3 weeks, Disp: 56 tablet, Rfl: 0    montelukast (Singulair) 10 MG tablet, Take 1 tablet by mouth Every Night., Disp: 30 tablet, Rfl: 3    multivitamin with minerals tablet tablet, Take 1 tablet by mouth Daily., Disp: , Rfl:     sulfamethoxazole-trimethoprim (BACTRIM DS,SEPTRA DS) 800-160 MG per tablet, , Disp: , Rfl:     triamcinolone (KENALOG) 0.1 % ointment, , Disp: , Rfl:     vitamin B-12 (CYANOCOBALAMIN) 500 MCG tablet, Take 1 tablet by mouth Daily., Disp: , Rfl:     vitamin D3 125 MCG (5000 UT) capsule capsule, Take 1 capsule by mouth Daily., Disp: , Rfl:     Elidel 1 % cream, , Disp: , Rfl:     ondansetron ODT (ZOFRAN-ODT) 4 MG disintegrating tablet, Place 1 tablet on the tongue Every 8 (Eight) Hours As Needed for Nausea or Vomiting., Disp: 20 tablet, Rfl: 0    polyethylene glycol (MiraLax) 17 GM/SCOOP powder, Take 17 g by mouth Daily., Disp: 255 g, Rfl: 1    promethazine (PHENERGAN) 12.5 MG tablet, Take 1 tablet by mouth Every 8 (Eight) Hours As Needed for Nausea or Vomiting., Disp: 20 tablet, Rfl: 0    Saw Palmetto 80 MG capsule, Take 1 capsule by mouth Daily., Disp: , Rfl:     sennosides-docusate (senna-docusate sodium) 8.6-50 MG per tablet, Take 1-2 tablets by mouth Daily., Disp: 60 tablet, Rfl: 1  Current outpatient and discharge medications have been reconciled for the patient.  Reviewed by: Franca Palmer PA-C    New Medications  Ordered This Visit   Medications    sennosides-docusate (senna-docusate sodium) 8.6-50 MG per tablet     Sig: Take 1-2 tablets by mouth Daily.     Dispense:  60 tablet     Refill:  1    polyethylene glycol (MiraLax) 17 GM/SCOOP powder     Sig: Take 17 g by mouth Daily.     Dispense:  255 g     Refill:  1    esomeprazole (nexIUM) 40 MG capsule     Sig: Take 1 capsule by mouth Every Morning Before Breakfast.     Dispense:  90 capsule     Refill:  1    promethazine (PHENERGAN) 12.5 MG tablet     Sig: Take 1 tablet by mouth Every 8 (Eight) Hours As Needed for Nausea or Vomiting.     Dispense:  20 tablet     Refill:  0    ondansetron ODT (ZOFRAN-ODT) 4 MG disintegrating tablet     Sig: Place 1 tablet on the tongue Every 8 (Eight) Hours As Needed for Nausea or Vomiting.     Dispense:  20 tablet     Refill:  0       Social History     Tobacco Use   Smoking Status Never   Smokeless Tobacco Never   Tobacco Comments    disgust me never touched       Objective   Vitals:    12/26/23 1120   BP: 126/74   Pulse: 76   Resp: 20   Temp: 98.2 °F (36.8 °C)   SpO2: 97%     Physical Exam  Vitals reviewed.   Constitutional:       General: She is not in acute distress.     Appearance: Normal appearance.   HENT:      Head: Normocephalic and atraumatic.   Eyes:      General: No scleral icterus.     Extraocular Movements: Extraocular movements intact.      Conjunctiva/sclera: Conjunctivae normal.   Cardiovascular:      Rate and Rhythm: Normal rate and regular rhythm.      Heart sounds: Normal heart sounds. No murmur heard.  Pulmonary:      Effort: Pulmonary effort is normal. No respiratory distress.      Breath sounds: Normal breath sounds. No stridor. No wheezing or rhonchi.   Musculoskeletal:      Cervical back: Normal range of motion and neck supple.   Skin:     General: Skin is warm and dry.      Coloration: Skin is not jaundiced.   Neurological:      General: No focal deficit present.      Mental Status: She is alert and oriented to  person, place, and time.      Gait: Gait normal.   Psychiatric:         Mood and Affect: Mood normal.         Behavior: Behavior normal.         Assessment & Plan   Diagnoses and all orders for this visit:    1. Bipolar disorder in partial remission, most recent episode unspecified type (Primary)  Assessment & Plan:  Chronic, stable/improving, cont abilifty and lamictal, f/u closely with psych.      2. History of suicidal ideation    3. Dyspepsia  Assessment & Plan:  Chronic, stable, cont on nexium    Orders:  -     esomeprazole (nexIUM) 40 MG capsule; Take 1 capsule by mouth Every Morning Before Breakfast.  Dispense: 90 capsule; Refill: 1  -     promethazine (PHENERGAN) 12.5 MG tablet; Take 1 tablet by mouth Every 8 (Eight) Hours As Needed for Nausea or Vomiting.  Dispense: 20 tablet; Refill: 0  -     ondansetron ODT (ZOFRAN-ODT) 4 MG disintegrating tablet; Place 1 tablet on the tongue Every 8 (Eight) Hours As Needed for Nausea or Vomiting.  Dispense: 20 tablet; Refill: 0    4. Constipation, unspecified constipation type  Assessment & Plan:  Miralax and colace, f/u if sx worsen or persist.     Orders:  -     sennosides-docusate (senna-docusate sodium) 8.6-50 MG per tablet; Take 1-2 tablets by mouth Daily.  Dispense: 60 tablet; Refill: 1  -     polyethylene glycol (MiraLax) 17 GM/SCOOP powder; Take 17 g by mouth Daily.  Dispense: 255 g; Refill: 1             Follow up if symptoms worsen or persist or has new or concerning symptoms, go to ER for severe symptoms.   Reviewed common medication effects and side effects and advised to report side effects immediately, the patient expressed good understanding.  Encouraged medication compliance and the importance of keeping scheduled follow up appointments with me and any other providers    Return if symptoms worsen or fail to improve, for Next scheduled follow up.    Franca Palmer PA-C    * Please note that portions of this note were completed with a voice recognition  program.

## 2023-12-27 ENCOUNTER — TELEPHONE (OUTPATIENT)
Dept: INTERNAL MEDICINE | Facility: CLINIC | Age: 33
End: 2023-12-27
Payer: COMMERCIAL

## 2023-12-27 NOTE — TELEPHONE ENCOUNTER
Dr. Sanderson and Sallie state patient needs a psych clearance before upcoming procedure, I gave her out office fax and phone number to follow up on this.

## 2023-12-27 NOTE — TELEPHONE ENCOUNTER
PT CALLED AND REQUESTING THE STATUS OF HER THERAPEUTIC DOG LETTER PATIENT STATES SHE WOULD NEED THE LETTER BY 12/28/2023 IN ORDER TO GET THE KEYS TO HER NEW APARTMENT. PT CALL BACK NUMBER [phone]

## 2024-01-03 PROBLEM — K59.00 CONSTIPATION: Status: ACTIVE | Noted: 2024-01-03

## 2024-01-04 ENCOUNTER — OFFICE VISIT (OUTPATIENT)
Dept: BEHAVIORAL HEALTH | Facility: CLINIC | Age: 34
End: 2024-01-04
Payer: COMMERCIAL

## 2024-01-04 VITALS
HEART RATE: 90 BPM | DIASTOLIC BLOOD PRESSURE: 74 MMHG | SYSTOLIC BLOOD PRESSURE: 118 MMHG | OXYGEN SATURATION: 99 % | BODY MASS INDEX: 35.17 KG/M2 | HEIGHT: 64 IN | WEIGHT: 206 LBS

## 2024-01-04 DIAGNOSIS — F31.70 BIPOLAR DISORDER IN PARTIAL REMISSION, MOST RECENT EPISODE UNSPECIFIED TYPE: ICD-10-CM

## 2024-01-04 RX ORDER — LAMOTRIGINE 25 MG/1
50 TABLET ORAL DAILY
Qty: 60 TABLET | Refills: 0 | Status: SHIPPED | OUTPATIENT
Start: 2024-01-04

## 2024-01-04 RX ORDER — ARIPIPRAZOLE 5 MG/1
5 TABLET ORAL DAILY
Qty: 30 TABLET | Refills: 0 | Status: SHIPPED | OUTPATIENT
Start: 2024-01-04

## 2024-01-04 NOTE — PROGRESS NOTES
Follow Up Office Visit      Patient Name: Theresa Maya  : 1990   MRN: [id number]     Referring Provider: Gina Green APRN    Chief Complaint:      ICD-10-CM ICD-9-CM   1. Bipolar disorder in partial remission, most recent episode unspecified type  F31.70 296.80        History of Present Illness:   Theresa Maya is a 33 y.o. female who is here today for follow up and medication management    Subjective      Patient Reports: that her time at [facility] was not bad. She reports that she slept for the first 20 hours that she was there. She reports that she doesn't have a lot of energy, but has enough energy to be peppy. She reports that her  was very supportive while she was there, but they didn't tell the kids why she was there. She reports that they are filing Chapter 13 bankruptcy, and she feels better about that. She reports that she is not as high strung. She reports that she has had 2 episodes of what she would consider panic attacks when she was around a lot of people, but her  was able to talk her through it. She reports that she has her first therapy appointment set up. She reports that the apartment is accepting the letter for her pet. She reports that  advocacy is helping her get an apartment. She reports that she is taking her abilify 5 mg and lamotrigine 25 mg between 530 and 6 pm and can fall asleep and stay asleep, but can't if she takes it too late. She reports that she can tolerate the children better and it is easier to get up and walk away and not fight with them. She reports that she is still having vomitting, since her hernia has gotten worse. She reports that she needs psych clearance, to get gastric bypass to repair her hernia. She reports that she is getting ready to start a new job. Denies SI/HI/AVH    Review of Systems:   Review of Systems   Constitutional:  Negative for appetite change and unexpected weight change.   Eyes:  Negative for  visual disturbance.   Respiratory:  Negative for chest tightness and shortness of breath.    Cardiovascular:  Negative for chest pain.   Gastrointestinal:  Positive for abdominal pain and nausea.   Musculoskeletal:  Negative for gait problem.   Skin:  Negative for rash and wound.   Neurological:  Positive for headaches. Negative for dizziness, tremors, seizures, weakness and light-headedness.   Psychiatric/Behavioral:  Negative for agitation, behavioral problems, confusion, decreased concentration, hallucinations and self-injury. The patient is not nervous/anxious and is not hyperactive.      Sleep pattern: better  Appetite: no changes     Screening Scores:   PHQ-9 : 10  ALICIA-7 : 8    RISK ASSESSMENT:  Patient denies any thoughts or intent of suicide today. Patient denies any impulsive behavior today.     Medications:     Current Outpatient Medications:     ARIPiprazole (ABILIFY) 5 MG tablet, Take 1 tablet by mouth Daily., Disp: 30 tablet, Rfl: 0    Elidel 1 % cream, , Disp: , Rfl:     esomeprazole (nexIUM) 40 MG capsule, Take 1 capsule by mouth Every Morning Before Breakfast. (Patient taking differently: Take 1 capsule by mouth 2 (Two) Times a Day.), Disp: 90 capsule, Rfl: 1    ferrous sulfate 325 (65 FE) MG tablet, Take 1 tablet by mouth 2 (Two) Times a Day., Disp: , Rfl:     montelukast (Singulair) 10 MG tablet, Take 1 tablet by mouth Every Night., Disp: 30 tablet, Rfl: 3    multivitamin with minerals tablet tablet, Take 1 tablet by mouth Daily., Disp: , Rfl:     ondansetron ODT (ZOFRAN-ODT) 4 MG disintegrating tablet, Place 1 tablet on the tongue Every 8 (Eight) Hours As Needed for Nausea or Vomiting., Disp: 20 tablet, Rfl: 0    polyethylene glycol (MiraLax) 17 GM/SCOOP powder, Take 17 g by mouth Daily., Disp: 255 g, Rfl: 1    promethazine (PHENERGAN) 12.5 MG tablet, Take 1 tablet by mouth Every 8 (Eight) Hours As Needed for Nausea or Vomiting., Disp: 20 tablet, Rfl: 0    Saw Palmetto 80 MG capsule, Take 1  "capsule by mouth Daily., Disp: , Rfl:     sennosides-docusate (senna-docusate sodium) 8.6-50 MG per tablet, Take 1-2 tablets by mouth Daily., Disp: 60 tablet, Rfl: 1    triamcinolone (KENALOG) 0.1 % ointment, , Disp: , Rfl:     vitamin B-12 (CYANOCOBALAMIN) 500 MCG tablet, Take 1 tablet by mouth Daily., Disp: , Rfl:     vitamin D3 125 MCG (5000 UT) capsule capsule, Take 1 capsule by mouth Daily., Disp: , Rfl:     lamoTRIgine (LaMICtal) 25 MG tablet, Take 2 tablets by mouth Daily., Disp: 60 tablet, Rfl: 0    sulfamethoxazole-trimethoprim (BACTRIM DS,SEPTRA DS) 800-160 MG per tablet, , Disp: , Rfl:     Medication Considerations:  ISAMAR reviewed and appropriate.      Allergies:   Allergies   Allergen Reactions    Amoxicillin Diarrhea and GI Intolerance    Doxycycline Other (See Comments)     Vaginal swelling     Latex Hives and Itching    Penicillins GI Intolerance     Says Keflex OK as long as she takes w/ food.        Results Reviewed:   Reviewed hospital report     Objective     Physical Exam:  Vital Signs:   Vitals:    01/04/24 1209   BP: 118/74   Pulse: 90   SpO2: 99%   Weight: 93.4 kg (206 lb)   Height: 161.3 cm (63.5\")     Body mass index is 35.92 kg/m².     Mental Status Exam:   Hygiene:   good  Cooperation:  Cooperative  Eye Contact:  Good  Psychomotor Behavior:  Appropriate  Affect:  Appropriate  Mood: normal  Speech:  Normal  Thought Process:  Goal directed and Linear  Thought Content:  Normal  Suicidal:  None  Homicidal:  None  Hallucinations:  None  Delusion:  None  Memory:  Intact  Orientation:  Person, Place, Time, and Situation  Reliability:  good  Insight:  Fair  Judgement:  Good  Impulse Control:  Good  Physical/Medical Issues:   see problem list      Assessment / Plan      Visit Diagnosis/Orders Placed This Visit:  Diagnoses and all orders for this visit:    1. Bipolar disorder in partial remission, most recent episode unspecified type  -     ARIPiprazole (ABILIFY) 5 MG tablet; Take 1 tablet by " mouth Daily.  Dispense: 30 tablet; Refill: 0    Other orders  -     lamoTRIgine (LaMICtal) 25 MG tablet; Take 2 tablets by mouth Daily.  Dispense: 60 tablet; Refill: 0         Functional Status: Mild impairment     Prognosis: Good with Ongoing Treatment     Impression/Formulation:  Patient appeared alert and oriented.  Patient is voluntarily requesting to continue outpatient psychiatric treatment at [facility].  Patient is receptive to assistance with maintaining a stable lifestyle.  Patient presents with history of     ICD-10-CM ICD-9-CM   1. Bipolar disorder in partial remission, most recent episode unspecified type  F31.70 296.80     Reviewed patient's previous provider notes. Reviewed most recent labs. Patient meets DSM V diagnostic criteria for diagnoses. Diagnoses may be updated as more information becomes available.       Treatment Plan:   Continue abilify 5 mg daily  Increase lamotrigine to 50 mg daily  Begin individual therapy  Patient will be cleared for surgery if tolerates increase in lamotrigine with no side effects and does not have any suicidal thoughts within the next 4-6 weeks  Follow up in 1 month or sooner   Patient will continue supportive psychotherapy efforts and medications as indicated. Clinic will obtain release of information for current treatment team for continuity of care as needed. Patient will contact this office, call 911 or present to the nearest emergency room should suicidal or homicidal ideations occur.  Discussed medication options and treatment plan of prescribed medication(s) as well as the risks, benefits, and potential side effects. Patient ackowledged and verbally consented to continue with current treatment plan and was educated on the importance of compliance with treatment and follow-up appointments.     Patient instructions:  Reviewed black box warning for Lamictal causing cases of life-threatening serious rashes, including Hernandez-Cristino  syndrome, toxic epidermal necrolysis, and/or rash-related death.   All risks/benefits and side effects discussed with patient, including risk for weight gain, increased lipids, hyperprolactemia, EPS symptoms, including restlessness, muscle  stiffness or contraction of head, face, neck, trunk and limbs, and TD (which can be irreversible). Pt verbalizes understanding and consents to treatment with these medications.   Instructed will take 4-6 weeks for full therapeutic effect. Medication risks and side effects discussed with patient including risk for worsening mood, changes in behavior, thoughts of suicide or homicide, induction of kati, serotonin syndrome.   If any thoughts of SI or HI, worsening mood or changes in behavior, call 911 or crisis line [phone], or go to nearest ER at once. Pt.verbalizes understanding and consents to treatment with this medication.     Follow Up:   Return in about 1 month (around 2/4/2024) for Med Check.        PHYLICIA Greer, PMHNP-BC [facility]

## 2024-01-25 ENCOUNTER — OFFICE VISIT (OUTPATIENT)
Dept: BEHAVIORAL HEALTH | Facility: CLINIC | Age: 34
End: 2024-01-25
Payer: COMMERCIAL

## 2024-01-25 DIAGNOSIS — F41.1 GENERALIZED ANXIETY DISORDER: ICD-10-CM

## 2024-01-25 DIAGNOSIS — F31.70 BIPOLAR DISORDER IN PARTIAL REMISSION, MOST RECENT EPISODE UNSPECIFIED TYPE: Primary | ICD-10-CM

## 2024-01-25 DIAGNOSIS — F44.81 DISSOCIATIVE IDENTITY DISORDER: ICD-10-CM
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
## 2024-01-30 ENCOUNTER — OFFICE VISIT (OUTPATIENT)
Dept: INTERNAL MEDICINE | Facility: CLINIC | Age: 34
End: 2024-01-30
Payer: COMMERCIAL

## 2024-01-30 VITALS
SYSTOLIC BLOOD PRESSURE: 110 MMHG | WEIGHT: 208.2 LBS | HEIGHT: 64 IN | OXYGEN SATURATION: 99 % | DIASTOLIC BLOOD PRESSURE: 68 MMHG | TEMPERATURE: 97.5 F | RESPIRATION RATE: 16 BRPM | BODY MASS INDEX: 35.55 KG/M2 | HEART RATE: 68 BPM

## 2024-01-30 DIAGNOSIS — T81.31XA DEHISCENCE OF OPERATIVE WOUND, INITIAL ENCOUNTER: Primary | ICD-10-CM

## 2024-01-30 PROCEDURE — 99213 OFFICE O/P EST LOW 20 MIN: CPT | Performed by: NURSE PRACTITIONER

## 2024-01-30 PROCEDURE — 1159F MED LIST DOCD IN RCRD: CPT | Performed by: NURSE PRACTITIONER

## 2024-01-30 PROCEDURE — 1160F RVW MEDS BY RX/DR IN RCRD: CPT | Performed by: NURSE PRACTITIONER

## 2024-01-30 RX ORDER — ENOXAPARIN SODIUM 40 MG/.4ML
40 INJECTION SUBCUTANEOUS
COMMUNITY
Start: 2024-01-23 | End: 2024-01-30

## 2024-01-30 RX ORDER — CYCLOBENZAPRINE HCL 5 MG
5 TABLET ORAL AS NEEDED
COMMUNITY
Start: 2024-01-28

## 2024-01-30 NOTE — PROGRESS NOTES
"Subjective   Theresa Maya is a 33 y.o. female.     Chief Complaint   Patient presents with    Wound Check     Pt had a hysterectomy on 1/23/24 and is wondering if she has a scab or if her stiches have came undone.        PCP: Gina Green APRN       She is here today to have a wound check on her abdominal umbilical incision from a hysterectomy she had done in [place] on 01/23/2024. She is concerned that maybe the stitches may have come undone or is not certain that this just may be scabbed.    She reports she underwent a laparoscopic hysterectomy. She states she was found to have a cyst on the umbilical incision. She is unsure if there is a \"piece of meat\" coming out of her incision. She states when she can not eat solid foods, she resorts to protein shakes. She denies any mucus in her stool. She states her stools have a normal odor. She states she still has \"pulling and tugging\" sensation at the umbilical incision She has a follow up appointment with her surgeon on 03/01/2024.    The following portions of the patient's history were reviewed and updated as appropriate: allergies, current medications, past family history, past medical history, past social history, past surgical history and problem list.        Review of Systems   Constitutional:  Negative for chills, fatigue, fever and unexpected weight loss.   Eyes:  Negative for blurred vision, double vision and visual disturbance.   Respiratory:  Negative for cough, shortness of breath and wheezing.    Cardiovascular:  Negative for chest pain, palpitations and leg swelling.   Gastrointestinal:  Positive for diarrhea, nausea and vomiting. Negative for abdominal pain, blood in stool and constipation.   Genitourinary:  Negative for difficulty urinating, frequency and urgency.   Musculoskeletal:  Negative for arthralgias and myalgias.   Skin:  Positive for wound. Negative for color change and rash.   Neurological:  Negative for dizziness, weakness and " headache.   Hematological:  Negative for adenopathy. Does not bruise/bleed easily.           Outpatient Medications Marked as Taking for the 1/30/24 encounter (Office Visit) with Gina Green APRN   Medication Sig Dispense Refill    ARIPiprazole (ABILIFY) 5 MG tablet Take 1 tablet by mouth Daily. 30 tablet 0    cyclobenzaprine (FLEXERIL) 5 MG tablet Take 1 tablet by mouth As Needed.      Elidel 1 % cream       esomeprazole (nexIUM) 40 MG capsule Take 1 capsule by mouth Every Morning Before Breakfast. (Patient taking differently: Take 1 capsule by mouth 2 (Two) Times a Day.) 90 capsule 1    ferrous sulfate 325 (65 FE) MG tablet Take 1 tablet by mouth 2 (Two) Times a Day.      lamoTRIgine (LaMICtal) 25 MG tablet Take 2 tablets by mouth Daily. 60 tablet 0    Lovenox 40 MG/0.4ML solution prefilled syringe syringe 0.4 mL.      montelukast (Singulair) 10 MG tablet Take 1 tablet by mouth Every Night. 30 tablet 3    multivitamin with minerals tablet tablet Take 1 tablet by mouth Daily.      ondansetron ODT (ZOFRAN-ODT) 4 MG disintegrating tablet Place 1 tablet on the tongue Every 8 (Eight) Hours As Needed for Nausea or Vomiting. 20 tablet 0    polyethylene glycol (MiraLax) 17 GM/SCOOP powder Take 17 g by mouth Daily. 255 g 1    promethazine (PHENERGAN) 12.5 MG tablet Take 1 tablet by mouth Every 8 (Eight) Hours As Needed for Nausea or Vomiting. 20 tablet 0    Saw Palmetto 80 MG capsule Take 1 capsule by mouth Daily.      sennosides-docusate (senna-docusate sodium) 8.6-50 MG per tablet Take 1-2 tablets by mouth Daily. 60 tablet 1    triamcinolone (KENALOG) 0.1 % ointment       vitamin B-12 (CYANOCOBALAMIN) 500 MCG tablet Take 1 tablet by mouth Daily.      vitamin D3 125 MCG (5000 UT) capsule capsule Take 1 capsule by mouth Daily.       Allergies   Allergen Reactions    Amoxicillin Diarrhea and GI Intolerance    Doxycycline Other (See Comments)     Vaginal swelling     Latex Hives and Itching    Penicillins GI  "Intolerance     Says Keflex OK as long as she takes w/ food.            Objective   Physical Exam  Constitutional:       Appearance: Normal appearance. She is well-developed.   HENT:      Head: Normocephalic and atraumatic.   Eyes:      General: No scleral icterus.     Conjunctiva/sclera: Conjunctivae normal.   Cardiovascular:      Rate and Rhythm: Normal rate and regular rhythm.      Heart sounds: Normal heart sounds.   Pulmonary:      Effort: Pulmonary effort is normal. No respiratory distress.      Breath sounds: Normal breath sounds.   Abdominal:      General: Bowel sounds are normal.      Palpations: Abdomen is soft.      Tenderness: There is no abdominal tenderness.      Comments: Umbilical incision appears to be starting to dehisce. There is no erythema or warmth. No exudate, although the area appears to be somewhat moist.   Musculoskeletal:         General: Normal range of motion.      Cervical back: Normal range of motion.      Right lower leg: No edema.      Left lower leg: No edema.   Skin:     General: Skin is warm and dry.   Neurological:      General: No focal deficit present.      Mental Status: She is alert and oriented to person, place, and time.   Psychiatric:         Attention and Perception: Attention normal.         Mood and Affect: Mood and affect normal.         Behavior: Behavior normal. Behavior is cooperative.         Thought Content: Thought content normal.         Cognition and Memory: Cognition normal.         Judgment: Judgment normal.         Vitals:    01/30/24 1255   BP: 110/68   BP Location: Right arm   Patient Position: Sitting   Cuff Size: Adult   Pulse: 68   Resp: 16   Temp: 97.5 °F (36.4 °C)   TempSrc: Infrared   SpO2: 99%   Weight: 94.4 kg (208 lb 3.2 oz)   Height: 161.3 cm (63.5\")     Body mass index is 36.3 kg/m².  Wt Readings from Last 3 Encounters:   01/30/24 94.4 kg (208 lb 3.2 oz)   01/04/24 93.4 kg (206 lb)   12/26/23 91.6 kg (202 lb)             Assessment & Plan "   Diagnoses and all orders for this visit:    1. Dehiscence of operative wound, initial encounter (Primary)    2. Body mass index (BMI) of 36.0 to 36.9 in adult    Dehiscence of umbilical operative wound  - I encouraged her to call her surgeon to be seen today or tomorrow. She reports she will likely be able to get an appointment for 8 AM tomorrow. She will let me know if she has any other issues.  Class 2 Severe Obesity (BMI >=35 and <=39.9). Obesity-related health conditions include the following: GERD. Obesity is unchanged. BMI is is above average; BMI management plan is completed. We discussed Information on healthy weight added to patient's after visit summary.      Return if symptoms worsen or fail to improve, for pt to see surgeon today/tomorrrow.    I discussed my findings,recommendations, and plan of care was with the patient. They verbalized understanding and agreement.  Patient was encouraged to keep me informed of any acute changes, lack of improvement, or any new concerning symptoms.     Transcribed from ambient dictation for PHYLICIA Bear by Andrea Agudelo.  01/30/24   15:47 EST    Patient or patient representative verbalized consent to the visit recording.  I have personally performed the services described in this document as transcribed by the above individual, and it is both accurate and complete.  PHYLICIA Bear  1/30/2024  16:12 EST

## 2024-02-05 ENCOUNTER — OFFICE VISIT (OUTPATIENT)
Dept: BEHAVIORAL HEALTH | Facility: CLINIC | Age: 34
End: 2024-02-05
Payer: COMMERCIAL

## 2024-02-05 VITALS
BODY MASS INDEX: 35.85 KG/M2 | SYSTOLIC BLOOD PRESSURE: 118 MMHG | DIASTOLIC BLOOD PRESSURE: 76 MMHG | WEIGHT: 210 LBS | OXYGEN SATURATION: 100 % | HEIGHT: 64 IN | HEART RATE: 59 BPM

## 2024-02-05 DIAGNOSIS — F31.70 BIPOLAR DISORDER IN PARTIAL REMISSION, MOST RECENT EPISODE UNSPECIFIED TYPE: ICD-10-CM

## 2024-02-05 PROCEDURE — 1160F RVW MEDS BY RX/DR IN RCRD: CPT | Performed by: REGISTERED NURSE

## 2024-02-05 PROCEDURE — 99213 OFFICE O/P EST LOW 20 MIN: CPT | Performed by: REGISTERED NURSE

## 2024-02-05 PROCEDURE — 1159F MED LIST DOCD IN RCRD: CPT | Performed by: REGISTERED NURSE

## 2024-02-05 RX ORDER — LAMOTRIGINE 25 MG/1
50 TABLET ORAL DAILY
Qty: 60 TABLET | Refills: 1 | Status: SHIPPED | OUTPATIENT
Start: 2024-02-05

## 2024-02-05 RX ORDER — ARIPIPRAZOLE 5 MG/1
5 TABLET ORAL DAILY
Qty: 30 TABLET | Refills: 1 | Status: SHIPPED | OUTPATIENT
Start: 2024-02-05

## 2024-02-05 NOTE — PROGRESS NOTES
Follow Up Office Visit      Patient Name: Theresa Maya  : 1990   MRN: [id number]     Referring Provider: Gina Green APRN    Chief Complaint:      ICD-10-CM ICD-9-CM   1. Bipolar disorder in partial remission, most recent episode unspecified type  F31.70 296.80        History of Present Illness:   Theresa Maya is a 33 y.o. female who is here today for follow up and medication management    Subjective      Patient Reports: that she has had a hysterectomy for endometriosis since our last visit. She reports that she has been sleeping better after this. She reports that the surgeon left her ovaries intact, which he said is better for mental health. She reports that they are still living in the [facility] and they are in the process of getting approved for an apartment. She reports that her  is working two jobs and she is not able to go to work yet, but has two offers. She reports that she is taking her medication every day at 6 am every day. She reports that she will get a hernia repair along with gastric bypass surgery, but needs psych clearance for this.  Denies side effects. Denies SI/HI/AVH.    Review of Systems:   Review of Systems   Constitutional:  Negative for appetite change and unexpected weight change.   Eyes:  Negative for visual disturbance.   Respiratory:  Negative for chest tightness and shortness of breath.    Cardiovascular:  Negative for chest pain.   Gastrointestinal:  Positive for abdominal pain and nausea.   Musculoskeletal:  Negative for gait problem.   Skin:  Negative for rash and wound.   Neurological:  Positive for headaches. Negative for dizziness, tremors, seizures, weakness and light-headedness.   Psychiatric/Behavioral:  Negative for agitation, behavioral problems, confusion, decreased concentration, hallucinations and self-injury. The patient is not nervous/anxious and is not hyperactive.      Sleep pattern: still with acid reflux up at 4 am  Appetite: acid  reflux, taking phenergan     Screening Scores:   PHQ-9 : 8  ALICIA-7 : 5    RISK ASSESSMENT:  Patient denies any thoughts or intent of suicide today. Patient denies any impulsive behavior today.     Medications:     Current Outpatient Medications:     ARIPiprazole (ABILIFY) 5 MG tablet, Take 1 tablet by mouth Daily., Disp: 30 tablet, Rfl: 1    cyclobenzaprine (FLEXERIL) 5 MG tablet, Take 1 tablet by mouth As Needed., Disp: , Rfl:     Elidel 1 % cream, , Disp: , Rfl:     esomeprazole (nexIUM) 40 MG capsule, Take 1 capsule by mouth Every Morning Before Breakfast. (Patient taking differently: Take 1 capsule by mouth 2 (Two) Times a Day.), Disp: 90 capsule, Rfl: 1    ferrous sulfate 325 (65 FE) MG tablet, Take 1 tablet by mouth 2 (Two) Times a Day., Disp: , Rfl:     lamoTRIgine (LaMICtal) 25 MG tablet, Take 2 tablets by mouth Daily., Disp: 60 tablet, Rfl: 1    montelukast (Singulair) 10 MG tablet, Take 1 tablet by mouth Every Night., Disp: 30 tablet, Rfl: 3    multivitamin with minerals tablet tablet, Take 1 tablet by mouth Daily., Disp: , Rfl:     ondansetron ODT (ZOFRAN-ODT) 4 MG disintegrating tablet, Place 1 tablet on the tongue Every 8 (Eight) Hours As Needed for Nausea or Vomiting., Disp: 20 tablet, Rfl: 0    polyethylene glycol (MiraLax) 17 GM/SCOOP powder, Take 17 g by mouth Daily., Disp: 255 g, Rfl: 1    promethazine (PHENERGAN) 12.5 MG tablet, Take 1 tablet by mouth Every 8 (Eight) Hours As Needed for Nausea or Vomiting., Disp: 20 tablet, Rfl: 0    Saw Palmetto 80 MG capsule, Take 1 capsule by mouth Daily., Disp: , Rfl:     sennosides-docusate (senna-docusate sodium) 8.6-50 MG per tablet, Take 1-2 tablets by mouth Daily., Disp: 60 tablet, Rfl: 1    triamcinolone (KENALOG) 0.1 % ointment, , Disp: , Rfl:     vitamin B-12 (CYANOCOBALAMIN) 500 MCG tablet, Take 1 tablet by mouth Daily., Disp: , Rfl:     vitamin D3 125 MCG (5000 UT) capsule capsule, Take 1 capsule by mouth Daily., Disp: , Rfl:     Medication  "Considerations:  ISAMAR reviewed and appropriate.      Allergies:   Allergies   Allergen Reactions    Amoxicillin Diarrhea and GI Intolerance    Doxycycline Other (See Comments)     Vaginal swelling     Latex Hives and Itching    Penicillins GI Intolerance     Says Keflex OK as long as she takes w/ food.          Objective     Physical Exam:  Vital Signs:   Vitals:    02/05/24 0806 02/05/24 0807 02/05/24 0809   BP:   118/76   Pulse:   59   SpO2:   100%   Weight:  95.3 kg (210 lb)    Height: 161.3 cm (63.5\")       Body mass index is 36.62 kg/m².     Mental Status Exam:   Hygiene:   good  Cooperation:  Cooperative  Eye Contact:  Good  Psychomotor Behavior:  Appropriate  Affect:  Appropriate  Mood: normal  Speech:  Normal  Thought Process:  Goal directed and Linear  Thought Content:  Normal  Suicidal:  None  Homicidal:  None  Hallucinations:  None  Delusion:  None  Memory:  Intact  Orientation:  Person, Place, Time, and Situation  Reliability:  good  Insight:  Good  Judgement:  Good  Impulse Control:  Good  Physical/Medical Issues:   see problem list      Assessment / Plan      Visit Diagnosis/Orders Placed This Visit:  Diagnoses and all orders for this visit:    1. Bipolar disorder in partial remission, most recent episode unspecified type  -     ARIPiprazole (ABILIFY) 5 MG tablet; Take 1 tablet by mouth Daily.  Dispense: 30 tablet; Refill: 1  -     lamoTRIgine (LaMICtal) 25 MG tablet; Take 2 tablets by mouth Daily.  Dispense: 60 tablet; Refill: 1         Functional Status: No impairment    Prognosis: Good with Ongoing Treatment     Impression/Formulation:  Patient appeared alert and oriented.  Patient is voluntarily requesting to continue outpatient psychiatric treatment at [facility].  Patient is receptive to assistance with maintaining a stable lifestyle.  Patient presents with history of     ICD-10-CM ICD-9-CM   1. Bipolar disorder in partial remission, most recent episode unspecified type  " F31.70 296.80     Reviewed patient's previous provider notes. Reviewed most recent labs. Patient meets DSM V diagnostic criteria for diagnoses. Diagnoses may be updated as more information becomes available.     At this time patient appears stabilized on psychiatric meds and denies SI/HI/AVH and therefore is cleared psychiatrically to proceed with bariatric/hernia surgery.      Treatment Plan:   Continue abilify 5 mg daily  Continue lamotrigine 50 mg daily  Continue individual therapy  Follow up in two months or sooner if needed  Patient will continue supportive psychotherapy efforts and medications as indicated. Clinic will obtain release of information for current treatment team for continuity of care as needed. Patient will contact this office, call 911 or present to the nearest emergency room should suicidal or homicidal ideations occur.  Discussed medication options and treatment plan of prescribed medication(s) as well as the risks, benefits, and potential side effects. Patient ackowledged and verbally consented to continue with current treatment plan and was educated on the importance of compliance with treatment and follow-up appointments.     Patient instructions:  Reviewed black box warning for Lamictal causing cases of life-threatening serious rashes, including Hernandez-Cristino syndrome, toxic epidermal necrolysis, and/or rash-related death.   All risks/benefits and side effects discussed with patient, including risk for weight gain, increased lipids, hyperprolactemia, EPS symptoms, including restlessness, muscle  stiffness or contraction of head, face, neck, trunk and limbs, and TD (which can be irreversible). Pt verbalizes understanding and consents to treatment with these medications.     Follow Up:   Return in about 2 months (around 4/5/2024) for Med Check.        PHYLICIA Greer, PMHNP-BC [facility]

## 2024-02-26 ENCOUNTER — TELEPHONE (OUTPATIENT)
Dept: INTERNAL MEDICINE | Facility: CLINIC | Age: 34
End: 2024-02-26
Payer: COMMERCIAL

## 2024-02-26 NOTE — TELEPHONE ENCOUNTER
PATIENT HAS CALLED REQUESTING A COPY OF ALL IMMUNIZATION RECORDS. PATIENT IS WANTING TO PICK THOSE UP AS SOON AS POSSIBLE TODAY AND IS REQUESTING A CALL BACK ONCE READY FOR .    CALL BACK NUMBER [phone]

## 2024-02-28 ENCOUNTER — OFFICE VISIT (OUTPATIENT)
Dept: BEHAVIORAL HEALTH | Facility: CLINIC | Age: 34
End: 2024-02-28
Payer: COMMERCIAL

## 2024-02-28 DIAGNOSIS — F41.1 GENERALIZED ANXIETY DISORDER: ICD-10-CM

## 2024-02-28 DIAGNOSIS — F31.70 BIPOLAR DISORDER IN PARTIAL REMISSION, MOST RECENT EPISODE UNSPECIFIED TYPE: Primary | ICD-10-CM

## 2024-02-28 DIAGNOSIS — F44.81 DISSOCIATIVE IDENTITY DISORDER: ICD-10-CM

## 2024-02-28 NOTE — PROGRESS NOTES
[facility]                 Follow Up Adult      Follow Up Adult Note     Date:2024   Patient Name: Theresa Maya  : 1990   MRN: [id number]   Time IN: 8:50 am    Time OUT: 9:42 am     Referring Provider: Gina Green APRN    Chief Complaint:      ICD-10-CM ICD-9-CM   1. Bipolar disorder in partial remission, most recent episode unspecified type  F31.70 296.80   2. Generalized anxiety disorder  F41.1 300.02   3. Dissociative identity disorder  F44.81 300.14        History of Present Illness:   Theresa Maya is a 33 y.o. female who is being seen today for follow up counseling for bipolar disorder, ALICIA and DID.  Subjective               Patient's Support Network Includes: extended family    Functional Status: Mild impairment       Current Outpatient Medications:     ARIPiprazole (ABILIFY) 5 MG tablet, Take 1 tablet by mouth Daily., Disp: 30 tablet, Rfl: 1    cyclobenzaprine (FLEXERIL) 5 MG tablet, Take 1 tablet by mouth As Needed., Disp: , Rfl:     Elidel 1 % cream, , Disp: , Rfl:     esomeprazole (nexIUM) 40 MG capsule, Take 1 capsule by mouth Every Morning Before Breakfast. (Patient taking differently: Take 1 capsule by mouth 2 (Two) Times a Day.), Disp: 90 capsule, Rfl: 1    ferrous sulfate 325 (65 FE) MG tablet, Take 1 tablet by mouth 2 (Two) Times a Day., Disp: , Rfl:     lamoTRIgine (LaMICtal) 25 MG tablet, Take 2 tablets by mouth Daily., Disp: 60 tablet, Rfl: 1    montelukast (Singulair) 10 MG tablet, Take 1 tablet by mouth Every Night., Disp: 30 tablet, Rfl: 3    multivitamin with minerals tablet tablet, Take 1 tablet by mouth Daily., Disp: , Rfl:     ondansetron ODT (ZOFRAN-ODT) 4 MG disintegrating tablet, Place 1 tablet on the tongue Every 8 (Eight) Hours As Needed for Nausea or Vomiting., Disp: 20 tablet, Rfl: 0    polyethylene glycol (MiraLax) 17 GM/SCOOP powder, Take 17 g by mouth Daily., Disp: 255 g, Rfl: 1    promethazine  (PHENERGAN) 12.5 MG tablet, Take 1 tablet by mouth Every 8 (Eight) Hours As Needed for Nausea or Vomiting., Disp: 20 tablet, Rfl: 0    Saw Palmetto 80 MG capsule, Take 1 capsule by mouth Daily., Disp: , Rfl:     sennosides-docusate (senna-docusate sodium) 8.6-50 MG per tablet, Take 1-2 tablets by mouth Daily., Disp: 60 tablet, Rfl: 1    triamcinolone (KENALOG) 0.1 % ointment, , Disp: , Rfl:     vitamin B-12 (CYANOCOBALAMIN) 500 MCG tablet, Take 1 tablet by mouth Daily., Disp: , Rfl:     vitamin D3 125 MCG (5000 UT) capsule capsule, Take 1 capsule by mouth Daily., Disp: , Rfl:     Allergies   Allergen Reactions    Amoxicillin Diarrhea and GI Intolerance    Doxycycline Other (See Comments)     Vaginal swelling     Latex Hives and Itching    Penicillins GI Intolerance     Says Keflex OK as long as she takes w/ food.        Objective     Physical Exam:  Vital Signs: There were no vitals filed for this visit.  There is no height or weight on file to calculate BMI.     Mental Status Exam:   Hygiene:   good  Cooperation:  Cooperative  Eye Contact:  Good  Psychomotor Behavior:  Appropriate  Affect:  Full range  Mood: normal  Speech:  Normal  Thought Process:  Goal directed  Thought Content:  Normal  Suicidal:  None  Homicidal:  None  Hallucinations:  None  Delusion:  None  Memory:  Intact  Orientation:  Person, Place, Time, and Situation  Reliability:  good  Insight:  Good  Judgement:  Good  Impulse Control:  Good  Physical/Medical Issues:   See problem list      Assessment / Plan      Diagnosis:  Diagnoses and all orders for this visit:    1. Bipolar disorder in partial remission, most recent episode unspecified type (Primary)    2. Generalized anxiety disorder    3. Dissociative identity disorder    Patient presented for follow-up with clinician.  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  Patient and clinician identified and addressed patient anxiety related to tests.  Patient and clinician identified and addressed patient cognitive distortions related to test anxiety.  Patient was responsive to intervention.  Clinician utilized active listening and reflective response to convey empathy and support.    Progress toward goal: Patient reports improvement in symptoms since initiation of treatment.    Prognosis: Good with further outpatient treatment.    PLAN:  Patient and clinician will continue to utilize a cognitive behavioral intervention which identifies and addresses patient cognitive distortions related to bipolar disorder, ALICIA and DID.  Follow-ups will occur every 14 to 21 days will last for 45 to 60 minutes in duration.    Safety: No acute safety concerns  Risk Assessment: Risk of self-harm acutely is low. Risk of self-harm chronically is also low, but could be further elevated in the event of treatment noncompliance and/or AODA.    Treatment Plan/Goals: Continue supportive psychotherapy efforts and medications as indicated. Treatment and medication options discussed during today's visit. Patient ackowledged and verbally consented to continue with current treatment plan and was educated on the importance of compliance with treatment and follow-up appointments. Patient seems reasonably able to adhere to treatment plan.      Assisted Patient in processing above session content; acknowledged and normalized patient’s thoughts, feelings, and concerns.  Rationalized patient thought process regarding bipolar disorder, ALICIA and DID.      Allowed Patient to freely discuss issues  without interruption or judgement with unconditional positive regard, active listening skills, and empathy. Therapist provided a safe, confidential environment to facilitate the development of a positive therapeutic relationship and encouraged open, honest communication. Assisted Patient in identifying risk factors which  would indicate the need for higher level of care including thoughts to harm self or others and/or self-harming behavior and encouraged Patient to contact this office, call 911, or present to the nearest emergency room should any of these events occur. Discussed crisis intervention services and means to access. Patient adamantly and convincingly denies current suicidal or homicidal ideation or perceptual disturbance. Assisted Patient in processing session content; acknowledged and normalized Patient’s thoughts, feelings, and concerns by utilizing a person-centered approach in efforts to build appropriate rapport and a positive therapeutic relationship with open and honest communication. .     Part of this note may be an electronic transcription/translation of spoken language to printed text using the Dragon Dictation System.       Follow Up:   Return in about 2 weeks (around 3/13/2024).    Koffi Lindsay LCSW

## 2024-03-18 ENCOUNTER — OFFICE VISIT (OUTPATIENT)
Dept: BEHAVIORAL HEALTH | Facility: CLINIC | Age: 34
End: 2024-03-18
Payer: COMMERCIAL

## 2024-03-18 VITALS
SYSTOLIC BLOOD PRESSURE: 128 MMHG | BODY MASS INDEX: 36.54 KG/M2 | WEIGHT: 214 LBS | HEIGHT: 64 IN | HEART RATE: 74 BPM | DIASTOLIC BLOOD PRESSURE: 74 MMHG | OXYGEN SATURATION: 98 %

## 2024-03-18 DIAGNOSIS — F41.9 ANXIETY: ICD-10-CM

## 2024-03-18 DIAGNOSIS — F31.70 BIPOLAR DISORDER IN PARTIAL REMISSION, MOST RECENT EPISODE UNSPECIFIED TYPE: Primary | ICD-10-CM

## 2024-03-18 RX ORDER — LAMOTRIGINE 100 MG/1
100 TABLET ORAL DAILY
Qty: 30 TABLET | Refills: 1 | Status: SHIPPED | OUTPATIENT
Start: 2024-03-18

## 2024-03-18 RX ORDER — ARIPIPRAZOLE 5 MG/1
5 TABLET ORAL DAILY
Qty: 30 TABLET | Refills: 1 | Status: SHIPPED | OUTPATIENT
Start: 2024-03-18

## 2024-03-18 NOTE — PROGRESS NOTES
Follow Up Office Visit      Patient Name: Theresa Maya  : 1990   MRN: [id number]     Referring Provider: Gina Green APRN    Chief Complaint:      ICD-10-CM ICD-9-CM   1. Bipolar disorder in partial remission, most recent episode unspecified type  F31.70 296.80   2. Anxiety  F41.9 300.00        History of Present Illness:   Theresa Maya is a 33 y.o. female who is here today for follow up and medication management    Subjective      Patient Reports: that  hired her through the [employer] program. 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 She reports that she does feel like she is talking a lot and a little fidgety. She reports that she feels like her mood is good and things are moving in the right direction, but not overly elevated. She reports that she did start crying out of nowhere at her daughter's cheer[employer] game, because it made her think of herself when she was young. Denies SI/HI/AVH.    Review of Systems:   Review of Systems   Constitutional:  Negative for appetite change and unexpected weight change.   Eyes:  Negative for visual disturbance.   Respiratory:  Negative for chest tightness and shortness of breath.    Cardiovascular:  Negative for chest pain.   Gastrointestinal:  Positive for abdominal pain and nausea.   Musculoskeletal:  Negative for gait problem.   Skin:  Negative for rash and wound.   Neurological:  Positive for headaches. Negative for dizziness, tremors, seizures, weakness and light-headedness.   Psychiatric/Behavioral:  Negative for agitation, behavioral problems, confusion, decreased concentration, hallucinations and self-injury. The patient is not nervous/anxious and is not hyperactive.      Sleep pattern: not sleeping, fidgety, legs shaking  Appetite: no  changes     Screening Scores:   PHQ-9 : 8  ALICIA-7 : 7    RISK ASSESSMENT:  Patient denies any thoughts or intent of suicide today. Patient denies any impulsive behavior today.     Medications:     Current Outpatient Medications:     ARIPiprazole (ABILIFY) 5 MG tablet, Take 1 tablet by mouth Daily., Disp: 30 tablet, Rfl: 1    cyclobenzaprine (FLEXERIL) 5 MG tablet, Take 1 tablet by mouth As Needed., Disp: , Rfl:     Elidel 1 % cream, , Disp: , Rfl:     esomeprazole (nexIUM) 40 MG capsule, Take 1 capsule by mouth Every Morning Before Breakfast. (Patient taking differently: Take 1 capsule by mouth 2 (Two) Times a Day.), Disp: 90 capsule, Rfl: 1    ferrous sulfate 325 (65 FE) MG tablet, Take 1 tablet by mouth 2 (Two) Times a Day., Disp: , Rfl:     montelukast (Singulair) 10 MG tablet, Take 1 tablet by mouth Every Night., Disp: 30 tablet, Rfl: 3    multivitamin with minerals tablet tablet, Take 1 tablet by mouth Daily., Disp: , Rfl:     ondansetron ODT (ZOFRAN-ODT) 4 MG disintegrating tablet, Place 1 tablet on the tongue Every 8 (Eight) Hours As Needed for Nausea or Vomiting., Disp: 20 tablet, Rfl: 0    polyethylene glycol (MiraLax) 17 GM/SCOOP powder, Take 17 g by mouth Daily., Disp: 255 g, Rfl: 1    promethazine (PHENERGAN) 12.5 MG tablet, Take 1 tablet by mouth Every 8 (Eight) Hours As Needed for Nausea or Vomiting., Disp: 20 tablet, Rfl: 0    Saw Palmetto 80 MG capsule, Take 1 capsule by mouth Daily., Disp: , Rfl:     sennosides-docusate (senna-docusate sodium) 8.6-50 MG per tablet, Take 1-2 tablets by mouth Daily., Disp: 60 tablet, Rfl: 1    triamcinolone (KENALOG) 0.1 % ointment, , Disp: , Rfl:     vitamin B-12 (CYANOCOBALAMIN) 500 MCG tablet, Take 1 tablet by mouth Daily., Disp: , Rfl:     vitamin D3 125 MCG (5000 UT) capsule capsule, Take 1 capsule by mouth Daily., Disp: , Rfl:     lamoTRIgine (LaMICtal) 100 MG tablet, Take 1 tablet by mouth Daily., Disp: 30 tablet, Rfl: 1    Medication Considerations:  ISAMAR  "reviewed and appropriate.      Allergies:   Allergies   Allergen Reactions    Amoxicillin Diarrhea and GI Intolerance    Doxycycline Other (See Comments)     Vaginal swelling     Latex Hives and Itching    Penicillins GI Intolerance     Says Keflex OK as long as she takes w/ food.        Objective     Physical Exam:  Vital Signs:   Vitals:    03/18/24 0953 03/18/24 0956   BP:  128/74   Pulse:  74   SpO2:  98%   Weight: 97.1 kg (214 lb)    Height: 161.3 cm (63.5\")      Body mass index is 37.31 kg/m².     Mental Status Exam:   Hygiene:   good  Cooperation:  Cooperative  Eye Contact:  Good  Psychomotor Behavior:  Appropriate  Affect:  Appropriate  Mood: normal  Speech:  Rapid  Thought Process:  Goal directed and Linear  Thought Content:  Normal  Suicidal:  None  Homicidal:  None  Hallucinations:  None  Delusion:  None  Memory:  Intact  Orientation:  Person, Place, Time, and Situation  Reliability:  good  Insight:  Good  Judgement:  Good  Impulse Control:  Good  Physical/Medical Issues:   see problem list      Assessment / Plan      Visit Diagnosis/Orders Placed This Visit:  Diagnoses and all orders for this visit:    1. Bipolar disorder in partial remission, most recent episode unspecified type (Primary)  -     lamoTRIgine (LaMICtal) 100 MG tablet; Take 1 tablet by mouth Daily.  Dispense: 30 tablet; Refill: 1  -     ARIPiprazole (ABILIFY) 5 MG tablet; Take 1 tablet by mouth Daily.  Dispense: 30 tablet; Refill: 1    2. Anxiety  -     lamoTRIgine (LaMICtal) 100 MG tablet; Take 1 tablet by mouth Daily.  Dispense: 30 tablet; Refill: 1         Functional Status: No impairment    Prognosis: Good with Ongoing Treatment     Impression/Formulation:  Patient appeared alert and oriented.  Patient is voluntarily requesting to continue outpatient psychiatric treatment at [facility].  Patient is receptive to assistance with maintaining a stable lifestyle.  Patient presents with history of     ICD-10-CM " ICD-9-CM   1. Bipolar disorder in partial remission, most recent episode unspecified type  F31.70 296.80   2. Anxiety  F41.9 300.00     Reviewed patient's previous provider notes. Reviewed most recent labs. Patient meets DSM V diagnostic criteria for diagnoses. Diagnoses may be updated as more information becomes available.       Treatment Plan:   Continue abilify 5 mg daily  Increase lamotrigine to 100 mg daily  Continue individual therapy  Follow up in 2 months or sooner if needed  Patient will continue supportive psychotherapy efforts and medications as indicated. Clinic will obtain release of information for current treatment team for continuity of care as needed. Patient will contact this office, call 911 or present to the nearest emergency room should suicidal or homicidal ideations occur.  Discussed medication options and treatment plan of prescribed medication(s) as well as the risks, benefits, and potential side effects. Patient ackowledged and verbally consented to continue with current treatment plan and was educated on the importance of compliance with treatment and follow-up appointments.     Patient instructions:  Reviewed black box warning for Lamictal causing cases of life-threatening serious rashes, including Hernandez-Cristino syndrome, toxic epidermal necrolysis, and/or rash-related death.     Follow Up:   Return in about 2 months (around 5/18/2024) for Med Check.        PHYLICIA Greer, PMHNP-BC [facility]

## 2024-04-09 ENCOUNTER — OFFICE VISIT (OUTPATIENT)
Dept: INTERNAL MEDICINE | Facility: CLINIC | Age: 34
End: 2024-04-09
Payer: COMMERCIAL

## 2024-04-09 ENCOUNTER — LAB (OUTPATIENT)
Dept: INTERNAL MEDICINE | Facility: CLINIC | Age: 34
End: 2024-04-09
Payer: COMMERCIAL

## 2024-04-09 VITALS
SYSTOLIC BLOOD PRESSURE: 118 MMHG | BODY MASS INDEX: 36.54 KG/M2 | RESPIRATION RATE: 16 BRPM | OXYGEN SATURATION: 100 % | HEART RATE: 90 BPM | HEIGHT: 64 IN | WEIGHT: 214 LBS | DIASTOLIC BLOOD PRESSURE: 78 MMHG | TEMPERATURE: 97.1 F

## 2024-04-09 DIAGNOSIS — R09.81 NASAL CONGESTION: Primary | ICD-10-CM

## 2024-04-09 DIAGNOSIS — R11.10 VOMITING AND DIARRHEA: ICD-10-CM

## 2024-04-09 DIAGNOSIS — J02.9 SORE THROAT: ICD-10-CM

## 2024-04-09 DIAGNOSIS — Z91.09 ENVIRONMENTAL ALLERGIES: ICD-10-CM

## 2024-04-09 DIAGNOSIS — R19.7 VOMITING AND DIARRHEA: ICD-10-CM

## 2024-04-09 DIAGNOSIS — R10.13 DYSPEPSIA: ICD-10-CM

## 2024-04-09 DIAGNOSIS — R10.11 RIGHT UPPER QUADRANT ABDOMINAL PAIN: ICD-10-CM

## 2024-04-09 LAB
ALBUMIN SERPL-MCNC: 4.1 G/DL (ref 3.5–5.2)
ALBUMIN/GLOB SERPL: 1.5 G/DL
ALP SERPL-CCNC: 64 U/L (ref 39–117)
ALT SERPL W P-5'-P-CCNC: 14 U/L (ref 1–33)
AMYLASE SERPL-CCNC: 55 U/L (ref 28–100)
ANION GAP SERPL CALCULATED.3IONS-SCNC: 11 MMOL/L (ref 5–15)
AST SERPL-CCNC: 13 U/L (ref 1–32)
BILIRUB SERPL-MCNC: <0.2 MG/DL (ref 0–1.2)
BUN SERPL-MCNC: 9 MG/DL (ref 6–20)
BUN/CREAT SERPL: 10.8 (ref 7–25)
CALCIUM SPEC-SCNC: 8.8 MG/DL (ref 8.6–10.5)
CHLORIDE SERPL-SCNC: 106 MMOL/L (ref 98–107)
CO2 SERPL-SCNC: 24 MMOL/L (ref 22–29)
CREAT SERPL-MCNC: 0.83 MG/DL (ref 0.57–1)
DEPRECATED RDW RBC AUTO: 40.1 FL (ref 37–54)
EGFRCR SERPLBLD CKD-EPI 2021: 95.6 ML/MIN/1.73
ERYTHROCYTE [DISTWIDTH] IN BLOOD BY AUTOMATED COUNT: 13.3 % (ref 12.3–15.4)
EXPIRATION DATE: NORMAL
EXPIRATION DATE: NORMAL
FLUAV AG UPPER RESP QL IA.RAPID: NOT DETECTED
FLUBV AG UPPER RESP QL IA.RAPID: NOT DETECTED
GLOBULIN UR ELPH-MCNC: 2.8 GM/DL
GLUCOSE SERPL-MCNC: 97 MG/DL (ref 65–99)
HCT VFR BLD AUTO: 34.4 % (ref 34–46.6)
HGB BLD-MCNC: 10.9 G/DL (ref 12–15.9)
INTERNAL CONTROL: NORMAL
INTERNAL CONTROL: NORMAL
LIPASE SERPL-CCNC: 53 U/L (ref 13–60)
Lab: NORMAL
Lab: NORMAL
MCH RBC QN AUTO: 26.1 PG (ref 26.6–33)
MCHC RBC AUTO-ENTMCNC: 31.7 G/DL (ref 31.5–35.7)
MCV RBC AUTO: 82.3 FL (ref 79–97)
PLATELET # BLD AUTO: 256 10*3/MM3 (ref 140–450)
PMV BLD AUTO: 11.3 FL (ref 6–12)
POTASSIUM SERPL-SCNC: 3.6 MMOL/L (ref 3.5–5.2)
PROT SERPL-MCNC: 6.9 G/DL (ref 6–8.5)
RBC # BLD AUTO: 4.18 10*6/MM3 (ref 3.77–5.28)
S PYO AG THROAT QL: NEGATIVE
SARS-COV-2 AG UPPER RESP QL IA.RAPID: NOT DETECTED
SODIUM SERPL-SCNC: 141 MMOL/L (ref 136–145)
WBC NRBC COR # BLD AUTO: 5.32 10*3/MM3 (ref 3.4–10.8)

## 2024-04-09 PROCEDURE — 36415 COLL VENOUS BLD VENIPUNCTURE: CPT

## 2024-04-09 PROCEDURE — 1160F RVW MEDS BY RX/DR IN RCRD: CPT

## 2024-04-09 PROCEDURE — 82150 ASSAY OF AMYLASE: CPT

## 2024-04-09 PROCEDURE — 87880 STREP A ASSAY W/OPTIC: CPT

## 2024-04-09 PROCEDURE — 83690 ASSAY OF LIPASE: CPT

## 2024-04-09 PROCEDURE — 87428 SARSCOV & INF VIR A&B AG IA: CPT

## 2024-04-09 PROCEDURE — 80053 COMPREHEN METABOLIC PANEL: CPT

## 2024-04-09 PROCEDURE — 1159F MED LIST DOCD IN RCRD: CPT

## 2024-04-09 PROCEDURE — 85027 COMPLETE CBC AUTOMATED: CPT

## 2024-04-09 PROCEDURE — 99214 OFFICE O/P EST MOD 30 MIN: CPT

## 2024-04-09 RX ORDER — ONDANSETRON 4 MG/1
4 TABLET, ORALLY DISINTEGRATING ORAL EVERY 8 HOURS PRN
Qty: 20 TABLET | Refills: 0 | Status: SHIPPED | OUTPATIENT
Start: 2024-04-09

## 2024-04-09 RX ORDER — PIMECROLIMUS 1 %
CREAM (GRAM) TOPICAL 2 TIMES DAILY
Qty: 100 G | Refills: 2 | Status: SHIPPED | OUTPATIENT
Start: 2024-04-09

## 2024-04-09 RX ORDER — TRIAMCINOLONE ACETONIDE 1 MG/G
OINTMENT TOPICAL 2 TIMES DAILY
Qty: 453.6 G | Refills: 1 | Status: SHIPPED | OUTPATIENT
Start: 2024-04-09

## 2024-04-09 RX ORDER — MONTELUKAST SODIUM 10 MG/1
10 TABLET ORAL NIGHTLY
Qty: 30 TABLET | Refills: 3 | Status: SHIPPED | OUTPATIENT
Start: 2024-04-09 | End: 2024-04-09 | Stop reason: SDUPTHER

## 2024-04-09 RX ORDER — GUAIFENESIN AND DEXTROMETHORPHAN HYDROBROMIDE 600; 30 MG/1; MG/1
1 TABLET, EXTENDED RELEASE ORAL 2 TIMES DAILY PRN
Qty: 20 TABLET | Refills: 0 | Status: SHIPPED | OUTPATIENT
Start: 2024-04-09

## 2024-04-09 RX ORDER — MONTELUKAST SODIUM 10 MG/1
10 TABLET ORAL NIGHTLY
Qty: 30 TABLET | Refills: 3 | Status: SHIPPED | OUTPATIENT
Start: 2024-04-09

## 2024-04-09 NOTE — TELEPHONE ENCOUNTER
Caller: Theresa Maya    Relationship: Self    Best call back number:     Requested Prescriptions:   Requested Prescriptions     Pending Prescriptions Disp Refills    montelukast (Singulair) 10 MG tablet 30 tablet 3     Sig: Take 1 tablet by mouth Every Night.        Pharmacy where request should be sent: [facility] [id number] [address] AT [facility] [address] - [phone]  - [phone]     Last office visit with prescribing clinician: 1/30/2024   Last telemedicine visit with prescribing clinician: Visit date not found   Next office visit with prescribing clinician: Visit date not found     Additional details provided by patient:  PATIENT IS OUT OF MEDICATION    Does the patient have less than a 3 day supply:  [x] Yes  [] No    Krishan Schmidt Rep   04/09/24 09:39 EDT

## 2024-04-09 NOTE — PROGRESS NOTES
Office Note     Name: Theresa Maya    : 1990     MRN: [id number]     Chief Complaint  Vomiting (Started about 48 hours ago), Diarrhea, Nasal Congestion, and Sore Throat    Subjective     History of Present Illness:  Theresa Maya is a 33 y.o. female who presents today for vomiting, diarrhea, congestion and sore throat. Denies fevers/chills, cough or SOB. All symptoms started about 2 days ago. She also c/o abdominal pain in the RUQ area. States she did have a cholecystectomy about 20 years ago.     States diarrhea is yellow, denies blood in her stool. she is having BMs about 4 x/day. She is unable to tolerate po intake because of the nausea/vomiting.     C/o R sided flank pain/low back pain and states she feel like her urine is more cloudy recently. Denies dysuria, urgency or frequency.     Review of Systems    Past Medical History:   Diagnosis Date    Abnormal Pap smear of cervix     ADHD (attention deficit hyperactivity disorder)     Working with a therapy at     Allergic     Take montalukast    Anxiety     Arthritis     Clotting disorder     Deep vein thrombosis     , (R) leg while pregnant, dx w/ MTHFR    Depression     Dyspepsia     Dyspnea on exertion     Elevated hemoglobin A1c     Fatigue     Gallbladder abscess     s/p lap nicolas     GERD (gastroesophageal reflux disease)     UGI  - small recurrent HH, EGD Dr. Ricardo   small recurrent HH    Heartburn     chronic/episodic, depends on what she eats, takes Pepcid prn, EGD Dr. Ricardo     Hyperemesis gravidarum     Hyperlipidemia     Incomplete right bundle branch block     Iron deficiency anemia     Migraines     Morbid obesity with body mass index (BMI) of 40.0 to 44.9 in adult 2022    MTHFR deficiency complicating pregnancy     says clotting d/o only an issue when pregnant, advised to use heparin shots during pregnancy    Obesity     Ovarian cyst     Recurrent pregnancy loss, antepartum condition or  "complication     had a [facility] and lost the pregnancy at 6 months    Strep pharyngitis     Urinary tract infection      Past Surgical History:   Procedure Laterality Date    ENDOSCOPY N/A 05/05/2022    Procedure: ESOPHAGOGASTRODUODENOSCOPY;  Surgeon: Cristiano Ricardo MD;  Location:  [facility];  Service: Bariatric;  Laterality: N/A;    GASTRECTOMY N/A 05/05/2022    Procedure: GASTRECTOMY LAPAROSCOPIC;  Surgeon: Cristiano Ricardo MD;  Location:  [facility];  Service: Bariatric;  Laterality: N/A;    HERNIA REPAIR  2022    HIATAL HERNIA REPAIR N/A 05/05/2022    Procedure: HIATAL HERNIA REPAIR LAPAROSCOPIC;  Surgeon: Cristiano Ricardo MD;  Location:  [facility];  Service: Bariatric;  Laterality: N/A;    HYSTERECTOMY  01/23/2024    [facility] Dr. Elizabeth    LAPAROSCOPIC CHOLECYSTECTOMY  2006    no stones, was told she had \"three gallbladders\"- all removed    SINUS SURGERY Bilateral 2006    TUBAL COAGULATION LAPAROSCOPIC Bilateral 09/15/2017    Procedure: BILATERAL TUBAL FALLOPE FILSHIE CLIPPING LAPAROSCOPIC;  Surgeon: Leo Posey III, MD;  Location:  [facility];  Service:     VAGINAL DELIVERY  14; 16; 17    female,male, male.  She said she had subcutaneous Lovenox injections throughout the second and third pregnancies until delivery       Current Outpatient Medications:     ARIPiprazole (ABILIFY) 5 MG tablet, Take 1 tablet by mouth Daily., Disp: 30 tablet, Rfl: 1    cyclobenzaprine (FLEXERIL) 5 MG tablet, Take 1 tablet by mouth As Needed., Disp: , Rfl:     Elidel 1 % cream, Apply  topically to the appropriate area as directed 2 (Two) Times a Day., Disp: 100 g, Rfl: 2    esomeprazole (nexIUM) 40 MG capsule, Take 1 capsule by mouth Every Morning Before Breakfast. (Patient taking differently: Take 1 capsule by mouth 2 (Two) Times a Day.), Disp: 90 capsule, Rfl: 1    ferrous sulfate 325 (65 FE) MG tablet, Take 1 tablet by mouth 2 (Two) Times a Day., Disp: , Rfl:     lamoTRIgine (LaMICtal) 100 MG tablet, Take 1 tablet by mouth " "Daily., Disp: 30 tablet, Rfl: 1    montelukast (Singulair) 10 MG tablet, Take 1 tablet by mouth Every Night., Disp: 30 tablet, Rfl: 3    multivitamin with minerals tablet tablet, Take 1 tablet by mouth Daily., Disp: , Rfl:     ondansetron ODT (ZOFRAN-ODT) 4 MG disintegrating tablet, Place 1 tablet on the tongue Every 8 (Eight) Hours As Needed for Nausea or Vomiting., Disp: 20 tablet, Rfl: 0    polyethylene glycol (MiraLax) 17 GM/SCOOP powder, Take 17 g by mouth Daily., Disp: 255 g, Rfl: 1    promethazine (PHENERGAN) 12.5 MG tablet, Take 1 tablet by mouth Every 8 (Eight) Hours As Needed for Nausea or Vomiting., Disp: 20 tablet, Rfl: 0    Saw Palmetto 80 MG capsule, Take 1 capsule by mouth Daily., Disp: , Rfl:     triamcinolone (KENALOG) 0.1 % ointment, Apply  topically to the appropriate area as directed 2 (Two) Times a Day., Disp: 453.6 g, Rfl: 1    vitamin B-12 (CYANOCOBALAMIN) 500 MCG tablet, Take 1 tablet by mouth Daily., Disp: , Rfl:     vitamin D3 125 MCG (5000 UT) capsule capsule, Take 1 capsule by mouth Daily., Disp: , Rfl:     guaifenesin-dextromethorphan 600-30 mg (MUCINEX DM)  MG tablet sustained-release 12 hour, Take 1 tablet by mouth 2 (Two) Times a Day As Needed (congestion)., Disp: 20 tablet, Rfl: 0    sennosides-docusate (senna-docusate sodium) 8.6-50 MG per tablet, Take 1-2 tablets by mouth Daily. (Patient not taking: Reported on 4/9/2024), Disp: 60 tablet, Rfl: 1  Allergies   Allergen Reactions    Amoxicillin Diarrhea and GI Intolerance    Doxycycline Other (See Comments)     Vaginal swelling     Latex Hives and Itching    Penicillins GI Intolerance     Says Keflex OK as long as she takes w/ food.        Objective     Vital Signs  /78 (BP Location: Right arm, Patient Position: Sitting, Cuff Size: Adult)   Pulse 90   Temp 97.1 °F (36.2 °C) (Infrared)   Resp 16   Ht 161.3 cm (63.5\")   Wt 97.1 kg (214 lb)   SpO2 100%   BMI 37.31 kg/m²   Estimated body mass index is 37.31 kg/m² as " "calculated from the following:    Height as of this encounter: 161.3 cm (63.5\").    Weight as of this encounter: 97.1 kg (214 lb).            Physical Exam  Vitals reviewed.   Constitutional:       Appearance: Normal appearance. She is ill-appearing.   HENT:      Head: Normocephalic and atraumatic.      Right Ear: Tympanic membrane, ear canal and external ear normal. There is no impacted cerumen.      Left Ear: Tympanic membrane, ear canal and external ear normal. There is no impacted cerumen.      Nose: Nose normal. No congestion or rhinorrhea.      Mouth/Throat:      Mouth: Mucous membranes are moist.      Pharynx: Oropharynx is clear. Uvula midline. No oropharyngeal exudate or posterior oropharyngeal erythema.      Tonsils: No tonsillar exudate or tonsillar abscesses. 0 on the right. 0 on the left.   Eyes:      Extraocular Movements: Extraocular movements intact.      Conjunctiva/sclera: Conjunctivae normal.      Pupils: Pupils are equal, round, and reactive to light.   Neck:      Thyroid: No thyroid mass, thyromegaly or thyroid tenderness.   Cardiovascular:      Rate and Rhythm: Normal rate and regular rhythm.      Pulses: Normal pulses.           Radial pulses are 2+ on the right side and 2+ on the left side.      Heart sounds: Normal heart sounds, S1 normal and S2 normal. No murmur heard.  Pulmonary:      Effort: Pulmonary effort is normal. No tachypnea or respiratory distress.      Breath sounds: Normal breath sounds and air entry. No decreased breath sounds, wheezing or rhonchi.   Abdominal:      General: Abdomen is flat. Bowel sounds are normal.      Palpations: Abdomen is soft.      Tenderness: There is abdominal tenderness in the right upper quadrant. There is right CVA tenderness. There is no guarding or rebound.   Musculoskeletal:      Right lower leg: No edema.      Left lower leg: No edema.   Lymphadenopathy:      Cervical: No cervical adenopathy.   Skin:     General: Skin is warm and dry.      " Capillary Refill: Capillary refill takes less than 2 seconds.   Neurological:      General: No focal deficit present.      Mental Status: She is alert and oriented to person, place, and time. Mental status is at baseline.      Sensory: Sensation is intact.      Motor: Motor function is intact.      Coordination: Coordination is intact.      Gait: Gait is intact.   Psychiatric:         Attention and Perception: Attention and perception normal.         Mood and Affect: Mood and affect normal.         Speech: Speech normal.         Behavior: Behavior normal. Behavior is cooperative.         Thought Content: Thought content normal.         Cognition and Memory: Cognition and memory normal.         Judgment: Judgment normal.          Results for orders placed or performed in visit on 04/09/24   POCT SARS-CoV-2 Antigen LUKAS + Flu    Specimen: Swab   Result Value Ref Range    SARS Antigen Not Detected Not Detected, Presumptive Negative    Influenza A Antigen LUKAS Not Detected Not Detected    Influenza B Antigen LUKAS Not Detected Not Detected    Internal Control Passed Passed    Lot Number [id number]     Expiration Date 02-    POCT rapid strep A    Specimen: Swab   Result Value Ref Range    Rapid Strep A Screen Negative Negative, VALID, INVALID, Not Performed    Internal Control Passed Passed    Lot Number [id number]     Expiration Date 07-     v         Assessment and Plan     Diagnoses and all orders for this visit:    1. Nasal congestion (Primary)  -     POCT SARS-CoV-2 Antigen LUKAS + Flu  -     guaifenesin-dextromethorphan 600-30 mg (MUCINEX DM)  MG tablet sustained-release 12 hour; Take 1 tablet by mouth 2 (Two) Times a Day As Needed (congestion).  Dispense: 20 tablet; Refill: 0    2. Sore throat  -     POCT rapid strep A    3. Environmental allergies  -     montelukast (Singulair) 10 MG tablet; Take 1 tablet by mouth Every Night.  Dispense: 30 tablet; Refill: 3    4. Dyspepsia  -     ondansetron ODT  (ZOFRAN-ODT) 4 MG disintegrating tablet; Place 1 tablet on the tongue Every 8 (Eight) Hours As Needed for Nausea or Vomiting.  Dispense: 20 tablet; Refill: 0    5. Right upper quadrant abdominal pain  -     Comprehensive Metabolic Panel; Future  -     CBC No Differential; Future  -     Lipase; Future  -     Amylase; Future  -     Gastrointestinal Panel, PCR - Stool, Per Rectum  -     US Abdomen Complete; Future  -     Cancel: POC Urinalysis Dipstick, Automated  -     Urinalysis With Microscopic - Urine, Clean Catch; Future    6. Vomiting and diarrhea  -     ondansetron ODT (ZOFRAN-ODT) 4 MG disintegrating tablet; Place 1 tablet on the tongue Every 8 (Eight) Hours As Needed for Nausea or Vomiting.  Dispense: 20 tablet; Refill: 0  -     Comprehensive Metabolic Panel; Future  -     CBC No Differential; Future  -     Lipase; Future  -     Amylase; Future  -     Gastrointestinal Panel, PCR - Stool, Per Rectum  -     US Abdomen Complete; Future  -     Cancel: POC Urinalysis Dipstick, Automated    Other orders  -     Elidel 1 % cream; Apply  topically to the appropriate area as directed 2 (Two) Times a Day.  Dispense: 100 g; Refill: 2  -     triamcinolone (KENALOG) 0.1 % ointment; Apply  topically to the appropriate area as directed 2 (Two) Times a Day.  Dispense: 453.6 g; Refill: 1    POC strep, flu, covid negative  UA ordered  Will check labs and stool  US ordered  Zofran and mucinex dm sent to pharmacy  Med refills sent     If a referral was made please contact our office if you have not heard about an appointment in the next 2 weeks.   If labs or images are ordered we will contact you with the results within the next week.  If you have not heard from us after a week please call our office to inquire about the results.      PHYLICIA García

## 2024-04-09 NOTE — TELEPHONE ENCOUNTER
Rx Refill Note  Requested Prescriptions     Pending Prescriptions Disp Refills    montelukast (Singulair) 10 MG tablet 30 tablet 3     Sig: Take 1 tablet by mouth Every Night.      Last office visit with prescribing clinician: 1/30/2024   Last telemedicine visit with prescribing clinician: Visit date not found   Next office visit with prescribing clinician: Visit date not found       Jessica Márquez MA  04/09/24, 12:20 EDT

## 2024-04-10 ENCOUNTER — HOSPITAL ENCOUNTER (OUTPATIENT)
Dept: ULTRASOUND IMAGING | Facility: HOSPITAL | Age: 34
Discharge: HOME | End: 2024-04-10
Payer: COMMERCIAL

## 2024-04-10 DIAGNOSIS — R19.7 VOMITING AND DIARRHEA: ICD-10-CM

## 2024-04-10 DIAGNOSIS — R11.10 VOMITING AND DIARRHEA: ICD-10-CM

## 2024-04-10 DIAGNOSIS — R10.11 RIGHT UPPER QUADRANT ABDOMINAL PAIN: ICD-10-CM

## 2024-04-10 PROCEDURE — 76700 US EXAM ABDOM COMPLETE: CPT

## 2024-04-18 DIAGNOSIS — R06.00 DYSPNEA, UNSPECIFIED TYPE: ICD-10-CM

## 2024-04-18 DIAGNOSIS — R53.83 FATIGUE, UNSPECIFIED TYPE: Primary | ICD-10-CM

## 2024-04-23 ENCOUNTER — LAB (OUTPATIENT)
Dept: LAB | Facility: HOSPITAL | Age: 34
End: 2024-04-23
Payer: COMMERCIAL

## 2024-04-23 DIAGNOSIS — R06.00 DYSPNEA, UNSPECIFIED TYPE: ICD-10-CM

## 2024-04-23 DIAGNOSIS — R53.83 FATIGUE, UNSPECIFIED TYPE: ICD-10-CM

## 2024-04-23 LAB
ALBUMIN SERPL-MCNC: 4.3 G/DL (ref 3.5–5.2)
ALBUMIN/GLOB SERPL: 1.6 G/DL
ALP SERPL-CCNC: 72 U/L (ref 39–117)
ALT SERPL W P-5'-P-CCNC: 14 U/L (ref 1–33)
ANION GAP SERPL CALCULATED.3IONS-SCNC: 9 MMOL/L (ref 5–15)
AST SERPL-CCNC: 15 U/L (ref 1–32)
BILIRUB SERPL-MCNC: <0.2 MG/DL (ref 0–1.2)
BUN SERPL-MCNC: 8 MG/DL (ref 6–20)
BUN/CREAT SERPL: 11.1 (ref 7–25)
CALCIUM SPEC-SCNC: 8.9 MG/DL (ref 8.6–10.5)
CHLORIDE SERPL-SCNC: 107 MMOL/L (ref 98–107)
CO2 SERPL-SCNC: 26 MMOL/L (ref 22–29)
CREAT SERPL-MCNC: 0.72 MG/DL (ref 0.57–1)
DEPRECATED RDW RBC AUTO: 40.5 FL (ref 37–54)
EGFRCR SERPLBLD CKD-EPI 2021: 113.4 ML/MIN/1.73
ERYTHROCYTE [DISTWIDTH] IN BLOOD BY AUTOMATED COUNT: 13.6 % (ref 12.3–15.4)
GLOBULIN UR ELPH-MCNC: 2.7 GM/DL
GLUCOSE SERPL-MCNC: 96 MG/DL (ref 65–99)
HCT VFR BLD AUTO: 33.5 % (ref 34–46.6)
HGB BLD-MCNC: 10.7 G/DL (ref 12–15.9)
MCH RBC QN AUTO: 26.2 PG (ref 26.6–33)
MCHC RBC AUTO-ENTMCNC: 31.9 G/DL (ref 31.5–35.7)
MCV RBC AUTO: 82.1 FL (ref 79–97)
PLATELET # BLD AUTO: 281 10*3/MM3 (ref 140–450)
PMV BLD AUTO: 11.2 FL (ref 6–12)
POTASSIUM SERPL-SCNC: 3.9 MMOL/L (ref 3.5–5.2)
PROT SERPL-MCNC: 7 G/DL (ref 6–8.5)
RBC # BLD AUTO: 4.08 10*6/MM3 (ref 3.77–5.28)
SODIUM SERPL-SCNC: 142 MMOL/L (ref 136–145)
WBC NRBC COR # BLD AUTO: 4.2 10*3/MM3 (ref 3.4–10.8)

## 2024-04-23 PROCEDURE — 85027 COMPLETE CBC AUTOMATED: CPT

## 2024-04-23 PROCEDURE — 80053 COMPREHEN METABOLIC PANEL: CPT

## 2024-04-23 PROCEDURE — 36415 COLL VENOUS BLD VENIPUNCTURE: CPT

## 2024-05-07 ENCOUNTER — CONSULT (OUTPATIENT)
Dept: BARIATRICS/WEIGHT MGMT | Facility: CLINIC | Age: 34
End: 2024-05-07
Payer: COMMERCIAL

## 2024-05-07 VITALS
SYSTOLIC BLOOD PRESSURE: 122 MMHG | HEART RATE: 80 BPM | OXYGEN SATURATION: 99 % | HEIGHT: 64 IN | TEMPERATURE: 98 F | WEIGHT: 212.5 LBS | DIASTOLIC BLOOD PRESSURE: 78 MMHG | BODY MASS INDEX: 36.28 KG/M2

## 2024-05-07 DIAGNOSIS — K21.9 HIATAL HERNIA WITH GASTROESOPHAGEAL REFLUX: Primary | ICD-10-CM

## 2024-05-07 DIAGNOSIS — K44.9 HIATAL HERNIA WITH GASTROESOPHAGEAL REFLUX: Primary | ICD-10-CM

## 2024-05-07 DIAGNOSIS — Z98.84 S/P LAPAROSCOPIC SLEEVE GASTRECTOMY: ICD-10-CM

## 2024-05-07 PROCEDURE — 99214 OFFICE O/P EST MOD 30 MIN: CPT | Performed by: SURGERY

## 2024-05-07 RX ORDER — SODIUM CHLORIDE 9 MG/ML
40 INJECTION, SOLUTION INTRAVENOUS AS NEEDED
OUTPATIENT
Start: 2024-05-07

## 2024-05-07 RX ORDER — SODIUM CHLORIDE 0.9 % (FLUSH) 0.9 %
3 SYRINGE (ML) INJECTION EVERY 12 HOURS SCHEDULED
OUTPATIENT
Start: 2024-05-07

## 2024-05-07 RX ORDER — SODIUM CHLORIDE 0.9 % (FLUSH) 0.9 %
3-10 SYRINGE (ML) INJECTION AS NEEDED
OUTPATIENT
Start: 2024-05-07

## 2024-05-07 RX ORDER — SODIUM CHLORIDE, SODIUM LACTATE, POTASSIUM CHLORIDE, CALCIUM CHLORIDE 600; 310; 30; 20 MG/100ML; MG/100ML; MG/100ML; MG/100ML
150 INJECTION, SOLUTION INTRAVENOUS CONTINUOUS
OUTPATIENT
Start: 2024-05-07

## 2024-05-07 RX ORDER — SCOLOPAMINE TRANSDERMAL SYSTEM 1 MG/1
1 PATCH, EXTENDED RELEASE TRANSDERMAL ONCE
OUTPATIENT
Start: 2024-05-07 | End: 2024-05-07

## 2024-05-07 RX ORDER — ENOXAPARIN SODIUM 100 MG/ML
40 INJECTION SUBCUTANEOUS ONCE
OUTPATIENT
Start: 2024-05-07 | End: 2024-05-07

## 2024-05-07 RX ORDER — GABAPENTIN 100 MG/1
600 CAPSULE ORAL ONCE
OUTPATIENT
Start: 2024-05-07 | End: 2024-05-07

## 2024-05-07 RX ORDER — ACETAMINOPHEN 500 MG
1000 TABLET ORAL ONCE
OUTPATIENT
Start: 2024-05-07 | End: 2024-05-07

## 2024-05-07 RX ORDER — CHLORHEXIDINE GLUCONATE ORAL RINSE 1.2 MG/ML
30 SOLUTION DENTAL
OUTPATIENT
Start: 2024-05-07 | End: 2024-05-07

## 2024-05-07 RX ORDER — PANTOPRAZOLE SODIUM 40 MG/10ML
40 INJECTION, POWDER, LYOPHILIZED, FOR SOLUTION INTRAVENOUS ONCE
OUTPATIENT
Start: 2024-05-07 | End: 2024-05-07

## 2024-05-07 NOTE — H&P (VIEW-ONLY)
"[facility] BARIATRIC SURGERY  [address]  [address]  [facility] [id number] [phone]      Patient  Name:  Theresa Maya  :  1990      Date of Visit: 24    Chief Complaint:  Recurrent hiatal hernia and reflux, abdominal pain status post laparoscopic sleeve gastrectomy and hiatal hernia repair May 2022    History of Present Illness:  Theresa Maya is a 33 y.o. female who presents today for reevaluation, education and consultation regarding her recurrent hiatal hernia and GE reflux and abdominal pain status post laparoscopic sleeve gastrectomy with hiatal hernia repair in May 2022.  My most recent evaluation dated 2023 reviewed:    \"Theresa Maya is a 33 y.o. female who presents today for evaluation, education and consultation regarding her recurrent hiatal hernia and reflux and abdominal pain status post laparoscopic sleeve gastrectomy and hiatal hernia repair in May 2022.  Since last seen 2023 she has lost 3-1/2 pounds.  Most recent evaluation Sallie CONCEPCION PA-C dated 2023 reviewed.  She notes the last office visit was on 2023 where she quotes findings that at that time 3 weeks prior the patient developed sharp acute crampy umbilical pain that doubled her over with minimal relief and next day noted bloody discharge from her umbilicus and subsidy went to the ER at  on 2023 where they noted she went to an urgent care earlier for this and was given Keflex and Toradol and told to come to the ED and has associated diarrhea which was painful without hematochezia also nausea dry heaving increased urinary frequency and yellow vaginal discharge no fever chills CAT scan showed a small hiatal hernia and was instructed to continue Keflex and that wound cultures on 2023 grew normal skin nicole and her PCP gave her a treatment for Bactrim after she finished her Keflex and that her symptoms persist and she was advised to follow-up with her bariatric " "surgeon continues with intense pain/pressure surrounding her umbilicus with ongoing bloody discharge also having heartburn nausea poor appetite and is on a Medrol pack for sinus drainage Mobic as needed for sacral pain and took over-the-counter omeprazole for 20 days and notes that surgical history includes a laparoscopic tubal ligation in 2017 at [facility] by Dr. Leo Posey and the op note was reviewed where a periumbilical incision was made and that they discussed with Dr. Ricardo who said to reassure her that I did not make any incisions at her umbilicus and that she had previously had umbilical incisions both for her cholecystectomy and likely her tubal ligation and she should follow-up with a general surgeon for evaluation of her umbilicus and with regards to her reflux and possible recurrent hernia needs an upper GI as the next step.  She notes evaluation with a general surgeon is pending has a repeat CT of the abdomen pelvis ordered also with a possible left ovarian issue follow-up with GYN plan next week and that upper GI on 9/29/2023 at [facility] showed mild reflux and a small sliding hiatal hernia and Sallie CONCEPCION PA-C update says she still has \"massive heartburn\" with pain in her stomach continues on Nexium OTC currently on Macrobid for UTI umbilical drainage has slowed.  EGD on 11/27/2023 I noted a recurrent hiatal hernia Z-line 35 cm distal linear erosive focal esophagitis the sleeve itself unremarkable no bile in the stomach pylorus not in spasm or deformed.  I noted that prior to her sleeve she had reflux and EGD showed a small hiatal hernia with the Z-line at 35 cm and at surgery the hiatal hernia was subtle seen on the posterior lateral view and was repaired posteriorly the sleeve was done by Titan technique and then I reviewed the color images from surgery including a normal-appearing periumbilical region and at the time of her surgery she weighed 242-1/2 pounds and had a BMI " 42.7 and currently weighs 204 pounds with a BMI of 35.6 and that she is developed recurrent reflux not well-controlled with maximal medical therapy most recently switched from omeprazole to Nexium and that I reviewed the upper GI read by Dr. Ari Moeller.  Pathology of the antrum showed minimal chronic gastritis negative for H. pylori and distal esophageal biopsies were consistent with reflux esophagitis otherwise unremarkable.     33-year-old female from [place] known to me as above.  She comes in today to discuss options.  We had briefly discussed prior to her EGD that the usual options are recurrent hiatal hernia repair with or without revision to Teresa-en-Y gastric bypass.  Her  was there at the time.  She says they have talked this over at length.  Currently her reflux symptoms or not controlled on 40 mg of Nexium.  She says she regurgitates at night and gets nausea frequently from the reflux as well.  With regards to her periumbilical pain and issues she says workup has revealed that her ovary is adhesed to her umbilicus, this was diagnosed by what she describes as an invasive ultrasound probe and that they suspect she has PCOS and she has an appointment to see her GYN in [place] on 12/14/2023.  She says she is switching insurance, her  is a transplant nurse in [place] at [employer] and she is going under his insurance.....    Assessment:     hTeresa Maya is a 33 y.o. year old female with recurrent hiatal hernia and reflux, abdominal pain status post laparoscopic sleeve gastrectomy and hiatal hernia repair May 2022     I reviewed her Carlos Alberto report which is negative.       We went over the options to treat her recurrent hiatal hernia and reflux.  I used flip charts.  We discussed recurrent hiatal hernia repair alone as a stand-alone procedure versus recurrent hiatal hernia pair with revision to a Teresa-en-Y gastric bypass with or without a longer BP limb.  She says she has no issues  with taking required lifelong vitamins and already takes a prenatal vitamin, iron, and vitamin D and B12 and understands that there will be several additional vitamins required.  She also understands there could be would be more frequent bowel movements.     Complications of laparoscopic/possible robotic gastric bypass were discussed including but not limited to bleeding, infection, deep venous thrombosis, pulmonary embolism, pulmonary complications such as pneumonia, cardiac events, hernias, small bowel obstruction, damage to the spleen or other organs, bowel injury, disfiguring scars, failure to lose weight, need for additional surgery, conversion to an open procedure, and death.  Patient is also aware of complications which apply in this particular procedure that can include but are not limited to leaking of gastric contents at the staple or suture lines which could lead to intra-abdominal abscess, or chronic complications of strictures, ulcers, or vitamin/mineral deficiencies.  If long BP limb, we discussed the likelihood of more frequent BM's,possibly malodorous, with increased risk for vitamin deficiencies, beena the fat soluble vitamins, and that this can be serious and irreversible and will require lifelong compliance with w f/u and vit/supplment recommendations.  Aware that may need an elongation procedure in the future if diarrhea and/or vitamin issues not controlled.        R/B/A Rx to recurrent hiatal hernia repair were discussed.  These include but are not limited to bleeding, infection, death, pulmonary complications,  VTE events, splenic injury or infarction, recurrent hernia, DAVID, mesh complications, dysphagia, esophageal injury, pneumothorax, injury to the vagus nerves, injury to the thoracic duct, aorta or vena cava, diaphragmatic paralysis.         Plan:    After discussion of the risk, benefits, and alternative therapies as above she wishes to proceed with laparoscopic possible robotic assisted  "recurrent hiatal hernia repair with revision to Teresa-en-Y gastric to address her recurrent hiatal hernia and severe reflux status post laparoscopic sleeve gastrectomy and hiatal hernia repair in May 2022.  Based on my interaction with her today plan BP limb around 100 to 150 cm depending on bowel length.  Likely discharge on Eliquis.     Other issues include anxiety and depression, arthritis, MTHFR mutation with history of DVT right lower extremity 2014, dyspnea exertion, elevated hemoglobin A1c, fatigue, upper lipidemia, incomplete right bundle branch block, history of iron deficiency anemia, migraine headaches, ovarian cyst, history of UTIs.\"    She returns for final evaluation prior to scheduling surgery.  She attended informed consent last evening and although it is pretty much the same information feels she learned some new information.  A daughter is with her for today's evaluation.  The patient continues to have severe reflux symptoms and is up to 80 mg of Nexium daily and takes Zofran at night to keep from vomiting.  Since last seen she underwent laparoscopic total abdominal hysterectomy with bilateral salpingectomy, cystoscopy, resection of an umbilical endometrioma with reconstruction of the umbilicus, the ovaries were left.  This was done at [facility].  She said no complications of surgery and she was sent home on 7 days of Lovenox injections.  Her hemoglobin is 10 and she is agreeable with blood transfusion if deemed appropriate.  She is no longer living in [place] or working at the [facility] [facility] cancer center.  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      Past Medical History:   Diagnosis Date    Abnormal Pap smear of cervix 2019    ADHD (attention deficit hyperactivity disorder) 2022    Working with a therapy at     Allergic 2011    Take montalukast    Anxiety     Arthritis     Clotting disorder     Deep vein thrombosis     2014, (R) leg " "while pregnant, dx w/ MTHFR    Depression     Dyspepsia     Dyspnea on exertion     Elevated hemoglobin A1c     Fatigue     Gallbladder abscess     s/p lap nicolas 2006    GERD (gastroesophageal reflux disease)     UGI 9/23 - small recurrent HH, EGD Dr. Ricardo  11/23 small recurrent HH    Heartburn     chronic/episodic, depends on what she eats, takes Pepcid prn, EGD Dr. Ricardo 11/21    Hyperemesis gravidarum     Hyperlipidemia     Incomplete right bundle branch block     Iron deficiency anemia     Migraines     Miscarriage     x 4 prior to 1st pregnancy, presumably from undiagnosed MTHFR mutation    Morbid obesity with body mass index (BMI) of 40.0 to 44.9 in adult 04/19/2022    MTHFR deficiency complicating pregnancy     says clotting d/o only an issue when pregnant, advised to use heparin shots during pregnancy    Obesity     Ovarian cyst     Recurrent pregnancy loss, antepartum condition or complication     had a [facility] and lost the pregnancy at 6 months    Strep pharyngitis     Urinary tract infection      Past Surgical History:   Procedure Laterality Date    ENDOSCOPY N/A 05/05/2022    Procedure: ESOPHAGOGASTRODUODENOSCOPY;  Surgeon: Cristiano Ricardo MD;  Location:  [facility];  Service: Bariatric;  Laterality: N/A;    GASTRECTOMY N/A 05/05/2022    Procedure: GASTRECTOMY LAPAROSCOPIC;  Surgeon: Cristiano Ricardo MD;  Location:  [facility];  Service: Bariatric;  Laterality: N/A;    HERNIA REPAIR  2022    HIATAL HERNIA REPAIR N/A 05/05/2022    Procedure: HIATAL HERNIA REPAIR LAPAROSCOPIC;  Surgeon: Cristiano Ricardo MD;  Location:  [facility];  Service: Bariatric;  Laterality: N/A;    HYSTERECTOMY  01/23/2024    [facility] Dr. Elizabeth    LAPAROSCOPIC CHOLECYSTECTOMY  2006    no stones, was told she had \"three gallbladders\"- all removed    SINUS SURGERY Bilateral 2006    TUBAL COAGULATION LAPAROSCOPIC Bilateral 09/15/2017    Procedure: BILATERAL TUBAL FALLOPE FILSHIE CLIPPING LAPAROSCOPIC;  Surgeon: Leo Posey III, MD;  " Location: [facility] OR;  Service:     VAGINAL DELIVERY  14; 16; 17    female,male, male.  She said she had subcutaneous Lovenox injections throughout the second and third pregnancies until delivery       Allergies   Allergen Reactions    Amoxicillin Diarrhea and GI Intolerance    Doxycycline Other (See Comments)     Vaginal swelling     Latex Hives and Itching    Penicillins GI Intolerance     Says Keflex OK as long as she takes w/ food.        Current Outpatient Medications:     ARIPiprazole (ABILIFY) 5 MG tablet, Take 1 tablet by mouth Daily., Disp: 30 tablet, Rfl: 1    Elidel 1 % cream, Apply  topically to the appropriate area as directed 2 (Two) Times a Day., Disp: 100 g, Rfl: 2    esomeprazole (nexIUM) 40 MG capsule, Take 1 capsule by mouth Every Morning Before Breakfast. (Patient taking differently: Take 1 capsule by mouth 2 (Two) Times a Day.), Disp: 90 capsule, Rfl: 1    ferrous sulfate 325 (65 FE) MG tablet, Take 1 tablet by mouth 2 (Two) Times a Day., Disp: , Rfl:     guaifenesin-dextromethorphan 600-30 mg (MUCINEX DM)  MG tablet sustained-release 12 hour, Take 1 tablet by mouth 2 (Two) Times a Day As Needed (congestion)., Disp: 20 tablet, Rfl: 0    lamoTRIgine (LaMICtal) 100 MG tablet, Take 1 tablet by mouth Daily., Disp: 30 tablet, Rfl: 1    montelukast (Singulair) 10 MG tablet, Take 1 tablet by mouth Every Night., Disp: 30 tablet, Rfl: 3    multivitamin with minerals tablet tablet, Take 1 tablet by mouth Daily., Disp: , Rfl:     ondansetron ODT (ZOFRAN-ODT) 4 MG disintegrating tablet, Place 1 tablet on the tongue Every 8 (Eight) Hours As Needed for Nausea or Vomiting., Disp: 20 tablet, Rfl: 0    polyethylene glycol (MiraLax) 17 GM/SCOOP powder, Take 17 g by mouth Daily., Disp: 255 g, Rfl: 1    promethazine (PHENERGAN) 12.5 MG tablet, Take 1 tablet by mouth Every 8 (Eight) Hours As Needed for Nausea or Vomiting., Disp: 20 tablet, Rfl: 0    Saw Palmetto 80 MG capsule, Take 1 capsule by mouth Daily.,  Disp: , Rfl:     triamcinolone (KENALOG) 0.1 % ointment, Apply  topically to the appropriate area as directed 2 (Two) Times a Day., Disp: 453.6 g, Rfl: 1    vitamin B-12 (CYANOCOBALAMIN) 500 MCG tablet, Take 1 tablet by mouth Daily., Disp: , Rfl:     vitamin D3 125 MCG (5000 UT) capsule capsule, Take 1 capsule by mouth Daily., Disp: , Rfl:     apixaban (Eliquis) 2.5 MG tablet tablet, Take 1 tablet by mouth Every 12 (Twelve) Hours for 42 doses., Disp: 42 tablet, Rfl: 0    Social History     Socioeconomic History    Marital status:    Tobacco Use    Smoking status: Never    Smokeless tobacco: Never    Tobacco comments:     disgust me never touched   Vaping Use    Vaping status: Never Used   Substance and Sexual Activity    Alcohol use: Not Currently     Comment: do not drink weekly only on holidays/ birtdays    Drug use: No    Sexual activity: Yes     Partners: Male     Birth control/protection: Surgical     Comment: bilatiral tubal     Family History   Problem Relation Age of Onset    Asthma Mother         effected after stroke    Thyroid disease Mother     Heart attack Mother     Obesity Mother     Migraines Mother     Hypertension Mother     COPD Mother     Mental illness Mother         depression    Alcohol abuse Father         abused since childhood    Lupus Sister     Ovarian cancer Sister     Asthma Sister     Breast cancer Maternal Grandmother 54    Lung cancer Maternal Grandmother     Asthma Maternal Grandmother     Hypertension Maternal Grandmother     Lupus Maternal Grandmother     Thyroid disease Maternal Grandmother     Diabetes Maternal Grandmother     Stroke Maternal Grandmother     Cancer Maternal Grandmother         lung    Hypertension Maternal Grandfather     Lung cancer Paternal Grandmother     Obesity Paternal Grandmother     Cancer Paternal Grandmother     Prostate cancer Paternal Grandfather     Cancer Paternal Grandfather         lung    Miscarriages / Stillbirths Maternal Aunt         Review of Systems   Constitutional:  Positive for fatigue and unexpected weight gain. Negative for chills, diaphoresis, fever and unexpected weight loss.   HENT:  Negative for congestion and facial swelling.    Eyes:  Negative for blurred vision, double vision and discharge.   Respiratory:  Negative for chest tightness, shortness of breath and stridor.    Cardiovascular:  Negative for chest pain, palpitations and leg swelling.   Gastrointestinal:  Positive for GERD. Negative for blood in stool.   Endocrine: Negative for polydipsia.   Genitourinary:  Negative for hematuria.   Musculoskeletal:  Positive for arthralgias.   Skin:  Negative for color change.   Allergic/Immunologic: Negative for immunocompromised state.   Neurological:  Negative for confusion.   Psychiatric/Behavioral:  Positive for decreased concentration. Negative for self-injury.        I have reviewed the ROS and confirm that it's accurate today.    Physical Exam:  Vital Signs:  Weight: 96.4 kg (212 lb 8 oz)   Body mass index is 37.05 kg/m².  Temp: 98 °F (36.7 °C)   Heart Rate: 80   BP: 122/78     Physical Exam  Vitals reviewed.   Constitutional:       Appearance: She is well-developed.   HENT:      Head: Normocephalic and atraumatic.      Nose: Nose normal.   Eyes:      Conjunctiva/sclera: Conjunctivae normal.      Pupils: Pupils are equal, round, and reactive to light.   Neck:      Thyroid: No thyromegaly.      Vascular: No carotid bruit.      Trachea: No tracheal deviation.   Cardiovascular:      Rate and Rhythm: Normal rate and regular rhythm.      Heart sounds: Normal heart sounds.   Pulmonary:      Effort: Pulmonary effort is normal. No respiratory distress.      Breath sounds: Normal breath sounds.   Abdominal:      General: There is no distension.      Palpations: Abdomen is soft.      Tenderness: There is no abdominal tenderness.      Comments: Laparoscopy scars   Musculoskeletal:         General: No deformity. Normal range of motion.       Cervical back: Normal range of motion and neck supple.   Skin:     General: Skin is warm and dry.      Findings: No rash.   Neurological:      Mental Status: She is alert and oriented to person, place, and time.      Cranial Nerves: No cranial nerve deficit.      Coordination: Coordination normal.   Psychiatric:         Behavior: Behavior normal.         Thought Content: Thought content normal.         Judgment: Judgment normal.         Patient Active Problem List   Diagnosis    Previous stillbirth or demise, antepartum    Previous deep vein thrombosis (DVT) affecting pregnancy    H/O abnormal cervical Papanicolaou smear    Anxiety    MTHFR gene mutation    Heartburn    Fatigue    Dyspepsia    Dyspnea on exertion    Right leg pain    Lumbar back pain    Bipolar disorder in partial remission    History of suicidal ideation    Constipation    Hiatal hernia with gastroesophageal reflux    S/P laparoscopic sleeve gastrectomy       Assessment:    Theresa Maya is a 33 y.o. year old female with recurrent hiatal hernia and severe reflux and abdominal pain status post laparoscopic sleeve gastrectomy and hiatal hernia repair in May 2022    The patient was present for an approximately a 2.5 hour discussion of the purpose of MBS, how MBS is a tool to assist in achieving weight loss goals, the most common complications and how best to avoid them, and the strategies for short and long term weight loss and improvement in comorbidities.  Ample opportunity to discuss questions was available both in group and during the time of individual examination.    I reviewed her Carlos Alberto report showing oxycodone 5 mg #18 x 1 after her hysterectomy in January.  Labs dated 4/23/2024 unremarkable CMP CBC showing anemia with a hemoglobin of 10.7 hematocrit 35.5 low MCH.  Operative report at the [facility] in January 2024 total abdominal hysterectomy and bilateral salpingo-oophorectomy.  Psychosocial evaluation dated 2/5/2024  Yarelis WHATLEY and she is cleared to proceed with bariatric surgery.  Note dated 12/21/2023 that the patient was admitted to  on 12/18/2023 with suicidal ideation.  Please see scanned records that I have reviewed and signed off on today.  All of this in addition to the patient's unique history and exam has been taken into consideration in determining their appropriate candidacy for MBS.    Complications of laparoscopic/possible robotic gastric bypass were discussed including but not limited to bleeding, infection, deep venous thrombosis, pulmonary embolism, pulmonary complications such as pneumonia, cardiac events, hernias, small bowel obstruction, damage to the spleen or other organs, bowel injury, disfiguring scars, failure to lose weight, need for additional surgery, conversion to an open procedure, and death.  Patient is also aware of complications which apply in this particular procedure that can include but are not limited to leaking of gastric contents at the staple or suture lines which could lead to intra-abdominal abscess, or chronic complications of strictures, ulcers, or vitamin/mineral deficiencies.  If long BP limb, we discussed the likelihood of more frequent BM's,possibly malodorous, with increased risk for vitamin deficiencies, beena the fat soluble vitamins, and that this can be serious and irreversible and will require lifelong compliance with w f/u and vit/supplment recommendations.  Aware that may need an elongation procedure in the future if diarrhea and/or vitamin issues not controlled.       R/B/A Rx to recurrent hiatal hernia repair were discussed as outlined in our long consent form.  Briefly risks in addition to those for gastric bypass include recurrent hernia, DAVID, dysphagia, esophageal injury, pneumothorax, injury to the vagus nerves, injury to the thoracic duct, aorta or vena cava.    Discussed the risks, benefits and alternative therapies at great length as outlined in our extensive  consent forms, consent videos, and educational teaching process under the direction of the center's .    A copy of the patient's signed informed consent is on file.    R/B/A Rx discussed to postop anticoagulation incl but not limited to bleeding, drug reaction, venothromboembolic events, etc. and agreeable to taking post op  Eliquis 2.5 mg po Q 12 hrs #42        Plan:    After reevaluation today I think the patient remains a reasonable candidate for laparoscopic possible robotic assisted revision of her sleeve gastrectomy to a Teresa-en-Y gastric bypass, recurrent hiatal hernia repair, and EGD to address her recurrent hiatal hernia and severe reflux disease not controlled with maximal medical therapy status post laparoscopic sleeve gastrectomy and hiatal hernia repair in May 2022.  Aware this will be a complex likely 3 or more hours surgery with a Cali catheter, CHRISTINA drain, etc.    Other issues include Assessment:     Theresa Maya is a 33 y.o. year old female with recurrent hiatal hernia and reflux, abdominal pain status post laparoscopic sleeve gastrectomy and hiatal hernia repair May 2022     I reviewed her Carlos Alberto report which is negative.       We went over the options to treat her recurrent hiatal hernia and reflux.  I used flip charts.  We discussed recurrent hiatal hernia repair alone as a stand-alone procedure versus recurrent hiatal hernia pair with revision to a Teresa-en-Y gastric bypass with or without a longer BP limb.  She says she has no issues with taking required lifelong vitamins and already takes a prenatal vitamin, iron, and vitamin D and B12 and understands that there will be several additional vitamins required.  She also understands there could be would be more frequent bowel movements.     Complications of laparoscopic/possible robotic gastric bypass were discussed including but not limited to bleeding, infection, deep venous thrombosis, pulmonary embolism, pulmonary  complications such as pneumonia, cardiac events, hernias, small bowel obstruction, damage to the spleen or other organs, bowel injury, disfiguring scars, failure to lose weight, need for additional surgery, conversion to an open procedure, and death.  Patient is also aware of complications which apply in this particular procedure that can include but are not limited to leaking of gastric contents at the staple or suture lines which could lead to intra-abdominal abscess, or chronic complications of strictures, ulcers, or vitamin/mineral deficiencies.  If long BP limb, we discussed the likelihood of more frequent BM's,possibly malodorous, with increased risk for vitamin deficiencies, beena the fat soluble vitamins, and that this can be serious and irreversible and will require lifelong compliance with w f/u and vit/supplment recommendations.  Aware that may need an elongation procedure in the future if diarrhea and/or vitamin issues not controlled.        R/B/A Rx to recurrent hiatal hernia repair were discussed.  These include but are not limited to bleeding, infection, death, pulmonary complications,  VTE events, splenic injury or infarction, recurrent hernia, DAVID, mesh complications, dysphagia, esophageal injury, pneumothorax, injury to the vagus nerves, injury to the thoracic duct, aorta or vena cava, diaphragmatic paralysis.         Plan:    After discussion of the risk, benefits, and alternative therapies as above she wishes to proceed with laparoscopic possible robotic assisted recurrent hiatal hernia repair with revision to Teresa-en-Y gastric to address her recurrent hiatal hernia and severe reflux status post laparoscopic sleeve gastrectomy and hiatal hernia repair in May 2022.  Based on my interaction with her today plan BP limb around 100 to 150 cm depending on bowel length.  Likely discharge on Eliquis.     Other issues include anxiety and depression, arthritis, MTHFR mutation with history of DVT right lower  extremity 2014, dyspnea exertion, elevated hemoglobin A1c, fatigue, upper lipidemia, incomplete right bundle branch block, history of iron deficiency anemia, migraine headaches, ovarian cyst, history of UTIs.     Thank you Gina WHATLEY for the opportunity reevaluate Mrs. Maya.      Cristiano Ricardo MD

## 2024-05-07 NOTE — PROGRESS NOTES
"[facility] BARIATRIC SURGERY  [address]  [address]  [facility] [id number] [phone]      Patient  Name:  Theresa Maya  :  1990      Date of Visit: 24    Chief Complaint:  Recurrent hiatal hernia and reflux, abdominal pain status post laparoscopic sleeve gastrectomy and hiatal hernia repair May 2022    History of Present Illness:  Theresa Maya is a 33 y.o. female who presents today for reevaluation, education and consultation regarding her recurrent hiatal hernia and GE reflux and abdominal pain status post laparoscopic sleeve gastrectomy with hiatal hernia repair in May 2022.  My most recent evaluation dated 2023 reviewed:    \"Theresa Maya is a 33 y.o. female who presents today for evaluation, education and consultation regarding her recurrent hiatal hernia and reflux and abdominal pain status post laparoscopic sleeve gastrectomy and hiatal hernia repair in May 2022.  Since last seen 2023 she has lost 3-1/2 pounds.  Most recent evaluation Sallie CONCEPCION PA-C dated 2023 reviewed.  She notes the last office visit was on 2023 where she quotes findings that at that time 3 weeks prior the patient developed sharp acute crampy umbilical pain that doubled her over with minimal relief and next day noted bloody discharge from her umbilicus and subsidy went to the ER at  on 2023 where they noted she went to an urgent care earlier for this and was given Keflex and Toradol and told to come to the ED and has associated diarrhea which was painful without hematochezia also nausea dry heaving increased urinary frequency and yellow vaginal discharge no fever chills CAT scan showed a small hiatal hernia and was instructed to continue Keflex and that wound cultures on 2023 grew normal skin nicole and her PCP gave her a treatment for Bactrim after she finished her Keflex and that her symptoms persist and she was advised to follow-up with her bariatric " "surgeon continues with intense pain/pressure surrounding her umbilicus with ongoing bloody discharge also having heartburn nausea poor appetite and is on a Medrol pack for sinus drainage Mobic as needed for sacral pain and took over-the-counter omeprazole for 20 days and notes that surgical history includes a laparoscopic tubal ligation in 2017 at [facility] by Dr. Leo Posey and the op note was reviewed where a periumbilical incision was made and that they discussed with Dr. Ricardo who said to reassure her that I did not make any incisions at her umbilicus and that she had previously had umbilical incisions both for her cholecystectomy and likely her tubal ligation and she should follow-up with a general surgeon for evaluation of her umbilicus and with regards to her reflux and possible recurrent hernia needs an upper GI as the next step.  She notes evaluation with a general surgeon is pending has a repeat CT of the abdomen pelvis ordered also with a possible left ovarian issue follow-up with GYN plan next week and that upper GI on 9/29/2023 at [facility] showed mild reflux and a small sliding hiatal hernia and Sallie CONCEPCION PA-C update says she still has \"massive heartburn\" with pain in her stomach continues on Nexium OTC currently on Macrobid for UTI umbilical drainage has slowed.  EGD on 11/27/2023 I noted a recurrent hiatal hernia Z-line 35 cm distal linear erosive focal esophagitis the sleeve itself unremarkable no bile in the stomach pylorus not in spasm or deformed.  I noted that prior to her sleeve she had reflux and EGD showed a small hiatal hernia with the Z-line at 35 cm and at surgery the hiatal hernia was subtle seen on the posterior lateral view and was repaired posteriorly the sleeve was done by Titan technique and then I reviewed the color images from surgery including a normal-appearing periumbilical region and at the time of her surgery she weighed 242-1/2 pounds and had a BMI " 42.7 and currently weighs 204 pounds with a BMI of 35.6 and that she is developed recurrent reflux not well-controlled with maximal medical therapy most recently switched from omeprazole to Nexium and that I reviewed the upper GI read by Dr. Ari Moeller.  Pathology of the antrum showed minimal chronic gastritis negative for H. pylori and distal esophageal biopsies were consistent with reflux esophagitis otherwise unremarkable.     33-year-old female from [place] known to me as above.  She comes in today to discuss options.  We had briefly discussed prior to her EGD that the usual options are recurrent hiatal hernia repair with or without revision to Teresa-en-Y gastric bypass.  Her  was there at the time.  She says they have talked this over at length.  Currently her reflux symptoms or not controlled on 40 mg of Nexium.  She says she regurgitates at night and gets nausea frequently from the reflux as well.  With regards to her periumbilical pain and issues she says workup has revealed that her ovary is adhesed to her umbilicus, this was diagnosed by what she describes as an invasive ultrasound probe and that they suspect she has PCOS and she has an appointment to see her GYN in [place] on 12/14/2023.  She says she is switching insurance, her  is a transplant nurse in [place] at [employer] and she is going under his insurance.....    Assessment:     Theresa Maya is a 33 y.o. year old female with recurrent hiatal hernia and reflux, abdominal pain status post laparoscopic sleeve gastrectomy and hiatal hernia repair May 2022     I reviewed her Carlos Alberto report which is negative.       We went over the options to treat her recurrent hiatal hernia and reflux.  I used flip charts.  We discussed recurrent hiatal hernia repair alone as a stand-alone procedure versus recurrent hiatal hernia pair with revision to a Teresa-en-Y gastric bypass with or without a longer BP limb.  She says she has no issues  with taking required lifelong vitamins and already takes a prenatal vitamin, iron, and vitamin D and B12 and understands that there will be several additional vitamins required.  She also understands there could be would be more frequent bowel movements.     Complications of laparoscopic/possible robotic gastric bypass were discussed including but not limited to bleeding, infection, deep venous thrombosis, pulmonary embolism, pulmonary complications such as pneumonia, cardiac events, hernias, small bowel obstruction, damage to the spleen or other organs, bowel injury, disfiguring scars, failure to lose weight, need for additional surgery, conversion to an open procedure, and death.  Patient is also aware of complications which apply in this particular procedure that can include but are not limited to leaking of gastric contents at the staple or suture lines which could lead to intra-abdominal abscess, or chronic complications of strictures, ulcers, or vitamin/mineral deficiencies.  If long BP limb, we discussed the likelihood of more frequent BM's,possibly malodorous, with increased risk for vitamin deficiencies, beena the fat soluble vitamins, and that this can be serious and irreversible and will require lifelong compliance with w f/u and vit/supplment recommendations.  Aware that may need an elongation procedure in the future if diarrhea and/or vitamin issues not controlled.        R/B/A Rx to recurrent hiatal hernia repair were discussed.  These include but are not limited to bleeding, infection, death, pulmonary complications,  VTE events, splenic injury or infarction, recurrent hernia, DAVID, mesh complications, dysphagia, esophageal injury, pneumothorax, injury to the vagus nerves, injury to the thoracic duct, aorta or vena cava, diaphragmatic paralysis.         Plan:    After discussion of the risk, benefits, and alternative therapies as above she wishes to proceed with laparoscopic possible robotic assisted  "recurrent hiatal hernia repair with revision to Teresa-en-Y gastric to address her recurrent hiatal hernia and severe reflux status post laparoscopic sleeve gastrectomy and hiatal hernia repair in May 2022.  Based on my interaction with her today plan BP limb around 100 to 150 cm depending on bowel length.  Likely discharge on Eliquis.     Other issues include anxiety and depression, arthritis, MTHFR mutation with history of DVT right lower extremity 2014, dyspnea exertion, elevated hemoglobin A1c, fatigue, upper lipidemia, incomplete right bundle branch block, history of iron deficiency anemia, migraine headaches, ovarian cyst, history of UTIs.\"    She returns for final evaluation prior to scheduling surgery.  She attended informed consent last evening and although it is pretty much the same information feels she learned some new information.  A daughter is with her for today's evaluation.  The patient continues to have severe reflux symptoms and is up to 80 mg of Nexium daily and takes Zofran at night to keep from vomiting.  Since last seen she underwent laparoscopic total abdominal hysterectomy with bilateral salpingectomy, cystoscopy, resection of an umbilical endometrioma with reconstruction of the umbilicus, the ovaries were left.  This was done at [facility].  She said no complications of surgery and she was sent home on 7 days of Lovenox injections.  Her hemoglobin is 10 and she is agreeable with blood transfusion if deemed appropriate.  She is no longer living in [place] or working at the [employer] cancer center.  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      Past Medical History:   Diagnosis Date    Abnormal Pap smear of cervix 2019    ADHD (attention deficit hyperactivity disorder) 2022    Working with a therapy at     Allergic 2011    Take montalukast    Anxiety     Arthritis     Clotting disorder     Deep vein thrombosis     2014, (R) leg " "while pregnant, dx w/ MTHFR    Depression     Dyspepsia     Dyspnea on exertion     Elevated hemoglobin A1c     Fatigue     Gallbladder abscess     s/p lap nicolas 2006    GERD (gastroesophageal reflux disease)     UGI 9/23 - small recurrent HH, EGD Dr. Ricardo  11/23 small recurrent HH    Heartburn     chronic/episodic, depends on what she eats, takes Pepcid prn, EGD Dr. Ricardo 11/21    Hyperemesis gravidarum     Hyperlipidemia     Incomplete right bundle branch block     Iron deficiency anemia     Migraines     Miscarriage     x 4 prior to 1st pregnancy, presumably from undiagnosed MTHFR mutation    Morbid obesity with body mass index (BMI) of 40.0 to 44.9 in adult 04/19/2022    MTHFR deficiency complicating pregnancy     says clotting d/o only an issue when pregnant, advised to use heparin shots during pregnancy    Obesity     Ovarian cyst     Recurrent pregnancy loss, antepartum condition or complication     had a [facility] and lost the pregnancy at 6 months    Strep pharyngitis     Urinary tract infection      Past Surgical History:   Procedure Laterality Date    ENDOSCOPY N/A 05/05/2022    Procedure: ESOPHAGOGASTRODUODENOSCOPY;  Surgeon: Cristiano Ricardo MD;  Location:  [facility];  Service: Bariatric;  Laterality: N/A;    GASTRECTOMY N/A 05/05/2022    Procedure: GASTRECTOMY LAPAROSCOPIC;  Surgeon: Cristiano Ricardo MD;  Location:  [facility];  Service: Bariatric;  Laterality: N/A;    HERNIA REPAIR  2022    HIATAL HERNIA REPAIR N/A 05/05/2022    Procedure: HIATAL HERNIA REPAIR LAPAROSCOPIC;  Surgeon: Cristiano Ricardo MD;  Location:  [facility];  Service: Bariatric;  Laterality: N/A;    HYSTERECTOMY  01/23/2024    [facility] Dr. Elizabeth    LAPAROSCOPIC CHOLECYSTECTOMY  2006    no stones, was told she had \"three gallbladders\"- all removed    SINUS SURGERY Bilateral 2006    TUBAL COAGULATION LAPAROSCOPIC Bilateral 09/15/2017    Procedure: BILATERAL TUBAL FALLOPE FILSHIE CLIPPING LAPAROSCOPIC;  Surgeon: Leo Posey III, MD;  " Location: [facility] OR;  Service:     VAGINAL DELIVERY  14; 16; 17    female,male, male.  She said she had subcutaneous Lovenox injections throughout the second and third pregnancies until delivery       Allergies   Allergen Reactions    Amoxicillin Diarrhea and GI Intolerance    Doxycycline Other (See Comments)     Vaginal swelling     Latex Hives and Itching    Penicillins GI Intolerance     Says Keflex OK as long as she takes w/ food.        Current Outpatient Medications:     ARIPiprazole (ABILIFY) 5 MG tablet, Take 1 tablet by mouth Daily., Disp: 30 tablet, Rfl: 1    Elidel 1 % cream, Apply  topically to the appropriate area as directed 2 (Two) Times a Day., Disp: 100 g, Rfl: 2    esomeprazole (nexIUM) 40 MG capsule, Take 1 capsule by mouth Every Morning Before Breakfast. (Patient taking differently: Take 1 capsule by mouth 2 (Two) Times a Day.), Disp: 90 capsule, Rfl: 1    ferrous sulfate 325 (65 FE) MG tablet, Take 1 tablet by mouth 2 (Two) Times a Day., Disp: , Rfl:     guaifenesin-dextromethorphan 600-30 mg (MUCINEX DM)  MG tablet sustained-release 12 hour, Take 1 tablet by mouth 2 (Two) Times a Day As Needed (congestion)., Disp: 20 tablet, Rfl: 0    lamoTRIgine (LaMICtal) 100 MG tablet, Take 1 tablet by mouth Daily., Disp: 30 tablet, Rfl: 1    montelukast (Singulair) 10 MG tablet, Take 1 tablet by mouth Every Night., Disp: 30 tablet, Rfl: 3    multivitamin with minerals tablet tablet, Take 1 tablet by mouth Daily., Disp: , Rfl:     ondansetron ODT (ZOFRAN-ODT) 4 MG disintegrating tablet, Place 1 tablet on the tongue Every 8 (Eight) Hours As Needed for Nausea or Vomiting., Disp: 20 tablet, Rfl: 0    polyethylene glycol (MiraLax) 17 GM/SCOOP powder, Take 17 g by mouth Daily., Disp: 255 g, Rfl: 1    promethazine (PHENERGAN) 12.5 MG tablet, Take 1 tablet by mouth Every 8 (Eight) Hours As Needed for Nausea or Vomiting., Disp: 20 tablet, Rfl: 0    Saw Palmetto 80 MG capsule, Take 1 capsule by mouth Daily.,  Disp: , Rfl:     triamcinolone (KENALOG) 0.1 % ointment, Apply  topically to the appropriate area as directed 2 (Two) Times a Day., Disp: 453.6 g, Rfl: 1    vitamin B-12 (CYANOCOBALAMIN) 500 MCG tablet, Take 1 tablet by mouth Daily., Disp: , Rfl:     vitamin D3 125 MCG (5000 UT) capsule capsule, Take 1 capsule by mouth Daily., Disp: , Rfl:     apixaban (Eliquis) 2.5 MG tablet tablet, Take 1 tablet by mouth Every 12 (Twelve) Hours for 42 doses., Disp: 42 tablet, Rfl: 0    Social History     Socioeconomic History    Marital status:    Tobacco Use    Smoking status: Never    Smokeless tobacco: Never    Tobacco comments:     disgust me never touched   Vaping Use    Vaping status: Never Used   Substance and Sexual Activity    Alcohol use: Not Currently     Comment: do not drink weekly only on holidays/ birtdays    Drug use: No    Sexual activity: Yes     Partners: Male     Birth control/protection: Surgical     Comment: bilatiral tubal     Family History   Problem Relation Age of Onset    Asthma Mother         effected after stroke    Thyroid disease Mother     Heart attack Mother     Obesity Mother     Migraines Mother     Hypertension Mother     COPD Mother     Mental illness Mother         depression    Alcohol abuse Father         abused since childhood    Lupus Sister     Ovarian cancer Sister     Asthma Sister     Breast cancer Maternal Grandmother 54    Lung cancer Maternal Grandmother     Asthma Maternal Grandmother     Hypertension Maternal Grandmother     Lupus Maternal Grandmother     Thyroid disease Maternal Grandmother     Diabetes Maternal Grandmother     Stroke Maternal Grandmother     Cancer Maternal Grandmother         lung    Hypertension Maternal Grandfather     Lung cancer Paternal Grandmother     Obesity Paternal Grandmother     Cancer Paternal Grandmother     Prostate cancer Paternal Grandfather     Cancer Paternal Grandfather         lung    Miscarriages / Stillbirths Maternal Aunt         Review of Systems   Constitutional:  Positive for fatigue and unexpected weight gain. Negative for chills, diaphoresis, fever and unexpected weight loss.   HENT:  Negative for congestion and facial swelling.    Eyes:  Negative for blurred vision, double vision and discharge.   Respiratory:  Negative for chest tightness, shortness of breath and stridor.    Cardiovascular:  Negative for chest pain, palpitations and leg swelling.   Gastrointestinal:  Positive for GERD. Negative for blood in stool.   Endocrine: Negative for polydipsia.   Genitourinary:  Negative for hematuria.   Musculoskeletal:  Positive for arthralgias.   Skin:  Negative for color change.   Allergic/Immunologic: Negative for immunocompromised state.   Neurological:  Negative for confusion.   Psychiatric/Behavioral:  Positive for decreased concentration. Negative for self-injury.        I have reviewed the ROS and confirm that it's accurate today.    Physical Exam:  Vital Signs:  Weight: 96.4 kg (212 lb 8 oz)   Body mass index is 37.05 kg/m².  Temp: 98 °F (36.7 °C)   Heart Rate: 80   BP: 122/78     Physical Exam  Vitals reviewed.   Constitutional:       Appearance: She is well-developed.   HENT:      Head: Normocephalic and atraumatic.      Nose: Nose normal.   Eyes:      Conjunctiva/sclera: Conjunctivae normal.      Pupils: Pupils are equal, round, and reactive to light.   Neck:      Thyroid: No thyromegaly.      Vascular: No carotid bruit.      Trachea: No tracheal deviation.   Cardiovascular:      Rate and Rhythm: Normal rate and regular rhythm.      Heart sounds: Normal heart sounds.   Pulmonary:      Effort: Pulmonary effort is normal. No respiratory distress.      Breath sounds: Normal breath sounds.   Abdominal:      General: There is no distension.      Palpations: Abdomen is soft.      Tenderness: There is no abdominal tenderness.      Comments: Laparoscopy scars   Musculoskeletal:         General: No deformity. Normal range of motion.       Cervical back: Normal range of motion and neck supple.   Skin:     General: Skin is warm and dry.      Findings: No rash.   Neurological:      Mental Status: She is alert and oriented to person, place, and time.      Cranial Nerves: No cranial nerve deficit.      Coordination: Coordination normal.   Psychiatric:         Behavior: Behavior normal.         Thought Content: Thought content normal.         Judgment: Judgment normal.         Patient Active Problem List   Diagnosis    Previous stillbirth or demise, antepartum    Previous deep vein thrombosis (DVT) affecting pregnancy    H/O abnormal cervical Papanicolaou smear    Anxiety    MTHFR gene mutation    Heartburn    Fatigue    Dyspepsia    Dyspnea on exertion    Right leg pain    Lumbar back pain    Bipolar disorder in partial remission    History of suicidal ideation    Constipation    Hiatal hernia with gastroesophageal reflux    S/P laparoscopic sleeve gastrectomy       Assessment:    Theresa Maya is a 33 y.o. year old female with recurrent hiatal hernia and severe reflux and abdominal pain status post laparoscopic sleeve gastrectomy and hiatal hernia repair in May 2022    The patient was present for an approximately a 2.5 hour discussion of the purpose of MBS, how MBS is a tool to assist in achieving weight loss goals, the most common complications and how best to avoid them, and the strategies for short and long term weight loss and improvement in comorbidities.  Ample opportunity to discuss questions was available both in group and during the time of individual examination.    I reviewed her Carlos Alberto report showing oxycodone 5 mg #18 x 1 after her hysterectomy in January.  Labs dated 4/23/2024 unremarkable CMP CBC showing anemia with a hemoglobin of 10.7 hematocrit 35.5 low MCH.  Operative report at the [facility] in January 2024 total abdominal hysterectomy and bilateral salpingo-oophorectomy.  Psychosocial evaluation dated 2/5/2024  Yarelis WHATLEY and she is cleared to proceed with bariatric surgery.  Note dated 12/21/2023 that the patient was admitted to  on 12/18/2023 with suicidal ideation.  Please see scanned records that I have reviewed and signed off on today.  All of this in addition to the patient's unique history and exam has been taken into consideration in determining their appropriate candidacy for MBS.    Complications of laparoscopic/possible robotic gastric bypass were discussed including but not limited to bleeding, infection, deep venous thrombosis, pulmonary embolism, pulmonary complications such as pneumonia, cardiac events, hernias, small bowel obstruction, damage to the spleen or other organs, bowel injury, disfiguring scars, failure to lose weight, need for additional surgery, conversion to an open procedure, and death.  Patient is also aware of complications which apply in this particular procedure that can include but are not limited to leaking of gastric contents at the staple or suture lines which could lead to intra-abdominal abscess, or chronic complications of strictures, ulcers, or vitamin/mineral deficiencies.  If long BP limb, we discussed the likelihood of more frequent BM's,possibly malodorous, with increased risk for vitamin deficiencies, beena the fat soluble vitamins, and that this can be serious and irreversible and will require lifelong compliance with w f/u and vit/supplment recommendations.  Aware that may need an elongation procedure in the future if diarrhea and/or vitamin issues not controlled.       R/B/A Rx to recurrent hiatal hernia repair were discussed as outlined in our long consent form.  Briefly risks in addition to those for gastric bypass include recurrent hernia, DAVID, dysphagia, esophageal injury, pneumothorax, injury to the vagus nerves, injury to the thoracic duct, aorta or vena cava.    Discussed the risks, benefits and alternative therapies at great length as outlined in our extensive  consent forms, consent videos, and educational teaching process under the direction of the center's .    A copy of the patient's signed informed consent is on file.    R/B/A Rx discussed to postop anticoagulation incl but not limited to bleeding, drug reaction, venothromboembolic events, etc. and agreeable to taking post op  Eliquis 2.5 mg po Q 12 hrs #42        Plan:    After reevaluation today I think the patient remains a reasonable candidate for laparoscopic possible robotic assisted revision of her sleeve gastrectomy to a Teresa-en-Y gastric bypass, recurrent hiatal hernia repair, and EGD to address her recurrent hiatal hernia and severe reflux disease not controlled with maximal medical therapy status post laparoscopic sleeve gastrectomy and hiatal hernia repair in May 2022.  Aware this will be a complex likely 3 or more hours surgery with a Cali catheter, CHRISTINA drain, etc.    Other issues include Assessment:     Theresa Maya is a 33 y.o. year old female with recurrent hiatal hernia and reflux, abdominal pain status post laparoscopic sleeve gastrectomy and hiatal hernia repair May 2022     I reviewed her Carlos Alberto report which is negative.       We went over the options to treat her recurrent hiatal hernia and reflux.  I used flip charts.  We discussed recurrent hiatal hernia repair alone as a stand-alone procedure versus recurrent hiatal hernia pair with revision to a Teresa-en-Y gastric bypass with or without a longer BP limb.  She says she has no issues with taking required lifelong vitamins and already takes a prenatal vitamin, iron, and vitamin D and B12 and understands that there will be several additional vitamins required.  She also understands there could be would be more frequent bowel movements.     Complications of laparoscopic/possible robotic gastric bypass were discussed including but not limited to bleeding, infection, deep venous thrombosis, pulmonary embolism, pulmonary  complications such as pneumonia, cardiac events, hernias, small bowel obstruction, damage to the spleen or other organs, bowel injury, disfiguring scars, failure to lose weight, need for additional surgery, conversion to an open procedure, and death.  Patient is also aware of complications which apply in this particular procedure that can include but are not limited to leaking of gastric contents at the staple or suture lines which could lead to intra-abdominal abscess, or chronic complications of strictures, ulcers, or vitamin/mineral deficiencies.  If long BP limb, we discussed the likelihood of more frequent BM's,possibly malodorous, with increased risk for vitamin deficiencies, beena the fat soluble vitamins, and that this can be serious and irreversible and will require lifelong compliance with w f/u and vit/supplment recommendations.  Aware that may need an elongation procedure in the future if diarrhea and/or vitamin issues not controlled.        R/B/A Rx to recurrent hiatal hernia repair were discussed.  These include but are not limited to bleeding, infection, death, pulmonary complications,  VTE events, splenic injury or infarction, recurrent hernia, DAVID, mesh complications, dysphagia, esophageal injury, pneumothorax, injury to the vagus nerves, injury to the thoracic duct, aorta or vena cava, diaphragmatic paralysis.         Plan:    After discussion of the risk, benefits, and alternative therapies as above she wishes to proceed with laparoscopic possible robotic assisted recurrent hiatal hernia repair with revision to Teresa-en-Y gastric to address her recurrent hiatal hernia and severe reflux status post laparoscopic sleeve gastrectomy and hiatal hernia repair in May 2022.  Based on my interaction with her today plan BP limb around 100 to 150 cm depending on bowel length.  Likely discharge on Eliquis.     Other issues include anxiety and depression, arthritis, MTHFR mutation with history of DVT right lower  extremity 2014, dyspnea exertion, elevated hemoglobin A1c, fatigue, upper lipidemia, incomplete right bundle branch block, history of iron deficiency anemia, migraine headaches, ovarian cyst, history of UTIs.     Thank you Gina WHATLEY for the opportunity reevaluate Mrs. Maya.      Cristiano Ricardo MD

## 2024-05-07 NOTE — LETTER
"May 13, 2024     Gina Green APRN   [address]   [address]  [facility] [address]    Patient: Theresa Maya   YOB: 1990   Date of Visit: 2024     Dear PHYLICIA Barrett:       Thank you for referring Theresa Maya to me for evaluation. Below are the relevant portions of my assessment and plan of care.    If you have questions, please do not hesitate to call me. I look forward to following Theresa along with you.         Sincerely,        Cristiano Ricardo MD        CC: No Recipients    Cristiano Ricardo MD  24 1225  Sign when Signing Visit  [facility] BARIATRIC SURGERY  [address]  [address]  [facility] [address] [phone]      Patient  Name:  Theresa Maya  :  1990      Date of Visit: 24    Chief Complaint:  Recurrent hiatal hernia and reflux, abdominal pain status post laparoscopic sleeve gastrectomy and hiatal hernia repair May 2022    History of Present Illness:  Theresa Maya is a 33 y.o. female who presents today for reevaluation, education and consultation regarding her recurrent hiatal hernia and GE reflux and abdominal pain status post laparoscopic sleeve gastrectomy with hiatal hernia repair in May 2022.  My most recent evaluation dated 2023 reviewed:    \"Theresa Maya is a 33 y.o. female who presents today for evaluation, education and consultation regarding her recurrent hiatal hernia and reflux and abdominal pain status post laparoscopic sleeve gastrectomy and hiatal hernia repair in May 2022.  Since last seen 2023 she has lost 3-1/2 pounds.  Most recent evaluation Sallie CONCEPCION PA-C dated 2023 reviewed.  She notes the last office visit was on 2023 where she quotes findings that at that time 3 weeks prior the patient developed sharp acute crampy umbilical pain that doubled her over with minimal relief and next day noted bloody discharge from her umbilicus and subsidy went to the " "ER at  on 8/29/2023 where they noted she went to an urgent care earlier for this and was given Keflex and Toradol and told to come to the ED and has associated diarrhea which was painful without hematochezia also nausea dry heaving increased urinary frequency and yellow vaginal discharge no fever chills CAT scan showed a small hiatal hernia and was instructed to continue Keflex and that wound cultures on 9/1/2023 grew normal skin nicole and her PCP gave her a treatment for Bactrim after she finished her Keflex and that her symptoms persist and she was advised to follow-up with her bariatric surgeon continues with intense pain/pressure surrounding her umbilicus with ongoing bloody discharge also having heartburn nausea poor appetite and is on a Medrol pack for sinus drainage Mobic as needed for sacral pain and took over-the-counter omeprazole for 20 days and notes that surgical history includes a laparoscopic tubal ligation in 2017 at [facility] by Dr. Leo Posey and the op note was reviewed where a periumbilical incision was made and that they discussed with Dr. Ricardo who said to reassure her that I did not make any incisions at her umbilicus and that she had previously had umbilical incisions both for her cholecystectomy and likely her tubal ligation and she should follow-up with a general surgeon for evaluation of her umbilicus and with regards to her reflux and possible recurrent hernia needs an upper GI as the next step.  She notes evaluation with a general surgeon is pending has a repeat CT of the abdomen pelvis ordered also with a possible left ovarian issue follow-up with GYN plan next week and that upper GI on 9/29/2023 at [facility] showed mild reflux and a small sliding hiatal hernia and Sallie CONCEPCION PA-C update says she still has \"massive heartburn\" with pain in her stomach continues on Nexium OTC currently on Macrobid for UTI umbilical drainage has slowed.  EGD on 11/27/2023 I " noted a recurrent hiatal hernia Z-line 35 cm distal linear erosive focal esophagitis the sleeve itself unremarkable no bile in the stomach pylorus not in spasm or deformed.  I noted that prior to her sleeve she had reflux and EGD showed a small hiatal hernia with the Z-line at 35 cm and at surgery the hiatal hernia was subtle seen on the posterior lateral view and was repaired posteriorly the sleeve was done by Titan technique and then I reviewed the color images from surgery including a normal-appearing periumbilical region and at the time of her surgery she weighed 242-1/2 pounds and had a BMI 42.7 and currently weighs 204 pounds with a BMI of 35.6 and that she is developed recurrent reflux not well-controlled with maximal medical therapy most recently switched from omeprazole to Nexium and that I reviewed the upper GI read by Dr. Ari Moeller.  Pathology of the antrum showed minimal chronic gastritis negative for H. pylori and distal esophageal biopsies were consistent with reflux esophagitis otherwise unremarkable.     33-year-old female from [place] known to me as above.  She comes in today to discuss options.  We had briefly discussed prior to her EGD that the usual options are recurrent hiatal hernia repair with or without revision to Teresa-en-Y gastric bypass.  Her  was there at the time.  She says they have talked this over at length.  Currently her reflux symptoms or not controlled on 40 mg of Nexium.  She says she regurgitates at night and gets nausea frequently from the reflux as well.  With regards to her periumbilical pain and issues she says workup has revealed that her ovary is adhesed to her umbilicus, this was diagnosed by what she describes as an invasive ultrasound probe and that they suspect she has PCOS and she has an appointment to see her GYN in [place] on 12/14/2023.  She says she is switching insurance, her  is a transplant nurse in [place] at [employer] and she is  going under his insurance.....    Assessment:     Theresa Maya is a 33 y.o. year old female with recurrent hiatal hernia and reflux, abdominal pain status post laparoscopic sleeve gastrectomy and hiatal hernia repair May 2022     I reviewed her Carlos Alberto report which is negative.       We went over the options to treat her recurrent hiatal hernia and reflux.  I used flip charts.  We discussed recurrent hiatal hernia repair alone as a stand-alone procedure versus recurrent hiatal hernia pair with revision to a Teresa-en-Y gastric bypass with or without a longer BP limb.  She says she has no issues with taking required lifelong vitamins and already takes a prenatal vitamin, iron, and vitamin D and B12 and understands that there will be several additional vitamins required.  She also understands there could be would be more frequent bowel movements.     Complications of laparoscopic/possible robotic gastric bypass were discussed including but not limited to bleeding, infection, deep venous thrombosis, pulmonary embolism, pulmonary complications such as pneumonia, cardiac events, hernias, small bowel obstruction, damage to the spleen or other organs, bowel injury, disfiguring scars, failure to lose weight, need for additional surgery, conversion to an open procedure, and death.  Patient is also aware of complications which apply in this particular procedure that can include but are not limited to leaking of gastric contents at the staple or suture lines which could lead to intra-abdominal abscess, or chronic complications of strictures, ulcers, or vitamin/mineral deficiencies.  If long BP limb, we discussed the likelihood of more frequent BM's,possibly malodorous, with increased risk for vitamin deficiencies, beena the fat soluble vitamins, and that this can be serious and irreversible and will require lifelong compliance with w f/u and vit/supplment recommendations.  Aware that may need an elongation procedure in the  "future if diarrhea and/or vitamin issues not controlled.        R/B/A Rx to recurrent hiatal hernia repair were discussed.  These include but are not limited to bleeding, infection, death, pulmonary complications,  VTE events, splenic injury or infarction, recurrent hernia, DAVID, mesh complications, dysphagia, esophageal injury, pneumothorax, injury to the vagus nerves, injury to the thoracic duct, aorta or vena cava, diaphragmatic paralysis.         Plan:    After discussion of the risk, benefits, and alternative therapies as above she wishes to proceed with laparoscopic possible robotic assisted recurrent hiatal hernia repair with revision to Teresa-en-Y gastric to address her recurrent hiatal hernia and severe reflux status post laparoscopic sleeve gastrectomy and hiatal hernia repair in May 2022.  Based on my interaction with her today plan BP limb around 100 to 150 cm depending on bowel length.  Likely discharge on Eliquis.     Other issues include anxiety and depression, arthritis, MTHFR mutation with history of DVT right lower extremity 2014, dyspnea exertion, elevated hemoglobin A1c, fatigue, upper lipidemia, incomplete right bundle branch block, history of iron deficiency anemia, migraine headaches, ovarian cyst, history of UTIs.\"    She returns for final evaluation prior to scheduling surgery.  She attended informed consent last evening and although it is pretty much the same information feels she learned some new information.  A daughter is with her for today's evaluation.  The patient continues to have severe reflux symptoms and is up to 80 mg of Nexium daily and takes Zofran at night to keep from vomiting.  Since last seen she underwent laparoscopic total abdominal hysterectomy with bilateral salpingectomy, cystoscopy, resection of an umbilical endometrioma with reconstruction of the umbilicus, the ovaries were left.  This was done at [facility].  She said no complications of surgery and she " was sent home on 7 days of Lovenox injections.  Her hemoglobin is 10 and she is agreeable with blood transfusion if deemed appropriate.  She is no longer living in [place] or working at the [employer] [employer] [facility].  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      Past Medical History:   Diagnosis Date   • Abnormal Pap smear of cervix 2019   • ADHD (attention deficit hyperactivity disorder) 2022    Working with a therapy at    • Allergic 2011    Take montalukast   • Anxiety    • Arthritis    • Clotting disorder    • Deep vein thrombosis     2014, (R) leg while pregnant, dx w/ MTHFR   • Depression    • Dyspepsia    • Dyspnea on exertion    • Elevated hemoglobin A1c    • Fatigue    • Gallbladder abscess     s/p lap nicolas 2006   • GERD (gastroesophageal reflux disease)     [facility]I 9/23 - small recurrent HH, EGD Dr. Ricardo  11/23 small recurrent HH   • Heartburn     chronic/episodic, depends on what she eats, takes Pepcid prn, EGD Dr. Ricardo 11/21   • Hyperemesis gravidarum    • Hyperlipidemia    • Incomplete right bundle branch block    • Iron deficiency anemia    • Migraines    • Miscarriage     x 4 prior to 1st pregnancy, presumably from undiagnosed MTHFR mutation   • Morbid obesity with body mass index (BMI) of 40.0 to 44.9 in adult 04/19/2022   • MTHFR deficiency complicating pregnancy     says clotting d/o only an issue when pregnant, advised to use heparin shots during pregnancy   • Obesity    • Ovarian cyst    • Recurrent pregnancy loss, antepartum condition or complication     had a [facility] and lost the pregnancy at 6 months   • Strep pharyngitis    • Urinary tract infection      Past Surgical History:   Procedure Laterality Date   • ENDOSCOPY N/A 05/05/2022    Procedure: ESOPHAGOGASTRODUODENOSCOPY;  Surgeon: Cristiano Ricardo MD;  [facility];  Service: Bariatric;  Laterality: N/A;   • GASTRECTOMY N/A 05/05/2022    Procedure: GASTRECTOMY LAPAROSCOPIC;  Surgeon:  "Cristiano Ricardo MD;  Location:  [facility];  Service: Bariatric;  Laterality: N/A;   • HERNIA REPAIR  2022   • HIATAL HERNIA REPAIR N/A 05/05/2022    Procedure: HIATAL HERNIA REPAIR LAPAROSCOPIC;  Surgeon: Cristiano Ricardo MD;  Location:  [facility];  Service: Bariatric;  Laterality: N/A;   • HYSTERECTOMY  01/23/2024    [facility]   • LAPAROSCOPIC CHOLECYSTECTOMY  2006    no stones, was told she had \"three gallbladders\"- all removed   • SINUS SURGERY Bilateral 2006   • TUBAL COAGULATION LAPAROSCOPIC Bilateral 09/15/2017    Procedure: BILATERAL TUBAL FALLOPE FILSHIE CLIPPING LAPAROSCOPIC;  Surgeon: Leo Posey III, MD;  Location:  [facility];  Service:    • VAGINAL DELIVERY  14; 16; 17    female,male, male.  She said she had subcutaneous Lovenox injections throughout the second and third pregnancies until delivery       Allergies   Allergen Reactions   • Amoxicillin Diarrhea and GI Intolerance   • Doxycycline Other (See Comments)     Vaginal swelling    • Latex Hives and Itching   • Penicillins GI Intolerance     Says Keflex OK as long as she takes w/ food.        Current Outpatient Medications:   •  ARIPiprazole (ABILIFY) 5 MG tablet, Take 1 tablet by mouth Daily., Disp: 30 tablet, Rfl: 1  •  Elidel 1 % cream, Apply  topically to the appropriate area as directed 2 (Two) Times a Day., Disp: 100 g, Rfl: 2  •  esomeprazole (nexIUM) 40 MG capsule, Take 1 capsule by mouth Every Morning Before Breakfast. (Patient taking differently: Take 1 capsule by mouth 2 (Two) Times a Day.), Disp: 90 capsule, Rfl: 1  •  ferrous sulfate 325 (65 FE) MG tablet, Take 1 tablet by mouth 2 (Two) Times a Day., Disp: , Rfl:   •  guaifenesin-dextromethorphan 600-30 mg (MUCINEX DM)  MG tablet sustained-release 12 hour, Take 1 tablet by mouth 2 (Two) Times a Day As Needed (congestion)., Disp: 20 tablet, Rfl: 0  •  lamoTRIgine (LaMICtal) 100 MG tablet, Take 1 tablet by mouth Daily., Disp: 30 tablet, Rfl: 1  •  montelukast " (Singulair) 10 MG tablet, Take 1 tablet by mouth Every Night., Disp: 30 tablet, Rfl: 3  •  multivitamin with minerals tablet tablet, Take 1 tablet by mouth Daily., Disp: , Rfl:   •  ondansetron ODT (ZOFRAN-ODT) 4 MG disintegrating tablet, Place 1 tablet on the tongue Every 8 (Eight) Hours As Needed for Nausea or Vomiting., Disp: 20 tablet, Rfl: 0  •  polyethylene glycol (MiraLax) 17 GM/SCOOP powder, Take 17 g by mouth Daily., Disp: 255 g, Rfl: 1  •  promethazine (PHENERGAN) 12.5 MG tablet, Take 1 tablet by mouth Every 8 (Eight) Hours As Needed for Nausea or Vomiting., Disp: 20 tablet, Rfl: 0  •  Saw Palmetto 80 MG capsule, Take 1 capsule by mouth Daily., Disp: , Rfl:   •  triamcinolone (KENALOG) 0.1 % ointment, Apply  topically to the appropriate area as directed 2 (Two) Times a Day., Disp: 453.6 g, Rfl: 1  •  vitamin B-12 (CYANOCOBALAMIN) 500 MCG tablet, Take 1 tablet by mouth Daily., Disp: , Rfl:   •  vitamin D3 125 MCG (5000 UT) capsule capsule, Take 1 capsule by mouth Daily., Disp: , Rfl:   •  apixaban (Eliquis) 2.5 MG tablet tablet, Take 1 tablet by mouth Every 12 (Twelve) Hours for 42 doses., Disp: 42 tablet, Rfl: 0    Social History     Socioeconomic History   • Marital status:    Tobacco Use   • Smoking status: Never   • Smokeless tobacco: Never   • Tobacco comments:     disgust me never touched   Vaping Use   • Vaping status: Never Used   Substance and Sexual Activity   • Alcohol use: Not Currently     Comment: do not drink weekly only on holidays/ birtdays   • Drug use: No   • Sexual activity: Yes     Partners: Male     Birth control/protection: Surgical     Comment: bilatiral tubal     Family History   Problem Relation Age of Onset   • Asthma Mother         effected after stroke   • Thyroid disease Mother    • Heart attack Mother    • Obesity Mother    • Migraines Mother    • Hypertension Mother    • COPD Mother    • Mental illness Mother         depression   • Alcohol abuse Father         abused  since childhood   • Lupus Sister    • Ovarian cancer Sister    • Asthma Sister    • Breast cancer Maternal Grandmother 54   • Lung cancer Maternal Grandmother    • Asthma Maternal Grandmother    • Hypertension Maternal Grandmother    • Lupus Maternal Grandmother    • Thyroid disease Maternal Grandmother    • Diabetes Maternal Grandmother    • Stroke Maternal Grandmother    • Cancer Maternal Grandmother         lung   • Hypertension Maternal Grandfather    • Lung cancer Paternal Grandmother    • Obesity Paternal Grandmother    • Cancer Paternal Grandmother    • Prostate cancer Paternal Grandfather    • Cancer Paternal Grandfather         lung   • Miscarriages / Stillbirths Maternal Aunt        Review of Systems   Constitutional:  Positive for fatigue and unexpected weight gain. Negative for chills, diaphoresis, fever and unexpected weight loss.   HENT:  Negative for congestion and facial swelling.    Eyes:  Negative for blurred vision, double vision and discharge.   Respiratory:  Negative for chest tightness, shortness of breath and stridor.    Cardiovascular:  Negative for chest pain, palpitations and leg swelling.   Gastrointestinal:  Positive for GERD. Negative for blood in stool.   Endocrine: Negative for polydipsia.   Genitourinary:  Negative for hematuria.   Musculoskeletal:  Positive for arthralgias.   Skin:  Negative for color change.   Allergic/Immunologic: Negative for immunocompromised state.   Neurological:  Negative for confusion.   Psychiatric/Behavioral:  Positive for decreased concentration. Negative for self-injury.        I have reviewed the ROS and confirm that it's accurate today.    Physical Exam:  Vital Signs:  Weight: 96.4 kg (212 lb 8 oz)   Body mass index is 37.05 kg/m².  Temp: 98 °F (36.7 °C)   Heart Rate: 80   BP: 122/78     Physical Exam  Vitals reviewed.   Constitutional:       Appearance: She is well-developed.   HENT:      Head: Normocephalic and atraumatic.      Nose: Nose normal.    Eyes:      Conjunctiva/sclera: Conjunctivae normal.      Pupils: Pupils are equal, round, and reactive to light.   Neck:      Thyroid: No thyromegaly.      Vascular: No carotid bruit.      Trachea: No tracheal deviation.   Cardiovascular:      Rate and Rhythm: Normal rate and regular rhythm.      Heart sounds: Normal heart sounds.   Pulmonary:      Effort: Pulmonary effort is normal. No respiratory distress.      Breath sounds: Normal breath sounds.   Abdominal:      General: There is no distension.      Palpations: Abdomen is soft.      Tenderness: There is no abdominal tenderness.      Comments: Laparoscopy scars   Musculoskeletal:         General: No deformity. Normal range of motion.      Cervical back: Normal range of motion and neck supple.   Skin:     General: Skin is warm and dry.      Findings: No rash.   Neurological:      Mental Status: She is alert and oriented to person, place, and time.      Cranial Nerves: No cranial nerve deficit.      Coordination: Coordination normal.   Psychiatric:         Behavior: Behavior normal.         Thought Content: Thought content normal.         Judgment: Judgment normal.         Patient Active Problem List   Diagnosis   • Previous stillbirth or demise, antepartum   • Previous deep vein thrombosis (DVT) affecting pregnancy   • H/O abnormal cervical Papanicolaou smear   • Anxiety   • MTHFR gene mutation   • Heartburn   • Fatigue   • Dyspepsia   • Dyspnea on exertion   • Right leg pain   • Lumbar back pain   • Bipolar disorder in partial remission   • History of suicidal ideation   • Constipation   • Hiatal hernia with gastroesophageal reflux   • S/P laparoscopic sleeve gastrectomy       Assessment:    Theresa Maya is a 33 y.o. year old female with recurrent hiatal hernia and severe reflux and abdominal pain status post laparoscopic sleeve gastrectomy and hiatal hernia repair in May 2022    The patient was present for an approximately a 2.5 hour discussion of the  purpose of MBS, how MBS is a tool to assist in achieving weight loss goals, the most common complications and how best to avoid them, and the strategies for short and long term weight loss and improvement in comorbidities.  Ample opportunity to discuss questions was available both in group and during the time of individual examination.    I reviewed her Carlos Alberto report showing oxycodone 5 mg #18 x 1 after her hysterectomy in January.  Labs dated 4/23/2024 unremarkable CMP CBC showing anemia with a hemoglobin of 10.7 hematocrit 35.5 low MCH.  Operative report at the [facility] in January 2024 total abdominal hysterectomy and bilateral salpingo-oophorectomy.  Psychosocial evaluation dated 2/5/2024 Yarelis WHATLEY and she is cleared to proceed with bariatric surgery.  Note dated 12/21/2023 that the patient was admitted to  on 12/18/2023 with suicidal ideation.  Please see scanned records that I have reviewed and signed off on today.  All of this in addition to the patient's unique history and exam has been taken into consideration in determining their appropriate candidacy for MBS.    Complications of laparoscopic/possible robotic gastric bypass were discussed including but not limited to bleeding, infection, deep venous thrombosis, pulmonary embolism, pulmonary complications such as pneumonia, cardiac events, hernias, small bowel obstruction, damage to the spleen or other organs, bowel injury, disfiguring scars, failure to lose weight, need for additional surgery, conversion to an open procedure, and death.  Patient is also aware of complications which apply in this particular procedure that can include but are not limited to leaking of gastric contents at the staple or suture lines which could lead to intra-abdominal abscess, or chronic complications of strictures, ulcers, or vitamin/mineral deficiencies.  If long BP limb, we discussed the likelihood of more frequent BM's,possibly malodorous, with  increased risk for vitamin deficiencies, beena the fat soluble vitamins, and that this can be serious and irreversible and will require lifelong compliance with w f/u and vit/supplment recommendations.  Aware that may need an elongation procedure in the future if diarrhea and/or vitamin issues not controlled.       R/B/A Rx to recurrent hiatal hernia repair were discussed as outlined in our long consent form.  Briefly risks in addition to those for gastric bypass include recurrent hernia, DAVID, dysphagia, esophageal injury, pneumothorax, injury to the vagus nerves, injury to the thoracic duct, aorta or vena cava.    Discussed the risks, benefits and alternative therapies at great length as outlined in our extensive consent forms, consent videos, and educational teaching process under the direction of the center's .    A copy of the patient's signed informed consent is on file.    R/B/A Rx discussed to postop anticoagulation incl but not limited to bleeding, drug reaction, venothromboembolic events, etc. and agreeable to taking post op  Eliquis 2.5 mg po Q 12 hrs #42        Plan:    After reevaluation today I think the patient remains a reasonable candidate for laparoscopic possible robotic assisted revision of her sleeve gastrectomy to a Teresa-en-Y gastric bypass, recurrent hiatal hernia repair, and EGD to address her recurrent hiatal hernia and severe reflux disease not controlled with maximal medical therapy status post laparoscopic sleeve gastrectomy and hiatal hernia repair in May 2022.  Aware this will be a complex likely 3 or more hours surgery with a Cali catheter, CHRISTINA drain, etc.    Other issues include Assessment:     Theresa Maya is a 33 y.o. year old female with recurrent hiatal hernia and reflux, abdominal pain status post laparoscopic sleeve gastrectomy and hiatal hernia repair May 2022     I reviewed her Carlos Alberto report which is negative.       We went over the options to treat her  recurrent hiatal hernia and reflux.  I used flip charts.  We discussed recurrent hiatal hernia repair alone as a stand-alone procedure versus recurrent hiatal hernia pair with revision to a Teresa-en-Y gastric bypass with or without a longer BP limb.  She says she has no issues with taking required lifelong vitamins and already takes a prenatal vitamin, iron, and vitamin D and B12 and understands that there will be several additional vitamins required.  She also understands there could be would be more frequent bowel movements.     Complications of laparoscopic/possible robotic gastric bypass were discussed including but not limited to bleeding, infection, deep venous thrombosis, pulmonary embolism, pulmonary complications such as pneumonia, cardiac events, hernias, small bowel obstruction, damage to the spleen or other organs, bowel injury, disfiguring scars, failure to lose weight, need for additional surgery, conversion to an open procedure, and death.  Patient is also aware of complications which apply in this particular procedure that can include but are not limited to leaking of gastric contents at the staple or suture lines which could lead to intra-abdominal abscess, or chronic complications of strictures, ulcers, or vitamin/mineral deficiencies.  If long BP limb, we discussed the likelihood of more frequent BM's,possibly malodorous, with increased risk for vitamin deficiencies, beena the fat soluble vitamins, and that this can be serious and irreversible and will require lifelong compliance with w f/u and vit/supplment recommendations.  Aware that may need an elongation procedure in the future if diarrhea and/or vitamin issues not controlled.        R/B/A Rx to recurrent hiatal hernia repair were discussed.  These include but are not limited to bleeding, infection, death, pulmonary complications,  VTE events, splenic injury or infarction, recurrent hernia, DAVID, mesh complications, dysphagia, esophageal injury,  pneumothorax, injury to the vagus nerves, injury to the thoracic duct, aorta or vena cava, diaphragmatic paralysis.         Plan:    After discussion of the risk, benefits, and alternative therapies as above she wishes to proceed with laparoscopic possible robotic assisted recurrent hiatal hernia repair with revision to Teresa-en-Y gastric to address her recurrent hiatal hernia and severe reflux status post laparoscopic sleeve gastrectomy and hiatal hernia repair in May 2022.  Based on my interaction with her today plan BP limb around 100 to 150 cm depending on bowel length.  Likely discharge on Eliquis.     Other issues include anxiety and depression, arthritis, MTHFR mutation with history of DVT right lower extremity 2014, dyspnea exertion, elevated hemoglobin A1c, fatigue, upper lipidemia, incomplete right bundle branch block, history of iron deficiency anemia, migraine headaches, ovarian cyst, history of UTIs.     Thank you Gina WHATLEY for the opportunity reevaluate Mrs. Maya.      Cristiano Ricardo MD

## 2024-05-08 PROBLEM — Z98.84 S/P LAPAROSCOPIC SLEEVE GASTRECTOMY: Status: ACTIVE | Noted: 2024-05-07

## 2024-05-16 ENCOUNTER — OFFICE VISIT (OUTPATIENT)
Dept: BEHAVIORAL HEALTH | Facility: CLINIC | Age: 34
End: 2024-05-16
Payer: COMMERCIAL

## 2024-05-16 VITALS
DIASTOLIC BLOOD PRESSURE: 74 MMHG | WEIGHT: 215 LBS | SYSTOLIC BLOOD PRESSURE: 118 MMHG | HEIGHT: 64 IN | OXYGEN SATURATION: 99 % | BODY MASS INDEX: 36.7 KG/M2 | HEART RATE: 88 BPM

## 2024-05-16 DIAGNOSIS — F41.9 ANXIETY: ICD-10-CM

## 2024-05-16 DIAGNOSIS — F31.70 BIPOLAR DISORDER IN PARTIAL REMISSION, MOST RECENT EPISODE UNSPECIFIED TYPE: Primary | ICD-10-CM

## 2024-05-16 RX ORDER — LAMOTRIGINE 150 MG/1
150 TABLET ORAL DAILY
Qty: 30 TABLET | Refills: 1 | Status: SHIPPED | OUTPATIENT
Start: 2024-05-16

## 2024-05-16 RX ORDER — ARIPIPRAZOLE 5 MG/1
5 TABLET ORAL DAILY
Qty: 30 TABLET | Refills: 1 | Status: SHIPPED | OUTPATIENT
Start: 2024-05-16
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
## 2024-05-17 ENCOUNTER — PRE-ADMISSION TESTING (OUTPATIENT)
Dept: PREADMISSION TESTING | Facility: HOSPITAL | Age: 34
End: 2024-05-17
Payer: COMMERCIAL

## 2024-05-17 VITALS — BODY MASS INDEX: 39.76 KG/M2 | HEIGHT: 62 IN | WEIGHT: 216.05 LBS

## 2024-05-17 DIAGNOSIS — Z98.84 S/P LAPAROSCOPIC SLEEVE GASTRECTOMY: ICD-10-CM

## 2024-05-17 DIAGNOSIS — K44.9 HIATAL HERNIA WITH GASTROESOPHAGEAL REFLUX: ICD-10-CM

## 2024-05-17 DIAGNOSIS — K21.9 HIATAL HERNIA WITH GASTROESOPHAGEAL REFLUX: ICD-10-CM

## 2024-05-17 LAB
DEPRECATED RDW RBC AUTO: 43.5 FL (ref 37–54)
ERYTHROCYTE [DISTWIDTH] IN BLOOD BY AUTOMATED COUNT: 14 % (ref 12.3–15.4)
HBA1C MFR BLD: 5.6 % (ref 4.8–5.6)
HCT VFR BLD AUTO: 36.8 % (ref 34–46.6)
HGB BLD-MCNC: 11.6 G/DL (ref 12–15.9)
MCH RBC QN AUTO: 26.9 PG (ref 26.6–33)
MCHC RBC AUTO-ENTMCNC: 31.5 G/DL (ref 31.5–35.7)
MCV RBC AUTO: 85.2 FL (ref 79–97)
PLATELET # BLD AUTO: 262 10*3/MM3 (ref 140–450)
PMV BLD AUTO: 11 FL (ref 6–12)
POTASSIUM SERPL-SCNC: 4.2 MMOL/L (ref 3.5–5.2)
RBC # BLD AUTO: 4.32 10*6/MM3 (ref 3.77–5.28)
WBC NRBC COR # BLD AUTO: 5.81 10*3/MM3 (ref 3.4–10.8)

## 2024-05-17 PROCEDURE — 93005 ELECTROCARDIOGRAM TRACING: CPT

## 2024-05-17 PROCEDURE — 87081 CULTURE SCREEN ONLY: CPT

## 2024-05-17 PROCEDURE — 36415 COLL VENOUS BLD VENIPUNCTURE: CPT

## 2024-05-17 PROCEDURE — 83036 HEMOGLOBIN GLYCOSYLATED A1C: CPT

## 2024-05-17 PROCEDURE — 85027 COMPLETE CBC AUTOMATED: CPT

## 2024-05-17 PROCEDURE — 84132 ASSAY OF SERUM POTASSIUM: CPT

## 2024-05-17 NOTE — PAT
Too early to draw type and screen in PAT.  Please obtain blood bank specimen in pre-op on the day of surgery.    Patient instructed to drink 20 ounces of Gatorade or Gatorlyte (if diabetic) and it needs to be completed 1 hour (for Main OR patients) or 2 hours (scheduled  section & [facility] patients) before given arrival time for procedure (NO RED Gatorade and NO Gatorade Zero).    Patient verbalized understanding.    PCN ALLERGY UPDATED IN EPIC.     Patient to apply Chlorhexadine wipes  to surgical area (as instructed) the night before procedure and the AM of procedure. Wipes provided.

## 2024-05-18 LAB
MRSA SPEC QL CULT: NORMAL
QT INTERVAL: 402 MS
QTC INTERVAL: 430 MS

## 2024-05-23 ENCOUNTER — ANESTHESIA EVENT (OUTPATIENT)
Dept: PERIOP | Facility: HOSPITAL | Age: 34
End: 2024-05-23
Payer: COMMERCIAL

## 2024-05-23 RX ORDER — FAMOTIDINE 10 MG/ML
20 INJECTION, SOLUTION INTRAVENOUS ONCE
Status: CANCELLED | OUTPATIENT
Start: 2024-05-23 | End: 2024-05-23

## 2024-05-23 RX ORDER — SODIUM CHLORIDE 9 MG/ML
40 INJECTION, SOLUTION INTRAVENOUS AS NEEDED
Status: CANCELLED | OUTPATIENT
Start: 2024-05-23

## 2024-05-23 RX ORDER — SODIUM CHLORIDE 0.9 % (FLUSH) 0.9 %
10 SYRINGE (ML) INJECTION AS NEEDED
Status: CANCELLED | OUTPATIENT
Start: 2024-05-23

## 2024-05-23 RX ORDER — SODIUM CHLORIDE, SODIUM LACTATE, POTASSIUM CHLORIDE, CALCIUM CHLORIDE 600; 310; 30; 20 MG/100ML; MG/100ML; MG/100ML; MG/100ML
9 INJECTION, SOLUTION INTRAVENOUS CONTINUOUS
Status: CANCELLED | OUTPATIENT
Start: 2024-05-23

## 2024-05-23 RX ORDER — FAMOTIDINE 20 MG/1
20 TABLET, FILM COATED ORAL ONCE
Status: CANCELLED | OUTPATIENT
Start: 2024-05-23 | End: 2024-05-23

## 2024-05-23 NOTE — ANESTHESIA PREPROCEDURE EVALUATION
Anesthesia Evaluation     Patient summary reviewed and Nursing notes reviewed                Airway   Mallampati: II  TM distance: >3 FB  Neck ROM: full  No difficulty expected  Dental - normal exam     Pulmonary - negative pulmonary ROS and normal exam   Cardiovascular - normal exam    (+) DVT, hyperlipidemia    ROS comment:   Echo 2/17: normal EF/valves    Neuro/Psych- negative ROS  GI/Hepatic/Renal/Endo    (+) morbid obesity, GERD    ROS Comment: S/p gastric sleeve    Musculoskeletal (-) negative ROS    Abdominal  - normal exam    Bowel sounds: normal.   Substance History - negative use     OB/GYN negative ob/gyn ROS         Other                      Anesthesia Plan    ASA 3     general with block     intravenous induction     Anesthetic plan, risks, benefits, and alternatives have been provided, discussed and informed consent has been obtained with: patient.    Plan discussed with CRNA.    CODE STATUS:

## 2024-05-24 ENCOUNTER — HOSPITAL ENCOUNTER (INPATIENT)
Facility: HOSPITAL | Age: 34
LOS: 6 days | Discharge: HOME | End: 2024-05-30
Attending: SURGERY | Admitting: SURGERY
Payer: COMMERCIAL

## 2024-05-24 ENCOUNTER — ANESTHESIA EVENT CONVERTED (OUTPATIENT)
Dept: ANESTHESIOLOGY | Facility: HOSPITAL | Age: 34
End: 2024-05-24
Payer: COMMERCIAL

## 2024-05-24 ENCOUNTER — ANESTHESIA (OUTPATIENT)
Dept: PERIOP | Facility: HOSPITAL | Age: 34
End: 2024-05-24
Payer: COMMERCIAL

## 2024-05-24 DIAGNOSIS — K44.9 HIATAL HERNIA WITH GASTROESOPHAGEAL REFLUX: Primary | ICD-10-CM

## 2024-05-24 DIAGNOSIS — Z98.84 S/P LAPAROSCOPIC SLEEVE GASTRECTOMY: ICD-10-CM

## 2024-05-24 DIAGNOSIS — R10.13 DYSPEPSIA: ICD-10-CM

## 2024-05-24 DIAGNOSIS — K21.9 HIATAL HERNIA WITH GASTROESOPHAGEAL REFLUX: Primary | ICD-10-CM

## 2024-05-24 PROCEDURE — 25010000002 PROPOFOL 10 MG/ML EMULSION

## 2024-05-24 PROCEDURE — 25810000003 LACTATED RINGERS PER 1000 ML: Performed by: SURGERY

## 2024-05-24 PROCEDURE — 25010000002 ESMOLOL 100 MG/10ML SOLUTION

## 2024-05-24 PROCEDURE — 25010000002 CEFAZOLIN PER 500 MG: Performed by: SURGERY

## 2024-05-24 PROCEDURE — 0D164ZA BYPASS STOMACH TO JEJUNUM, PERCUTANEOUS ENDOSCOPIC APPROACH: ICD-10-PCS | Performed by: SURGERY

## 2024-05-24 PROCEDURE — 25010000002 BUPIVACAINE (PF) 0.25 % SOLUTION

## 2024-05-24 PROCEDURE — 25010000002 HYDROMORPHONE PER 4 MG

## 2024-05-24 PROCEDURE — 25010000002 ONDANSETRON PER 1 MG

## 2024-05-24 PROCEDURE — 0BQT4ZZ REPAIR DIAPHRAGM, PERCUTANEOUS ENDOSCOPIC APPROACH: ICD-10-PCS | Performed by: SURGERY

## 2024-05-24 PROCEDURE — 25010000002 INDOCYANINE GREEN 25 MG RECONSTITUTED SOLUTION

## 2024-05-24 PROCEDURE — 43644 LAP GASTRIC BYPASS/ROUX-EN-Y: CPT | Performed by: SURGERY

## 2024-05-24 PROCEDURE — 25010000002 SUGAMMADEX 200 MG/2ML SOLUTION

## 2024-05-24 PROCEDURE — S2900 ROBOTIC SURGICAL SYSTEM: HCPCS | Performed by: SURGERY

## 2024-05-24 PROCEDURE — 8E0W4CZ ROBOTIC ASSISTED PROCEDURE OF TRUNK REGION, PERCUTANEOUS ENDOSCOPIC APPROACH: ICD-10-PCS | Performed by: SURGERY

## 2024-05-24 PROCEDURE — 25810000003 SODIUM CHLORIDE PER 500 ML: Performed by: SURGERY

## 2024-05-24 PROCEDURE — 94799 UNLISTED PULMONARY SVC/PX: CPT

## 2024-05-24 PROCEDURE — 25010000002 ONDANSETRON PER 1 MG: Performed by: SURGERY

## 2024-05-24 PROCEDURE — 25010000002 THIAMINE HCL 200 MG/2ML SOLUTION: Performed by: SURGERY

## 2024-05-24 PROCEDURE — 25010000002 DEXAMETHASONE SODIUM PHOSPHATE 10 MG/ML SOLUTION

## 2024-05-24 PROCEDURE — 25010000002 DROPERIDOL PER 5 MG

## 2024-05-24 PROCEDURE — 25010000002 FENTANYL CITRATE (PF) 100 MCG/2ML SOLUTION

## 2024-05-24 PROCEDURE — 0DJ08ZZ INSPECTION OF UPPER INTESTINAL TRACT, VIA NATURAL OR ARTIFICIAL OPENING ENDOSCOPIC: ICD-10-PCS | Performed by: SURGERY

## 2024-05-24 PROCEDURE — 25010000002 ENOXAPARIN PER 10 MG: Performed by: SURGERY

## 2024-05-24 PROCEDURE — 25810000003 LACTATED RINGERS SOLUTION: Performed by: SURGERY

## 2024-05-24 DEVICE — DEV CONTRL TISS STRATAFIX SPIRAL PDS PLS CT1 2-0 45CM: Type: IMPLANTABLE DEVICE | Site: ABDOMEN | Status: FUNCTIONAL

## 2024-05-24 DEVICE — DEV CONTRL TISS STRATAFIX SPIRAL PDS PLS 3/0 0 15CM VIL: Type: IMPLANTABLE DEVICE | Site: ABDOMEN | Status: FUNCTIONAL

## 2024-05-24 DEVICE — STAPLER 60 RELOAD GREEN
Type: IMPLANTABLE DEVICE | Site: ABDOMEN | Status: FUNCTIONAL
Brand: SUREFORM

## 2024-05-24 DEVICE — STAPLER 60 RELOAD BLUE
Type: IMPLANTABLE DEVICE | Site: ABDOMEN | Status: FUNCTIONAL
Brand: SUREFORM

## 2024-05-24 DEVICE — STAPLER 60 RELOAD WHITE
Type: IMPLANTABLE DEVICE | Site: ABDOMEN | Status: FUNCTIONAL
Brand: SUREFORM

## 2024-05-24 DEVICE — PBT NON ABSORBABLE WOUND CLOSURE DEVICE
Type: IMPLANTABLE DEVICE | Site: ABDOMEN | Status: FUNCTIONAL
Brand: V-LOC

## 2024-05-24 RX ORDER — HYDROMORPHONE HYDROCHLORIDE 2 MG/ML
INJECTION, SOLUTION INTRAMUSCULAR; INTRAVENOUS; SUBCUTANEOUS AS NEEDED
Status: DISCONTINUED | OUTPATIENT
Start: 2024-05-24 | End: 2024-05-24 | Stop reason: SURG

## 2024-05-24 RX ORDER — HYDROMORPHONE HYDROCHLORIDE 2 MG/1
2 TABLET ORAL EVERY 4 HOURS PRN
Status: DISCONTINUED | OUTPATIENT
Start: 2024-05-24 | End: 2024-05-30 | Stop reason: HOSPADM

## 2024-05-24 RX ORDER — ONDANSETRON 2 MG/ML
4 INJECTION INTRAMUSCULAR; INTRAVENOUS ONCE AS NEEDED
Status: DISCONTINUED | OUTPATIENT
Start: 2024-05-24 | End: 2024-05-24 | Stop reason: HOSPADM

## 2024-05-24 RX ORDER — DROPERIDOL 2.5 MG/ML
0.62 INJECTION, SOLUTION INTRAMUSCULAR; INTRAVENOUS ONCE AS NEEDED
Status: DISCONTINUED | OUTPATIENT
Start: 2024-05-24 | End: 2024-05-24 | Stop reason: HOSPADM

## 2024-05-24 RX ORDER — ESMOLOL HYDROCHLORIDE 10 MG/ML
INJECTION INTRAVENOUS AS NEEDED
Status: DISCONTINUED | OUTPATIENT
Start: 2024-05-24 | End: 2024-05-24 | Stop reason: SURG

## 2024-05-24 RX ORDER — BUPIVACAINE HYDROCHLORIDE 2.5 MG/ML
INJECTION, SOLUTION EPIDURAL; INFILTRATION; INTRACAUDAL
Status: COMPLETED | OUTPATIENT
Start: 2024-05-24 | End: 2024-05-24

## 2024-05-24 RX ORDER — MONTELUKAST SODIUM 10 MG/1
10 TABLET ORAL NIGHTLY
Status: DISCONTINUED | OUTPATIENT
Start: 2024-05-24 | End: 2024-05-30 | Stop reason: HOSPADM

## 2024-05-24 RX ORDER — PANTOPRAZOLE SODIUM 40 MG/10ML
40 INJECTION, POWDER, LYOPHILIZED, FOR SOLUTION INTRAVENOUS ONCE
Status: COMPLETED | OUTPATIENT
Start: 2024-05-24 | End: 2024-05-24

## 2024-05-24 RX ORDER — ACETAMINOPHEN 500 MG
1000 TABLET ORAL ONCE
Status: COMPLETED | OUTPATIENT
Start: 2024-05-24 | End: 2024-05-24

## 2024-05-24 RX ORDER — OXYCODONE HYDROCHLORIDE 5 MG/1
5 TABLET ORAL EVERY 6 HOURS PRN
Status: DISCONTINUED | OUTPATIENT
Start: 2024-05-24 | End: 2024-05-26

## 2024-05-24 RX ORDER — ONDANSETRON 4 MG/1
4 TABLET, ORALLY DISINTEGRATING ORAL EVERY 6 HOURS PRN
Status: DISCONTINUED | OUTPATIENT
Start: 2024-05-28 | End: 2024-05-30 | Stop reason: HOSPADM

## 2024-05-24 RX ORDER — SODIUM CHLORIDE 0.9 % (FLUSH) 0.9 %
10 SYRINGE (ML) INJECTION EVERY 12 HOURS SCHEDULED
Status: DISCONTINUED | OUTPATIENT
Start: 2024-05-24 | End: 2024-05-24 | Stop reason: HOSPADM

## 2024-05-24 RX ORDER — SODIUM CHLORIDE 0.9 % (FLUSH) 0.9 %
3 SYRINGE (ML) INJECTION EVERY 12 HOURS SCHEDULED
Status: DISCONTINUED | OUTPATIENT
Start: 2024-05-24 | End: 2024-05-24 | Stop reason: HOSPADM

## 2024-05-24 RX ORDER — ARIPIPRAZOLE 5 MG/1
5 TABLET ORAL DAILY
Status: DISCONTINUED | OUTPATIENT
Start: 2024-05-25 | End: 2024-05-30 | Stop reason: HOSPADM

## 2024-05-24 RX ORDER — SODIUM CHLORIDE AND POTASSIUM CHLORIDE 150; 450 MG/100ML; MG/100ML
125 INJECTION, SOLUTION INTRAVENOUS CONTINUOUS
Status: DISCONTINUED | OUTPATIENT
Start: 2024-05-25 | End: 2024-05-28

## 2024-05-24 RX ORDER — ENOXAPARIN SODIUM 100 MG/ML
40 INJECTION SUBCUTANEOUS ONCE
Status: DISCONTINUED | OUTPATIENT
Start: 2024-05-24 | End: 2024-05-24 | Stop reason: HOSPADM

## 2024-05-24 RX ORDER — LORAZEPAM 1 MG/1
1 TABLET ORAL EVERY 12 HOURS PRN
Status: DISCONTINUED | OUTPATIENT
Start: 2024-05-24 | End: 2024-05-30 | Stop reason: HOSPADM

## 2024-05-24 RX ORDER — ONDANSETRON 2 MG/ML
INJECTION INTRAMUSCULAR; INTRAVENOUS AS NEEDED
Status: DISCONTINUED | OUTPATIENT
Start: 2024-05-24 | End: 2024-05-24 | Stop reason: SURG

## 2024-05-24 RX ORDER — PHENYLEPHRINE HCL IN 0.9% NACL 1 MG/10 ML
SYRINGE (ML) INTRAVENOUS AS NEEDED
Status: DISCONTINUED | OUTPATIENT
Start: 2024-05-24 | End: 2024-05-24 | Stop reason: SURG

## 2024-05-24 RX ORDER — INDOCYANINE GREEN AND WATER 25 MG
KIT INJECTION AS NEEDED
Status: DISCONTINUED | OUTPATIENT
Start: 2024-05-24 | End: 2024-05-24 | Stop reason: SURG

## 2024-05-24 RX ORDER — PROPOFOL 10 MG/ML
VIAL (ML) INTRAVENOUS AS NEEDED
Status: DISCONTINUED | OUTPATIENT
Start: 2024-05-24 | End: 2024-05-24 | Stop reason: SURG

## 2024-05-24 RX ORDER — GABAPENTIN 100 MG/1
100 CAPSULE ORAL 3 TIMES DAILY
Status: DISPENSED | OUTPATIENT
Start: 2024-05-24 | End: 2024-05-26

## 2024-05-24 RX ORDER — SODIUM CHLORIDE 9 MG/ML
40 INJECTION, SOLUTION INTRAVENOUS AS NEEDED
Status: DISCONTINUED | OUTPATIENT
Start: 2024-05-24 | End: 2024-05-24 | Stop reason: HOSPADM

## 2024-05-24 RX ORDER — PANTOPRAZOLE SODIUM 40 MG/10ML
40 INJECTION, POWDER, LYOPHILIZED, FOR SOLUTION INTRAVENOUS
Status: DISCONTINUED | OUTPATIENT
Start: 2024-05-25 | End: 2024-05-27

## 2024-05-24 RX ORDER — ACETAMINOPHEN 500 MG
1000 TABLET ORAL EVERY 8 HOURS SCHEDULED
Status: DISPENSED | OUTPATIENT
Start: 2024-05-24 | End: 2024-05-26

## 2024-05-24 RX ORDER — GABAPENTIN 300 MG/1
600 CAPSULE ORAL ONCE
Status: COMPLETED | OUTPATIENT
Start: 2024-05-24 | End: 2024-05-24

## 2024-05-24 RX ORDER — PROMETHAZINE HYDROCHLORIDE 12.5 MG/1
12.5 TABLET ORAL EVERY 6 HOURS PRN
Status: DISCONTINUED | OUTPATIENT
Start: 2024-05-24 | End: 2024-05-30 | Stop reason: HOSPADM

## 2024-05-24 RX ORDER — CHLORHEXIDINE GLUCONATE ORAL RINSE 1.2 MG/ML
30 SOLUTION DENTAL
Status: COMPLETED | OUTPATIENT
Start: 2024-05-24 | End: 2024-05-24

## 2024-05-24 RX ORDER — SCOLOPAMINE TRANSDERMAL SYSTEM 1 MG/1
1 PATCH, EXTENDED RELEASE TRANSDERMAL ONCE
Status: DISCONTINUED | OUTPATIENT
Start: 2024-05-24 | End: 2024-05-24

## 2024-05-24 RX ORDER — THIAMINE HYDROCHLORIDE 100 MG/ML
100 INJECTION, SOLUTION INTRAMUSCULAR; INTRAVENOUS ONCE
Qty: 2 ML | Refills: 0 | Status: COMPLETED | OUTPATIENT
Start: 2024-05-24 | End: 2024-05-24

## 2024-05-24 RX ORDER — HYDRALAZINE HYDROCHLORIDE 20 MG/ML
5 INJECTION INTRAMUSCULAR; INTRAVENOUS
Status: DISCONTINUED | OUTPATIENT
Start: 2024-05-24 | End: 2024-05-24 | Stop reason: HOSPADM

## 2024-05-24 RX ORDER — SODIUM CHLORIDE 9 MG/ML
INJECTION, SOLUTION INTRAVENOUS AS NEEDED
Status: DISCONTINUED | OUTPATIENT
Start: 2024-05-24 | End: 2024-05-24 | Stop reason: HOSPADM

## 2024-05-24 RX ORDER — DEXAMETHASONE SODIUM PHOSPHATE 10 MG/ML
INJECTION, SOLUTION INTRAMUSCULAR; INTRAVENOUS AS NEEDED
Status: DISCONTINUED | OUTPATIENT
Start: 2024-05-24 | End: 2024-05-24 | Stop reason: SURG

## 2024-05-24 RX ORDER — HYDRALAZINE HYDROCHLORIDE 20 MG/ML
10 INJECTION INTRAMUSCULAR; INTRAVENOUS
Status: DISCONTINUED | OUTPATIENT
Start: 2024-05-24 | End: 2024-05-30 | Stop reason: HOSPADM

## 2024-05-24 RX ORDER — NALOXONE HCL 0.4 MG/ML
0.1 VIAL (ML) INJECTION
Status: DISCONTINUED | OUTPATIENT
Start: 2024-05-24 | End: 2024-05-24

## 2024-05-24 RX ORDER — ALPRAZOLAM 0.25 MG/1
0.25 TABLET ORAL ONCE AS NEEDED
Status: DISCONTINUED | OUTPATIENT
Start: 2024-05-24 | End: 2024-05-24

## 2024-05-24 RX ORDER — ENOXAPARIN SODIUM 100 MG/ML
40 INJECTION SUBCUTANEOUS DAILY
Status: DISCONTINUED | OUTPATIENT
Start: 2024-05-25 | End: 2024-05-27

## 2024-05-24 RX ORDER — LIDOCAINE HYDROCHLORIDE 10 MG/ML
INJECTION, SOLUTION EPIDURAL; INFILTRATION; INTRACAUDAL; PERINEURAL AS NEEDED
Status: DISCONTINUED | OUTPATIENT
Start: 2024-05-24 | End: 2024-05-24 | Stop reason: SURG

## 2024-05-24 RX ORDER — ALBUTEROL SULFATE 2.5 MG/3ML
2.5 SOLUTION RESPIRATORY (INHALATION)
Status: DISCONTINUED | OUTPATIENT
Start: 2024-05-24 | End: 2024-05-30

## 2024-05-24 RX ORDER — SIMETHICONE 80 MG
80 TABLET,CHEWABLE ORAL 4 TIMES DAILY PRN
Status: DISCONTINUED | OUTPATIENT
Start: 2024-05-24 | End: 2024-05-30 | Stop reason: HOSPADM

## 2024-05-24 RX ORDER — NALOXONE HCL 0.4 MG/ML
0.4 VIAL (ML) INJECTION
Status: DISCONTINUED | OUTPATIENT
Start: 2024-05-24 | End: 2024-05-30 | Stop reason: HOSPADM

## 2024-05-24 RX ORDER — SODIUM CHLORIDE, SODIUM LACTATE, POTASSIUM CHLORIDE, CALCIUM CHLORIDE 600; 310; 30; 20 MG/100ML; MG/100ML; MG/100ML; MG/100ML
150 INJECTION, SOLUTION INTRAVENOUS CONTINUOUS
Status: DISCONTINUED | OUTPATIENT
Start: 2024-05-24 | End: 2024-05-28

## 2024-05-24 RX ORDER — SODIUM CHLORIDE, SODIUM LACTATE, POTASSIUM CHLORIDE, CALCIUM CHLORIDE 600; 310; 30; 20 MG/100ML; MG/100ML; MG/100ML; MG/100ML
150 INJECTION, SOLUTION INTRAVENOUS CONTINUOUS
Status: DISCONTINUED | OUTPATIENT
Start: 2024-05-24 | End: 2024-05-24

## 2024-05-24 RX ORDER — SODIUM CHLORIDE 0.9 % (FLUSH) 0.9 %
3-10 SYRINGE (ML) INJECTION AS NEEDED
Status: DISCONTINUED | OUTPATIENT
Start: 2024-05-24 | End: 2024-05-24 | Stop reason: HOSPADM

## 2024-05-24 RX ORDER — LABETALOL HYDROCHLORIDE 5 MG/ML
5 INJECTION, SOLUTION INTRAVENOUS
Status: DISCONTINUED | OUTPATIENT
Start: 2024-05-24 | End: 2024-05-24 | Stop reason: HOSPADM

## 2024-05-24 RX ORDER — ACETAMINOPHEN 160 MG/5ML
1000 SOLUTION ORAL EVERY 8 HOURS SCHEDULED
Status: ACTIVE | OUTPATIENT
Start: 2024-05-24 | End: 2024-05-26

## 2024-05-24 RX ORDER — DROPERIDOL 2.5 MG/ML
0.62 INJECTION, SOLUTION INTRAMUSCULAR; INTRAVENOUS
Status: DISCONTINUED | OUTPATIENT
Start: 2024-05-24 | End: 2024-05-24 | Stop reason: HOSPADM

## 2024-05-24 RX ORDER — NALOXONE HCL 0.4 MG/ML
0.4 VIAL (ML) INJECTION AS NEEDED
Status: DISCONTINUED | OUTPATIENT
Start: 2024-05-24 | End: 2024-05-24 | Stop reason: HOSPADM

## 2024-05-24 RX ORDER — METOPROLOL TARTRATE 1 MG/ML
INJECTION, SOLUTION INTRAVENOUS AS NEEDED
Status: DISCONTINUED | OUTPATIENT
Start: 2024-05-24 | End: 2024-05-24 | Stop reason: SURG

## 2024-05-24 RX ORDER — CYANOCOBALAMIN 1000 UG/ML
1000 INJECTION, SOLUTION INTRAMUSCULAR; SUBCUTANEOUS ONCE
Status: COMPLETED | OUTPATIENT
Start: 2024-05-25 | End: 2024-05-25

## 2024-05-24 RX ORDER — IPRATROPIUM BROMIDE AND ALBUTEROL SULFATE 2.5; .5 MG/3ML; MG/3ML
3 SOLUTION RESPIRATORY (INHALATION) ONCE AS NEEDED
Status: DISCONTINUED | OUTPATIENT
Start: 2024-05-24 | End: 2024-05-24 | Stop reason: HOSPADM

## 2024-05-24 RX ORDER — ONDANSETRON 2 MG/ML
4 INJECTION INTRAMUSCULAR; INTRAVENOUS EVERY 4 HOURS PRN
Status: DISPENSED | OUTPATIENT
Start: 2024-05-24 | End: 2024-05-28

## 2024-05-24 RX ORDER — ROCURONIUM BROMIDE 10 MG/ML
INJECTION, SOLUTION INTRAVENOUS AS NEEDED
Status: DISCONTINUED | OUTPATIENT
Start: 2024-05-24 | End: 2024-05-24 | Stop reason: SURG

## 2024-05-24 RX ORDER — DIPHENHYDRAMINE HYDROCHLORIDE 50 MG/ML
25 INJECTION INTRAMUSCULAR; INTRAVENOUS EVERY 4 HOURS PRN
Status: DISCONTINUED | OUTPATIENT
Start: 2024-05-24 | End: 2024-05-30 | Stop reason: HOSPADM

## 2024-05-24 RX ORDER — LIDOCAINE HYDROCHLORIDE 10 MG/ML
0.5 INJECTION, SOLUTION EPIDURAL; INFILTRATION; INTRACAUDAL; PERINEURAL ONCE AS NEEDED
Status: COMPLETED | OUTPATIENT
Start: 2024-05-24 | End: 2024-05-24

## 2024-05-24 RX ORDER — MIDAZOLAM HYDROCHLORIDE 1 MG/ML
1 INJECTION INTRAMUSCULAR; INTRAVENOUS
Status: DISCONTINUED | OUTPATIENT
Start: 2024-05-24 | End: 2024-05-24 | Stop reason: HOSPADM

## 2024-05-24 RX ORDER — FENTANYL CITRATE 50 UG/ML
INJECTION, SOLUTION INTRAMUSCULAR; INTRAVENOUS AS NEEDED
Status: DISCONTINUED | OUTPATIENT
Start: 2024-05-24 | End: 2024-05-24 | Stop reason: SURG

## 2024-05-24 RX ORDER — MORPHINE SULFATE 4 MG/ML
4 INJECTION, SOLUTION INTRAMUSCULAR; INTRAVENOUS
Status: DISCONTINUED | OUTPATIENT
Start: 2024-05-24 | End: 2024-05-30 | Stop reason: HOSPADM

## 2024-05-24 RX ORDER — PROCHLORPERAZINE MALEATE 10 MG
10 TABLET ORAL EVERY 6 HOURS PRN
Status: DISCONTINUED | OUTPATIENT
Start: 2024-05-24 | End: 2024-05-30 | Stop reason: HOSPADM

## 2024-05-24 RX ORDER — ENOXAPARIN SODIUM 100 MG/ML
INJECTION SUBCUTANEOUS AS NEEDED
Status: DISCONTINUED | OUTPATIENT
Start: 2024-05-24 | End: 2024-05-24 | Stop reason: HOSPADM

## 2024-05-24 RX ORDER — FENTANYL CITRATE 50 UG/ML
50 INJECTION, SOLUTION INTRAMUSCULAR; INTRAVENOUS
Status: DISCONTINUED | OUTPATIENT
Start: 2024-05-24 | End: 2024-05-24 | Stop reason: HOSPADM

## 2024-05-24 RX ORDER — DROPERIDOL 2.5 MG/ML
INJECTION, SOLUTION INTRAMUSCULAR; INTRAVENOUS
Status: COMPLETED
Start: 2024-05-24 | End: 2024-05-24

## 2024-05-24 RX ORDER — ALPRAZOLAM 0.25 MG/1
0.25 TABLET ORAL ONCE AS NEEDED
Status: DISCONTINUED | OUTPATIENT
Start: 2024-05-24 | End: 2024-05-30 | Stop reason: HOSPADM

## 2024-05-24 RX ORDER — ONDANSETRON 4 MG/1
4 TABLET, ORALLY DISINTEGRATING ORAL EVERY 4 HOURS PRN
Status: DISCONTINUED | OUTPATIENT
Start: 2024-05-24 | End: 2024-05-24

## 2024-05-24 RX ORDER — LABETALOL HYDROCHLORIDE 5 MG/ML
10 INJECTION, SOLUTION INTRAVENOUS
Status: DISCONTINUED | OUTPATIENT
Start: 2024-05-24 | End: 2024-05-30 | Stop reason: HOSPADM

## 2024-05-24 RX ORDER — GABAPENTIN 250 MG/5ML
100 SOLUTION ORAL 3 TIMES DAILY
Status: ACTIVE | OUTPATIENT
Start: 2024-05-24 | End: 2024-05-26

## 2024-05-24 RX ORDER — DEXAMETHASONE SODIUM PHOSPHATE 10 MG/ML
INJECTION, SOLUTION INTRAMUSCULAR; INTRAVENOUS
Status: COMPLETED | OUTPATIENT
Start: 2024-05-24 | End: 2024-05-24

## 2024-05-24 RX ORDER — HYDROMORPHONE HYDROCHLORIDE 1 MG/ML
0.5 INJECTION, SOLUTION INTRAMUSCULAR; INTRAVENOUS; SUBCUTANEOUS
Status: DISCONTINUED | OUTPATIENT
Start: 2024-05-24 | End: 2024-05-24 | Stop reason: HOSPADM

## 2024-05-24 RX ADMIN — SODIUM CHLORIDE, POTASSIUM CHLORIDE, SODIUM LACTATE AND CALCIUM CHLORIDE 1000 ML: 600; 310; 30; 20 INJECTION, SOLUTION INTRAVENOUS at 06:40

## 2024-05-24 RX ADMIN — ESMOLOL HYDROCHLORIDE 30 MG: 100 INJECTION, SOLUTION INTRAVENOUS at 09:53

## 2024-05-24 RX ADMIN — THIAMINE HYDROCHLORIDE 100 MG: 100 INJECTION, SOLUTION INTRAMUSCULAR; INTRAVENOUS at 20:18

## 2024-05-24 RX ADMIN — ONDANSETRON 4 MG: 2 INJECTION INTRAMUSCULAR; INTRAVENOUS at 11:19

## 2024-05-24 RX ADMIN — INDOCYANINE GREEN AND WATER 12.5 MG: KIT at 09:30

## 2024-05-24 RX ADMIN — DROPERIDOL 0.62 MG: 2.5 INJECTION, SOLUTION INTRAMUSCULAR; INTRAVENOUS at 16:54

## 2024-05-24 RX ADMIN — CHLORHEXIDINE GLUCONATE 30 ML: 1.2 SOLUTION ORAL at 06:40

## 2024-05-24 RX ADMIN — BUPIVACAINE HYDROCHLORIDE 60 ML: 2.5 INJECTION, SOLUTION EPIDURAL; INFILTRATION; INTRACAUDAL; PERINEURAL at 08:21

## 2024-05-24 RX ADMIN — ACETAMINOPHEN 1000 MG: 500 TABLET ORAL at 06:39

## 2024-05-24 RX ADMIN — SODIUM CHLORIDE, POTASSIUM CHLORIDE, SODIUM LACTATE AND CALCIUM CHLORIDE 150 ML/HR: 600; 310; 30; 20 INJECTION, SOLUTION INTRAVENOUS at 07:30

## 2024-05-24 RX ADMIN — ESMOLOL HYDROCHLORIDE 30 MG: 100 INJECTION, SOLUTION INTRAVENOUS at 09:00

## 2024-05-24 RX ADMIN — DEXAMETHASONE SODIUM PHOSPHATE 4 MG: 10 INJECTION, SOLUTION INTRAMUSCULAR; INTRAVENOUS at 08:21

## 2024-05-24 RX ADMIN — PANTOPRAZOLE SODIUM 40 MG: 40 INJECTION, POWDER, FOR SOLUTION INTRAVENOUS at 06:40

## 2024-05-24 RX ADMIN — SODIUM CHLORIDE, POTASSIUM CHLORIDE, SODIUM LACTATE AND CALCIUM CHLORIDE: 600; 310; 30; 20 INJECTION, SOLUTION INTRAVENOUS at 10:42

## 2024-05-24 RX ADMIN — ALBUTEROL SULFATE 2.5 MG: 2.5 SOLUTION RESPIRATORY (INHALATION) at 20:03

## 2024-05-24 RX ADMIN — MONTELUKAST 10 MG: 10 TABLET, FILM COATED ORAL at 20:18

## 2024-05-24 RX ADMIN — LIDOCAINE HYDROCHLORIDE 100 MG: 10 INJECTION, SOLUTION EPIDURAL; INFILTRATION; INTRACAUDAL; PERINEURAL at 08:17

## 2024-05-24 RX ADMIN — ACETAMINOPHEN 1000 MG: 500 TABLET ORAL at 21:39

## 2024-05-24 RX ADMIN — PROPOFOL 40 MG: 10 INJECTION, EMULSION INTRAVENOUS at 11:33

## 2024-05-24 RX ADMIN — HYDROMORPHONE HYDROCHLORIDE 0.5 MG: 2 INJECTION, SOLUTION INTRAMUSCULAR; INTRAVENOUS; SUBCUTANEOUS at 10:16

## 2024-05-24 RX ADMIN — ESMOLOL HYDROCHLORIDE 20 MG: 100 INJECTION, SOLUTION INTRAVENOUS at 10:14

## 2024-05-24 RX ADMIN — SUGAMMADEX 200 MG: 100 INJECTION, SOLUTION INTRAVENOUS at 11:33

## 2024-05-24 RX ADMIN — GABAPENTIN 100 MG: 100 CAPSULE ORAL at 20:18

## 2024-05-24 RX ADMIN — ROCURONIUM BROMIDE 20 MG: 10 INJECTION INTRAVENOUS at 09:04

## 2024-05-24 RX ADMIN — ROCURONIUM BROMIDE 50 MG: 10 INJECTION INTRAVENOUS at 10:05

## 2024-05-24 RX ADMIN — SODIUM CHLORIDE, POTASSIUM CHLORIDE, SODIUM LACTATE AND CALCIUM CHLORIDE 150 ML/HR: 600; 310; 30; 20 INJECTION, SOLUTION INTRAVENOUS at 18:24

## 2024-05-24 RX ADMIN — Medication 100 MCG: at 08:49

## 2024-05-24 RX ADMIN — ONDANSETRON 4 MG: 2 INJECTION INTRAMUSCULAR; INTRAVENOUS at 20:17

## 2024-05-24 RX ADMIN — Medication 100 MCG: at 09:42

## 2024-05-24 RX ADMIN — Medication 100 MCG: at 08:28

## 2024-05-24 RX ADMIN — SODIUM CHLORIDE 2000 MG: 900 INJECTION INTRAVENOUS at 08:27

## 2024-05-24 RX ADMIN — FENTANYL CITRATE 100 MCG: 50 INJECTION, SOLUTION INTRAMUSCULAR; INTRAVENOUS at 08:17

## 2024-05-24 RX ADMIN — PROPOFOL 225 MG: 10 INJECTION, EMULSION INTRAVENOUS at 08:17

## 2024-05-24 RX ADMIN — CHLORHEXIDINE GLUCONATE 30 ML: 1.2 SOLUTION ORAL at 06:43

## 2024-05-24 RX ADMIN — ESMOLOL HYDROCHLORIDE 20 MG: 100 INJECTION, SOLUTION INTRAVENOUS at 09:25

## 2024-05-24 RX ADMIN — LIDOCAINE HYDROCHLORIDE 0.5 ML: 10 INJECTION, SOLUTION EPIDURAL; INFILTRATION; INTRACAUDAL; PERINEURAL at 06:39

## 2024-05-24 RX ADMIN — HYDROMORPHONE HYDROCHLORIDE 0.5 MG: 2 INJECTION, SOLUTION INTRAMUSCULAR; INTRAVENOUS; SUBCUTANEOUS at 10:41

## 2024-05-24 RX ADMIN — HYDROMORPHONE HYDROCHLORIDE 0.5 MG: 2 INJECTION, SOLUTION INTRAMUSCULAR; INTRAVENOUS; SUBCUTANEOUS at 10:30

## 2024-05-24 RX ADMIN — OXYCODONE 5 MG: 5 TABLET ORAL at 21:39

## 2024-05-24 RX ADMIN — HYDROMORPHONE HYDROCHLORIDE 0.5 MG: 2 INJECTION, SOLUTION INTRAMUSCULAR; INTRAVENOUS; SUBCUTANEOUS at 11:19

## 2024-05-24 RX ADMIN — ROCURONIUM BROMIDE 80 MG: 10 INJECTION INTRAVENOUS at 08:18

## 2024-05-24 RX ADMIN — GABAPENTIN 600 MG: 300 CAPSULE ORAL at 06:40

## 2024-05-24 RX ADMIN — METOPROLOL TARTRATE 2.5 MG: 5 INJECTION INTRAVENOUS at 10:41

## 2024-05-24 RX ADMIN — SCOPOLAMINE 1 PATCH: 1.5 PATCH, EXTENDED RELEASE TRANSDERMAL at 06:39

## 2024-05-24 RX ADMIN — SODIUM CHLORIDE 2000 MG: 900 INJECTION INTRAVENOUS at 18:24

## 2024-05-24 RX ADMIN — DEXAMETHASONE SODIUM PHOSPHATE 6 MG: 10 INJECTION, SOLUTION INTRAMUSCULAR; INTRAVENOUS at 08:24

## 2024-05-24 NOTE — PLAN OF CARE
Goal Outcome Evaluation:  Plan of Care Reviewed With: patient, spouse        Progress: no change  Outcome Evaluation: Patient arrived on the unit this afternoon after having undergone a gastric sleeve, hiatal hernia repair, and EGD with Dr. Ricardo. Patient has had a decent shift, awake since arriving on the unit. VSS, and patient is alert and oriented times four. No complaints of pain since arrival on the unit. Patient has voided, but has yet to ambulate.  and niece at bedside. Nursing staff will continue to monitor and assess the patient.

## 2024-05-24 NOTE — PAYOR COMM NOTE
"Tere Prescott, RN  Utilization Management  P:[phone]  F:[phone]    [facility] ID# [id number]   OP-IP Surgical admission  Requesting IP for cpt 75229    Theresa Maya (33 y.o. Female)       Date of Birth   1990    Social Security Number       Address   [address]  [address] [place] [address]    Home Phone   [phone]    MRN   [id number]       Rastafari   None    Marital Status                               Admission Date   5/24/24    Admission Type   Elective    Admitting Provider   Cristiano Ricardo MD    Attending Provider   Cristiano Ricardo MD    Department, Room/Bed   [facility] OR/MAIN OR       Discharge Date       Discharge Disposition       Discharge Destination                                 Attending Provider: Cristiano Ricardo MD    Allergies: Amoxicillin, Doxycycline, Latex, Penicillins    Isolation: None   Infection: None   Code Status: CPR    Ht: 157.5 cm (62\")   Wt: 98 kg (216 lb 0.8 oz)    Admission Cmt: None   Principal Problem: S/P laparoscopic sleeve gastrectomy [Z98.84]                   Active Insurance as of 5/24/2024       Primary Coverage       Payor Plan Insurance Group Employer/Plan Group    [employer] [place] [employer] [place]ICA[place]        Payor Plan Address Payor Plan Phone Number Payor Plan Fax Number Effective Dates    [address] [address] [phone]  6/2/2020 - None Entered    [facility] [id number]         Subscriber Name Subscriber Birth Date Member ID       THERESA MAYA 1990 [id number]                     Emergency Contacts        (Rel.) Home Phone Work Phone Mobile Phone    Juve Maya (Spouse) [phone] -- [phone]                 History & Physical        Cristiano Ricardo MD at 05/24/24 0653            H&P updated. The patient was examined and the following changes are noted:  MRSA screen negative, Hb A1C normal         Electronically signed by Cristiano Ricardo MD at 05/24/24 0654   " "Source Note: H&P (View-Only)          [facility] BARIATRIC SURGERY  [address]  [address]  [facility] [address] [phone]      Patient  Name:  Theresa Maya  :  1990      Date of Visit: 24    Chief Complaint:  Recurrent hiatal hernia and reflux, abdominal pain status post laparoscopic sleeve gastrectomy and hiatal hernia repair May 2022    History of Present Illness:  Theresa Maya is a 33 y.o. female who presents today for reevaluation, education and consultation regarding her recurrent hiatal hernia and GE reflux and abdominal pain status post laparoscopic sleeve gastrectomy with hiatal hernia repair in May 2022.  My most recent evaluation dated 2023 reviewed:    \"Theresa Maya is a 33 y.o. female who presents today for evaluation, education and consultation regarding her recurrent hiatal hernia and reflux and abdominal pain status post laparoscopic sleeve gastrectomy and hiatal hernia repair in May 2022.  Since last seen 2023 she has lost 3-1/2 pounds.  Most recent evaluation Sallie CONCEPCION PA-C dated 2023 reviewed.  She notes the last office visit was on 2023 where she quotes findings that at that time 3 weeks prior the patient developed sharp acute crampy umbilical pain that doubled her over with minimal relief and next day noted bloody discharge from her umbilicus and subsidy went to the ER at  on 2023 where they noted she went to an urgent care earlier for this and was given Keflex and Toradol and told to come to the ED and has associated diarrhea which was painful without hematochezia also nausea dry heaving increased urinary frequency and yellow vaginal discharge no fever chills CAT scan showed a small hiatal hernia and was instructed to continue Keflex and that wound cultures on 2023 grew normal skin nicole and her PCP gave her a treatment for Bactrim after she finished her Keflex and that her symptoms persist and she was " "advised to follow-up with her bariatric surgeon continues with intense pain/pressure surrounding her umbilicus with ongoing bloody discharge also having heartburn nausea poor appetite and is on a Medrol pack for sinus drainage Mobic as needed for sacral pain and took over-the-counter omeprazole for 20 days and notes that surgical history includes a laparoscopic tubal ligation in 2017 at [facility] by Dr. Leo Posey and the op note was reviewed where a periumbilical incision was made and that they discussed with Dr. Ricardo who said to reassure her that I did not make any incisions at her umbilicus and that she had previously had umbilical incisions both for her cholecystectomy and likely her tubal ligation and she should follow-up with a general surgeon for evaluation of her umbilicus and with regards to her reflux and possible recurrent hernia needs an upper GI as the next step.  She notes evaluation with a general surgeon is pending has a repeat CT of the abdomen pelvis ordered also with a possible left ovarian issue follow-up with GYN plan next week and that upper GI on 9/29/2023 at [facility] showed mild reflux and a small sliding hiatal hernia and Sallie CONCEPCION PA-C update says she still has \"massive heartburn\" with pain in her stomach continues on Nexium OTC currently on Macrobid for UTI umbilical drainage has slowed.  EGD on 11/27/2023 I noted a recurrent hiatal hernia Z-line 35 cm distal linear erosive focal esophagitis the sleeve itself unremarkable no bile in the stomach pylorus not in spasm or deformed.  I noted that prior to her sleeve she had reflux and EGD showed a small hiatal hernia with the Z-line at 35 cm and at surgery the hiatal hernia was subtle seen on the posterior lateral view and was repaired posteriorly the sleeve was done by Titan technique and then I reviewed the color images from surgery including a normal-appearing periumbilical region and at the time of her surgery " she weighed 242-1/2 pounds and had a BMI 42.7 and currently weighs 204 pounds with a BMI of 35.6 and that she is developed recurrent reflux not well-controlled with maximal medical therapy most recently switched from omeprazole to Nexium and that I reviewed the upper GI read by Dr. Ari Moeller.  Pathology of the antrum showed minimal chronic gastritis negative for H. pylori and distal esophageal biopsies were consistent with reflux esophagitis otherwise unremarkable.     33-year-old female from [place] known to me as above.  She comes in today to discuss options.  We had briefly discussed prior to her EGD that the usual options are recurrent hiatal hernia repair with or without revision to Teresa-en-Y gastric bypass.  Her  was there at the time.  She says they have talked this over at length.  Currently her reflux symptoms or not controlled on 40 mg of Nexium.  She says she regurgitates at night and gets nausea frequently from the reflux as well.  With regards to her periumbilical pain and issues she says workup has revealed that her ovary is adhesed to her umbilicus, this was diagnosed by what she describes as an invasive ultrasound probe and that they suspect she has PCOS and she has an appointment to see her GYN in [place] on 12/14/2023.  She says she is switching insurance, her  is a transplant nurse in [place] at [employer] and she is going under his insurance.....    Assessment:     Theresa Maya is a 33 y.o. year old female with recurrent hiatal hernia and reflux, abdominal pain status post laparoscopic sleeve gastrectomy and hiatal hernia repair May 2022     I reviewed her Carlos Alberto report which is negative.       We went over the options to treat her recurrent hiatal hernia and reflux.  I used flip charts.  We discussed recurrent hiatal hernia repair alone as a stand-alone procedure versus recurrent hiatal hernia pair with revision to a Teresa-en-Y gastric bypass with or without a  longer BP limb.  She says she has no issues with taking required lifelong vitamins and already takes a prenatal vitamin, iron, and vitamin D and B12 and understands that there will be several additional vitamins required.  She also understands there could be would be more frequent bowel movements.     Complications of laparoscopic/possible robotic gastric bypass were discussed including but not limited to bleeding, infection, deep venous thrombosis, pulmonary embolism, pulmonary complications such as pneumonia, cardiac events, hernias, small bowel obstruction, damage to the spleen or other organs, bowel injury, disfiguring scars, failure to lose weight, need for additional surgery, conversion to an open procedure, and death.  Patient is also aware of complications which apply in this particular procedure that can include but are not limited to leaking of gastric contents at the staple or suture lines which could lead to intra-abdominal abscess, or chronic complications of strictures, ulcers, or vitamin/mineral deficiencies.  If long BP limb, we discussed the likelihood of more frequent BM's,possibly malodorous, with increased risk for vitamin deficiencies, beena the fat soluble vitamins, and that this can be serious and irreversible and will require lifelong compliance with w f/u and vit/supplment recommendations.  Aware that may need an elongation procedure in the future if diarrhea and/or vitamin issues not controlled.        R/B/A Rx to recurrent hiatal hernia repair were discussed.  These include but are not limited to bleeding, infection, death, pulmonary complications,  VTE events, splenic injury or infarction, recurrent hernia, DAVID, mesh complications, dysphagia, esophageal injury, pneumothorax, injury to the vagus nerves, injury to the thoracic duct, aorta or vena cava, diaphragmatic paralysis.         Plan:    After discussion of the risk, benefits, and alternative therapies as above she wishes to proceed  "with laparoscopic possible robotic assisted recurrent hiatal hernia repair with revision to Teresa-en-Y gastric to address her recurrent hiatal hernia and severe reflux status post laparoscopic sleeve gastrectomy and hiatal hernia repair in May 2022.  Based on my interaction with her today plan BP limb around 100 to 150 cm depending on bowel length.  Likely discharge on Eliquis.     Other issues include anxiety and depression, arthritis, MTHFR mutation with history of DVT right lower extremity 2014, dyspnea exertion, elevated hemoglobin A1c, fatigue, upper lipidemia, incomplete right bundle branch block, history of iron deficiency anemia, migraine headaches, ovarian cyst, history of UTIs.\"    She returns for final evaluation prior to scheduling surgery.  She attended informed consent last evening and although it is pretty much the same information feels she learned some new information.  A daughter is with her for today's evaluation.  The patient continues to have severe reflux symptoms and is up to 80 mg of Nexium daily and takes Zofran at night to keep from vomiting.  Since last seen she underwent laparoscopic total abdominal hysterectomy with bilateral salpingectomy, cystoscopy, resection of an umbilical endometrioma with reconstruction of the umbilicus, the ovaries were left.  This was [facility] at [facility].  She said no complications of surgery and she was sent home on 7 days of Lovenox injections.  Her hemoglobin is 10 and she is agreeable with blood transfusion if deemed appropriate.  She is no longer living in [place] or working at the [facility] cancer center.  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      Past Medical History:   Diagnosis Date    Abnormal Pap smear of cervix 2019    ADHD (attention deficit hyperactivity disorder) 2022    [facility] 2011    [facility]    Anxiety     Arthritis     Clotting disorder     " "Deep vein thrombosis     2014, (R) leg while pregnant, dx w/ MTHFR    Depression     Dyspepsia     Dyspnea on exertion     Elevated hemoglobin A1c     Fatigue     Gallbladder abscess     s/p lap nicolas 2006    GERD (gastroesophageal reflux disease)     UGI 9/23 - small recurrent HH, EGD Dr. Ricardo  11/23 small recurrent HH    Heartburn     chronic/episodic, depends on what she eats, takes Pepcid prn, EGD Dr. Ricardo 11/21    Hyperemesis gravidarum     Hyperlipidemia     Incomplete right bundle branch block     Iron deficiency anemia     Migraines     Miscarriage     x 4 prior to 1st pregnancy, presumably from undiagnosed MTHFR mutation    Morbid obesity with body mass index (BMI) of 40.0 to 44.9 in adult 04/19/2022    MTHFR deficiency complicating pregnancy     says clotting d/o only an issue when pregnant, advised to use heparin shots during pregnancy    Obesity     Ovarian cyst     Recurrent pregnancy loss, antepartum condition or complication     had a [facility] and lost the pregnancy at 6 months    Strep pharyngitis     Urinary tract infection      Past Surgical History:   Procedure Laterality Date    ENDOSCOPY N/A 05/05/2022    Procedure: ESOPHAGOGASTRODUODENOSCOPY;  Surgeon: Cristiano Ricardo MD;  Location:  [facility];  Service: Bariatric;  Laterality: N/A;    GASTRECTOMY N/A 05/05/2022    Procedure: GASTRECTOMY LAPAROSCOPIC;  Surgeon: Cristiano Ricardo MD;  Location:  [facility];  Service: Bariatric;  Laterality: N/A;    HERNIA REPAIR  2022    HIATAL HERNIA REPAIR N/A 05/05/2022    Procedure: HIATAL HERNIA REPAIR LAPAROSCOPIC;  Surgeon: Cristiano Ricardo MD;  Location:  [facility];  Service: Bariatric;  Laterality: N/A;    HYSTERECTOMY  01/23/2024    [facility] Dr. Elizabeth    LAPAROSCOPIC CHOLECYSTECTOMY  2006    no stones, was told she had \"three gallbladders\"- all removed    SINUS SURGERY Bilateral 2006    TUBAL COAGULATION LAPAROSCOPIC Bilateral 09/15/2017    Procedure: BILATERAL TUBAL FALLOPE FILSHIE CLIPPING " LAPAROSCOPIC;  Surgeon: Leo Posey III, MD;  Location: [facility];  Service:     VAGINAL DELIVERY  14; 16; 17    female,male, male.  She said she had subcutaneous Lovenox injections throughout the second and third pregnancies until delivery       Allergies   Allergen Reactions    Amoxicillin Diarrhea and GI Intolerance    Doxycycline Other (See Comments)     Vaginal swelling     Latex Hives and Itching    Penicillins GI Intolerance     Says Keflex OK as long as she takes w/ food.        Current Outpatient Medications:     ARIPiprazole (ABILIFY) 5 MG tablet, Take 1 tablet by mouth Daily., Disp: 30 tablet, Rfl: 1    Elidel 1 % cream, Apply  topically to the appropriate area as directed 2 (Two) Times a Day., Disp: 100 g, Rfl: 2    esomeprazole (nexIUM) 40 MG capsule, Take 1 capsule by mouth Every Morning Before Breakfast. (Patient taking differently: Take 1 capsule by mouth 2 (Two) Times a Day.), Disp: 90 capsule, Rfl: 1    ferrous sulfate 325 (65 FE) MG tablet, Take 1 tablet by mouth 2 (Two) Times a Day., Disp: , Rfl:     guaifenesin-dextromethorphan 600-30 mg (MUCINEX DM)  MG tablet sustained-release 12 hour, Take 1 tablet by mouth 2 (Two) Times a Day As Needed (congestion)., Disp: 20 tablet, Rfl: 0    lamoTRIgine (LaMICtal) 100 MG tablet, Take 1 tablet by mouth Daily., Disp: 30 tablet, Rfl: 1    montelukast (Singulair) 10 MG tablet, Take 1 tablet by mouth Every Night., Disp: 30 tablet, Rfl: 3    multivitamin with minerals tablet tablet, Take 1 tablet by mouth Daily., Disp: , Rfl:     ondansetron ODT (ZOFRAN-ODT) 4 MG disintegrating tablet, Place 1 tablet on the tongue Every 8 (Eight) Hours As Needed for Nausea or Vomiting., Disp: 20 tablet, Rfl: 0    polyethylene glycol (MiraLax) 17 GM/SCOOP powder, Take 17 g by mouth Daily., Disp: 255 g, Rfl: 1    promethazine (PHENERGAN) 12.5 MG tablet, Take 1 tablet by mouth Every 8 (Eight) Hours As Needed for Nausea or Vomiting., Disp: 20 tablet, Rfl: 0    Saw Palmetto  80 MG capsule, Take 1 capsule by mouth Daily., Disp: , Rfl:     triamcinolone (KENALOG) 0.1 % ointment, Apply  topically to the appropriate area as directed 2 (Two) Times a Day., Disp: 453.6 g, Rfl: 1    vitamin B-12 (CYANOCOBALAMIN) 500 MCG tablet, Take 1 tablet by mouth Daily., Disp: , Rfl:     vitamin D3 125 MCG (5000 UT) capsule capsule, Take 1 capsule by mouth Daily., Disp: , Rfl:     apixaban (Eliquis) 2.5 MG tablet tablet, Take 1 tablet by mouth Every 12 (Twelve) Hours for 42 doses., Disp: 42 tablet, Rfl: 0    Social History     Socioeconomic History    Marital status:    Tobacco Use    Smoking status: Never    Smokeless tobacco: Never    Tobacco comments:     disgust me never touched   Vaping Use    Vaping status: Never Used   Substance and Sexual Activity    Alcohol use: Not Currently     Comment: do not drink weekly only on holidays/ birtdays    Drug use: No    Sexual activity: Yes     Partners: Male     Birth control/protection: Surgical     Comment: bilatiral tubal     Family History   Problem Relation Age of Onset    Asthma Mother         effected after stroke    Thyroid disease Mother     Heart attack Mother     Obesity Mother     Migraines Mother     Hypertension Mother     COPD Mother     Mental illness Mother         depression    Alcohol abuse Father         abused since childhood    Lupus Sister     Ovarian cancer Sister     Asthma Sister     Breast cancer Maternal Grandmother 54    Lung cancer Maternal Grandmother     Asthma Maternal Grandmother     Hypertension Maternal Grandmother     Lupus Maternal Grandmother     Thyroid disease Maternal Grandmother     Diabetes Maternal Grandmother     Stroke Maternal Grandmother     Cancer Maternal Grandmother         lung    Hypertension Maternal Grandfather     Lung cancer Paternal Grandmother     Obesity Paternal Grandmother     Cancer Paternal Grandmother     Prostate cancer Paternal Grandfather     Cancer Paternal Grandfather         lung     Miscarriages / Stillbirths Maternal Aunt        Review of Systems   Constitutional:  Positive for fatigue and unexpected weight gain. Negative for chills, diaphoresis, fever and unexpected weight loss.   HENT:  Negative for congestion and facial swelling.    Eyes:  Negative for blurred vision, double vision and discharge.   Respiratory:  Negative for chest tightness, shortness of breath and stridor.    Cardiovascular:  Negative for chest pain, palpitations and leg swelling.   Gastrointestinal:  Positive for GERD. Negative for blood in stool.   Endocrine: Negative for polydipsia.   Genitourinary:  Negative for hematuria.   Musculoskeletal:  Positive for arthralgias.   Skin:  Negative for color change.   Allergic/Immunologic: Negative for immunocompromised state.   Neurological:  Negative for confusion.   Psychiatric/Behavioral:  Positive for decreased concentration. Negative for self-injury.        I have reviewed the ROS and confirm that it's accurate today.    Physical Exam:  Vital Signs:  Weight: 96.4 kg (212 lb 8 oz)   Body mass index is 37.05 kg/m².  Temp: 98 °F (36.7 °C)   Heart Rate: 80   BP: 122/78     Physical Exam  Vitals reviewed.   Constitutional:       Appearance: She is well-developed.   HENT:      Head: Normocephalic and atraumatic.      Nose: Nose normal.   Eyes:      Conjunctiva/sclera: Conjunctivae normal.      Pupils: Pupils are equal, round, and reactive to light.   Neck:      Thyroid: No thyromegaly.      Vascular: No carotid bruit.      Trachea: No tracheal deviation.   Cardiovascular:      Rate and Rhythm: Normal rate and regular rhythm.      Heart sounds: Normal heart sounds.   Pulmonary:      Effort: Pulmonary effort is normal. No respiratory distress.      Breath sounds: Normal breath sounds.   Abdominal:      General: There is no distension.      Palpations: Abdomen is soft.      Tenderness: There is no abdominal tenderness.      Comments: Laparoscopy scars   Musculoskeletal:          General: No deformity. Normal range of motion.      Cervical back: Normal range of motion and neck supple.   Skin:     General: Skin is warm and dry.      Findings: No rash.   Neurological:      Mental Status: She is alert and oriented to person, place, and time.      Cranial Nerves: No cranial nerve deficit.      Coordination: Coordination normal.   Psychiatric:         Behavior: Behavior normal.         Thought Content: Thought content normal.         Judgment: Judgment normal.         Patient Active Problem List   Diagnosis    Previous stillbirth or demise, antepartum    Previous deep vein thrombosis (DVT) affecting pregnancy    H/O abnormal cervical Papanicolaou smear    Anxiety    MTHFR gene mutation    Heartburn    Fatigue    Dyspepsia    Dyspnea on exertion    Right leg pain    Lumbar back pain    Bipolar disorder in partial remission    History of suicidal ideation    Constipation    Hiatal hernia with gastroesophageal reflux    S/P laparoscopic sleeve gastrectomy       Assessment:    Theresa Maya is a 33 y.o. year old female with recurrent hiatal hernia and severe reflux and abdominal pain status post laparoscopic sleeve gastrectomy and hiatal hernia repair in May 2022    The patient was present for an approximately a 2.5 hour discussion of the purpose of MBS, how MBS is a tool to assist in achieving weight loss goals, the most common complications and how best to avoid them, and the strategies for short and long term weight loss and improvement in comorbidities.  Ample opportunity to discuss questions was available both in group and during the time of individual examination.    I reviewed her Carlos Alberto report showing oxycodone 5 mg #18 x 1 after her hysterectomy in January.  Labs dated 4/23/2024 unremarkable CMP CBC showing anemia with a hemoglobin of 10.7 hematocrit 35.5 low MCH.  Operative report at the [facility] in January 2024 total abdominal hysterectomy and bilateral  salpingo-oophorectomy.  Psychosocial evaluation dated 2/5/2024 Yarelis WHATLEY and she is cleared to proceed with bariatric surgery.  Note dated 12/21/2023 that the patient was admitted to  on 12/18/2023 with suicidal ideation.  Please see scanned records that I have reviewed and signed off on today.  All of this in addition to the patient's unique history and exam has been taken into consideration in determining their appropriate candidacy for MBS.    Complications of laparoscopic/possible robotic gastric bypass were discussed including but not limited to bleeding, infection, deep venous thrombosis, pulmonary embolism, pulmonary complications such as pneumonia, cardiac events, hernias, small bowel obstruction, damage to the spleen or other organs, bowel injury, disfiguring scars, failure to lose weight, need for additional surgery, conversion to an open procedure, and death.  Patient is also aware of complications which apply in this particular procedure that can include but are not limited to leaking of gastric contents at the staple or suture lines which could lead to intra-abdominal abscess, or chronic complications of strictures, ulcers, or vitamin/mineral deficiencies.  If long BP limb, we discussed the likelihood of more frequent BM's,possibly malodorous, with increased risk for vitamin deficiencies, beena the fat soluble vitamins, and that this can be serious and irreversible and will require lifelong compliance with w f/u and vit/supplment recommendations.  Aware that may need an elongation procedure in the future if diarrhea and/or vitamin issues not controlled.       R/B/A Rx to recurrent hiatal hernia repair were discussed as outlined in our long consent form.  Briefly risks in addition to those for gastric bypass include recurrent hernia, DAVID, dysphagia, esophageal injury, pneumothorax, injury to the vagus nerves, injury to the thoracic duct, aorta or vena cava.    Discussed the risks, benefits and  alternative therapies at great length as outlined in our extensive consent forms, consent videos, and educational teaching process under the direction of the center's .    A copy of the patient's signed informed consent is on file.    R/B/A Rx discussed to postop anticoagulation incl but not limited to bleeding, drug reaction, venothromboembolic events, etc. and agreeable to taking post op  Eliquis 2.5 mg po Q 12 hrs #42        Plan:    After reevaluation today I think the patient remains a reasonable candidate for laparoscopic possible robotic assisted revision of her sleeve gastrectomy to a Teresa-en-Y gastric bypass, recurrent hiatal hernia repair, and EGD to address her recurrent hiatal hernia and severe reflux disease not controlled with maximal medical therapy status post laparoscopic sleeve gastrectomy and hiatal hernia repair in May 2022.  Aware this will be a complex likely 3 or more hours surgery with a Cali catheter, CHRISTINA drain, etc.    Other issues include Assessment:     Theresa Maya is a 33 y.o. year old female with recurrent hiatal hernia and reflux, abdominal pain status post laparoscopic sleeve gastrectomy and hiatal hernia repair May 2022     I reviewed her Carlos Alberto report which is negative.       We went over the options to treat her recurrent hiatal hernia and reflux.  I used flip charts.  We discussed recurrent hiatal hernia repair alone as a stand-alone procedure versus recurrent hiatal hernia pair with revision to a Teresa-en-Y gastric bypass with or without a longer BP limb.  She says she has no issues with taking required lifelong vitamins and already takes a prenatal vitamin, iron, and vitamin D and B12 and understands that there will be several additional vitamins required.  She also understands there could be would be more frequent bowel movements.     Complications of laparoscopic/possible robotic gastric bypass were discussed including but not limited to bleeding,  infection, deep venous thrombosis, pulmonary embolism, pulmonary complications such as pneumonia, cardiac events, hernias, small bowel obstruction, damage to the spleen or other organs, bowel injury, disfiguring scars, failure to lose weight, need for additional surgery, conversion to an open procedure, and death.  Patient is also aware of complications which apply in this particular procedure that can include but are not limited to leaking of gastric contents at the staple or suture lines which could lead to intra-abdominal abscess, or chronic complications of strictures, ulcers, or vitamin/mineral deficiencies.  If long BP limb, we discussed the likelihood of more frequent BM's,possibly malodorous, with increased risk for vitamin deficiencies, beena the fat soluble vitamins, and that this can be serious and irreversible and will require lifelong compliance with w f/u and vit/supplment recommendations.  Aware that may need an elongation procedure in the future if diarrhea and/or vitamin issues not controlled.        R/B/A Rx to recurrent hiatal hernia repair were discussed.  These include but are not limited to bleeding, infection, death, pulmonary complications,  VTE events, splenic injury or infarction, recurrent hernia, DAVID, mesh complications, dysphagia, esophageal injury, pneumothorax, injury to the vagus nerves, injury to the thoracic duct, aorta or vena cava, diaphragmatic paralysis.         Plan:    After discussion of the risk, benefits, and alternative therapies as above she wishes to proceed with laparoscopic possible robotic assisted recurrent hiatal hernia repair with revision to Teresa-en-Y gastric to address her recurrent hiatal hernia and severe reflux status post laparoscopic sleeve gastrectomy and hiatal hernia repair in May 2022.  Based on my interaction with her today plan BP limb around 100 to 150 cm depending on bowel length.  Likely discharge on Eliquis.     Other issues include anxiety and  "depression, arthritis, MTHFR mutation with history of DVT right lower extremity 2014, dyspnea exertion, elevated hemoglobin A1c, fatigue, upper lipidemia, incomplete right bundle branch block, history of iron deficiency anemia, migraine headaches, ovarian cyst, history of UTIs.     Thank you Gina WHATLEY for the opportunity reevaluate Mrs. Maya.      Cristiano Ricardo MD                Electronically signed by Cristiano Ricardo MD at 24 1225                 Cristiano Ricardo MD at 24 1100          [facility] BARIATRIC SURGERY  [address]  [address]  [facility] [address]  [phone]      Patient  Name:  Theresa Maya  :  1990      Date of Visit: 24    Chief Complaint:  Recurrent hiatal hernia and reflux, abdominal pain status post laparoscopic sleeve gastrectomy and hiatal hernia repair May 2022    History of Present Illness:  Theresa Maya is a 33 y.o. female who presents today for reevaluation, education and consultation regarding her recurrent hiatal hernia and GE reflux and abdominal pain status post laparoscopic sleeve gastrectomy with hiatal hernia repair in May 2022.  My most recent evaluation dated 2023 reviewed:    \"Theresa Maya is a 33 y.o. female who presents today for evaluation, education and consultation regarding her recurrent hiatal hernia and reflux and abdominal pain status post laparoscopic sleeve gastrectomy and hiatal hernia repair in May 2022.  Since last seen 2023 she has lost 3-1/2 pounds.  Most recent evaluation Sallie CONCEPCION PA-C dated 2023 reviewed.  She notes the last office visit was on 2023 where she quotes findings that at that time 3 weeks prior the patient developed sharp acute crampy umbilical pain that doubled her over with minimal relief and next day noted bloody discharge from her umbilicus and subsidy went to the ER at  on 2023 where they noted she went to an urgent care " "earlier for this and was given Keflex and Toradol and told to come to the ED and has associated diarrhea which was painful without hematochezia also nausea dry heaving increased urinary frequency and yellow vaginal discharge no fever chills CAT scan showed a small hiatal hernia and was instructed to continue Keflex and that wound cultures on 9/1/2023 grew normal skin nicole and her PCP gave her a treatment for Bactrim after she finished her Keflex and that her symptoms persist and she was advised to follow-up with her bariatric surgeon continues with intense pain/pressure surrounding her umbilicus with ongoing bloody discharge also having heartburn nausea poor appetite and is on a Medrol pack for sinus drainage Mobic as needed for sacral pain and took over-the-counter omeprazole for 20 days and notes that surgical history includes a laparoscopic tubal ligation in 2017 at [facility] by Dr. Leo Posey and the op note was reviewed where a periumbilical incision was made and that they discussed with Dr. Ricardo who said to reassure her that I did not make any incisions at her umbilicus and that she had previously had umbilical incisions both for her cholecystectomy and likely her tubal ligation and she should follow-up with a general surgeon for evaluation of her umbilicus and with regards to her reflux and possible recurrent hernia needs an upper GI as the next step.  She notes evaluation with a general surgeon is pending has a repeat CT of the abdomen pelvis ordered also with a possible left ovarian issue follow-up with GYN plan next week and that upper GI on 9/29/2023 at [facility] showed mild reflux and a small sliding hiatal hernia and Sallie CONCEPCION PA-C update says she still has \"massive heartburn\" with pain in her stomach continues on Nexium OTC currently on Macrobid for UTI umbilical drainage has slowed.  EGD on 11/27/2023 I noted a recurrent hiatal hernia Z-line 35 cm distal linear erosive " focal esophagitis the sleeve itself unremarkable no bile in the stomach pylorus not in spasm or deformed.  I noted that prior to her sleeve she had reflux and EGD showed a small hiatal hernia with the Z-line at 35 cm and at surgery the hiatal hernia was subtle seen on the posterior lateral view and was repaired posteriorly the sleeve was done by Titan technique and then I reviewed the color images from surgery including a normal-appearing periumbilical region and at the time of her surgery she weighed 242-1/2 pounds and had a BMI 42.7 and currently weighs 204 pounds with a BMI of 35.6 and that she is developed recurrent reflux not well-controlled with maximal medical therapy most recently switched from omeprazole to Nexium and that I reviewed the upper GI read by Dr. Ari Moeller.  Pathology of the antrum showed minimal chronic gastritis negative for H. pylori and distal esophageal biopsies were consistent with reflux esophagitis otherwise unremarkable.     33-year-old female from [place] known to me as above.  She comes in today to discuss options.  We had briefly discussed prior to her EGD that the usual options are recurrent hiatal hernia repair with or without revision to Teresa-en-Y gastric bypass.  Her  was there at the time.  She says they have talked this over at length.  Currently her reflux symptoms or not controlled on 40 mg of Nexium.  She says she regurgitates at night and gets nausea frequently from the reflux as well.  With regards to her periumbilical pain and issues she says workup has revealed that her ovary is adhesed to her umbilicus, this was diagnosed by what she describes as an invasive ultrasound probe and that they suspect she has PCOS and she has an appointment to see her GYN in [place] on 12/14/2023.  She says she is switching insurance, her  is a transplant nurse in [place] at [employer] and she is going under his insurance.....    Assessment:     Theresa Maya  is a 33 y.o. year old female with recurrent hiatal hernia and reflux, abdominal pain status post laparoscopic sleeve gastrectomy and hiatal hernia repair May 2022     I reviewed her Carlos Alberto report which is negative.       We went over the options to treat her recurrent hiatal hernia and reflux.  I used flip charts.  We discussed recurrent hiatal hernia repair alone as a stand-alone procedure versus recurrent hiatal hernia pair with revision to a Teresa-en-Y gastric bypass with or without a longer BP limb.  She says she has no issues with taking required lifelong vitamins and already takes a prenatal vitamin, iron, and vitamin D and B12 and understands that there will be several additional vitamins required.  She also understands there could be would be more frequent bowel movements.     Complications of laparoscopic/possible robotic gastric bypass were discussed including but not limited to bleeding, infection, deep venous thrombosis, pulmonary embolism, pulmonary complications such as pneumonia, cardiac events, hernias, small bowel obstruction, damage to the spleen or other organs, bowel injury, disfiguring scars, failure to lose weight, need for additional surgery, conversion to an open procedure, and death.  Patient is also aware of complications which apply in this particular procedure that can include but are not limited to leaking of gastric contents at the staple or suture lines which could lead to intra-abdominal abscess, or chronic complications of strictures, ulcers, or vitamin/mineral deficiencies.  If long BP limb, we discussed the likelihood of more frequent BM's,possibly malodorous, with increased risk for vitamin deficiencies, beena the fat soluble vitamins, and that this can be serious and irreversible and will require lifelong compliance with w f/u and vit/supplment recommendations.  Aware that may need an elongation procedure in the future if diarrhea and/or vitamin issues not controlled.        R/B/A  "Rx to recurrent hiatal hernia repair were discussed.  These include but are not limited to bleeding, infection, death, pulmonary complications,  VTE events, splenic injury or infarction, recurrent hernia, DAVID, mesh complications, dysphagia, esophageal injury, pneumothorax, injury to the vagus nerves, injury to the thoracic duct, aorta or vena cava, diaphragmatic paralysis.         Plan:    After discussion of the risk, benefits, and alternative therapies as above she wishes to proceed with laparoscopic possible robotic assisted recurrent hiatal hernia repair with revision to Teresa-en-Y gastric to address her recurrent hiatal hernia and severe reflux status post laparoscopic sleeve gastrectomy and hiatal hernia repair in May 2022.  Based on my interaction with her today plan BP limb around 100 to 150 cm depending on bowel length.  Likely discharge on Eliquis.     Other issues include anxiety and depression, arthritis, MTHFR mutation with history of DVT right lower extremity 2014, dyspnea exertion, elevated hemoglobin A1c, fatigue, upper lipidemia, incomplete right bundle branch block, history of iron deficiency anemia, migraine headaches, ovarian cyst, history of UTIs.\"    She returns for final evaluation prior to scheduling surgery.  She attended informed consent last evening and although it is pretty much the same information feels she learned some new information.  A daughter is with her for today's evaluation.  The patient continues to have severe reflux symptoms and is up to 80 mg of Nexium daily and takes Zofran at night to keep from vomiting.  Since last seen she underwent laparoscopic total abdominal hysterectomy with bilateral salpingectomy, cystoscopy, resection of an umbilical endometrioma with reconstruction of the umbilicus, the ovaries were left.  This was done at [facility].  She said no complications of surgery and she was sent home on 7 days of Lovenox injections.  Her hemoglobin is 10 " and she is agreeable with blood transfusion if deemed appropriate.  She is no longer living in [place] or working at the [facility] cancer center.  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      Past Medical History:   Diagnosis Date    Abnormal Pap smear of cervix 2019    ADHD (attention deficit hyperactivity disorder) 2022    Working with a therapy at     Allergic 2011    Take montalukast    Anxiety     Arthritis     Clotting disorder     Deep vein thrombosis     2014, (R) leg while pregnant, dx w/ MTHFR    Depression     Dyspepsia     Dyspnea on exertion     Elevated hemoglobin A1c     Fatigue     Gallbladder abscess     s/p lap nicolas 2006    GERD (gastroesophageal reflux disease)     UGI 9/23 - small recurrent HH, EGD Dr. Ricardo  11/23 small recurrent HH    Heartburn     chronic/episodic, depends on what she eats, takes Pepcid prn, EGD Dr. Ricardo 11/21    Hyperemesis gravidarum     Hyperlipidemia     Incomplete right bundle branch block     Iron deficiency anemia     Migraines     Miscarriage     x 4 prior to 1st pregnancy, presumably from undiagnosed MTHFR mutation    Morbid obesity with body mass index (BMI) of 40.0 to 44.9 in adult 04/19/2022    MTHFR deficiency complicating pregnancy     says clotting d/o only an issue when pregnant, advised to use heparin shots during pregnancy    Obesity     Ovarian cyst     Recurrent pregnancy loss, antepartum condition or complication     had a [facility] and lost the pregnancy at 6 months    Strep pharyngitis     Urinary tract infection      Past Surgical History:   Procedure Laterality Date    ENDOSCOPY N/A 05/05/2022    Procedure: ESOPHAGOGASTRODUODENOSCOPY;  Surgeon: Cristiano Ricardo MD;  Location: [facility] OR;  Service: Bariatric;  Laterality: N/A;    GASTRECTOMY N/A 05/05/2022    Procedure: GASTRECTOMY LAPAROSCOPIC;  Surgeon: Cristiano Ricardo MD;  Location:  [facility] OR;  Service: Bariatric;  Laterality: N/A;    HERNIA REPAIR  " 2022    HIATAL HERNIA REPAIR N/A 05/05/2022    Procedure: HIATAL HERNIA REPAIR LAPAROSCOPIC;  Surgeon: Cristiano Ricardo MD;  Location:  [facility] OR;  Service: Bariatric;  Laterality: N/A;    HYSTERECTOMY  01/23/2024    [facility]    LAPAROSCOPIC CHOLECYSTECTOMY  2006    no stones, was told she had \"three gallbladders\"- all removed    SINUS SURGERY Bilateral 2006    TUBAL COAGULATION LAPAROSCOPIC Bilateral 09/15/2017    Procedure: BILATERAL TUBAL FALLOPE FILSHIE CLIPPING LAPAROSCOPIC;  Surgeon: Leo Posey III, MD;  Location:  [facility] OR;  Service:     VAGINAL DELIVERY  14; 16; 17    female,male, male.  She said she had subcutaneous Lovenox injections throughout the second and third pregnancies until delivery       Allergies   Allergen Reactions    Amoxicillin Diarrhea and GI Intolerance    Doxycycline Other (See Comments)     Vaginal swelling     Latex Hives and Itching    Penicillins GI Intolerance     Says Keflex OK as long as she takes w/ food.        Current Outpatient Medications:     ARIPiprazole (ABILIFY) 5 MG tablet, Take 1 tablet by mouth Daily., Disp: 30 tablet, Rfl: 1    Elidel 1 % cream, Apply  topically to the appropriate area as directed 2 (Two) Times a Day., Disp: 100 g, Rfl: 2    esomeprazole (nexIUM) 40 MG capsule, Take 1 capsule by mouth Every Morning Before Breakfast. (Patient taking differently: Take 1 capsule by mouth 2 (Two) Times a Day.), Disp: 90 capsule, Rfl: 1    ferrous sulfate 325 (65 FE) MG tablet, Take 1 tablet by mouth 2 (Two) Times a Day., Disp: , Rfl:     guaifenesin-dextromethorphan 600-30 mg (MUCINEX DM)  MG tablet sustained-release 12 hour, Take 1 tablet by mouth 2 (Two) Times a Day As Needed (congestion)., Disp: 20 tablet, Rfl: 0    lamoTRIgine (LaMICtal) 100 MG tablet, Take 1 tablet by mouth Daily., Disp: 30 tablet, Rfl: 1    montelukast (Singulair) 10 MG tablet, Take 1 tablet by mouth Every Night., Disp: 30 tablet, Rfl: 3    multivitamin with minerals tablet " tablet, Take 1 tablet by mouth Daily., Disp: , Rfl:     ondansetron ODT (ZOFRAN-ODT) 4 MG disintegrating tablet, Place 1 tablet on the tongue Every 8 (Eight) Hours As Needed for Nausea or Vomiting., Disp: 20 tablet, Rfl: 0    polyethylene glycol (MiraLax) 17 GM/SCOOP powder, Take 17 g by mouth Daily., Disp: 255 g, Rfl: 1    promethazine (PHENERGAN) 12.5 MG tablet, Take 1 tablet by mouth Every 8 (Eight) Hours As Needed for Nausea or Vomiting., Disp: 20 tablet, Rfl: 0    Saw Palmetto 80 MG capsule, Take 1 capsule by mouth Daily., Disp: , Rfl:     triamcinolone (KENALOG) 0.1 % ointment, Apply  topically to the appropriate area as directed 2 (Two) Times a Day., Disp: 453.6 g, Rfl: 1    vitamin B-12 (CYANOCOBALAMIN) 500 MCG tablet, Take 1 tablet by mouth Daily., Disp: , Rfl:     vitamin D3 125 MCG (5000 UT) capsule capsule, Take 1 capsule by mouth Daily., Disp: , Rfl:     apixaban (Eliquis) 2.5 MG tablet tablet, Take 1 tablet by mouth Every 12 (Twelve) Hours for 42 doses., Disp: 42 tablet, Rfl: 0    Social History     Socioeconomic History    Marital status:    Tobacco Use    Smoking status: Never    Smokeless tobacco: Never    Tobacco comments:     disgust me never touched   Vaping Use    Vaping status: Never Used   Substance and Sexual Activity    Alcohol use: Not Currently     Comment: do not drink weekly only on holidays/ birtdays    Drug use: No    Sexual activity: Yes     Partners: Male     Birth control/protection: Surgical     Comment: bilatiral tubal     Family History   Problem Relation Age of Onset    Asthma Mother         effected after stroke    Thyroid disease Mother     Heart attack Mother     Obesity Mother     Migraines Mother     Hypertension Mother     COPD Mother     Mental illness Mother         depression    Alcohol abuse Father         abused since childhood    Lupus Sister     Ovarian cancer Sister     Asthma Sister     Breast cancer Maternal Grandmother 54    Lung cancer Maternal  Grandmother     Asthma Maternal Grandmother     Hypertension Maternal Grandmother     Lupus Maternal Grandmother     Thyroid disease Maternal Grandmother     Diabetes Maternal Grandmother     Stroke Maternal Grandmother     Cancer Maternal Grandmother         lung    Hypertension Maternal Grandfather     Lung cancer Paternal Grandmother     Obesity Paternal Grandmother     Cancer Paternal Grandmother     Prostate cancer Paternal Grandfather     Cancer Paternal Grandfather         lung    Miscarriages / Stillbirths Maternal Aunt        Review of Systems   Constitutional:  Positive for fatigue and unexpected weight gain. Negative for chills, diaphoresis, fever and unexpected weight loss.   HENT:  Negative for congestion and facial swelling.    Eyes:  Negative for blurred vision, double vision and discharge.   Respiratory:  Negative for chest tightness, shortness of breath and stridor.    Cardiovascular:  Negative for chest pain, palpitations and leg swelling.   Gastrointestinal:  Positive for GERD. Negative for blood in stool.   Endocrine: Negative for polydipsia.   Genitourinary:  Negative for hematuria.   Musculoskeletal:  Positive for arthralgias.   Skin:  Negative for color change.   Allergic/Immunologic: Negative for immunocompromised state.   Neurological:  Negative for confusion.   Psychiatric/Behavioral:  Positive for decreased concentration. Negative for self-injury.        I have reviewed the ROS and confirm that it's accurate today.    Physical Exam:  Vital Signs:  Weight: 96.4 kg (212 lb 8 oz)   Body mass index is 37.05 kg/m².  Temp: 98 °F (36.7 °C)   Heart Rate: 80   BP: 122/78     Physical Exam  Vitals reviewed.   Constitutional:       Appearance: She is well-developed.   HENT:      Head: Normocephalic and atraumatic.      Nose: Nose normal.   Eyes:      Conjunctiva/sclera: Conjunctivae normal.      Pupils: Pupils are equal, round, and reactive to light.   Neck:      Thyroid: No thyromegaly.       Vascular: No carotid bruit.      Trachea: No tracheal deviation.   Cardiovascular:      Rate and Rhythm: Normal rate and regular rhythm.      Heart sounds: Normal heart sounds.   Pulmonary:      Effort: Pulmonary effort is normal. No respiratory distress.      Breath sounds: Normal breath sounds.   Abdominal:      General: There is no distension.      Palpations: Abdomen is soft.      Tenderness: There is no abdominal tenderness.      Comments: Laparoscopy scars   Musculoskeletal:         General: No deformity. Normal range of motion.      Cervical back: Normal range of motion and neck supple.   Skin:     General: Skin is warm and dry.      Findings: No rash.   Neurological:      Mental Status: She is alert and oriented to person, place, and time.      Cranial Nerves: No cranial nerve deficit.      Coordination: Coordination normal.   Psychiatric:         Behavior: Behavior normal.         Thought Content: Thought content normal.         Judgment: Judgment normal.         Patient Active Problem List   Diagnosis    Previous stillbirth or demise, antepartum    Previous deep vein thrombosis (DVT) affecting pregnancy    H/O abnormal cervical Papanicolaou smear    Anxiety    MTHFR gene mutation    Heartburn    Fatigue    Dyspepsia    Dyspnea on exertion    Right leg pain    Lumbar back pain    Bipolar disorder in partial remission    History of suicidal ideation    Constipation    Hiatal hernia with gastroesophageal reflux    S/P laparoscopic sleeve gastrectomy       Assessment:    Theresa Maya is a 33 y.o. year old female with recurrent hiatal hernia and severe reflux and abdominal pain status post laparoscopic sleeve gastrectomy and hiatal hernia repair in May 2022    The patient was present for an approximately a 2.5 hour discussion of the purpose of MBS, how MBS is a tool to assist in achieving weight loss goals, the most common complications and how best to avoid them, and the strategies for short and long  term weight loss and improvement in comorbidities.  Ample opportunity to discuss questions was available both in group and during the time of individual examination.    I reviewed her Carlos Alberto report showing oxycodone 5 mg #18 x 1 after her hysterectomy in January.  Labs dated 4/23/2024 unremarkable CMP CBC showing anemia with a hemoglobin of 10.7 hematocrit 35.5 low MCH.  Operative report at the [facility] in January 2024 total abdominal hysterectomy and bilateral salpingo-oophorectomy.  Psychosocial evaluation dated 2/5/2024 Yarelis WHATLEY and she is cleared to proceed with bariatric surgery.  Note dated 12/21/2023 that the patient was admitted to  on 12/18/2023 with suicidal ideation.  Please see scanned records that I have reviewed and signed off on today.  All of this in addition to the patient's unique history and exam has been taken into consideration in determining their appropriate candidacy for MBS.    Complications of laparoscopic/possible robotic gastric bypass were discussed including but not limited to bleeding, infection, deep venous thrombosis, pulmonary embolism, pulmonary complications such as pneumonia, cardiac events, hernias, small bowel obstruction, damage to the spleen or other organs, bowel injury, disfiguring scars, failure to lose weight, need for additional surgery, conversion to an open procedure, and death.  Patient is also aware of complications which apply in this particular procedure that can include but are not limited to leaking of gastric contents at the staple or suture lines which could lead to intra-abdominal abscess, or chronic complications of strictures, ulcers, or vitamin/mineral deficiencies.  If long BP limb, we discussed the likelihood of more frequent BM's,possibly malodorous, with increased risk for vitamin deficiencies, beena the fat soluble vitamins, and that this can be serious and irreversible and will require lifelong compliance with w f/u and  vit/supplment recommendations.  Aware that may need an elongation procedure in the future if diarrhea and/or vitamin issues not controlled.       R/B/A Rx to recurrent hiatal hernia repair were discussed as outlined in our long consent form.  Briefly risks in addition to those for gastric bypass include recurrent hernia, DAVID, dysphagia, esophageal injury, pneumothorax, injury to the vagus nerves, injury to the thoracic duct, aorta or vena cava.    Discussed the risks, benefits and alternative therapies at great length as outlined in our extensive consent forms, consent videos, and educational teaching process under the direction of the center's .    A copy of the patient's signed informed consent is on file.    R/B/A Rx discussed to postop anticoagulation incl but not limited to bleeding, drug reaction, venothromboembolic events, etc. and agreeable to taking post op  Eliquis 2.5 mg po Q 12 hrs #42        Plan:    After reevaluation today I think the patient remains a reasonable candidate for laparoscopic possible robotic assisted revision of her sleeve gastrectomy to a Teresa-en-Y gastric bypass, recurrent hiatal hernia repair, and EGD to address her recurrent hiatal hernia and severe reflux disease not controlled with maximal medical therapy status post laparoscopic sleeve gastrectomy and hiatal hernia repair in May 2022.  Aware this will be a complex likely 3 or more hours surgery with a Cali catheter, CHRISTINA drain, etc.    Other issues include Assessment:     Theresa Maya is a 33 y.o. year old female with recurrent hiatal hernia and reflux, abdominal pain status post laparoscopic sleeve gastrectomy and hiatal hernia repair May 2022     I reviewed her Carlos Alberto report which is negative.       We went over the options to treat her recurrent hiatal hernia and reflux.  I used flip charts.  We discussed recurrent hiatal hernia repair alone as a stand-alone procedure versus recurrent hiatal hernia pair  with revision to a Teresa-en-Y gastric bypass with or without a longer BP limb.  She says she has no issues with taking required lifelong vitamins and already takes a prenatal vitamin, iron, and vitamin D and B12 and understands that there will be several additional vitamins required.  She also understands there could be would be more frequent bowel movements.     Complications of laparoscopic/possible robotic gastric bypass were discussed including but not limited to bleeding, infection, deep venous thrombosis, pulmonary embolism, pulmonary complications such as pneumonia, cardiac events, hernias, small bowel obstruction, damage to the spleen or other organs, bowel injury, disfiguring scars, failure to lose weight, need for additional surgery, conversion to an open procedure, and death.  Patient is also aware of complications which apply in this particular procedure that can include but are not limited to leaking of gastric contents at the staple or suture lines which could lead to intra-abdominal abscess, or chronic complications of strictures, ulcers, or vitamin/mineral deficiencies.  If long BP limb, we discussed the likelihood of more frequent BM's,possibly malodorous, with increased risk for vitamin deficiencies, beena the fat soluble vitamins, and that this can be serious and irreversible and will require lifelong compliance with w f/u and vit/supplment recommendations.  Aware that may need an elongation procedure in the future if diarrhea and/or vitamin issues not controlled.        R/B/A Rx to recurrent hiatal hernia repair were discussed.  These include but are not limited to bleeding, infection, death, pulmonary complications,  VTE events, splenic injury or infarction, recurrent hernia, DAVID, mesh complications, dysphagia, esophageal injury, pneumothorax, injury to the vagus nerves, injury to the thoracic duct, aorta or vena cava, diaphragmatic paralysis.         Plan:    After discussion of the risk,  benefits, and alternative therapies as above she wishes to proceed with laparoscopic possible robotic assisted recurrent hiatal hernia repair with revision to Teresa-en-Y gastric to address her recurrent hiatal hernia and severe reflux status post laparoscopic sleeve gastrectomy and hiatal hernia repair in May 2022.  Based on my interaction with her today plan BP limb around 100 to 150 cm depending on bowel length.  Likely discharge on Eliquis.     Other issues include anxiety and depression, arthritis, MTHFR mutation with history of DVT right lower extremity 2014, dyspnea exertion, elevated hemoglobin A1c, fatigue, upper lipidemia, incomplete right bundle branch block, history of iron deficiency anemia, migraine headaches, ovarian cyst, history of UTIs.     Thank you Gina WHATLEY for the opportunity reevaluate Mrs. Maya.      Cristiano Ricardo MD                Electronically signed by Cristiano Ricardo MD at 05/13/24 1222       Current Facility-Administered Medications   Medication Dose Route Frequency Provider Last Rate Last Admin    acetaminophen (TYLENOL) tablet 1,000 mg  1,000 mg Oral Q8H Cristiano Ricardo MD        Or    acetaminophen (TYLENOL) 160 MG/5ML oral solution 1,000 mg  1,000 mg Oral Q8H Cristiano Ricardo MD        albuterol (PROVENTIL) nebulizer solution 0.083% 2.5 mg/3mL  2.5 mg Nebulization Q6H While Awake - RT Cristiano Ricardo MD        ALPRAZolam (XANAX) tablet 0.25 mg  0.25 mg Oral Once PRN Cristiano Ricardo MD        ARIPiprazole (ABILIFY) tablet 5 mg  5 mg Oral Daily Cristiano Ricardo MD        ceFAZolin 2000 mg IVPB in 100 mL NS (MBP)  2,000 mg Intravenous Q8H Cristiano Ricardo MD        [START ON 5/25/2024] cyanocobalamin injection 1,000 mcg  1,000 mcg Intramuscular Once Cristiano Ricardo MD        diphenhydrAMINE (BENADRYL) injection 25 mg  25 mg Intravenous Q4H PRN Cristiano Ricardo MD        droperidol (INAPSINE) injection 0.625 mg  0.625 mg Intravenous  Q15 Min PRN Garrett Tate CRNA        Or    droperidol (INAPSINE) injection 0.625 mg  0.625 mg Intramuscular Once PRN Garrett Tate CRNA        [START ON 5/25/2024] Enoxaparin Sodium (LOVENOX) syringe 40 mg  40 mg Subcutaneous Daily Cristiano Ricardo MD        fentaNYL citrate (PF) (SUBLIMAZE) injection 50 mcg  50 mcg Intravenous Q5 Min PRN Garrett Tate CRNA        [START ON 5/25/2024] ferric gluconate (FERRLECIT)125 MG in sodium chloride 0.9 % 100 mL IVPB  125 mg Intravenous Once PRN Cristiano Ricardo MD        gabapentin (NEURONTIN) capsule 100 mg  100 mg Oral TID Cristiano Ricardo MD        Or    gabapentin (NEURONTIN) 50 mg/mL solution 100 mg  100 mg Oral TID Cristiano Ricardo MD        hydrALAZINE (APRESOLINE) injection 10 mg  10 mg Intravenous Q30 Min PRN Cristiano Ricardo MD        hydrALAZINE (APRESOLINE) injection 5 mg  5 mg Intravenous Q10 Min PRN Garrett Tate CRNA        HYDROmorphone (DILAUDID) injection 0.5 mg  0.5 mg Intravenous Q10 Min PRN Garrett Tate CRNA        HYDROmorphone (DILAUDID) injection 1 mg  1 mg Intravenous Q2H PRN Cristiano Ricardo MD        And    naloxone (NARCAN) injection 0.1 mg  0.1 mg Intravenous Q5 Min PRN Cristiano Ricardo MD        HYDROmorphone (DILAUDID) tablet 2 mg  2 mg Oral Q4H PRN Cristiano Ricardo MD        ipratropium-albuterol (DUO-NEB) nebulizer solution 3 mL  3 mL Nebulization Once PRN Garrett Tate CRNA        labetalol (NORMODYNE,TRANDATE) injection 10 mg  10 mg Intravenous Q30 Min PRN Cristiano Ricardo MD        labetalol (NORMODYNE,TRANDATE) injection 5 mg  5 mg Intravenous Q5 Min PRN Garrett Tate CRNA        lactated ringers bolus 250 mL  250 mL Intravenous Once PRN Garrett Tate CRNA        lactated ringers infusion  150 mL/hr Intravenous Continuous Cristiano Ricardo  mL/hr at 05/24/24 0730 New Bag at 05/24/24 1042    lactated ringers infusion  150 mL/hr Intravenous Continuous Cristiano Ricardo  MD Greg        lamoTRIgine (LaMICtal) tablet 150 mg  150 mg Oral Daily Cristiano Ricardo MD        LORazepam (ATIVAN) tablet 1 mg  1 mg Oral Q12H PRN Cristiano Ricardo MD        montelukast (SINGULAIR) tablet 10 mg  10 mg Oral Nightly Cristiano Ricardo MD        Morphine sulfate (PF) injection 4 mg  4 mg Intravenous Q2H PRN Cristiano Ricardo MD        And    naloxone (NARCAN) injection 0.4 mg  0.4 mg Intravenous Q5 Min PRN Cristiano Ricardo MD        naloxone (NARCAN) injection 0.4 mg  0.4 mg Intravenous PRN Garrett Tate CRNA        niCARdipine (CARDENE) 25mg in 250mL NS infusion  5-15 mg/hr Intravenous Titrated Cristiano Ricardo MD        ondansetron (ZOFRAN) injection 4 mg  4 mg Intravenous Once PRN Garrett Tate CRNA        ondansetron (ZOFRAN) injection 4 mg  4 mg Intravenous Q4H PRN Cristiano Ricardo MD        Followed by    [START ON 5/28/2024] ondansetron ODT (ZOFRAN-ODT) disintegrating tablet 4 mg  4 mg Oral Q6H PRN Cristiano Ricardo MD        ondansetron ODT (ZOFRAN-ODT) disintegrating tablet 4 mg  4 mg Oral Q4H PRN Cristiano Ricardo MD        oxyCODONE (ROXICODONE) immediate release tablet 5 mg  5 mg Oral Q6H PRN Cristiano Ricardo MD        [START ON 5/25/2024] pantoprazole (PROTONIX) injection 40 mg  40 mg Intravenous Q AM Cristiano Ricardo MD        phenol (CHLORASEPTIC) 1.4 % liquid 2 spray  2 spray Mouth/Throat Q2H PRN Cristiano Ricardo MD        prochlorperazine (COMPAZINE) tablet 10 mg  10 mg Oral Q6H PRN Cristiano Ricardo MD        promethazine (PHENERGAN) tablet 12.5 mg  12.5 mg Oral Q6H PRN Cristiano Ricardo MD        scopolamine patch 1 mg/72 hr  1 patch Transdermal Once Cristiano Ricardo MD   1 patch at 05/24/24 0639    simethicone (MYLICON) chewable tablet 80 mg  80 mg Oral 4x Daily PRN Cristiano Ricardo MD        [START ON 5/25/2024] sodium chloride 0.45 % with KCl 20 mEq/L infusion  125 mL/hr Intravenous Continuous Cristiano Ricardo  MD Greg        sodium chloride 0.9 % flush 3 mL  3 mL Intravenous Q12H Garrett Tate CRNA        sodium chloride 0.9 % flush 3-10 mL  3-10 mL Intravenous PRN Garrett Tate CRNA        sodium chloride 0.9 % infusion 40 mL  40 mL Intravenous PRN Garrett Tate CRNA        [START ON 5/25/2024] thiamine (B-1) 100 mg, folic acid 1 mg in sodium chloride 0.9 % 1,000 mL infusion  200 mL/hr Intravenous Once Cristiano Ricardo MD        thiamine (B-1) injection 100 mg  100 mg Intravenous Once Cristiano Ricardo MD            Operative/Procedure Notes (all)        Cristiano Ricardo MD at 05/24/24 0838  Version 1 of 1         GASTRIC BYPASS LAPAROSCOPIC, LAPAROSCOPIC HIATAL HERNIA REPAIR WITH DAVINCI ROBOT, ESOPHAGOGASTRODUODENOSCOPY  Progress Note    Theresa Maya  5/24/2024    Pre-op Diagnosis:   Hiatal hernia with gastroesophageal reflux [K44.9, K21.9]  S/P laparoscopic sleeve gastrectomy [Z98.84]       Post-Op Diagnosis Codes:     * Hiatal hernia with gastroesophageal reflux [K44.9, K21.9]     * S/P laparoscopic sleeve gastrectomy [Z98.84]    Procedure/CPT® Codes:  MS LAPS GSTR RSTCV PX W/BYP ANTOINETTE-EN-Y LIMB <150 CM [76528]  MS LAPS SURG ESOPG/GSTR FUNDOPLASTY [03327]  MS ESOPHAGOGASTRODUODENOSCOPY TRANSORAL DIAGNOSTIC [00798]      Procedure(s):  GASTRIC BYPASS LAPAROSCOPIC WITH DAVINCI ROBOT  RECURRENT HIATAL HERNIA REPAIR LAPAROSCOPIC WITH DAVINCI ROBOT WITH FALCIFORM LIGAMENT REINFORCMENT  ESOPHAGOGASTRODUODENOSCOPY              Surgeon(s):  Cristiano Ricardo MD    Anesthesia: General with Block    Staff:   Circulator: Juve Brewer RN; Dinora Gibson RN  Scrub Person: Franca Fletcher Jenna  Nursing Assistant: Maria Ines Bazan CNA  Assistant: Jorge Mahoney PA-C  Assistant: Jorge Mahoney PA-C      Estimated Blood Loss: 75 mL    Urine Voided: 165 mL    Specimens:                None          Drains:   Urethral Catheter Double-lumen;Non-latex 16 Fr. (Active)        Findings:         Complications: None    Assistant: Jorge Mahoney PA-C  was responsible for performing the following activities: Retraction, Suction, Irrigation, Suturing, Closing, Placing Dressing, and Held/Positioned Camera and their skilled assistance was necessary for the success of this case.    Cristiano Ricardo MD     Date: 5/24/2024  Time: 11:40 EDT          Electronically signed by Cristiano Ricardo MD at 05/24/24 1141       Cristiano Ricardo MD at 05/24/24 0838  Version 1 of 1         Preoperative Diagnosis:                 Recurrent hiatal hernia and severe GE reflux not controlled with maximal medical therapy status post laparoscopic sleeve gastrectomy and hiatal hernia repair May 2022                                      Postoperative Diagnosis:                                Same     Procedure:                                                   Laparoscopic robotic assisted antecolic revision previous sleeve gastrectomy to Teresa-en-Y gastric bypass                                        Laparoscopic robotic assisted recurrent hiatal hernia repair  (not paraesophageal) with falciform ligament reinforcement                                                                       EGD     Surgeon:                                                       BRUNA Ricardo MD     Assistant: Jorge Mahoney PA-C  was responsible for performing the following activities: Retraction, Suction, Irrigation, Suturing, Closing, Placing Dressing, and Held/Positioned Camera and their skilled assistance was necessary for the success of this case.     Anesthesia:                                                   GETA     EBL:                                                              Minimal     Fluids:                                                           Crystalloid     Specimens:                                                    None     Drains:                                                          #10  CHRISTINA     Counts:                                                          Correct     Complications:                                               None     Indications:    This is a 33-year-old female known to me status post laparoscopic sleeve gastrectomy and hiatal hernia repair in May 2022.  She presented with complaints of recurrent reflux symptoms not controlled with maximal medical therapy.  EGD and upper GI showed a small recurrent hiatal hernia and distal esophageal biopsies were consistent with reflux.  Please see my office notes.  Risks, benefits, and alternative therapies were discussed and she wished to proceed with laparoscopic possible robotic assisted recurrent hiatal hernia pair and revision of her sleeve gastrectomy to a Teresa-en-Y gastric bypass to hopefully alleviate her symptoms.     Operative technique:      The patient was brought to the operating room, and placed supine upon the operating room table.  SCD hose were placed, she underwent uneventful general endotracheal anesthesia per the anesthesiology staff, the anesthesiology staff performed a TAP block, and received IV Ancef (her penicillin and amoxicillin allergies noted) and subcutaneous Lovenox, a Cali catheter was placed, Lovenox and her abdomen was prepped and draped with ChloraPrep in a sterile fashion, an Ioban was used as well.     The peritoneal cavity was entered in the mid abdomen and approximately the left midclavicular line using a 5 mm trocar utilizing an Optiview technique and the abdomen was insufflated to pressure of 15 mmHg with CO2 gas.  Exploratory laparoscopy revealed no evidence of injury from the entrance technique, a normal-appearing liver, some omental adhesions to the proximal falciform.    Remaining trocars were placed under direct visualization throughout the case.  After infiltration of the peritoneum under direct visualization a 12 mm robotic trocar was placed in the right lateral abdomen, 8 mm robotic trocars to  the right of the umbilicus at the lateral aspect of her old 19 mm scar, left upper and lower lateral abdomen, and the entrance trocar was changed out to an 8 mm robotic trocar.    The sleeve appeared to be densely adherent to the undersurface of the left lobe of the liver.    The omentum was elevated and identified the ligament of Treitz and it was run distally to the ileocecal valve and measured roughly 625 cm.  The ligament of Treitz was reidentified and run for 150 cm and marked on the antimesenteric border with 2 different colored sutures (2-0 silk and 2-0 Vicryl plus) for orientation (future BP limb), and another 150 cm and marked in a similar fashion (future Teresa limb).     The Xi robot was docked and I scrubbed out and went to the robotic console.  Omental adhesions to the falciform ligament were divided with the vessel sealing device.  In addition the large fatty but shortened falciform ligament was amputated at its base and mobilized up to the level of the liver.  Dense adhesions of the undersurface of the left lobe of the liver to the sleeve gastrectomy were divided taking care to avoid a gastrotomy.  The sleeve itself appeared to be resting nicely.  The hiatus was exposed, no obvious recurrent hiatal hernia noted.  The left lobe of the liver was elevated with a f2-0 Stratafix suture by taking a bite of the anterior abdominal wall to the left of midline, the diaphragm adjacent to the hiatus and back to the intra-abdominal wall to the right of midline.    Omental adhesions to the lateral sleeve were divided exposing the left bishop where the recurrent hernia appeared subtle.  The adhesions were quite dense.  Adhesions in the area of the previous pars flaccida were divided exposing the right bishop where previous posterior suture material could be seen.  The recurrent hiatal hernia remains subtle from this view as well.  The hernia sac was incised along the base of the right bishop and this was extended up and  across the phrenoesophageal membrane.  Quite a bit of dense scarring in the mediastinum from previous dissection.  The hernia sac was incised along the base of the left bishop and this was also extended up and across the phrenoesophageal membrane.  The hernia sac and its contents were dissected out of the mediastinum and reduced to below the level of the crura.  It did appear that the lateral superior sleeve staple line had been resting in the posterior mediastinum.  Dissection proceeded in the mediastinum through scar tissue to fresh areolar planes.  Approximately 3 cm of intra-abdominal esophagus was obtained.  The recurrent hiatal hernia repair was performed posteriorly using a running nonabsorbable 2-0 V-loc suture with good result.  The actual fascial defect was quite small and care was taken to not make the closure too tight.  Several photos obtained throughout the procedure.  The falciform ligament was passed behind the esophagus in front of the crural repair, it was not long enough to be sutured back to itself in a 360 degree fashion.  Therefore it was sutured as a sling over the angle of Hiss using a running nonabsorbable 2-0 V-loc suture continuing the suture medially along the GE junction and then suturing to the falciform ligament medially along the lesser curvature.  Upon completion the repair rested nicely and the sleeve appeared to be resting nicely.    The lesser omentum along the edge of the medial sleeve below the left gastric artery was dissected free into the lesser sac.  This was approximately 5 or 6 cm from the GE junction.  The lesser omentum was divided horizontally using a 60 mm robotic white staple load.  The lesser curvature of the stomach in this area was divided horizontally with a robotic 60 mm stapler green load.  There was a few millimeters of residual tissue which was divided with a 60 mm blue load.    The greater omentum was divided with the vessel sealing device up to the transverse  colon in the area of the intended antecolic gastric bypass.       The ligament of Treitz identified.   The small bowel beginning at the ligament of Treitz was run distally in appropriate orientation until the first marking that was again was roughly 150 cm.  It reached up nicely to the gastric pouch without tension.  A handsewn 2 layer gastrojejunostomy was then carried out over the 36 Syrian bougie dilator using running absorbable  3-0 Stratafix sutures.  The bougie dilator was removed.  Upon completion the anastomosis rested nicely without tension or torsion.  Intravenous ICG given and excellent hemostasis of all bowel noted throughout the case.     A window was created a couple centimeters distal to the anastomosis on the future BP limb and this was divided with a 60 mm white load.  The underlying mesentery was taken down for short distance to relieve any tension on the future jejunojejunostomy.  The Teresa limb was run distally to the next marking (roughly 150 cm as above) and a linear jejunojejunostomy was carried out as follows:    An enterotomy was made on the antimesenteric border of each limb and a common lumen created with a 60 mm white load.  The intervening enterotomy was closed in a single seromuscular layer using running 3-0 absorbable Stratafix suture.  The remainder of the suture was used to imbricate the end of the staple line in a figure-of-eight seromuscular fashion.     The potential mesenteric defects at the jejunojejunostomy and at Garcia's space were individually closed using running 2-0 nonabsorbable V-Loc sutures.     The Teresa limb several centimeters distal to the gastrojejunostomy was occluded with a noncrushing bowel clamp and the gastrojejunostomy was submerged under saline.  I performed upper endoscopy.  The endoscope advanced through the esophagus into the gastric pouch,  gastrojejunostomy opening was small but patent and the endoscope advanced through the area without resistance.  No  ischemia of the anastomosis, no bleeding at the anastomosis, no air bubbles or leak seen.  Z-line roughly 39 cm.  Nice distention of the Teresa limb.  The endoscope was withdrawn.  Endoscopic photodocumentation obtained of the Teresa limb and GE junction.     I scrubbed back in and the robot was undocked.  Irrigation fluid was suctioned free.  The gastric bypass was inspected laparoscopically and appropriate orientation, viable anastomoses resting well, and closure of the potential mesenteric defects confirmed.  Under direct visualization a 5 mm trocar was placed high in the right upper quadrant through which to withdraw the CHRISTINA drain.  A #10 CHRISTINA drain was placed under the left lobe of the liver and up over the spleen and brought out through the RUQ 5 mm trocar site incision and anchored to the skin with a 2-0 nylon suture.  The suture used to elevate the liver was removed.  Fascia at the 12 mm trocar site incision was closed with a horizontal mattress 0 Vicryl suture placed with a suture passer under direct visualization and tying the knot extracorporeally.  Remaining trochars were removed under direct visualization, no bleeding noted from their sites.     Skin in each incision was closed using 3-0 Monocryl plus in an interrupted subcuticular stitch followed by skin glue.  A dry sterile dressing was placed around the CHRISTINA site.  The Cali catheter was removed.     The patient tolerated the procedure well without complication, was taken to the recovery room in stable condition.         Electronically signed by Cristiano Ricardo MD at 05/24/24 9243

## 2024-05-24 NOTE — BRIEF OP NOTE
GASTRIC BYPASS LAPAROSCOPIC, LAPAROSCOPIC HIATAL HERNIA REPAIR WITH DAVINCI ROBOT, ESOPHAGOGASTRODUODENOSCOPY  Progress Note    Theresa Maya  5/24/2024    Pre-op Diagnosis:   Hiatal hernia with gastroesophageal reflux [K44.9, K21.9]  S/P laparoscopic sleeve gastrectomy [Z98.84]       Post-Op Diagnosis Codes:     * Hiatal hernia with gastroesophageal reflux [K44.9, K21.9]     * S/P laparoscopic sleeve gastrectomy [Z98.84]    Procedure/CPT® Codes:  RI LAPS GSTR RSTCV PX W/BYP ANTOINETTE-EN-Y LIMB <150 CM [99401]  RI LAPS SURG ESOPG/GSTR FUNDOPLASTY [66472]  RI ESOPHAGOGASTRODUODENOSCOPY TRANSORAL DIAGNOSTIC [47716]      Procedure(s):  GASTRIC BYPASS LAPAROSCOPIC WITH DAVINCI ROBOT  RECURRENT HIATAL HERNIA REPAIR LAPAROSCOPIC WITH DAVINCI ROBOT WITH FALCIFORM LIGAMENT REINFORCMENT  ESOPHAGOGASTRODUODENOSCOPY              Surgeon(s):  Cristiano Ricardo MD    Anesthesia: General with Block    Staff:   Circulator: Juve Brewer RN; Dinora Gibson RN  Scrub Person: Franca Fletcher Jenna  Nursing Assistant: Maria Ines Bazan CNA  Assistant: Jorge Mahoney PA-C  Assistant: Jorge Mahoney PA-C      Estimated Blood Loss: 75 mL    Urine Voided: 165 mL    Specimens:                None          Drains:   Urethral Catheter Double-lumen;Non-latex 16 Fr. (Active)       Findings:         Complications: None    Assistant: Jorge Mahoney PA-C  was responsible for performing the following activities: Retraction, Suction, Irrigation, Suturing, Closing, Placing Dressing, and Held/Positioned Camera and their skilled assistance was necessary for the success of this case.    Cristiano Ricardo MD     Date: 5/24/2024  Time: 11:40 EDT

## 2024-05-24 NOTE — ANESTHESIA PROCEDURE NOTES
"Bilateral TAPs      Patient reassessed immediately prior to procedure    Patient location during procedure: OR  Reason for block: at surgeon's request and post-op pain management  Performed by  Anesthesiologist: Francisco Javier Luong MD  Preanesthetic Checklist  Completed: patient identified, IV checked, site marked, risks and benefits discussed, surgical consent, monitors and equipment checked, pre-op evaluation and timeout performed  Prep:  Pt Position: supine  Sterile barriers:cap, gloves, mask and washed/disinfected hands  Prep: ChloraPrep  Patient monitoring: blood pressure monitoring, continuous pulse oximetry and EKG  Procedure    Sedation: yes  Performed under: general  Guidance:ultrasound guided  Images:still images obtained, printed/placed on chart    Laterality:Bilateral  Block Type:TAP  Injection Technique:single-shot  Needle Type:short-bevel and echogenic  Needle Gauge:20 G  Resistance on Injection: none    Medications Used: dexamethasone sodium phosphate injection - Injection   4 mg - 5/24/2024 8:21:00 AM  bupivacaine PF (MARCAINE) 0.25 % injection - Injection   60 mL - 5/24/2024 8:21:00 AM      Medications  Comment:Block Injection:  LA dose divided between Right and Left block        Post Assessment  Injection Assessment: negative aspiration for heme, incremental injection and no paresthesia on injection  Patient Tolerance:comfortable throughout block  Complications:no  Additional Notes    Subcostal TAPs    A high-frequency linear transducer, with sterile cover, was placed sub-xiphoid to identify Linea Alba, right and left Rectus Abdominus Muscles (JO). The transducer was moved either right or left subcostally to identify the JO and the Transverse Abdominus Muscle (KOWALSKI). The insertion site was prepped in sterile fashion and then localized with 2-5 ml of 1% Lidocaine. Using ultrasound-guidance, a 20-gauge B-Keller 4\" Ultraplex 360 non-stimulating echogenic needle was advanced in plane, from medial to " lateral, until the tip of the needle was in the fascial plane between the JO and KOWALSKI. 1-3ml of preservative free normal saline was used to hydro-dissect the fascial planes. After the fascial plane was verified, the local anesthetic (LA) was injected. The procedure was repeated on the opposite side for bilateral coverage. Aspiration every 5 ml to prevent intravascular injection. Injection was completed with negative aspiration of blood and negative intravascular injection. Injection pressures were normal with minimal resistance. The subcostal approach to the TAP nerve block ideally anesthetizes the intercostal nerves T6-T9.

## 2024-05-24 NOTE — ANESTHESIA POSTPROCEDURE EVALUATION
Patient: Theresa Maya    Procedure Summary       Date: 05/24/24 [facility] / [facility]    Anesthesia Start: 0813 Anesthesia Stop: 1152    Procedures:       GASTRIC BYPASS LAPAROSCOPIC WITH DAVINCI ROBOT (Abdomen)      RECURRENT HIATAL HERNIA REPAIR LAPAROSCOPIC WITH DAVINCI ROBOT (Abdomen)      ESOPHAGOGASTRODUODENOSCOPY Diagnosis:       Hiatal hernia with gastroesophageal reflux      S/P laparoscopic sleeve gastrectomy      (Hiatal hernia with gastroesophageal reflux [K44.9, K21.9])      (S/P laparoscopic sleeve gastrectomy [Z98.84])    Surgeons: Cristiano Ricardo MD Provider: Francisco Javier Luong MD    Anesthesia Type: general with block ASA Status: 3            Anesthesia Type: general with block    Vitals  Vitals Value Taken Time   /67 05/24/24 1153   Temp 98.4 °F (36.9 °C) 05/24/24 1153   Pulse 79 05/24/24 1153   Resp 12 05/24/24 1153   SpO2 97 % 05/24/24 1153           Post Anesthesia Care and Evaluation    Patient location during evaluation: PACU  Patient participation: waiting for patient participation  Level of consciousness: sleepy but conscious  Pain management: adequate    Airway patency: patent  Anesthetic complications: No anesthetic complications  PONV Status: none  Cardiovascular status: hemodynamically stable and acceptable  Respiratory status: nonlabored ventilation, acceptable, nasal cannula and oral airway  Hydration status: acceptable

## 2024-05-24 NOTE — LETTER
May 30, 2024       To Whom It May Concern:    Please excuse Lelia Barrera from work on 5/27/24-5/31/24.    She may return to work on 6/3/2024           Sincerely,      Soniya LANIER RN

## 2024-05-24 NOTE — ANESTHESIA PROCEDURE NOTES
Airway  Urgency: elective    Date/Time: 5/24/2024 8:19 AM  Airway not difficult    General Information and Staff    Patient location during procedure: OR  CRNA/CAA: Garrett Tate CRNA    Indications and Patient Condition  Indications for airway management: airway protection    Preoxygenated: yes  MILS not maintained throughout  Mask difficulty assessment: 1 - vent by mask    Final Airway Details  Final airway type: endotracheal airway      Successful airway: ETT  Cuffed: yes   Successful intubation technique: direct laryngoscopy  Facilitating devices/methods: intubating stylet  Endotracheal tube insertion site: oral  Blade: Tonia  Blade size: 4  ETT size (mm): 7.0  Cormack-Lehane Classification: grade I - full view of glottis  Placement verified by: chest auscultation and capnometry   Cuff volume (mL): 10  Measured from: lips  ETT/EBT  to lips (cm): 21  Number of attempts at approach: 1  Assessment: lips, teeth, and gum same as pre-op and atraumatic intubation    Additional Comments  Negative epigastric sounds, Breath sound equal bilaterally with symmetric chest rise and fall

## 2024-05-24 NOTE — OP NOTE
Preoperative Diagnosis:                 Recurrent hiatal hernia and severe GE reflux not controlled with maximal medical therapy status post laparoscopic sleeve gastrectomy and hiatal hernia repair May 2022                                      Postoperative Diagnosis:                                Same     Procedure:                                                   Laparoscopic robotic assisted antecolic revision previous sleeve gastrectomy to Teresa-en-Y gastric bypass                                        Laparoscopic robotic assisted recurrent hiatal hernia repair  (not paraesophageal) with falciform ligament reinforcement                                                                       EGD     Surgeon:                                                       BRUNA Ricardo MD     Assistant: Jorge Mahoney PA-C  was responsible for performing the following activities: Retraction, Suction, Irrigation, Suturing, Closing, Placing Dressing, and Held/Positioned Camera and their skilled assistance was necessary for the success of this case.     Anesthesia:                                                   GETA     EBL:                                                              Minimal     Fluids:                                                           Crystalloid     Specimens:                                                    None     Drains:                                                          #10 CHRISTINA     Counts:                                                          Correct     Complications:                                               None     Indications:    This is a 33-year-old female known to me status post laparoscopic sleeve gastrectomy and hiatal hernia repair in May 2022.  She presented with complaints of recurrent reflux symptoms not controlled with maximal medical therapy.  EGD and upper GI showed a small recurrent hiatal hernia and distal esophageal biopsies were consistent with reflux.   Please see my office notes.  Risks, benefits, and alternative therapies were discussed and she wished to proceed with laparoscopic possible robotic assisted recurrent hiatal hernia pair and revision of her sleeve gastrectomy to a Teresa-en-Y gastric bypass to hopefully alleviate her symptoms.     Operative technique:      The patient was brought to the operating room, and placed supine upon the operating room table.  SCD hose were placed, she underwent uneventful general endotracheal anesthesia per the anesthesiology staff, the anesthesiology staff performed a TAP block, and received IV Ancef (her penicillin and amoxicillin allergies noted) and subcutaneous Lovenox, a Cali catheter was placed, Lovenox and her abdomen was prepped and draped with ChloraPrep in a sterile fashion, an Ioban was used as well.     The peritoneal cavity was entered in the mid abdomen and approximately the left midclavicular line using a 5 mm trocar utilizing an Optiview technique and the abdomen was insufflated to pressure of 15 mmHg with CO2 gas.  Exploratory laparoscopy revealed no evidence of injury from the entrance technique, a normal-appearing liver, some omental adhesions to the proximal falciform.    Remaining trocars were placed under direct visualization throughout the case.  After infiltration of the peritoneum under direct visualization a 12 mm robotic trocar was placed in the right lateral abdomen, 8 mm robotic trocars to the right of the umbilicus at the lateral aspect of her old 19 mm scar, left upper and lower lateral abdomen, and the entrance trocar was changed out to an 8 mm robotic trocar.    The sleeve appeared to be densely adherent to the undersurface of the left lobe of the liver.    The omentum was elevated and identified the ligament of Treitz and it was run distally to the ileocecal valve and measured roughly 625 cm.  The ligament of Treitz was reidentified and run for 150 cm and marked on the antimesenteric border  with 2 different colored sutures (2-0 silk and 2-0 Vicryl plus) for orientation (future BP limb), and another 150 cm and marked in a similar fashion (future Teresa limb).     The Xi robot was docked and I scrubbed out and went to the robotic console.  Omental adhesions to the falciform ligament were divided with the vessel sealing device.  In addition the large fatty but shortened falciform ligament was amputated at its base and mobilized up to the level of the liver.  Dense adhesions of the undersurface of the left lobe of the liver to the sleeve gastrectomy were divided taking care to avoid a gastrotomy.  The sleeve itself appeared to be resting nicely.  The hiatus was exposed, no obvious recurrent hiatal hernia noted.  The left lobe of the liver was elevated with a f2-0 Stratafix suture by taking a bite of the anterior abdominal wall to the left of midline, the diaphragm adjacent to the hiatus and back to the intra-abdominal wall to the right of midline.    Omental adhesions to the lateral sleeve were divided exposing the left bishop where the recurrent hernia appeared subtle.  The adhesions were quite dense.  Adhesions in the area of the previous pars flaccida were divided exposing the right bishop where previous posterior suture material could be seen.  The recurrent hiatal hernia remains subtle from this view as well.  The hernia sac was incised along the base of the right bishop and this was extended up and across the phrenoesophageal membrane.  Quite a bit of dense scarring in the mediastinum from previous dissection.  The hernia sac was incised along the base of the left bishop and this was also extended up and across the phrenoesophageal membrane.  The hernia sac and its contents were dissected out of the mediastinum and reduced to below the level of the crura.  It did appear that the lateral superior sleeve staple line had been resting in the posterior mediastinum.  Dissection proceeded in the mediastinum through  scar tissue to fresh areolar planes.  Approximately 3 cm of intra-abdominal esophagus was obtained.  The recurrent hiatal hernia repair was performed posteriorly using a running nonabsorbable 2-0 V-loc suture with good result.  The actual fascial defect was quite small and care was taken to not make the closure too tight.  Several photos obtained throughout the procedure.  The falciform ligament was passed behind the esophagus in front of the crural repair, it was not long enough to be sutured back to itself in a 360 degree fashion.  Therefore it was sutured as a sling over the angle of Hiss using a running nonabsorbable 2-0 V-loc suture continuing the suture medially along the GE junction and then suturing to the falciform ligament medially along the lesser curvature.  Upon completion the repair rested nicely and the sleeve appeared to be resting nicely.    The lesser omentum along the edge of the medial sleeve below the left gastric artery was dissected free into the lesser sac.  This was approximately 5 or 6 cm from the GE junction.  The lesser omentum was divided horizontally using a 60 mm robotic white staple load.  The lesser curvature of the stomach in this area was divided horizontally with a robotic 60 mm stapler green load.  There was a few millimeters of residual tissue which was divided with a 60 mm blue load.    The greater omentum was divided with the vessel sealing device up to the transverse colon in the area of the intended antecolic gastric bypass.       The ligament of Treitz identified.   The small bowel beginning at the ligament of Treitz was run distally in appropriate orientation until the first marking that was again was roughly 150 cm.  It reached up nicely to the gastric pouch without tension.  A handsewn 2 layer gastrojejunostomy was then carried out over the 36 Gabonese bougie dilator using running absorbable  3-0 Stratafix sutures.  The bougie dilator was removed.  Upon completion the  anastomosis rested nicely without tension or torsion.  Intravenous ICG given and excellent hemostasis of all bowel noted throughout the case.     A window was created a couple centimeters distal to the anastomosis on the future BP limb and this was divided with a 60 mm white load.  The underlying mesentery was taken down for short distance to relieve any tension on the future jejunojejunostomy.  The Teresa limb was run distally to the next marking (roughly 150 cm as above) and a linear jejunojejunostomy was carried out as follows:    An enterotomy was made on the antimesenteric border of each limb and a common lumen created with a 60 mm white load.  The intervening enterotomy was closed in a single seromuscular layer using running 3-0 absorbable Stratafix suture.  The remainder of the suture was used to imbricate the end of the staple line in a figure-of-eight seromuscular fashion.     The potential mesenteric defects at the jejunojejunostomy and at Garcia's space were individually closed using running 2-0 nonabsorbable V-Loc sutures.     The Teresa limb several centimeters distal to the gastrojejunostomy was occluded with a noncrushing bowel clamp and the gastrojejunostomy was submerged under saline.  I performed upper endoscopy.  The endoscope advanced through the esophagus into the gastric pouch,  gastrojejunostomy opening was small but patent and the endoscope advanced through the area without resistance.  No ischemia of the anastomosis, no bleeding at the anastomosis, no air bubbles or leak seen.  Z-line roughly 39 cm.  Nice distention of the Teresa limb.  The endoscope was withdrawn.  Endoscopic photodocumentation obtained of the Teresa limb and GE junction.     I scrubbed back in and the robot was undocked.  Irrigation fluid was suctioned free.  The gastric bypass was inspected laparoscopically and appropriate orientation, viable anastomoses resting well, and closure of the potential mesenteric defects confirmed.   Under direct visualization a 5 mm trocar was placed high in the right upper quadrant through which to withdraw the CHRISTINA drain.  A #10 CHRISTINA drain was placed under the left lobe of the liver and up over the spleen and brought out through the RUQ 5 mm trocar site incision and anchored to the skin with a 2-0 nylon suture.  The suture used to elevate the liver was removed.  Fascia at the 12 mm trocar site incision was closed with a horizontal mattress 0 Vicryl suture placed with a suture passer under direct visualization and tying the knot extracorporeally.  Remaining trochars were removed under direct visualization, no bleeding noted from their sites.     Skin in each incision was closed using 3-0 Monocryl plus in an interrupted subcuticular stitch followed by skin glue.  A dry sterile dressing was placed around the CHRISTINA site.  The Cali catheter was removed.     The patient tolerated the procedure well without complication, was taken to the recovery room in stable condition.

## 2024-05-24 NOTE — INTERVAL H&P NOTE
H&P updated. The patient was examined and the following changes are noted:  MRSA screen negative, Hb A1C normal

## 2024-05-25 ENCOUNTER — APPOINTMENT (OUTPATIENT)
Dept: GENERAL RADIOLOGY | Facility: HOSPITAL | Age: 34
End: 2024-05-25
Payer: COMMERCIAL

## 2024-05-25 LAB
ALBUMIN SERPL-MCNC: 3.3 G/DL (ref 3.5–5.2)
ALBUMIN/GLOB SERPL: 1.2 G/DL
ALP SERPL-CCNC: 61 U/L (ref 39–117)
ALT SERPL W P-5'-P-CCNC: 24 U/L (ref 1–33)
ANION GAP SERPL CALCULATED.3IONS-SCNC: 11 MMOL/L (ref 5–15)
AST SERPL-CCNC: 37 U/L (ref 1–32)
BASOPHILS # BLD AUTO: 0.03 10*3/MM3 (ref 0–0.2)
BASOPHILS NFR BLD AUTO: 0.3 % (ref 0–1.5)
BILIRUB SERPL-MCNC: 0.2 MG/DL (ref 0–1.2)
BUN SERPL-MCNC: 7 MG/DL (ref 6–20)
BUN/CREAT SERPL: 7.4 (ref 7–25)
CALCIUM SPEC-SCNC: 7.9 MG/DL (ref 8.6–10.5)
CHLORIDE SERPL-SCNC: 102 MMOL/L (ref 98–107)
CO2 SERPL-SCNC: 23 MMOL/L (ref 22–29)
CREAT SERPL-MCNC: 0.94 MG/DL (ref 0.57–1)
DEPRECATED RDW RBC AUTO: 43.2 FL (ref 37–54)
EGFRCR SERPLBLD CKD-EPI 2021: 82.3 ML/MIN/1.73
EOSINOPHIL # BLD AUTO: 0.01 10*3/MM3 (ref 0–0.4)
EOSINOPHIL NFR BLD AUTO: 0.1 % (ref 0.3–6.2)
ERYTHROCYTE [DISTWIDTH] IN BLOOD BY AUTOMATED COUNT: 14.3 % (ref 12.3–15.4)
GLOBULIN UR ELPH-MCNC: 2.7 GM/DL
GLUCOSE SERPL-MCNC: 101 MG/DL (ref 65–99)
HCT VFR BLD AUTO: 31.7 % (ref 34–46.6)
HGB BLD-MCNC: 10 G/DL (ref 12–15.9)
IMM GRANULOCYTES # BLD AUTO: 0.03 10*3/MM3 (ref 0–0.05)
IMM GRANULOCYTES NFR BLD AUTO: 0.3 % (ref 0–0.5)
IRON 24H UR-MRATE: 19 MCG/DL (ref 37–145)
LYMPHOCYTES # BLD AUTO: 0.98 10*3/MM3 (ref 0.7–3.1)
LYMPHOCYTES NFR BLD AUTO: 11.2 % (ref 19.6–45.3)
MCH RBC QN AUTO: 26.2 PG (ref 26.6–33)
MCHC RBC AUTO-ENTMCNC: 31.5 G/DL (ref 31.5–35.7)
MCV RBC AUTO: 83 FL (ref 79–97)
MONOCYTES # BLD AUTO: 0.78 10*3/MM3 (ref 0.1–0.9)
MONOCYTES NFR BLD AUTO: 8.9 % (ref 5–12)
NEUTROPHILS NFR BLD AUTO: 6.9 10*3/MM3 (ref 1.7–7)
NEUTROPHILS NFR BLD AUTO: 79.2 % (ref 42.7–76)
NRBC BLD AUTO-RTO: 0 /100 WBC (ref 0–0.2)
PLATELET # BLD AUTO: 253 10*3/MM3 (ref 140–450)
PMV BLD AUTO: 10.8 FL (ref 6–12)
POTASSIUM SERPL-SCNC: 3.9 MMOL/L (ref 3.5–5.2)
PREALB SERPL-MCNC: 19.8 MG/DL (ref 20–40)
PROT SERPL-MCNC: 6 G/DL (ref 6–8.5)
RBC # BLD AUTO: 3.82 10*6/MM3 (ref 3.77–5.28)
SODIUM SERPL-SCNC: 136 MMOL/L (ref 136–145)
WBC NRBC COR # BLD AUTO: 8.73 10*3/MM3 (ref 3.4–10.8)

## 2024-05-25 PROCEDURE — 25010000002 CEFAZOLIN PER 500 MG: Performed by: SURGERY

## 2024-05-25 PROCEDURE — 25010000002 THIAMINE PER 100 MG: Performed by: SURGERY

## 2024-05-25 PROCEDURE — 84134 ASSAY OF PREALBUMIN: CPT | Performed by: SURGERY

## 2024-05-25 PROCEDURE — 25010000002 CYANOCOBALAMIN PER 1000 MCG: Performed by: SURGERY

## 2024-05-25 PROCEDURE — 25010000002 ONDANSETRON PER 1 MG: Performed by: SURGERY

## 2024-05-25 PROCEDURE — 25810000003 SODIUM CHLORIDE 0.9 % SOLUTION 1,000 ML FLEX CONT: Performed by: SURGERY

## 2024-05-25 PROCEDURE — 94799 UNLISTED PULMONARY SVC/PX: CPT

## 2024-05-25 PROCEDURE — 25010000002 ENOXAPARIN PER 10 MG: Performed by: SURGERY

## 2024-05-25 PROCEDURE — 25010000002 HYDROMORPHONE 1 MG/ML SOLUTION: Performed by: SURGERY

## 2024-05-25 PROCEDURE — 25810000003 LACTATED RINGERS PER 1000 ML: Performed by: SURGERY

## 2024-05-25 PROCEDURE — 25010000002 POTASSIUM CHLORIDE PER 2 MEQ: Performed by: SURGERY

## 2024-05-25 PROCEDURE — 63710000001 PROCHLORPERAZINE MALEATE PER 10 MG: Performed by: SURGERY

## 2024-05-25 PROCEDURE — 0 DIATRIZOATE MEGLUMINE & SODIUM PER 1 ML: Performed by: SURGERY

## 2024-05-25 PROCEDURE — 85025 COMPLETE CBC W/AUTO DIFF WBC: CPT | Performed by: SURGERY

## 2024-05-25 PROCEDURE — 80053 COMPREHEN METABOLIC PANEL: CPT | Performed by: SURGERY

## 2024-05-25 PROCEDURE — 99024 POSTOP FOLLOW-UP VISIT: CPT | Performed by: SURGERY

## 2024-05-25 PROCEDURE — 25010000002 NA FERRIC GLUC CPLX PER 12.5 MG: Performed by: SURGERY

## 2024-05-25 PROCEDURE — 94664 DEMO&/EVAL PT USE INHALER: CPT

## 2024-05-25 PROCEDURE — 74240 X-RAY XM UPR GI TRC 1CNTRST: CPT

## 2024-05-25 PROCEDURE — 83540 ASSAY OF IRON: CPT | Performed by: SURGERY

## 2024-05-25 RX ADMIN — ALBUTEROL SULFATE 2.5 MG: 2.5 SOLUTION RESPIRATORY (INHALATION) at 18:53

## 2024-05-25 RX ADMIN — SODIUM CHLORIDE 125 MG: 9 INJECTION, SOLUTION INTRAVENOUS at 11:02

## 2024-05-25 RX ADMIN — PANTOPRAZOLE SODIUM 40 MG: 40 INJECTION, POWDER, FOR SOLUTION INTRAVENOUS at 05:13

## 2024-05-25 RX ADMIN — OXYCODONE 5 MG: 5 TABLET ORAL at 08:36

## 2024-05-25 RX ADMIN — ARIPIPRAZOLE 5 MG: 5 TABLET ORAL at 08:36

## 2024-05-25 RX ADMIN — SODIUM CHLORIDE 2000 MG: 900 INJECTION INTRAVENOUS at 03:09

## 2024-05-25 RX ADMIN — PROCHLORPERAZINE MALEATE 10 MG: 10 TABLET ORAL at 23:33

## 2024-05-25 RX ADMIN — ENOXAPARIN SODIUM 40 MG: 100 INJECTION SUBCUTANEOUS at 08:36

## 2024-05-25 RX ADMIN — HYDROMORPHONE HYDROCHLORIDE 1 MG: 1 INJECTION, SOLUTION INTRAMUSCULAR; INTRAVENOUS; SUBCUTANEOUS at 21:14

## 2024-05-25 RX ADMIN — ONDANSETRON 4 MG: 2 INJECTION INTRAMUSCULAR; INTRAVENOUS at 21:14

## 2024-05-25 RX ADMIN — GABAPENTIN 100 MG: 100 CAPSULE ORAL at 21:14

## 2024-05-25 RX ADMIN — HYDROMORPHONE HYDROCHLORIDE 2 MG: 2 TABLET ORAL at 09:59

## 2024-05-25 RX ADMIN — CYANOCOBALAMIN 1000 MCG: 1000 INJECTION, SOLUTION INTRAMUSCULAR; SUBCUTANEOUS at 08:36

## 2024-05-25 RX ADMIN — HYDROMORPHONE HYDROCHLORIDE 1 MG: 1 INJECTION, SOLUTION INTRAMUSCULAR; INTRAVENOUS; SUBCUTANEOUS at 23:33

## 2024-05-25 RX ADMIN — POTASSIUM CHLORIDE AND SODIUM CHLORIDE 125 ML/HR: 450; 150 INJECTION, SOLUTION INTRAVENOUS at 11:01

## 2024-05-25 RX ADMIN — SODIUM CHLORIDE, POTASSIUM CHLORIDE, SODIUM LACTATE AND CALCIUM CHLORIDE 150 ML/HR: 600; 310; 30; 20 INJECTION, SOLUTION INTRAVENOUS at 03:09

## 2024-05-25 RX ADMIN — ACETAMINOPHEN 1000 MG: 500 TABLET ORAL at 05:13

## 2024-05-25 RX ADMIN — ALBUTEROL SULFATE 2.5 MG: 2.5 SOLUTION RESPIRATORY (INHALATION) at 13:58

## 2024-05-25 RX ADMIN — HYDROMORPHONE HYDROCHLORIDE 1 MG: 1 INJECTION, SOLUTION INTRAMUSCULAR; INTRAVENOUS; SUBCUTANEOUS at 03:11

## 2024-05-25 RX ADMIN — ALBUTEROL SULFATE 2.5 MG: 2.5 SOLUTION RESPIRATORY (INHALATION) at 08:17

## 2024-05-25 RX ADMIN — LAMOTRIGINE 150 MG: 25 TABLET ORAL at 08:36

## 2024-05-25 RX ADMIN — HYDROMORPHONE HYDROCHLORIDE 1 MG: 1 INJECTION, SOLUTION INTRAMUSCULAR; INTRAVENOUS; SUBCUTANEOUS at 00:22

## 2024-05-25 RX ADMIN — ONDANSETRON 4 MG: 2 INJECTION INTRAMUSCULAR; INTRAVENOUS at 16:50

## 2024-05-25 RX ADMIN — ONDANSETRON 4 MG: 2 INJECTION INTRAMUSCULAR; INTRAVENOUS at 12:29

## 2024-05-25 RX ADMIN — GABAPENTIN 100 MG: 100 CAPSULE ORAL at 08:37

## 2024-05-25 RX ADMIN — FOLIC ACID 200 ML/HR: 5 INJECTION, SOLUTION INTRAMUSCULAR; INTRAVENOUS; SUBCUTANEOUS at 05:14

## 2024-05-25 RX ADMIN — HYDROMORPHONE HYDROCHLORIDE 1 MG: 1 INJECTION, SOLUTION INTRAMUSCULAR; INTRAVENOUS; SUBCUTANEOUS at 16:49

## 2024-05-25 NOTE — PLAN OF CARE
Goal Outcome Evaluation:  Plan of Care Reviewed With: patient        Progress: improving  Outcome Evaluation: a/o x 4; VSS; RA - 2L n.c prn while resting; PRNs given for pain control; incisions intact; ambulated halls once no acute changes to report; will continue POC                 Problem: Adult Inpatient Plan of Care  Goal: Absence of Hospital-Acquired Illness or Injury  Intervention: Identify and Manage Fall Risk  Recent Flowsheet Documentation  Taken 5/25/2024 0547 by Rambo Aguirre, RN  Safety Promotion/Fall Prevention:   assistive device/personal items within reach   clutter free environment maintained   activity supervised   fall prevention program maintained   nonskid shoes/slippers when out of bed   room organization consistent   safety round/check completed  Taken 5/25/2024 0400 by Rambo Aguirre, RN  Safety Promotion/Fall Prevention:   activity supervised   assistive device/personal items within reach   clutter free environment maintained   fall prevention program maintained   nonskid shoes/slippers when out of bed   room organization consistent   safety round/check completed  Taken 5/25/2024 0200 by Rambo Aguirre, RN  Safety Promotion/Fall Prevention:   activity supervised   assistive device/personal items within reach   clutter free environment maintained   fall prevention program maintained   nonskid shoes/slippers when out of bed   room organization consistent   safety round/check completed  Taken 5/25/2024 0000 by Rambo Aguirre, RN  Safety Promotion/Fall Prevention:   activity supervised   assistive device/personal items within reach   clutter free environment maintained   fall prevention program maintained   nonskid shoes/slippers when out of bed   room organization consistent   safety round/check completed  Taken 5/24/2024 2155 by Rambo Aguirre, RN  Safety Promotion/Fall Prevention:   assistive device/personal items within reach   clutter free environment maintained    activity supervised   fall prevention program maintained   nonskid shoes/slippers when out of bed   room organization consistent   safety round/check completed  Taken 5/24/2024 2020 by Rambo Aguirre RN  Safety Promotion/Fall Prevention:   activity supervised   assistive device/personal items within reach   clutter free environment maintained   fall prevention program maintained   nonskid shoes/slippers when out of bed   room organization consistent   safety round/check completed     Problem: Adult Inpatient Plan of Care  Goal: Absence of Hospital-Acquired Illness or Injury  Intervention: Prevent Skin Injury  Recent Flowsheet Documentation  Taken 5/25/2024 0547 by Rambo Aguirre RN  Body Position: position changed independently  Skin Protection:   adhesive use limited   tubing/devices free from skin contact   transparent dressing maintained   skin-to-device areas padded  Taken 5/25/2024 0400 by Rambo Aguirre RN  Body Position: position changed independently  Skin Protection:   adhesive use limited   transparent dressing maintained   tubing/devices free from skin contact   skin-to-device areas padded  Taken 5/25/2024 0200 by Rambo Aguirre RN  Body Position: position changed independently  Skin Protection:   adhesive use limited   transparent dressing maintained   tubing/devices free from skin contact   skin-to-device areas padded  Taken 5/25/2024 0000 by Rambo Aguirre RN  Body Position: position changed independently  Skin Protection:   adhesive use limited   transparent dressing maintained   tubing/devices free from skin contact   skin-to-device areas padded  Taken 5/24/2024 2155 by Rambo Aguirre RN  Body Position: position changed independently  Skin Protection:   adhesive use limited   transparent dressing maintained   tubing/devices free from skin contact   skin-to-device areas padded  Taken 5/24/2024 2020 by Rambo Aguirre RN  Body Position: position changed  independently  Skin Protection:   adhesive use limited   incontinence pads utilized   transparent dressing maintained   tubing/devices free from skin contact   skin-to-device areas padded

## 2024-05-25 NOTE — PROGRESS NOTES
"Bariatric Surgery     LOS: 1 day   Patient Care Team:  Gina Green APRN as PCP - General (Nurse Practitioner)    Chief Complaint:  post op    Subjective     Interval History:  Doing ok.  C/o nausea and vomiting, including during UGI.  Scop patch taken off yesterday b/c she did not think it was effective.  No fevers.  Pain controlled.  Ambulating.  Voiding.  IS 1500.    Objective     Vital Signs  Blood pressure 128/83, pulse 85, temperature 97.6 °F (36.4 °C), temperature source Oral, resp. rate 16, height 157.5 cm (62\"), weight 98 kg (216 lb 0.8 oz), last menstrual period 08/07/2023, SpO2 98%, not currently breastfeeding.    Physical Exam:  General: Alert, NAD  Abdomen: soft, appropriate, incisions okay  Extremities: warm, (+) SCDs     Results Review:     I reviewed the patient's new clinical results.  I reviewed the patient's new imaging results and agree with the interpretation.    Labs:  Lab Results (last 24 hours)       Procedure Component Value Units Date/Time    Iron [[id number]]  (Abnormal) Collected: 05/25/24 0531    Specimen: Blood Updated: 05/25/24 0907     Iron 19 mcg/dL     Comprehensive Metabolic Panel [[id number]]  (Abnormal) Collected: 05/25/24 0531    Specimen: Blood Updated: 05/25/24 0709     Glucose 101 mg/dL      BUN 7 mg/dL      Creatinine 0.94 mg/dL      Sodium 136 mmol/L      Potassium 3.9 mmol/L      Chloride 102 mmol/L      CO2 23.0 mmol/L      Calcium 7.9 mg/dL      Total Protein 6.0 g/dL      Albumin 3.3 g/dL      ALT (SGPT) 24 U/L      AST (SGOT) 37 U/L      Alkaline Phosphatase 61 U/L      Total Bilirubin 0.2 mg/dL      Globulin 2.7 gm/dL      Comment: Calculated Result        A/G Ratio 1.2 g/dL      BUN/Creatinine Ratio 7.4     Anion Gap 11.0 mmol/L      eGFR 82.3 mL/min/1.73     Narrative:      GFR Normal >60  Chronic Kidney Disease <60  Kidney Failure <15      CBC & Differential [[id number]]  (Abnormal) Collected: 05/25/24 0531    Specimen: Blood Updated: 05/25/24 0630    Narrative:  "     The following orders were created for panel order CBC & Differential.  Procedure                               Abnormality         Status                     ---------                               -----------         ------                     CBC Auto Differential[[id number]]        Abnormal            Final result                 Please view results for these tests on the individual orders.    CBC Auto Differential [[id number]]  (Abnormal) Collected: 05/25/24 0531    Specimen: Blood Updated: 05/25/24 0630     WBC 8.73 10*3/mm3      RBC 3.82 10*6/mm3      Hemoglobin 10.0 g/dL      Hematocrit 31.7 %      MCV 83.0 fL      MCH 26.2 pg      MCHC 31.5 g/dL      RDW 14.3 %      RDW-SD 43.2 fl      MPV 10.8 fL      Platelets 253 10*3/mm3      Neutrophil % 79.2 %      Lymphocyte % 11.2 %      Monocyte % 8.9 %      Eosinophil % 0.1 %      Basophil % 0.3 %      Immature Grans % 0.3 %      Neutrophils, Absolute 6.90 10*3/mm3      Lymphocytes, Absolute 0.98 10*3/mm3      Monocytes, Absolute 0.78 10*3/mm3      Eosinophils, Absolute 0.01 10*3/mm3      Basophils, Absolute 0.03 10*3/mm3      Immature Grans, Absolute 0.03 10*3/mm3      nRBC 0.0 /100 WBC     Prealbumin [[id number]] Collected: 05/25/24 0531    Specimen: Blood Updated: 05/25/24 0605            Imaging:  Imaging Results (Last 24 Hours)       Procedure Component Value Units Date/Time    FL Upper GI Single Contrast With KUB [[id number]] Resulted: 05/25/24 0848     Updated: 05/25/24 0906              Assessment & Plan     POD # 1 s/p conversion of sleeve to RYGB, recurrent HHR with falciform ligament reinforcement.    Doing well.  UGI normal post-op, images reviewed.  IV iron given for low Fe without evidence of bleeding on Lovenox.  Prealbumin 19.8, but low albumin today.      Poor oral intake with n/v.  Pt encouraged to take advantage of all antiemetics ordered.  Reorder scop patch as it may have additive effect with other antiemetics.  UGI looks fine.       Continue  OOB/ PT/ DVT prophx.          Debbie Atwood MD  05/25/24  11:51 EDT

## 2024-05-25 NOTE — PLAN OF CARE
Goal Outcome Evaluation:  Plan of Care Reviewed With: patient, spouse        Progress: improving  Outcome Evaluation: Patient has had a decent shift, awake for most of today. VSS, and patient has been alert and oriented times four. Patient has asked for pain medications twice thus far today for pain related to her recent surgical procedure. Patient is staying tonight due to poor oral intake and intermittent nausea. Nursing staff will continue to monitor and assess the patient.

## 2024-05-26 LAB
ALBUMIN SERPL-MCNC: 3.3 G/DL (ref 3.5–5.2)
ALBUMIN/GLOB SERPL: 1.4 G/DL
ALP SERPL-CCNC: 60 U/L (ref 39–117)
ALT SERPL W P-5'-P-CCNC: 14 U/L (ref 1–33)
ANION GAP SERPL CALCULATED.3IONS-SCNC: 8 MMOL/L (ref 5–15)
AST SERPL-CCNC: 20 U/L (ref 1–32)
BASOPHILS # BLD AUTO: 0.03 10*3/MM3 (ref 0–0.2)
BASOPHILS NFR BLD AUTO: 0.4 % (ref 0–1.5)
BILIRUB SERPL-MCNC: 0.2 MG/DL (ref 0–1.2)
BUN SERPL-MCNC: 3 MG/DL (ref 6–20)
BUN/CREAT SERPL: 4.4 (ref 7–25)
CALCIUM SPEC-SCNC: 8 MG/DL (ref 8.6–10.5)
CHLORIDE SERPL-SCNC: 106 MMOL/L (ref 98–107)
CO2 SERPL-SCNC: 25 MMOL/L (ref 22–29)
CREAT SERPL-MCNC: 0.68 MG/DL (ref 0.57–1)
DEPRECATED RDW RBC AUTO: 45.9 FL (ref 37–54)
EGFRCR SERPLBLD CKD-EPI 2021: 118.1 ML/MIN/1.73
EOSINOPHIL # BLD AUTO: 0.23 10*3/MM3 (ref 0–0.4)
EOSINOPHIL NFR BLD AUTO: 3.3 % (ref 0.3–6.2)
ERYTHROCYTE [DISTWIDTH] IN BLOOD BY AUTOMATED COUNT: 14.9 % (ref 12.3–15.4)
GLOBULIN UR ELPH-MCNC: 2.3 GM/DL
GLUCOSE SERPL-MCNC: 102 MG/DL (ref 65–99)
HCT VFR BLD AUTO: 29.5 % (ref 34–46.6)
HGB BLD-MCNC: 9.3 G/DL (ref 12–15.9)
IMM GRANULOCYTES # BLD AUTO: 0.02 10*3/MM3 (ref 0–0.05)
IMM GRANULOCYTES NFR BLD AUTO: 0.3 % (ref 0–0.5)
LYMPHOCYTES # BLD AUTO: 1.08 10*3/MM3 (ref 0.7–3.1)
LYMPHOCYTES NFR BLD AUTO: 15.5 % (ref 19.6–45.3)
MCH RBC QN AUTO: 26.6 PG (ref 26.6–33)
MCHC RBC AUTO-ENTMCNC: 31.5 G/DL (ref 31.5–35.7)
MCV RBC AUTO: 84.5 FL (ref 79–97)
MONOCYTES # BLD AUTO: 0.59 10*3/MM3 (ref 0.1–0.9)
MONOCYTES NFR BLD AUTO: 8.4 % (ref 5–12)
NEUTROPHILS NFR BLD AUTO: 5.04 10*3/MM3 (ref 1.7–7)
NEUTROPHILS NFR BLD AUTO: 72.1 % (ref 42.7–76)
NRBC BLD AUTO-RTO: 0 /100 WBC (ref 0–0.2)
PLATELET # BLD AUTO: 220 10*3/MM3 (ref 140–450)
PMV BLD AUTO: 10.9 FL (ref 6–12)
POTASSIUM SERPL-SCNC: 3.8 MMOL/L (ref 3.5–5.2)
PROT SERPL-MCNC: 5.6 G/DL (ref 6–8.5)
RBC # BLD AUTO: 3.49 10*6/MM3 (ref 3.77–5.28)
SODIUM SERPL-SCNC: 139 MMOL/L (ref 136–145)
WBC NRBC COR # BLD AUTO: 6.99 10*3/MM3 (ref 3.4–10.8)

## 2024-05-26 PROCEDURE — 94799 UNLISTED PULMONARY SVC/PX: CPT

## 2024-05-26 PROCEDURE — 80053 COMPREHEN METABOLIC PANEL: CPT | Performed by: SURGERY

## 2024-05-26 PROCEDURE — 85025 COMPLETE CBC W/AUTO DIFF WBC: CPT | Performed by: SURGERY

## 2024-05-26 PROCEDURE — 63710000001 PROCHLORPERAZINE MALEATE PER 10 MG: Performed by: SURGERY

## 2024-05-26 PROCEDURE — 25010000002 PROCHLORPERAZINE 10 MG/2ML SOLUTION: Performed by: SURGERY

## 2024-05-26 PROCEDURE — 25010000002 ENOXAPARIN PER 10 MG: Performed by: SURGERY

## 2024-05-26 PROCEDURE — 25010000002 POTASSIUM CHLORIDE PER 2 MEQ: Performed by: SURGERY

## 2024-05-26 PROCEDURE — 25010000002 ONDANSETRON PER 1 MG: Performed by: SURGERY

## 2024-05-26 PROCEDURE — 94761 N-INVAS EAR/PLS OXIMETRY MLT: CPT

## 2024-05-26 PROCEDURE — 99024 POSTOP FOLLOW-UP VISIT: CPT | Performed by: SURGERY

## 2024-05-26 PROCEDURE — 25010000002 HYDROMORPHONE 1 MG/ML SOLUTION: Performed by: SURGERY

## 2024-05-26 PROCEDURE — 94664 DEMO&/EVAL PT USE INHALER: CPT

## 2024-05-26 RX ORDER — PROCHLORPERAZINE EDISYLATE 5 MG/ML
5 INJECTION INTRAMUSCULAR; INTRAVENOUS EVERY 6 HOURS PRN
Status: DISCONTINUED | OUTPATIENT
Start: 2024-05-26 | End: 2024-05-30 | Stop reason: HOSPADM

## 2024-05-26 RX ADMIN — PANTOPRAZOLE SODIUM 40 MG: 40 INJECTION, POWDER, FOR SOLUTION INTRAVENOUS at 05:58

## 2024-05-26 RX ADMIN — OXYCODONE 5 MG: 5 TABLET ORAL at 12:45

## 2024-05-26 RX ADMIN — LAMOTRIGINE 150 MG: 25 TABLET ORAL at 08:20

## 2024-05-26 RX ADMIN — PROCHLORPERAZINE MALEATE 10 MG: 10 TABLET ORAL at 05:58

## 2024-05-26 RX ADMIN — PROCHLORPERAZINE EDISYLATE 5 MG: 5 INJECTION INTRAMUSCULAR; INTRAVENOUS at 14:44

## 2024-05-26 RX ADMIN — HYDROMORPHONE HYDROCHLORIDE 1 MG: 1 INJECTION, SOLUTION INTRAMUSCULAR; INTRAVENOUS; SUBCUTANEOUS at 03:39

## 2024-05-26 RX ADMIN — ONDANSETRON 4 MG: 2 INJECTION INTRAMUSCULAR; INTRAVENOUS at 12:45

## 2024-05-26 RX ADMIN — ALBUTEROL SULFATE 2.5 MG: 2.5 SOLUTION RESPIRATORY (INHALATION) at 06:27

## 2024-05-26 RX ADMIN — ACETAMINOPHEN 1000 MG: 500 TABLET ORAL at 05:58

## 2024-05-26 RX ADMIN — HYDROMORPHONE HYDROCHLORIDE 2 MG: 2 TABLET ORAL at 20:55

## 2024-05-26 RX ADMIN — SIMETHICONE 80 MG: 80 TABLET, CHEWABLE ORAL at 06:03

## 2024-05-26 RX ADMIN — GABAPENTIN 100 MG: 100 CAPSULE ORAL at 08:21

## 2024-05-26 RX ADMIN — PROCHLORPERAZINE MALEATE 10 MG: 10 TABLET ORAL at 20:52

## 2024-05-26 RX ADMIN — POTASSIUM CHLORIDE AND SODIUM CHLORIDE 125 ML/HR: 450; 150 INJECTION, SOLUTION INTRAVENOUS at 07:35

## 2024-05-26 RX ADMIN — SIMETHICONE 80 MG: 80 TABLET, CHEWABLE ORAL at 13:18

## 2024-05-26 RX ADMIN — ALBUTEROL SULFATE 2.5 MG: 2.5 SOLUTION RESPIRATORY (INHALATION) at 12:26

## 2024-05-26 RX ADMIN — POTASSIUM CHLORIDE AND SODIUM CHLORIDE 125 ML/HR: 450; 150 INJECTION, SOLUTION INTRAVENOUS at 16:06

## 2024-05-26 RX ADMIN — ALBUTEROL SULFATE 2.5 MG: 2.5 SOLUTION RESPIRATORY (INHALATION) at 19:06

## 2024-05-26 RX ADMIN — OXYCODONE 5 MG: 5 TABLET ORAL at 06:03

## 2024-05-26 RX ADMIN — ARIPIPRAZOLE 5 MG: 5 TABLET ORAL at 08:21

## 2024-05-26 RX ADMIN — MONTELUKAST 10 MG: 10 TABLET, FILM COATED ORAL at 20:52

## 2024-05-26 RX ADMIN — ENOXAPARIN SODIUM 40 MG: 100 INJECTION SUBCUTANEOUS at 08:21

## 2024-05-26 RX ADMIN — ONDANSETRON 4 MG: 2 INJECTION INTRAMUSCULAR; INTRAVENOUS at 03:39

## 2024-05-26 RX ADMIN — SIMETHICONE 80 MG: 80 TABLET, CHEWABLE ORAL at 20:55

## 2024-05-26 NOTE — PROGRESS NOTES
"Bariatric Surgery     LOS: 2 days   Patient Care Team:  Gina Green APRN as PCP - General (Nurse Practitioner)    Chief Complaint:  post op    Subjective     Interval History:  No acute events.  Less vomiting today than yesterday, but still has some vomiting and nausea.  Zofran didn't work.  Hasn't tried other antiemetics.  So far today has tolerated 2 oz premier protein and a few pills by mouth. IV dilaudid works much better than po oxy.  No fevers.  Pain controlled.  Ambulating.  Voiding.  IS 2000.    Objective     Vital Signs  Blood pressure 120/77, pulse 90, temperature 99 °F (37.2 °C), temperature source Oral, resp. rate 16, height 157.5 cm (62\"), weight 98 kg (216 lb 0.8 oz), last menstrual period 08/07/2023, SpO2 96%, not currently breastfeeding.    Physical Exam:  General: Alert, NAD  Abdomen: soft, appropriate, incisions okay.  No bowel sounds.  CHRISTINA ss  Extremities: warm, (+) SCDs     Results Review:     I reviewed the patient's new clinical results.  I reviewed the patient's new imaging results and agree with the interpretation.    Labs:  Lab Results (last 24 hours)       Procedure Component Value Units Date/Time    CBC & Differential [[id number]]  (Abnormal) Collected: 05/26/24 0726    Specimen: Blood Updated: 05/26/24 0836    Narrative:      The following orders were created for panel order CBC & Differential.  Procedure                               Abnormality         Status                     ---------                               -----------         ------                     CBC Auto Differential[[id number]]        Abnormal            Final result                 Please view results for these tests on the individual orders.    CBC Auto Differential [[id number]]  (Abnormal) Collected: 05/26/24 0726    Specimen: Blood Updated: 05/26/24 0836     WBC 6.99 10*3/mm3      RBC 3.49 10*6/mm3      Hemoglobin 9.3 g/dL      Hematocrit 29.5 %      MCV 84.5 fL      MCH 26.6 pg      MCHC 31.5 g/dL      RDW " 14.9 %      RDW-SD 45.9 fl      MPV 10.9 fL      Platelets 220 10*3/mm3      Neutrophil % 72.1 %      Lymphocyte % 15.5 %      Monocyte % 8.4 %      Eosinophil % 3.3 %      Basophil % 0.4 %      Immature Grans % 0.3 %      Neutrophils, Absolute 5.04 10*3/mm3      Lymphocytes, Absolute 1.08 10*3/mm3      Monocytes, Absolute 0.59 10*3/mm3      Eosinophils, Absolute 0.23 10*3/mm3      Basophils, Absolute 0.03 10*3/mm3      Immature Grans, Absolute 0.02 10*3/mm3      nRBC 0.0 /100 WBC     Comprehensive Metabolic Panel [[id number]]  (Abnormal) Collected: 05/26/24 0726    Specimen: Blood Updated: 05/26/24 0834     Glucose 102 mg/dL      BUN 3 mg/dL      Creatinine 0.68 mg/dL      Sodium 139 mmol/L      Potassium 3.8 mmol/L      Chloride 106 mmol/L      CO2 25.0 mmol/L      Calcium 8.0 mg/dL      Total Protein 5.6 g/dL      Albumin 3.3 g/dL      ALT (SGPT) 14 U/L      AST (SGOT) 20 U/L      Alkaline Phosphatase 60 U/L      Total Bilirubin 0.2 mg/dL      Globulin 2.3 gm/dL      Comment: Calculated Result        A/G Ratio 1.4 g/dL      BUN/Creatinine Ratio 4.4     Anion Gap 8.0 mmol/L      eGFR 118.1 mL/min/1.73     Narrative:      GFR Normal >60  Chronic Kidney Disease <60  Kidney Failure <15              Imaging:  Imaging Results (Last 24 Hours)       ** No results found for the last 24 hours. **              Assessment & Plan     POD # 2 s/p conversion of sleeve to RYGB, recurrent HHR with falciform ligament reinforcement.    Still very nauseated, but less vomiting today than yesterday.  Now tolerating some pills and a few oz of protein.  Recommend compazine instead of zofran, will message her nurse.  D/c oxy, dilaudid po for pain since oxy was not as effective.      If still vomiting tomorrow, repeat KUB, possibly UGI.    Hgb drifting slowly, normal VS.     Continue OOB/ PT/ DVT prophx.          Debbie Atwood MD  05/26/24  14:12 EDT

## 2024-05-26 NOTE — PLAN OF CARE
Goal Outcome Evaluation:  Plan of Care Reviewed With: patient, spouse        Progress: improving  Outcome Evaluation: Patient has had a decent shift, awake for most of today. VSS, and patient is alert and oriented times four. Patient has asked for pain medications once today thus far for pain related to her recent surgical procedure. Patient is still having poor oral intake and feeling quite nauseous, so Dr. Atwood is going to keep her for another night. Nursing staff will continue to monitor and assess the patient.

## 2024-05-26 NOTE — PLAN OF CARE
Goal Outcome Evaluation:      VSS on RA-2LNC. PRN pain and nausea meds given with relief. Pt ambulated in hallway. Will continue plan of care.     Progress: improving

## 2024-05-27 ENCOUNTER — APPOINTMENT (OUTPATIENT)
Dept: CT IMAGING | Facility: HOSPITAL | Age: 34
End: 2024-05-27
Payer: COMMERCIAL

## 2024-05-27 LAB
ABO GROUP BLD: NORMAL
ALBUMIN SERPL-MCNC: 3.5 G/DL (ref 3.5–5.2)
ALBUMIN/GLOB SERPL: 1.3 G/DL
ALP SERPL-CCNC: 70 U/L (ref 39–117)
ALT SERPL W P-5'-P-CCNC: 12 U/L (ref 1–33)
ANION GAP SERPL CALCULATED.3IONS-SCNC: 10 MMOL/L (ref 5–15)
AST SERPL-CCNC: 20 U/L (ref 1–32)
BASOPHILS # BLD AUTO: 0.02 10*3/MM3 (ref 0–0.2)
BASOPHILS NFR BLD AUTO: 0.3 % (ref 0–1.5)
BILIRUB SERPL-MCNC: 0.3 MG/DL (ref 0–1.2)
BLD GP AB SCN SERPL QL: POSITIVE
BUN SERPL-MCNC: 3 MG/DL (ref 6–20)
BUN/CREAT SERPL: 4.5 (ref 7–25)
CALCIUM SPEC-SCNC: 8.3 MG/DL (ref 8.6–10.5)
CHLORIDE SERPL-SCNC: 103 MMOL/L (ref 98–107)
CO2 SERPL-SCNC: 25 MMOL/L (ref 22–29)
COLD SCREEN: NEGATIVE
CREAT SERPL-MCNC: 0.66 MG/DL (ref 0.57–1)
DAT C3: NEGATIVE
DAT IGG GEL: POSITIVE
DEPRECATED RDW RBC AUTO: 44.5 FL (ref 37–54)
EGFRCR SERPLBLD CKD-EPI 2021: 119 ML/MIN/1.73
EOSINOPHIL # BLD AUTO: 0.15 10*3/MM3 (ref 0–0.4)
EOSINOPHIL NFR BLD AUTO: 2.1 % (ref 0.3–6.2)
ERYTHROCYTE [DISTWIDTH] IN BLOOD BY AUTOMATED COUNT: 14.6 % (ref 12.3–15.4)
GLOBULIN UR ELPH-MCNC: 2.8 GM/DL
GLUCOSE SERPL-MCNC: 96 MG/DL (ref 65–99)
HCT VFR BLD AUTO: 27.9 % (ref 34–46.6)
HCT VFR BLD AUTO: 28.4 % (ref 34–46.6)
HGB BLD-MCNC: 8.8 G/DL (ref 12–15.9)
HGB BLD-MCNC: 8.9 G/DL (ref 12–15.9)
HGB BLD-MCNC: 9.1 G/DL (ref 12–15.9)
IMM GRANULOCYTES # BLD AUTO: 0.02 10*3/MM3 (ref 0–0.05)
IMM GRANULOCYTES NFR BLD AUTO: 0.3 % (ref 0–0.5)
LISS SCREEN: NEGATIVE
LYMPHOCYTES # BLD AUTO: 0.64 10*3/MM3 (ref 0.7–3.1)
LYMPHOCYTES NFR BLD AUTO: 8.9 % (ref 19.6–45.3)
MCH RBC QN AUTO: 26.8 PG (ref 26.6–33)
MCHC RBC AUTO-ENTMCNC: 31.9 G/DL (ref 31.5–35.7)
MCV RBC AUTO: 84 FL (ref 79–97)
MONOCYTES # BLD AUTO: 0.55 10*3/MM3 (ref 0.1–0.9)
MONOCYTES NFR BLD AUTO: 7.7 % (ref 5–12)
NEUTROPHILS NFR BLD AUTO: 5.78 10*3/MM3 (ref 1.7–7)
NEUTROPHILS NFR BLD AUTO: 80.7 % (ref 42.7–76)
NRBC BLD AUTO-RTO: 0 /100 WBC (ref 0–0.2)
PLATELET # BLD AUTO: 244 10*3/MM3 (ref 140–450)
PMV BLD AUTO: 10.8 FL (ref 6–12)
POTASSIUM SERPL-SCNC: 3.8 MMOL/L (ref 3.5–5.2)
PROT SERPL-MCNC: 6.3 G/DL (ref 6–8.5)
RBC # BLD AUTO: 3.32 10*6/MM3 (ref 3.77–5.28)
RH BLD: POSITIVE
SALINE SCREEN: NEGATIVE
SODIUM SERPL-SCNC: 138 MMOL/L (ref 136–145)
T&S EXPIRATION DATE: NORMAL
WBC NRBC COR # BLD AUTO: 7.16 10*3/MM3 (ref 3.4–10.8)

## 2024-05-27 PROCEDURE — 94799 UNLISTED PULMONARY SVC/PX: CPT

## 2024-05-27 PROCEDURE — 25010000002 PROCHLORPERAZINE 10 MG/2ML SOLUTION: Performed by: SURGERY

## 2024-05-27 PROCEDURE — 85018 HEMOGLOBIN: CPT | Performed by: SURGERY

## 2024-05-27 PROCEDURE — 74176 CT ABD & PELVIS W/O CONTRAST: CPT

## 2024-05-27 PROCEDURE — 86900 BLOOD TYPING SEROLOGIC ABO: CPT | Performed by: SURGERY

## 2024-05-27 PROCEDURE — 86850 RBC ANTIBODY SCREEN: CPT | Performed by: SURGERY

## 2024-05-27 PROCEDURE — 86901 BLOOD TYPING SEROLOGIC RH(D): CPT | Performed by: SURGERY

## 2024-05-27 PROCEDURE — 86870 RBC ANTIBODY IDENTIFICATION: CPT | Performed by: SURGERY

## 2024-05-27 PROCEDURE — 25010000002 POTASSIUM CHLORIDE PER 2 MEQ: Performed by: SURGERY

## 2024-05-27 PROCEDURE — 25010000002 ENOXAPARIN PER 10 MG: Performed by: SURGERY

## 2024-05-27 PROCEDURE — 80053 COMPREHEN METABOLIC PANEL: CPT | Performed by: SURGERY

## 2024-05-27 PROCEDURE — 85025 COMPLETE CBC W/AUTO DIFF WBC: CPT | Performed by: SURGERY

## 2024-05-27 PROCEDURE — 94664 DEMO&/EVAL PT USE INHALER: CPT

## 2024-05-27 PROCEDURE — 25810000003 SODIUM CHLORIDE 0.9 % SOLUTION: Performed by: SURGERY

## 2024-05-27 PROCEDURE — 85014 HEMATOCRIT: CPT | Performed by: SURGERY

## 2024-05-27 PROCEDURE — 86880 COOMBS TEST DIRECT: CPT | Performed by: SURGERY

## 2024-05-27 RX ORDER — PANTOPRAZOLE SODIUM 40 MG/1
40 TABLET, DELAYED RELEASE ORAL
Status: DISCONTINUED | OUTPATIENT
Start: 2024-05-28 | End: 2024-05-30 | Stop reason: HOSPADM

## 2024-05-27 RX ORDER — ENOXAPARIN SODIUM 100 MG/ML
40 INJECTION SUBCUTANEOUS EVERY 24 HOURS
Status: DISCONTINUED | OUTPATIENT
Start: 2024-05-28 | End: 2024-05-30 | Stop reason: HOSPADM

## 2024-05-27 RX ORDER — SCOLOPAMINE TRANSDERMAL SYSTEM 1 MG/1
1 PATCH, EXTENDED RELEASE TRANSDERMAL
Status: DISCONTINUED | OUTPATIENT
Start: 2024-05-27 | End: 2024-05-29

## 2024-05-27 RX ADMIN — LAMOTRIGINE 150 MG: 25 TABLET ORAL at 08:01

## 2024-05-27 RX ADMIN — ALBUTEROL SULFATE 2.5 MG: 2.5 SOLUTION RESPIRATORY (INHALATION) at 12:31

## 2024-05-27 RX ADMIN — POTASSIUM CHLORIDE AND SODIUM CHLORIDE 125 ML/HR: 450; 150 INJECTION, SOLUTION INTRAVENOUS at 17:30

## 2024-05-27 RX ADMIN — ALBUTEROL SULFATE 2.5 MG: 2.5 SOLUTION RESPIRATORY (INHALATION) at 06:48

## 2024-05-27 RX ADMIN — SODIUM CHLORIDE 1000 ML: 9 INJECTION, SOLUTION INTRAVENOUS at 22:34

## 2024-05-27 RX ADMIN — ENOXAPARIN SODIUM 40 MG: 100 INJECTION SUBCUTANEOUS at 08:00

## 2024-05-27 RX ADMIN — HYDROMORPHONE HYDROCHLORIDE 2 MG: 2 TABLET ORAL at 20:27

## 2024-05-27 RX ADMIN — SIMETHICONE 80 MG: 80 TABLET, CHEWABLE ORAL at 22:34

## 2024-05-27 RX ADMIN — PANTOPRAZOLE SODIUM 40 MG: 40 INJECTION, POWDER, FOR SOLUTION INTRAVENOUS at 05:12

## 2024-05-27 RX ADMIN — HYDROMORPHONE HYDROCHLORIDE 2 MG: 2 TABLET ORAL at 03:17

## 2024-05-27 RX ADMIN — ARIPIPRAZOLE 5 MG: 5 TABLET ORAL at 08:01

## 2024-05-27 RX ADMIN — SIMETHICONE 80 MG: 80 TABLET, CHEWABLE ORAL at 08:09

## 2024-05-27 RX ADMIN — HYDROMORPHONE HYDROCHLORIDE 2 MG: 2 TABLET ORAL at 11:25

## 2024-05-27 RX ADMIN — ALBUTEROL SULFATE 2.5 MG: 2.5 SOLUTION RESPIRATORY (INHALATION) at 18:55

## 2024-05-27 RX ADMIN — POTASSIUM CHLORIDE AND SODIUM CHLORIDE 125 ML/HR: 450; 150 INJECTION, SOLUTION INTRAVENOUS at 07:57

## 2024-05-27 RX ADMIN — HYDROMORPHONE HYDROCHLORIDE 2 MG: 2 TABLET ORAL at 16:16

## 2024-05-27 RX ADMIN — SCOPOLAMINE 1 PATCH: 1.5 PATCH, EXTENDED RELEASE TRANSDERMAL at 18:07

## 2024-05-27 RX ADMIN — PROCHLORPERAZINE EDISYLATE 5 MG: 5 INJECTION INTRAMUSCULAR; INTRAVENOUS at 12:28

## 2024-05-27 RX ADMIN — MONTELUKAST 10 MG: 10 TABLET, FILM COATED ORAL at 20:27

## 2024-05-27 NOTE — PLAN OF CARE
Goal Outcome Evaluation:  Plan of Care Reviewed With: patient, spouse        Progress: improving  Outcome Evaluation: Patient has had a decent shift, awake for most of today. VSS, and patient has been alert and oriented times four. Patient has asked for pain medications once today thus far for pain related to her recent surgical procedure. Patient is showing signs of improvement with her PO intake and is less nauseous, but still not at a level that Dr. Atwood is satisfied with. For that reason, Dr. Atwood is going to keep the patient for another night. Nursing staff will continue to monitor and assess the patient.

## 2024-05-27 NOTE — PLAN OF CARE
Goal Outcome Evaluation:      VSS on RA. PRNs given for pain/nausea. Patient ambulated in hallway. Will continue plan of care.     Progress: improving

## 2024-05-27 NOTE — PROGRESS NOTES
"Bariatric Surgery     LOS: 3 days   Patient Care Team:  Gina Green APRN as PCP - General (Nurse Practitioner)    Chief Complaint:  post op    Subjective     Interval History:  No acute events.  Hgb dropped <9, no lightheadedness.  CT scans showed only small amount of intraabdominal hematoma on my review and the read.    Less nausea with compazine, no vomiting since yesterday.  Has had 4 oz protein shake so far today.      Abd pain is mostly at drain site.  Has feeling of flatus in rectum, has not yet passed flatus.    No fevers.  Pain controlled.  Ambulating.  Voiding.  IS 2000.    Objective     Vital Signs  Blood pressure 123/81, pulse 98, temperature 97.9 °F (36.6 °C), temperature source Oral, resp. rate 16, height 157.5 cm (62\"), weight 98 kg (216 lb 0.8 oz), last menstrual period 08/07/2023, SpO2 92%, not currently breastfeeding.    Physical Exam:  General: Alert, NAD  Abdomen: soft, appropriate, incisions okay.  Normal bowel sounds.  CHRISTINA ss, 85 cc/24 hours  Extremities: warm, (+) SCDs     Results Review:     I reviewed the patient's new clinical results.  I reviewed the patient's new imaging results and agree with the interpretation.    Labs:  Lab Results (last 24 hours)       Procedure Component Value Units Date/Time    Hemoglobin [[id number]]  (Abnormal) Collected: 05/27/24 1155    Specimen: Blood Updated: 05/27/24 1205     Hemoglobin 8.8 g/dL     Comprehensive Metabolic Panel [[id number]]  (Abnormal) Collected: 05/27/24 0507    Specimen: Blood Updated: 05/27/24 0641     Glucose 96 mg/dL      BUN 3 mg/dL      Creatinine 0.66 mg/dL      Sodium 138 mmol/L      Potassium 3.8 mmol/L      Chloride 103 mmol/L      CO2 25.0 mmol/L      Calcium 8.3 mg/dL      Total Protein 6.3 g/dL      Albumin 3.5 g/dL      ALT (SGPT) 12 U/L      AST (SGOT) 20 U/L      Alkaline Phosphatase 70 U/L      Total Bilirubin 0.3 mg/dL      Globulin 2.8 gm/dL      Comment: Calculated Result        A/G Ratio 1.3 g/dL      BUN/Creatinine " Ratio 4.5     Anion Gap 10.0 mmol/L      eGFR 119.0 mL/min/1.73     Narrative:      GFR Normal >60  Chronic Kidney Disease <60  Kidney Failure <15      CBC & Differential [[id number]]  (Abnormal) Collected: 05/27/24 0507    Specimen: Blood Updated: 05/27/24 0556    Narrative:      The following orders were created for panel order CBC & Differential.  Procedure                               Abnormality         Status                     ---------                               -----------         ------                     CBC Auto Differential[[id number]]        Abnormal            Final result                 Please view results for these tests on the individual orders.    CBC Auto Differential [[id number]]  (Abnormal) Collected: 05/27/24 0507    Specimen: Blood Updated: 05/27/24 0556     WBC 7.16 10*3/mm3      RBC 3.32 10*6/mm3      Hemoglobin 8.9 g/dL      Hematocrit 27.9 %      MCV 84.0 fL      MCH 26.8 pg      MCHC 31.9 g/dL      RDW 14.6 %      RDW-SD 44.5 fl      MPV 10.8 fL      Platelets 244 10*3/mm3      Neutrophil % 80.7 %      Lymphocyte % 8.9 %      Monocyte % 7.7 %      Eosinophil % 2.1 %      Basophil % 0.3 %      Immature Grans % 0.3 %      Neutrophils, Absolute 5.78 10*3/mm3      Lymphocytes, Absolute 0.64 10*3/mm3      Monocytes, Absolute 0.55 10*3/mm3      Eosinophils, Absolute 0.15 10*3/mm3      Basophils, Absolute 0.02 10*3/mm3      Immature Grans, Absolute 0.02 10*3/mm3      nRBC 0.0 /100 WBC             Imaging:  Imaging Results (Last 24 Hours)       Procedure Component Value Units Date/Time    CT Abdomen Pelvis Without Contrast [[id number]] Collected: 05/27/24 1146     Updated: 05/27/24 1210    Narrative:      CT ABDOMEN PELVIS WO CONTRAST    Date of Exam: 5/27/2024 11:31 AM EDT    Indication: Rule out intrabdominal hematoma.    Comparison: Upper GI fluoroscopic exam 5/25/2024, CT abdomen and pelvis 11/29/2023    Technique: Axial CT images were obtained of the abdomen and pelvis without the  administration of contrast. Reconstructed coronal and sagittal images were also obtained. Automated exposure control and iterative construction methods were used.      Findings:  There are surgical changes of recent Teresa-en-Y gastric bypass. A surgical drain is positioned in the epigastrium/left upper quadrant. There is hazy and wispy fat stranding about the stomach, compatible with recent surgery. There are couple small rounded   hyperdensities adjacent to the stomach in the region of fat stranding, likely reflecting small postsurgical blood products (series 2 image 42, series 900 image 55). There is small hyperdense fluid inferior to the spleen (series 2 image 54), also likely   reflecting a small amount of upper abdominal blood. No discrete/drainable fluid collection at the surgical sites or elsewhere within the abdomen or pelvis. There is a trace amount of simple pelvic free fluid which may be reactive to recent surgery or   physiologic in this young patient. There is contrast within the colon from previous upper GI fluoroscopic exam; no evidence of extraluminal contrast to suggest leak. No evidence of bowel obstruction or ileus; the majority of the prior oral contrast is   now within the colon. There is small scattered air in the anterior mediastinum compatible with pneumomediastinum, likely reflecting surgical change of hiatal hernia repair.    There is mild subsegmental atelectasis in the lung bases likely relating to recent upper abdominal surgery. Unremarkable appearance of the liver. The gallbladder is surgically absent. Normal appearance of the bile ducts. Normal appearance of the spleen.   Unremarkable appearance of the pancreas. Normal appearance of the adrenal glands, kidneys, ureters, and bladder. The uterus is surgically absent. Unremarkable appearance of the adnexa. There is some scattered linear fat stranding and scattered small   locules of air in the subcutaneous tissues of the anterior abdominal  wall compatible with recent laparoscopic access. No evidence of intra-abdominal or body wall hematoma. Unremarkable noncontrast appearance of the vasculature. Unremarkable appearance of   the osseous structures.      Impression:      Impression:  Expected recent surgical changes relating to gastric bypass and hiatal hernia repair. There is a small amount of hyperdense nonorganized fluid adjacent to the stomach and spleen, likely reflecting small postoperative blood products. Assessment for any   active bleeding is precluded in the absence of IV contrast. No evidence of body wall or abdominopelvic hematoma as queried.    Additional nonacute findings relating to recent surgery, detailed above.            Electronically Signed: Socrates Adame MD    5/27/2024 12:07 PM EDT    Workstation ID: [id number]              Assessment & Plan     POD # 3 s/p conversion of sleeve to RYGB, recurrent HHR with falciform ligament reinforcement.    Nausea is improving with compazine.  Discussed drinking on a schedule.    Remove drain hopefully before discharge.  Output >30/24 hours still, and has only ever been ss, not bloody.    Small intraabdominal hematoma, accounts for drifting Hgb.  Ok to continue Lovenox.       Continue OOB/ PT/ DVT prophx.          Debbie Atwood MD  05/27/24  12:31 EDT

## 2024-05-28 ENCOUNTER — APPOINTMENT (OUTPATIENT)
Dept: GENERAL RADIOLOGY | Facility: HOSPITAL | Age: 34
End: 2024-05-28
Payer: COMMERCIAL

## 2024-05-28 PROBLEM — J18.9 PNEUMONIA INVOLVING RIGHT LUNG: Status: ACTIVE | Noted: 2024-05-28

## 2024-05-28 LAB
ALBUMIN SERPL-MCNC: 3.1 G/DL (ref 3.5–5.2)
ALBUMIN/GLOB SERPL: 1.2 G/DL
ALP SERPL-CCNC: 85 U/L (ref 39–117)
ALT SERPL W P-5'-P-CCNC: 10 U/L (ref 1–33)
ANION GAP SERPL CALCULATED.3IONS-SCNC: 14 MMOL/L (ref 5–15)
ANION GAP SERPL CALCULATED.3IONS-SCNC: 9 MMOL/L (ref 5–15)
AST SERPL-CCNC: 16 U/L (ref 1–32)
BACTERIA UR QL AUTO: ABNORMAL /HPF
BASOPHILS # BLD AUTO: 0.02 10*3/MM3 (ref 0–0.2)
BASOPHILS # BLD AUTO: 0.02 10*3/MM3 (ref 0–0.2)
BASOPHILS NFR BLD AUTO: 0.2 % (ref 0–1.5)
BASOPHILS NFR BLD AUTO: 0.2 % (ref 0–1.5)
BILIRUB SERPL-MCNC: 0.5 MG/DL (ref 0–1.2)
BILIRUB UR QL STRIP: NEGATIVE
BUN SERPL-MCNC: 3 MG/DL (ref 6–20)
BUN SERPL-MCNC: 5 MG/DL (ref 6–20)
BUN/CREAT SERPL: 4.7 (ref 7–25)
BUN/CREAT SERPL: 7.7 (ref 7–25)
CALCIUM SPEC-SCNC: 7.8 MG/DL (ref 8.6–10.5)
CALCIUM SPEC-SCNC: 7.9 MG/DL (ref 8.6–10.5)
CHLORIDE SERPL-SCNC: 100 MMOL/L (ref 98–107)
CHLORIDE SERPL-SCNC: 101 MMOL/L (ref 98–107)
CLARITY UR: ABNORMAL
CO2 SERPL-SCNC: 19 MMOL/L (ref 22–29)
CO2 SERPL-SCNC: 23 MMOL/L (ref 22–29)
COLOR UR: YELLOW
CREAT SERPL-MCNC: 0.64 MG/DL (ref 0.57–1)
CREAT SERPL-MCNC: 0.65 MG/DL (ref 0.57–1)
D-LACTATE SERPL-SCNC: 0.8 MMOL/L (ref 0.5–2)
DEPRECATED RDW RBC AUTO: 45.7 FL (ref 37–54)
DEPRECATED RDW RBC AUTO: 46.7 FL (ref 37–54)
EGFRCR SERPLBLD CKD-EPI 2021: 119.4 ML/MIN/1.73
EGFRCR SERPLBLD CKD-EPI 2021: 119.8 ML/MIN/1.73
EOSINOPHIL # BLD AUTO: 0.03 10*3/MM3 (ref 0–0.4)
EOSINOPHIL # BLD AUTO: 0.1 10*3/MM3 (ref 0–0.4)
EOSINOPHIL NFR BLD AUTO: 0.3 % (ref 0.3–6.2)
EOSINOPHIL NFR BLD AUTO: 1 % (ref 0.3–6.2)
ERYTHROCYTE [DISTWIDTH] IN BLOOD BY AUTOMATED COUNT: 14.9 % (ref 12.3–15.4)
ERYTHROCYTE [DISTWIDTH] IN BLOOD BY AUTOMATED COUNT: 14.9 % (ref 12.3–15.4)
GLOBULIN UR ELPH-MCNC: 2.5 GM/DL
GLUCOSE SERPL-MCNC: 113 MG/DL (ref 65–99)
GLUCOSE SERPL-MCNC: 87 MG/DL (ref 65–99)
GLUCOSE UR STRIP-MCNC: NEGATIVE MG/DL
HCT VFR BLD AUTO: 26.6 % (ref 34–46.6)
HCT VFR BLD AUTO: 28 % (ref 34–46.6)
HGB BLD-MCNC: 8.5 G/DL (ref 12–15.9)
HGB BLD-MCNC: 8.8 G/DL (ref 12–15.9)
HGB UR QL STRIP.AUTO: NEGATIVE
HYALINE CASTS UR QL AUTO: ABNORMAL /LPF
IMM GRANULOCYTES # BLD AUTO: 0.04 10*3/MM3 (ref 0–0.05)
IMM GRANULOCYTES # BLD AUTO: 0.14 10*3/MM3 (ref 0–0.05)
IMM GRANULOCYTES NFR BLD AUTO: 0.4 % (ref 0–0.5)
IMM GRANULOCYTES NFR BLD AUTO: 1.4 % (ref 0–0.5)
KETONES UR QL STRIP: ABNORMAL
LEUKOCYTE ESTERASE UR QL STRIP.AUTO: NEGATIVE
LYMPHOCYTES # BLD AUTO: 0.65 10*3/MM3 (ref 0.7–3.1)
LYMPHOCYTES # BLD AUTO: 0.77 10*3/MM3 (ref 0.7–3.1)
LYMPHOCYTES NFR BLD AUTO: 5.7 % (ref 19.6–45.3)
LYMPHOCYTES NFR BLD AUTO: 7.7 % (ref 19.6–45.3)
MCH RBC QN AUTO: 26.8 PG (ref 26.6–33)
MCH RBC QN AUTO: 27 PG (ref 26.6–33)
MCHC RBC AUTO-ENTMCNC: 31.4 G/DL (ref 31.5–35.7)
MCHC RBC AUTO-ENTMCNC: 32 G/DL (ref 31.5–35.7)
MCV RBC AUTO: 83.9 FL (ref 79–97)
MCV RBC AUTO: 85.9 FL (ref 79–97)
MONOCYTES # BLD AUTO: 1.03 10*3/MM3 (ref 0.1–0.9)
MONOCYTES # BLD AUTO: 1.06 10*3/MM3 (ref 0.1–0.9)
MONOCYTES NFR BLD AUTO: 10.4 % (ref 5–12)
MONOCYTES NFR BLD AUTO: 9.4 % (ref 5–12)
NEUTROPHILS NFR BLD AUTO: 7.89 10*3/MM3 (ref 1.7–7)
NEUTROPHILS NFR BLD AUTO: 79.3 % (ref 42.7–76)
NEUTROPHILS NFR BLD AUTO: 84 % (ref 42.7–76)
NEUTROPHILS NFR BLD AUTO: 9.53 10*3/MM3 (ref 1.7–7)
NITRITE UR QL STRIP: NEGATIVE
NRBC BLD AUTO-RTO: 0 /100 WBC (ref 0–0.2)
NRBC BLD AUTO-RTO: 0 /100 WBC (ref 0–0.2)
PH UR STRIP.AUTO: 6 [PH] (ref 5–8)
PLATELET # BLD AUTO: 227 10*3/MM3 (ref 140–450)
PLATELET # BLD AUTO: 240 10*3/MM3 (ref 140–450)
PMV BLD AUTO: 10.7 FL (ref 6–12)
PMV BLD AUTO: 10.8 FL (ref 6–12)
POTASSIUM SERPL-SCNC: 3.6 MMOL/L (ref 3.5–5.2)
POTASSIUM SERPL-SCNC: 3.7 MMOL/L (ref 3.5–5.2)
PROCALCITONIN SERPL-MCNC: 0.6 NG/ML (ref 0–0.25)
PROT SERPL-MCNC: 5.6 G/DL (ref 6–8.5)
PROT UR QL STRIP: ABNORMAL
RBC # BLD AUTO: 3.17 10*6/MM3 (ref 3.77–5.28)
RBC # BLD AUTO: 3.26 10*6/MM3 (ref 3.77–5.28)
RBC # UR STRIP: ABNORMAL /HPF
REF LAB TEST METHOD: ABNORMAL
SODIUM SERPL-SCNC: 132 MMOL/L (ref 136–145)
SODIUM SERPL-SCNC: 134 MMOL/L (ref 136–145)
SP GR UR STRIP: 1.01 (ref 1–1.03)
SQUAMOUS #/AREA URNS HPF: ABNORMAL /HPF
UROBILINOGEN UR QL STRIP: ABNORMAL
WARM AUTOANTIBODY: NORMAL
WBC # UR STRIP: ABNORMAL /HPF
WBC NRBC COR # BLD AUTO: 11.33 10*3/MM3 (ref 3.4–10.8)
WBC NRBC COR # BLD AUTO: 9.95 10*3/MM3 (ref 3.4–10.8)

## 2024-05-28 PROCEDURE — 99231 SBSQ HOSP IP/OBS SF/LOW 25: CPT | Performed by: NURSE PRACTITIONER

## 2024-05-28 PROCEDURE — 87899 AGENT NOS ASSAY W/OPTIC: CPT | Performed by: INTERNAL MEDICINE

## 2024-05-28 PROCEDURE — 25010000002 MORPHINE PER 10 MG: Performed by: SURGERY

## 2024-05-28 PROCEDURE — 87040 BLOOD CULTURE FOR BACTERIA: CPT | Performed by: NURSE PRACTITIONER

## 2024-05-28 PROCEDURE — 80053 COMPREHEN METABOLIC PANEL: CPT | Performed by: SURGERY

## 2024-05-28 PROCEDURE — 85025 COMPLETE CBC W/AUTO DIFF WBC: CPT | Performed by: SURGERY

## 2024-05-28 PROCEDURE — 71046 X-RAY EXAM CHEST 2 VIEWS: CPT

## 2024-05-28 PROCEDURE — 85025 COMPLETE CBC W/AUTO DIFF WBC: CPT | Performed by: NURSE PRACTITIONER

## 2024-05-28 PROCEDURE — 25010000002 CEFTRIAXONE PER 250 MG: Performed by: NURSE PRACTITIONER

## 2024-05-28 PROCEDURE — 94664 DEMO&/EVAL PT USE INHALER: CPT

## 2024-05-28 PROCEDURE — 99024 POSTOP FOLLOW-UP VISIT: CPT | Performed by: SURGERY

## 2024-05-28 PROCEDURE — 25010000002 ENOXAPARIN PER 10 MG: Performed by: SURGERY

## 2024-05-28 PROCEDURE — 63710000001 PROCHLORPERAZINE MALEATE PER 10 MG: Performed by: SURGERY

## 2024-05-28 PROCEDURE — 94799 UNLISTED PULMONARY SVC/PX: CPT

## 2024-05-28 PROCEDURE — 84145 PROCALCITONIN (PCT): CPT | Performed by: NURSE PRACTITIONER

## 2024-05-28 PROCEDURE — 63710000001 PROMETHAZINE PER 12.5 MG: Performed by: SURGERY

## 2024-05-28 PROCEDURE — 25010000002 SODIUM CHLORIDE 0.9 % WITH KCL 20 MEQ 20-0.9 MEQ/L-% SOLUTION: Performed by: SURGERY

## 2024-05-28 PROCEDURE — 81001 URINALYSIS AUTO W/SCOPE: CPT | Performed by: SURGERY

## 2024-05-28 PROCEDURE — 94761 N-INVAS EAR/PLS OXIMETRY MLT: CPT

## 2024-05-28 PROCEDURE — 83605 ASSAY OF LACTIC ACID: CPT | Performed by: NURSE PRACTITIONER

## 2024-05-28 PROCEDURE — 87449 NOS EACH ORGANISM AG IA: CPT | Performed by: INTERNAL MEDICINE

## 2024-05-28 RX ORDER — ESOMEPRAZOLE MAGNESIUM 40 MG/1
40 CAPSULE, DELAYED RELEASE ORAL
Status: ON HOLD
Start: 2024-05-28 | End: 2024-06-11

## 2024-05-28 RX ORDER — ACETAMINOPHEN 160 MG/5ML
650 SOLUTION ORAL EVERY 4 HOURS PRN
Status: DISCONTINUED | OUTPATIENT
Start: 2024-05-28 | End: 2024-05-30 | Stop reason: HOSPADM

## 2024-05-28 RX ORDER — PROCHLORPERAZINE MALEATE 10 MG
10 TABLET ORAL EVERY 6 HOURS PRN
Qty: 30 TABLET | Refills: 1 | Status: SHIPPED | OUTPATIENT
Start: 2024-05-28

## 2024-05-28 RX ORDER — ACETAMINOPHEN 650 MG/1
650 SUPPOSITORY RECTAL EVERY 4 HOURS PRN
Status: DISCONTINUED | OUTPATIENT
Start: 2024-05-28 | End: 2024-05-30 | Stop reason: HOSPADM

## 2024-05-28 RX ORDER — HYDROMORPHONE HYDROCHLORIDE 2 MG/1
2 TABLET ORAL EVERY 4 HOURS PRN
Qty: 10 TABLET | Refills: 0 | Status: ON HOLD | OUTPATIENT
Start: 2024-05-28 | End: 2024-06-09

## 2024-05-28 RX ORDER — SODIUM CHLORIDE AND POTASSIUM CHLORIDE 150; 900 MG/100ML; MG/100ML
83 INJECTION, SOLUTION INTRAVENOUS CONTINUOUS
Status: DISCONTINUED | OUTPATIENT
Start: 2024-05-28 | End: 2024-05-30 | Stop reason: HOSPADM

## 2024-05-28 RX ORDER — ACETAMINOPHEN 325 MG/1
650 TABLET ORAL EVERY 4 HOURS PRN
Status: DISCONTINUED | OUTPATIENT
Start: 2024-05-28 | End: 2024-05-30 | Stop reason: HOSPADM

## 2024-05-28 RX ADMIN — LAMOTRIGINE 150 MG: 25 TABLET ORAL at 08:34

## 2024-05-28 RX ADMIN — MORPHINE SULFATE 4 MG: 4 INJECTION, SOLUTION INTRAMUSCULAR; INTRAVENOUS at 22:55

## 2024-05-28 RX ADMIN — HYDROMORPHONE HYDROCHLORIDE 2 MG: 2 TABLET ORAL at 08:34

## 2024-05-28 RX ADMIN — HYDROMORPHONE HYDROCHLORIDE 2 MG: 2 TABLET ORAL at 03:29

## 2024-05-28 RX ADMIN — ALBUTEROL SULFATE 2.5 MG: 2.5 SOLUTION RESPIRATORY (INHALATION) at 19:09

## 2024-05-28 RX ADMIN — HYDROMORPHONE HYDROCHLORIDE 2 MG: 2 TABLET ORAL at 13:46

## 2024-05-28 RX ADMIN — SODIUM CHLORIDE 2000 MG: 900 INJECTION INTRAVENOUS at 20:01

## 2024-05-28 RX ADMIN — PROMETHAZINE HYDROCHLORIDE 12.5 MG: 12.5 TABLET ORAL at 13:46

## 2024-05-28 RX ADMIN — HYDROMORPHONE HYDROCHLORIDE 2 MG: 2 TABLET ORAL at 19:37

## 2024-05-28 RX ADMIN — ALBUTEROL SULFATE 2.5 MG: 2.5 SOLUTION RESPIRATORY (INHALATION) at 06:49

## 2024-05-28 RX ADMIN — PANTOPRAZOLE SODIUM 40 MG: 40 TABLET, DELAYED RELEASE ORAL at 05:46

## 2024-05-28 RX ADMIN — PROCHLORPERAZINE MALEATE 10 MG: 10 TABLET ORAL at 17:13

## 2024-05-28 RX ADMIN — SIMETHICONE 80 MG: 80 TABLET, CHEWABLE ORAL at 19:47

## 2024-05-28 RX ADMIN — MONTELUKAST 10 MG: 10 TABLET, FILM COATED ORAL at 19:37

## 2024-05-28 RX ADMIN — POTASSIUM CHLORIDE AND SODIUM CHLORIDE 83 ML/HR: 900; 150 INJECTION, SOLUTION INTRAVENOUS at 08:45

## 2024-05-28 RX ADMIN — ARIPIPRAZOLE 5 MG: 5 TABLET ORAL at 08:34

## 2024-05-28 RX ADMIN — POTASSIUM CHLORIDE AND SODIUM CHLORIDE 83 ML/HR: 900; 150 INJECTION, SOLUTION INTRAVENOUS at 19:51

## 2024-05-28 RX ADMIN — ALBUTEROL SULFATE 2.5 MG: 2.5 SOLUTION RESPIRATORY (INHALATION) at 13:13

## 2024-05-28 RX ADMIN — ENOXAPARIN SODIUM 40 MG: 100 INJECTION SUBCUTANEOUS at 08:34

## 2024-05-28 RX ADMIN — PROCHLORPERAZINE MALEATE 10 MG: 10 TABLET ORAL at 08:34

## 2024-05-28 NOTE — CONSULTS
[facility] Medicine Services  CONSULT NOTE      Patient Name: Theresa Maya  : 1990  MRN: [id number]    Primary Care Physician: Gina Green APRN  Provider requesting consultation: Cristiano Ricardo MD    Subjective   Subjective     Reason for Consultation:  New onset fever, pneumonia     HPI:  Theresa Maya is a 33 y.o. female with PMH significant for GERD, obesity s/p gastric sleeve and hiatal hernia repair May 2022, who presented to [facility] on 2024 for planned surgical repair of recurrent hiatal hernia with revision to Teresa-en-Y gastric bypass per Dr. Ricardo.  She was planning for discharge home today when she developed fever.  Tmax 101.  She reports having cough with green sputum production.  She denies any urinary symptoms.  She also notes postop abdominal pain that is controlled with pain medications.  Discharge was canceled and hospital medicine was consulted for concern of pneumonia.    Review of Systems   Constitutional:  Positive for activity change, appetite change, chills, fatigue and fever. Negative for diaphoresis and unexpected weight change.   HENT:  Negative for congestion, sore throat and trouble swallowing.    Eyes: Negative.  Negative for visual disturbance.   Respiratory:  Positive for cough and chest tightness. Negative for shortness of breath and wheezing.    Cardiovascular: Negative.  Negative for chest pain, palpitations and leg swelling.   Gastrointestinal:  Positive for abdominal distention, abdominal pain, constipation, nausea and vomiting. Negative for blood in stool, diarrhea and rectal pain.   Endocrine: Negative.  Negative for polydipsia and polyphagia.   Genitourinary: Negative.  Negative for difficulty urinating, dysuria, frequency and urgency.   Musculoskeletal: Negative.  Negative for arthralgias, back pain, gait problem, joint swelling, myalgias and neck stiffness.   Skin:  Positive for wound (Postop incisions). Negative for  color change, pallor and rash.   Allergic/Immunologic: Negative.  Negative for immunocompromised state.   Neurological:  Positive for weakness and headaches. Negative for dizziness, seizures, syncope, facial asymmetry and light-headedness.   Hematological: Negative.  Does not bruise/bleed easily.   Psychiatric/Behavioral: Negative.  Negative for confusion. The patient is not nervous/anxious.        All other systems reviewed and are negative.     Personal History     Past Medical History:   Diagnosis Date    Abnormal Pap smear of cervix 2019    ADHD (attention deficit hyperactivity disorder) 2022    Working with a therapy at     Allergic 2011    Take montalukast    Anesthesia complication     SLOW TO WAKE UP AFTER GASTRO AND HYSTERECTOMY    Anxiety     Arthritis     Clotting disorder     Deep vein thrombosis     2014, (R) leg while pregnant, dx w/ MTHFR    Depression     Dermatitis     UNDER BREAST AT TIMES    Dyspepsia     Dyspnea on exertion     Elevated hemoglobin A1c     Fatigue     Gallbladder abscess     s/p lap nicolas 2006    GERD (gastroesophageal reflux disease)     UGI 9/23 - small recurrent HH, EGD Dr. Ricardo  11/23 small recurrent HH    Heartburn     chronic/episodic, depends on what she eats, takes Pepcid prn, EGD Dr. Ricardo 11/21    Hyperemesis gravidarum     Hyperlipidemia     Incomplete right bundle branch block     Iron deficiency anemia     Migraines     Miscarriage     x 4 prior to 1st pregnancy, presumably from undiagnosed MTHFR mutation    Morbid obesity with body mass index (BMI) of 40.0 to 44.9 in adult 04/19/2022    MTHFR deficiency complicating pregnancy     says clotting d/o only an issue when pregnant, advised to use heparin shots during pregnancy    Obesity     Ovarian cyst     Recurrent pregnancy loss, antepartum condition or complication     had a [facility] and lost the pregnancy at 6 months    Strep pharyngitis     Urinary tract infection     Wears glasses        Past Surgical History:  "  Procedure Laterality Date    ENDOSCOPY N/A 05/05/2022    Procedure: ESOPHAGOGASTRODUODENOSCOPY;  Surgeon: Cristiano Ricardo MD;  Location:  [facility];  Service: Bariatric;  Laterality: N/A;    ENDOSCOPY N/A 5/24/2024    Procedure: ESOPHAGOGASTRODUODENOSCOPY;  Surgeon: Cristiano Ricardo MD;  Location:  [facility];  Service: Robotics - DaVinci;  Laterality: N/A;  EGD # 752 SCOPE USED    GASTRECTOMY N/A 05/05/2022    Procedure: GASTRECTOMY LAPAROSCOPIC;  Surgeon: Cristiano Ricardo MD;  Location:  [facility];  Service: Bariatric;  Laterality: N/A;    GASTRIC BYPASS N/A 5/24/2024    Procedure: GASTRIC BYPASS LAPAROSCOPIC WITH DAVINCI ROBOT;  Surgeon: Cristiano Ricardo MD;  Location:  [facility];  Service: Robotics - DaVinci;  Laterality: N/A;    HIATAL HERNIA REPAIR N/A 05/05/2022    Procedure: HIATAL HERNIA REPAIR LAPAROSCOPIC;  Surgeon: Cristiano Ricardo MD;  Location:  [facility];  Service: Bariatric;  Laterality: N/A;    HIATAL HERNIA REPAIR N/A 5/24/2024    Procedure: RECURRENT HIATAL HERNIA REPAIR LAPAROSCOPIC WITH DAVINCI ROBOT;  Surgeon: Cristiano Ricardo MD;  Location:  [facility];  Service: Robotics - DaVinci;  Laterality: N/A;    HYSTERECTOMY  01/23/2024    [facility] Dr[facility]- TOTAL    LAPAROSCOPIC CHOLECYSTECTOMY  2006    no stones, was told she had \"three gallbladders\"- all removed    SINUS SURGERY Bilateral 2006    TUBAL COAGULATION LAPAROSCOPIC Bilateral 09/15/2017    Procedure: BILATERAL TUBAL FALLOPE FILSHIE CLIPPING LAPAROSCOPIC;  Surgeon: Leo Posey III, MD;  Location:  [facility];  Service:     VAGINAL DELIVERY  14; 16; 17    female,male, male.  She said she had subcutaneous Lovenox injections throughout the second and third pregnancies until delivery    WISDOM TOOTH EXTRACTION         Family History:  family history includes Alcohol abuse in her father; Asthma in her maternal grandmother, mother, and sister; Breast cancer (age of onset: 54) in her maternal grandmother; COPD in her mother; " Cancer in her maternal grandmother, paternal grandfather, and paternal grandmother; Diabetes in her maternal grandmother; Heart attack in her mother; Hypertension in her maternal grandfather, maternal grandmother, and mother; Lung cancer in her maternal grandmother and paternal grandmother; Lupus in her maternal grandmother and sister; Mental illness in her mother; Migraines in her mother; Miscarriages / Stillbirths in her maternal aunt; Obesity in her mother and paternal grandmother; Ovarian cancer in her sister; Prostate cancer in her paternal grandfather; Stroke in her maternal grandmother; Thyroid disease in her maternal grandmother and mother. Otherwise pertinent FHx was reviewed and unremarkable.     Social History:  reports that she has never smoked. She has never used smokeless tobacco. She reports that she does not currently use alcohol. She reports that she does not use drugs.    Medications:  ARIPiprazole, HYDROmorphone, Saw Palmetto, apixaban, esomeprazole, ferrous sulfate, lamoTRIgine, montelukast, multivitamin with minerals, ondansetron ODT, pimecrolimus, prochlorperazine, promethazine, triamcinolone, vitamin B-12, and vitamin D3    Scheduled Meds:Albuterol Sulfate NEB Orderable, 2.5 mg, Nebulization, Q6H While Awake - RT  ARIPiprazole, 5 mg, Oral, Daily  enoxaparin, 40 mg, Subcutaneous, Q24H  lamoTRIgine, 150 mg, Oral, Daily  montelukast, 10 mg, Oral, Nightly  pantoprazole, 40 mg, Oral, Q AM  Scopolamine, 1 patch, Transdermal, Q72H      Continuous Infusions:sodium chloride 0.9 % with KCl 20 mEq, 83 mL/hr, Last Rate: 83 mL/hr (24 0845)      PRN Meds:.  ALPRAZolam    diphenhydrAMINE    hydrALAZINE    HYDROmorphone **AND** [DISCONTINUED] naloxone    HYDROmorphone    labetalol    LORazepam    Morphine **AND** naloxone    [] ondansetron **FOLLOWED BY** ondansetron ODT    phenol    prochlorperazine    prochlorperazine    promethazine    simethicone    Allergies   Allergen Reactions     Amoxicillin Diarrhea and GI Intolerance    Doxycycline Other (See Comments)     Vaginal swelling     Latex Hives and Itching    Penicillins GI Intolerance     Says Keflex OK as long as she takes w/ food.     Beta lactam allergy details  Antibiotic reaction: rash, hives, other (ITCHING)  Age at reaction: adult (2017)  Dose to reaction time: (!) hours  Reason for antibiotic: sore throat (STREP)  Epinephrine required for reaction?: no  Tolerated antibiotics: cephalexin (KEFLEX)           Objective   Objective     Vital Signs:   Temp:  [98.7 °F (37.1 °C)-101 °F (38.3 °C)] 101 °F (38.3 °C)  Heart Rate:  [104-120] 107  Resp:  [12-16] 16  BP: (101-132)/(55-74) 101/66  Flow (L/min):  [2] 2    Physical Exam  Constitutional: Awake, alert, no acute distress   Eyes: PERRLA, sclerae anicteric, no conjunctival injection  HENT: NCAT, mucous membranes moist  Neck: Supple, no thyromegaly, no lymphadenopathy, trachea midline  Respiratory: Clear to auscultation bilaterally, mildly decreased RLL, nonlabored respirations   Cardiovascular: tachycardic, no murmurs, rubs, or gallops, palpable pedal pulses bilaterally  Gastrointestinal: Positive decreased bowel sounds, soft, tender to palpation, nondistended  Musculoskeletal: No bilateral ankle edema, no clubbing or cyanosis to extremities  Psychiatric: Appropriate affect, cooperative  Neurologic: Oriented x 3, strength symmetric in all extremities, Cranial Nerves grossly intact to confrontation, speech clear  Skin: No rashes, surgical incisions intact, drain RUQ intact          Result Review:  I have personally reviewed the results from the time of admission and agree with these findings:  [x]  Laboratory  [x]  Radiology  []  EKG/Telemetry   []  Pathology  [x]  Old records  []  Other:  Most notable findings include:     LAB RESULTS:      Lab 05/28/24  0605 05/27/24  2224 05/27/24  1155 05/27/24  0507 05/26/24  0726 05/25/24  0531   WBC 9.95  --   --  7.16 6.99 8.73   HEMOGLOBIN 8.5* 9.1*  8.8* 8.9* 9.3* 10.0*   HEMATOCRIT 26.6* 28.4*  --  27.9* 29.5* 31.7*   PLATELETS 240  --   --  244 220 253   NEUTROS ABS 7.89*  --   --  5.78 5.04 6.90   IMMATURE GRANS (ABS) 0.14*  --   --  0.02 0.02 0.03   LYMPHS ABS 0.77  --   --  0.64* 1.08 0.98   MONOS ABS 1.03*  --   --  0.55 0.59 0.78   EOS ABS 0.10  --   --  0.15 0.23 0.01   MCV 83.9  --   --  84.0 84.5 83.0         Lab 05/28/24  0605 05/27/24  0507 05/26/24  0726 05/25/24  0531   SODIUM 132* 138 139 136   POTASSIUM 3.6 3.8 3.8 3.9   CHLORIDE 100 103 106 102   CO2 23.0 25.0 25.0 23.0   ANION GAP 9.0 10.0 8.0 11.0   BUN 3* 3* 3* 7   CREATININE 0.64 0.66 0.68 0.94   EGFR 119.8 119.0 118.1 82.3   GLUCOSE 113* 96 102* 101*   CALCIUM 7.8* 8.3* 8.0* 7.9*         Lab 05/28/24  0605 05/27/24  0507 05/26/24  0726 05/25/24  0531   TOTAL PROTEIN 5.6* 6.3 5.6* 6.0   ALBUMIN 3.1* 3.5 3.3* 3.3*   GLOBULIN 2.5 2.8 2.3 2.7   ALT (SGPT) 10 12 14 24   AST (SGOT) 16 20 20 37*   BILIRUBIN 0.5 0.3 0.2 0.2   ALK PHOS 85 70 60 61                 Lab 05/27/24  1346 05/25/24  0531   IRON  --  19*   ABO TYPING A  --    RH TYPING Positive  --    ANTIBODY SCREEN Positive  --          Brief Urine Lab Results  (Last result in the past 365 days)        Color   Clarity   Blood   Leuk Est   Nitrite   Protein   CREAT   Urine HCG        12/18/23 1256 Yellow   Cloudy     Small                     Microbiology Results (last 10 days)       ** No results found for the last 240 hours. **            XR Chest PA & Lateral    Result Date: 5/28/2024  XR CHEST PA AND LATERAL Date of Exam: 5/28/2024 4:20 PM EDT Indication: fever, tachycardia Comparison: 5/9/2022. Findings: Right perihilar opacity is present, somewhat nonspecific but concerning for underlying airspace disease given provided history. There is also some linear opacity at the left lung base which may represent atelectasis. There is no distinct pneumothorax. Normal heart and mediastinal contours.     Impression: Impression: Nonspecific right  perihilar opacity which could represent airspace disease such as pneumonia. Electronically Signed: Kerwin Bateman MD  5/28/2024 4:31 PM EDT  Workstation ID: [id number]    CT Abdomen Pelvis Without Contrast    Result Date: 5/27/2024  CT ABDOMEN PELVIS WO CONTRAST Date of Exam: 5/27/2024 11:31 AM EDT Indication: Rule out intrabdominal hematoma. Comparison: Upper GI fluoroscopic exam 5/25/2024, CT abdomen and pelvis 11/29/2023 Technique: Axial CT images were obtained of the abdomen and pelvis without the administration of contrast. Reconstructed coronal and sagittal images were also obtained. Automated exposure control and iterative construction methods were used. Findings: There are surgical changes of recent Teresa-en-Y gastric bypass. A surgical drain is positioned in the epigastrium/left upper quadrant. There is hazy and wispy fat stranding about the stomach, compatible with recent surgery. There are couple small rounded hyperdensities adjacent to the stomach in the region of fat stranding, likely reflecting small postsurgical blood products (series 2 image 42, series 900 image 55). There is small hyperdense fluid inferior to the spleen (series 2 image 54), also likely reflecting a small amount of upper abdominal blood. No discrete/drainable fluid collection at the surgical sites or elsewhere within the abdomen or pelvis. There is a trace amount of simple pelvic free fluid which may be reactive to recent surgery or physiologic in this young patient. There is contrast within the colon from previous upper GI fluoroscopic exam; no evidence of extraluminal contrast to suggest leak. No evidence of bowel obstruction or ileus; the majority of the prior oral contrast is now within the colon. There is small scattered air in the anterior mediastinum compatible with pneumomediastinum, likely reflecting surgical change of hiatal hernia repair. There is mild subsegmental atelectasis in the lung bases likely relating to recent upper  abdominal surgery. Unremarkable appearance of the liver. The gallbladder is surgically absent. Normal appearance of the bile ducts. Normal appearance of the spleen. Unremarkable appearance of the pancreas. Normal appearance of the adrenal glands, kidneys, ureters, and bladder. The uterus is surgically absent. Unremarkable appearance of the adnexa. There is some scattered linear fat stranding and scattered small locules of air in the subcutaneous tissues of the anterior abdominal wall compatible with recent laparoscopic access. No evidence of intra-abdominal or body wall hematoma. Unremarkable noncontrast appearance of the vasculature. Unremarkable appearance of  the osseous structures.     Impression: Impression: Expected recent surgical changes relating to gastric bypass and hiatal hernia repair. There is a small amount of hyperdense nonorganized fluid adjacent to the stomach and spleen, likely reflecting small postoperative blood products. Assessment for any active bleeding is precluded in the absence of IV contrast. No evidence of body wall or abdominopelvic hematoma as queried. Additional nonacute findings relating to recent surgery, detailed above. Electronically Signed: Socrates Adame MD  5/27/2024 12:07 PM EDT  Workstation ID: [id number]     Results for orders placed during the hospital encounter of 02/10/17    Adult Transthoracic Echo Complete    Interpretation Summary  · Normal left ventricular cavity size and wall thickness noted. All left ventricular wall segments contract normally.  · Estimated EF appears to be in the range of 61 - 65%.  · Left ventricular diastolic dysfunction is noted (grade I) consistent with impaired relaxation.  · The aortic valve is structurally normal. No aortic valve regurgitation is present. No aortic valve stenosis is present.  · The mitral valve is normal in structure. No mitral valve regurgitation is present. No significant mitral valve stenosis is present.  · The tricuspid  valve is normal. No tricuspid valve stenosis is present. No tricuspid valve regurgitation is present.  · There is no evidence of pericardial effusion.      Assessment & Plan   Assessment & Plan     Active Hospital Problems    Diagnosis  POA    **S/P laparoscopic sleeve gastrectomy [Z98.84]  Not Applicable    Pneumonia involving right lung [J18.9]  Unknown      Resolved Hospital Problems    Diagnosis Date Resolved POA    Hiatal hernia with gastroesophageal reflux [K44.9, K21.9] 05/24/2024 Yes     33 y.o. female with PMH significant for GERD, obesity s/p gastric sleeve and hiatal hernia repair May 2022, who presented to [facility] on 5/24/2024 for planned surgical repair of recurrent hiatal hernia with revision to Teresa-en-Y gastric bypass per Dr. Ricardo who has concern for Right lobe pneumonia.    Conversion of sleeve to RYGB, recurrent hiatal hernia repair with falciform ligament reinforcement  - Care per bariatric surgery  - Postop day #4    Pneumonia right lung  - CBC, BMP, lactic acid, procalcitonin pending  - BC x 2 pending  - Rocephin 2 g IV daily  - Sputum culture    GERD  - Continue daily Protonix    Thank you for allowing [facility] Medicine Service to provide consultative care for your patient, we will continue to follow while clinically appropriate.    PHYLICIA Torres  05/28/24    Electronically signed by PHYLICIA Torres, 05/28/24, 7:41 PM EDT.

## 2024-05-28 NOTE — CASE MANAGEMENT/SOCIAL WORK
Continued Stay Note  [facility]     Patient Name: Theresa Maya  MRN: [id number]  Today's Date: 5/28/2024    Admit Date: 5/24/2024    Plan: home   Discharge Plan       Row Name 05/28/24 1524       Plan    Plan home    Patient/Family in Agreement with Plan yes    Plan Comments I spoke with this patient regarding her discharge home today. She is in agreement. Her  can transport. She denies having any further discharge planning needs.    Final Discharge Disposition Code 01 - home or self-care                   Discharge Codes    No documentation.                 Expected Discharge Date and Time       Expected Discharge Date Expected Discharge Time    May 28, 2024               Zahida Rico RN

## 2024-05-28 NOTE — CASE MANAGEMENT/SOCIAL WORK
Discharge Planning Assessment  [facility]     Patient Name: Theresa Maya  MRN: [id number]  Today's Date: 5/28/2024    Admit Date: 5/24/2024    Plan: home   Discharge Needs Assessment       Row Name 05/28/24 0823       Living Environment    People in Home spouse    Current Living Arrangements apartment    Primary Care Provided by self       Transition Planning    Patient/Family Anticipates Transition to home with family       Discharge Needs Assessment    Readmission Within the Last 30 Days no previous admission in last 30 days    Current Outpatient/Agency/Support Group other (see comments)  psychologist    Equipment Currently Used at Home none    Concerns to be Addressed discharge planning;basic needs                   Discharge Plan       Row Name 05/28/24 0824       Plan    Plan home    Patient/Family in Agreement with Plan yes    Plan Comments I met with this patient bedside. She lives with her  in [facility]. She is independent with activities of daily living and mobility. She anticipates returning home after this hospitalization, and her  can transport. Case management will follow.    Final Discharge Disposition Code 01 - home or self-care                  Continued Care and Services - Admitted Since 5/24/2024    No active coordination exists for this encounter.       Expected Discharge Date and Time       Expected Discharge Date Expected Discharge Time    May 30, 2024            Demographic Summary       Row Name 05/28/24 0822       General Information    General Information Comments I confirmed that Gina Green is Ms Maya's PCp and she has [facility]                   Functional Status       Row Name 05/28/24 0822       Functional Status, IADL    Medications independent    Meal Preparation independent    Housekeeping independent    Laundry independent    Shopping independent                   Psychosocial    No documentation.                  Abuse/Neglect    No  documentation.                  Legal    No documentation.                  Substance Abuse    No documentation.                  Patient Forms    No documentation.                     Zahida Rico RN

## 2024-05-28 NOTE — PROGRESS NOTES
"Bariatric Surgery     LOS: 4 days   Patient Care Team:  Gina Green APRN as PCP - General (Nurse Practitioner)    Chief Complaint:  post op    Subjective     Interval History:    Overnight, c/o of feeling poorly, with chest tightness, and Hr was up to 120s.  Received 1 L NS bolus.  Hgb recheck was stable.  Did her IS aggressively, coughed some sputum, and chest tightness went away completely and has felt very well today.      Has had several hours of reliable 4 oz/hour liquids, including protein shakes.  No BM, but feels \"rumbling.\"     No fevers.  Pain controlled.  Ambulating.  Voiding.  IS 2000.    Objective     Vital Signs  Blood pressure 118/55, pulse 107, temperature 98.7 °F (37.1 °C), temperature source Oral, resp. rate 12, height 157.5 cm (62\"), weight 98 kg (216 lb 0.8 oz), last menstrual period 08/07/2023, SpO2 94%, not currently breastfeeding.    Physical Exam:  General: Alert, NAD  Abdomen: soft, appropriate, incisions okay.  Normal bowel sounds.  CHRISTINA ss .  Extremities: warm, (+) SCDs     Results Review:     I reviewed the patient's new clinical results.  I reviewed the patient's new imaging results and agree with the interpretation.    Labs:  Lab Results (last 24 hours)       Procedure Component Value Units Date/Time    Comprehensive Metabolic Panel [[id number]]  (Abnormal) Collected: 05/28/24 0605    Specimen: Blood Updated: 05/28/24 0720     Glucose 113 mg/dL      BUN 3 mg/dL      Creatinine 0.64 mg/dL      Sodium 132 mmol/L      Potassium 3.6 mmol/L      Chloride 100 mmol/L      CO2 23.0 mmol/L      Calcium 7.8 mg/dL      Total Protein 5.6 g/dL      Albumin 3.1 g/dL      ALT (SGPT) 10 U/L      AST (SGOT) 16 U/L      Alkaline Phosphatase 85 U/L      Total Bilirubin 0.5 mg/dL      Globulin 2.5 gm/dL      Comment: Calculated Result        A/G Ratio 1.2 g/dL      BUN/Creatinine Ratio 4.7     Anion Gap 9.0 mmol/L      eGFR 119.8 mL/min/1.73     Narrative:      GFR Normal >60  Chronic Kidney Disease " <60  Kidney Failure <15      CBC & Differential [[id number]]  (Abnormal) Collected: 05/28/24 0605    Specimen: Blood Updated: 05/28/24 0708    Narrative:      The following orders were created for panel order CBC & Differential.  Procedure                               Abnormality         Status                     ---------                               -----------         ------                     CBC Auto Differential[[id number]]        Abnormal            Final result                 Please view results for these tests on the individual orders.    CBC Auto Differential [[id number]]  (Abnormal) Collected: 05/28/24 0605    Specimen: Blood Updated: 05/28/24 0708     WBC 9.95 10*3/mm3      RBC 3.17 10*6/mm3      Hemoglobin 8.5 g/dL      Hematocrit 26.6 %      MCV 83.9 fL      MCH 26.8 pg      MCHC 32.0 g/dL      RDW 14.9 %      RDW-SD 45.7 fl      MPV 10.8 fL      Platelets 240 10*3/mm3      Neutrophil % 79.3 %      Lymphocyte % 7.7 %      Monocyte % 10.4 %      Eosinophil % 1.0 %      Basophil % 0.2 %      Immature Grans % 1.4 %      Neutrophils, Absolute 7.89 10*3/mm3      Lymphocytes, Absolute 0.77 10*3/mm3      Monocytes, Absolute 1.03 10*3/mm3      Eosinophils, Absolute 0.10 10*3/mm3      Basophils, Absolute 0.02 10*3/mm3      Immature Grans, Absolute 0.14 10*3/mm3      nRBC 0.0 /100 WBC     Hemoglobin & Hematocrit, Blood [[id number]]  (Abnormal) Collected: 05/27/24 2224    Specimen: Blood Updated: 05/27/24 2256     Hemoglobin 9.1 g/dL      Hematocrit 28.4 %             Imaging:  Imaging Results (Last 24 Hours)       ** No results found for the last 24 hours. **              Assessment & Plan     POD # 4 s/p conversion of sleeve to RYGB, recurrent HHR with falciform ligament reinforcement.    Doing much better today with oral intake.    Chest tightness resolved with pulmonary toilet, look very well today.    Hgb drifted slightly, stable.  Hyponatremia, changed fluids to normal saline + KCl.  D/c CHRISTINA.    Would like  to go home.  Patient feels well and has met discharge criteria.  Will discharge home with follow up in 1 week with PA.  Rx given for dilaudid, compazine.  Continue home Nexium.  Start Eliquis tomorrow.   Discharge instructions reviewed with patient and all questions answered.         Debbie Atwood MD  05/28/24  12:18 EDT    ADDENDUM:  After rounds, she spiked a new fever to 101.  Check CXR and UA.  Postpone discharge.  May need to stay overnight and have repeat UGI in AM to rule out early GJ leak if workup is unrevealing.

## 2024-05-29 LAB
ALBUMIN SERPL-MCNC: 2.9 G/DL (ref 3.5–5.2)
ALBUMIN/GLOB SERPL: 1 G/DL
ALP SERPL-CCNC: 74 U/L (ref 39–117)
ALT SERPL W P-5'-P-CCNC: 9 U/L (ref 1–33)
ANION GAP SERPL CALCULATED.3IONS-SCNC: 12 MMOL/L (ref 5–15)
AST SERPL-CCNC: 11 U/L (ref 1–32)
BASOPHILS # BLD AUTO: 0.02 10*3/MM3 (ref 0–0.2)
BASOPHILS NFR BLD AUTO: 0.2 % (ref 0–1.5)
BILIRUB SERPL-MCNC: 0.5 MG/DL (ref 0–1.2)
BUN SERPL-MCNC: 6 MG/DL (ref 6–20)
BUN/CREAT SERPL: 10.2 (ref 7–25)
CALCIUM SPEC-SCNC: 7.7 MG/DL (ref 8.6–10.5)
CHLORIDE SERPL-SCNC: 104 MMOL/L (ref 98–107)
CO2 SERPL-SCNC: 21 MMOL/L (ref 22–29)
CREAT SERPL-MCNC: 0.59 MG/DL (ref 0.57–1)
DEPRECATED RDW RBC AUTO: 45.4 FL (ref 37–54)
EGFRCR SERPLBLD CKD-EPI 2021: 122.2 ML/MIN/1.73
EOSINOPHIL # BLD AUTO: 0.08 10*3/MM3 (ref 0–0.4)
EOSINOPHIL NFR BLD AUTO: 0.8 % (ref 0.3–6.2)
ERYTHROCYTE [DISTWIDTH] IN BLOOD BY AUTOMATED COUNT: 15 % (ref 12.3–15.4)
GLOBULIN UR ELPH-MCNC: 2.8 GM/DL
GLUCOSE SERPL-MCNC: 77 MG/DL (ref 65–99)
HCT VFR BLD AUTO: 25 % (ref 34–46.6)
HGB BLD-MCNC: 8 G/DL (ref 12–15.9)
IMM GRANULOCYTES # BLD AUTO: 0.05 10*3/MM3 (ref 0–0.05)
IMM GRANULOCYTES NFR BLD AUTO: 0.5 % (ref 0–0.5)
L PNEUMO1 AG UR QL IA: NEGATIVE
LYMPHOCYTES # BLD AUTO: 0.51 10*3/MM3 (ref 0.7–3.1)
LYMPHOCYTES NFR BLD AUTO: 5.4 % (ref 19.6–45.3)
MCH RBC QN AUTO: 26.5 PG (ref 26.6–33)
MCHC RBC AUTO-ENTMCNC: 32 G/DL (ref 31.5–35.7)
MCV RBC AUTO: 82.8 FL (ref 79–97)
MONOCYTES # BLD AUTO: 0.85 10*3/MM3 (ref 0.1–0.9)
MONOCYTES NFR BLD AUTO: 8.9 % (ref 5–12)
MRSA DNA SPEC QL NAA+PROBE: NEGATIVE
NEUTROPHILS NFR BLD AUTO: 8 10*3/MM3 (ref 1.7–7)
NEUTROPHILS NFR BLD AUTO: 84.2 % (ref 42.7–76)
NRBC BLD AUTO-RTO: 0 /100 WBC (ref 0–0.2)
PLATELET # BLD AUTO: 227 10*3/MM3 (ref 140–450)
PMV BLD AUTO: 10.8 FL (ref 6–12)
POTASSIUM SERPL-SCNC: 3.7 MMOL/L (ref 3.5–5.2)
PROT SERPL-MCNC: 5.7 G/DL (ref 6–8.5)
RBC # BLD AUTO: 3.02 10*6/MM3 (ref 3.77–5.28)
S PNEUM AG SPEC QL LA: NEGATIVE
SODIUM SERPL-SCNC: 137 MMOL/L (ref 136–145)
WBC NRBC COR # BLD AUTO: 9.51 10*3/MM3 (ref 3.4–10.8)

## 2024-05-29 PROCEDURE — 25010000002 ENOXAPARIN PER 10 MG: Performed by: SURGERY

## 2024-05-29 PROCEDURE — 87641 MR-STAPH DNA AMP PROBE: CPT | Performed by: INTERNAL MEDICINE

## 2024-05-29 PROCEDURE — 25010000002 SODIUM CHLORIDE 0.9 % WITH KCL 20 MEQ 20-0.9 MEQ/L-% SOLUTION: Performed by: SURGERY

## 2024-05-29 PROCEDURE — 94799 UNLISTED PULMONARY SVC/PX: CPT

## 2024-05-29 PROCEDURE — 63710000001 PROCHLORPERAZINE MALEATE PER 10 MG: Performed by: SURGERY

## 2024-05-29 PROCEDURE — 25010000002 DIPHENHYDRAMINE PER 50 MG: Performed by: SURGERY

## 2024-05-29 PROCEDURE — 99024 POSTOP FOLLOW-UP VISIT: CPT | Performed by: SURGERY

## 2024-05-29 PROCEDURE — 99232 SBSQ HOSP IP/OBS MODERATE 35: CPT | Performed by: INTERNAL MEDICINE

## 2024-05-29 PROCEDURE — 94664 DEMO&/EVAL PT USE INHALER: CPT

## 2024-05-29 PROCEDURE — 94761 N-INVAS EAR/PLS OXIMETRY MLT: CPT

## 2024-05-29 PROCEDURE — 80053 COMPREHEN METABOLIC PANEL: CPT | Performed by: SURGERY

## 2024-05-29 PROCEDURE — 25010000002 MEROPENEM PER 100 MG: Performed by: INTERNAL MEDICINE

## 2024-05-29 PROCEDURE — 85025 COMPLETE CBC W/AUTO DIFF WBC: CPT | Performed by: SURGERY

## 2024-05-29 PROCEDURE — 63710000001 PROMETHAZINE PER 12.5 MG: Performed by: SURGERY

## 2024-05-29 RX ADMIN — MEROPENEM 1000 MG: 1 INJECTION, POWDER, FOR SOLUTION INTRAVENOUS at 08:01

## 2024-05-29 RX ADMIN — ACETAMINOPHEN 650 MG: 325 TABLET ORAL at 03:52

## 2024-05-29 RX ADMIN — LAMOTRIGINE 150 MG: 25 TABLET ORAL at 08:00

## 2024-05-29 RX ADMIN — HYDROMORPHONE HYDROCHLORIDE 2 MG: 2 TABLET ORAL at 06:23

## 2024-05-29 RX ADMIN — PROCHLORPERAZINE MALEATE 10 MG: 10 TABLET ORAL at 08:01

## 2024-05-29 RX ADMIN — ACETAMINOPHEN 650 MG: 325 TABLET ORAL at 20:56

## 2024-05-29 RX ADMIN — MEROPENEM 1000 MG: 1 INJECTION, POWDER, FOR SOLUTION INTRAVENOUS at 20:51

## 2024-05-29 RX ADMIN — ALBUTEROL SULFATE 2.5 MG: 2.5 SOLUTION RESPIRATORY (INHALATION) at 19:30

## 2024-05-29 RX ADMIN — DIPHENHYDRAMINE HYDROCHLORIDE 25 MG: 50 INJECTION INTRAMUSCULAR; INTRAVENOUS at 23:40

## 2024-05-29 RX ADMIN — PROMETHAZINE HYDROCHLORIDE 12.5 MG: 12.5 TABLET ORAL at 12:00

## 2024-05-29 RX ADMIN — ENOXAPARIN SODIUM 40 MG: 100 INJECTION SUBCUTANEOUS at 07:58

## 2024-05-29 RX ADMIN — ARIPIPRAZOLE 5 MG: 5 TABLET ORAL at 08:01

## 2024-05-29 RX ADMIN — HYDROMORPHONE HYDROCHLORIDE 2 MG: 2 TABLET ORAL at 12:00

## 2024-05-29 RX ADMIN — HYDROMORPHONE HYDROCHLORIDE 2 MG: 2 TABLET ORAL at 16:40

## 2024-05-29 RX ADMIN — MEROPENEM 1000 MG: 1 INJECTION, POWDER, FOR SOLUTION INTRAVENOUS at 12:00

## 2024-05-29 RX ADMIN — PANTOPRAZOLE SODIUM 40 MG: 40 TABLET, DELAYED RELEASE ORAL at 06:20

## 2024-05-29 RX ADMIN — ALBUTEROL SULFATE 2.5 MG: 2.5 SOLUTION RESPIRATORY (INHALATION) at 08:05

## 2024-05-29 RX ADMIN — MONTELUKAST 10 MG: 10 TABLET, FILM COATED ORAL at 20:51

## 2024-05-29 RX ADMIN — HYDROMORPHONE HYDROCHLORIDE 2 MG: 2 TABLET ORAL at 23:40

## 2024-05-29 RX ADMIN — POTASSIUM CHLORIDE AND SODIUM CHLORIDE 83 ML/HR: 900; 150 INJECTION, SOLUTION INTRAVENOUS at 08:02

## 2024-05-29 NOTE — PROGRESS NOTES
[facility] Medicine Services  PROGRESS NOTE    Patient Name: Theresa Maya  : 1990  MRN: [id number]    Date of Admission: 2024  Primary Care Physician: Gina Green APRN    Subjective   Subjective     CC:  Consult for PNA     HPI:  Feeling better this am, no new issues overnight. States that her PCN allergy occurred about seven years ago and was a rash.       Objective   Objective     Vital Signs:   Temp:  [98.3 °F (36.8 °C)-101 °F (38.3 °C)] 98.6 °F (37 °C)  Heart Rate:  [] 99  Resp:  [14-18] 16  BP: ()/(56-66) 93/56  Flow (L/min):  [0-2] 0     Physical Exam:  Constitutional: No acute distress, awake, alert  HENT: NCAT, mucous membranes moist  Respiratory: Clear to auscultation bilaterally, respiratory effort normal   Cardiovascular: RRR, no murmurs, rubs, or gallops  Gastrointestinal: Positive bowel sounds, soft, nontender, nondistended  Musculoskeletal: No bilateral ankle edema  Psychiatric: Appropriate affect, cooperative  Neurologic: Oriented x 3, strength symmetric in all extremities, Cranial Nerves grossly intact to confrontation, speech clear  Skin: No rashes     Results Reviewed:  LAB RESULTS:      Lab 24  0709 24  0605 24  2224 24  1155 24  0507 24  0726   WBC 9.51 11.33* 9.95  --   --  7.16 6.99   HEMOGLOBIN 8.0* 8.8* 8.5* 9.1* 8.8* 8.9* 9.3*   HEMATOCRIT 25.0* 28.0* 26.6* 28.4*  --  27.9* 29.5*   PLATELETS 227 227 240  --   --  244 220   NEUTROS ABS 8.00* 9.53* 7.89*  --   --  5.78 5.04   IMMATURE GRANS (ABS) 0.05 0.04 0.14*  --   --  0.02 0.02   LYMPHS ABS 0.51* 0.65* 0.77  --   --  0.64* 1.08   MONOS ABS 0.85 1.06* 1.03*  --   --  0.55 0.59   EOS ABS 0.08 0.03 0.10  --   --  0.15 0.23   MCV 82.8 85.9 83.9  --   --  84.0 84.5   PROCALCITONIN  --  0.60*  --   --   --   --   --    LACTATE  --  0.8  --   --   --   --   --          [facility] 24  0724  [facility] 24  06 24  0507  05/26/24  0726   SODIUM 137 134* 132* 138 139   POTASSIUM 3.7 3.7 3.6 3.8 3.8   CHLORIDE 104 101 100 103 106   CO2 21.0* 19.0* 23.0 25.0 25.0   ANION GAP 12.0 14.0 9.0 10.0 8.0   BUN 6 5* 3* 3* 3*   CREATININE 0.59 0.65 0.64 0.66 0.68   EGFR 122.2 119.4 119.8 119.0 118.1   GLUCOSE 77 87 113* 96 102*   CALCIUM 7.7* 7.9* 7.8* 8.3* 8.0*         Lab 05/29/24  0709 05/28/24  0605 05/27/24  0507 05/26/24  0726 05/25/24  0531   TOTAL PROTEIN 5.7* 5.6* 6.3 5.6* 6.0   ALBUMIN 2.9* 3.1* 3.5 3.3* 3.3*   GLOBULIN 2.8 2.5 2.8 2.3 2.7   ALT (SGPT) 9 10 12 14 24   AST (SGOT) 11 16 20 20 37*   BILIRUBIN 0.5 0.5 0.3 0.2 0.2   ALK PHOS 74 85 70 60 61                 Lab 05/27/24  1346 05/25/24  0531   IRON  --  19*   ABO TYPING A  --    RH TYPING Positive  --    ANTIBODY SCREEN Positive  --          Brief Urine Lab Results  (Last result in the past 365 days)        Color   Clarity   Blood   Leuk Est   Nitrite   Protein   CREAT   Urine HCG        05/28/24 1948 Yellow   Cloudy   Negative   Negative   Negative   30 mg/dL (1+)                   Microbiology Results Abnormal       Procedure Component Value - Date/Time    S. Pneumo Ag Urine or CSF - Urine, Urine, Clean Catch [[id number]]  (Normal) Collected: 05/28/24 1948    Lab Status: Final result Specimen: Urine, Clean Catch Updated: 05/29/24 0950     Strep Pneumo Ag Negative    Legionella Antigen, Urine - Urine, Urine, Clean Catch [[id number]]  (Normal) Collected: 05/28/24 1948    Lab Status: Final result Specimen: Urine, Clean Catch Updated: 05/29/24 0950     LEGIONELLA ANTIGEN, URINE Negative    MRSA Screen, PCR (Inpatient) - Swab, Nares [[id number]]  (Normal) Collected: 05/29/24 0626    Lab Status: Final result Specimen: Swab from Nares Updated: 05/29/24 0908     MRSA PCR Negative    Narrative:      The negative predictive value of this diagnostic test is high and should only be used to consider de-escalating anti-MRSA therapy. A positive result may indicate colonization with MRSA and  must be correlated clinically.  MRSA Negative            XR Chest PA & Lateral    Result Date: 5/28/2024  XR CHEST PA AND LATERAL Date of Exam: 5/28/2024 4:20 PM EDT Indication: fever, tachycardia Comparison: 5/9/2022. Findings: Right perihilar opacity is present, somewhat nonspecific but concerning for underlying airspace disease given provided history. There is also some linear opacity at the left lung base which may represent atelectasis. There is no distinct pneumothorax. Normal heart and mediastinal contours.     Impression: Impression: Nonspecific right perihilar opacity which could represent airspace disease such as pneumonia. Electronically Signed: Kerwin Bateman MD  5/28/2024 4:31 PM EDT  Workstation ID: [id number]     Results for orders placed during the hospital encounter of 02/10/17    Adult Transthoracic Echo Complete    Interpretation Summary  · Normal left ventricular cavity size and wall thickness noted. All left ventricular wall segments contract normally.  · Estimated EF appears to be in the range of 61 - 65%.  · Left ventricular diastolic dysfunction is noted (grade I) consistent with impaired relaxation.  · The aortic valve is structurally normal. No aortic valve regurgitation is present. No aortic valve stenosis is present.  · The mitral valve is normal in structure. No mitral valve regurgitation is present. No significant mitral valve stenosis is present.  · The tricuspid valve is normal. No tricuspid valve stenosis is present. No tricuspid valve regurgitation is present.  · There is no evidence of pericardial effusion.      Current medications:  Scheduled Meds:Albuterol Sulfate NEB Orderable, 2.5 mg, Nebulization, Q6H While Awake - RT  ARIPiprazole, 5 mg, Oral, Daily  enoxaparin, 40 mg, Subcutaneous, Q24H  lamoTRIgine, 150 mg, Oral, Daily  meropenem, 1,000 mg, Intravenous, Q8H  montelukast, 10 mg, Oral, Nightly  pantoprazole, 40 mg, Oral, Q AM      Continuous Infusions:sodium chloride 0.9 % with  KCl 20 mEq, 83 mL/hr, Last Rate: 83 mL/hr (24 0802)      PRN Meds:.  acetaminophen **OR** acetaminophen **OR** acetaminophen    ALPRAZolam    diphenhydrAMINE    hydrALAZINE    HYDROmorphone **AND** [DISCONTINUED] naloxone    HYDROmorphone    labetalol    LORazepam    Morphine **AND** naloxone    [] ondansetron **FOLLOWED BY** ondansetron ODT    phenol    prochlorperazine    prochlorperazine    promethazine    simethicone    Assessment & Plan   Assessment & Plan     Active Hospital Problems    Diagnosis  POA    **S/P laparoscopic sleeve gastrectomy [Z98.84]  Not Applicable    Pneumonia involving right lung [J18.9]  No      Resolved Hospital Problems    Diagnosis Date Resolved POA    Hiatal hernia with gastroesophageal reflux [K44.9, K21.9] 2024 Yes        Brief Hospital Course to date:  Theresa Maya is a 33 y.o. female with PMH significant for GERD, obesity s/p gastric sleeve and hiatal hernia repair May 2022, who presented to [facility] on 2024 for planned surgical repair of recurrent hiatal hernia with revision to Teresa-en-Y gastric bypass per Dr. Ricardo.  She developed a fever on  and imaging findings were concerning for RLL PNA. We were consulted for further management.      Conversion of sleeve to RYGB, recurrent hiatal hernia repair with falciform ligament reinforcement  - Care per bariatric surgery     Pneumonia right lung  Sepsis (fever, tachycardia, source)  -- possibly aspiration PNA given recent anesthesia and location of PNA  - leukocytosis with left shift as well as mildly elevated procal  -- lactic acid wnl  - BC x 2 pending  - change from Rocephin to Meropenem given concern for aspiration and PCN allergy. Will trial Moxifloxacin prior to d/c   -- send urine Strep, Legionella Ags. Repeat MRSA PCR (was negative prior to hospitalization)  - Sputum culture     GERD  - Continue daily Protonix       Total time spent: 35 minutes  Time spent includes time reviewing chart, face-to-face  time, counseling patient/family/caregiver, ordering medications/tests/procedures, communicating with other health care professionals, documenting clinical information in the electronic health record, and coordination of care.      Expected Discharge Location and Transportation: per primary team and once afebrile for 24 hours  Expected Discharge   Expected Discharge Date: 5/30/2024; Expected Discharge Time:      DVT prophylaxis:  Medical and mechanical DVT prophylaxis orders are present.         AM-PAC 6 Clicks Score (PT): 24 (05/29/24 0800)    CODE STATUS:   Code Status and Medical Interventions:   Ordered at: 05/24/24 1142     Level Of Support Discussed With:    Patient     Code Status (Patient has no pulse and is not breathing):    CPR (Attempt to Resuscitate)     Medical Interventions (Patient has pulse or is breathing):    Full Support       Paulina Del Rosario MD  05/29/24

## 2024-05-29 NOTE — PLAN OF CARE
Pt tmax overnight 100.7F. Pt states pain is worse tonight than previous night. Pt able to ambulate and had 1 BM overnight.        0638: RN called by pharmacy in regards to Unasyn abx ordered, but pt has Penicillin allergy listed in chart. Pharmacy instructed RN to wait to administer abx until ordering provider and pharmacy agree upon next steps.

## 2024-05-29 NOTE — PLAN OF CARE
Goal Outcome Evaluation:  Plan of Care Reviewed With: patient        Progress: improving  Outcome Evaluation: Patient has had a decent shift but she has slept most of the day. She states that she does feel much better than she has the past few days. Patient tolerated norma drain being removed, VSS, no fever this shift, alert & oriented x4. PO intake is much better with less nasuea, seems to be under control she has only asked x2 times this shift. Nursing staff will continue to monitor and assess patient.

## 2024-05-29 NOTE — PROGRESS NOTES
"Cc: POD#5 Robot RNY/HHR  \"feel ok\"    Merrem #1    She is sitting up in bed alone in the room.  She says she has some soreness at her CHRISTINA site.  She says her pain is controlled with Tylenol and oral narcotic.  She is ambulating and voiding okay.  Passing flatus and having bowel movements.  Tolerating liquids without nausea or vomiting including her protein shakes.  Minimal pulmonary symptoms.  Spirometer 1750 observed.    Tmax 101 currently 90.3 pulse 85 respiration 16 blood pressure 113/59 saturation 96%.  - NR  CHRISTINA 40 - NR SS.  She is in no apparent distress.  Abdomen soft and benign, wounds healing.  Some bruising.  No cellulitis.  Bowel sounds normal active.    CMP normal except for a CO2 of 21 calcium 7.7 total protein 5.7 albumin 2.9.  White count normal at 9.5 with 84 segs 5 lymphs 9 monocytes 1 eosinophil no bands.  H&H 8 and 25.  Chest x-ray yesterday with nonspecific right perihilar opacity which could represent airspace disease such as pneumonia.  MRSA repeat testing again negative similar to preop.  Blood cultures pending.  Strep pneumonia antigen and Legionella antigen pending.  Urinalysis specific gravity 1.015 pH 6.0 80 of ketones 30 protein unremarkable microscopic.    Impression: Postoperative #5 robotic conversion of sleeve gastrectomy to Teresa-en-Y gastric bypass and recurrent hiatal hernia repair.  Doing okay from that standpoint.  Had a temp of 101 and chest x-ray suggesting possible right-sided pneumonia, seen by Swetha WHATLEY of the hospitalist service yesterday, recommend Rocephin, patient on Merrem.  Stable iron deficiency anemia without evidence of bleeding on Lovenox.    Plan: Discharge home when okay with the hospitalist service, oral antibiotics per their discretion.  Discontinue CHRISTINA drain.  Continue out of bed, pulmonary toilet, VTE prophylaxis, PPI.  "

## 2024-05-29 NOTE — CASE MANAGEMENT/SOCIAL WORK
Continued Stay Note  [facility]     Patient Name: Theresa Maya  MRN: [id number]  Today's Date: 5/29/2024    Admit Date: 5/24/2024    Plan: home   Discharge Plan       Row Name 05/29/24 0816       Plan    Plan home    Patient/Family in Agreement with Plan yes    Plan Comments I met with this patient bedside. She was to have been discharged home yesterday, but now she has been diagnosed with pneumonia. Her plan remains home at discharge, with her  to transport. CM will follow.    Final Discharge Disposition Code 01 - home or self-care                   Discharge Codes    No documentation.                 Expected Discharge Date and Time       Expected Discharge Date Expected Discharge Time    May 28, 2024               Zahida Rico RN

## 2024-05-30 ENCOUNTER — READMISSION MANAGEMENT (OUTPATIENT)
Dept: CALL CENTER | Facility: HOSPITAL | Age: 34
End: 2024-05-30
Payer: COMMERCIAL

## 2024-05-30 VITALS
DIASTOLIC BLOOD PRESSURE: 68 MMHG | RESPIRATION RATE: 18 BRPM | OXYGEN SATURATION: 97 % | HEART RATE: 73 BPM | SYSTOLIC BLOOD PRESSURE: 98 MMHG | HEIGHT: 62 IN | WEIGHT: 216.05 LBS | TEMPERATURE: 98 F | BODY MASS INDEX: 39.76 KG/M2

## 2024-05-30 PROBLEM — J15.69 PNEUMONIA DUE TO OTHER GRAM-NEGATIVE BACTERIA: Status: ACTIVE | Noted: 2024-05-30

## 2024-05-30 LAB
ALBUMIN SERPL-MCNC: 2.8 G/DL (ref 3.5–5.2)
ALBUMIN/GLOB SERPL: 1.3 G/DL
ALP SERPL-CCNC: 79 U/L (ref 39–117)
ALT SERPL W P-5'-P-CCNC: 9 U/L (ref 1–33)
ANION GAP SERPL CALCULATED.3IONS-SCNC: 15 MMOL/L (ref 5–15)
AST SERPL-CCNC: 14 U/L (ref 1–32)
BASOPHILS # BLD AUTO: 0.01 10*3/MM3 (ref 0–0.2)
BASOPHILS NFR BLD AUTO: 0.2 % (ref 0–1.5)
BILIRUB SERPL-MCNC: 0.3 MG/DL (ref 0–1.2)
BUN SERPL-MCNC: 6 MG/DL (ref 6–20)
BUN/CREAT SERPL: 12.8 (ref 7–25)
CALCIUM SPEC-SCNC: 7.7 MG/DL (ref 8.6–10.5)
CHLORIDE SERPL-SCNC: 105 MMOL/L (ref 98–107)
CO2 SERPL-SCNC: 20 MMOL/L (ref 22–29)
CREAT SERPL-MCNC: 0.47 MG/DL (ref 0.57–1)
DEPRECATED RDW RBC AUTO: 45.7 FL (ref 37–54)
EGFRCR SERPLBLD CKD-EPI 2021: 129.1 ML/MIN/1.73
EOSINOPHIL # BLD AUTO: 0.17 10*3/MM3 (ref 0–0.4)
EOSINOPHIL NFR BLD AUTO: 3 % (ref 0.3–6.2)
ERYTHROCYTE [DISTWIDTH] IN BLOOD BY AUTOMATED COUNT: 14.9 % (ref 12.3–15.4)
GLOBULIN UR ELPH-MCNC: 2.2 GM/DL
GLUCOSE SERPL-MCNC: 70 MG/DL (ref 65–99)
HCT VFR BLD AUTO: 24.5 % (ref 34–46.6)
HGB BLD-MCNC: 7.8 G/DL (ref 12–15.9)
IMM GRANULOCYTES # BLD AUTO: 0.02 10*3/MM3 (ref 0–0.05)
IMM GRANULOCYTES NFR BLD AUTO: 0.3 % (ref 0–0.5)
LYMPHOCYTES # BLD AUTO: 0.6 10*3/MM3 (ref 0.7–3.1)
LYMPHOCYTES NFR BLD AUTO: 10.5 % (ref 19.6–45.3)
MCH RBC QN AUTO: 26.7 PG (ref 26.6–33)
MCHC RBC AUTO-ENTMCNC: 31.8 G/DL (ref 31.5–35.7)
MCV RBC AUTO: 83.9 FL (ref 79–97)
MONOCYTES # BLD AUTO: 0.44 10*3/MM3 (ref 0.1–0.9)
MONOCYTES NFR BLD AUTO: 7.7 % (ref 5–12)
NEUTROPHILS NFR BLD AUTO: 4.48 10*3/MM3 (ref 1.7–7)
NEUTROPHILS NFR BLD AUTO: 78.3 % (ref 42.7–76)
NRBC BLD AUTO-RTO: 0 /100 WBC (ref 0–0.2)
PLATELET # BLD AUTO: 221 10*3/MM3 (ref 140–450)
PMV BLD AUTO: 10.6 FL (ref 6–12)
POTASSIUM SERPL-SCNC: 3.7 MMOL/L (ref 3.5–5.2)
PROT SERPL-MCNC: 5 G/DL (ref 6–8.5)
RBC # BLD AUTO: 2.92 10*6/MM3 (ref 3.77–5.28)
SODIUM SERPL-SCNC: 140 MMOL/L (ref 136–145)
WBC NRBC COR # BLD AUTO: 5.72 10*3/MM3 (ref 3.4–10.8)

## 2024-05-30 PROCEDURE — 25010000002 ENOXAPARIN PER 10 MG: Performed by: SURGERY

## 2024-05-30 PROCEDURE — 80053 COMPREHEN METABOLIC PANEL: CPT | Performed by: SURGERY

## 2024-05-30 PROCEDURE — 99232 SBSQ HOSP IP/OBS MODERATE 35: CPT | Performed by: INTERNAL MEDICINE

## 2024-05-30 PROCEDURE — 25010000002 MEROPENEM PER 100 MG: Performed by: INTERNAL MEDICINE

## 2024-05-30 PROCEDURE — 63710000001 PROCHLORPERAZINE MALEATE PER 10 MG: Performed by: SURGERY

## 2024-05-30 PROCEDURE — 85025 COMPLETE CBC W/AUTO DIFF WBC: CPT | Performed by: SURGERY

## 2024-05-30 PROCEDURE — 99024 POSTOP FOLLOW-UP VISIT: CPT | Performed by: SURGERY

## 2024-05-30 PROCEDURE — 25010000002 SODIUM CHLORIDE 0.9 % WITH KCL 20 MEQ 20-0.9 MEQ/L-% SOLUTION: Performed by: SURGERY

## 2024-05-30 RX ORDER — ALBUTEROL SULFATE 2.5 MG/3ML
2.5 SOLUTION RESPIRATORY (INHALATION) EVERY 6 HOURS PRN
Status: DISCONTINUED | OUTPATIENT
Start: 2024-05-30 | End: 2024-05-30 | Stop reason: HOSPADM

## 2024-05-30 RX ORDER — LEVOFLOXACIN 750 MG/1
750 TABLET, FILM COATED ORAL DAILY
Qty: 5 TABLET | Refills: 0 | Status: SHIPPED | OUTPATIENT
Start: 2024-05-30 | End: 2024-06-04

## 2024-05-30 RX ADMIN — PANTOPRAZOLE SODIUM 40 MG: 40 TABLET, DELAYED RELEASE ORAL at 05:30

## 2024-05-30 RX ADMIN — ARIPIPRAZOLE 5 MG: 5 TABLET ORAL at 08:19

## 2024-05-30 RX ADMIN — LAMOTRIGINE 150 MG: 25 TABLET ORAL at 08:19

## 2024-05-30 RX ADMIN — POTASSIUM CHLORIDE AND SODIUM CHLORIDE 83 ML/HR: 900; 150 INJECTION, SOLUTION INTRAVENOUS at 01:36

## 2024-05-30 RX ADMIN — ENOXAPARIN SODIUM 40 MG: 100 INJECTION SUBCUTANEOUS at 08:22

## 2024-05-30 RX ADMIN — HYDROMORPHONE HYDROCHLORIDE 2 MG: 2 TABLET ORAL at 05:30

## 2024-05-30 RX ADMIN — ACETAMINOPHEN 650 MG: 325 TABLET ORAL at 08:23

## 2024-05-30 RX ADMIN — MEROPENEM 1000 MG: 1 INJECTION, POWDER, FOR SOLUTION INTRAVENOUS at 04:51

## 2024-05-30 RX ADMIN — HYDROMORPHONE HYDROCHLORIDE 2 MG: 2 TABLET ORAL at 13:32

## 2024-05-30 RX ADMIN — PROCHLORPERAZINE MALEATE 10 MG: 10 TABLET ORAL at 08:19

## 2024-05-30 NOTE — PAYOR COMM NOTE
"Auth# [id number]   5/30/24 Discharge Date, no summary available at this time    Utilization Review  Phone [phone]  Fax [phone]    [facility]  [address] [id number]       Theresa Maya (33 y.o. Female)       Date of Birth   1990    Social Security Number       Address   [address]  [address] [place] [address]    Home Phone   [phone]    MRN   [id number]       Buddhist   None    Marital Status                               Admission Date   5/24/24    Admission Type   Elective    Admitting Provider   Cristiano Ricardo MD    Attending Provider   Cristiano Ricardo MD    Department, Room/Bed   [facility] 2F, S206/1       Discharge Date       Discharge Disposition   Home or Self Care    Discharge Destination                                 Attending Provider: Cristiano Ricardo MD    Allergies: Amoxicillin, Doxycycline, Latex, Penicillins    Isolation: None   Infection: None   Code Status: CPR    Ht: 157.5 cm (62\")   Wt: 98 kg (216 lb 0.8 oz)    Admission Cmt: None   Principal Problem: S/P laparoscopic sleeve gastrectomy [Z98.84]                   Active Insurance as of 5/24/2024       Primary Coverage       Payor Plan Insurance Group Employer/Plan Group    [employer] [place] [employer] [place]ICA[place]        Payor Plan Address Payor Plan Phone Number Payor Plan Fax Number Effective Dates    [address] [address] [phone]  6/2/2020 - None Entered    [facility] [id number]         Subscriber Name Subscriber Birth Date Member ID       THERESA MAYA DILLAN 1990 [id number]                     Emergency Contacts        (Rel.) Home Phone Work Phone Mobile Phone    Juve Maya (Spouse) [phone] -- [phone]                 Operative/Procedure Notes (all)        Cristiano Ricardo MD at 05/24/24 0838  Version 1 of 1         GASTRIC BYPASS LAPAROSCOPIC, LAPAROSCOPIC HIATAL HERNIA REPAIR WITH DAVINCI ROBOT, " ESOPHAGOGASTRODUODENOSCOPY  Progress Note    Theresa Maya  5/24/2024    Pre-op Diagnosis:   Hiatal hernia with gastroesophageal reflux [K44.9, K21.9]  S/P laparoscopic sleeve gastrectomy [Z98.84]       Post-Op Diagnosis Codes:     * Hiatal hernia with gastroesophageal reflux [K44.9, K21.9]     * S/P laparoscopic sleeve gastrectomy [Z98.84]    Procedure/CPT® Codes:  NM LAPS GSTR RSTCV PX W/BYP ANTOINETTE-EN-Y LIMB <150 CM [77486]  NM LAPS SURG ESOPG/GSTR FUNDOPLASTY [33115]  NM ESOPHAGOGASTRODUODENOSCOPY TRANSORAL DIAGNOSTIC [72971]      Procedure(s):  GASTRIC BYPASS LAPAROSCOPIC WITH DAVINCI ROBOT  RECURRENT HIATAL HERNIA REPAIR LAPAROSCOPIC WITH DAVINCI ROBOT WITH FALCIFORM LIGAMENT REINFORCMENT  ESOPHAGOGASTRODUODENOSCOPY              Surgeon(s):  Cristiano Ricardo MD    Anesthesia: General with Block    Staff:   Circulator: Juve Brewer RN; Dinora Gibson RN  Scrub Person: Franca Fletcher Jenna  Nursing Assistant: Maria Ines Bazan CNA  Assistant: Jorge Mahoney PA-C  Assistant: Jorge Mahoney PA-C      Estimated Blood Loss: 75 mL    Urine Voided: 165 mL    Specimens:                None          Drains:   Urethral Catheter Double-lumen;Non-latex 16 Fr. (Active)       Findings:         Complications: None    Assistant: Jorge Mahoney PA-C  was responsible for performing the following activities: Retraction, Suction, Irrigation, Suturing, Closing, Placing Dressing, and Held/Positioned Camera and their skilled assistance was necessary for the success of this case.    Cristiano Ricardo MD     Date: 5/24/2024  Time: 11:40 EDT          Electronically signed by Cristiano Ricardo MD at 05/24/24 1141       Cristiano Ricardo MD at 05/24/24 0802  Version 1 of 1         Preoperative Diagnosis:                 Recurrent hiatal hernia and severe GE reflux not controlled with maximal medical therapy status post laparoscopic sleeve gastrectomy and hiatal hernia repair May 2022                                       Postoperative Diagnosis:                                Same     Procedure:                                                   Laparoscopic robotic assisted antecolic revision previous sleeve gastrectomy to Teresa-en-Y gastric bypass                                        Laparoscopic robotic assisted recurrent hiatal hernia repair  (not paraesophageal) with falciform ligament reinforcement                                                                       EGD     Surgeon:                                                       BRUNA Ricardo MD     Assistant: Jorge Mahoney PA-C  was responsible for performing the following activities: Retraction, Suction, Irrigation, Suturing, Closing, Placing Dressing, and Held/Positioned Camera and their skilled assistance was necessary for the success of this case.     Anesthesia:                                                   GETA     EBL:                                                              Minimal     Fluids:                                                           Crystalloid     Specimens:                                                    None     Drains:                                                          #10 CHRISTINA     Counts:                                                          Correct     Complications:                                               None     Indications:    This is a 33-year-old female known to me status post laparoscopic sleeve gastrectomy and hiatal hernia repair in May 2022.  She presented with complaints of recurrent reflux symptoms not controlled with maximal medical therapy.  EGD and upper GI showed a small recurrent hiatal hernia and distal esophageal biopsies were consistent with reflux.  Please see my office notes.  Risks, benefits, and alternative therapies were discussed and she wished to proceed with laparoscopic possible robotic assisted recurrent hiatal hernia pair and revision of her sleeve  gastrectomy to a Teresa-en-Y gastric bypass to hopefully alleviate her symptoms.     Operative technique:      The patient was brought to the operating room, and placed supine upon the operating room table.  SCD hose were placed, she underwent uneventful general endotracheal anesthesia per the anesthesiology staff, the anesthesiology staff performed a TAP block, and received IV Ancef (her penicillin and amoxicillin allergies noted) and subcutaneous Lovenox, a Cali catheter was placed, Lovenox and her abdomen was prepped and draped with ChloraPrep in a sterile fashion, an Ioban was used as well.     The peritoneal cavity was entered in the mid abdomen and approximately the left midclavicular line using a 5 mm trocar utilizing an Optiview technique and the abdomen was insufflated to pressure of 15 mmHg with CO2 gas.  Exploratory laparoscopy revealed no evidence of injury from the entrance technique, a normal-appearing liver, some omental adhesions to the proximal falciform.    Remaining trocars were placed under direct visualization throughout the case.  After infiltration of the peritoneum under direct visualization a 12 mm robotic trocar was placed in the right lateral abdomen, 8 mm robotic trocars to the right of the umbilicus at the lateral aspect of her old 19 mm scar, left upper and lower lateral abdomen, and the entrance trocar was changed out to an 8 mm robotic trocar.    The sleeve appeared to be densely adherent to the undersurface of the left lobe of the liver.    The omentum was elevated and identified the ligament of Treitz and it was run distally to the ileocecal valve and measured roughly 625 cm.  The ligament of Treitz was reidentified and run for 150 cm and marked on the antimesenteric border with 2 different colored sutures (2-0 silk and 2-0 Vicryl plus) for orientation (future BP limb), and another 150 cm and marked in a similar fashion (future Teresa limb).     The Xi robot was docked and I scrubbed  out and went to the robotic console.  Omental adhesions to the falciform ligament were divided with the vessel sealing device.  In addition the large fatty but shortened falciform ligament was amputated at its base and mobilized up to the level of the liver.  Dense adhesions of the undersurface of the left lobe of the liver to the sleeve gastrectomy were divided taking care to avoid a gastrotomy.  The sleeve itself appeared to be resting nicely.  The hiatus was exposed, no obvious recurrent hiatal hernia noted.  The left lobe of the liver was elevated with a f2-0 Stratafix suture by taking a bite of the anterior abdominal wall to the left of midline, the diaphragm adjacent to the hiatus and back to the intra-abdominal wall to the right of midline.    Omental adhesions to the lateral sleeve were divided exposing the left bishop where the recurrent hernia appeared subtle.  The adhesions were quite dense.  Adhesions in the area of the previous pars flaccida were divided exposing the right bishop where previous posterior suture material could be seen.  The recurrent hiatal hernia remains subtle from this view as well.  The hernia sac was incised along the base of the right bishop and this was extended up and across the phrenoesophageal membrane.  Quite a bit of dense scarring in the mediastinum from previous dissection.  The hernia sac was incised along the base of the left bishop and this was also extended up and across the phrenoesophageal membrane.  The hernia sac and its contents were dissected out of the mediastinum and reduced to below the level of the crura.  It did appear that the lateral superior sleeve staple line had been resting in the posterior mediastinum.  Dissection proceeded in the mediastinum through scar tissue to fresh areolar planes.  Approximately 3 cm of intra-abdominal esophagus was obtained.  The recurrent hiatal hernia repair was performed posteriorly using a running nonabsorbable 2-0 V-loc suture with  good result.  The actual fascial defect was quite small and care was taken to not make the closure too tight.  Several photos obtained throughout the procedure.  The falciform ligament was passed behind the esophagus in front of the crural repair, it was not long enough to be sutured back to itself in a 360 degree fashion.  Therefore it was sutured as a sling over the angle of Hiss using a running nonabsorbable 2-0 V-loc suture continuing the suture medially along the GE junction and then suturing to the falciform ligament medially along the lesser curvature.  Upon completion the repair rested nicely and the sleeve appeared to be resting nicely.    The lesser omentum along the edge of the medial sleeve below the left gastric artery was dissected free into the lesser sac.  This was approximately 5 or 6 cm from the GE junction.  The lesser omentum was divided horizontally using a 60 mm robotic white staple load.  The lesser curvature of the stomach in this area was divided horizontally with a robotic 60 mm stapler green load.  There was a few millimeters of residual tissue which was divided with a 60 mm blue load.    The greater omentum was divided with the vessel sealing device up to the transverse colon in the area of the intended antecolic gastric bypass.       The ligament of Treitz identified.   The small bowel beginning at the ligament of Treitz was run distally in appropriate orientation until the first marking that was again was roughly 150 cm.  It reached up nicely to the gastric pouch without tension.  A handsewn 2 layer gastrojejunostomy was then carried out over the 36 Romanian bougie dilator using running absorbable  3-0 Stratafix sutures.  The bougie dilator was removed.  Upon completion the anastomosis rested nicely without tension or torsion.  Intravenous ICG given and excellent hemostasis of all bowel noted throughout the case.     A window was created a couple centimeters distal to the anastomosis on  the future BP limb and this was divided with a 60 mm white load.  The underlying mesentery was taken down for short distance to relieve any tension on the future jejunojejunostomy.  The Teresa limb was run distally to the next marking (roughly 150 cm as above) and a linear jejunojejunostomy was carried out as follows:    An enterotomy was made on the antimesenteric border of each limb and a common lumen created with a 60 mm white load.  The intervening enterotomy was closed in a single seromuscular layer using running 3-0 absorbable Stratafix suture.  The remainder of the suture was used to imbricate the end of the staple line in a figure-of-eight seromuscular fashion.     The potential mesenteric defects at the jejunojejunostomy and at Garcia's space were individually closed using running 2-0 nonabsorbable V-Loc sutures.     The Teresa limb several centimeters distal to the gastrojejunostomy was occluded with a noncrushing bowel clamp and the gastrojejunostomy was submerged under saline.  I performed upper endoscopy.  The endoscope advanced through the esophagus into the gastric pouch,  gastrojejunostomy opening was small but patent and the endoscope advanced through the area without resistance.  No ischemia of the anastomosis, no bleeding at the anastomosis, no air bubbles or leak seen.  Z-line roughly 39 cm.  Nice distention of the Teresa limb.  The endoscope was withdrawn.  Endoscopic photodocumentation obtained of the Teresa limb and GE junction.     I scrubbed back in and the robot was undocked.  Irrigation fluid was suctioned free.  The gastric bypass was inspected laparoscopically and appropriate orientation, viable anastomoses resting well, and closure of the potential mesenteric defects confirmed.  Under direct visualization a 5 mm trocar was placed high in the right upper quadrant through which to withdraw the CHRISTINA drain.  A #10 CHRISTINA drain was placed under the left lobe of the liver and up over the spleen and  "brought out through the RUQ 5 mm trocar site incision and anchored to the skin with a 2-0 nylon suture.  The suture used to elevate the liver was removed.  Fascia at the 12 mm trocar site incision was closed with a horizontal mattress 0 Vicryl suture placed with a suture passer under direct visualization and tying the knot extracorporeally.  Remaining trochars were removed under direct visualization, no bleeding noted from their sites.     Skin in each incision was closed using 3-0 Monocryl plus in an interrupted subcuticular stitch followed by skin glue.  A dry sterile dressing was placed around the CHRISTINA site.  The Cali catheter was removed.     The patient tolerated the procedure well without complication, was taken to the recovery room in stable condition.         Electronically signed by Cristiano Ricardo MD at 05/24/24 1155          Physician Progress Notes (last 72 hours)        Cristiano Ricardo MD at 05/30/24 1323          Cc: POD#6 robotic revision sleeve gastrectomy to Teresa-en-Y gastric bypass and recurrent hiatal hernia repair for uncontrollable GE reflux \"ready to go home\"    Moxifloxacin #1    She is dressed and ready to go home.  Her  is in the room.  She says she can tolerate 3 protein shakes a day no problem.  Passing flatus and now having some diarrhea.  Ambulating and voiding well.  No pulmonary complaints, spirometer 2000 observed.  She notes that her preoperative reflux symptoms have resolved.  Denies any cough, dyspnea or sputum production.    No fever or tachycardia pulse 73 respirations 18 blood pressure 98/68 saturation 97%. UO NR.  She is in no apparent distress.  Abdomen soft and benign, wounds healing without evidence of infection.  Scattered mild soft bruising.  Old CHRISTINA dressing dry.    CMP normal  creatinine 0.47 CO2 20 calcium 7.7 total protein 5.0 abdomen 2.8.  White count normal at 5.7 with 78 segs 11 lymphs 8 monocytes 3 eosinophils no bands.  H&H 7.8 and 24.5.  No " new imaging results.  Blood cultures no growth at 24 hours.    Impression: Postop day #6 robotic conversion of sleeve gastrectomy to Teresa-en-Y gastric bypass and recurrent hiatal hernia repair for severe GE reflux disease.  Patient tolerating her diet and her reflux symptoms have resolved.  Suggested possible right sided pneumonia on moxifloxacin.  Stable iron deficiency anemia without evidence of bleeding on Lovenox.    Plan: Discharge home if okay with the hospitalist service.  Patient says she has her Eliquis at home.  Reiterated avoiding aspirin, NSAIDs and steroids indefinitely.  Continue daily PPI, decrease dose to once daily.  Follow-up in the office in approximately 1 week and call in the meantime with any problems questions or concerns.  Additional antibiotics if desired per hospitalist service.  See orders.    Electronically signed by Cristiano Ricardo MD at 24 1329       Paulina Del Rosario MD at 24 0919              [facility] Medicine Services  PROGRESS NOTE    Patient Name: Theresa Maya  : 1990  MRN: [id number]    Date of Admission: 2024  Primary Care Physician: Gina Green APRN    Subjective   Subjective     CC:  Consult for PNA     HPI:  Feels much better today, denies any issues overnight. No further fevers      Objective   Objective     Vital Signs:   Temp:  [98.4 °F (36.9 °C)-99.1 °F (37.3 °C)] 98.4 °F (36.9 °C)  Heart Rate:  [] 92  Resp:  [16] 16  BP: ()/(56-75) 118/63  Flow (L/min):  [0-2] 0     Physical Exam:  Constitutional: No acute distress, awake, alert  HENT: NCAT, mucous membranes moist  Respiratory: Clear to auscultation bilaterally, respiratory effort normal   Cardiovascular: RRR, no murmurs, rubs, or gallops  Gastrointestinal: Positive bowel sounds, soft, nontender, nondistended  Musculoskeletal: No bilateral ankle edema  Psychiatric: Appropriate affect, cooperative  Neurologic: Oriented x 3, strength  symmetric in all extremities, Cranial Nerves grossly intact to confrontation, speech clear  Skin: No rashes     Results Reviewed:  LAB RESULTS:      Lab 05/30/24  0643 05/29/24  0709 05/28/24 2001 05/28/24 0605 05/27/24  2224 05/27/24  1155 05/27/24  0507   WBC 5.72 9.51 11.33* 9.95  --   --  7.16   HEMOGLOBIN 7.8* 8.0* 8.8* 8.5* 9.1*   < > 8.9*   HEMATOCRIT 24.5* 25.0* 28.0* 26.6* 28.4*  --  27.9*   PLATELETS 221 227 227 240  --   --  244   NEUTROS ABS 4.48 8.00* 9.53* 7.89*  --   --  5.78   IMMATURE GRANS (ABS) 0.02 0.05 0.04 0.14*  --   --  0.02   LYMPHS ABS 0.60* 0.51* 0.65* 0.77  --   --  0.64*   MONOS ABS 0.44 0.85 1.06* 1.03*  --   --  0.55   EOS ABS 0.17 0.08 0.03 0.10  --   --  0.15   MCV 83.9 82.8 85.9 83.9  --   --  84.0   PROCALCITONIN  --   --  0.60*  --   --   --   --    LACTATE  --   --  0.8  --   --   --   --     < > = values in this interval not displayed.         Lab 05/30/24  0643 05/29/24 0709 05/28/24 2001 05/28/24  0605 05/27/24  0507   SODIUM 140 137 134* 132* 138   POTASSIUM 3.7 3.7 3.7 3.6 3.8   CHLORIDE 105 104 101 100 103   CO2 20.0* 21.0* 19.0* 23.0 25.0   ANION GAP 15.0 12.0 14.0 9.0 10.0   BUN 6 6 5* 3* 3*   CREATININE 0.47* 0.59 0.65 0.64 0.66   EGFR 129.1 122.2 119.4 119.8 119.0   GLUCOSE 70 77 87 113* 96   CALCIUM 7.7* 7.7* 7.9* 7.8* 8.3*         Lab 05/30/24  0643 05/29/24  0709 05/28/24  0605 05/27/24  0507 05/26/24  0726   TOTAL PROTEIN 5.0* 5.7* 5.6* 6.3 5.6*   ALBUMIN 2.8* 2.9* 3.1* 3.5 3.3*   GLOBULIN 2.2 2.8 2.5 2.8 2.3   ALT (SGPT) 9 9 10 12 14   AST (SGOT) 14 11 16 20 20   BILIRUBIN 0.3 0.5 0.5 0.3 0.2   ALK PHOS 79 74 85 70 60                 Lab 05/27/24  1346 05/25/24  0531   IRON  --  19*   ABO TYPING A  --    RH TYPING Positive  --    ANTIBODY SCREEN Positive  --          Brief Urine Lab Results  (Last result in the past 365 days)        Color   Clarity   Blood   Leuk Est   Nitrite   Protein   CREAT   Urine HCG        05/28/24 1948 Yellow   Cloudy   Negative    Negative   Negative   30 mg/dL (1+)                   Microbiology Results Abnormal       Procedure Component Value - Date/Time    Blood Culture - Blood, Hand, Left [[id number]]  (Normal) Collected: 05/28/24 1955    Lab Status: Preliminary result Specimen: Blood from Hand, Left Updated: 05/29/24 2030     Blood Culture No growth at 24 hours    Blood Culture - Blood, Hand, Right [[id number]]  (Normal) Collected: 05/28/24 2001    Lab Status: Preliminary result Specimen: Blood from Hand, Right Updated: 05/29/24 2030     Blood Culture No growth at 24 hours    S. Pneumo Ag Urine or CSF - Urine, Urine, Clean Catch [[id number]]  (Normal) Collected: 05/28/24 1948    Lab Status: Final result Specimen: Urine, Clean Catch Updated: 05/29/24 0950     Strep Pneumo Ag Negative    Legionella Antigen, Urine - Urine, Urine, Clean Catch [[id number]]  (Normal) Collected: 05/28/24 1948    Lab Status: Final result Specimen: Urine, Clean Catch Updated: 05/29/24 0950     LEGIONELLA ANTIGEN, URINE Negative    MRSA Screen, PCR (Inpatient) - Swab, Nares [[id number]]  (Normal) Collected: 05/29/24 0626    Lab Status: Final result Specimen: Swab from Nares Updated: 05/29/24 0908     MRSA PCR Negative    Narrative:      The negative predictive value of this diagnostic test is high and should only be used to consider de-escalating anti-MRSA therapy. A positive result may indicate colonization with MRSA and must be correlated clinically.  MRSA Negative            XR Chest PA & Lateral    Result Date: 5/28/2024  XR CHEST PA AND LATERAL Date of Exam: 5/28/2024 4:20 PM EDT Indication: fever, tachycardia Comparison: 5/9/2022. Findings: Right perihilar opacity is present, somewhat nonspecific but concerning for underlying airspace disease given provided history. There is also some linear opacity at the left lung base which may represent atelectasis. There is no distinct pneumothorax. Normal heart and mediastinal contours.     Impression: Impression:  Nonspecific right perihilar opacity which could represent airspace disease such as pneumonia. Electronically Signed: Kerwin Bateman MD  2024 4:31 PM EDT  Workstation ID: [id number]     Results for orders placed during the hospital encounter of 02/10/17    Adult Transthoracic Echo Complete    Interpretation Summary  · Normal left ventricular cavity size and wall thickness noted. All left ventricular wall segments contract normally.  · Estimated EF appears to be in the range of 61 - 65%.  · Left ventricular diastolic dysfunction is noted (grade I) consistent with impaired relaxation.  · The aortic valve is structurally normal. No aortic valve regurgitation is present. No aortic valve stenosis is present.  · The mitral valve is normal in structure. No mitral valve regurgitation is present. No significant mitral valve stenosis is present.  · The tricuspid valve is normal. No tricuspid valve stenosis is present. No tricuspid valve regurgitation is present.  · There is no evidence of pericardial effusion.      Current medications:  Scheduled Meds:ARIPiprazole, 5 mg, Oral, Daily  enoxaparin, 40 mg, Subcutaneous, Q24H  lamoTRIgine, 150 mg, Oral, Daily  meropenem, 1,000 mg, Intravenous, Q8H  montelukast, 10 mg, Oral, Nightly  pantoprazole, 40 mg, Oral, Q AM      Continuous Infusions:sodium chloride 0.9 % with KCl 20 mEq, 83 mL/hr, Last Rate: 83 mL/hr (24 0136)      PRN Meds:.  acetaminophen **OR** acetaminophen **OR** acetaminophen    Albuterol Sulfate NEB Orderable    ALPRAZolam    diphenhydrAMINE    hydrALAZINE    HYDROmorphone **AND** [DISCONTINUED] naloxone    HYDROmorphone    labetalol    LORazepam    Morphine **AND** naloxone    [] ondansetron **FOLLOWED BY** ondansetron ODT    phenol    prochlorperazine    prochlorperazine    promethazine    simethicone    Assessment & Plan   Assessment & Plan     Active Hospital Problems    Diagnosis  POA    **S/P laparoscopic sleeve gastrectomy [Z98.84]  Not  Applicable    Pneumonia involving right lung [J18.9]  No      Resolved Hospital Problems    Diagnosis Date Resolved POA    Hiatal hernia with gastroesophageal reflux [K44.9, K21.9] 05/24/2024 Yes        Brief Hospital Course to date:  Theresa Maya is a 33 y.o. female with PMH significant for GERD, obesity s/p gastric sleeve and hiatal hernia repair May 2022, who presented to [facility] on 5/24/2024 for planned surgical repair of recurrent hiatal hernia with revision to Teresa-en-Y gastric bypass per Dr. Ricardo.  She developed a fever on 5/28 and imaging findings were concerning for RLL PNA. We were consulted for further management.      Conversion of sleeve to RYGB, recurrent hiatal hernia repair with falciform ligament reinforcement  - Care per bariatric surgery     Pneumonia right lung  Sepsis (fever, tachycardia, source)  -- possibly aspiration PNA given recent anesthesia and location of PNA  - leukocytosis with left shift as well as mildly elevated procal  -- lactic acid wnl  - BC x 2 NGTD  - continue ABX with Moxifloxacin 400 mg daily x 5 more days (starting tomorrow, 5/31) upon s/x   -- urine Strep, Legionella Ags and repeat MRSA PCR (was negative prior to hospitalization) all negative  - Sputum culture     GERD  - Continue daily Protonix       Total time spent: 35 minutes  Time spent includes time reviewing chart, face-to-face time, counseling patient/family/caregiver, ordering medications/tests/procedures, communicating with other health care professionals, documenting clinical information in the electronic health record, and coordination of care.      Expected Discharge Location and Transportation: per primary team   Expected Discharge   Expected Discharge Date: 5/30/2024; Expected Discharge Time:      DVT prophylaxis:  Medical and mechanical DVT prophylaxis orders are present.         AM-PAC 6 Clicks Score (PT): 24 (05/30/24 0831)    CODE STATUS:   Code Status and Medical Interventions:   Ordered at: 05/24/24  "1142     Level Of Support Discussed With:    Patient     Code Status (Patient has no pulse and is not breathing):    CPR (Attempt to Resuscitate)     Medical Interventions (Patient has pulse or is breathing):    Full Support       Paulina Del Rosario MD  05/30/24       Electronically signed by Paulina Del Rosario MD at 05/30/24 0921       Cristiano Ricardo MD at 05/29/24 0918          Cc: POD#5 Robot RNY/HHR  \"feel ok\"    Merrem #1    She is sitting up in bed alone in the room.  She says she has some soreness at her CHRISTINA site.  She says her pain is controlled with Tylenol and oral narcotic.  She is ambulating and voiding okay.  Passing flatus and having bowel movements.  Tolerating liquids without nausea or vomiting including her protein shakes.  Minimal pulmonary symptoms.  Spirometer 1750 observed.    Tmax 101 currently 90.3 pulse 85 respiration 16 blood pressure 113/59 saturation 96%.  - NR  CHRISTINA 40 - NR SS.  She is in no apparent distress.  Abdomen soft and benign, wounds healing.  Some bruising.  No cellulitis.  Bowel sounds normal active.    CMP normal except for a CO2 of 21 calcium 7.7 total protein 5.7 albumin 2.9.  White count normal at 9.5 with 84 segs 5 lymphs 9 monocytes 1 eosinophil no bands.  H&H 8 and 25.  Chest x-ray yesterday with nonspecific right perihilar opacity which could represent airspace disease such as pneumonia.  MRSA repeat testing again negative similar to preop.  Blood cultures pending.  Strep pneumonia antigen and Legionella antigen pending.  Urinalysis specific gravity 1.015 pH 6.0 80 of ketones 30 protein unremarkable microscopic.    Impression: Postoperative #5 robotic conversion of sleeve gastrectomy to Teresa-en-Y gastric bypass and recurrent hiatal hernia repair.  Doing okay from that standpoint.  Had a temp of 101 and chest x-ray suggesting possible right-sided pneumonia, seen by Swetha WHATLEY of the hospitalist service yesterday, recommend Rocephin, patient on Merrem.  " Stable iron deficiency anemia without evidence of bleeding on Lovenox.    Plan: Discharge home when okay with the hospitalist service, oral antibiotics per their discretion.  Discontinue CHRISTINA drain.  Continue out of bed, pulmonary toilet, VTE prophylaxis, PPI.    Electronically signed by Cristiano Ricardo MD at 24 0953       Paulina De lRosario MD at 24 0501              [facility] Medicine Services  PROGRESS NOTE    Patient Name: Theresa Maya  : 1990  MRN: [id number]    Date of Admission: 2024  Primary Care Physician: Gina Green APRN    Subjective   Subjective     CC:  Consult for PNA     HPI:  Feeling better this am, no new issues overnight. States that her PCN allergy occurred about seven years ago and was a rash.       Objective   Objective     Vital Signs:   Temp:  [98.3 °F (36.8 °C)-101 °F (38.3 °C)] 98.6 °F (37 °C)  Heart Rate:  [] 99  Resp:  [14-18] 16  BP: ()/(56-66) 93/56  Flow (L/min):  [0-2] 0     Physical Exam:  Constitutional: No acute distress, awake, alert  HENT: NCAT, mucous membranes moist  Respiratory: Clear to auscultation bilaterally, respiratory effort normal   Cardiovascular: RRR, no murmurs, rubs, or gallops  Gastrointestinal: Positive bowel sounds, soft, nontender, nondistended  Musculoskeletal: No bilateral ankle edema  Psychiatric: Appropriate affect, cooperative  Neurologic: Oriented x 3, strength symmetric in all extremities, Cranial Nerves grossly intact to confrontation, speech clear  Skin: No rashes     Results Reviewed:  LAB RESULTS:      Lab 24  0709 24  0605 24  2224 24  1155 24  0507 24  0726   WBC 9.51 11.33* 9.95  --   --  7.16 6.99   HEMOGLOBIN 8.0* 8.8* 8.5* 9.1* 8.8* 8.9* 9.3*   HEMATOCRIT 25.0* 28.0* 26.6* 28.4*  --  27.9* 29.5*   PLATELETS 227 227 240  --   --  244 220   NEUTROS ABS 8.00* 9.53* 7.89*  --   --  5.78 5.04   IMMATURE GRANS (ABS) 0.05  0.04 0.14*  --   --  0.02 0.02   LYMPHS ABS 0.51* 0.65* 0.77  --   --  0.64* 1.08   MONOS ABS 0.85 1.06* 1.03*  --   --  0.55 0.59   EOS ABS 0.08 0.03 0.10  --   --  0.15 0.23   MCV 82.8 85.9 83.9  --   --  84.0 84.5   PROCALCITONIN  --  0.60*  --   --   --   --   --    LACTATE  --  0.8  --   --   --   --   --          Lab 05/29/24  0709 05/28/24 2001 05/28/24  0605 05/27/24  0507 05/26/24  0726   SODIUM 137 134* 132* 138 139   POTASSIUM 3.7 3.7 3.6 3.8 3.8   CHLORIDE 104 101 100 103 106   CO2 21.0* 19.0* 23.0 25.0 25.0   ANION GAP 12.0 14.0 9.0 10.0 8.0   BUN 6 5* 3* 3* 3*   CREATININE 0.59 0.65 0.64 0.66 0.68   EGFR 122.2 119.4 119.8 119.0 118.1   GLUCOSE 77 87 113* 96 102*   CALCIUM 7.7* 7.9* 7.8* 8.3* 8.0*         Lab 05/29/24  0709 05/28/24 0605 05/27/24  0507 05/26/24  0726 05/25/24  0531   TOTAL PROTEIN 5.7* 5.6* 6.3 5.6* 6.0   ALBUMIN 2.9* 3.1* 3.5 3.3* 3.3*   GLOBULIN 2.8 2.5 2.8 2.3 2.7   ALT (SGPT) 9 10 12 14 24   AST (SGOT) 11 16 20 20 37*   BILIRUBIN 0.5 0.5 0.3 0.2 0.2   ALK PHOS 74 85 70 60 61                 Lab 05/27/24  1346 05/25/24  0531   IRON  --  19*   ABO TYPING A  --    RH TYPING Positive  --    ANTIBODY SCREEN Positive  --          Brief Urine Lab Results  (Last result in the past 365 days)        Color   Clarity   Blood   Leuk Est   Nitrite   Protein   CREAT   Urine HCG        05/28/24 1948 Yellow   Cloudy   Negative   Negative   Negative   30 mg/dL (1+)                   Microbiology Results Abnormal       Procedure Component Value - Date/Time    S. Pneumo Ag Urine or CSF - Urine, Urine, Clean Catch [[id number]]  (Normal) Collected: 05/28/24 1948    Lab Status: Final result Specimen: Urine, Clean Catch Updated: 05/29/24 0950     Strep Pneumo Ag Negative    Legionella Antigen, Urine - Urine, Urine, Clean Catch [[id number]]  (Normal) Collected: 05/28/24 1948    Lab Status: Final result Specimen: Urine, Clean Catch Updated: 05/29/24 0950     LEGIONELLA ANTIGEN, URINE Negative    MRSA Screen,  PCR (Inpatient) - Swab, Nares [[id number]]  (Normal) Collected: 05/29/24 0626    Lab Status: Final result Specimen: Swab from Nares Updated: 05/29/24 0908     MRSA PCR Negative    Narrative:      The negative predictive value of this diagnostic test is high and should only be used to consider de-escalating anti-MRSA therapy. A positive result may indicate colonization with MRSA and must be correlated clinically.  MRSA Negative            XR Chest PA & Lateral    Result Date: 5/28/2024  XR CHEST PA AND LATERAL Date of Exam: 5/28/2024 4:20 PM EDT Indication: fever, tachycardia Comparison: 5/9/2022. Findings: Right perihilar opacity is present, somewhat nonspecific but concerning for underlying airspace disease given provided history. There is also some linear opacity at the left lung base which may represent atelectasis. There is no distinct pneumothorax. Normal heart and mediastinal contours.     Impression: Impression: Nonspecific right perihilar opacity which could represent airspace disease such as pneumonia. Electronically Signed: Kerwin Bateman MD  5/28/2024 4:31 PM EDT  Workstation ID: [id number]     Results for orders placed during the hospital encounter of 02/10/17    Adult Transthoracic Echo Complete    Interpretation Summary  · Normal left ventricular cavity size and wall thickness noted. All left ventricular wall segments contract normally.  · Estimated EF appears to be in the range of 61 - 65%.  · Left ventricular diastolic dysfunction is noted (grade I) consistent with impaired relaxation.  · The aortic valve is structurally normal. No aortic valve regurgitation is present. No aortic valve stenosis is present.  · The mitral valve is normal in structure. No mitral valve regurgitation is present. No significant mitral valve stenosis is present.  · The tricuspid valve is normal. No tricuspid valve stenosis is present. No tricuspid valve regurgitation is present.  · There is no evidence of pericardial  effusion.      Current medications:  Scheduled Meds:Albuterol Sulfate NEB Orderable, 2.5 mg, Nebulization, Q6H While Awake - RT  ARIPiprazole, 5 mg, Oral, Daily  enoxaparin, 40 mg, Subcutaneous, Q24H  lamoTRIgine, 150 mg, Oral, Daily  meropenem, 1,000 mg, Intravenous, Q8H  montelukast, 10 mg, Oral, Nightly  pantoprazole, 40 mg, Oral, Q AM      Continuous Infusions:sodium chloride 0.9 % with KCl 20 mEq, 83 mL/hr, Last Rate: 83 mL/hr (24 0802)      PRN Meds:.  acetaminophen **OR** acetaminophen **OR** acetaminophen    ALPRAZolam    diphenhydrAMINE    hydrALAZINE    HYDROmorphone **AND** [DISCONTINUED] naloxone    HYDROmorphone    labetalol    LORazepam    Morphine **AND** naloxone    [] ondansetron **FOLLOWED BY** ondansetron ODT    phenol    prochlorperazine    prochlorperazine    promethazine    simethicone    Assessment & Plan   Assessment & Plan     Active Hospital Problems    Diagnosis  POA    **S/P laparoscopic sleeve gastrectomy [Z98.84]  Not Applicable    Pneumonia involving right lung [J18.9]  No      Resolved Hospital Problems    Diagnosis Date Resolved POA    Hiatal hernia with gastroesophageal reflux [K44.9, K21.9] 2024 Yes        Brief Hospital Course to date:  Theresa Maya is a 33 y.o. female with PMH significant for GERD, obesity s/p gastric sleeve and hiatal hernia repair May 2022, who presented to [facility] on 2024 for planned surgical repair of recurrent hiatal hernia with revision to Teresa-en-Y gastric bypass per Dr. Ricardo.  She developed a fever on  and imaging findings were concerning for RLL PNA. We were consulted for further management.      Conversion of sleeve to RYGB, recurrent hiatal hernia repair with falciform ligament reinforcement  - Care per bariatric surgery     Pneumonia right lung  Sepsis (fever, tachycardia, source)  -- possibly aspiration PNA given recent anesthesia and location of PNA  - leukocytosis with left shift as well as mildly elevated procal  --  lactic acid wnl  - BC x 2 pending  - change from Rocephin to Meropenem given concern for aspiration and PCN allergy. Will trial Moxifloxacin prior to d/c   -- send urine Strep, Legionella Ags. Repeat MRSA PCR (was negative prior to hospitalization)  - Sputum culture     GERD  - Continue daily Protonix       Total time spent: 35 minutes  Time spent includes time reviewing chart, face-to-face time, counseling patient/family/caregiver, ordering medications/tests/procedures, communicating with other health care professionals, documenting clinical information in the electronic health record, and coordination of care.      Expected Discharge Location and Transportation: per primary team and once afebrile for 24 hours  Expected Discharge   Expected Discharge Date: 5/30/2024; Expected Discharge Time:      DVT prophylaxis:  Medical and mechanical DVT prophylaxis orders are present.         AM-PAC 6 Clicks Score (PT): 24 (05/29/24 0800)    CODE STATUS:   Code Status and Medical Interventions:   Ordered at: 05/24/24 1142     Level Of Support Discussed With:    Patient     Code Status (Patient has no pulse and is not breathing):    CPR (Attempt to Resuscitate)     Medical Interventions (Patient has pulse or is breathing):    Full Support       Paulina Del Rosario MD  05/29/24       Electronically signed by Paulina Del Rosario MD at 05/29/24 1329       Debbie Atwood MD at 05/28/24 1218          Bariatric Surgery     LOS: 4 days   Patient Care Team:  Gina Green APRN as PCP - General (Nurse Practitioner)    Chief Complaint:  post op    Subjective     Interval History:    Overnight, c/o of feeling poorly, with chest tightness, and Hr was up to 120s.  Received 1 L NS bolus.  Hgb recheck was stable.  Did her IS aggressively, coughed some sputum, and chest tightness went away completely and has felt very well today.      Has had several hours of reliable 4 oz/hour liquids, including protein shakes.  No BM, but  "feels \"rumbling.\"     No fevers.  Pain controlled.  Ambulating.  Voiding.  IS 2000.    Objective     Vital Signs  Blood pressure 118/55, pulse 107, temperature 98.7 °F (37.1 °C), temperature source Oral, resp. rate 12, height 157.5 cm (62\"), weight 98 kg (216 lb 0.8 oz), last menstrual period 08/07/2023, SpO2 94%, not currently breastfeeding.    Physical Exam:  General: Alert, NAD  Abdomen: soft, appropriate, incisions okay.  Normal bowel sounds.  CHRISTINA ss .  Extremities: warm, (+) SCDs     Results Review:     I reviewed the patient's new clinical results.  I reviewed the patient's new imaging results and agree with the interpretation.    Labs:  Lab Results (last 24 hours)       Procedure Component Value Units Date/Time    Comprehensive Metabolic Panel [[id number]]  (Abnormal) Collected: 05/28/24 0605    Specimen: Blood Updated: 05/28/24 0720     Glucose 113 mg/dL      BUN 3 mg/dL      Creatinine 0.64 mg/dL      Sodium 132 mmol/L      Potassium 3.6 mmol/L      Chloride 100 mmol/L      CO2 23.0 mmol/L      Calcium 7.8 mg/dL      Total Protein 5.6 g/dL      Albumin 3.1 g/dL      ALT (SGPT) 10 U/L      AST (SGOT) 16 U/L      Alkaline Phosphatase 85 U/L      Total Bilirubin 0.5 mg/dL      Globulin 2.5 gm/dL      Comment: Calculated Result        A/G Ratio 1.2 g/dL      BUN/Creatinine Ratio 4.7     Anion Gap 9.0 mmol/L      eGFR 119.8 mL/min/1.73     Narrative:      GFR Normal >60  Chronic Kidney Disease <60  Kidney Failure <15      CBC & Differential [[id number]]  (Abnormal) Collected: 05/28/24 0605    Specimen: Blood Updated: 05/28/24 0708    Narrative:      The following orders were created for panel order CBC & Differential.  Procedure                               Abnormality         Status                     ---------                               -----------         ------                     CBC Auto Differential[[id number]]        Abnormal            Final result                 Please view results for these tests on " the individual orders.    CBC Auto Differential [[id number]]  (Abnormal) Collected: 05/28/24 0605    Specimen: Blood Updated: 05/28/24 0708     WBC 9.95 10*3/mm3      RBC 3.17 10*6/mm3      Hemoglobin 8.5 g/dL      Hematocrit 26.6 %      MCV 83.9 fL      MCH 26.8 pg      MCHC 32.0 g/dL      RDW 14.9 %      RDW-SD 45.7 fl      MPV 10.8 fL      Platelets 240 10*3/mm3      Neutrophil % 79.3 %      Lymphocyte % 7.7 %      Monocyte % 10.4 %      Eosinophil % 1.0 %      Basophil % 0.2 %      Immature Grans % 1.4 %      Neutrophils, Absolute 7.89 10*3/mm3      Lymphocytes, Absolute 0.77 10*3/mm3      Monocytes, Absolute 1.03 10*3/mm3      Eosinophils, Absolute 0.10 10*3/mm3      Basophils, Absolute 0.02 10*3/mm3      Immature Grans, Absolute 0.14 10*3/mm3      nRBC 0.0 /100 WBC     Hemoglobin & Hematocrit, Blood [[id number]]  (Abnormal) Collected: 05/27/24 2224    Specimen: Blood Updated: 05/27/24 2256     Hemoglobin 9.1 g/dL      Hematocrit 28.4 %             Imaging:  Imaging Results (Last 24 Hours)       ** No results found for the last 24 hours. **              Assessment & Plan     POD # 4 s/p conversion of sleeve to RYGB, recurrent HHR with falciform ligament reinforcement.    Doing much better today with oral intake.    Chest tightness resolved with pulmonary toilet, look very well today.    Hgb drifted slightly, stable.  Hyponatremia, changed fluids to normal saline + KCl.  D/c CHRISTINA.    Would like to go home.  Patient feels well and has met discharge criteria.  Will discharge home with follow up in 1 week with PA.  Rx given for dilaudid, compazine.  Continue home Nexium.  Start Eliquis tomorrow.   Discharge instructions reviewed with patient and all questions answered.         Debbie Atwood MD  05/28/24  12:18 EDT    ADDENDUM:  After rounds, she spiked a new fever to 101.  Check CXR and UA.  Postpone discharge.  May need to stay overnight and have repeat UGI in AM to rule out early GJ leak if workup is  unrevealing.          Electronically signed by Debbie Atwood MD at 24 1535          Consult Notes (last 72 hours)        Swetha Khan, APRN at 24 1927        Consult Orders    1. Inpatient Hospitalist Consult [[id number]] ordered by Debbie Atwood MD at 24 1721                      [facility] Medicine Services  CONSULT NOTE      Patient Name: Theresa Maya  : 1990  MRN: [id number]    Primary Care Physician: Gina Green APRN  Provider requesting consultation: Cristiano Ricardo MD    Subjective   Subjective     Reason for Consultation:  New onset fever, pneumonia     HPI:  Theresa Maya is a 33 y.o. female with PMH significant for GERD, obesity s/p gastric sleeve and hiatal hernia repair May 2022, who presented to [facility] on 2024 for planned surgical repair of recurrent hiatal hernia with revision to Teresa-en-Y gastric bypass per Dr. Ricardo.  She was planning for discharge home today when she developed fever.  Tmax 101.  She reports having cough with green sputum production.  She denies any urinary symptoms.  She also notes postop abdominal pain that is controlled with pain medications.  Discharge was canceled and hospital medicine was consulted for concern of pneumonia.    Review of Systems   Constitutional:  Positive for activity change, appetite change, chills, fatigue and fever. Negative for diaphoresis and unexpected weight change.   HENT:  Negative for congestion, sore throat and trouble swallowing.    Eyes: Negative.  Negative for visual disturbance.   Respiratory:  Positive for cough and chest tightness. Negative for shortness of breath and wheezing.    Cardiovascular: Negative.  Negative for chest pain, palpitations and leg swelling.   Gastrointestinal:  Positive for abdominal distention, abdominal pain, constipation, nausea and vomiting. Negative for blood in stool, diarrhea and rectal pain.   Endocrine: Negative.  Negative  for polydipsia and polyphagia.   Genitourinary: Negative.  Negative for difficulty urinating, dysuria, frequency and urgency.   Musculoskeletal: Negative.  Negative for arthralgias, back pain, gait problem, joint swelling, myalgias and neck stiffness.   Skin:  Positive for wound (Postop incisions). Negative for color change, pallor and rash.   Allergic/Immunologic: Negative.  Negative for immunocompromised state.   Neurological:  Positive for weakness and headaches. Negative for dizziness, seizures, syncope, facial asymmetry and light-headedness.   Hematological: Negative.  Does not bruise/bleed easily.   Psychiatric/Behavioral: Negative.  Negative for confusion. The patient is not nervous/anxious.        All other systems reviewed and are negative.     Personal History     Past Medical History:   Diagnosis Date    Abnormal Pap smear of cervix 2019    ADHD (attention deficit hyperactivity disorder) 2022    Working with a therapy at     Allergic 2011    Take montalukast    Anesthesia complication     SLOW TO WAKE UP AFTER GASTRO AND HYSTERECTOMY    Anxiety     Arthritis     Clotting disorder     Deep vein thrombosis     2014, (R) leg while pregnant, dx w/ MTHFR    Depression     Dermatitis     UNDER BREAST AT TIMES    Dyspepsia     Dyspnea on exertion     Elevated hemoglobin A1c     Fatigue     Gallbladder abscess     s/p lap nicolas 2006    GERD (gastroesophageal reflux disease)     UGI 9/23 - small recurrent HH, EGD Dr. Ricardo  11/23 small recurrent HH    Heartburn     chronic/episodic, depends on what she eats, takes Pepcid prn, EGD Dr. Ricardo 11/21    Hyperemesis gravidarum     Hyperlipidemia     Incomplete right bundle branch block     Iron deficiency anemia     Migraines     Miscarriage     x 4 prior to 1st pregnancy, presumably from undiagnosed MTHFR mutation    Morbid obesity with body mass index (BMI) of 40.0 to 44.9 in adult 04/19/2022    MTHFR deficiency complicating pregnancy     says clotting d/o only an  "issue when pregnant, advised to use heparin shots during pregnancy    Obesity     Ovarian cyst     Recurrent pregnancy loss, antepartum condition or complication     had a [facility] and lost the pregnancy at 6 months    Strep pharyngitis     Urinary tract infection     Wears glasses        Past Surgical History:   Procedure Laterality Date    ENDOSCOPY N/A 05/05/2022    Procedure: ESOPHAGOGASTRODUODENOSCOPY;  Surgeon: Cristiano Ricardo MD;  Location:  [facility];  Service: Bariatric;  Laterality: N/A;    ENDOSCOPY N/A 5/24/2024    Procedure: ESOPHAGOGASTRODUODENOSCOPY;  Surgeon: Cristiano Ricardo MD;  Location:  [facility];  Service: Robotics - DaVinci;  Laterality: N/A;  EGD # 752 SCOPE USED    GASTRECTOMY N/A 05/05/2022    Procedure: GASTRECTOMY LAPAROSCOPIC;  Surgeon: Cristiano Ricardo MD;  Location:  [facility];  Service: Bariatric;  Laterality: N/A;    GASTRIC BYPASS N/A 5/24/2024    Procedure: GASTRIC BYPASS LAPAROSCOPIC WITH DAVINCI ROBOT;  Surgeon: Cristiano Ricardo MD;  Location:  [facility];  Service: Robotics - DaVinci;  Laterality: N/A;    HIATAL HERNIA REPAIR N/A 05/05/2022    Procedure: HIATAL HERNIA REPAIR LAPAROSCOPIC;  Surgeon: Cristiano Ricardo MD;  Location:  [facility];  Service: Bariatric;  Laterality: N/A;    HIATAL HERNIA REPAIR N/A 5/24/2024    Procedure: RECURRENT HIATAL HERNIA REPAIR LAPAROSCOPIC WITH DAVINCI ROBOT;  Surgeon: Cristiano Ricardo MD;  Location:  [facility];  Service: Robotics - DaVinci;  Laterality: N/A;    HYSTERECTOMY  01/23/2024    [facility] Dr[facility]- TOTAL    LAPAROSCOPIC CHOLECYSTECTOMY  2006    no stones, was told she had \"three gallbladders\"- all removed    SINUS SURGERY Bilateral 2006    TUBAL COAGULATION LAPAROSCOPIC Bilateral 09/15/2017    Procedure: BILATERAL TUBAL FALLOPE FILSHIE CLIPPING LAPAROSCOPIC;  Surgeon: Leo Posey III, MD;  Location:  [facility];  Service:     VAGINAL DELIVERY  14; 16; 17    female,male, male.  She said she had subcutaneous Lovenox " injections throughout the second and third pregnancies until delivery    WISDOM TOOTH EXTRACTION         Family History:  family history includes Alcohol abuse in her father; Asthma in her maternal grandmother, mother, and sister; Breast cancer (age of onset: 54) in her maternal grandmother; COPD in her mother; Cancer in her maternal grandmother, paternal grandfather, and paternal grandmother; Diabetes in her maternal grandmother; Heart attack in her mother; Hypertension in her maternal grandfather, maternal grandmother, and mother; Lung cancer in her maternal grandmother and paternal grandmother; Lupus in her maternal grandmother and sister; Mental illness in her mother; Migraines in her mother; Miscarriages / Stillbirths in her maternal aunt; Obesity in her mother and paternal grandmother; Ovarian cancer in her sister; Prostate cancer in her paternal grandfather; Stroke in her maternal grandmother; Thyroid disease in her maternal grandmother and mother. Otherwise pertinent FHx was reviewed and unremarkable.     Social History:  reports that she has never smoked. She has never used smokeless tobacco. She reports that she does not currently use alcohol. She reports that she does not use drugs.    Medications:  ARIPiprazole, HYDROmorphone, Saw Palmetto, apixaban, esomeprazole, ferrous sulfate, lamoTRIgine, montelukast, multivitamin with minerals, ondansetron ODT, pimecrolimus, prochlorperazine, promethazine, triamcinolone, vitamin B-12, and vitamin D3    Scheduled Meds:Albuterol Sulfate NEB Orderable, 2.5 mg, Nebulization, Q6H While Awake - RT  ARIPiprazole, 5 mg, Oral, Daily  enoxaparin, 40 mg, Subcutaneous, Q24H  lamoTRIgine, 150 mg, Oral, Daily  montelukast, 10 mg, Oral, Nightly  pantoprazole, 40 mg, Oral, Q AM  Scopolamine, 1 patch, Transdermal, Q72H      Continuous Infusions:sodium chloride 0.9 % with KCl 20 mEq, 83 mL/hr, Last Rate: 83 mL/hr (05/28/24 0845)      PRN Meds:.  ALPRAZolam    diphenhydrAMINE     hydrALAZINE    HYDROmorphone **AND** [DISCONTINUED] naloxone    HYDROmorphone    labetalol    LORazepam    Morphine **AND** naloxone    [] ondansetron **FOLLOWED BY** ondansetron ODT    phenol    prochlorperazine    prochlorperazine    promethazine    simethicone    Allergies   Allergen Reactions    Amoxicillin Diarrhea and GI Intolerance    Doxycycline Other (See Comments)     Vaginal swelling     Latex Hives and Itching    Penicillins GI Intolerance     Says Keflex OK as long as she takes w/ food.     Beta lactam allergy details  Antibiotic reaction: rash, hives, other (ITCHING)  Age at reaction: adult (2017)  Dose to reaction time: (!) hours  Reason for antibiotic: sore throat (STREP)  Epinephrine required for reaction?: no  Tolerated antibiotics: cephalexin (KEFLEX)           Objective   Objective     Vital Signs:   Temp:  [98.7 °F (37.1 °C)-101 °F (38.3 °C)] 101 °F (38.3 °C)  Heart Rate:  [104-120] 107  Resp:  [12-16] 16  BP: (101-132)/(55-74) 101/66  Flow (L/min):  [2] 2    Physical Exam  Constitutional: Awake, alert, no acute distress   Eyes: PERRLA, sclerae anicteric, no conjunctival injection  HENT: NCAT, mucous membranes moist  Neck: Supple, no thyromegaly, no lymphadenopathy, trachea midline  Respiratory: Clear to auscultation bilaterally, mildly decreased RLL, nonlabored respirations   Cardiovascular: tachycardic, no murmurs, rubs, or gallops, palpable pedal pulses bilaterally  Gastrointestinal: Positive decreased bowel sounds, soft, tender to palpation, nondistended  Musculoskeletal: No bilateral ankle edema, no clubbing or cyanosis to extremities  Psychiatric: Appropriate affect, cooperative  Neurologic: Oriented x 3, strength symmetric in all extremities, Cranial Nerves grossly intact to confrontation, speech clear  Skin: No rashes, surgical incisions intact, drain RUQ intact          Result Review:  I have personally reviewed the results from the time of admission and agree with these  findings:  [x]  Laboratory  [x]  Radiology  []  EKG/Telemetry   []  Pathology  [x]  Old records  []  Other:  Most notable findings include:     LAB RESULTS:      Lab 05/28/24  0605 05/27/24  2224 05/27/24  1155 05/27/24  0507 05/26/24  0726 05/25/24  0531   WBC 9.95  --   --  7.16 6.99 8.73   HEMOGLOBIN 8.5* 9.1* 8.8* 8.9* 9.3* 10.0*   HEMATOCRIT 26.6* 28.4*  --  27.9* 29.5* 31.7*   PLATELETS 240  --   --  244 220 253   NEUTROS ABS 7.89*  --   --  5.78 5.04 6.90   IMMATURE GRANS (ABS) 0.14*  --   --  0.02 0.02 0.03   LYMPHS ABS 0.77  --   --  0.64* 1.08 0.98   MONOS ABS 1.03*  --   --  0.55 0.59 0.78   EOS ABS 0.10  --   --  0.15 0.23 0.01   MCV 83.9  --   --  84.0 84.5 83.0         Lab 05/28/24  0605 05/27/24  0507 05/26/24 0726 05/25/24  0531   SODIUM 132* 138 139 136   POTASSIUM 3.6 3.8 3.8 3.9   CHLORIDE 100 103 106 102   CO2 23.0 25.0 25.0 23.0   ANION GAP 9.0 10.0 8.0 11.0   BUN 3* 3* 3* 7   CREATININE 0.64 0.66 0.68 0.94   EGFR 119.8 119.0 118.1 82.3   GLUCOSE 113* 96 102* 101*   CALCIUM 7.8* 8.3* 8.0* 7.9*         Lab 05/28/24  0605 05/27/24  0507 05/26/24  0726 05/25/24  0531   TOTAL PROTEIN 5.6* 6.3 5.6* 6.0   ALBUMIN 3.1* 3.5 3.3* 3.3*   GLOBULIN 2.5 2.8 2.3 2.7   ALT (SGPT) 10 12 14 24   AST (SGOT) 16 20 20 37*   BILIRUBIN 0.5 0.3 0.2 0.2   ALK PHOS 85 70 60 61                 Lab 05/27/24  1346 05/25/24  0531   IRON  --  19*   ABO TYPING A  --    RH TYPING Positive  --    ANTIBODY SCREEN Positive  --          Brief Urine Lab Results  (Last result in the past 365 days)        Color   Clarity   Blood   Leuk Est   Nitrite   Protein   CREAT   Urine HCG        12/18/23 1256 Yellow   Cloudy     Small                     Microbiology Results (last 10 days)       ** No results found for the last 240 hours. **            XR Chest PA & Lateral    Result Date: 5/28/2024  XR CHEST PA AND LATERAL Date of Exam: 5/28/2024 4:20 PM EDT Indication: fever, tachycardia Comparison: 5/9/2022. Findings: Right perihilar  opacity is present, somewhat nonspecific but concerning for underlying airspace disease given provided history. There is also some linear opacity at the left lung base which may represent atelectasis. There is no distinct pneumothorax. Normal heart and mediastinal contours.     Impression: Impression: Nonspecific right perihilar opacity which could represent airspace disease such as pneumonia. Electronically Signed: Kerwin Bateman MD  5/28/2024 4:31 PM EDT  Workstation ID: [id number]    CT Abdomen Pelvis Without Contrast    Result Date: 5/27/2024  CT ABDOMEN PELVIS WO CONTRAST Date of Exam: 5/27/2024 11:31 AM EDT Indication: Rule out intrabdominal hematoma. Comparison: Upper GI fluoroscopic exam 5/25/2024, CT abdomen and pelvis 11/29/2023 Technique: Axial CT images were obtained of the abdomen and pelvis without the administration of contrast. Reconstructed coronal and sagittal images were also obtained. Automated exposure control and iterative construction methods were used. Findings: There are surgical changes of recent Teresa-en-Y gastric bypass. A surgical drain is positioned in the epigastrium/left upper quadrant. There is hazy and wispy fat stranding about the stomach, compatible with recent surgery. There are couple small rounded hyperdensities adjacent to the stomach in the region of fat stranding, likely reflecting small postsurgical blood products (series 2 image 42, series 900 image 55). There is small hyperdense fluid inferior to the spleen (series 2 image 54), also likely reflecting a small amount of upper abdominal blood. No discrete/drainable fluid collection at the surgical sites or elsewhere within the abdomen or pelvis. There is a trace amount of simple pelvic free fluid which may be reactive to recent surgery or physiologic in this young patient. There is contrast within the colon from previous upper GI fluoroscopic exam; no evidence of extraluminal contrast to suggest leak. No evidence of bowel  obstruction or ileus; the majority of the prior oral contrast is now within the colon. There is small scattered air in the anterior mediastinum compatible with pneumomediastinum, likely reflecting surgical change of hiatal hernia repair. There is mild subsegmental atelectasis in the lung bases likely relating to recent upper abdominal surgery. Unremarkable appearance of the liver. The gallbladder is surgically absent. Normal appearance of the bile ducts. Normal appearance of the spleen. Unremarkable appearance of the pancreas. Normal appearance of the adrenal glands, kidneys, ureters, and bladder. The uterus is surgically absent. Unremarkable appearance of the adnexa. There is some scattered linear fat stranding and scattered small locules of air in the subcutaneous tissues of the anterior abdominal wall compatible with recent laparoscopic access. No evidence of intra-abdominal or body wall hematoma. Unremarkable noncontrast appearance of the vasculature. Unremarkable appearance of  the osseous structures.     Impression: Impression: Expected recent surgical changes relating to gastric bypass and hiatal hernia repair. There is a small amount of hyperdense nonorganized fluid adjacent to the stomach and spleen, likely reflecting small postoperative blood products. Assessment for any active bleeding is precluded in the absence of IV contrast. No evidence of body wall or abdominopelvic hematoma as queried. Additional nonacute findings relating to recent surgery, detailed above. Electronically Signed: Socrates Adame MD  5/27/2024 12:07 PM EDT  Workstation ID: [id number]     Results for orders placed during the hospital encounter of 02/10/17    Adult Transthoracic Echo Complete    Interpretation Summary  · Normal left ventricular cavity size and wall thickness noted. All left ventricular wall segments contract normally.  · Estimated EF appears to be in the range of 61 - 65%.  · Left ventricular diastolic dysfunction is  noted (grade I) consistent with impaired relaxation.  · The aortic valve is structurally normal. No aortic valve regurgitation is present. No aortic valve stenosis is present.  · The mitral valve is normal in structure. No mitral valve regurgitation is present. No significant mitral valve stenosis is present.  · The tricuspid valve is normal. No tricuspid valve stenosis is present. No tricuspid valve regurgitation is present.  · There is no evidence of pericardial effusion.      Assessment & Plan   Assessment & Plan     Active Hospital Problems    Diagnosis  POA    **S/P laparoscopic sleeve gastrectomy [Z98.84]  Not Applicable    Pneumonia involving right lung [J18.9]  Unknown      Resolved Hospital Problems    Diagnosis Date Resolved POA    Hiatal hernia with gastroesophageal reflux [K44.9, K21.9] 05/24/2024 Yes     33 y.o. female with PMH significant for GERD, obesity s/p gastric sleeve and hiatal hernia repair May 2022, who presented to [facility] on 5/24/2024 for planned surgical repair of recurrent hiatal hernia with revision to Teresa-en-Y gastric bypass per Dr. Ricardo who has concern for Right lobe pneumonia.    Conversion of sleeve to RYGB, recurrent hiatal hernia repair with falciform ligament reinforcement  - Care per bariatric surgery  - Postop day #4    Pneumonia right lung  - CBC, BMP, lactic acid, procalcitonin pending  - BC x 2 pending  - Rocephin 2 g IV daily  - Sputum culture    GERD  - Continue daily Protonix    Thank you for allowing [facility] to provide consultative care for your patient, we will continue to follow while clinically appropriate.    PHYLICIA Torres  05/28/24    Electronically signed by PHYLICIA Torres, 05/28/24, 7:41 PM EDT.              Electronically signed by Swetha Khan APRN at 05/28/24 1941       Discharge Summary    No notes of this type exist for this encounter.       Discharge Order (From admission, onward)       Start     Ordered    05/30/24  1414  Discharge patient  Once        Expected Discharge Date: 05/30/24   Discharge Disposition: Home or Self Care   Physician of Record for Attribution - Please select from Treatment Team: KELBY ERNST [[id number]]   Review needed by CMO to determine Physician of Record: No      Question Answer Comment   Physician of Record for Attribution - Please select from Treatment Team KELBY ERNST    Review needed by CMO to determine Physician of Record No        05/30/24 1414    05/30/24 1354  Discharge patient  Once        Expected Discharge Date: 05/30/24   Discharge Disposition: Home or Self Care   Physician of Record for Attribution - Please select from Treatment Team: KELBY ERNST [[id number]]   Review needed by CMO to determine Physician of Record: No      Question Answer Comment   Physician of Record for Attribution - Please select from Treatment Team KELBY ERNST    Review needed by CMO to determine Physician of Record No        05/30/24 1354    05/29/24 0955  Discharge patient  Once,   Status:  Canceled        Expected Discharge Date: 05/29/24   Discharge Disposition: Home or Self Care   Physician of Record for Attribution - Please select from Treatment Team: KELBY ERNST [[id number]]   Review needed by CMO to determine Physician of Record: No      Question Answer Comment   Physician of Record for Attribution - Please select from Treatment Team KELBY ERNST    Review needed by CMO to determine Physician of Record No        05/29/24 0956    05/28/24 1517  Discharge patient  Once,   Status:  Canceled        Expected Discharge Date: 05/28/24   Discharge Disposition: Home or Self Care   Physician of Record for Attribution - Please select from Treatment Team: KELBY ERNST [[id number]]   Review needed by CMO to determine Physician of Record: No      Question Answer Comment   Physician of Record for Attribution - Please select from Treatment Team KELBY ERNST    Review needed by CMO to  determine Physician of Record No        05/28/24 1512

## 2024-05-30 NOTE — CASE MANAGEMENT/SOCIAL WORK
Case Management Discharge Note      Final Note: Patient is discharging today. CM spoke to patient and her plan is home with spouse to transport. She denies any equipment or discharge needs.         Selected Continued Care - Admitted Since 5/24/2024       Destination    No services have been selected for the patient.                Durable Medical Equipment    No services have been selected for the patient.                Dialysis/Infusion    No services have been selected for the patient.                Home Medical Care    No services have been selected for the patient.                Therapy    No services have been selected for the patient.                Community Resources    No services have been selected for the patient.                Community & DME    No services have been selected for the patient.                    Transportation Services  Private: Car    Final Discharge Disposition Code: 01 - home or self-care

## 2024-05-30 NOTE — PLAN OF CARE
Problem: Adult Inpatient Plan of Care  Goal: Plan of Care Review  Outcome: Ongoing, Progressing  Flowsheets  Taken 5/30/2024 0246 by Beth Jules, RN  Outcome Evaluation: Patient restless overnight, but complains of minimal pain that is resloved with PRN analgesics. No reports of nausea or vomiting. A&O x4. No acute events or concerns during the shift.  Taken 5/29/2024 1730 by Pauly Miguel RNA  Progress: improving  Plan of Care Reviewed With: patient  Goal: Patient-Specific Goal (Individualized)  Outcome: Ongoing, Progressing  Goal: Absence of Hospital-Acquired Illness or Injury  Outcome: Ongoing, Progressing  Goal: Optimal Comfort and Wellbeing  Outcome: Ongoing, Progressing  Goal: Readiness for Transition of Care  Outcome: Ongoing, Progressing     Problem: Bariatric Care Environmental Safety (Bariatric Surgery)  Goal: Safety Maintained with Care  Outcome: Ongoing, Progressing     Problem: Bleeding (Bariatric Surgery)  Goal: Absence of Bleeding  Outcome: Ongoing, Progressing     Problem: Bowel Motility Impaired (Bariatric Surgery)  Goal: Effective Bowel Motility and Elimination  Outcome: Ongoing, Progressing     Problem: Fluid and Electrolyte Imbalance (Bariatric Surgery)  Goal: Fluid and Electrolyte Balance  Outcome: Ongoing, Progressing     Problem: Glycemic Control Impaired (Bariatric Surgery)  Goal: Blood Glucose Level Within Desired Range  Outcome: Ongoing, Progressing     Problem: Infection (Bariatric Surgery)  Goal: Absence of Infection Signs and Symptoms  Outcome: Ongoing, Progressing     Problem: Ongoing Anesthesia Effects (Bariatric Surgery)  Goal: Anesthesia/Sedation Recovery  Outcome: Ongoing, Progressing  Intervention: Optimize Anesthesia Recovery  Recent Flowsheet Documentation  Taken 5/29/2024 2000 by Beth Jules, RN  Patient Tolerance (IS): good     Problem: Pain (Bariatric Surgery)  Goal: Acceptable Pain Control  Outcome: Ongoing, Progressing     Problem: Postoperative Nausea and  Vomiting (Bariatric Surgery)  Goal: Nausea and Vomiting Relief  Outcome: Ongoing, Progressing     Problem: Postoperative Urinary Retention (Bariatric Surgery)  Goal: Effective Urinary Elimination  Outcome: Ongoing, Progressing     Problem: Respiratory Compromise (Bariatric Surgery)  Goal: Effective Oxygenation and Ventilation  Outcome: Ongoing, Progressing     Problem: Fall Injury Risk  Goal: Absence of Fall and Fall-Related Injury  Outcome: Ongoing, Progressing     Problem: Pain (Bariatric Surgery)  Goal: Acceptable Pain Control  Outcome: Ongoing, Progressing     Problem: Adult Inpatient Plan of Care  Goal: Plan of Care Review  Outcome: Ongoing, Progressing  Flowsheets  Taken 5/30/2024 0246 by Beth Jules, RN  Outcome Evaluation: Patient restless overnight, but complains of minimal pain that is resloved with PRN analgesics. No reports of nausea or vomiting. A&O x4. No acute events or concerns during the shift.  Taken 5/29/2024 1730 by Pauly Miguel RNA  Progress: improving  Plan of Care Reviewed With: patient  Goal: Patient-Specific Goal (Individualized)  Outcome: Ongoing, Progressing  Goal: Absence of Hospital-Acquired Illness or Injury  Outcome: Ongoing, Progressing  Goal: Optimal Comfort and Wellbeing  Outcome: Ongoing, Progressing  Goal: Readiness for Transition of Care  Outcome: Ongoing, Progressing     Problem: Bariatric Care Environmental Safety (Bariatric Surgery)  Goal: Safety Maintained with Care  Outcome: Ongoing, Progressing     Problem: Bleeding (Bariatric Surgery)  Goal: Absence of Bleeding  Outcome: Ongoing, Progressing     Problem: Bowel Motility Impaired (Bariatric Surgery)  Goal: Effective Bowel Motility and Elimination  Outcome: Ongoing, Progressing     Problem: Fluid and Electrolyte Imbalance (Bariatric Surgery)  Goal: Fluid and Electrolyte Balance  Outcome: Ongoing, Progressing     Problem: Glycemic Control Impaired (Bariatric Surgery)  Goal: Blood Glucose Level Within Desired  Range  Outcome: Ongoing, Progressing     Problem: Infection (Bariatric Surgery)  Goal: Absence of Infection Signs and Symptoms  Outcome: Ongoing, Progressing     Problem: Ongoing Anesthesia Effects (Bariatric Surgery)  Goal: Anesthesia/Sedation Recovery  Outcome: Ongoing, Progressing  Intervention: Optimize Anesthesia Recovery  Recent Flowsheet Documentation  Taken 5/29/2024 2000 by Beth Jules, RN  Patient Tolerance (IS): good     Problem: Pain (Bariatric Surgery)  Goal: Acceptable Pain Control  Outcome: Ongoing, Progressing     Problem: Postoperative Nausea and Vomiting (Bariatric Surgery)  Goal: Nausea and Vomiting Relief  Outcome: Ongoing, Progressing     Problem: Postoperative Urinary Retention (Bariatric Surgery)  Goal: Effective Urinary Elimination  Outcome: Ongoing, Progressing     Problem: Respiratory Compromise (Bariatric Surgery)  Goal: Effective Oxygenation and Ventilation  Outcome: Ongoing, Progressing     Problem: Fall Injury Risk  Goal: Absence of Fall and Fall-Related Injury  Outcome: Ongoing, Progressing   Goal Outcome Evaluation:              Outcome Evaluation: Patient restless overnight, but complains of minimal pain that is resloved with PRN analgesics. No reports of nausea or vomiting. A&O x4. No acute events or concerns during the shift.

## 2024-05-30 NOTE — PROGRESS NOTES
[facility] Medicine Services  PROGRESS NOTE    Patient Name: Theresa Maya  : 1990  MRN: [id number]    Date of Admission: 2024  Primary Care Physician: Gina Green APRN    Subjective   Subjective     CC:  Consult for PNA     HPI:  Feels much better today, denies any issues overnight. No further fevers      Objective   Objective     Vital Signs:   Temp:  [98.4 °F (36.9 °C)-99.1 °F (37.3 °C)] 98.4 °F (36.9 °C)  Heart Rate:  [] 92  Resp:  [16] 16  BP: ()/(56-75) 118/63  Flow (L/min):  [0-2] 0     Physical Exam:  Constitutional: No acute distress, awake, alert  HENT: NCAT, mucous membranes moist  Respiratory: Clear to auscultation bilaterally, respiratory effort normal   Cardiovascular: RRR, no murmurs, rubs, or gallops  Gastrointestinal: Positive bowel sounds, soft, nontender, nondistended  Musculoskeletal: No bilateral ankle edema  Psychiatric: Appropriate affect, cooperative  Neurologic: Oriented x 3, strength symmetric in all extremities, Cranial Nerves grossly intact to confrontation, speech clear  Skin: No rashes     Results Reviewed:  LAB RESULTS:      Lab 24  0643 24  0709 24  0605 24  2224 24  1155 24  0507   WBC 5.72 9.51 11.33* 9.95  --   --  7.16   HEMOGLOBIN 7.8* 8.0* 8.8* 8.5* 9.1*   < > 8.9*   HEMATOCRIT 24.5* 25.0* 28.0* 26.6* 28.4*  --  27.9*   PLATELETS 221 227 227 240  --   --  244   NEUTROS ABS 4.48 8.00* 9.53* 7.89*  --   --  5.78   IMMATURE GRANS (ABS) 0.02 0.05 0.04 0.14*  --   --  0.02   LYMPHS ABS 0.60* 0.51* 0.65* 0.77  --   --  0.64*   MONOS ABS 0.44 0.85 1.06* 1.03*  --   --  0.55   EOS ABS 0.17 0.08 0.03 0.10  --   --  0.15   MCV 83.9 82.8 85.9 83.9  --   --  84.0   PROCALCITONIN  --   --  0.60*  --   --   --   --    LACTATE  --   --  0.8  --   --   --   --     < > = values in this interval not displayed.         Lab 24  0643 24  0709 24  0605  05/27/24  0507   SODIUM 140 137 134* 132* 138   POTASSIUM 3.7 3.7 3.7 3.6 3.8   CHLORIDE 105 104 101 100 103   CO2 20.0* 21.0* 19.0* 23.0 25.0   ANION GAP 15.0 12.0 14.0 9.0 10.0   BUN 6 6 5* 3* 3*   CREATININE 0.47* 0.59 0.65 0.64 0.66   EGFR 129.1 122.2 119.4 119.8 119.0   GLUCOSE 70 77 87 113* 96   CALCIUM 7.7* 7.7* 7.9* 7.8* 8.3*         Lab 05/30/24  0643 05/29/24  0709 05/28/24  0605 05/27/24  0507 05/26/24  0726   TOTAL PROTEIN 5.0* 5.7* 5.6* 6.3 5.6*   ALBUMIN 2.8* 2.9* 3.1* 3.5 3.3*   GLOBULIN 2.2 2.8 2.5 2.8 2.3   ALT (SGPT) 9 9 10 12 14   AST (SGOT) 14 11 16 20 20   BILIRUBIN 0.3 0.5 0.5 0.3 0.2   ALK PHOS 79 74 85 70 60                 Lab 05/27/24  1346 05/25/24  0531   IRON  --  19*   ABO TYPING A  --    RH TYPING Positive  --    ANTIBODY SCREEN Positive  --          Brief Urine Lab Results  (Last result in the past 365 days)        Color   Clarity   Blood   Leuk Est   Nitrite   Protein   CREAT   Urine HCG        05/28/24 1948 Yellow   Cloudy   Negative   Negative   Negative   30 mg/dL (1+)                   Microbiology Results Abnormal       Procedure Component Value - Date/Time    Blood Culture - Blood, Hand, Left [[id number]]  (Normal) Collected: 05/28/24 1955    Lab Status: Preliminary result Specimen: Blood from Hand, Left Updated: 05/29/24 2030     Blood Culture No growth at 24 hours    Blood Culture - Blood, Hand, Right [[id number]]  (Normal) Collected: 05/28/24 2001    Lab Status: Preliminary result Specimen: Blood from Hand, Right Updated: 05/29/24 2030     Blood Culture No growth at 24 hours    S. Pneumo Ag Urine or CSF - Urine, Urine, Clean Catch [[id number]]  (Normal) Collected: 05/28/24 1948    Lab Status: Final result Specimen: Urine, Clean Catch Updated: 05/29/24 0950     Strep Pneumo Ag Negative    Legionella Antigen, Urine - Urine, Urine, Clean Catch [[id number]]  (Normal) Collected: 05/28/24 1948    Lab Status: Final result Specimen: Urine, Clean Catch Updated: 05/29/24 0950      LEGIONELLA ANTIGEN, URINE Negative    MRSA Screen, PCR (Inpatient) - Swab, Nares [[id number]]  (Normal) Collected: 05/29/24 0626    Lab Status: Final result Specimen: Swab from Nares Updated: 05/29/24 0908     MRSA PCR Negative    Narrative:      The negative predictive value of this diagnostic test is high and should only be used to consider de-escalating anti-MRSA therapy. A positive result may indicate colonization with MRSA and must be correlated clinically.  MRSA Negative            XR Chest PA & Lateral    Result Date: 5/28/2024  XR CHEST PA AND LATERAL Date of Exam: 5/28/2024 4:20 PM EDT Indication: fever, tachycardia Comparison: 5/9/2022. Findings: Right perihilar opacity is present, somewhat nonspecific but concerning for underlying airspace disease given provided history. There is also some linear opacity at the left lung base which may represent atelectasis. There is no distinct pneumothorax. Normal heart and mediastinal contours.     Impression: Impression: Nonspecific right perihilar opacity which could represent airspace disease such as pneumonia. Electronically Signed: Kerwin Bateman MD  5/28/2024 4:31 PM EDT  Workstation ID: [id number]     Results for orders placed during the hospital encounter of 02/10/17    Adult Transthoracic Echo Complete    Interpretation Summary  · Normal left ventricular cavity size and wall thickness noted. All left ventricular wall segments contract normally.  · Estimated EF appears to be in the range of 61 - 65%.  · Left ventricular diastolic dysfunction is noted (grade I) consistent with impaired relaxation.  · The aortic valve is structurally normal. No aortic valve regurgitation is present. No aortic valve stenosis is present.  · The mitral valve is normal in structure. No mitral valve regurgitation is present. No significant mitral valve stenosis is present.  · The tricuspid valve is normal. No tricuspid valve stenosis is present. No tricuspid valve regurgitation is  present.  · There is no evidence of pericardial effusion.      Current medications:  Scheduled Meds:ARIPiprazole, 5 mg, Oral, Daily  enoxaparin, 40 mg, Subcutaneous, Q24H  lamoTRIgine, 150 mg, Oral, Daily  meropenem, 1,000 mg, Intravenous, Q8H  montelukast, 10 mg, Oral, Nightly  pantoprazole, 40 mg, Oral, Q AM      Continuous Infusions:sodium chloride 0.9 % with KCl 20 mEq, 83 mL/hr, Last Rate: 83 mL/hr (24 0136)      PRN Meds:.  acetaminophen **OR** acetaminophen **OR** acetaminophen    Albuterol Sulfate NEB Orderable    ALPRAZolam    diphenhydrAMINE    hydrALAZINE    HYDROmorphone **AND** [DISCONTINUED] naloxone    HYDROmorphone    labetalol    LORazepam    Morphine **AND** naloxone    [] ondansetron **FOLLOWED BY** ondansetron ODT    phenol    prochlorperazine    prochlorperazine    promethazine    simethicone    Assessment & Plan   Assessment & Plan     Active Hospital Problems    Diagnosis  POA    **S/P laparoscopic sleeve gastrectomy [Z98.84]  Not Applicable    Pneumonia involving right lung [J18.9]  No      Resolved Hospital Problems    Diagnosis Date Resolved POA    Hiatal hernia with gastroesophageal reflux [K44.9, K21.9] 2024 Yes        Brief Hospital Course to date:  Theresa Maya is a 33 y.o. female with PMH significant for GERD, obesity s/p gastric sleeve and hiatal hernia repair May 2022, who presented to [facility] on 2024 for planned surgical repair of recurrent hiatal hernia with revision to Teresa-en-Y gastric bypass per Dr. Ricardo.  She developed a fever on  and imaging findings were concerning for RLL PNA. We were consulted for further management.      Conversion of sleeve to RYGB, recurrent hiatal hernia repair with falciform ligament reinforcement  - Care per bariatric surgery     Pneumonia right lung  Sepsis (fever, tachycardia, source)  -- possibly aspiration PNA given recent anesthesia and location of PNA  - leukocytosis with left shift as well as mildly elevated  procal  -- lactic acid wnl  - BC x 2 NGTD  - continue ABX with Moxifloxacin 400 mg daily x 5 more days (starting tomorrow, 5/31) upon s/x   -- urine Strep, Legionella Ags and repeat MRSA PCR (was negative prior to hospitalization) all negative  - Sputum culture     GERD  - Continue daily Protonix       Total time spent: 35 minutes  Time spent includes time reviewing chart, face-to-face time, counseling patient/family/caregiver, ordering medications/tests/procedures, communicating with other health care professionals, documenting clinical information in the electronic health record, and coordination of care.      Expected Discharge Location and Transportation: per primary team   Expected Discharge   Expected Discharge Date: 5/30/2024; Expected Discharge Time:      DVT prophylaxis:  Medical and mechanical DVT prophylaxis orders are present.         AM-PAC 6 Clicks Score (PT): 24 (05/30/24 0831)    CODE STATUS:   Code Status and Medical Interventions:   Ordered at: 05/24/24 1142     Level Of Support Discussed With:    Patient     Code Status (Patient has no pulse and is not breathing):    CPR (Attempt to Resuscitate)     Medical Interventions (Patient has pulse or is breathing):    Full Support       Paulina Del Rosario MD  05/30/24

## 2024-05-30 NOTE — OUTREACH NOTE
Prep Survey      Flowsheet Row Responses   [facility] patient discharged from? [place]   Is LACE score < 7 ? No   Eligibility [facility]   Date of Admission 05/24/24   Date of Discharge 05/30/24   Discharge Disposition Home or Self Care   Discharge diagnosis GASTRIC BYPASS LAPAROSCOPIC   Does the patient have one of the following disease processes/diagnoses(primary or secondary)? General Surgery   Does the patient have Home health ordered? No   Is there a DME ordered? No   Prep survey completed? Yes            Jackie CHENG - Registered Nurse

## 2024-05-30 NOTE — PROGRESS NOTES
"Cc: POD#6 robotic revision sleeve gastrectomy to Teresa-en-Y gastric bypass and recurrent hiatal hernia repair for uncontrollable GE reflux \"ready to go home\"    Moxifloxacin #1    She is dressed and ready to go home.  Her  is in the room.  She says she can tolerate 3 protein shakes a day no problem.  Passing flatus and now having some diarrhea.  Ambulating and voiding well.  No pulmonary complaints, spirometer 2000 observed.  She notes that her preoperative reflux symptoms have resolved.  Denies any cough, dyspnea or sputum production.    No fever or tachycardia pulse 73 respirations 18 blood pressure 98/68 saturation 97%. UO NR.  She is in no apparent distress.  Abdomen soft and benign, wounds healing without evidence of infection.  Scattered mild soft bruising.  Old CHRISTINA dressing dry.    CMP normal  creatinine 0.47 CO2 20 calcium 7.7 total protein 5.0 abdomen 2.8.  White count normal at 5.7 with 78 segs 11 lymphs 8 monocytes 3 eosinophils no bands.  H&H 7.8 and 24.5.  No new imaging results.  Blood cultures no growth at 24 hours.    Impression: Postop day #6 robotic conversion of sleeve gastrectomy to Teresa-en-Y gastric bypass and recurrent hiatal hernia repair for severe GE reflux disease.  Patient tolerating her diet and her reflux symptoms have resolved.  Suggested possible right sided pneumonia on moxifloxacin.  Stable iron deficiency anemia without evidence of bleeding on Lovenox.    Plan: Discharge home if okay with the hospitalist service.  Patient says she has her Eliquis at home.  Reiterated avoiding aspirin, NSAIDs and steroids indefinitely.  Continue daily PPI, decrease dose to once daily.  Follow-up in the office in approximately 1 week and call in the meantime with any problems questions or concerns.  Additional antibiotics if desired per hospitalist service.  See orders.  "

## 2024-05-31 ENCOUNTER — OFFICE VISIT (OUTPATIENT)
Dept: BARIATRICS/WEIGHT MGMT | Facility: CLINIC | Age: 34
End: 2024-05-31
Payer: COMMERCIAL

## 2024-05-31 ENCOUNTER — TRANSITIONAL CARE MANAGEMENT TELEPHONE ENCOUNTER (OUTPATIENT)
Dept: CALL CENTER | Facility: HOSPITAL | Age: 34
End: 2024-05-31
Payer: COMMERCIAL

## 2024-05-31 VITALS
WEIGHT: 213 LBS | BODY MASS INDEX: 36.37 KG/M2 | SYSTOLIC BLOOD PRESSURE: 112 MMHG | HEART RATE: 83 BPM | DIASTOLIC BLOOD PRESSURE: 66 MMHG | HEIGHT: 64 IN | TEMPERATURE: 98.2 F

## 2024-05-31 DIAGNOSIS — Z98.84 S/P LAPAROSCOPIC SLEEVE GASTRECTOMY: Primary | ICD-10-CM

## 2024-05-31 DIAGNOSIS — K21.9 HIATAL HERNIA WITH GASTROESOPHAGEAL REFLUX: ICD-10-CM

## 2024-05-31 DIAGNOSIS — R10.13 DYSPEPSIA: ICD-10-CM

## 2024-05-31 DIAGNOSIS — K44.9 HIATAL HERNIA WITH GASTROESOPHAGEAL REFLUX: ICD-10-CM

## 2024-05-31 PROCEDURE — 99024 POSTOP FOLLOW-UP VISIT: CPT | Performed by: SURGERY

## 2024-05-31 PROCEDURE — 1159F MED LIST DOCD IN RCRD: CPT | Performed by: SURGERY

## 2024-05-31 PROCEDURE — 1160F RVW MEDS BY RX/DR IN RCRD: CPT | Performed by: SURGERY

## 2024-05-31 NOTE — OUTREACH NOTE
Call Center TCM Note      Flowsheet Row Responses   [facility] patient discharged from? [place]   Does the patient have one of the following disease processes/diagnoses(primary or secondary)? General Surgery   TCM attempt successful? Yes   Call start time 1539   Call end time 1541   Discharge diagnosis GASTRIC BYPASS LAPAROSCOPIC   Person spoke with today (if not patient) and relationship patient   Meds reviewed with patient/caregiver? Yes   Is the patient having any side effects they believe may be caused by any medication additions or changes? No   Does the patient have all medications related to this admission filled (includes all antibiotics, pain medications, etc.) Yes   Is the patient taking all medications as directed (includes completed medication regime)? Yes   Comments Patient has hospital followup on 6/10/2024 with PCP and saw surgeon today.   Does the patient have an appointment with their PCP within 7-14 days of discharge? Yes   Psychosocial issues? No   Did the patient receive a copy of their discharge instructions? Yes   Nursing interventions Reviewed instructions with patient   What is the patient's perception of their health status since discharge? Improving   Is the patient/caregiver able to teach back signs and symptoms of incisional infection? Increased redness, swelling or pain at the incisonal site, Incisional warmth, Fever, Pus or odor from incision, Increased drainage or bleeding   Is the patient/caregiver able to teach back steps to recovery at home? Set small, achievable goals for return to baseline health, Rest and rebuild strength, gradually increase activity   If the patient is a current smoker, are they able to teach back resources for cessation? Not a smoker   Is the patient/caregiver able to teach back the hierarchy of who to call/visit for symptoms/problems? PCP, Specialist, Home health nurse, Urgent Care, ED, 911 Yes   TCM call completed? Yes   Wrap up additional comments  Patient is doing great!   Call end time 2324   Would this patient benefit from a Referral to [facility] Social Work? No   Is the patient interested in additional calls from an ambulatory ? No            Ashu Hunter RN    5/31/2024, 15:41 EDT

## 2024-05-31 NOTE — DISCHARGE SUMMARY
Date of admission:   5/24/2024    Date of discharge:   5/30/2024    Admission Diagnosis:       Recurrent hiatal hernia and severe GE reflux not controlled with maximal medical therapy status post laparoscopic sleeve gastrectomy and hiatal hernia repair May 2022        Discharge Diagnosis:  Same    Other diagnoses:   history of DVT right lower extremity 2014, dyspnea exertion, elevated hemoglobin A1c, fatigue, upper lipidemia, incomplete right bundle branch block, history of iron deficiency anemia, migraine headaches, ovarian cyst, history of UTIs, hysterectomy January 2024, lap nicolas 2006, sinus surgery, tubal ligation, allergies to amoxicillin, doxycycline, latex, and penicillins (Keflex okay if she takes with food)    Principal Procedure Performed:  Laparoscopic robotic assisted antecolic revision previous sleeve gastrectomy to Teresa-en-Y gastric bypass                                        Laparoscopic robotic assisted recurrent hiatal hernia repair  (not paraesophageal) with falciform ligament reinforcement                                                                       EGD 5/24/2024    Other procedures performed:    Upper GI 5/25/2024, CT scan abdomen pelvis 5/27/2024, chest x-ray PA and lateral 5/28/2024    Complications:   Right-sided pneumonia    Consultations:   Hospitalist service    History of present illness:  This is a 33-year-old female known to me status post laparoscopic sleeve gastrectomy and hiatal hernia repair in May 2022.  She presented with complaints of recurrent reflux symptoms not controlled with maximal medical therapy.  EGD and upper GI showed a small recurrent hiatal hernia and distal esophageal biopsies were consistent with reflux.  Please see my office notes.  Risks, benefits, and alternative therapies were discussed and she wished to proceed with laparoscopic possible robotic assisted recurrent hiatal hernia pair and revision of her sleeve gastrectomy to a Teresa-en-Y gastric  bypass to hopefully alleviate her symptoms.     Hospital Course:  The patient was admitted and underwent uneventful surgery as described.  Postoperatively the patient was transferred to the bariatric telemetry unit. Upper GI on postoperative day #1 was unremarkable.  She complained of nausea and vomiting however.  She was given IV iron for low level.  Albumin low at 3.3, prealbumin 19.8.  By the second postoperative day she remained very nauseated but less vomiting.  She was doing well up with her spirometer.  Hemoglobin was low at baseline and after surgery but remained stable on Lovenox without signs of active bleeding.  A CT scan was performed to assess for bleeding as her hemoglobin had dropped below 9, no orthostatic symptoms.  CT scan was read as a small amount of intra-abdominal hematoma, very subtle on review of the images.  Her nausea slowly improved and she was able to tolerate small amounts of liquids.  CHRISTINA drain remains serosanguineous.  On postoperative day #4 she complained of feeling poorly and chest tightness.  She had some tachycardia.  She continued to do well with her spirometer.  She was tolerating oral intake much better.  It was felt that she was ready for discharge and discharge orders were written including removal of her CHRISTINA prior to discharge.  Shortly thereafter however she spiked a fever to 101.  Chest x-ray showed possible right-sided pneumonia.  Empiric Rocephin begun and the hospitalist service consulted.  They felt she had possible aspiration pneumonia and noted leukocytosis with left shift as well as mildly elevated procalcitonin, normal lactic acid.  They switched antibiotics to meropenem with plans to trial moxifloxacin prior to discharge.  They obtained a urine strep, Legionella antigens, and repeat MRSA PCR all of which were negative.  Blood cultures were negative.  The next day she felt well, her CHRISTINA drain was removed and again discharge orders written if okay with the hospitalist  service however they felt she needed another day of IV antibiotics.  At the time of discharge on postop day #6 she continues to feel well, tolerating protein shakes without nausea or vomiting.  Ambulating and voiding well.  Has passed flatus and had bowel movement since surgery.  No pulmonary complaints.   No fever or tachycardia pulse 73 respirations 18 blood pressure 98/68 saturation 97%. UO NR.  She is in no apparent distress.  Abdomen soft and benign, wounds healing without evidence of infection.  Scattered mild soft bruising.  Old CHRISTINA dressing dry.   CMP normal  creatinine 0.47 CO2 20 calcium 7.7 total protein 5.0 abdomen 2.8. White count normal at 5.7 with 78 segs 11 lymphs 8 monocytes 3 eosinophils no bands. H&H 7.8 and 24.5. No new imaging results. Blood cultures no growth at 24 hours.     She was therefore discharged home.  Patient says she has her Eliquis at home.  Reiterated avoiding aspirin, NSAIDs and steroids indefinitely.  Continue daily PPI, decrease dose to once daily.  Follow-up in the office in approximately 1 week and call in the meantime with any problems questions or concerns.  Additional antibiotics if desired per hospitalist service, their note said moxifloxacin 400 mg daily for 5 more days starting on 5/31/2024.  See orders.

## 2024-05-31 NOTE — PROGRESS NOTES
[facility] Bariatric Surgery  [address]  [address]  [facility] [id number] [phone]      Patient Name:  Theresa Maya.  :  1990      Date of Visit: 2024      Reason for Visit:  POD #7    HPI:  Theresa Maya is a 33 y.o. female s/p 24 sleeve to bypass, recurrent HHr and falciform ligament reconstruction.  Stayed until POD#6 with poor oral intake, anemia.  CT scan showed only a small collection of blood.  The day before discharge, she spiked a high fever and CXR was c/w pneumonia.  She was discharged home on 5 days of moxifloxacin.    Doing well.  No issues/concerns. Denies dysphagia, reflux, nausea, and vomiting.  Tolerating diet progression - on stage 1.  Getting 20g prot/day.  Hard to get very much down d/t dysphagia, shake is too thick.  Had some protein powder in applesauce.    Drinking 0 fluid oz/day today, yesterday 20 oz water and 7 oz shake.  Taking MVI, B12, B1, Calcium, Vit D, iron, Vit C, and vit K in her vit D pill .  On Nexium and Eliquis  (hx of DVT and MTHFR mutation).  Holding ASA , NSAIDs , and Steroids.  Ambulating.     Constipation?: denies, stools are actually loose.  Lots of coughing yesterday and today, more after IS (2000 mL).  Denies fever.  Taking moxifloxacin.       Presurgery weight: 216 pounds.  Today's weight is 96.6 kg (213 lb) pounds, today's  Body mass index is 37.14 kg/m²., and weight loss since surgery is 3 pounds.          Past Medical History:   Diagnosis Date    Abnormal Pap smear of cervix     ADHD (attention deficit hyperactivity disorder)     Working with a therapy at     Allergic     Take montalukast    Anesthesia complication     SLOW TO WAKE UP AFTER GASTRO AND HYSTERECTOMY    Anxiety     Arthritis     Clotting disorder     Deep vein thrombosis     2014, (R) leg while pregnant, dx w/ MTHFR    Depression     Dermatitis     UNDER BREAST AT TIMES    Dyspepsia     Dyspnea on exertion     Elevated  hemoglobin A1c     Fatigue     Gallbladder abscess     s/p lap nicolas 2006    GERD (gastroesophageal reflux disease)     UGI 9/23 - small recurrent HH, EGD Dr. Ricardo  11/23 small recurrent HH    Heartburn     chronic/episodic, depends on what she eats, takes Pepcid prn, EGD Dr. Ricardo 11/21    Hyperemesis gravidarum     Hyperlipidemia     Incomplete right bundle branch block     Iron deficiency anemia     Migraines     Miscarriage     x 4 prior to 1st pregnancy, presumably from undiagnosed MTHFR mutation    Morbid obesity with body mass index (BMI) of 40.0 to 44.9 in adult 04/19/2022    MTHFR deficiency complicating pregnancy     says clotting d/o only an issue when pregnant, advised to use heparin shots during pregnancy    Obesity     Ovarian cyst     Recurrent pregnancy loss, antepartum condition or complication     had a [facility] and lost the pregnancy at 6 months    Strep pharyngitis     Urinary tract infection     Wears glasses      Past Surgical History:   Procedure Laterality Date    ENDOSCOPY N/A 05/05/2022    Procedure: ESOPHAGOGASTRODUODENOSCOPY;  Surgeon: Cristiano Ricardo MD;  Location:  [facility];  Service: Bariatric;  Laterality: N/A;    ENDOSCOPY N/A 5/24/2024    Procedure: ESOPHAGOGASTRODUODENOSCOPY;  Surgeon: Cristiano Ricardo MD;  Location:  [facility];  Service: Robotics - DaVinci;  Laterality: N/A;  EGD # 752 SCOPE USED    GASTRECTOMY N/A 05/05/2022    Procedure: GASTRECTOMY LAPAROSCOPIC;  Surgeon: Cristiano Ricardo MD;  Location:  [facility];  Service: Bariatric;  Laterality: N/A;    GASTRIC BYPASS N/A 5/24/2024    Procedure: GASTRIC BYPASS LAPAROSCOPIC WITH DAVINCI ROBOT;  Surgeon: Cristiano Ricardo MD;  Location:  [facility];  Service: Robotics - DaVinci;  Laterality: N/A;    HIATAL HERNIA REPAIR N/A 05/05/2022    Procedure: HIATAL HERNIA REPAIR LAPAROSCOPIC;  Surgeon: Cristiano Ricardo MD;  Location:  [facility];  Service: Bariatric;  Laterality: N/A;    HIATAL HERNIA REPAIR N/A 5/24/2024     "Procedure: RECURRENT HIATAL HERNIA REPAIR LAPAROSCOPIC WITH DAVINCI ROBOT;  Surgeon: Cristiano Ricardo MD;  Location:  [facility];  Service: Robotics - DaVinci;  Laterality: N/A;    HYSTERECTOMY  01/23/2024    [facility]- TOTAL    LAPAROSCOPIC CHOLECYSTECTOMY  2006    no stones, was told she had \"three gallbladders\"- all removed    SINUS SURGERY Bilateral 2006    TUBAL COAGULATION LAPAROSCOPIC Bilateral 09/15/2017    Procedure: BILATERAL TUBAL FALLOPE FILSHIE CLIPPING LAPAROSCOPIC;  Surgeon: Leo Posey III, MD;  [facility]:  [facility];  Service:     VAGINAL DELIVERY  14; 16; 17    female,male, male.  She said she had subcutaneous Lovenox injections throughout the second and third pregnancies until delivery    WISDOM TOOTH EXTRACTION       Outpatient Medications Marked as Taking for the 5/31/24 encounter (Office Visit) with Debbie Atwood MD   Medication Sig Dispense Refill    apixaban (Eliquis) 2.5 MG tablet tablet Take 1 tablet by mouth Every 12 (Twelve) Hours for 42 doses. 42 tablet 0    ARIPiprazole (ABILIFY) 5 MG tablet Take 1 tablet by mouth Daily. 30 tablet 1    Elidel 1 % cream Apply  topically to the appropriate area as directed 2 (Two) Times a Day. (Patient taking differently: Apply  topically to the appropriate area as directed As Needed.) 100 g 2    esomeprazole (nexIUM) 40 MG capsule Take 1 capsule by mouth 2 (Two) Times a Day.      ferrous sulfate 325 (65 FE) MG tablet Take 1 tablet by mouth 2 (Two) Times a Day.      HYDROmorphone (Dilaudid) 2 MG tablet Take 1 tablet by mouth Every 4 (Four) Hours As Needed for Moderate Pain. 10 tablet 0    lamoTRIgine (LaMICtal) 150 MG tablet Take 1 tablet by mouth Daily. 30 tablet 1    levoFLOXacin (LEVAQUIN) 750 MG tablet Take 1 tablet by mouth Daily for 5 days, **STARTING ON 5/31/24** 5 tablet 0    montelukast (Singulair) 10 MG tablet Take 1 tablet by mouth Every Night. 30 tablet 3    multivitamin with minerals tablet tablet Take 1 tablet by mouth " Daily.      ondansetron ODT (ZOFRAN-ODT) 4 MG disintegrating tablet Place 1 tablet on the tongue Every 8 (Eight) Hours As Needed for Nausea or Vomiting. 20 tablet 0    prochlorperazine (COMPAZINE) 10 MG tablet Take 1 tablet by mouth Every 6 (Six) Hours As Needed for Nausea or Vomiting. 30 tablet 1    promethazine (PHENERGAN) 12.5 MG tablet Take 1 tablet by mouth Every 8 (Eight) Hours As Needed for Nausea or Vomiting. 20 tablet 0    Saw Palmetto 80 MG capsule Take 1 capsule by mouth Daily.      triamcinolone (KENALOG) 0.1 % ointment Apply  topically to the appropriate area as directed 2 (Two) Times a Day. (Patient taking differently: Apply 1 Application topically to the appropriate area as directed 2 (Two) Times a Day.) 453.6 g 1    vitamin B-12 (CYANOCOBALAMIN) 500 MCG tablet Take 1 tablet by mouth Daily.      vitamin D3 125 MCG (5000 UT) capsule capsule Take 1 capsule by mouth Daily. AND K 12       Allergies   Allergen Reactions    Amoxicillin Diarrhea and GI Intolerance    Doxycycline Other (See Comments)     Vaginal swelling     Latex Hives and Itching    Penicillins Rash     Says Keflex OK as long as she takes w/ food. Has tolerated cefazolin, ceftriaxone    Beta lactam allergy details  Antibiotic reaction: rash, hives, other (ITCHING)  Age at reaction: adult (2017)  Dose to reaction time: (!) hours  Reason for antibiotic: sore throat (STREP)  Epinephrine required for reaction?: no  Tolerated antibiotics: cephalexin (KEFLEX), Cefazolin, ceftriaxone           Social History     Socioeconomic History    Marital status:    Tobacco Use    Smoking status: Never    Smokeless tobacco: Never    Tobacco comments:     disgust me never touched   Vaping Use    Vaping status: Never Used   Substance and Sexual Activity    Alcohol use: Not Currently     Comment: do not drink weekly only on holidays/ birtdays    Drug use: No    Sexual activity: Yes     Partners: Male     Birth control/protection: Surgical     Comment:  "bilatiral tubal       /66 (BP Location: Right arm, Patient Position: Sitting)   Pulse 83   Temp 98.2 °F (36.8 °C)   Ht 161.3 cm (63.5\")   Wt 96.6 kg (213 lb)   LMP 08/07/2023 (Approximate)   BMI 37.14 kg/m²   Physical Exam  Constitutional:       General: She is not in acute distress.     Appearance: She is well-developed. She is not diaphoretic.   HENT:      Head: Normocephalic and atraumatic.      Mouth/Throat:      Pharynx: No oropharyngeal exudate.   Eyes:      Conjunctiva/sclera: Conjunctivae normal.      Pupils: Pupils are equal, round, and reactive to light.   Pulmonary:      Effort: Pulmonary effort is normal. No respiratory distress.   Abdominal:      General: There is no distension.      Palpations: Abdomen is soft.      Comments: Incisions healing well.  Lots of bruising.  2 prominent injection site reactions from Lovenox.   Skin:     General: Skin is warm and dry.      Coloration: Skin is not pale.   Neurological:      Mental Status: She is alert and oriented to person, place, and time.      Cranial Nerves: No cranial nerve deficit.   Psychiatric:         Behavior: Behavior normal.         Thought Content: Thought content normal.       Assessment:   POD # 7             ICD-10-CM ICD-9-CM   1. S/P laparoscopic sleeve gastrectomy  Z98.84 V45.86   2. Hiatal hernia with gastroesophageal reflux  K44.9 553.3    K21.9 530.81   3. Dyspepsia  R10.13 536.8       Plan:  Doing well. Continue to advance diet per manual.  Increase protein intake to 100g/day.  Increase exercise/activity as tolerated.  Reviewed lifting restrictions, nothing >25 lbs x 2 more weeks.  Continue vitamins.  Continue PPI.  Continue to avoid ASA/NSAIDs/Steroids for life.  Call w/ problems/concerns.    Discussed Greek yogurt, collagen in soup.  Needs to get on a more diligent schedule with drinking liquids and protein intake.  Reviewed goals.  Continue compazine, really helping.    The patient was instructed to follow up in 3 weeks, " sooner if needed.     Debbie Atwood MD

## 2024-06-02 LAB
BACTERIA SPEC AEROBE CULT: NORMAL
BACTERIA SPEC AEROBE CULT: NORMAL

## 2024-06-07 ENCOUNTER — OFFICE VISIT (OUTPATIENT)
Dept: INTERNAL MEDICINE | Facility: CLINIC | Age: 34
End: 2024-06-07
Payer: COMMERCIAL

## 2024-06-07 ENCOUNTER — TELEPHONE (OUTPATIENT)
Dept: INTERNAL MEDICINE | Facility: CLINIC | Age: 34
End: 2024-06-07
Payer: COMMERCIAL

## 2024-06-07 VITALS
HEIGHT: 64 IN | WEIGHT: 203.2 LBS | OXYGEN SATURATION: 98 % | BODY MASS INDEX: 34.69 KG/M2 | TEMPERATURE: 96.4 F | DIASTOLIC BLOOD PRESSURE: 70 MMHG | HEART RATE: 78 BPM | SYSTOLIC BLOOD PRESSURE: 116 MMHG

## 2024-06-07 DIAGNOSIS — R30.0 DYSURIA: ICD-10-CM

## 2024-06-07 DIAGNOSIS — R31.9 HEMATURIA, UNSPECIFIED TYPE: ICD-10-CM

## 2024-06-07 DIAGNOSIS — R39.198 DIFFICULTY IN URINATION: Primary | ICD-10-CM

## 2024-06-07 LAB
BILIRUB BLD-MCNC: NEGATIVE MG/DL
CLARITY, POC: ABNORMAL
COLOR UR: ABNORMAL
EXPIRATION DATE: ABNORMAL
GLUCOSE UR STRIP-MCNC: NEGATIVE MG/DL
KETONES UR QL: NEGATIVE
LEUKOCYTE EST, POC: NEGATIVE
Lab: ABNORMAL
NITRITE UR-MCNC: NEGATIVE MG/ML
PH UR: 6 [PH] (ref 5–8)
PROT UR STRIP-MCNC: ABNORMAL MG/DL
RBC # UR STRIP: NEGATIVE /UL
SP GR UR: 1.01 (ref 1–1.03)
UROBILINOGEN UR QL: NORMAL

## 2024-06-07 PROCEDURE — 1126F AMNT PAIN NOTED NONE PRSNT: CPT | Performed by: STUDENT IN AN ORGANIZED HEALTH CARE EDUCATION/TRAINING PROGRAM

## 2024-06-07 PROCEDURE — 87086 URINE CULTURE/COLONY COUNT: CPT | Performed by: STUDENT IN AN ORGANIZED HEALTH CARE EDUCATION/TRAINING PROGRAM

## 2024-06-07 PROCEDURE — 99214 OFFICE O/P EST MOD 30 MIN: CPT | Performed by: STUDENT IN AN ORGANIZED HEALTH CARE EDUCATION/TRAINING PROGRAM

## 2024-06-07 RX ORDER — NITROFURANTOIN 25; 75 MG/1; MG/1
100 CAPSULE ORAL 2 TIMES DAILY
Qty: 10 CAPSULE | Refills: 0 | Status: SHIPPED | OUTPATIENT
Start: 2024-06-07 | End: 2024-06-07

## 2024-06-07 RX ORDER — PHENAZOPYRIDINE HYDROCHLORIDE 100 MG/1
100 TABLET, FILM COATED ORAL 3 TIMES DAILY PRN
Qty: 12 TABLET | Refills: 0 | Status: SHIPPED | OUTPATIENT
Start: 2024-06-07 | End: 2024-06-07

## 2024-06-07 RX ORDER — NITROFURANTOIN 25; 75 MG/1; MG/1
100 CAPSULE ORAL 2 TIMES DAILY
Qty: 10 CAPSULE | Refills: 0 | Status: ON HOLD | OUTPATIENT
Start: 2024-06-07 | End: 2024-06-09

## 2024-06-07 RX ORDER — PHENAZOPYRIDINE HYDROCHLORIDE 100 MG/1
100 TABLET, FILM COATED ORAL 3 TIMES DAILY PRN
Qty: 12 TABLET | Refills: 0 | Status: ON HOLD | OUTPATIENT
Start: 2024-06-07 | End: 2024-06-09

## 2024-06-07 NOTE — TELEPHONE ENCOUNTER
Patient has been notified to make an appointment or be seen at [facility].  I made her an appointment with Dr. Maria today at 2:00.  I also advised patient to call her bariatric office to see if this is a common after having surgery to see if they can send her in an antibiotic.

## 2024-06-07 NOTE — TELEPHONE ENCOUNTER
Caller: Theresa Maya    Relationship: Self    Best call back number: [phone]     What medication are you requestin DAY PILL    What are your current symptoms: PAINFUL URINATION     How long have you been experiencing symptoms: SINCE WEDNESDAY NIGHT    Have you had these symptoms before:    [x] Yes  [] No    Have you been treated for these symptoms before:   [x] Yes  [] No    If a prescription is needed, what is your preferred pharmacy and phone number: [facility] [id number] - [place], [place] - [address] AT [address] & [address] [phone] [facility] [phone] FX     Additional notes: PATIENT STATES THAT SE IS IN AN EXTREME AMOUNT OF UTI PAIN. SHE SAYS THAT THE HOSPITAL GAVE HER ANTIBIOTICS BUT DID NOT GIVE HER ANYTHING FOR A UTI.

## 2024-06-07 NOTE — TELEPHONE ENCOUNTER
Please transfer call to the office when patient calls back.  Need to find out which ER patient was seen at?  What is the name of the antibiotic she was given?

## 2024-06-07 NOTE — PROGRESS NOTES
Office Note     Name: Theresa Maya    : 1990     MRN: [id number]     Chief Complaint  Difficulty Urinating (Hard time urinating and starts to bleed,started Wednesday night and progressed to blood last night while wiping /)    Subjective     History of Present Illness:  Theresa Maya is a 33 y.o. female who presents today for       complains of burning with urination, dysuria, and hesitancy. She has had symptoms for a few days. Patient denies back pain, fever, stomach ache, and vaginal discharge. Patient does not have a history of recurrent UTI. Patient does not have a history of pyelonephritis.     Had surgery on may 24th with gastric bypass.  Was treated for PNA on May 26- May 3.      Review of Systems:   Review of Systems   All other systems reviewed and are negative.      Past Medical History:   Past Medical History:   Diagnosis Date    Abnormal Pap smear of cervix     ADHD (attention deficit hyperactivity disorder)     Working with a therapy at     Allergic     Take montalukast    Anesthesia complication     SLOW TO WAKE UP AFTER GASTRO AND HYSTERECTOMY    Anxiety     Arthritis     Clotting disorder     Deep vein thrombosis     2014, (R) leg while pregnant, dx w/ MTHFR    Depression     Dermatitis     UNDER BREAST AT TIMES    Dyspepsia     Dyspnea on exertion     Elevated hemoglobin A1c     Fatigue     Gallbladder abscess     s/p lap nicolas     GERD (gastroesophageal reflux disease)     UGI  - small recurrent HH, EGD Dr. Ricardo   small recurrent HH    Heartburn     chronic/episodic, depends on what she eats, takes Pepcid prn, EGD Dr. Ricardo     Hyperemesis gravidarum     Hyperlipidemia     Incomplete right bundle branch block     Iron deficiency anemia     Migraines     Miscarriage     x 4 prior to 1st pregnancy, presumably from undiagnosed MTHFR mutation    Morbid obesity with body mass index (BMI) of 40.0 to 44.9 in adult 2022    MTHFR deficiency  "complicating pregnancy     says clotting d/o only an issue when pregnant, advised to use heparin shots during pregnancy    Obesity     Ovarian cyst     Recurrent pregnancy loss, antepartum condition or complication     had [facility] and lost the pregnancy at 6 months    Strep pharyngitis     Urinary tract infection     Wears glasses        Past Surgical History:   Past Surgical History:   Procedure Laterality Date    ENDOSCOPY N/A 05/05/2022    Procedure: ESOPHAGOGASTRODUODENOSCOPY;  Surgeon: Cristiano Ricardo MD;  Location:  [facility];  Service: Bariatric;  Laterality: N/A;    ENDOSCOPY N/A 5/24/2024    Procedure: ESOPHAGOGASTRODUODENOSCOPY;  Surgeon: Cristiano Ricardo MD;  Location:  [facility];  Service: Robotics - DaVinci;  Laterality: N/A;  EGD # 752 SCOPE USED    GASTRECTOMY N/A 05/05/2022    Procedure: GASTRECTOMY LAPAROSCOPIC;  Surgeon: Cristiano Ricardo MD;  Location:  [facility];  Service: Bariatric;  Laterality: N/A;    GASTRIC BYPASS N/A 5/24/2024    Procedure: GASTRIC BYPASS LAPAROSCOPIC WITH DAVINCI ROBOT;  Surgeon: Cristiano Ricardo MD;  Location:  [facility];  Service: Robotics - DaVinci;  Laterality: N/A;    HIATAL HERNIA REPAIR N/A 05/05/2022    Procedure: HIATAL HERNIA REPAIR LAPAROSCOPIC;  Surgeon: Cristiano Ricardo MD;  Location:  [facility];  Service: Bariatric;  Laterality: N/A;    HIATAL HERNIA REPAIR N/A 5/24/2024    Procedure: RECURRENT HIATAL HERNIA REPAIR LAPAROSCOPIC WITH DAVINCI ROBOT;  Surgeon: Cristiano Ricardo MD;  Location:  [facility];  Service: Robotics - DaVinci;  Laterality: N/A;    HYSTERECTOMY  01/23/2024    [facility]- TOTAL    LAPAROSCOPIC CHOLECYSTECTOMY  2006    no stones, was told she had \"three gallbladders\"- all removed    SINUS SURGERY Bilateral 2006    TUBAL COAGULATION LAPAROSCOPIC Bilateral 09/15/2017    Procedure: BILATERAL TUBAL FALLOPE FILSHIE CLIPPING LAPAROSCOPIC;  Surgeon: Leo Posey III, MD;  [facility]:  [facility];  Service:     VAGINAL DELIVERY  14; " 16; 17    female,male, male.  She said she had subcutaneous Lovenox injections throughout the second and third pregnancies until delivery    WISDOM TOOTH EXTRACTION         Family History:   Family History   Problem Relation Age of Onset    Asthma Mother         effected after stroke    Thyroid disease Mother     Heart attack Mother     Obesity Mother     Migraines Mother     Hypertension Mother     COPD Mother     Mental illness Mother         depression    Alcohol abuse Father         abused since childhood    Lupus Sister     Ovarian cancer Sister     Asthma Sister     Breast cancer Maternal Grandmother 54    Lung cancer Maternal Grandmother     Asthma Maternal Grandmother     Hypertension Maternal Grandmother     Lupus Maternal Grandmother     Thyroid disease Maternal Grandmother     Diabetes Maternal Grandmother     Stroke Maternal Grandmother     Cancer Maternal Grandmother         lung    Hypertension Maternal Grandfather     Lung cancer Paternal Grandmother     Obesity Paternal Grandmother     Cancer Paternal Grandmother     Prostate cancer Paternal Grandfather     Cancer Paternal Grandfather         lung    Miscarriages / Stillbirths Maternal Aunt        Social History:   Social History     Socioeconomic History    Marital status:    Tobacco Use    Smoking status: Never    Smokeless tobacco: Never    Tobacco comments:     disgust me never touched   Vaping Use    Vaping status: Never Used   Substance and Sexual Activity    Alcohol use: Not Currently     Comment: do not drink weekly only on holidays/ birtdays    Drug use: No    Sexual activity: Yes     Partners: Male     Birth control/protection: Surgical     Comment: bilatiral tubal       Immunizations:   Immunization History   Administered Date(s) Administered    COVID-19 ([facility]) 08/19/2022    Flu Vaccine Quad PF >36MO 10/24/2016, 10/02/2018    Fluzone (or Fluarix & Flulaval for VFC) >6mos 10/11/2022    HPV Quadrivalent 02/13/2009    Influenza TIV  (IM) 10/10/2013, 12/10/2014    Influenza, Unspecified 10/01/2019, 08/15/2020, 10/01/2023    Pneumococcal Polysaccharide (PPSV23) 01/01/2014    Tdap 01/01/1993, 12/28/2020        Medications:     Current Outpatient Medications:     apixaban (Eliquis) 2.5 MG tablet tablet, Take 1 tablet by mouth Every 12 (Twelve) Hours for 42 doses., Disp: 42 tablet, Rfl: 0    ARIPiprazole (ABILIFY) 5 MG tablet, Take 1 tablet by mouth Daily., Disp: 30 tablet, Rfl: 1    Elidel 1 % cream, Apply  topically to the appropriate area as directed 2 (Two) Times a Day. (Patient taking differently: Apply  topically to the appropriate area as directed As Needed.), Disp: 100 g, Rfl: 2    esomeprazole (nexIUM) 40 MG capsule, Take 1 capsule by mouth 2 (Two) Times a Day., Disp: , Rfl:     ferrous sulfate 325 (65 FE) MG tablet, Take 1 tablet by mouth 2 (Two) Times a Day., Disp: , Rfl:     lamoTRIgine (LaMICtal) 150 MG tablet, Take 1 tablet by mouth Daily., Disp: 30 tablet, Rfl: 1    montelukast (Singulair) 10 MG tablet, Take 1 tablet by mouth Every Night., Disp: 30 tablet, Rfl: 3    multivitamin with minerals tablet tablet, Take 1 tablet by mouth Daily., Disp: , Rfl:     nitrofurantoin, macrocrystal-monohydrate, (Macrobid) 100 MG capsule, Take 1 capsule by mouth 2 (Two) Times a Day for 5 days., Disp: 10 capsule, Rfl: 0    phenazopyridine (Pyridium) 100 MG tablet, Take 1 tablet by mouth 3 (Three) Times a Day As Needed for Bladder Spasms., Disp: 12 tablet, Rfl: 0    prochlorperazine (COMPAZINE) 10 MG tablet, Take 1 tablet by mouth Every 6 (Six) Hours As Needed for Nausea or Vomiting., Disp: 30 tablet, Rfl: 1    Saw Palmetto 80 MG capsule, Take 1 capsule by mouth Daily., Disp: , Rfl:     triamcinolone (KENALOG) 0.1 % ointment, Apply  topically to the appropriate area as directed 2 (Two) Times a Day. (Patient taking differently: Apply 1 Application topically to the appropriate area as directed 2 (Two) Times a Day.), Disp: 453.6 g, Rfl: 1    vitamin B-12  "(CYANOCOBALAMIN) 500 MCG tablet, Take 1 tablet by mouth Daily., Disp: , Rfl:     vitamin D3 125 MCG (5000 UT) capsule capsule, Take 1 capsule by mouth Daily. AND K 12, Disp: , Rfl:     HYDROmorphone (Dilaudid) 2 MG tablet, Take 1 tablet by mouth Every 4 (Four) Hours As Needed for Moderate Pain. (Patient not taking: Reported on 6/7/2024), Disp: 10 tablet, Rfl: 0    ondansetron ODT (ZOFRAN-ODT) 4 MG disintegrating tablet, Place 1 tablet on the tongue Every 8 (Eight) Hours As Needed for Nausea or Vomiting. (Patient not taking: Reported on 6/7/2024), Disp: 20 tablet, Rfl: 0    promethazine (PHENERGAN) 12.5 MG tablet, Take 1 tablet by mouth Every 8 (Eight) Hours As Needed for Nausea or Vomiting. (Patient not taking: Reported on 6/7/2024), Disp: 20 tablet, Rfl: 0    Allergies:   Allergies   Allergen Reactions    Amoxicillin Diarrhea and GI Intolerance    Doxycycline Other (See Comments)     Vaginal swelling     Latex Hives and Itching    Penicillins Rash     Says Keflex OK as long as she takes w/ food. Has tolerated cefazolin, ceftriaxone    Beta lactam allergy details  Antibiotic reaction: rash, hives, other (ITCHING)  Age at reaction: adult (2017)  Dose to reaction time: (!) hours  Reason for antibiotic: sore throat (STREP)  Epinephrine required for reaction?: no  Tolerated antibiotics: cephalexin (KEFLEX), Cefazolin, ceftriaxone           Objective     Vital Signs  /70   Pulse 78   Temp 96.4 °F (35.8 °C) (Temporal)   Ht 161.3 cm (63.5\")   Wt 92.2 kg (203 lb 3.2 oz)   SpO2 98%   BMI 35.43 kg/m²   Estimated body mass index is 35.43 kg/m² as calculated from the following:    Height as of this encounter: 161.3 cm (63.5\").    Weight as of this encounter: 92.2 kg (203 lb 3.2 oz).            Physical Exam  Constitutional:       Appearance: Normal appearance.   Cardiovascular:      Rate and Rhythm: Normal rate and regular rhythm.      Pulses: Normal pulses.      Heart sounds: Normal heart sounds.   Pulmonary:      " Effort: Pulmonary effort is normal.      Breath sounds: Normal breath sounds.   Abdominal:      Tenderness: There is no right CVA tenderness or left CVA tenderness.   Neurological:      Mental Status: She is alert.          Result Review :                  Assessment and Plan     1. Difficulty in urination  \F  - POCT urinalysis dipstick, automated    2. Hematuria, unspecified type      3. Dysuria  Will treat for UTI Symptoms, UA POCT unremarkable, will send out for culture  Follow up on results.   - Urine Culture - Urine, Urine, Clean Catch; Future  - nitrofurantoin, macrocrystal-monohydrate, (Macrobid) 100 MG capsule; Take 1 capsule by mouth 2 (Two) Times a Day for 5 days.  Dispense: 10 capsule; Refill: 0  - phenazopyridine (Pyridium) 100 MG tablet; Take 1 tablet by mouth 3 (Three) Times a Day As Needed for Bladder Spasms.  Dispense: 12 tablet; Refill: 0  - Urine Culture - Urine, Urine, Clean Catch       Follow Up  No follow-ups on file.    Giorgio Maria MD  [facility] [address]  [facility]  [address]  [address] [id number] [phone]

## 2024-06-08 LAB — BACTERIA SPEC AEROBE CULT: NORMAL

## 2024-06-09 ENCOUNTER — HOSPITAL ENCOUNTER (OUTPATIENT)
Facility: HOSPITAL | Age: 34
Discharge: HOME | End: 2024-06-11
Attending: STUDENT IN AN ORGANIZED HEALTH CARE EDUCATION/TRAINING PROGRAM | Admitting: STUDENT IN AN ORGANIZED HEALTH CARE EDUCATION/TRAINING PROGRAM
Payer: COMMERCIAL

## 2024-06-09 ENCOUNTER — APPOINTMENT (OUTPATIENT)
Dept: CT IMAGING | Facility: HOSPITAL | Age: 34
End: 2024-06-09
Payer: COMMERCIAL

## 2024-06-09 ENCOUNTER — READMISSION MANAGEMENT (OUTPATIENT)
Dept: CALL CENTER | Facility: HOSPITAL | Age: 34
End: 2024-06-09
Payer: COMMERCIAL

## 2024-06-09 DIAGNOSIS — R10.13 DYSPEPSIA: ICD-10-CM

## 2024-06-09 DIAGNOSIS — K91.89 GASTROJEJUNAL ANASTOMOTIC STRICTURE: Primary | ICD-10-CM

## 2024-06-09 DIAGNOSIS — R74.8 SERUM LIPASE ELEVATION: ICD-10-CM

## 2024-06-09 DIAGNOSIS — R63.8 UNABLE TO EAT: ICD-10-CM

## 2024-06-09 DIAGNOSIS — G89.18 POST-OP PAIN: ICD-10-CM

## 2024-06-09 PROBLEM — R11.2 NAUSEA & VOMITING: Status: ACTIVE | Noted: 2024-06-09

## 2024-06-09 LAB
ALBUMIN SERPL-MCNC: 4.2 G/DL (ref 3.5–5.2)
ALBUMIN/GLOB SERPL: 1 G/DL
ALP SERPL-CCNC: 94 U/L (ref 39–117)
ALT SERPL W P-5'-P-CCNC: 8 U/L (ref 1–33)
ANION GAP SERPL CALCULATED.3IONS-SCNC: 16 MMOL/L (ref 5–15)
AST SERPL-CCNC: 25 U/L (ref 1–32)
BACTERIA UR QL AUTO: ABNORMAL /HPF
BACTERIA UR QL AUTO: ABNORMAL /HPF
BASOPHILS # BLD AUTO: 0.06 10*3/MM3 (ref 0–0.2)
BASOPHILS NFR BLD AUTO: 1.2 % (ref 0–1.5)
BILIRUB SERPL-MCNC: 0.3 MG/DL (ref 0–1.2)
BILIRUB UR QL STRIP: NEGATIVE
BILIRUB UR QL STRIP: NEGATIVE
BUN SERPL-MCNC: 8 MG/DL (ref 6–20)
BUN/CREAT SERPL: 9.3 (ref 7–25)
CALCIUM SPEC-SCNC: 9.3 MG/DL (ref 8.6–10.5)
CHLORIDE SERPL-SCNC: 97 MMOL/L (ref 98–107)
CLARITY UR: ABNORMAL
CLARITY UR: ABNORMAL
CO2 SERPL-SCNC: 26 MMOL/L (ref 22–29)
COD CRY URNS QL: ABNORMAL /HPF
COLOR UR: YELLOW
COLOR UR: YELLOW
CREAT SERPL-MCNC: 0.86 MG/DL (ref 0.57–1)
CRP SERPL-MCNC: 1.08 MG/DL (ref 0–0.5)
D-LACTATE SERPL-SCNC: 0.9 MMOL/L (ref 0.5–2)
DEPRECATED RDW RBC AUTO: 51.8 FL (ref 37–54)
EGFRCR SERPLBLD CKD-EPI 2021: 91.6 ML/MIN/1.73
EOSINOPHIL # BLD AUTO: 0.17 10*3/MM3 (ref 0–0.4)
EOSINOPHIL NFR BLD AUTO: 3.5 % (ref 0.3–6.2)
ERYTHROCYTE [DISTWIDTH] IN BLOOD BY AUTOMATED COUNT: 16.3 % (ref 12.3–15.4)
GLOBULIN UR ELPH-MCNC: 4.2 GM/DL
GLUCOSE SERPL-MCNC: 82 MG/DL (ref 65–99)
GLUCOSE UR STRIP-MCNC: NEGATIVE MG/DL
GLUCOSE UR STRIP-MCNC: NEGATIVE MG/DL
HCT VFR BLD AUTO: 40.2 % (ref 34–46.6)
HGB BLD-MCNC: 12.2 G/DL (ref 12–15.9)
HGB UR QL STRIP.AUTO: NEGATIVE
HGB UR QL STRIP.AUTO: NEGATIVE
HYALINE CASTS UR QL AUTO: ABNORMAL /LPF
HYALINE CASTS UR QL AUTO: ABNORMAL /LPF
IMM GRANULOCYTES # BLD AUTO: 0.02 10*3/MM3 (ref 0–0.05)
IMM GRANULOCYTES NFR BLD AUTO: 0.4 % (ref 0–0.5)
KETONES UR QL STRIP: ABNORMAL
KETONES UR QL STRIP: ABNORMAL
LEUKOCYTE ESTERASE UR QL STRIP.AUTO: NEGATIVE
LEUKOCYTE ESTERASE UR QL STRIP.AUTO: NEGATIVE
LIPASE SERPL-CCNC: 159 U/L (ref 13–60)
LYMPHOCYTES # BLD AUTO: 1.26 10*3/MM3 (ref 0.7–3.1)
LYMPHOCYTES NFR BLD AUTO: 25.9 % (ref 19.6–45.3)
MAGNESIUM SERPL-MCNC: 2.3 MG/DL (ref 1.6–2.6)
MCH RBC QN AUTO: 26.7 PG (ref 26.6–33)
MCHC RBC AUTO-ENTMCNC: 30.3 G/DL (ref 31.5–35.7)
MCV RBC AUTO: 88 FL (ref 79–97)
MONOCYTES # BLD AUTO: 0.34 10*3/MM3 (ref 0.1–0.9)
MONOCYTES NFR BLD AUTO: 7 % (ref 5–12)
NEUTROPHILS NFR BLD AUTO: 3.02 10*3/MM3 (ref 1.7–7)
NEUTROPHILS NFR BLD AUTO: 62 % (ref 42.7–76)
NITRITE UR QL STRIP: NEGATIVE
NITRITE UR QL STRIP: NEGATIVE
NRBC BLD AUTO-RTO: 0 /100 WBC (ref 0–0.2)
PH UR STRIP.AUTO: 5.5 [PH] (ref 5–8)
PH UR STRIP.AUTO: <=5 [PH] (ref 5–8)
PHOSPHATE SERPL-MCNC: 3.4 MG/DL (ref 2.5–4.5)
PLATELET # BLD AUTO: 337 10*3/MM3 (ref 140–450)
PMV BLD AUTO: 11.2 FL (ref 6–12)
POTASSIUM SERPL-SCNC: 3.5 MMOL/L (ref 3.5–5.2)
PROT SERPL-MCNC: 8.4 G/DL (ref 6–8.5)
PROT UR QL STRIP: ABNORMAL
PROT UR QL STRIP: ABNORMAL
QT INTERVAL: 406 MS
QTC INTERVAL: 459 MS
RBC # BLD AUTO: 4.57 10*6/MM3 (ref 3.77–5.28)
RBC # UR STRIP: ABNORMAL /HPF
RBC # UR STRIP: ABNORMAL /HPF
REF LAB TEST METHOD: ABNORMAL
REF LAB TEST METHOD: ABNORMAL
SODIUM SERPL-SCNC: 139 MMOL/L (ref 136–145)
SP GR UR STRIP: 1.03 (ref 1–1.03)
SP GR UR STRIP: >=1.099 (ref 1–1.03)
SQUAMOUS #/AREA URNS HPF: ABNORMAL /HPF
SQUAMOUS #/AREA URNS HPF: ABNORMAL /HPF
UROBILINOGEN UR QL STRIP: ABNORMAL
UROBILINOGEN UR QL STRIP: ABNORMAL
WBC # UR STRIP: ABNORMAL /HPF
WBC # UR STRIP: ABNORMAL /HPF
WBC NRBC COR # BLD AUTO: 4.87 10*3/MM3 (ref 3.4–10.8)

## 2024-06-09 PROCEDURE — 96361 HYDRATE IV INFUSION ADD-ON: CPT

## 2024-06-09 PROCEDURE — 83690 ASSAY OF LIPASE: CPT | Performed by: STUDENT IN AN ORGANIZED HEALTH CARE EDUCATION/TRAINING PROGRAM

## 2024-06-09 PROCEDURE — 93005 ELECTROCARDIOGRAM TRACING: CPT | Performed by: STUDENT IN AN ORGANIZED HEALTH CARE EDUCATION/TRAINING PROGRAM

## 2024-06-09 PROCEDURE — 81001 URINALYSIS AUTO W/SCOPE: CPT | Performed by: INTERNAL MEDICINE

## 2024-06-09 PROCEDURE — 80053 COMPREHEN METABOLIC PANEL: CPT | Performed by: STUDENT IN AN ORGANIZED HEALTH CARE EDUCATION/TRAINING PROGRAM

## 2024-06-09 PROCEDURE — G0378 HOSPITAL OBSERVATION PER HR: HCPCS

## 2024-06-09 PROCEDURE — 71260 CT THORAX DX C+: CPT

## 2024-06-09 PROCEDURE — 99285 EMERGENCY DEPT VISIT HI MDM: CPT

## 2024-06-09 PROCEDURE — 25510000001 IOPAMIDOL 61 % SOLUTION: Performed by: STUDENT IN AN ORGANIZED HEALTH CARE EDUCATION/TRAINING PROGRAM

## 2024-06-09 PROCEDURE — 25010000002 METOCLOPRAMIDE PER 10 MG: Performed by: STUDENT IN AN ORGANIZED HEALTH CARE EDUCATION/TRAINING PROGRAM

## 2024-06-09 PROCEDURE — 83605 ASSAY OF LACTIC ACID: CPT | Performed by: STUDENT IN AN ORGANIZED HEALTH CARE EDUCATION/TRAINING PROGRAM

## 2024-06-09 PROCEDURE — 25010000002 KETOROLAC TROMETHAMINE PER 15 MG: Performed by: STUDENT IN AN ORGANIZED HEALTH CARE EDUCATION/TRAINING PROGRAM

## 2024-06-09 PROCEDURE — 25010000002 HEPARIN (PORCINE) PER 1000 UNITS: Performed by: INTERNAL MEDICINE

## 2024-06-09 PROCEDURE — 83735 ASSAY OF MAGNESIUM: CPT | Performed by: STUDENT IN AN ORGANIZED HEALTH CARE EDUCATION/TRAINING PROGRAM

## 2024-06-09 PROCEDURE — 74177 CT ABD & PELVIS W/CONTRAST: CPT

## 2024-06-09 PROCEDURE — 84100 ASSAY OF PHOSPHORUS: CPT | Performed by: STUDENT IN AN ORGANIZED HEALTH CARE EDUCATION/TRAINING PROGRAM

## 2024-06-09 PROCEDURE — 25010000002 DIPHENHYDRAMINE PER 50 MG: Performed by: STUDENT IN AN ORGANIZED HEALTH CARE EDUCATION/TRAINING PROGRAM

## 2024-06-09 PROCEDURE — 96375 TX/PRO/DX INJ NEW DRUG ADDON: CPT

## 2024-06-09 PROCEDURE — 25810000003 LACTATED RINGERS PER 1000 ML: Performed by: STUDENT IN AN ORGANIZED HEALTH CARE EDUCATION/TRAINING PROGRAM

## 2024-06-09 PROCEDURE — 99222 1ST HOSP IP/OBS MODERATE 55: CPT | Performed by: INTERNAL MEDICINE

## 2024-06-09 PROCEDURE — 86140 C-REACTIVE PROTEIN: CPT | Performed by: STUDENT IN AN ORGANIZED HEALTH CARE EDUCATION/TRAINING PROGRAM

## 2024-06-09 PROCEDURE — 25010000002 ONDANSETRON PER 1 MG: Performed by: INTERNAL MEDICINE

## 2024-06-09 PROCEDURE — 25810000003 SODIUM CHLORIDE 0.9 % SOLUTION: Performed by: STUDENT IN AN ORGANIZED HEALTH CARE EDUCATION/TRAINING PROGRAM

## 2024-06-09 PROCEDURE — 85025 COMPLETE CBC W/AUTO DIFF WBC: CPT | Performed by: STUDENT IN AN ORGANIZED HEALTH CARE EDUCATION/TRAINING PROGRAM

## 2024-06-09 PROCEDURE — 81001 URINALYSIS AUTO W/SCOPE: CPT | Performed by: STUDENT IN AN ORGANIZED HEALTH CARE EDUCATION/TRAINING PROGRAM

## 2024-06-09 PROCEDURE — 25010000002 HYDROMORPHONE PER 4 MG: Performed by: STUDENT IN AN ORGANIZED HEALTH CARE EDUCATION/TRAINING PROGRAM

## 2024-06-09 PROCEDURE — 96374 THER/PROPH/DIAG INJ IV PUSH: CPT

## 2024-06-09 PROCEDURE — 96372 THER/PROPH/DIAG INJ SC/IM: CPT

## 2024-06-09 RX ORDER — NALOXONE HCL 0.4 MG/ML
0.4 VIAL (ML) INJECTION AS NEEDED
Status: DISCONTINUED | OUTPATIENT
Start: 2024-06-09 | End: 2024-06-11 | Stop reason: HOSPADM

## 2024-06-09 RX ORDER — BISACODYL 5 MG/1
5 TABLET, DELAYED RELEASE ORAL DAILY PRN
Status: DISCONTINUED | OUTPATIENT
Start: 2024-06-09 | End: 2024-06-11 | Stop reason: HOSPADM

## 2024-06-09 RX ORDER — ARIPIPRAZOLE 5 MG/1
5 TABLET ORAL DAILY
Status: DISCONTINUED | OUTPATIENT
Start: 2024-06-10 | End: 2024-06-11 | Stop reason: HOSPADM

## 2024-06-09 RX ORDER — PANTOPRAZOLE SODIUM 40 MG/1
40 TABLET, DELAYED RELEASE ORAL
Status: DISCONTINUED | OUTPATIENT
Start: 2024-06-10 | End: 2024-06-10

## 2024-06-09 RX ORDER — DEXTROSE MONOHYDRATE AND SODIUM CHLORIDE 5; .45 G/100ML; G/100ML
75 INJECTION, SOLUTION INTRAVENOUS CONTINUOUS
Status: DISCONTINUED | OUTPATIENT
Start: 2024-06-09 | End: 2024-06-10

## 2024-06-09 RX ORDER — DIPHENHYDRAMINE HYDROCHLORIDE 50 MG/ML
25 INJECTION INTRAMUSCULAR; INTRAVENOUS ONCE
Status: COMPLETED | OUTPATIENT
Start: 2024-06-09 | End: 2024-06-09

## 2024-06-09 RX ORDER — HEPARIN SODIUM 5000 [USP'U]/ML
5000 INJECTION, SOLUTION INTRAVENOUS; SUBCUTANEOUS EVERY 8 HOURS SCHEDULED
Status: DISCONTINUED | OUTPATIENT
Start: 2024-06-09 | End: 2024-06-11 | Stop reason: HOSPADM

## 2024-06-09 RX ORDER — ONDANSETRON 2 MG/ML
4 INJECTION INTRAMUSCULAR; INTRAVENOUS EVERY 6 HOURS PRN
Status: DISCONTINUED | OUTPATIENT
Start: 2024-06-09 | End: 2024-06-11 | Stop reason: HOSPADM

## 2024-06-09 RX ORDER — PROMETHAZINE HYDROCHLORIDE 12.5 MG/1
12.5 TABLET ORAL EVERY 6 HOURS PRN
Status: DISCONTINUED | OUTPATIENT
Start: 2024-06-09 | End: 2024-06-11 | Stop reason: HOSPADM

## 2024-06-09 RX ORDER — SODIUM CHLORIDE 9 MG/ML
40 INJECTION, SOLUTION INTRAVENOUS AS NEEDED
Status: DISCONTINUED | OUTPATIENT
Start: 2024-06-09 | End: 2024-06-11 | Stop reason: HOSPADM

## 2024-06-09 RX ORDER — SODIUM CHLORIDE 0.9 % (FLUSH) 0.9 %
10 SYRINGE (ML) INJECTION AS NEEDED
Status: DISCONTINUED | OUTPATIENT
Start: 2024-06-09 | End: 2024-06-11 | Stop reason: HOSPADM

## 2024-06-09 RX ORDER — POTASSIUM CHLORIDE 20 MEQ/1
40 TABLET, EXTENDED RELEASE ORAL EVERY 4 HOURS
Qty: 4 TABLET | Refills: 0 | Status: COMPLETED | OUTPATIENT
Start: 2024-06-09 | End: 2024-06-10

## 2024-06-09 RX ORDER — SODIUM CHLORIDE 0.9 % (FLUSH) 0.9 %
10 SYRINGE (ML) INJECTION EVERY 12 HOURS SCHEDULED
Status: DISCONTINUED | OUTPATIENT
Start: 2024-06-09 | End: 2024-06-11 | Stop reason: HOSPADM

## 2024-06-09 RX ORDER — HYDROMORPHONE HYDROCHLORIDE 1 MG/ML
0.25 INJECTION, SOLUTION INTRAMUSCULAR; INTRAVENOUS; SUBCUTANEOUS
Status: DISCONTINUED | OUTPATIENT
Start: 2024-06-09 | End: 2024-06-11 | Stop reason: HOSPADM

## 2024-06-09 RX ORDER — BISACODYL 10 MG
10 SUPPOSITORY, RECTAL RECTAL DAILY PRN
Status: DISCONTINUED | OUTPATIENT
Start: 2024-06-09 | End: 2024-06-11 | Stop reason: HOSPADM

## 2024-06-09 RX ORDER — ACETAMINOPHEN 160 MG/5ML
650 SOLUTION ORAL EVERY 4 HOURS PRN
Status: DISCONTINUED | OUTPATIENT
Start: 2024-06-09 | End: 2024-06-11 | Stop reason: HOSPADM

## 2024-06-09 RX ORDER — LANOLIN ALCOHOL/MO/W.PET/CERES
500 CREAM (GRAM) TOPICAL DAILY
Status: DISCONTINUED | OUTPATIENT
Start: 2024-06-10 | End: 2024-06-11 | Stop reason: HOSPADM

## 2024-06-09 RX ORDER — PROMETHAZINE HYDROCHLORIDE 12.5 MG/1
12.5 SUPPOSITORY RECTAL EVERY 6 HOURS PRN
Status: DISCONTINUED | OUTPATIENT
Start: 2024-06-09 | End: 2024-06-11 | Stop reason: HOSPADM

## 2024-06-09 RX ORDER — SODIUM CHLORIDE, SODIUM LACTATE, POTASSIUM CHLORIDE, CALCIUM CHLORIDE 600; 310; 30; 20 MG/100ML; MG/100ML; MG/100ML; MG/100ML
150 INJECTION, SOLUTION INTRAVENOUS CONTINUOUS
Status: DISCONTINUED | OUTPATIENT
Start: 2024-06-09 | End: 2024-06-09

## 2024-06-09 RX ORDER — METOCLOPRAMIDE HYDROCHLORIDE 5 MG/ML
5 INJECTION INTRAMUSCULAR; INTRAVENOUS ONCE
Status: COMPLETED | OUTPATIENT
Start: 2024-06-09 | End: 2024-06-09

## 2024-06-09 RX ORDER — MONTELUKAST SODIUM 10 MG/1
10 TABLET ORAL NIGHTLY
Status: DISCONTINUED | OUTPATIENT
Start: 2024-06-09 | End: 2024-06-11 | Stop reason: HOSPADM

## 2024-06-09 RX ORDER — HYDROMORPHONE HYDROCHLORIDE 1 MG/ML
0.25 INJECTION, SOLUTION INTRAMUSCULAR; INTRAVENOUS; SUBCUTANEOUS ONCE
Status: COMPLETED | OUTPATIENT
Start: 2024-06-09 | End: 2024-06-09

## 2024-06-09 RX ORDER — AMOXICILLIN 250 MG
2 CAPSULE ORAL 2 TIMES DAILY PRN
Status: DISCONTINUED | OUTPATIENT
Start: 2024-06-09 | End: 2024-06-11 | Stop reason: HOSPADM

## 2024-06-09 RX ORDER — ACETAMINOPHEN 325 MG/1
650 TABLET ORAL EVERY 4 HOURS PRN
Status: DISCONTINUED | OUTPATIENT
Start: 2024-06-09 | End: 2024-06-11 | Stop reason: HOSPADM

## 2024-06-09 RX ORDER — MULTIPLE VITAMINS W/ MINERALS TAB 9MG-400MCG
1 TAB ORAL DAILY
Status: DISCONTINUED | OUTPATIENT
Start: 2024-06-10 | End: 2024-06-11 | Stop reason: HOSPADM

## 2024-06-09 RX ORDER — POLYETHYLENE GLYCOL 3350 17 G/17G
17 POWDER, FOR SOLUTION ORAL DAILY PRN
Status: DISCONTINUED | OUTPATIENT
Start: 2024-06-09 | End: 2024-06-11 | Stop reason: HOSPADM

## 2024-06-09 RX ORDER — KETOROLAC TROMETHAMINE 15 MG/ML
15 INJECTION, SOLUTION INTRAMUSCULAR; INTRAVENOUS ONCE
Status: COMPLETED | OUTPATIENT
Start: 2024-06-09 | End: 2024-06-09

## 2024-06-09 RX ORDER — ACETAMINOPHEN 650 MG/1
650 SUPPOSITORY RECTAL EVERY 4 HOURS PRN
Status: DISCONTINUED | OUTPATIENT
Start: 2024-06-09 | End: 2024-06-11 | Stop reason: HOSPADM

## 2024-06-09 RX ORDER — ONDANSETRON 4 MG/1
4 TABLET, ORALLY DISINTEGRATING ORAL EVERY 6 HOURS PRN
Status: DISCONTINUED | OUTPATIENT
Start: 2024-06-09 | End: 2024-06-11 | Stop reason: HOSPADM

## 2024-06-09 RX ADMIN — METOCLOPRAMIDE 5 MG: 5 INJECTION, SOLUTION INTRAMUSCULAR; INTRAVENOUS at 16:39

## 2024-06-09 RX ADMIN — MONTELUKAST 10 MG: 10 TABLET, FILM COATED ORAL at 22:11

## 2024-06-09 RX ADMIN — DIPHENHYDRAMINE HYDROCHLORIDE 25 MG: 50 INJECTION INTRAMUSCULAR; INTRAVENOUS at 16:36

## 2024-06-09 RX ADMIN — HEPARIN SODIUM 5000 UNITS: 5000 INJECTION INTRAVENOUS; SUBCUTANEOUS at 22:12

## 2024-06-09 RX ADMIN — POTASSIUM CHLORIDE 40 MEQ: 1500 TABLET, EXTENDED RELEASE ORAL at 22:11

## 2024-06-09 RX ADMIN — ONDANSETRON 4 MG: 2 INJECTION INTRAMUSCULAR; INTRAVENOUS at 22:12

## 2024-06-09 RX ADMIN — KETOROLAC TROMETHAMINE 15 MG: 15 INJECTION, SOLUTION INTRAMUSCULAR; INTRAVENOUS at 16:34

## 2024-06-09 RX ADMIN — SODIUM CHLORIDE, POTASSIUM CHLORIDE, SODIUM LACTATE AND CALCIUM CHLORIDE 150 ML/HR: 600; 310; 30; 20 INJECTION, SOLUTION INTRAVENOUS at 20:22

## 2024-06-09 RX ADMIN — ACETAMINOPHEN 650 MG: 325 TABLET ORAL at 22:11

## 2024-06-09 RX ADMIN — DEXTROSE AND SODIUM CHLORIDE 75 ML/HR: 5; 450 INJECTION, SOLUTION INTRAVENOUS at 22:11

## 2024-06-09 RX ADMIN — HYDROMORPHONE HYDROCHLORIDE 0.25 MG: 1 INJECTION, SOLUTION INTRAMUSCULAR; INTRAVENOUS; SUBCUTANEOUS at 16:38

## 2024-06-09 RX ADMIN — IOPAMIDOL 90 ML: 612 INJECTION, SOLUTION INTRAVENOUS at 16:59

## 2024-06-09 RX ADMIN — SODIUM CHLORIDE 1000 ML: 9 INJECTION, SOLUTION INTRAVENOUS at 16:33

## 2024-06-09 NOTE — Clinical Note
Level of Care: Telemetry [5]   Diagnosis: Post-op pain [[id number]]   Admitting Physician: SAMAN BERKOWITZ [[id number]]

## 2024-06-09 NOTE — ED NOTES
Theresa Maya    Nursing Report ED to Floor:  Mental status: a&o x4  Ambulatory status: ad gail  Oxygen Therapy:  RA  Cardiac Rhythm: NSR  Admitted from: HOME  Safety Concerns:  N/A  Social Issues: N/A  ED Room #:  12    ED Nurse Phone Extension - [phone] or may call [phone].      HPI:   Chief Complaint   Patient presents with    Post-op Problem       Past Medical History:  Past Medical History:   Diagnosis Date    Abnormal Pap smear of cervix 2019    ADHD (attention deficit hyperactivity disorder) 2022    Working with a therapy at     Allergic 2011    Take montalukast    Anesthesia complication     SLOW TO WAKE UP AFTER GASTRO AND HYSTERECTOMY    Anxiety     Arthritis     Clotting disorder     Deep vein thrombosis     2014, (R) leg while pregnant, dx w/ MTHFR    Depression     Dermatitis     UNDER BREAST AT TIMES    Dyspepsia     Dyspnea on exertion     Elevated hemoglobin A1c     Fatigue     Gallbladder abscess     s/p lap nicolas 2006    GERD (gastroesophageal reflux disease)     UGI 9/23 - small recurrent HH, EGD Dr. Ricardo  11/23 small recurrent HH    Heartburn     chronic/episodic, depends on what she eats, takes Pepcid prn, EGD Dr. Ricardo 11/21    Hyperemesis gravidarum     Hyperlipidemia     Incomplete right bundle branch block     Iron deficiency anemia     Migraines     Miscarriage     x 4 prior to 1st pregnancy, presumably from undiagnosed MTHFR mutation    Morbid obesity with body mass index (BMI) of 40.0 to 44.9 in adult 04/19/2022    MTHFR deficiency complicating pregnancy     says clotting d/o only an issue when pregnant, advised to use heparin shots during pregnancy    Obesity     Ovarian cyst     Recurrent pregnancy loss, antepartum condition or complication     had a [facility] and lost the pregnancy at 6 months    Strep pharyngitis     Urinary tract infection     Wears glasses         Past Surgical History:  Past Surgical History:   Procedure Laterality Date    ENDOSCOPY N/A 05/05/2022    Procedure:  "ESOPHAGOGASTRODUODENOSCOPY;  Surgeon: Cristiano Ricardo MD;  Location:  [facility];  Service: Bariatric;  Laterality: N/A;    ENDOSCOPY N/A 5/24/2024    Procedure: ESOPHAGOGASTRODUODENOSCOPY;  Surgeon: Cristiano Ricardo MD;  Location:  [facility];  Service: Robotics - DaVinci;  Laterality: N/A;  EGD # 752 SCOPE USED    GASTRECTOMY N/A 05/05/2022    Procedure: GASTRECTOMY LAPAROSCOPIC;  Surgeon: Cristiano Ricardo MD;  Location:  [facility];  Service: Bariatric;  Laterality: N/A;    GASTRIC BYPASS N/A 5/24/2024    Procedure: GASTRIC BYPASS LAPAROSCOPIC WITH DAVINCI ROBOT;  Surgeon: Cristiano Ricardo MD;  Location:  [facility];  Service: Robotics - DaVinci;  Laterality: N/A;    HIATAL HERNIA REPAIR N/A 05/05/2022    Procedure: HIATAL HERNIA REPAIR LAPAROSCOPIC;  Surgeon: Cristiano Ricardo MD;  Location:  [facility];  Service: Bariatric;  Laterality: N/A;    HIATAL HERNIA REPAIR N/A 5/24/2024    Procedure: RECURRENT HIATAL HERNIA REPAIR LAPAROSCOPIC WITH DAVINCI ROBOT;  Surgeon: Cristiano Ricardo MD;  Location:  [facility];  Service: Robotics - DaVinci;  Laterality: N/A;    HYSTERECTOMY  01/23/2024    [facility] Dr. Elizabeth- TOTAL    LAPAROSCOPIC CHOLECYSTECTOMY  2006    no stones, was told she had \"three gallbladders\"- all removed    SINUS SURGERY Bilateral 2006    TUBAL COAGULATION LAPAROSCOPIC Bilateral 09/15/2017    Procedure: BILATERAL TUBAL FALLOPE FILSHIE CLIPPING LAPAROSCOPIC;  Surgeon: Leo Posey III, MD;  Location:  [facility];  Service:     VAGINAL DELIVERY  14; 16; 17    female,male, male.  She said she had subcutaneous Lovenox injections throughout the second and third pregnancies until delivery    WISDOM TOOTH EXTRACTION          Admitting Doctor:   Jada Mata DO    Consulting Provider(s):  Consults       Date and Time Order Name Status Description    5/28/2024  5:21 PM Inpatient Hospitalist Consult Completed              Admitting Diagnosis:   There were no encounter diagnoses.    Most Recent " Vitals:   Vitals:    06/09/24 1730 06/09/24 1800 06/09/24 1830 06/09/24 1900   BP: 102/75 111/76 107/73 125/76   BP Location:       Patient Position:       Pulse: 64 61 61 58   Resp:       Temp:       TempSrc:       SpO2: 97% 96% 98% 99%   Weight:       Height:           Active LDAs/IV Access:   Lines, Drains & Airways       Active LDAs       Name Placement date Placement time Site Days    Peripheral IV 06/09/24 1630 Left Antecubital 06/09/24  1630  Antecubital  less than 1                    Labs (abnormal labs have a star):   Labs Reviewed   COMPREHENSIVE METABOLIC PANEL - Abnormal; Notable for the following components:       Result Value    Chloride 97 (*)     Anion Gap 16.0 (*)     All other components within normal limits    Narrative:     GFR Normal >60  Chronic Kidney Disease <60  Kidney Failure <15     LIPASE - Abnormal; Notable for the following components:    Lipase 159 (*)     All other components within normal limits   URINALYSIS W/ MICROSCOPIC IF INDICATED (NO CULTURE) - Abnormal; Notable for the following components:    Appearance, UA Turbid (*)     Ketones, UA 40 mg/dL (2+) (*)     Protein, UA Trace (*)     All other components within normal limits   C-REACTIVE PROTEIN - Abnormal; Notable for the following components:    C-Reactive Protein 1.08 (*)     All other components within normal limits   CBC WITH AUTO DIFFERENTIAL - Abnormal; Notable for the following components:    MCHC 30.3 (*)     RDW 16.3 (*)     All other components within normal limits   URINALYSIS, MICROSCOPIC ONLY - Abnormal; Notable for the following components:    WBC, UA 6-10 (*)     Bacteria, UA 1+ (*)     Squamous Epithelial Cells, UA 31-50 (*)     All other components within normal limits   MAGNESIUM - Normal   PHOSPHORUS - Normal   LACTIC ACID, PLASMA - Normal   CBC AND DIFFERENTIAL    Narrative:     The following orders were created for panel order CBC & Differential.  Procedure                               Abnormality          Status                     ---------                               -----------         ------                     CBC Auto Differential[[id number]]        Abnormal            Final result                 Please view results for these tests on the individual orders.       Meds Given in ED:   Medications   lactated ringers infusion (has no administration in time range)   sodium chloride 0.9 % bolus 1,000 mL (0 mL Intravenous Stopped 6/9/24 1817)   ketorolac (TORADOL) injection 15 mg (15 mg Intravenous Given 6/9/24 1634)   metoclopramide (REGLAN) injection 5 mg (5 mg Intravenous Given 6/9/24 1639)   diphenhydrAMINE (BENADRYL) injection 25 mg (25 mg Intravenous Given 6/9/24 1636)   HYDROmorphone (DILAUDID) injection 0.25 mg (0.25 mg Intravenous Given 6/9/24 1638)   iopamidol (ISOVUE-300) 61 % injection 100 mL (90 mL Intravenous Given 6/9/24 1659)     lactated ringers, 150 mL/hr         Last NIH score:                                                          Dysphagia screening results:  Patient Factors Component (Dysphagia:Stroke or Rule-out)  Best Eye Response: 4-->(E4) spontaneous (06/09/24 1559)  Best Motor Response: 6-->(M6) obeys commands (06/09/24 1559)  Best Verbal Response: 5-->(V5) oriented (06/09/24 1559)  [place] Coma Scale Score: 15 (06/09/24 1559)     Tres Coma Scale:  No data recorded     CIWA:        Restraint Type:            Isolation Status:  No active isolations

## 2024-06-09 NOTE — ED PROVIDER NOTES
EMERGENCY DEPARTMENT ENCOUNTER    Pt Name: Theresa Maya  MRN: [id number]  Pt :   1990  Room Number:    Date of encounter:  2024  PCP: Gina Green APRN  ED Provider: Julian Sorenson MD    Historian: Patient      HPI:  Chief Complaint: Epigastric pain, unable to tolerate oral intake        Context: Theresa Maya is a 33-year-old female who presents the emergency department for epigastric and low mid chest pain as well as inability to tolerate oral intake.  She recently had hiatal hernia repair and Teresa-en-Y bypass performed on the  was on a soft puréed diet the last thing she was able to tolerate was puréed chicken on Friday morning but she is not able to tolerate solids or liquids since then she contacted her bariatrics team who told her to come here for evaluation.  She says she has the sensation that there is something stuck just behind her lower sternum.  No other complaints at this time.       PAST MEDICAL HISTORY  Past Medical History:   Diagnosis Date    Abnormal Pap smear of cervix     ADHD (attention deficit hyperactivity disorder)     Working with a therapy at     Allergic     Take montalukast    Anesthesia complication     SLOW TO WAKE UP AFTER GASTRO AND HYSTERECTOMY    Anxiety     Arthritis     Clotting disorder     Deep vein thrombosis     2014, (R) leg while pregnant, dx w/ MTHFR    Depression     Dermatitis     UNDER BREAST AT TIMES    Dyspepsia     Dyspnea on exertion     Elevated hemoglobin A1c     Fatigue     Gallbladder abscess     s/p lap nicolas 2006    GERD (gastroesophageal reflux disease)     UGI  - small recurrent HH, EGD Dr. Ricardo   small recurrent HH    Heartburn     chronic/episodic, depends on what she eats, takes Pepcid prn, EGD Dr. Ricardo     Hyperemesis gravidarum     Hyperlipidemia     Incomplete right bundle branch block     Iron deficiency anemia     Migraines     Miscarriage     x 4 prior to 1st pregnancy,  "presumably from undiagnosed MTHFR mutation    Morbid obesity with body mass index (BMI) of 40.0 to 44.9 in adult 04/19/2022    MTHFR deficiency complicating pregnancy     says clotting d/o only an issue when pregnant, advised to use heparin shots during pregnancy    Obesity     Ovarian cyst     Recurrent pregnancy loss, antepartum condition or complication     had a [facility] and lost the pregnancy at 6 months    Strep pharyngitis     Urinary tract infection     Wears glasses          PAST SURGICAL HISTORY  Past Surgical History:   Procedure Laterality Date    ENDOSCOPY N/A 05/05/2022    Procedure: ESOPHAGOGASTRODUODENOSCOPY;  Surgeon: Cristiano Ricardo MD;  Location:  [facility];  Service: Bariatric;  Laterality: N/A;    ENDOSCOPY N/A 5/24/2024    Procedure: ESOPHAGOGASTRODUODENOSCOPY;  Surgeon: Cristiano Ricardo MD;  Location:  [facility];  Service: Robotics - DaVinci;  Laterality: N/A;  EGD # 752 SCOPE USED    GASTRECTOMY N/A 05/05/2022    Procedure: GASTRECTOMY LAPAROSCOPIC;  Surgeon: Cristiano Ricardo MD;  Location:  [facility];  Service: Bariatric;  Laterality: N/A;    GASTRIC BYPASS N/A 5/24/2024    Procedure: GASTRIC BYPASS LAPAROSCOPIC WITH DAVINCI ROBOT;  Surgeon: Cristiano Ricardo MD;  Location:  [facility];  Service: Robotics - DaVinci;  Laterality: N/A;    HIATAL HERNIA REPAIR N/A 05/05/2022    Procedure: HIATAL HERNIA REPAIR LAPAROSCOPIC;  Surgeon: Cristiano Ricardo MD;  Location:  [facility];  Service: Bariatric;  Laterality: N/A;    HIATAL HERNIA REPAIR N/A 5/24/2024    Procedure: RECURRENT HIATAL HERNIA REPAIR LAPAROSCOPIC WITH DAVINCI ROBOT;  Surgeon: Cristiano Ricardo MD;  Location:  [facility];  Service: Robotics - DaVinci;  Laterality: N/A;    HYSTERECTOMY  01/23/2024    [facility] Dr[facility]- TOTAL    LAPAROSCOPIC CHOLECYSTECTOMY  2006    no stones, was told she had \"three gallbladders\"- all removed    SINUS SURGERY Bilateral 2006    TUBAL COAGULATION LAPAROSCOPIC Bilateral 09/15/2017    Procedure: " BILATERAL TUBAL FALLOPE FILSHIE CLIPPING LAPAROSCOPIC;  Surgeon: Leo Posey III, MD;  Location: [facility];  Service:     VAGINAL DELIVERY  14; 16; 17    female,male, male.  She said she had subcutaneous Lovenox injections throughout the second and third pregnancies until delivery    WISDOM TOOTH EXTRACTION           FAMILY HISTORY  Family History   Problem Relation Age of Onset    Asthma Mother         effected after stroke    Thyroid disease Mother     Heart attack Mother     Obesity Mother     Migraines Mother     Hypertension Mother     COPD Mother     Mental illness Mother         depression    Alcohol abuse Father         abused since childhood    Lupus Sister     Ovarian cancer Sister     Asthma Sister     Breast cancer Maternal Grandmother 54    Lung cancer Maternal Grandmother     Asthma Maternal Grandmother     Hypertension Maternal Grandmother     Lupus Maternal Grandmother     Thyroid disease Maternal Grandmother     Diabetes Maternal Grandmother     Stroke Maternal Grandmother     Cancer Maternal Grandmother         lung    Hypertension Maternal Grandfather     Lung cancer Paternal Grandmother     Obesity Paternal Grandmother     Cancer Paternal Grandmother     Prostate cancer Paternal Grandfather     Cancer Paternal Grandfather         lung    Miscarriages / Stillbirths Maternal Aunt          SOCIAL HISTORY  Social History     Socioeconomic History    Marital status:    Tobacco Use    Smoking status: Never    Smokeless tobacco: Never    Tobacco comments:     disgust me never touched   Vaping Use    Vaping status: Never Used   Substance and Sexual Activity    Alcohol use: Not Currently     Comment: do not drink weekly only on holidays/ birtdays    Drug use: No    Sexual activity: Yes     Partners: Male     Birth control/protection: Surgical     Comment: bilatiral tubal         ALLERGIES  Amoxicillin, Doxycycline, Latex, and Penicillins        REVIEW OF SYSTEMS  Review of Systems       All  systems reviewed and negative except for those discussed in HPI.       PHYSICAL EXAM    I have reviewed the triage vital signs and nursing notes.    ED Triage Vitals [06/09/24 1436]   Temp Heart Rate Resp BP SpO2   98 °F (36.7 °C) 72 16 132/75 98 %      Temp src Heart Rate Source Patient Position BP Location FiO2 (%)   Oral Monitor Sitting Left arm --       Physical Exam  GENERAL:   Appears in no acute distress.   HENT: Nares patent.  EYES: No scleral icterus.  CV: Regular rhythm, regular rate.  RESPIRATORY: Normal effort.  No audible wheezes, rales or rhonchi.  ABDOMEN: Soft, tender in the epigastrium and left upper quadrant, surgical incisions well-healing,  MUSCULOSKELETAL: No deformities.   NEURO: Alert, moves all extremities, follows commands.  SKIN: Warm, dry, no rash visualized.      LAB RESULTS  Recent Results (from the past 24 hour(s))   Urinalysis With Microscopic If Indicated (No Culture) - Urine, Clean Catch    Collection Time: 06/09/24  4:25 PM    Specimen: Urine, Clean Catch   Result Value Ref Range    Color, UA Yellow Yellow, Straw    Appearance, UA Turbid (A) Clear    pH, UA 5.5 5.0 - 8.0    Specific Gravity, UA 1.028 1.001 - 1.030    Glucose, UA Negative Negative    Ketones, UA 40 mg/dL (2+) (A) Negative    Bilirubin, UA Negative Negative    Blood, UA Negative Negative    Protein, UA Trace (A) Negative    Leuk Esterase, UA Negative Negative    Nitrite, UA Negative Negative    Urobilinogen, UA 0.2 E.U./dL 0.2 - 1.0 E.U./dL   Urinalysis, Microscopic Only - Urine, Clean Catch    Collection Time: 06/09/24  4:25 PM    Specimen: Urine, Clean Catch   Result Value Ref Range    RBC, UA 0-2 None Seen, 0-2 /HPF    WBC, UA 6-10 (A) None Seen, 0-2 /HPF    Bacteria, UA 1+ (A) None Seen, Trace /HPF    Squamous Epithelial Cells, UA 31-50 (A) None Seen, 0-2 /HPF    Hyaline Casts, UA 0-6 0 - 6 /LPF    Methodology Manual Light Microscopy    ECG 12 Lead Chest Pain    Collection Time: 06/09/24  4:31 PM   Result Value  Ref Range    QT Interval 406 ms    QTC Interval 459 ms   Comprehensive Metabolic Panel    Collection Time: 06/09/24  4:33 PM    Specimen: Blood   Result Value Ref Range    Glucose 82 65 - 99 mg/dL    BUN 8 6 - 20 mg/dL    Creatinine 0.86 0.57 - 1.00 mg/dL    Sodium 139 136 - 145 mmol/L    Potassium 3.5 3.5 - 5.2 mmol/L    Chloride 97 (L) 98 - 107 mmol/L    CO2 26.0 22.0 - 29.0 mmol/L    Calcium 9.3 8.6 - 10.5 mg/dL    Total Protein 8.4 6.0 - 8.5 g/dL    Albumin 4.2 3.5 - 5.2 g/dL    ALT (SGPT) 8 1 - 33 U/L    AST (SGOT) 25 1 - 32 U/L    Alkaline Phosphatase 94 39 - 117 U/L    Total Bilirubin 0.3 0.0 - 1.2 mg/dL    Globulin 4.2 gm/dL    A/G Ratio 1.0 g/dL    BUN/Creatinine Ratio 9.3 7.0 - 25.0    Anion Gap 16.0 (H) 5.0 - 15.0 mmol/L    eGFR 91.6 >60.0 mL/min/1.73   Lipase    Collection Time: 06/09/24  4:33 PM    Specimen: Blood   Result Value Ref Range    Lipase 159 (H) 13 - 60 U/L   C-reactive Protein    Collection Time: 06/09/24  4:33 PM    Specimen: Blood   Result Value Ref Range    C-Reactive Protein 1.08 (H) 0.00 - 0.50 mg/dL   Magnesium    Collection Time: 06/09/24  4:33 PM    Specimen: Blood   Result Value Ref Range    Magnesium 2.3 1.6 - 2.6 mg/dL   Phosphorus    Collection Time: 06/09/24  4:33 PM    Specimen: Blood   Result Value Ref Range    Phosphorus 3.4 2.5 - 4.5 mg/dL   Lactic Acid, Plasma    Collection Time: 06/09/24  4:33 PM    Specimen: Blood   Result Value Ref Range    Lactate 0.9 0.5 - 2.0 mmol/L   CBC Auto Differential    Collection Time: 06/09/24  4:33 PM    Specimen: Blood   Result Value Ref Range    WBC 4.87 3.40 - 10.80 10*3/mm3    RBC 4.57 3.77 - 5.28 10*6/mm3    Hemoglobin 12.2 12.0 - 15.9 g/dL    Hematocrit 40.2 34.0 - 46.6 %    MCV 88.0 79.0 - 97.0 fL    MCH 26.7 26.6 - 33.0 pg    MCHC 30.3 (L) 31.5 - 35.7 g/dL    RDW 16.3 (H) 12.3 - 15.4 %    RDW-SD 51.8 37.0 - 54.0 fl    MPV 11.2 6.0 - 12.0 fL    Platelets 337 140 - 450 10*3/mm3    Neutrophil % 62.0 42.7 - 76.0 %    Lymphocyte % 25.9  19.6 - 45.3 %    Monocyte % 7.0 5.0 - 12.0 %    Eosinophil % 3.5 0.3 - 6.2 %    Basophil % 1.2 0.0 - 1.5 %    Immature Grans % 0.4 0.0 - 0.5 %    Neutrophils, Absolute 3.02 1.70 - 7.00 10*3/mm3    Lymphocytes, Absolute 1.26 0.70 - 3.10 10*3/mm3    Monocytes, Absolute 0.34 0.10 - 0.90 10*3/mm3    Eosinophils, Absolute 0.17 0.00 - 0.40 10*3/mm3    Basophils, Absolute 0.06 0.00 - 0.20 10*3/mm3    Immature Grans, Absolute 0.02 0.00 - 0.05 10*3/mm3    nRBC 0.0 0.0 - 0.2 /100 WBC       If labs were ordered, I independently reviewed the results and considered them in treating the patient.        RADIOLOGY  CT Abdomen Pelvis With Contrast    Result Date: 6/9/2024  CT CHEST W CONTRAST DIAGNOSTIC, CT ABDOMEN PELVIS W CONTRAST Date of Exam: 6/9/2024 4:55 PM EDT Indication: 3 days unable to tolerate solids or liquids, 5/25 Nissen fundoplication and Teresa-en-Y bypass. Comparison: CT abdomen and pelvis without contrast 5/27/2024, CT chest with contrast 8/15/2021 Technique: Axial CT images were obtained of the chest after the uneventful intravenous administration of 90 mL Isovue-300.  Reconstructed coronal and sagittal images were also obtained. Automated exposure control and iterative construction methods were used. Findings: Chest: Visualized soft tissues of the neck are without acute abnormality. Normal contrast opacification of the aortic arch branch vasculature. Heart size normal. No coronary artery calcification. No central pulmonary embolus. Negative for mediastinal or hilar adenopathy. No pericardial effusion or pleural effusion. Trachea and mainstem bronchi are patent. Negative for pneumothorax. No consolidation or findings of pneumonia. No suspicious pulmonary nodule or mass. Previously seen bibasilar atelectasis and small bilateral pleural effusions have resolved. Negative for  pneumomediastinum no paraesophageal fluid collection. Mild wall thickening of the distal esophagus similar to prior exam. No aggressive osseous  lesion or acute fracture. Abdomen and pelvis: The liver is normal in size and contour. There is a low-density lesion in the right hepatic lobe near the dome at the junction of segments 7 and 8 which is stable from prior studies which may relate to an angioma measuring 1.7 cm (2/21). The spleen is normal in size. The adrenal glands are without acute abnormality. Pancreas without findings of pancreatitis. Gallbladder absent. Mild common bile duct dilatation is stable likely related to postcholecystectomy state. The kidneys are symmetrically enhancing. There is no hydronephrosis or hydroureter. No ureteral calculus. Urinary bladder is collapsed. The uterus is absent. Normal ovaries for age. Postsurgical changes again noted related to recent hiatal hernia repair and gastric bypass. The surgical drainage catheter has been removed since the prior exam. There is inflammatory stranding surrounding the residual proximal stomach proximal to the gastrojejunal anastomosis which is mildly decreased from prior study. There is no adjacent fluid collection. No dilated bowel loops to indicate obstruction. No upper abdominal drainable abscess or fluid collection. Small upper abdominal hematomas on prior study have improved from the prior study. Small residual hematoma noted measuring 1.5 x 1 cm and measuring 1.1 x 1.1 cm at the operative site near the stomach (2/29, 2/32). Negative for residual pneumoperitoneum. No pneumatosis intestinalis. Colon is underdistended. Nonspecific submucosal fat deposition in the colon. The appendix is normal. The portal vein, splenic vein, and superior mesenteric vein are patent. Delayed images demonstrate normal contrast excretion into the ureters.     Impression: Chest: 1. No acute intrathoracic process. 2. Bibasilar atelectasis and bilateral pleural effusions on prior study have resolved. Abdomen and pelvis: 1. Postsurgical changes related to recent hiatal hernia repair and gastric bypass. Persistent  inflammatory stranding surrounding the remaining proximal stomach and gastrojejunal anastomosis. No organized or drainable abscess. 2. No dilated distal bowel loops to indicate small bowel obstruction. 3. Additional stable chronic findings above. Electronically Signed: Vinod Alfaro MD  6/9/2024 5:57 PM EDT  Workstation ID: [id number]    CT Chest With Contrast Diagnostic    Result Date: 6/9/2024  CT CHEST W CONTRAST DIAGNOSTIC, CT ABDOMEN PELVIS W CONTRAST Date of Exam: 6/9/2024 4:55 PM EDT Indication: 3 days unable to tolerate solids or liquids, 5/25 Nissen fundoplication and Teresa-en-Y bypass. Comparison: CT abdomen and pelvis without contrast 5/27/2024, CT chest with contrast 8/15/2021 Technique: Axial CT images were obtained of the chest after the uneventful intravenous administration of 90 mL Isovue-300.  Reconstructed coronal and sagittal images were also obtained. Automated exposure control and iterative construction methods were used. Findings: Chest: Visualized soft tissues of the neck are without acute abnormality. Normal contrast opacification of the aortic arch branch vasculature. Heart size normal. No coronary artery calcification. No central pulmonary embolus. Negative for mediastinal or hilar adenopathy. No pericardial effusion or pleural effusion. Trachea and mainstem bronchi are patent. Negative for pneumothorax. No consolidation or findings of pneumonia. No suspicious pulmonary nodule or mass. Previously seen bibasilar atelectasis and small bilateral pleural effusions have resolved. Negative for  pneumomediastinum no paraesophageal fluid collection. Mild wall thickening of the distal esophagus similar to prior exam. No aggressive osseous lesion or acute fracture. Abdomen and pelvis: The liver is normal in size and contour. There is a low-density lesion in the right hepatic lobe near the dome at the junction of segments 7 and 8 which is stable from prior studies which may relate to an angioma measuring  1.7 cm (2/21). The spleen is normal in size. The adrenal glands are without acute abnormality. Pancreas without findings of pancreatitis. Gallbladder absent. Mild common bile duct dilatation is stable likely related to postcholecystectomy state. The kidneys are symmetrically enhancing. There is no hydronephrosis or hydroureter. No ureteral calculus. Urinary bladder is collapsed. The uterus is absent. Normal ovaries for age. Postsurgical changes again noted related to recent hiatal hernia repair and gastric bypass. The surgical drainage catheter has been removed since the prior exam. There is inflammatory stranding surrounding the residual proximal stomach proximal to the gastrojejunal anastomosis which is mildly decreased from prior study. There is no adjacent fluid collection. No dilated bowel loops to indicate obstruction. No upper abdominal drainable abscess or fluid collection. Small upper abdominal hematomas on prior study have improved from the prior study. Small residual hematoma noted measuring 1.5 x 1 cm and measuring 1.1 x 1.1 cm at the operative site near the stomach (2/29, 2/32). Negative for residual pneumoperitoneum. No pneumatosis intestinalis. Colon is underdistended. Nonspecific submucosal fat deposition in the colon. The appendix is normal. The portal vein, splenic vein, and superior mesenteric vein are patent. Delayed images demonstrate normal contrast excretion into the ureters.     Impression: Chest: 1. No acute intrathoracic process. 2. Bibasilar atelectasis and bilateral pleural effusions on prior study have resolved. Abdomen and pelvis: 1. Postsurgical changes related to recent hiatal hernia repair and gastric bypass. Persistent inflammatory stranding surrounding the remaining proximal stomach and gastrojejunal anastomosis. No organized or drainable abscess. 2. No dilated distal bowel loops to indicate small bowel obstruction. 3. Additional stable chronic findings above. Electronically  Signed: Vinod Alfaro MD  6/9/2024 5:57 PM EDT  Workstation ID: [id number]     I ordered and independently reviewed the above noted radiographic studies.      I viewed images of CT scan of the abdomen pelvis which showed inflammation around the stomach but no other acute pathology that I can appreciate per my independent interpretation.  CT scan of the chest does not show any food bolus inflammation of the esophagus or other acute pathology that I can appreciate.    See radiologist's dictation for official interpretation.        PROCEDURES    Procedures    ECG 12 Lead Chest Pain   Preliminary Result   Test Reason : Chest Pain   Blood Pressure :   */*   mmHG   Vent. Rate :  77 BPM     Atrial Rate :  77 BPM      P-R Int : 142 ms          QRS Dur : 102 ms       QT Int : 406 ms       P-R-T Axes :  22 -17  15 degrees      QTc Int : 459 ms      Normal sinus rhythm   Moderate voltage criteria for LVH, may be normal variant   Borderline ECG   When compared with ECG of 17-MAY-2024 14:36,   No significant change was found      Referred By: EDMD           Confirmed By:           MEDICATIONS GIVEN IN ER    Medications   lactated ringers infusion (has no administration in time range)   sodium chloride 0.9 % bolus 1,000 mL (0 mL Intravenous Stopped 6/9/24 1817)   ketorolac (TORADOL) injection 15 mg (15 mg Intravenous Given 6/9/24 1634)   metoclopramide (REGLAN) injection 5 mg (5 mg Intravenous Given 6/9/24 1639)   diphenhydrAMINE (BENADRYL) injection 25 mg (25 mg Intravenous Given 6/9/24 1636)   HYDROmorphone (DILAUDID) injection 0.25 mg (0.25 mg Intravenous Given 6/9/24 1638)   iopamidol (ISOVUE-300) 61 % injection 100 mL (90 mL Intravenous Given 6/9/24 1659)         MEDICAL DECISION MAKING, PROGRESS, and CONSULTS    All labs, if obtained, have been independently reviewed by me.  All radiology studies, if obtained, have been reviewed by me and the radiologist dictating the report.  All EKG's, if obtained, have been independently  viewed and interpreted by me/my attending physician.      Discussion below represents my analysis of pertinent findings related to patient's condition, differential diagnosis, treatment plan and final disposition.                         Differential diagnosis:    Bowel perforation, intra-abdominal infection, bowel obstruction, volvulus, ileus, food bolus, pancreatitis, colitis, sepsis, anemia, electrolyte abnormality, dehydration      Additional sources:    - Discussed/ obtained information from independent historians:      - External (non-ED) record review:  Chart review shows hospital admission with  for 5/25 conversion of lap band to Teresa-en-Y bypass and hiatal hernia repair.    - Chronic or social conditions impacting care: Obesity, history of bariatric surgery, hyperlipidemia    - Shared decision making: Patient/patient representative in complete agreement with current plans for evaluation and management.      Orders placed during this visit:  Orders Placed This Encounter   Procedures    CT Abdomen Pelvis With Contrast    CT Chest With Contrast Diagnostic    Comprehensive Metabolic Panel    Lipase    Urinalysis With Microscopic If Indicated (No Culture) - Urine, Clean Catch    C-reactive Protein    Magnesium    Phosphorus    Lactic Acid, Plasma    CBC Auto Differential    Urinalysis, Microscopic Only - Urine, Clean Catch    ECG 12 Lead Chest Pain    Initiate Observation Status    ED Bed Request    CBC & Differential         Additional orders considered but not ordered:      ED Course:    Consultants: Bariatrics, [facility] medicine    ED Course as of 06/09/24 1938   Sun Jun 09, 2024 1600 Chart review shows hospital admission with  for 5/25 conversion of lap band to Teresa-en-Y bypass and hiatal hernia repair. [CC]   2086 In summary is a very nice 33-year-old female who presents the emergency department for epigastric and low mid chest pain as well as inability to tolerate oral intake.  She  recently had hiatal hernia repair and Teresa-en-Y bypass performed on the 25th was on a soft puréed diet the last thing she was able to tolerate was puréed chicken on Friday morning but she is not able to tolerate solids or liquids since then she contacted her bariatrics team who told her to come here for evaluation.  She says she has the sensation that there is something stuck just behind her lower sternum.  No other complaints at this time. [CC]   1615 She arrived awake and alert vitals within normal limits surgical incisions appear well-healing and abdomen is soft but concern for obstruction, food bolus, perforation etc. obtaining CT scan both of the abdomen and chest.  Obtaining basic laboratory workup treating with IV fluids Reglan Toradol Dilaudid and diphenhydramine will reevaluate pending imaging.  Will discuss with bariatrics. [CC]   1937 Labs generally reassuring she does have mildly elevated C-reactive protein but the rest of her infectious labs are generally reassuring no elevation in lactate.  Lipase however is significantly elevated at 159 not quite meeting criteria for pancreatitis, I have contacted S[facility] with bariatric surgery and Dr. Atwood has reviewed the case they said the lipase elevation is not normal.  CT scan shows inflammation around the stomach but no other acute pathology.  They are requesting hospital admission since she is not able to tolerate oral intake they would like her to be kept n.p.o. and continue IV fluids.  Patient is agreeable to this plan.  She says her pain and nausea are controlled for the time being.  Bariatrics will continue to follow.  Medicine team consulted for admission. [CC]      ED Course User Index  [CC] Julian Sorenson MD              Shared Decision Making:  After my consideration of clinical presentation and any laboratory/radiology studies obtained, I discussed the findings with the patient/patient representative who is in agreement with the treatment  plan and the final disposition.   Risks and benefits of discharge and/or observation/admission were discussed.       AS OF 19:38 EDT VITALS:    BP - 125/76  HR - 58  TEMP - 98 °F (36.7 °C) (Oral)  O2 SATS - 99%                  DIAGNOSIS  Final diagnoses:   Post-op pain   Unable to eat   Serum lipase elevation         DISPOSITION  Admit      Please note that portions of this document were completed with voice recognition software.        Julian Sorenson MD  06/09/24 7023

## 2024-06-10 ENCOUNTER — ANESTHESIA (OUTPATIENT)
Dept: GASTROENTEROLOGY | Facility: HOSPITAL | Age: 34
End: 2024-06-10
Payer: COMMERCIAL

## 2024-06-10 ENCOUNTER — ANESTHESIA EVENT (OUTPATIENT)
Dept: GASTROENTEROLOGY | Facility: HOSPITAL | Age: 34
End: 2024-06-10
Payer: COMMERCIAL

## 2024-06-10 PROBLEM — K91.89 GASTROJEJUNAL ANASTOMOTIC STRICTURE: Status: ACTIVE | Noted: 2024-06-09

## 2024-06-10 PROBLEM — K91.89 GASTROJEJUNAL ANASTOMOTIC STRICTURE: Status: RESOLVED | Noted: 2024-06-09 | Resolved: 2024-06-10

## 2024-06-10 PROBLEM — R11.2 NAUSEA & VOMITING: Status: RESOLVED | Noted: 2024-06-09 | Resolved: 2024-06-10

## 2024-06-10 LAB
ANION GAP SERPL CALCULATED.3IONS-SCNC: 11 MMOL/L (ref 5–15)
BASOPHILS # BLD AUTO: 0.04 10*3/MM3 (ref 0–0.2)
BASOPHILS NFR BLD AUTO: 1.1 % (ref 0–1.5)
BUN SERPL-MCNC: 7 MG/DL (ref 6–20)
BUN/CREAT SERPL: 10.1 (ref 7–25)
CALCIUM SPEC-SCNC: 8.4 MG/DL (ref 8.6–10.5)
CHLORIDE SERPL-SCNC: 104 MMOL/L (ref 98–107)
CO2 SERPL-SCNC: 24 MMOL/L (ref 22–29)
CREAT SERPL-MCNC: 0.69 MG/DL (ref 0.57–1)
DEPRECATED RDW RBC AUTO: 51 FL (ref 37–54)
EGFRCR SERPLBLD CKD-EPI 2021: 117.7 ML/MIN/1.73
EOSINOPHIL # BLD AUTO: 0.14 10*3/MM3 (ref 0–0.4)
EOSINOPHIL NFR BLD AUTO: 3.8 % (ref 0.3–6.2)
ERYTHROCYTE [DISTWIDTH] IN BLOOD BY AUTOMATED COUNT: 16.1 % (ref 12.3–15.4)
FERRITIN SERPL-MCNC: 84.92 NG/ML (ref 13–150)
GLUCOSE SERPL-MCNC: 80 MG/DL (ref 65–99)
HCT VFR BLD AUTO: 31.7 % (ref 34–46.6)
HGB BLD-MCNC: 9.6 G/DL (ref 12–15.9)
IMM GRANULOCYTES # BLD AUTO: 0.01 10*3/MM3 (ref 0–0.05)
IMM GRANULOCYTES NFR BLD AUTO: 0.3 % (ref 0–0.5)
IRON 24H UR-MRATE: 24 MCG/DL (ref 37–145)
IRON SATN MFR SERPL: 10 % (ref 20–50)
LYMPHOCYTES # BLD AUTO: 1.33 10*3/MM3 (ref 0.7–3.1)
LYMPHOCYTES NFR BLD AUTO: 36.5 % (ref 19.6–45.3)
MCH RBC QN AUTO: 26.4 PG (ref 26.6–33)
MCHC RBC AUTO-ENTMCNC: 30.3 G/DL (ref 31.5–35.7)
MCV RBC AUTO: 87.1 FL (ref 79–97)
MONOCYTES # BLD AUTO: 0.37 10*3/MM3 (ref 0.1–0.9)
MONOCYTES NFR BLD AUTO: 10.2 % (ref 5–12)
NEUTROPHILS NFR BLD AUTO: 1.75 10*3/MM3 (ref 1.7–7)
NEUTROPHILS NFR BLD AUTO: 48.1 % (ref 42.7–76)
NRBC BLD AUTO-RTO: 0 /100 WBC (ref 0–0.2)
PLATELET # BLD AUTO: 249 10*3/MM3 (ref 140–450)
PMV BLD AUTO: 11.5 FL (ref 6–12)
POTASSIUM SERPL-SCNC: 3.9 MMOL/L (ref 3.5–5.2)
RBC # BLD AUTO: 3.64 10*6/MM3 (ref 3.77–5.28)
SODIUM SERPL-SCNC: 139 MMOL/L (ref 136–145)
TIBC SERPL-MCNC: 252 MCG/DL (ref 298–536)
TRANSFERRIN SERPL-MCNC: 169 MG/DL (ref 200–360)
TSH SERPL DL<=0.05 MIU/L-ACNC: 3.79 UIU/ML (ref 0.27–4.2)
WBC NRBC COR # BLD AUTO: 3.64 10*3/MM3 (ref 3.4–10.8)

## 2024-06-10 PROCEDURE — 63710000001 PROMETHAZINE PER 12.5 MG: Performed by: INTERNAL MEDICINE

## 2024-06-10 PROCEDURE — 25010000002 THIAMINE HCL 200 MG/2ML SOLUTION: Performed by: SURGERY

## 2024-06-10 PROCEDURE — 25010000002 ONDANSETRON PER 1 MG: Performed by: SURGERY

## 2024-06-10 PROCEDURE — 25010000002 DEXAMETHASONE PER 1 MG

## 2024-06-10 PROCEDURE — 25010000002 PROCHLORPERAZINE 10 MG/2ML SOLUTION: Performed by: STUDENT IN AN ORGANIZED HEALTH CARE EDUCATION/TRAINING PROGRAM

## 2024-06-10 PROCEDURE — 25010000002 POTASSIUM CHLORIDE PER 2 MEQ: Performed by: SURGERY

## 2024-06-10 PROCEDURE — 83540 ASSAY OF IRON: CPT | Performed by: STUDENT IN AN ORGANIZED HEALTH CARE EDUCATION/TRAINING PROGRAM

## 2024-06-10 PROCEDURE — 25810000003 LACTATED RINGERS PER 1000 ML

## 2024-06-10 PROCEDURE — 25010000002 HEPARIN (PORCINE) PER 1000 UNITS: Performed by: SURGERY

## 2024-06-10 PROCEDURE — 25010000002 HYDROMORPHONE PER 4 MG: Performed by: INTERNAL MEDICINE

## 2024-06-10 PROCEDURE — 82728 ASSAY OF FERRITIN: CPT | Performed by: STUDENT IN AN ORGANIZED HEALTH CARE EDUCATION/TRAINING PROGRAM

## 2024-06-10 PROCEDURE — 43245 EGD DILATE STRICTURE: CPT | Performed by: SURGERY

## 2024-06-10 PROCEDURE — 25010000002 ONDANSETRON PER 1 MG: Performed by: INTERNAL MEDICINE

## 2024-06-10 PROCEDURE — 97165 OT EVAL LOW COMPLEX 30 MIN: CPT

## 2024-06-10 PROCEDURE — 99232 SBSQ HOSP IP/OBS MODERATE 35: CPT | Performed by: STUDENT IN AN ORGANIZED HEALTH CARE EDUCATION/TRAINING PROGRAM

## 2024-06-10 PROCEDURE — 25010000002 HEPARIN (PORCINE) PER 1000 UNITS: Performed by: INTERNAL MEDICINE

## 2024-06-10 PROCEDURE — G0378 HOSPITAL OBSERVATION PER HR: HCPCS

## 2024-06-10 PROCEDURE — 96376 TX/PRO/DX INJ SAME DRUG ADON: CPT

## 2024-06-10 PROCEDURE — 25010000002 ONDANSETRON PER 1 MG

## 2024-06-10 PROCEDURE — 25810000003 SODIUM CHLORIDE 0.9 % SOLUTION: Performed by: SURGERY

## 2024-06-10 PROCEDURE — 84466 ASSAY OF TRANSFERRIN: CPT | Performed by: STUDENT IN AN ORGANIZED HEALTH CARE EDUCATION/TRAINING PROGRAM

## 2024-06-10 PROCEDURE — 25010000002 ESMOLOL 100 MG/10ML SOLUTION

## 2024-06-10 PROCEDURE — 84443 ASSAY THYROID STIM HORMONE: CPT | Performed by: INTERNAL MEDICINE

## 2024-06-10 PROCEDURE — C1726 CATH, BAL DIL, NON-VASCULAR: HCPCS | Performed by: SURGERY

## 2024-06-10 PROCEDURE — 96372 THER/PROPH/DIAG INJ SC/IM: CPT

## 2024-06-10 PROCEDURE — 97161 PT EVAL LOW COMPLEX 20 MIN: CPT

## 2024-06-10 PROCEDURE — 85025 COMPLETE CBC W/AUTO DIFF WBC: CPT | Performed by: INTERNAL MEDICINE

## 2024-06-10 PROCEDURE — 25010000002 PROPOFOL 10 MG/ML EMULSION

## 2024-06-10 PROCEDURE — 80048 BASIC METABOLIC PNL TOTAL CA: CPT | Performed by: INTERNAL MEDICINE

## 2024-06-10 PROCEDURE — 96375 TX/PRO/DX INJ NEW DRUG ADDON: CPT

## 2024-06-10 RX ORDER — PROCHLORPERAZINE EDISYLATE 5 MG/ML
5 INJECTION INTRAMUSCULAR; INTRAVENOUS EVERY 6 HOURS PRN
Status: DISCONTINUED | OUTPATIENT
Start: 2024-06-10 | End: 2024-06-11 | Stop reason: HOSPADM

## 2024-06-10 RX ORDER — SODIUM CHLORIDE 9 MG/ML
250 INJECTION, SOLUTION INTRAVENOUS CONTINUOUS
Status: DISCONTINUED | OUTPATIENT
Start: 2024-06-10 | End: 2024-06-10

## 2024-06-10 RX ORDER — SODIUM CHLORIDE AND POTASSIUM CHLORIDE 150; 450 MG/100ML; MG/100ML
125 INJECTION, SOLUTION INTRAVENOUS CONTINUOUS
Status: DISCONTINUED | OUTPATIENT
Start: 2024-06-10 | End: 2024-06-11

## 2024-06-10 RX ORDER — ONDANSETRON 2 MG/ML
INJECTION INTRAMUSCULAR; INTRAVENOUS AS NEEDED
Status: DISCONTINUED | OUTPATIENT
Start: 2024-06-10 | End: 2024-06-10 | Stop reason: SURG

## 2024-06-10 RX ORDER — ESMOLOL HYDROCHLORIDE 10 MG/ML
INJECTION INTRAVENOUS AS NEEDED
Status: DISCONTINUED | OUTPATIENT
Start: 2024-06-10 | End: 2024-06-10 | Stop reason: SURG

## 2024-06-10 RX ORDER — PROPOFOL 10 MG/ML
VIAL (ML) INTRAVENOUS AS NEEDED
Status: DISCONTINUED | OUTPATIENT
Start: 2024-06-10 | End: 2024-06-10 | Stop reason: SURG

## 2024-06-10 RX ORDER — DOCUSATE SODIUM 100 MG/1
100 CAPSULE, LIQUID FILLED ORAL 2 TIMES DAILY
Status: DISCONTINUED | OUTPATIENT
Start: 2024-06-10 | End: 2024-06-11 | Stop reason: HOSPADM

## 2024-06-10 RX ORDER — SUCCINYLCHOLINE/SOD CL,ISO/PF 200MG/10ML
SYRINGE (ML) INTRAVENOUS AS NEEDED
Status: DISCONTINUED | OUTPATIENT
Start: 2024-06-10 | End: 2024-06-10 | Stop reason: SURG

## 2024-06-10 RX ORDER — IPRATROPIUM BROMIDE AND ALBUTEROL SULFATE 2.5; .5 MG/3ML; MG/3ML
3 SOLUTION RESPIRATORY (INHALATION) ONCE AS NEEDED
Status: DISCONTINUED | OUTPATIENT
Start: 2024-06-10 | End: 2024-06-10 | Stop reason: HOSPADM

## 2024-06-10 RX ORDER — THIAMINE HYDROCHLORIDE 100 MG/ML
100 INJECTION, SOLUTION INTRAMUSCULAR; INTRAVENOUS DAILY
Status: DISCONTINUED | OUTPATIENT
Start: 2024-06-10 | End: 2024-06-11 | Stop reason: HOSPADM

## 2024-06-10 RX ORDER — PANTOPRAZOLE SODIUM 40 MG/10ML
40 INJECTION, POWDER, LYOPHILIZED, FOR SOLUTION INTRAVENOUS EVERY 12 HOURS SCHEDULED
Status: DISCONTINUED | OUTPATIENT
Start: 2024-06-10 | End: 2024-06-11 | Stop reason: HOSPADM

## 2024-06-10 RX ORDER — DEXAMETHASONE SODIUM PHOSPHATE 4 MG/ML
INJECTION, SOLUTION INTRA-ARTICULAR; INTRALESIONAL; INTRAMUSCULAR; INTRAVENOUS; SOFT TISSUE AS NEEDED
Status: DISCONTINUED | OUTPATIENT
Start: 2024-06-10 | End: 2024-06-10 | Stop reason: SURG

## 2024-06-10 RX ORDER — SODIUM CHLORIDE, SODIUM LACTATE, POTASSIUM CHLORIDE, CALCIUM CHLORIDE 600; 310; 30; 20 MG/100ML; MG/100ML; MG/100ML; MG/100ML
INJECTION, SOLUTION INTRAVENOUS CONTINUOUS PRN
Status: DISCONTINUED | OUTPATIENT
Start: 2024-06-10 | End: 2024-06-10 | Stop reason: SURG

## 2024-06-10 RX ORDER — LIDOCAINE HYDROCHLORIDE 10 MG/ML
INJECTION, SOLUTION EPIDURAL; INFILTRATION; INTRACAUDAL; PERINEURAL AS NEEDED
Status: DISCONTINUED | OUTPATIENT
Start: 2024-06-10 | End: 2024-06-10 | Stop reason: SURG

## 2024-06-10 RX ORDER — ONDANSETRON 2 MG/ML
4 INJECTION INTRAMUSCULAR; INTRAVENOUS ONCE AS NEEDED
Status: DISCONTINUED | OUTPATIENT
Start: 2024-06-10 | End: 2024-06-10 | Stop reason: HOSPADM

## 2024-06-10 RX ADMIN — POTASSIUM CHLORIDE AND SODIUM CHLORIDE 125 ML/HR: 450; 150 INJECTION, SOLUTION INTRAVENOUS at 14:14

## 2024-06-10 RX ADMIN — POTASSIUM CHLORIDE AND SODIUM CHLORIDE 125 ML/HR: 450; 150 INJECTION, SOLUTION INTRAVENOUS at 22:57

## 2024-06-10 RX ADMIN — ACETAMINOPHEN 650 MG: 325 TABLET ORAL at 05:28

## 2024-06-10 RX ADMIN — LAMOTRIGINE 150 MG: 25 TABLET ORAL at 08:35

## 2024-06-10 RX ADMIN — HEPARIN SODIUM 5000 UNITS: 5000 INJECTION INTRAVENOUS; SUBCUTANEOUS at 05:28

## 2024-06-10 RX ADMIN — THIAMINE HYDROCHLORIDE 100 MG: 100 INJECTION, SOLUTION INTRAMUSCULAR; INTRAVENOUS at 08:37

## 2024-06-10 RX ADMIN — ONDANSETRON 4 MG: 2 INJECTION INTRAMUSCULAR; INTRAVENOUS at 05:29

## 2024-06-10 RX ADMIN — DEXAMETHASONE SODIUM PHOSPHATE 8 MG: 4 INJECTION, SOLUTION INTRAMUSCULAR; INTRAVENOUS at 13:23

## 2024-06-10 RX ADMIN — Medication 10 ML: at 08:38

## 2024-06-10 RX ADMIN — Medication 1 TABLET: at 08:34

## 2024-06-10 RX ADMIN — ONDANSETRON 4 MG: 2 INJECTION INTRAMUSCULAR; INTRAVENOUS at 20:35

## 2024-06-10 RX ADMIN — ARIPIPRAZOLE 5 MG: 5 TABLET ORAL at 08:36

## 2024-06-10 RX ADMIN — SODIUM CHLORIDE 250 ML/HR: 9 INJECTION, SOLUTION INTRAVENOUS at 08:38

## 2024-06-10 RX ADMIN — PANTOPRAZOLE SODIUM 40 MG: 40 INJECTION, POWDER, FOR SOLUTION INTRAVENOUS at 20:35

## 2024-06-10 RX ADMIN — ESMOLOL HYDROCHLORIDE 20 MG: 10 INJECTION, SOLUTION INTRAVENOUS at 13:33

## 2024-06-10 RX ADMIN — PROPOFOL 200 MG: 10 INJECTION, EMULSION INTRAVENOUS at 13:20

## 2024-06-10 RX ADMIN — DOCUSATE SODIUM 100 MG: 100 CAPSULE, LIQUID FILLED ORAL at 10:35

## 2024-06-10 RX ADMIN — PROMETHAZINE HYDROCHLORIDE 12.5 MG: 12.5 TABLET ORAL at 01:29

## 2024-06-10 RX ADMIN — PANTOPRAZOLE SODIUM 40 MG: 40 INJECTION, POWDER, FOR SOLUTION INTRAVENOUS at 08:37

## 2024-06-10 RX ADMIN — Medication 160 MG: at 13:20

## 2024-06-10 RX ADMIN — POTASSIUM CHLORIDE 40 MEQ: 1500 TABLET, EXTENDED RELEASE ORAL at 01:23

## 2024-06-10 RX ADMIN — PROMETHAZINE HYDROCHLORIDE 12.5 MG: 12.5 TABLET ORAL at 08:37

## 2024-06-10 RX ADMIN — PROCHLORPERAZINE EDISYLATE 5 MG: 5 INJECTION INTRAMUSCULAR; INTRAVENOUS at 15:08

## 2024-06-10 RX ADMIN — Medication 5000 UNITS: at 08:34

## 2024-06-10 RX ADMIN — CYANOCOBALAMIN TAB 1000 MCG 500 MCG: 1000 TAB at 08:37

## 2024-06-10 RX ADMIN — PANTOPRAZOLE SODIUM 40 MG: 40 TABLET, DELAYED RELEASE ORAL at 05:28

## 2024-06-10 RX ADMIN — SODIUM CHLORIDE, POTASSIUM CHLORIDE, SODIUM LACTATE AND CALCIUM CHLORIDE: 600; 310; 30; 20 INJECTION, SOLUTION INTRAVENOUS at 13:17

## 2024-06-10 RX ADMIN — MONTELUKAST 10 MG: 10 TABLET, FILM COATED ORAL at 20:35

## 2024-06-10 RX ADMIN — LIDOCAINE HYDROCHLORIDE 100 MG: 10 INJECTION, SOLUTION EPIDURAL; INFILTRATION; INTRACAUDAL; PERINEURAL at 13:20

## 2024-06-10 RX ADMIN — ONDANSETRON 4 MG: 2 INJECTION INTRAMUSCULAR; INTRAVENOUS at 13:34

## 2024-06-10 RX ADMIN — DOCUSATE SODIUM 100 MG: 100 CAPSULE, LIQUID FILLED ORAL at 20:35

## 2024-06-10 RX ADMIN — HYDROMORPHONE HYDROCHLORIDE 0.25 MG: 1 INJECTION, SOLUTION INTRAMUSCULAR; INTRAVENOUS; SUBCUTANEOUS at 01:29

## 2024-06-10 RX ADMIN — HEPARIN SODIUM 5000 UNITS: 5000 INJECTION INTRAVENOUS; SUBCUTANEOUS at 20:35

## 2024-06-10 NOTE — CASE MANAGEMENT/SOCIAL WORK
Discharge Planning Assessment  [facility]     Patient Name: Theresa Maya  MRN: [id number]  Today's Date: 6/10/2024    Admit Date: 6/9/2024    Plan: home   Discharge Needs Assessment       Row Name 06/10/24 0901       Living Environment    People in Home spouse    Current Living Arrangements home    Primary Care Provided by self       Transition Planning    Patient/Family Anticipates Transition to home with family       Discharge Needs Assessment    Readmission Within the Last 30 Days no previous admission in last 30 days    Equipment Currently Used at Home none    Concerns to be Addressed basic needs;discharge planning                   Discharge Plan       Row Name 06/10/24 0902       Plan    Plan home    Patient/Family in Agreement with Plan yes    Plan Comments I met with this patient bedside. She lives with her  in [facility]. She is independent with activities of daily living and mobility. PT and OT have been consulted. She anticipates returning home after this hospitalization, and her  can transport. Case management will follow.    Final Discharge Disposition Code 01 - home or self-care                  Continued Care and Services - Admitted Since 6/9/2024    No active coordination exists for this encounter.       Expected Discharge Date and Time       Expected Discharge Date Expected Discharge Time    Jun 12, 2024            Demographic Summary       Row Name 06/10/24 0901       General Information    General Information Comments I confirmed that Gina Green is Ms Maya's PCP and she has [facility]                   Functional Status       Row Name 06/10/24 0901       Functional Status, IADL    Medications independent    Meal Preparation independent    Housekeeping independent    Laundry independent    Shopping independent                   Psychosocial    No documentation.                  Abuse/Neglect    No documentation.                  Legal    No documentation.                   Substance Abuse    No documentation.                  Patient Forms    No documentation.                     Zahida Rico RN

## 2024-06-10 NOTE — OUTREACH NOTE
General Surgery Week 2 Survey      Flowsheet Row Responses   [facility] patient discharged from? [place]   Does the patient have one of the following disease processes/diagnoses(primary or secondary)? General Surgery   Week 2 attempt successful? No   Unsuccessful attempts Attempt 1   Revoke Readmitted            Jackie CHENG - Registered Nurse

## 2024-06-10 NOTE — PLAN OF CARE
Goal Outcome Evaluation:  Plan of Care Reviewed With: patient        Progress: no change  Outcome Evaluation: Pt. presents at baseline with ADLs and functional mobility. No further skilled OT services warranted. Recommend home with family at discharge.      Anticipated Discharge Disposition (OT): home with assist

## 2024-06-10 NOTE — OP NOTE
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    Indications:   This is a 33-year-old female 2-1/2 weeks status post uneventful laparoscopic robotic assisted revision of her previous sleeve gastrectomy to Teresa-en-Y gastric bypass with recurrent hiatal hernia repair.  The indication was a recurrent hiatal hernia and severe reflux status post laparoscopic sleeve gastrectomy and hiatal hernia repair in May 2022.  She did well until 3 days ago when she experienced dysphagia and vomiting of frothy material consistent with a gastrojejunostomy stricture.  She did have some associated abdominal pain.  She came to the ER where labs and CT were unremarkable other than showing dehydration, they did give her Toradol.  Please see my consultation.    Risks, benefits and alternative therapies were discussed and the patient wishes to proceed with diagnostic possible therapeutic upper endoscopy    Operative Technique:  The patient was brought to the endoscopy suite and placed supine upon the stretcher.  She underwent uneventful rapid sequence general endotracheal esthesia anesthesiology staff.  A bite block was placed.  The standard flexible endoscope was advanced under direct visualization into the posterior pharynx and the esophagus intubated.  The endoscope advanced easily through the esophagus, no retained secretions or distention.  Some mild distal esophagitis noted without ulceration, Z-line roughly 39 cm.  Gastric pouch was some mucosal edema, no visible gastritis.  Moderate stricture of the gastrojejunostomy, the endoscope passed  through with mild resistance and minimal scope trauma.  The preferred opening appeared to be the blind Teresa limb with slight angulation to the Teresa limb itself.  The Teresa limb was not dilated, no bilious material, nothing to suggest distal obstruction.  The 12 to 15 mm balloon dilator was then advanced into the Teresa limb and brought back so the midportion of the balloon was at the gastrojejunostomy.  I initially partially dilated to about 10 mm with mild resistance and left it there for a little while and then went up to 12 mm, still with mild resistance.  The balloon was left there for about a minute.  The balloon was then inflated to about 13-1/2 mm where there was significant resistance.  I did not feel comfortable going beyond this level.  It was left there for a full minute by the clock.  The balloon was desufflated and removed.  Some balloon trauma noted, no active bleeding.  The gastrojejunostomy was inspected.  There was some nonobstructive suture material visible with some associated superficial ulceration.  Some of the suture material was debrided with biopsy forceps, the rest was fairly embedded and tenacious and was left undisturbed.  The endoscope was slowly withdrawn, no new abnormalities noted.  Several photos obtained throughout the procedure.    The patient tolerated the procedure well without complication and was taken to the recovery room in stable condition.

## 2024-06-10 NOTE — THERAPY DISCHARGE NOTE
Patient Name: Theresa Maya  : 1990    MRN: [id number]                              Today's Date: 6/10/2024       Admit Date: 2024    Visit Dx:     ICD-10-CM ICD-9-CM   1. Gastrojejunal anastomotic stricture  K91.89 997.49   2. Post-op pain  G89.18 338.18   3. Unable to eat  R63.8 307.59   4. Serum lipase elevation  R74.8 790.5     Patient Active Problem List   Diagnosis    Previous stillbirth or demise, antepartum    Previous deep vein thrombosis (DVT) affecting pregnancy    H/O abnormal cervical Papanicolaou smear    Anxiety    MTHFR gene mutation    Heartburn    Fatigue    Dyspepsia    Dyspnea on exertion    Right leg pain    Lumbar back pain    Bipolar disorder in partial remission    History of suicidal ideation    Constipation    S/P laparoscopic sleeve gastrectomy    Pneumonia involving right lung    Pneumonia due to other gram-negative bacteria    Post-op pain    Nausea & vomiting    Gastrojejunal anastomotic stricture     Past Medical History:   Diagnosis Date    Abnormal Pap smear of cervix     ADHD (attention deficit hyperactivity disorder)     Working with a therapy at     Allergic 2011    Take montalukast    Anesthesia complication     SLOW TO WAKE UP AFTER GASTRO AND HYSTERECTOMY    Anxiety     Arthritis     Clotting disorder     Deep vein thrombosis     2014, (R) leg while pregnant, dx w/ MTHFR    Depression     Dermatitis     UNDER BREAST AT TIMES    Dyspepsia     Dyspnea on exertion     Elevated hemoglobin A1c     Fatigue     Gallbladder abscess     s/p lap nicolas     GERD (gastroesophageal reflux disease)     UGI  - small recurrent HH, EGD Dr. Ricardo   small recurrent HH    Heartburn     chronic/episodic, depends on what she eats, takes Pepcid prn, EGD Dr. Ricardo     Hyperemesis gravidarum     Hyperlipidemia     Incomplete right bundle branch block     Iron deficiency anemia     Migraines     Miscarriage     x 4 prior to 1st pregnancy, presumably from  "undiagnosed MTHFR mutation    Morbid obesity with body mass index (BMI) of 40.0 to 44.9 in adult 04/19/2022    MTHFR deficiency complicating pregnancy     says clotting d/o only an issue when pregnant, advised to use heparin shots during pregnancy    Obesity     Ovarian cyst     Recurrent pregnancy loss, antepartum condition or complication     had a [facility] and lost the pregnancy at 6 months    Strep pharyngitis     Urinary tract infection     Wears glasses      Past Surgical History:   Procedure Laterality Date    ENDOSCOPY N/A 05/05/2022    Procedure: ESOPHAGOGASTRODUODENOSCOPY;  Surgeon: Cristiano Ricardo MD;  Location:  [facility];  Service: Bariatric;  Laterality: N/A;    ENDOSCOPY N/A 5/24/2024    Procedure: ESOPHAGOGASTRODUODENOSCOPY;  Surgeon: Cristiano Ricardo MD;  Location:  [facility];  Service: Robotics - DaVinci;  Laterality: N/A;  EGD # 752 SCOPE USED    GASTRECTOMY N/A 05/05/2022    Procedure: GASTRECTOMY LAPAROSCOPIC;  Surgeon: Cristiano Ricardo MD;  Location:  [facility];  Service: Bariatric;  Laterality: N/A;    GASTRIC BYPASS N/A 5/24/2024    Procedure: GASTRIC BYPASS LAPAROSCOPIC WITH DAVINCI ROBOT;  Surgeon: Cristiano Ricardo MD;  Location:  [facility];  Service: Robotics - DaVinci;  Laterality: N/A;    HIATAL HERNIA REPAIR N/A 05/05/2022    Procedure: HIATAL HERNIA REPAIR LAPAROSCOPIC;  Surgeon: Cristiano Ricardo MD;  Location:  [facility];  Service: Bariatric;  Laterality: N/A;    HIATAL HERNIA REPAIR N/A 5/24/2024    Procedure: RECURRENT HIATAL HERNIA REPAIR LAPAROSCOPIC WITH DAVINCI ROBOT;  Surgeon: Cristiano Ricardo MD;  Location:  [facility];  Service: Robotics - DaVinci;  Laterality: N/A;    HYSTERECTOMY  01/23/2024    [facility]- TOTAL    LAPAROSCOPIC CHOLECYSTECTOMY  2006    no stones, was told she had \"three gallbladders\"- all removed    SINUS SURGERY Bilateral 2006    TUBAL COAGULATION LAPAROSCOPIC Bilateral 09/15/2017    Procedure: BILATERAL TUBAL FALLOPE FILSHIE CLIPPING " LAPAROSCOPIC;  Surgeon: Leo Posey III, MD;  Location: [facility];  Service:     VAGINAL DELIVERY  14; 16; 17    female,male, male.  She said she had subcutaneous Lovenox injections throughout the second and third pregnancies until delivery    WISDOM TOOTH EXTRACTION        General Information       [facility] Name 06/10/24 0815          Physical Therapy Time and Intention    Document Type discharge evaluation/summary  -     Mode of Treatment physical therapy;individual therapy  -       Row Name 06/10/24 0815          General Information    Patient Profile Reviewed yes  -     Prior Level of Function independent:  -     Existing Precautions/Restrictions no known precautions/restrictions  -     Barriers to Rehab none identified  -       Row Name 06/10/24 0815          Living Environment    People in Home spouse;child(maninder), adult;child(maninder), dependent  -       Row Name 06/10/24 0815          Home Main Entrance    Number of Stairs, Main Entrance three  -     Stair Railings, Main Entrance railing on right side (ascending)  -       Row Name 06/10/24 0815          Stairs Within Home, Primary    Number of Stairs, Within Home, Primary none  -       Row Name 06/10/24 0815          Cognition    Orientation Status (Cognition) oriented x 4  -       Row Name 06/10/24 0815          Safety Issues, Functional Mobility    Safety Issues Affecting Function (Mobility) --  No observable safety issues  -     Comment, Safety Issues/Impairments (Mobility) No mobility impairments observed  -               User Key  (r) = Recorded By, (t) = Taken By, (c) = Cosigned By      Initials Name Provider Type     Marcial Valentin, PT Physical Therapist                   Mobility       Row Name 06/10/24 0816          Bed Mobility    Bed Mobility supine-sit  -     Supine-Sit [place] (Bed Mobility) independent  -     Comment, (Bed Mobility) No gross bed mobility deficits/impairments noted.  -       Row Name 06/10/24 0816           Sit-Stand Transfer    Sit-Stand [place] (Transfers) independent  -     Comment, (Sit-Stand Transfer) Good balance and safety noted throughout.  -       Row Name 06/10/24 0816          Gait/Stairs (Locomotion)    [place] Level (Gait) independent  -     Patient was able to Ambulate yes  -     Distance in Feet (Gait) 450  -     [place] Level (Stairs) not tested  -     Comment, (Gait/Stairs) Pt demonstrated step through gait pattern with good gait speed and gait mechanics, no gross safety deficits noted throughout and no gross functional endurance limitations.  -               User Key  (r) = Recorded By, (t) = Taken By, (c) = Cosigned By      Initials Name Provider Type     Marcial Valentin, PT Physical Therapist                   Obj/Interventions       [facility] Name 06/10/24 0818          Range of Motion Comprehensive    General Range of Motion bilateral lower extremity ROM WFL  -[facility] Name 06/10/24 0818          Strength Comprehensive (MMT)    General Manual Muscle Testing (MMT) Assessment no strength deficits identified  -     Comment, General Manual Muscle Testing (MMT) Assessment BLE strength grossly 5/5  -[facility] Name 06/10/24 0818          Motor Skills    Motor Skills coordination  -     Coordination WNL;bilateral;lower extremity  -[facility] Name 06/10/24 0818          Balance    Balance Assessment sitting static balance;sitting dynamic balance;standing static balance;standing dynamic balance  -     Static Sitting Balance independent  -     Dynamic Sitting Balance independent  -     Position, Sitting Balance unsupported  -     Static Standing Balance independent  -     Dynamic Standing Balance independent  -     Position/Device Used, Standing Balance unsupported  -     Comment, Balance No gross balance deficits noted throughout OOB mobility.  -       Row Name 06/10/24 0818          Sensory Assessment (Somatosensory)    Sensory Assessment  (Somatosensory) LE sensation intact  -               User Key  (r) = Recorded By, (t) = Taken By, (c) = Cosigned By      Initials Name Provider Type     Marcial Valentin, PT Physical Therapist                   Goals/Plan    No documentation.                  Clinical Impression       Row Name 06/10/24 0819          Pain    Pain Intervention(s) Repositioned;Ambulation/increased activity  -     Additional Documentation Pain Scale: FACES Pre/Post-Treatment (Group)  -       Row Name 06/10/24 0819          Pain Scale: FACES Pre/Post-Treatment    Pain: FACES Scale, Pretreatment 2-->hurts little bit  -     Posttreatment Pain Rating 2-->hurts little bit  -     Pain Location generalized  -     Pain Location - abdomen  -       Row Name 06/10/24 0819          Plan of Care Review    Plan of Care Reviewed With patient  -     Progress no change  -     Outcome Evaluation PT initial evaluation complete. Pt presenting at/near her baseline level of function, ambulating 450ft independently with no AD. Pt demonstrating good strength, balance, safety, and functional endurance. Pt with no problems requiring skilled acute PT services at this time. PT educated pt on importance of frequent ambulation during hospital admission. PT recommending return home at [place] when medically appropriate.  -       Row Name 06/10/24 0819          Therapy Assessment/Plan (PT)    Patient/Family Therapy Goals Statement (PT) Reduce abd pain/nausea and return home.  -     Criteria for Skilled Interventions Met (PT) no;no problems identified which require skilled intervention  -     Therapy Frequency (PT) evaluation only  -       Row Name 06/10/24 0819          Vital Signs    Pre Systolic BP Rehab 122  -     Pre Treatment Diastolic BP 70  -     Pretreatment Heart Rate (beats/min) 96  -     O2 Delivery Pre Treatment room air  -     O2 Delivery Intra Treatment room air  -     O2 Delivery Post Treatment room air  -     Pre  Patient Position Supine  -     Intra Patient Position Standing  -     Post Patient Position Sitting  -       Row Name 06/10/24 0819          Positioning and Restraints    Pre-Treatment Position in bed  -LH     Post Treatment Position bed  -LH     In Bed sitting EOB;call light within reach;encouraged to call for assist  -               User Key  (r) = Recorded By, (t) = Taken By, (c) = Cosigned By      Initials Name Provider Type     Marcial Valentin, PT Physical Therapist                   Outcome Measures       Row Name 06/10/24 0821          How much help from another person do you currently need...    Turning from your back to your side while in flat bed without using bedrails? 4  -LH     Moving from lying on back to sitting on the side of a flat bed without bedrails? 4  -LH     Moving to and from a bed to a chair (including a wheelchair)? 4  -LH     Standing up from a chair using your arms (e.g., wheelchair, bedside chair)? 4  -LH     Climbing 3-5 steps with a railing? 4  -LH     To walk in hospital room? 4  -     AM-PAC 6 Clicks Score (PT) 24  -     Highest Level of Mobility Goal 8 --> Walked 250 feet or more  -       Row Name 06/10/24 0821          Functional Assessment    Outcome Measure Options AM-PAC 6 Clicks Basic Mobility (PT)  -               User Key  (r) = Recorded By, (t) = Taken By, (c) = Cosigned By      Initials Name Provider Type     Marcial Valentin, PT Physical Therapist                  Physical Therapy Education       Title: PT OT SLP Therapies (In Progress)       Topic: Physical Therapy (Done)       Point: Mobility training (Done)       Learning Progress Summary             Patient Acceptance, E, VU by  at 6/10/2024 0822                         Point: Home exercise program (Done)       Learning Progress Summary             Patient Acceptance, E, VU by  at 6/10/2024 0822                         Point: Body mechanics (Done)       Learning Progress Summary              Patient Acceptance, E, VU by  at 6/10/2024 0822                         Point: Precautions (Done)       Learning Progress Summary             Patient Acceptance, E, VU by  at 6/10/2024 0822                                         User Key       Initials Effective Dates Name Provider Type [facility] 09/21/21 -  Marcial Valentin, PT Physical Therapist PT                  PT Recommendation and Plan     Plan of Care Reviewed With: patient  Progress: no change  Outcome Evaluation: PT initial evaluation complete. Pt presenting at/near her baseline level of function, ambulating 450ft independently with no AD. Pt demonstrating good strength, balance, safety, and functional endurance. Pt with no problems requiring skilled acute PT services at this time. PT educated pt on importance of frequent ambulation during hospital admission. PT recommending return home at [place] when medically appropriate.     Time Calculation:   PT Evaluation Complexity  History, PT Evaluation Complexity: 1-2 personal factors and/or comorbidities  Examination of Body Systems (PT Eval Complexity): 1-2 elements  Clinical Presentation (PT Evaluation Complexity): stable  Clinical Decision Making (PT Evaluation Complexity): low complexity  Overall Complexity (PT Evaluation Complexity): low complexity     PT Charges       Row Name 06/10/24 0823             Time Calculation    Start Time 0801  -      PT Received On 06/10/24  -         Untimed Charges    PT Eval/Re-eval Minutes 33  -         Total Minutes    Untimed Charges Total Minutes 33  -       Total Minutes 33  -                User Key  (r) = Recorded By, (t) = Taken By, (c) = Cosigned By      Initials Name Provider Type     Marcial Valentin, PT Physical Therapist                  Therapy Charges for Today       Code Description Service Date Service Provider Modifiers Qty    30753895420 HC PT EVAL LOW COMPLEXITY 3 6/10/2024 Marcial Valentin, PT GP 1            PT G-Codes  Outcome  Measure Options: AM-PAC 6 Clicks Basic Mobility (PT)  AM-PAC 6 Clicks Score (PT): 24    PT Discharge Summary  Anticipated Discharge Disposition (PT): home  Reason for Discharge: Independent, At baseline function    Marcial Valentin, PT  6/10/2024

## 2024-06-10 NOTE — CONSULTS
"Cc:  \"Food sticks\"    HPI: 33-year-old female POD#17 revision sleeve gastrectomy to Teresa-en-Y gastric bypass and recurrent hiatal hernia repair to address her recurrent hiatal hernia and severe reflux status post laparoscopic sleeve gastrectomy with hiatal hernia repair in May 2022.  She was doing well until 3 days ago when things would not go down.  She has been \"vomiting\" frothy white material.  Last bowel movement Friday.  She does complain of some dysuria.  No fever or chills.  She says when this first started she developed abdominal pain 8/10 which has improved currently with Tylenol alternating with Dilaudid.  Apparently she received a Toradol injection while in the ER.    Past history:   history of DVT right lower extremity 2014, MTHFR mutation, dyspnea exertion, elevated hemoglobin A1c, fatigue, hyperlipidemia, incomplete right bundle branch block, iron deficiency anemia, migraine headaches, ovarian cyst, history of UTIs, hysterectomy January 2024, lap nicolas 2006, sinus surgery, tubal ligation, allergies to amoxicillin, doxycycline, latex, and penicillins (Keflex okay if she takes with food)     ROS: As above no fever or chills.  Complains of some dysuria.  No pulmonary complaints.  No melena or hematemesis.    SH: .  Lives in [place].  No nicotine use.  Rare EtOH.    PE: She is in no apparent distress and conversant.  No fever temperature 98.1 pulse 59 respiration 16 blood pressure 105/66 saturation 96%.  Abdomen soft and benign, bowel sounds active, wounds healed, no hernias.  Laparoscopy scars only.    White count 3.6 with a normal differential.  H&H 9.6 and 31.7 with microcytic indices.  UA specific gravity 1.099 pH less than 5.0 15 ketones trace protein.  Negative microscopic.  CMP normal except for chloride of 97.  Anion gap 16.  Lipase 159.  CRP 1.08.  Lactate 0.9.  Magnesium and phosphorus normal.    CT scan of the chest abdomen pelvis reviewed, unremarkable.    Impression: Postop day #17 " revision sleeve gastrectomy to Teresa-en-Y gastric bypass with recurrent hiatal hernia repair.  Presents with 3-day history of dysphagia and frothy vomiting consistent with a gastrojejunostomy stricture.  Associated dehydration.  Iron deficiency anemia without evidence of active bleeding.    Recommendations: Risks, benefits, and alternative therapies were discussed and she wishes to proceed with EGD with dilatation.  Continue IV hydration.  IV PPI.  IV thiamine.  Recheck iron level.  Avoid steroids, aspirin and NSAIDs.  Thank you

## 2024-06-10 NOTE — BRIEF OP NOTE
ESOPHAGOGASTRODUODENOSCOPY WITH DILATATION  Progress Note    Theresa Maya  6/10/2024    Pre-op Diagnosis:   Gastrojejunal anastomotic stricture [K91.89]       Post-Op Diagnosis Codes:     * Gastrojejunal anastomotic stricture [K91.89]    Procedure/CPT® Codes:  ASC EGD DILATION GASTRIC/DUODENAL STRICTURE [69609]      Procedure(s):  ESOPHAGOGASTRODUODENOSCOPY WITH DILATATION              Surgeon(s):  Cristiano Ricardo MD    Anesthesia: General    Staff:   Circulator: Ximena Calvillo RN  Endo Nurse: Blake Stevens RN         Estimated Blood Loss: none    Urine Voided: * No values recorded between 6/10/2024 12:00 AM and 6/10/2024  1:21 PM *    Specimens:                None          Drains:   [REMOVED] Closed/Suction Drain 1 RUQ Bulb 10 Fr. (Removed)   Site Description Unable to view 05/29/24 0800   Dressing Status Clean;Intact;Dry 05/29/24 0800   Drainage Appearance Serosanguineous 05/29/24 0800   Status To bulb suction 05/29/24 0800   Sutures Removed Intact Yes 05/28/24 1200   Output (mL) 40 mL 05/29/24 0400       [REMOVED] Urethral Catheter Double-lumen;Non-latex 16 Fr. (Removed)   Daily Indications Selected surgeries ( tract, abdomen) 05/24/24 1151   Site Assessment Clean;Skin intact 05/24/24 1151   Collection Container Standard drainage bag 05/24/24 1151   Output (mL) 200 mL 05/24/24 1151       Findings:         Complications: None          Cristiano Ricardo MD     Date: 6/10/2024  Time: 13:21 EDT

## 2024-06-10 NOTE — PLAN OF CARE
Goal Outcome Evaluation:  Plan of Care Reviewed With: patient        Progress: improving  Outcome Evaluation: VSS on RA. EGD dilatation completed today. Complaints of nausea relieved with PRN meds. No complaints of pain. Discussed plan of care with patient who verbalizes understanding.

## 2024-06-10 NOTE — PLAN OF CARE
Goal Outcome Evaluation:  Plan of Care Reviewed With: patient        Progress: no change  Outcome Evaluation: VSS. RA. NSR-sinus bradycardia on the monitor. A&O x4. PRNs administered for pain and nausea per pt request. Plan for Dr. Ricardo/bariatrics consult in AM. IVFs infusing. Plan of care discussed with patient who expresses understanding. Pt resting comfortably at this time.

## 2024-06-10 NOTE — ANESTHESIA PROCEDURE NOTES
Airway  Urgency: elective    Date/Time: 6/10/2024 1:22 PM  Airway not difficult    General Information and Staff    Patient location during procedure: OR    Indications and Patient Condition  Indications for airway management: airway protection    Preoxygenated: yes  MILS not maintained throughout  Mask difficulty assessment: 0 - not attempted    Final Airway Details  Final airway type: endotracheal airway      Successful airway: ETT  Cuffed: yes   Successful intubation technique: video laryngoscopy and RSI  Facilitating devices/methods: intubating stylet  Endotracheal tube insertion site: oral  Blade: Anand  Blade size: 3  ETT size (mm): 7.0  Cormack-Lehane Classification: grade I - full view of glottis  Placement verified by: chest auscultation and capnometry   Cuff volume (mL): 6  Measured from: lips  ETT/EBT  to lips (cm): 20  Number of attempts at approach: 1  Assessment: lips, teeth, and gum same as pre-op and atraumatic intubation    Additional Comments  Negative epigastric sounds, Breath sound equal bilaterally with symmetric chest rise and fall

## 2024-06-10 NOTE — PLAN OF CARE
Goal Outcome Evaluation:  Plan of Care Reviewed With: patient        Progress: no change  Outcome Evaluation: PT initial evaluation complete. Pt presenting at/near her baseline level of function, ambulating 450ft independently with no AD. Pt demonstrating good strength, balance, safety, and functional endurance. Pt with no problems requiring skilled acute PT services at this time. PT educated pt on importance of frequent ambulation during hospital admission. PT recommending return home at DC when medically appropriate.      Anticipated Discharge Disposition (PT): home

## 2024-06-10 NOTE — THERAPY DISCHARGE NOTE
Acute Care - Occupational Therapy Discharge  [facility]    Patient Name: Theresa Maya  : 1990    MRN: [id number]                              Today's Date: 6/10/2024       Admit Date: 2024    Visit Dx:     ICD-10-CM ICD-9-CM   1. Gastrojejunal anastomotic stricture  K91.89 997.49   2. Post-op pain  G89.18 338.18   3. Unable to eat  R63.8 307.59   4. Serum lipase elevation  R74.8 790.5     Patient Active Problem List   Diagnosis    Previous stillbirth or demise, antepartum    Previous deep vein thrombosis (DVT) affecting pregnancy    H/O abnormal cervical Papanicolaou smear    Anxiety    MTHFR gene mutation    Heartburn    Fatigue    Dyspepsia    Dyspnea on exertion    Right leg pain    Lumbar back pain    Bipolar disorder in partial remission    History of suicidal ideation    Constipation    S/P laparoscopic sleeve gastrectomy    Pneumonia involving right lung    Pneumonia due to other gram-negative bacteria    Post-op pain     Past Medical History:   Diagnosis Date    Abnormal Pap smear of cervix     ADHD (attention deficit hyperactivity disorder)     Working with a therapy at     Allergic 2011    Take montalukast    Anesthesia complication     SLOW TO WAKE UP AFTER GASTRO AND HYSTERECTOMY    Anxiety     Arthritis     Clotting disorder     Deep vein thrombosis     2014, (R) leg while pregnant, dx w/ MTHFR    Depression     Dermatitis     UNDER BREAST AT TIMES    Dyspepsia     Dyspnea on exertion     Elevated hemoglobin A1c     Fatigue     Gallbladder abscess     s/p lap nicolas     GERD (gastroesophageal reflux disease)     UGI  - small recurrent HH, EGD Dr. Ricardo   small recurrent HH    Heartburn     chronic/episodic, depends on what she eats, takes Pepcid prn, EGD Dr. Ricardo     Hyperemesis gravidarum     Hyperlipidemia     Incomplete right bundle branch block     Iron deficiency anemia     Migraines     Miscarriage     x 4 prior to 1st pregnancy, presumably from  "undiagnosed MTHFR mutation    Morbid obesity with body mass index (BMI) of 40.0 to 44.9 in adult 04/19/2022    MTHFR deficiency complicating pregnancy     says clotting d/o only an issue when pregnant, advised to use heparin shots during pregnancy    Obesity     Ovarian cyst     Recurrent pregnancy loss, antepartum condition or complication     had a [facility] and lost the pregnancy at 6 months    Strep pharyngitis     Urinary tract infection     Wears glasses      Past Surgical History:   Procedure Laterality Date    ENDOSCOPY N/A 05/05/2022    Procedure: ESOPHAGOGASTRODUODENOSCOPY;  Surgeon: Cristiano Ricardo MD;  Location:  [facility];  Service: Bariatric;  Laterality: N/A;    ENDOSCOPY N/A 5/24/2024    Procedure: ESOPHAGOGASTRODUODENOSCOPY;  Surgeon: Cristiano Ricardo MD;  Location:  [facility];  Service: Robotics - DaVinci;  Laterality: N/A;  EGD # 752 SCOPE USED    GASTRECTOMY N/A 05/05/2022    Procedure: GASTRECTOMY LAPAROSCOPIC;  Surgeon: Cristiano Ricardo MD;  Location:  [facility];  Service: Bariatric;  Laterality: N/A;    GASTRIC BYPASS N/A 5/24/2024    Procedure: GASTRIC BYPASS LAPAROSCOPIC WITH DAVINCI ROBOT;  Surgeon: Cristiano Ricardo MD;  Location:  [facility];  Service: Robotics - DaVinci;  Laterality: N/A;    HIATAL HERNIA REPAIR N/A 05/05/2022    Procedure: HIATAL HERNIA REPAIR LAPAROSCOPIC;  Surgeon: Cristiano Ricardo MD;  Location:  [facility];  Service: Bariatric;  Laterality: N/A;    HIATAL HERNIA REPAIR N/A 5/24/2024    Procedure: RECURRENT HIATAL HERNIA REPAIR LAPAROSCOPIC WITH DAVINCI ROBOT;  Surgeon: Cristiano Ricardo MD;  Location:  [facility];  Service: Robotics - DaVinci;  Laterality: N/A;    HYSTERECTOMY  01/23/2024    [facility]- TOTAL    LAPAROSCOPIC CHOLECYSTECTOMY  2006    no stones, was told she had \"three gallbladders\"- all removed    SINUS SURGERY Bilateral 2006    TUBAL COAGULATION LAPAROSCOPIC Bilateral 09/15/2017    Procedure: BILATERAL TUBAL FALLOPE FILSHIE CLIPPING " LAPAROSCOPIC;  Surgeon: Leo Posey III, MD;  Location: [facility];  Service:     VAGINAL DELIVERY  14; 16; 17    female,male, male.  She said she had subcutaneous Lovenox injections throughout the second and third pregnancies until delivery    WISDOM TOOTH EXTRACTION        General Information       Row Name 06/10/24 1523          OT Time and Intention    Document Type discharge evaluation/summary  -     Mode of Treatment occupational therapy  -       Row Name 06/10/24 1523          General Information    Patient Profile Reviewed yes  -     Prior Level of Function independent:;all household mobility;ADL's;transfer  -     Existing Precautions/Restrictions no known precautions/restrictions  -     Barriers to Rehab none identified  -       Row Name 06/10/24 1523          Occupational Profile    Environmental Supports and Barriers (Occupational Profile) Pt. lives in an RV  -       Row Name 06/10/24 1523          Living Environment    People in Home spouse  -       Row Name 06/10/24 1523          Home Main Entrance    Number of Stairs, Main Entrance three  -     Stair Railings, Main Entrance railing on right side (ascending)  -       Row Name 06/10/24 1523          Stairs Within Home, Primary    Number of Stairs, Within Home, Primary none  -       Row Name 06/10/24 1523          Cognition    Orientation Status (Cognition) oriented x 4  -       Row Name 06/10/24 1523          Safety Issues, Functional Mobility    Comment, Safety Issues/Impairments (Mobility) Demonstrates good safety awareness  -               User Key  (r) = Recorded By, (t) = Taken By, (c) = Cosigned By      Initials Name Provider Type     Yesenia Sheffield OT Occupational Therapist                   Mobility/ADL's       Row Name 06/10/24 1526          Bed Mobility    Bed Mobility supine-sit;sit-supine  -     Supine-Sit [place] (Bed Mobility) independent  -     Sit-Supine [place] (Bed Mobility) independent  -      Comment, (Bed Mobility) Demonstrates good sequencing  -[facility] Name 06/10/24 1526          Transfers    Transfers sit-stand transfer  -     Comment, (Transfers) Demonstrates good sequencing.  -[facility] Name 06/10/24 1526          Sit-Stand Transfer    Sit-Stand [place] (Transfers) independent  -[facility] Name 06/10/24 1526          Activities of Daily Living    BADL Assessment/Intervention lower body dressing;toileting  -[facility] Name 06/10/24 1526          Lower Body Dressing Assessment/Training    [place] Level (Lower Body Dressing) lower body dressing skills;independent  -[facility] Name 06/10/24 1526          Toileting Assessment/Training    [place] Level (Toileting) toileting skills;independent  -               User Key  (r) = Recorded By, (t) = Taken By, (c) = Cosigned By      Initials Name Provider Type    Yesenia Salinas OT Occupational Therapist                   Obj/Interventions       [facility] Name 06/10/24 1532          Sensory Assessment (Somatosensory)    Sensory Assessment (Somatosensory) UE sensation intact  -LC       Row Name 06/10/24 1532          Vision Assessment/Intervention    Visual Impairment/Limitations corrective lenses full-time  -[facility] Name 06/10/24 1532          Range of Motion Comprehensive    General Range of Motion bilateral upper extremity ROM WNL  -[facility] Name 06/10/24 1532          Balance    Balance Assessment sitting static balance;sitting dynamic balance;standing static balance;standing dynamic balance  -     Static Sitting Balance independent  -     Dynamic Sitting Balance independent  -     Position, Sitting Balance unsupported  -     Static Standing Balance independent  -     Dynamic Standing Balance independent  -     Position/Device Used, Standing Balance unsupported  -     Balance Interventions sitting;standing;sit to stand;supported;occupation based/functional task;weight shifting activity  -     Comment,  Balance No LOB  -               User Key  (r) = Recorded By, (t) = Taken By, (c) = Cosigned By      Initials Name Provider Type    Yesenia Salinas OT Occupational Therapist                   Goals/Plan    No documentation.                  Clinical Impression       Row Name 06/10/24 1534          Pain Assessment    Pretreatment Pain Rating 0/10 - no pain  -     Posttreatment Pain Rating 0/10 - no pain  -     Pain Intervention(s) Repositioned;Ambulation/increased activity  -     Additional Documentation Pain Scale: Word Pre/Post-Treatment (Group)  -[facility] Name 06/10/24 1534          Plan of Care Review    Plan of Care Reviewed With patient  -     Progress no change  -     Outcome Evaluation Pt. presents at baseline with ADLs and functional mobility. No further skilled OT services warranted. Recommend home with family at discharge.  -[facility] Name 06/10/24 1534          Therapy Assessment/Plan (OT)    Therapy Frequency (OT) evaluation only  -     Predicted Duration of Therapy Intervention (OT) 1 day  -[facility] Name 06/10/24 1534          Therapy Plan Review/Discharge Plan (OT)    Anticipated Discharge Disposition (OT) home with assist  -LC       Row Name 06/10/24 1534          Positioning and Restraints    Pre-Treatment Position in bed  -     Post Treatment Position bed  -LC     In Bed notified nsg;fowlers;call light within reach;encouraged to call for assist;with family/caregiver  Alarm unchanged  -               User Key  (r) = Recorded By, (t) = Taken By, (c) = Cosigned By      Initials Name Provider Type    Yesenia Salinas OT Occupational Therapist                   Outcome Measures       Row Name 06/10/24 1533          How much help from another is currently needed...    Putting on and taking off regular lower body clothing? 4  -LC     Bathing (including washing, rinsing, and drying) 4  -LC     Toileting (which includes using toilet bed pan or urinal) 4  -LC     Putting on  and taking off regular upper body clothing 4  -     Taking care of personal grooming (such as brushing teeth) 4  -     Eating meals 4  -     AM-PAC 6 Clicks Score (OT) 24  -       Row Name 06/10/24 0834 06/10/24 0821       How much help from another person do you currently need...    Turning from your back to your side while in flat bed without using bedrails? 4  - 4  -LH    Moving from lying on back to sitting on the side of a flat bed without bedrails? 4  - 4  -LH    Moving to and from a bed to a chair (including a wheelchair)? 4  -CG 4  -LH    Standing up from a chair using your arms (e.g., wheelchair, bedside chair)? 4  - 4  -LH    Climbing 3-5 steps with a railing? 4  - 4  -LH    To walk in hospital room? 4  - 4  -    AM-PAC 6 Clicks Score (PT) 24  - 24  -LH    Highest Level of Mobility Goal 8 --> Walked 250 feet or more  - 8 --> Walked 250 feet or more  -      Row Name 06/10/24 1539 06/10/24 0821       Functional Assessment    Outcome Measure Options AM-PAC 6 Clicks Daily Activity (OT)  - AM-PAC 6 Clicks Basic Mobility (PT)  -              User Key  (r) = Recorded By, (t) = Taken By, (c) = Cosigned By      Initials Name Provider Type     Clifford Gallegos, RN Registered Nurse    Yesenia Salinas OT Occupational Therapist     Marcial Valentin, PT Physical Therapist                  Occupational Therapy Education       Title: PT OT SLP Therapies (In Progress)       Topic: Occupational Therapy (In Progress)       Point: ADL training (In Progress)       Description:   Instruct learner(s) on proper safety adaptation and remediation techniques during self care or transfers.   Instruct in proper use of assistive devices.                  Learning Progress Summary             Patient Acceptance, E, NR by [place] at 6/10/2024 7182                         Point: Home exercise program (Not Started)       Description:   Instruct learner(s) on appropriate technique for monitoring, assisting  and/or progressing therapeutic exercises/activities.                  Learner Progress:  Not documented in this visit.              Point: Precautions (In Progress)       Description:   Instruct learner(s) on prescribed precautions during self-care and functional transfers.                  Learning Progress Summary             Patient Acceptance, E, NR by  at 6/10/2024 1428                         Point: Body mechanics (In Progress)       Description:   Instruct learner(s) on proper positioning and spine alignment during self-care, functional mobility activities and/or exercises.                  Learning Progress Summary             Patient Acceptance, E, NR by  at 6/10/2024 1428                                         User Key       Initials Effective Dates Name Provider Type Discipline     06/16/21 -  Yesenia Sheffield OT Occupational Therapist OT                  OT Recommendation and Plan  Therapy Frequency (OT): evaluation only  Plan of Care Review  Plan of Care Reviewed With: patient  Progress: no change  Outcome Evaluation: Pt. presents at baseline with ADLs and functional mobility. No further skilled OT services warranted. Recommend home with family at discharge.  Plan of Care Reviewed With: patient  Outcome Evaluation: Pt. presents at baseline with ADLs and functional mobility. No further skilled OT services warranted. Recommend home with family at discharge.     Time Calculation:   Evaluation Complexity (OT)  Review Occupational Profile/Medical/Therapy History Complexity: brief/low complexity  Assessment, Occupational Performance/Identification of Deficit Complexity: 1-3 performance deficits  Clinical Decision Making Complexity (OT): problem focused assessment/low complexity  Overall Complexity of Evaluation (OT): low complexity     Time Calculation- OT       Row Name 06/10/24 1540             Time Calculation- OT    OT Start Time 1428  -      OT Received On 06/10/24  -         Untimed Charges     OT Eval/Re-eval Minutes 37  -LC         Total Minutes    Untimed Charges Total Minutes 37  -LC       Total Minutes 37  -LC                User Key  (r) = Recorded By, (t) = Taken By, (c) = Cosigned By      Initials Name Provider Type    Yesenia Salinas OT Occupational Therapist                  Therapy Charges for Today       Code Description Service Date Service Provider Modifiers Qty    [id number]  OT EVAL LOW COMPLEXITY 3 6/10/2024 Yesenia Sheffield OT GO 1               OT Discharge Summary  Anticipated Discharge Disposition (OT): home with assist  Reason for Discharge: At baseline function  Discharge Destination: Home with assist    Yesenia Sheffield OT  6/10/2024

## 2024-06-10 NOTE — H&P
"    [facility]  HISTORY AND PHYSICAL    Patient Name: Theresa Maya  : 1990  MRN: [id number]  Primary Care Physician: Gina Green APRN  Date of admission: 2024      Subjective   Subjective     Chief Complaint:  Abdominal pain    HPI:  Theresa Maya is a 33 y.o. female with a PMH significant for bipolar disorder, recent hernia repair and laparoscopic sleeve gastrectomy and hiatal hernia repair on 2024, ADHD, history of DVT, MTHFR mutation, depression, GERD, HLD who comes to the ED due to abdominal pain. Patient says that she had been doing well postoperatively.  This past Friday she advanced her diet to puréed chicken.  She tolerated the meal well, ate applesauce afterwards.  The next morning when she woke up she had epigastric pain with associated nausea and vomiting.  She says since that time she has not been able to tolerate any oral intake.  She says she feels like when she eats or drinks, the substance gets \"stuck \"in her throat.  Additionally, she presented to outpatient [facility] due to dysuria.  She was diagnosed with a UTI but told to not start antibiotics until she was seen by her PCP on Monday.  She denies fever, chills, malaise, hematemesis.  Last bowel movement was Friday, she denies passing flatus.      Personal History     Past Medical History:   Diagnosis Date    Abnormal Pap smear of cervix     ADHD (attention deficit hyperactivity disorder)     Working with a therapy at     Allergic 2011    Take montalukast    Anesthesia complication     SLOW TO WAKE UP AFTER GASTRO AND HYSTERECTOMY    Anxiety     Arthritis     Clotting disorder     Deep vein thrombosis     2014, (R) leg while pregnant, dx w/ MTHFR    Depression     Dermatitis     UNDER BREAST AT TIMES    Dyspepsia     Dyspnea on exertion     Elevated hemoglobin A1c     Fatigue     Gallbladder abscess     s/p lap nicolas 2006    GERD (gastroesophageal reflux disease)     UGI " 9/23 - small recurrent HH, EGD Dr. Ricardo  11/23 small recurrent HH    Heartburn     chronic/episodic, depends on what she eats, takes Pepcid prn, EGD Dr. Ricardo 11/21    Hyperemesis gravidarum     Hyperlipidemia     Incomplete right bundle branch block     Iron deficiency anemia     Migraines     Miscarriage     x 4 prior to 1st pregnancy, presumably from undiagnosed MTHFR mutation    Morbid obesity with body mass index (BMI) of 40.0 to 44.9 in adult 04/19/2022    MTHFR deficiency complicating pregnancy     says clotting d/o only an issue when pregnant, advised to use heparin shots during pregnancy    Obesity     Ovarian cyst     Recurrent pregnancy loss, antepartum condition or complication     had a [facility] and lost the pregnancy at 6 months    Strep pharyngitis     Urinary tract infection     Wears glasses            Past Surgical History:   Procedure Laterality Date    ENDOSCOPY N/A 05/05/2022    Procedure: ESOPHAGOGASTRODUODENOSCOPY;  Surgeon: Cristiano Ricardo MD;  Location:  [facility];  Service: Bariatric;  Laterality: N/A;    ENDOSCOPY N/A 5/24/2024    Procedure: ESOPHAGOGASTRODUODENOSCOPY;  Surgeon: Cristiano Ricardo MD;  Location:  [facility];  Service: Robotics - DaVinci;  Laterality: N/A;  EGD # 752 SCOPE USED    GASTRECTOMY N/A 05/05/2022    Procedure: GASTRECTOMY LAPAROSCOPIC;  Surgeon: Cristiano Ricardo MD;  Location:  [facility];  Service: Bariatric;  Laterality: N/A;    GASTRIC BYPASS N/A 5/24/2024    Procedure: GASTRIC BYPASS LAPAROSCOPIC WITH DAVINCI ROBOT;  Surgeon: Cristiano Ricardo MD;  Location:  [facility];  Service: Robotics - DaVinci;  Laterality: N/A;    HIATAL HERNIA REPAIR N/A 05/05/2022    Procedure: HIATAL HERNIA REPAIR LAPAROSCOPIC;  Surgeon: Cristiano Ricardo MD;  Location:  [facility];  Service: Bariatric;  Laterality: N/A;    HIATAL HERNIA REPAIR N/A 5/24/2024    Procedure: RECURRENT HIATAL HERNIA REPAIR LAPAROSCOPIC WITH DAVINCI ROBOT;  Surgeon: Cristiano Ricardo MD;  Location:  "[facility] OR;  Service: Robotics - DaVinci;  Laterality: N/A;    HYSTERECTOMY  01/23/2024    [facility]- TOTAL    LAPAROSCOPIC CHOLECYSTECTOMY  2006    no stones, was told she had \"three gallbladders\"- all removed    SINUS SURGERY Bilateral 2006    TUBAL COAGULATION LAPAROSCOPIC Bilateral 09/15/2017    Procedure: BILATERAL TUBAL FALLOPE FILSHIE CLIPPING LAPAROSCOPIC;  Surgeon: Leo Posey III, MD;  Location: [facility];  Service:     VAGINAL DELIVERY  14; 16; 17    female,male, male.  She said she had subcutaneous Lovenox injections throughout the second and third pregnancies until delivery    WISDOM TOOTH EXTRACTION         Family History: family history includes Alcohol abuse in her father; Asthma in her maternal grandmother, mother, and sister; Breast cancer (age of onset: 54) in her maternal grandmother; COPD in her mother; Cancer in her maternal grandmother, paternal grandfather, and paternal grandmother; Diabetes in her maternal grandmother; Heart attack in her mother; Hypertension in her maternal grandfather, maternal grandmother, and mother; Lung cancer in her maternal grandmother and paternal grandmother; Lupus in her maternal grandmother and sister; Mental illness in her mother; Migraines in her mother; Miscarriages / Stillbirths in her maternal aunt; Obesity in her mother and paternal grandmother; Ovarian cancer in her sister; Prostate cancer in her paternal grandfather; Stroke in her maternal grandmother; Thyroid disease in her maternal grandmother and mother.     Social History:  reports that she has never smoked. She has never used smokeless tobacco. She reports that she does not currently use alcohol. She reports that she does not use drugs.  Social History     Social History Narrative     with three children.  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       Medications:  Available home " medication information reviewed.  ARIPiprazole, Saw Palmetto, esomeprazole, ferrous sulfate, lamoTRIgine, montelukast, multivitamin with minerals, pimecrolimus, prochlorperazine, triamcinolone, vitamin B-12, and vitamin D3    Allergies   Allergen Reactions    Amoxicillin Diarrhea and GI Intolerance    Doxycycline Other (See Comments)     Vaginal swelling     Latex Hives and Itching    Penicillins Rash     Says Keflex OK as long as she takes w/ food. Has tolerated cefazolin, ceftriaxone    Beta lactam allergy details  Antibiotic reaction: rash, hives, other (ITCHING)  Age at reaction: adult (2017)  Dose to reaction time: (!) hours  Reason for antibiotic: sore throat (STREP)  Epinephrine required for reaction?: no  Tolerated antibiotics: cephalexin (KEFLEX), Cefazolin, ceftriaxone           Objective   Objective     Vital Signs:   Temp:  [97.8 °F (36.6 °C)-98 °F (36.7 °C)] 97.8 °F (36.6 °C)  Heart Rate:  [58-79] 63  Resp:  [16] 16  BP: (102-132)/() 115/80       Physical Exam   Constitutional: Awake, alert  Eyes: PERRLA, sclerae anicteric, no conjunctival injection  HENT: NCAT, mucous membranes moist  Neck: Supple, no thyromegaly, no lymphadenopathy, trachea midline  Respiratory: Clear to auscultation bilaterally, nonlabored respirations   Cardiovascular: RRR, no murmurs, rubs, or gallops, palpable pedal pulses bilaterally  Gastrointestinal: Positive bowel sounds, soft, nontender, nondistended  Musculoskeletal: No bilateral ankle edema, no clubbing or cyanosis to extremities  Psychiatric: Appropriate affect, cooperative  Neurologic: Oriented x 3, strength symmetric in all extremities, Cranial Nerves grossly intact to confrontation, speech clear  Skin: Surgical incisions dry and intact, healing well without erythema or induration      Result Review:  I have personally reviewed the results from the time of this admission to 6/9/2024 22:28 EDT and agree with these findings:  [x]  Laboratory list / accordion  []   Microbiology  [x]  Radiology  [x]  EKG/Telemetry   []  Cardiology/Vascular   []  Pathology  [x]  Old records      LAB RESULTS:      Lab 06/09/24  1633   WBC 4.87   HEMOGLOBIN 12.2   HEMATOCRIT 40.2   PLATELETS 337   NEUTROS ABS 3.02   IMMATURE GRANS (ABS) 0.02   LYMPHS ABS 1.26   MONOS ABS 0.34   EOS ABS 0.17   MCV 88.0   CRP 1.08*   LACTATE 0.9         Lab 06/09/24  1633   SODIUM 139   POTASSIUM 3.5   CHLORIDE 97*   CO2 26.0   ANION GAP 16.0*   BUN 8   CREATININE 0.86   EGFR 91.6   GLUCOSE 82   CALCIUM 9.3   MAGNESIUM 2.3   PHOSPHORUS 3.4         Lab 06/09/24  1633   TOTAL PROTEIN 8.4   ALBUMIN 4.2   GLOBULIN 4.2   ALT (SGPT) 8   AST (SGOT) 25   BILIRUBIN 0.3   ALK PHOS 94   LIPASE 159*                     UA          5/28/2024    19:48 6/7/2024    14:16 6/9/2024    16:25 6/9/2024    21:47   Urinalysis   Squamous Epithelial Cells, UA 3-6   31-50  3-6    Specific Gravity, UA 1.015   1.028  >=1.099    Ketones, UA 80 mg/dL (3+)  Negative  40 mg/dL (2+)  15 mg/dL (1+)    Blood, UA Negative   Negative  Negative    Leukocytes, UA Negative  Negative  Negative  Negative    Nitrite, UA Negative   Negative  Negative    RBC, UA 3-5   0-2  3-5    WBC, UA 3-5   6-10  3-5    Bacteria, UA None Seen   1+  None Seen        Microbiology Results (last 10 days)       Procedure Component Value - Date/Time    Urine Culture - Urine, Urine, Clean Catch [[id number]] Collected: 06/07/24 1423    Lab Status: Final result Specimen: Urine, Clean Catch Updated: 06/08/24 1135     Urine Culture >100,000 CFU/mL Mixed Augustina Isolated    Narrative:      Specimen contains mixed organisms of questionable pathogenicity suggestive of contamination. If symptoms persist, suggest recollection.  Colonization of the urinary tract without infection is common. Treatment is discouraged unless the patient is symptomatic, pregnant, or undergoing an invasive urologic procedure.            CT Abdomen Pelvis With Contrast    Result Date: 6/9/2024  CT CHEST W CONTRAST  DIAGNOSTIC, CT ABDOMEN PELVIS W CONTRAST Date of Exam: 6/9/2024 4:55 PM EDT Indication: 3 days unable to tolerate solids or liquids, 5/25 Nissen fundoplication and Teresa-en-Y bypass. Comparison: CT abdomen and pelvis without contrast 5/27/2024, CT chest with contrast 8/15/2021 Technique: Axial CT images were obtained of the chest after the uneventful intravenous administration of 90 mL Isovue-300.  Reconstructed coronal and sagittal images were also obtained. Automated exposure control and iterative construction methods were used. Findings: Chest: Visualized soft tissues of the neck are without acute abnormality. Normal contrast opacification of the aortic arch branch vasculature. Heart size normal. No coronary artery calcification. No central pulmonary embolus. Negative for mediastinal or hilar adenopathy. No pericardial effusion or pleural effusion. Trachea and mainstem bronchi are patent. Negative for pneumothorax. No consolidation or findings of pneumonia. No suspicious pulmonary nodule or mass. Previously seen bibasilar atelectasis and small bilateral pleural effusions have resolved. Negative for  pneumomediastinum no paraesophageal fluid collection. Mild wall thickening of the distal esophagus similar to prior exam. No aggressive osseous lesion or acute fracture. Abdomen and pelvis: The liver is normal in size and contour. There is a low-density lesion in the right hepatic lobe near the dome at the junction of segments 7 and 8 which is stable from prior studies which may relate to an angioma measuring 1.7 cm (2/21). The spleen is normal in size. The adrenal glands are without acute abnormality. Pancreas without findings of pancreatitis. Gallbladder absent. Mild common bile duct dilatation is stable likely related to postcholecystectomy state. The kidneys are symmetrically enhancing. There is no hydronephrosis or hydroureter. No ureteral calculus. Urinary bladder is collapsed. The uterus is absent. Normal  ovaries for age. Postsurgical changes again noted related to recent hiatal hernia repair and gastric bypass. The surgical drainage catheter has been removed since the prior exam. There is inflammatory stranding surrounding the residual proximal stomach proximal to the gastrojejunal anastomosis which is mildly decreased from prior study. There is no adjacent fluid collection. No dilated bowel loops to indicate obstruction. No upper abdominal drainable abscess or fluid collection. Small upper abdominal hematomas on prior study have improved from the prior study. Small residual hematoma noted measuring 1.5 x 1 cm and measuring 1.1 x 1.1 cm at the operative site near the stomach (2/29, 2/32). Negative for residual pneumoperitoneum. No pneumatosis intestinalis. Colon is underdistended. Nonspecific submucosal fat deposition in the colon. The appendix is normal. The portal vein, splenic vein, and superior mesenteric vein are patent. Delayed images demonstrate normal contrast excretion into the ureters.     Impression: Impression: Chest: 1. No acute intrathoracic process. 2. Bibasilar atelectasis and bilateral pleural effusions on prior study have resolved. Abdomen and pelvis: 1. Postsurgical changes related to recent hiatal hernia repair and gastric bypass. Persistent inflammatory stranding surrounding the remaining proximal stomach and gastrojejunal anastomosis. No organized or drainable abscess. 2. No dilated distal bowel loops to indicate small bowel obstruction. 3. Additional stable chronic findings above. Electronically Signed: Vinod Alfaro MD  6/9/2024 5:57 PM EDT  Workstation ID: [id number]    CT Chest With Contrast Diagnostic    Result Date: 6/9/2024  CT CHEST W CONTRAST DIAGNOSTIC, CT ABDOMEN PELVIS W CONTRAST Date of Exam: 6/9/2024 4:55 PM EDT Indication: 3 days unable to tolerate solids or liquids, 5/25 Nissen fundoplication and Teresa-en-Y bypass. Comparison: CT abdomen and pelvis without contrast 5/27/2024, CT  chest with contrast 8/15/2021 Technique: Axial CT images were obtained of the chest after the uneventful intravenous administration of 90 mL Isovue-300.  Reconstructed coronal and sagittal images were also obtained. Automated exposure control and iterative construction methods were used. Findings: Chest: Visualized soft tissues of the neck are without acute abnormality. Normal contrast opacification of the aortic arch branch vasculature. Heart size normal. No coronary artery calcification. No central pulmonary embolus. Negative for mediastinal or hilar adenopathy. No pericardial effusion or pleural effusion. Trachea and mainstem bronchi are patent. Negative for pneumothorax. No consolidation or findings of pneumonia. No suspicious pulmonary nodule or mass. Previously seen bibasilar atelectasis and small bilateral pleural effusions have resolved. Negative for  pneumomediastinum no paraesophageal fluid collection. Mild wall thickening of the distal esophagus similar to prior exam. No aggressive osseous lesion or acute fracture. Abdomen and pelvis: The liver is normal in size and contour. There is a low-density lesion in the right hepatic lobe near the dome at the junction of segments 7 and 8 which is stable from prior studies which may relate to an angioma measuring 1.7 cm (2/21). The spleen is normal in size. The adrenal glands are without acute abnormality. Pancreas without findings of pancreatitis. Gallbladder absent. Mild common bile duct dilatation is stable likely related to postcholecystectomy state. The kidneys are symmetrically enhancing. There is no hydronephrosis or hydroureter. No ureteral calculus. Urinary bladder is collapsed. The uterus is absent. Normal ovaries for age. Postsurgical changes again noted related to recent hiatal hernia repair and gastric bypass. The surgical drainage catheter has been removed since the prior exam. There is inflammatory stranding surrounding the residual proximal stomach  proximal to the gastrojejunal anastomosis which is mildly decreased from prior study. There is no adjacent fluid collection. No dilated bowel loops to indicate obstruction. No upper abdominal drainable abscess or fluid collection. Small upper abdominal hematomas on prior study have improved from the prior study. Small residual hematoma noted measuring 1.5 x 1 cm and measuring 1.1 x 1.1 cm at the operative site near the stomach (2/29, 2/32). Negative for residual pneumoperitoneum. No pneumatosis intestinalis. Colon is underdistended. Nonspecific submucosal fat deposition in the colon. The appendix is normal. The portal vein, splenic vein, and superior mesenteric vein are patent. Delayed images demonstrate normal contrast excretion into the ureters.     Impression: Impression: Chest: 1. No acute intrathoracic process. 2. Bibasilar atelectasis and bilateral pleural effusions on prior study have resolved. Abdomen and pelvis: 1. Postsurgical changes related to recent hiatal hernia repair and gastric bypass. Persistent inflammatory stranding surrounding the remaining proximal stomach and gastrojejunal anastomosis. No organized or drainable abscess. 2. No dilated distal bowel loops to indicate small bowel obstruction. 3. Additional stable chronic findings above. Electronically Signed: Vinod Alfaro MD  6/9/2024 5:57 PM EDT  Workstation ID: [id number]     Results for orders placed during the hospital encounter of 02/10/17    Adult Transthoracic Echo Complete    Interpretation Summary  · Normal left ventricular cavity size and wall thickness noted. All left ventricular wall segments contract normally.  · Estimated EF appears to be in the range of 61 - 65%.  · Left ventricular diastolic dysfunction is noted (grade I) consistent with impaired relaxation.  · The aortic valve is structurally normal. No aortic valve regurgitation is present. No aortic valve stenosis is present.  · The mitral valve is normal in structure. No mitral  valve regurgitation is present. No significant mitral valve stenosis is present.  · The tricuspid valve is normal. No tricuspid valve stenosis is present. No tricuspid valve regurgitation is present.  · There is no evidence of pericardial effusion.      Assessment & Plan   Assessment & Plan       Post-op pain    Previous deep vein thrombosis (DVT) affecting pregnancy    Anxiety    MTHFR gene mutation    Bipolar disorder in partial remission    S/P laparoscopic sleeve gastrectomy    Theresa Maya is a 33 y.o. female with a PMH significant for bipolar disorder, recent hernia repair and laparoscopic sleeve gastrectomy and hiatal hernia repair on 5/25/2024, ADHD, history of DVT, MTHFR mutation, depression, GERD, HLD who comes to the ED due to abdominal pain.     Nausea and vomiting  Abdominal pain  -S/p laparoscopic sleeve gastrectomy and hiatal hernia repair  with Dr. Ricardo on 5/25/2024  - CT documented above  - Mild elevation of lipase, not meeting criteria for pancreatitis  - IVF's  - Zofran, Phenergan, Dilaudid as needed  - NPO, sips with meds and ice chips  - Consult Dr. Ricardo in the a.m.    History of DVT  MTHFR gene mutation  - Finished Eliquis for DVT prophylaxis evening of 6/7    Anxiety  Bipolar disorder  - Continue home meds    Questionable UTI diagnosed on Friday, patient has not taken any antibiotics.  UA negative for UTI tonight.    DVT prophylaxis:  subq heparin  Medical and mechanical DVT prophylaxis orders are present.          CODE STATUS:    Code Status and Medical Interventions:   Ordered at: 06/09/24 2025     Level Of Support Discussed With:    Patient     Code Status (Patient has no pulse and is not breathing):    CPR (Attempt to Resuscitate)     Medical Interventions (Patient has pulse or is breathing):    Full Support       Expected Discharge   Expected discharge date/ time has not been documented.     Chandrika Viveros, DO  06/09/24

## 2024-06-10 NOTE — ANESTHESIA PREPROCEDURE EVALUATION
Anesthesia Evaluation     Patient summary reviewed and Nursing notes reviewed   NPO Solid Status: > 8 hours  NPO Liquid Status: > 2 hours           Airway   Mallampati: I  TM distance: >3 FB  Neck ROM: full  No difficulty expected  Dental    (+) poor dentition    Pulmonary    (+) ,shortness of breath  (-) pneumonia (R lung), asthma, recent URI, sleep apnea, not a smoker  Cardiovascular     ECG reviewed    (+) dysrhythmias (inc rbbb), DVT, hyperlipidemia  (-) CAD, angina, cardiac stents    ROS comment: ECG SR     Neuro/Psych  (+) headaches, psychiatric history Bipolar  (-) seizures, CVA  GI/Hepatic/Renal/Endo    (+) obesity, GERD  (-) morbid obesity, no renal disease, diabetes, no thyroid disorder    Musculoskeletal     Abdominal    Substance History      OB/GYN    (-)  Pregnant        Other   arthritis,     ROS/Med Hx Other: MTHFR gene mutation  HCT 31       Phys Exam Other: Mac 4 g1V                   Anesthesia Plan    ASA 3     general   Rapid sequence  (Nausea/ ETT RSI)  intravenous induction     Anesthetic plan, risks, benefits, and alternatives have been provided, discussed and informed consent has been obtained with: patient.    Plan discussed with CRNA.        CODE STATUS:    Level Of Support Discussed With: Patient  Code Status (Patient has no pulse and is not breathing): CPR (Attempt to Resuscitate)  Medical Interventions (Patient has pulse or is breathing): Full Support

## 2024-06-10 NOTE — ANESTHESIA POSTPROCEDURE EVALUATION
Patient: Theresa Maya    Procedure Summary       Date: 06/10/24 Room / Location:  [facility]    Anesthesia Start: 1317 Anesthesia Stop: 1345    Procedure: ESOPHAGOGASTRODUODENOSCOPY WITH DILATATION Diagnosis:       Gastrojejunal anastomotic stricture      (Gastrojejunal anastomotic stricture [K91.89])    Surgeons: Cristiano Ricardo MD Provider: Joe Mcallister MD    Anesthesia Type: general ASA Status: 3            Anesthesia Type: general    Vitals  Vitals Value Taken Time   /101 06/10/24 1345   Temp 97.5 °F (36.4 °C) 06/10/24 1345   Pulse 74 06/10/24 1345   Resp 16 06/10/24 1345   SpO2 100 % 06/10/24 1345           Post Anesthesia Care and Evaluation    Patient location during evaluation: PACU  Patient participation: complete - patient participated  Level of consciousness: awake and alert  Pain management: adequate    Airway patency: patent  Anesthetic complications: No anesthetic complications  PONV Status: none  Cardiovascular status: hemodynamically stable and acceptable  Respiratory status: nonlabored ventilation, acceptable and nasal cannula  Hydration status: acceptable

## 2024-06-10 NOTE — PROGRESS NOTES
Clinical Nutrition Assessment     Patient Name: Theresa Maya  YOB: 1990  MRN: [id number]  Date of Encounter: 06/10/24 12:40 EDT  Admission date: 6/9/2024  Reason for Visit: Reduced oral intake    Assessment   Nutrition Assessment   Admission Diagnosis:  Post-op pain [G89.18]    Problem List:    Post-op pain    Previous deep vein thrombosis (DVT) affecting pregnancy    Anxiety    MTHFR gene mutation    Bipolar disorder in partial remission    S/P laparoscopic sleeve gastrectomy    Nausea & vomiting    Gastrojejunal anastomotic stricture      PMH:   She  has a past medical history of Abnormal Pap smear of cervix (2019), ADHD (attention deficit hyperactivity disorder) (2022), Allergic (2011), Anesthesia complication, Anxiety, Arthritis, Clotting disorder, Deep vein thrombosis, Depression, Dermatitis, Dyspepsia, Dyspnea on exertion, Elevated hemoglobin A1c, Fatigue, Gallbladder abscess, GERD (gastroesophageal reflux disease), Heartburn, Hyperemesis gravidarum, Hyperlipidemia, Incomplete right bundle branch block, Iron deficiency anemia, Migraines, Miscarriage, Morbid obesity with body mass index (BMI) of 40.0 to 44.9 in adult (04/19/2022), MTHFR deficiency complicating pregnancy, Obesity, Ovarian cyst, Recurrent pregnancy loss, antepartum condition or complication, Strep pharyngitis, Urinary tract infection, and Wears glasses.    PSH:  She  has a past surgical history that includes Tubal Sterilization (Bilateral, 09/15/2017); Vaginal delivery (14; 16; 17); Sinus surgery (Bilateral, 2006); Laparoscopic cholecystectomy (2006); Gastrectomy (N/A, 05/05/2022); Hiatal hernia repair (N/A, 05/05/2022); Esophagogastroduodenoscopy (N/A, 05/05/2022); Hysterectomy (01/23/2024); Zenda tooth extraction; Gastric bypass (N/A, 5/24/2024); Hiatal hernia repair (N/A, 5/24/2024); and Esophagogastroduodenoscopy (N/A, 5/24/2024).    Applicable Nutrition History:   (5/5/22) S/P gastric sleeve   (5/24/24)  "Revision of sleeve to verito-en-y   (6/10/24) Plan for EGD and dilation (? Gastrojejunostomy stricture)    Anthropometrics     Height: Height: 157.5 cm (62\")  Last Filed Weight: Weight: 91.2 kg (201 lb 1.6 oz) (06/09/24 2022)  Method: Weight Method: Stated  BMI: BMI (Calculated): 36.8    UBW:  287lb prior to gastrive sleeve 2022; weight stable ~ 215lb.  + 15lb weight loss x 2 weeks    Weight change:  15lb weight loss x 2 weeks    Nutrition Focused Physical Exam    Date: 6/10    Pt does not meet criteria for malnutrition diagnosis, at this time.      Subjective   Reported/Observed/Food/Nutrition Related History:   6/10  Patient reports she was up to regular food after her sleeve in 2022.  Continued to take post bariatric vitamins/minerals. She tolerates premier protein supplement well.  Up to stage II (full liquid with some baby food) after sleeve reversal 5/24/24.  Was drinking at least 2 premier proteins a day as well as some protein powder sprinkled on her food.  Overall 15lb weigh loss since surgery 5/24; weight loss would be considered intentional due the nature of surgery.     Current Nutrition Prescription   PO: NPO Diet NPO Type: Sips with Meds, Ice Chips  Oral Nutrition Supplement:   Intake: Insufficient data    Assessment & Plan   Nutrition Diagnosis   Date:  6/10            Updated:    Problem Predicted suboptimal energy intake    Etiology Altered GI function    Signs/Symptoms ? gastrojejunostomy stricture    Status: New      Goal / Objectives:   Nutrition to support treatment and Establish PO      Nutrition Intervention      Follow treatment progress, Care plan reviewed    Monitor PO tolerance and diet progression   Prefs for premier protein supplement flavor obtained - patient requested only two supplements a day once bariatric diet started.     Monitoring/Evaluation:   Per protocol, I&O, PO intake, Supplement intake, Pertinent labs    Demetria Trent RD  Time Spent: 25min    "

## 2024-06-10 NOTE — PROGRESS NOTES
[facility] Medicine Services  PROGRESS NOTE    Patient Name: Theresa Maya  : 1990  MRN: [id number]    Date of Admission: 2024  Primary Care Physician: Gina Green APRN    Subjective   Subjective     CC:  Postoperative pain    HPI:  Reports epigastric abdominal discomfort and nausea.  Passing flatus.  No bowel movement since before surgery.  Denied any respiratory symptoms.  Awaiting scope with Dr. Ricardo today.    Objective   Objective     Vital Signs:   Temp:  [97.7 °F (36.5 °C)-98.1 °F (36.7 °C)] 98.1 °F (36.7 °C)  Heart Rate:  [58-86] 59  Resp:  [16] 16  BP: (100-132)/() 105/66     Physical Exam:  Constitutional: No acute distress, awake, alert  HENT: NCAT, mucous membranes moist  Respiratory: Clear to auscultation bilaterally, respiratory effort normal   Cardiovascular: RRR, no murmurs  Gastrointestinal: Mildly tender to palpation, hypoactive bowel sounds  Musculoskeletal: No bilateral ankle edema  Psychiatric: Appropriate affect, cooperative  Neurologic: PERRL, symmetric facies, moves all extremity  Skin: No rashes      Results Reviewed:  LAB RESULTS:      Lab 06/10/24  0556 24  1633   WBC 3.64 4.87   HEMOGLOBIN 9.6* 12.2   HEMATOCRIT 31.7* 40.2   PLATELETS 249 337   NEUTROS ABS 1.75 3.02   IMMATURE GRANS (ABS) 0.01 0.02   LYMPHS ABS 1.33 1.26   MONOS ABS 0.37 0.34   EOS ABS 0.14 0.17   MCV 87.1 88.0   CRP  --  1.08*   LACTATE  --  0.9         Lab 24  1633   SODIUM 139   POTASSIUM 3.5   CHLORIDE 97*   CO2 26.0   ANION GAP 16.0*   BUN 8   CREATININE 0.86   EGFR 91.6   GLUCOSE 82   CALCIUM 9.3   MAGNESIUM 2.3   PHOSPHORUS 3.4         Lab 24  1633   TOTAL PROTEIN 8.4   ALBUMIN 4.2   GLOBULIN 4.2   ALT (SGPT) 8   AST (SGOT) 25   BILIRUBIN 0.3   ALK PHOS 94   LIPASE 159*                     Brief Urine Lab Results  (Last result in the past 365 days)        Color   Clarity   Blood   Leuk Est   Nitrite   Protein   CREAT   Urine HCG         06/09/24 2147 Yellow   Turbid   Negative   Negative   Negative   Trace                   Microbiology Results Abnormal       None            CT Abdomen Pelvis With Contrast    Result Date: 6/9/2024  CT CHEST W CONTRAST DIAGNOSTIC, CT ABDOMEN PELVIS W CONTRAST Date of Exam: 6/9/2024 4:55 PM EDT Indication: 3 days unable to tolerate solids or liquids, 5/25 Nissen fundoplication and Teresa-en-Y bypass. Comparison: CT abdomen and pelvis without contrast 5/27/2024, CT chest with contrast 8/15/2021 Technique: Axial CT images were obtained of the chest after the uneventful intravenous administration of 90 mL Isovue-300.  Reconstructed coronal and sagittal images were also obtained. Automated exposure control and iterative construction methods were used. Findings: Chest: Visualized soft tissues of the neck are without acute abnormality. Normal contrast opacification of the aortic arch branch vasculature. Heart size normal. No coronary artery calcification. No central pulmonary embolus. Negative for mediastinal or hilar adenopathy. No pericardial effusion or pleural effusion. Trachea and mainstem bronchi are patent. Negative for pneumothorax. No consolidation or findings of pneumonia. No suspicious pulmonary nodule or mass. Previously seen bibasilar atelectasis and small bilateral pleural effusions have resolved. Negative for  pneumomediastinum no paraesophageal fluid collection. Mild wall thickening of the distal esophagus similar to prior exam. No aggressive osseous lesion or acute fracture. Abdomen and pelvis: The liver is normal in size and contour. There is a low-density lesion in the right hepatic lobe near the dome at the junction of segments 7 and 8 which is stable from prior studies which may relate to an angioma measuring 1.7 cm (2/21). The spleen is normal in size. The adrenal glands are without acute abnormality. Pancreas without findings of pancreatitis. Gallbladder absent. Mild common bile duct dilatation is  stable likely related to postcholecystectomy state. The kidneys are symmetrically enhancing. There is no hydronephrosis or hydroureter. No ureteral calculus. Urinary bladder is collapsed. The uterus is absent. Normal ovaries for age. Postsurgical changes again noted related to recent hiatal hernia repair and gastric bypass. The surgical drainage catheter has been removed since the prior exam. There is inflammatory stranding surrounding the residual proximal stomach proximal to the gastrojejunal anastomosis which is mildly decreased from prior study. There is no adjacent fluid collection. No dilated bowel loops to indicate obstruction. No upper abdominal drainable abscess or fluid collection. Small upper abdominal hematomas on prior study have improved from the prior study. Small residual hematoma noted measuring 1.5 x 1 cm and measuring 1.1 x 1.1 cm at the operative site near the stomach (2/29, 2/32). Negative for residual pneumoperitoneum. No pneumatosis intestinalis. Colon is underdistended. Nonspecific submucosal fat deposition in the colon. The appendix is normal. The portal vein, splenic vein, and superior mesenteric vein are patent. Delayed images demonstrate normal contrast excretion into the ureters.     Impression: Impression: Chest: 1. No acute intrathoracic process. 2. Bibasilar atelectasis and bilateral pleural effusions on prior study have resolved. Abdomen and pelvis: 1. Postsurgical changes related to recent hiatal hernia repair and gastric bypass. Persistent inflammatory stranding surrounding the remaining proximal stomach and gastrojejunal anastomosis. No organized or drainable abscess. 2. No dilated distal bowel loops to indicate small bowel obstruction. 3. Additional stable chronic findings above. Electronically Signed: Vinod Alfaro MD  6/9/2024 5:57 PM EDT  Workstation ID: [id number]    CT Chest With Contrast Diagnostic    Result Date: 6/9/2024  CT CHEST W CONTRAST DIAGNOSTIC, CT ABDOMEN PELVIS W  CONTRAST Date of Exam: 6/9/2024 4:55 PM EDT Indication: 3 days unable to tolerate solids or liquids, 5/25 Nissen fundoplication and Teresa-en-Y bypass. Comparison: CT abdomen and pelvis without contrast 5/27/2024, CT chest with contrast 8/15/2021 Technique: Axial CT images were obtained of the chest after the uneventful intravenous administration of 90 mL Isovue-300.  Reconstructed coronal and sagittal images were also obtained. Automated exposure control and iterative construction methods were used. Findings: Chest: Visualized soft tissues of the neck are without acute abnormality. Normal contrast opacification of the aortic arch branch vasculature. Heart size normal. No coronary artery calcification. No central pulmonary embolus. Negative for mediastinal or hilar adenopathy. No pericardial effusion or pleural effusion. Trachea and mainstem bronchi are patent. Negative for pneumothorax. No consolidation or findings of pneumonia. No suspicious pulmonary nodule or mass. Previously seen bibasilar atelectasis and small bilateral pleural effusions have resolved. Negative for  pneumomediastinum no paraesophageal fluid collection. Mild wall thickening of the distal esophagus similar to prior exam. No aggressive osseous lesion or acute fracture. Abdomen and pelvis: The liver is normal in size and contour. There is a low-density lesion in the right hepatic lobe near the dome at the junction of segments 7 and 8 which is stable from prior studies which may relate to an angioma measuring 1.7 cm (2/21). The spleen is normal in size. The adrenal glands are without acute abnormality. Pancreas without findings of pancreatitis. Gallbladder absent. Mild common bile duct dilatation is stable likely related to postcholecystectomy state. The kidneys are symmetrically enhancing. There is no hydronephrosis or hydroureter. No ureteral calculus. Urinary bladder is collapsed. The uterus is absent. Normal ovaries for age. Postsurgical changes  again noted related to recent hiatal hernia repair and gastric bypass. The surgical drainage catheter has been removed since the prior exam. There is inflammatory stranding surrounding the residual proximal stomach proximal to the gastrojejunal anastomosis which is mildly decreased from prior study. There is no adjacent fluid collection. No dilated bowel loops to indicate obstruction. No upper abdominal drainable abscess or fluid collection. Small upper abdominal hematomas on prior study have improved from the prior study. Small residual hematoma noted measuring 1.5 x 1 cm and measuring 1.1 x 1.1 cm at the operative site near the stomach (2/29, 2/32). Negative for residual pneumoperitoneum. No pneumatosis intestinalis. Colon is underdistended. Nonspecific submucosal fat deposition in the colon. The appendix is normal. The portal vein, splenic vein, and superior mesenteric vein are patent. Delayed images demonstrate normal contrast excretion into the ureters.     Impression: Impression: Chest: 1. No acute intrathoracic process. 2. Bibasilar atelectasis and bilateral pleural effusions on prior study have resolved. Abdomen and pelvis: 1. Postsurgical changes related to recent hiatal hernia repair and gastric bypass. Persistent inflammatory stranding surrounding the remaining proximal stomach and gastrojejunal anastomosis. No organized or drainable abscess. 2. No dilated distal bowel loops to indicate small bowel obstruction. 3. Additional stable chronic findings above. Electronically Signed: Vinod Alfaro MD  6/9/2024 5:57 PM EDT  Workstation ID: [id number]     Results for orders placed during the hospital encounter of 02/10/17    Adult Transthoracic Echo Complete    Interpretation Summary  · Normal left ventricular cavity size and wall thickness noted. All left ventricular wall segments contract normally.  · Estimated EF appears to be in the range of 61 - 65%.  · Left ventricular diastolic dysfunction is noted (grade I)  consistent with impaired relaxation.  · The aortic valve is structurally normal. No aortic valve regurgitation is present. No aortic valve stenosis is present.  · The mitral valve is normal in structure. No mitral valve regurgitation is present. No significant mitral valve stenosis is present.  · The tricuspid valve is normal. No tricuspid valve stenosis is present. No tricuspid valve regurgitation is present.  · There is no evidence of pericardial effusion.      Current medications:  Scheduled Meds:ARIPiprazole, 5 mg, Oral, Daily  heparin (porcine), 5,000 Units, Subcutaneous, Q8H  lamoTRIgine, 150 mg, Oral, Daily  montelukast, 10 mg, Oral, Nightly  multivitamin with minerals, 1 tablet, Oral, Daily  pantoprazole, 40 mg, Intravenous, Q12H  sodium chloride, 10 mL, Intravenous, Q12H  thiamine (B-1) IV, 100 mg, Intravenous, Daily  vitamin B-12, 500 mcg, Oral, Daily  vitamin D3, 5,000 Units, Oral, Daily      Continuous Infusions:sodium chloride 0.45 % with KCl 20 mEq, 125 mL/hr  sodium chloride, 250 mL/hr      PRN Meds:.  acetaminophen **OR** acetaminophen **OR** acetaminophen    senna-docusate sodium **AND** polyethylene glycol **AND** bisacodyl **AND** bisacodyl    Calcium Replacement - Follow Nurse / BPA Driven Protocol    HYDROmorphone **AND** naloxone    Magnesium Standard Dose Replacement - Follow Nurse / BPA Driven Protocol    ondansetron ODT **OR** ondansetron    Phosphorus Replacement - Follow Nurse / BPA Driven Protocol    Potassium Replacement - Follow Nurse / BPA Driven Protocol    promethazine **OR** promethazine    sodium chloride    sodium chloride    Assessment & Plan   Assessment & Plan     Active Hospital Problems    Diagnosis  POA    **Post-op pain [G89.18]  Yes    Nausea & vomiting [R11.2]  Unknown    Gastrojejunal anastomotic stricture [K91.89]  Unknown    S/P laparoscopic sleeve gastrectomy [Z98.84]  Not Applicable    Bipolar disorder in partial remission [F31.70]  Yes    MTHFR gene mutation  [Z15.89]  Not Applicable    Anxiety [F41.9]  Yes    Previous deep vein thrombosis (DVT) affecting pregnancy [O09.899, Z86.718]  Not Applicable      Resolved Hospital Problems   No resolved problems to display.        Brief Hospital Course to date:  Theresa Maya is a 33 y.o. female with a PMH significant for bipolar disorder, recent hernia repair and laparoscopic sleeve gastrectomy and hiatal hernia repair on 5/25/2024, ADHD, history of DVT, MTHFR mutation, depression, GERD, HLD, obesity, who presented to the ED due to abdominal pain.      Nausea and vomiting  Dehydration  Postoperative abdominal pain  Concern for gastrojejunostomy stricture  -S/p laparoscopic sleeve gastrectomy and hiatal hernia repair  with Dr. Ricardo on 5/25/2024  - CT documented above  - Mild elevation of lipase, not meeting criteria for pancreatitis  - IVF  - PPI IV, IV thiamine  - Zofran, Phenergan, Dilaudid as needed  - NPO, sips with meds and ice chips  - Consult Dr. Ricardo, plan for EGD with likely dilation     History of DVT  MTHFR gene mutation  - Finished Eliquis for DVT prophylaxis evening of 6/7     Anxiety  Bipolar disorder  - Continue home meds     Questionable UTI diagnosed on Friday, patient has not taken any antibiotics.  UA negative for UTI on admission.    Expected Discharge Location and Transportation: Home  Expected Discharge   Expected Discharge Date: 6/12/2024; Expected Discharge Time:      DVT prophylaxis:  Medical and mechanical DVT prophylaxis orders are present.         AM-PAC 6 Clicks Score (PT): 24 (06/09/24 2001)    CODE STATUS:   Code Status and Medical Interventions:   Ordered at: 06/09/24 2025     Level Of Support Discussed With:    Patient     Code Status (Patient has no pulse and is not breathing):    CPR (Attempt to Resuscitate)     Medical Interventions (Patient has pulse or is breathing):    Full Support       Alison Lynn MD  06/10/24

## 2024-06-11 ENCOUNTER — READMISSION MANAGEMENT (OUTPATIENT)
Dept: CALL CENTER | Facility: HOSPITAL | Age: 34
End: 2024-06-11
Payer: COMMERCIAL

## 2024-06-11 VITALS
RESPIRATION RATE: 16 BRPM | HEIGHT: 62 IN | DIASTOLIC BLOOD PRESSURE: 74 MMHG | BODY MASS INDEX: 37.01 KG/M2 | TEMPERATURE: 98 F | SYSTOLIC BLOOD PRESSURE: 124 MMHG | WEIGHT: 201.1 LBS | OXYGEN SATURATION: 97 % | HEART RATE: 74 BPM

## 2024-06-11 LAB
ANION GAP SERPL CALCULATED.3IONS-SCNC: 14 MMOL/L (ref 5–15)
BUN SERPL-MCNC: 4 MG/DL (ref 6–20)
BUN/CREAT SERPL: 5.6 (ref 7–25)
CALCIUM SPEC-SCNC: 9.3 MG/DL (ref 8.6–10.5)
CHLORIDE SERPL-SCNC: 99 MMOL/L (ref 98–107)
CO2 SERPL-SCNC: 23 MMOL/L (ref 22–29)
CREAT SERPL-MCNC: 0.72 MG/DL (ref 0.57–1)
DEPRECATED RDW RBC AUTO: 48.5 FL (ref 37–54)
EGFRCR SERPLBLD CKD-EPI 2021: 113.4 ML/MIN/1.73
ERYTHROCYTE [DISTWIDTH] IN BLOOD BY AUTOMATED COUNT: 15.8 % (ref 12.3–15.4)
GLUCOSE SERPL-MCNC: 75 MG/DL (ref 65–99)
HCT VFR BLD AUTO: 33.3 % (ref 34–46.6)
HGB BLD-MCNC: 10.4 G/DL (ref 12–15.9)
MAGNESIUM SERPL-MCNC: 1.9 MG/DL (ref 1.6–2.6)
MCH RBC QN AUTO: 26.3 PG (ref 26.6–33)
MCHC RBC AUTO-ENTMCNC: 31.2 G/DL (ref 31.5–35.7)
MCV RBC AUTO: 84.1 FL (ref 79–97)
PLATELET # BLD AUTO: 293 10*3/MM3 (ref 140–450)
PMV BLD AUTO: 11.4 FL (ref 6–12)
POTASSIUM SERPL-SCNC: 4.2 MMOL/L (ref 3.5–5.2)
PREALB SERPL-MCNC: 18.5 MG/DL (ref 20–40)
RBC # BLD AUTO: 3.96 10*6/MM3 (ref 3.77–5.28)
SODIUM SERPL-SCNC: 136 MMOL/L (ref 136–145)
WBC NRBC COR # BLD AUTO: 5.19 10*3/MM3 (ref 3.4–10.8)

## 2024-06-11 PROCEDURE — 80048 BASIC METABOLIC PNL TOTAL CA: CPT | Performed by: SURGERY

## 2024-06-11 PROCEDURE — 99239 HOSP IP/OBS DSCHRG MGMT >30: CPT | Performed by: STUDENT IN AN ORGANIZED HEALTH CARE EDUCATION/TRAINING PROGRAM

## 2024-06-11 PROCEDURE — 85027 COMPLETE CBC AUTOMATED: CPT | Performed by: SURGERY

## 2024-06-11 PROCEDURE — 25010000002 HEPARIN (PORCINE) PER 1000 UNITS: Performed by: SURGERY

## 2024-06-11 PROCEDURE — G0378 HOSPITAL OBSERVATION PER HR: HCPCS

## 2024-06-11 PROCEDURE — 25010000002 THIAMINE HCL 200 MG/2ML SOLUTION: Performed by: SURGERY

## 2024-06-11 PROCEDURE — 25010000002 NA FERRIC GLUC CPLX PER 12.5 MG: Performed by: STUDENT IN AN ORGANIZED HEALTH CARE EDUCATION/TRAINING PROGRAM

## 2024-06-11 PROCEDURE — 84134 ASSAY OF PREALBUMIN: CPT | Performed by: SURGERY

## 2024-06-11 PROCEDURE — 83735 ASSAY OF MAGNESIUM: CPT | Performed by: SURGERY

## 2024-06-11 RX ORDER — ESOMEPRAZOLE MAGNESIUM 40 MG/1
40 CAPSULE, DELAYED RELEASE ORAL
Qty: 30 CAPSULE | Refills: 0 | Status: SHIPPED | OUTPATIENT
Start: 2024-06-11 | End: 2024-06-11

## 2024-06-11 RX ORDER — SUCRALFATE 1 G/1
1 TABLET ORAL
Status: DISCONTINUED | OUTPATIENT
Start: 2024-06-11 | End: 2024-06-11 | Stop reason: HOSPADM

## 2024-06-11 RX ORDER — ESOMEPRAZOLE MAGNESIUM 40 MG/1
40 CAPSULE, DELAYED RELEASE ORAL 2 TIMES DAILY
Qty: 60 CAPSULE | Refills: 0 | Status: SHIPPED | OUTPATIENT
Start: 2024-06-11

## 2024-06-11 RX ORDER — SUCRALFATE 1 G/1
1 TABLET ORAL
Qty: 40 TABLET | Refills: 0 | Status: SHIPPED | OUTPATIENT
Start: 2024-06-11

## 2024-06-11 RX ADMIN — SODIUM CHLORIDE 125 MG: 9 INJECTION, SOLUTION INTRAVENOUS at 08:43

## 2024-06-11 RX ADMIN — Medication 5000 UNITS: at 08:44

## 2024-06-11 RX ADMIN — Medication 10 ML: at 08:44

## 2024-06-11 RX ADMIN — LAMOTRIGINE 150 MG: 25 TABLET ORAL at 08:44

## 2024-06-11 RX ADMIN — THIAMINE HYDROCHLORIDE 100 MG: 100 INJECTION, SOLUTION INTRAMUSCULAR; INTRAVENOUS at 08:43

## 2024-06-11 RX ADMIN — SUCRALFATE 1 G: 1 TABLET ORAL at 11:15

## 2024-06-11 RX ADMIN — CYANOCOBALAMIN TAB 1000 MCG 500 MCG: 1000 TAB at 08:44

## 2024-06-11 RX ADMIN — HEPARIN SODIUM 5000 UNITS: 5000 INJECTION INTRAVENOUS; SUBCUTANEOUS at 05:34

## 2024-06-11 RX ADMIN — ARIPIPRAZOLE 5 MG: 5 TABLET ORAL at 08:44

## 2024-06-11 RX ADMIN — Medication 1 TABLET: at 08:44

## 2024-06-11 RX ADMIN — PANTOPRAZOLE SODIUM 40 MG: 40 INJECTION, POWDER, FOR SOLUTION INTRAVENOUS at 08:43

## 2024-06-11 NOTE — CASE MANAGEMENT/SOCIAL WORK
Continued Stay Note  [facility]     Patient Name: Theresa Maya  MRN: [id number]  Today's Date: 6/11/2024    Admit Date: 6/9/2024    Plan: home   Discharge Plan       Row Name 06/11/24 0924       Plan    Plan home    Patient/Family in Agreement with Plan yes    Plan Comments I met with this patient bedside regarding her discharge home today. She is in agreement. Her  can transport. She denies having any further discharge planning needs.    Final Discharge Disposition Code 01 - home or self-care                   Discharge Codes    No documentation.                 Expected Discharge Date and Time       Expected Discharge Date Expected Discharge Time    Jun 11, 2024               Zahida Rico RN

## 2024-06-11 NOTE — PLAN OF CARE
Goal Outcome Evaluation:  Plan of Care Reviewed With: patient        Progress: improving    Outcome Evaluation: VIRGILIO. SHANNAN. NSR. A&O x4. No complaints of pain. PRNs administered for nausea. IVFs infusing. Pt ambulating independently and tolerating diet. Colace administered per orders since pt last BM was Friday 6/7. Pt now complains of multiple episodes of diarrhea. Adequate UOP. Plan for pt to be d/c tomorrow. Pt resting comfortably at this time.

## 2024-06-11 NOTE — PLAN OF CARE
Goal Outcome Evaluation:  Plan of Care Reviewed With: patient        Progress: improving  Outcome Evaluation: VSS on RA. A&Ox4. NSR. No complaints of pain or nausea. Tolerating marvin 1 diet. Iron replaced. DC home with family.

## 2024-06-11 NOTE — OUTREACH NOTE
Prep Survey      Flowsheet Row Responses   [facility] patient discharged from? [place]   Is LACE score < 7 ? Yes   Eligibility [facility]   Date of Admission 06/09/24   Date of Discharge 06/11/24   Discharge Disposition Home or Self Care   Discharge diagnosis Post-op pain   Does the patient have one of the following disease processes/diagnoses(primary or secondary)? Other   Does the patient have Home health ordered? No   Is there a DME ordered? No   Prep survey completed? Yes            MAGALYS LANIER - Registered Nurse

## 2024-06-11 NOTE — DISCHARGE SUMMARY
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 female  with a PMH significant for bipolar disorder, recent hernia repair and laparoscopic sleeve gastrectomy and hiatal hernia repair on 2024, ADHD, history of DVT, MTHFR mutation, depression, GERD, HLD, obesity, who presented to the ED due to abdominal pain.      Nausea and vomiting due to gastrojejunostomy stricture  Dehydration  Postoperative abdominal pain  -S/p laparoscopic sleeve gastrectomy and hiatal hernia repair  with Dr. Ricardo on 2024  - CT documented below  - Mild elevation of lipase, not meeting criteria for pancreatitis  - s/p IVF  - PPI BID on DC along with Carafate slurry 4 times daily continue through follow-up with Dr. Ricardo's clinic; patient was instructed on how to make Carafate slurry  - Bariatric diet level 1 or 2 until follow-up with Dr. Ricardo's clinic, diet advancement will be considered at that time; no solids, should  only have liquids until follow-up, which was discussed with patient  - Status post EGD with dilation on 6/10 with Dr. Ricardo  - Tolerated boost for breakfast, broth and protein shake for lunch without any nausea or pain; stable for discharge home today per discussion with Dr. Ricardo    Iron deficiency anemia  - Status post IV iron, continue p.o. iron on discharge     History of DVT  MTHFR gene mutation  - Finished Eliquis for DVT prophylaxis evening of 6/7     Anxiety  Bipolar disorder  - Continue home meds     Questionable UTI diagnosed on Friday, patient has not taken any antibiotics.  UA negative for UTI on admission.    Discharge Follow Up Recommendations for outpatient labs/diagnostics:  Follow-up with Sallie Mata in 1 week   Follow-up with primary care doctor in 5 to 7 days regarding this hospitalization, chronic disease management, repeat labs (CBC, CMP)   Continue Nexium twice daily   Continue on Carafate slurry 4 times a day until follow-up with bariatrics clinic and they will determine if you need to be on it longer   Continue on bariatric 1 or 2 diet (liquids only, nothing that you can chew) until follow-up with bariatrics clinic and they will advance your diet based on your symptoms/exam at that time     Day of Discharge     HPI:   Patient tolerated boost for breakfast, protein shake and broth for lunch without any nausea.  No vomiting.  No abdominal pain.  No respiratory symptoms.  Had bowel movements after bowel regime yesterday.  Feels ready to go home.    Review of Systems  As above    Vital Signs:   Temp:  [97.5 °F (36.4 °C)-98.1 °F (36.7 °C)] 98 °F (36.7 °C)  Heart Rate:  [62-96] 74  Resp:  [16-18] 16  BP: ()/() 124/74  Flow (L/min):  [3-4] 4      Physical Exam:  Constitutional: No acute distress, awake, alert  HENT: NCAT, mucous membranes moist  Respiratory: Clear to auscultation bilaterally, respiratory effort normal   Cardiovascular: RRR, no murmurs  Gastrointestinal:  Non- tender to palpation, hypoactive bowel sounds  Musculoskeletal: No bilateral ankle edema  Psychiatric: Appropriate affect, cooperative  Neurologic: PERRL, symmetric facies, moves all extremity  Skin: No rashes on exposed skin    Pertinent  and/or Most Recent Results     LAB RESULTS:      Lab 06/11/24  0725 06/10/24  0556 06/09/24  1633   WBC 5.19 3.64 4.87   HEMOGLOBIN 10.4* 9.6* 12.2   HEMATOCRIT 33.3* 31.7* 40.2   PLATELETS 293 249 337   NEUTROS ABS  --  1.75 3.02   IMMATURE GRANS (ABS)  --  0.01 0.02   LYMPHS ABS  --  1.33 1.26   MONOS ABS  --  0.37 0.34   EOS ABS  --  0.14 0.17   MCV 84.1 87.1 88.0   CRP  --   --  1.08*   LACTATE  --   --  0.9         Lab 06/11/24  0725 06/10/24  0556 06/09/24  1633   SODIUM 136 139 139   POTASSIUM 4.2 3.9 3.5   CHLORIDE 99 104 97*   CO2 23.0 24.0 26.0   ANION GAP 14.0 11.0 16.0*   BUN 4* 7 8   CREATININE 0.72 0.69 0.86   EGFR 113.4 117.7 91.6   GLUCOSE 75 80 82   CALCIUM 9.3 8.4* 9.3   MAGNESIUM 1.9  --  2.3   PHOSPHORUS  --   --  3.4   TSH  --  3.790  --          Lab 06/09/24  1633   TOTAL PROTEIN 8.4   ALBUMIN 4.2   GLOBULIN 4.2   ALT (SGPT) 8   AST (SGOT) 25   BILIRUBIN 0.3   ALK PHOS 94   LIPASE 159*                 Lab 06/10/24  0556   IRON 24*   IRON SATURATION (TSAT) 10*   TIBC 252*   TRANSFERRIN 169*   FERRITIN 84.92         Brief Urine Lab Results  (Last result in the past 365 days)        Color   Clarity   Blood   Leuk Est   Nitrite   Protein   CREAT   Urine HCG        06/09/24 2147 Yellow   Turbid   Negative   Negative   Negative   Trace                 Microbiology Results (last 10 days)       Procedure Component Value - Date/Time    Urine Culture - Urine, Urine, Clean Catch [[id number]] Collected: 06/07/24 1423    Lab Status: Final result Specimen: Urine, Clean Catch Updated: 06/08/24 1135     Urine Culture >100,000 CFU/mL Mixed Augustina Isolated    Narrative:      Specimen contains mixed organisms of questionable pathogenicity suggestive of contamination. If  symptoms persist, suggest recollection.  Colonization of the urinary tract without infection is common. Treatment is discouraged unless the patient is symptomatic, pregnant, or undergoing an invasive urologic procedure.            CT Abdomen Pelvis With Contrast    Result Date: 6/9/2024  CT CHEST W CONTRAST DIAGNOSTIC, CT ABDOMEN PELVIS W CONTRAST Date of Exam: 6/9/2024 4:55 PM EDT Indication: 3 days unable to tolerate solids or liquids, 5/25 Nissen fundoplication and Teresa-en-Y bypass. Comparison: CT abdomen and pelvis without contrast 5/27/2024, CT chest with contrast 8/15/2021 Technique: Axial CT images were obtained of the chest after the uneventful intravenous administration of 90 mL Isovue-300.  Reconstructed coronal and sagittal images were also obtained. Automated exposure control and iterative construction methods were used. Findings: Chest: Visualized soft tissues of the neck are without acute abnormality. Normal contrast opacification of the aortic arch branch vasculature. Heart size normal. No coronary artery calcification. No central pulmonary embolus. Negative for mediastinal or hilar adenopathy. No pericardial effusion or pleural effusion. Trachea and mainstem bronchi are patent. Negative for pneumothorax. No consolidation or findings of pneumonia. No suspicious pulmonary nodule or mass. Previously seen bibasilar atelectasis and small bilateral pleural effusions have resolved. Negative for  pneumomediastinum no paraesophageal fluid collection. Mild wall thickening of the distal esophagus similar to prior exam. No aggressive osseous lesion or acute fracture. Abdomen and pelvis: The liver is normal in size and contour. There is a low-density lesion in the right hepatic lobe near the dome at the junction of segments 7 and 8 which is stable from prior studies which may relate to an angioma measuring 1.7 cm (2/21). The spleen is normal in size. The adrenal glands are without acute abnormality. Pancreas  without findings of pancreatitis. Gallbladder absent. Mild common bile duct dilatation is stable likely related to postcholecystectomy state. The kidneys are symmetrically enhancing. There is no hydronephrosis or hydroureter. No ureteral calculus. Urinary bladder is collapsed. The uterus is absent. Normal ovaries for age. Postsurgical changes again noted related to recent hiatal hernia repair and gastric bypass. The surgical drainage catheter has been removed since the prior exam. There is inflammatory stranding surrounding the residual proximal stomach proximal to the gastrojejunal anastomosis which is mildly decreased from prior study. There is no adjacent fluid collection. No dilated bowel loops to indicate obstruction. No upper abdominal drainable abscess or fluid collection. Small upper abdominal hematomas on prior study have improved from the prior study. Small residual hematoma noted measuring 1.5 x 1 cm and measuring 1.1 x 1.1 cm at the operative site near the stomach (2/29, 2/32). Negative for residual pneumoperitoneum. No pneumatosis intestinalis. Colon is underdistended. Nonspecific submucosal fat deposition in the colon. The appendix is normal. The portal vein, splenic vein, and superior mesenteric vein are patent. Delayed images demonstrate normal contrast excretion into the ureters.     Impression: Chest: 1. No acute intrathoracic process. 2. Bibasilar atelectasis and bilateral pleural effusions on prior study have resolved. Abdomen and pelvis: 1. Postsurgical changes related to recent hiatal hernia repair and gastric bypass. Persistent inflammatory stranding surrounding the remaining proximal stomach and gastrojejunal anastomosis. No organized or drainable abscess. 2. No dilated distal bowel loops to indicate small bowel obstruction. 3. Additional stable chronic findings above. Electronically Signed: Vinod Alfaro MD  6/9/2024 5:57 PM EDT  Workstation ID: [id number]    CT Chest With Contrast  Diagnostic    Result Date: 6/9/2024  CT CHEST W CONTRAST DIAGNOSTIC, CT ABDOMEN PELVIS W CONTRAST Date of Exam: 6/9/2024 4:55 PM EDT Indication: 3 days unable to tolerate solids or liquids, 5/25 Nissen fundoplication and Teresa-en-Y bypass. Comparison: CT abdomen and pelvis without contrast 5/27/2024, CT chest with contrast 8/15/2021 Technique: Axial CT images were obtained of the chest after the uneventful intravenous administration of 90 mL Isovue-300.  Reconstructed coronal and sagittal images were also obtained. Automated exposure control and iterative construction methods were used. Findings: Chest: Visualized soft tissues of the neck are without acute abnormality. Normal contrast opacification of the aortic arch branch vasculature. Heart size normal. No coronary artery calcification. No central pulmonary embolus. Negative for mediastinal or hilar adenopathy. No pericardial effusion or pleural effusion. Trachea and mainstem bronchi are patent. Negative for pneumothorax. No consolidation or findings of pneumonia. No suspicious pulmonary nodule or mass. Previously seen bibasilar atelectasis and small bilateral pleural effusions have resolved. Negative for  pneumomediastinum no paraesophageal fluid collection. Mild wall thickening of the distal esophagus similar to prior exam. No aggressive osseous lesion or acute fracture. Abdomen and pelvis: The liver is normal in size and contour. There is a low-density lesion in the right hepatic lobe near the dome at the junction of segments 7 and 8 which is stable from prior studies which may relate to an angioma measuring 1.7 cm (2/21). The spleen is normal in size. The adrenal glands are without acute abnormality. Pancreas without findings of pancreatitis. Gallbladder absent. Mild common bile duct dilatation is stable likely related to postcholecystectomy state. The kidneys are symmetrically enhancing. There is no hydronephrosis or hydroureter. No ureteral calculus. Urinary  bladder is collapsed. The uterus is absent. Normal ovaries for age. Postsurgical changes again noted related to recent hiatal hernia repair and gastric bypass. The surgical drainage catheter has been removed since the prior exam. There is inflammatory stranding surrounding the residual proximal stomach proximal to the gastrojejunal anastomosis which is mildly decreased from prior study. There is no adjacent fluid collection. No dilated bowel loops to indicate obstruction. No upper abdominal drainable abscess or fluid collection. Small upper abdominal hematomas on prior study have improved from the prior study. Small residual hematoma noted measuring 1.5 x 1 cm and measuring 1.1 x 1.1 cm at the operative site near the stomach (2/29, 2/32). Negative for residual pneumoperitoneum. No pneumatosis intestinalis. Colon is underdistended. Nonspecific submucosal fat deposition in the colon. The appendix is normal. The portal vein, splenic vein, and superior mesenteric vein are patent. Delayed images demonstrate normal contrast excretion into the ureters.     Impression: Chest: 1. No acute intrathoracic process. 2. Bibasilar atelectasis and bilateral pleural effusions on prior study have resolved. Abdomen and pelvis: 1. Postsurgical changes related to recent hiatal hernia repair and gastric bypass. Persistent inflammatory stranding surrounding the remaining proximal stomach and gastrojejunal anastomosis. No organized or drainable abscess. 2. No dilated distal bowel loops to indicate small bowel obstruction. 3. Additional stable chronic findings above. Electronically Signed: Vinod Alfaro MD  6/9/2024 5:57 PM EDT  Workstation ID: [id number]     Results for orders placed during the hospital encounter of 05/29/23    Duplex Venous Lower Extremity - Right CAR    Interpretation Summary    Normal right lower extremity venous duplex scan.      Results for orders placed during the hospital encounter of 05/29/23    Duplex Venous Lower  Extremity - Right CAR    Interpretation Summary    Normal right lower extremity venous duplex scan.      Results for orders placed during the hospital encounter of 02/10/17    Adult Transthoracic Echo Complete    Interpretation Summary  · Normal left ventricular cavity size and wall thickness noted. All left ventricular wall segments contract normally.  · Estimated EF appears to be in the range of 61 - 65%.  · Left ventricular diastolic dysfunction is noted (grade I) consistent with impaired relaxation.  · The aortic valve is structurally normal. No aortic valve regurgitation is present. No aortic valve stenosis is present.  · The mitral valve is normal in structure. No mitral valve regurgitation is present. No significant mitral valve stenosis is present.  · The tricuspid valve is normal. No tricuspid valve stenosis is present. No tricuspid valve regurgitation is present.  · There is no evidence of pericardial effusion.      Plan for Follow-up of Pending Labs/Results:      Discharge Details        Discharge Medications        New Medications        Instructions Start Date   sucralfate 1 g tablet  Commonly known as: CARAFATE   1 g, Oral, 4 Times Daily Before Meals & Nightly, Crush tablets and mix with 10-15mL water and take it as a slurry             Changes to Medications        Instructions Start Date   Elidel 1 % cream  Generic drug: pimecrolimus  What changed:   when to take this  reasons to take this   Topical, 2 Times Daily      esomeprazole 40 MG capsule  Commonly known as: nexIUM  What changed: when to take this   40 mg, Oral, 2 Times Daily      triamcinolone 0.1 % ointment  Commonly known as: KENALOG  What changed: how much to take   Topical, 2 Times Daily             Continue These Medications        Instructions Start Date   ARIPiprazole 5 MG tablet  Commonly known as: ABILIFY   5 mg, Oral, Daily      ferrous sulfate 325 (65 FE) MG tablet   325 mg, Oral, Daily      lamoTRIgine 150 MG tablet  Commonly  known as: LaMICtal   150 mg, Oral, Daily      montelukast 10 MG tablet  Commonly known as: Singulair   10 mg, Oral, Nightly      multivitamin with minerals tablet tablet   1 tablet, Oral, Daily      prochlorperazine 10 MG tablet  Commonly known as: COMPAZINE   10 mg, Oral, Every 6 Hours PRN      Saw Palmetto 80 MG capsule   1 capsule, Oral, Daily      vitamin B-12 500 MCG tablet  Commonly known as: CYANOCOBALAMIN   500 mcg, Oral, Daily      vitamin D3 125 MCG (5000 UT) capsule capsule   5,000 Units, Oral, Daily, AND K 12                Allergies   Allergen Reactions    Amoxicillin Diarrhea and GI Intolerance    Doxycycline Other (See Comments)     Vaginal swelling     Latex Hives and Itching    Penicillins Rash     Says Keflex OK as long as she takes w/ food. Has tolerated cefazolin, ceftriaxone    Beta lactam allergy details  Antibiotic reaction: rash, hives, other (ITCHING)  Age at reaction: adult (2017)  Dose to reaction time: (!) hours  Reason for antibiotic: sore throat (STREP)  Epinephrine required for reaction?: no  Tolerated antibiotics: cephalexin (KEFLEX), Cefazolin, ceftriaxone             Discharge Disposition:  Home or Self Care    Diet:  Hospital:  Diet Order   Procedures    Diet: Bariatric Stage 1; Fluid Consistency: Thin (IDDSI 0)       Diet Instructions       Diet: Regular/House Diet; Bariatric Stage 1; Thin (IDDSI 0)      Discharge Diet: Regular/House Diet    Diet Modifiers / Additional Diets: Bariatric Stage 1    Fluid Consistency: Thin (IDDSI 0)             Activity:      Restrictions or Other Recommendations:       CODE STATUS:    Code Status and Medical Interventions:   Ordered at: 06/09/24 2025     Level Of Support Discussed With:    Patient     Code Status (Patient has no pulse and is not breathing):    CPR (Attempt to Resuscitate)     Medical Interventions (Patient has pulse or is breathing):    Full Support       Future Appointments   Date Time Provider Department Center   6/18/2024  1:15  PM Gina Green APRN [facility]   6/25/2024  1:30 PM Sallie Rutherford PA [facility] None   7/15/2024  8:15 AM Yarelis Adams APRN [facility] None       Additional Instructions for the Follow-ups that You Need to Schedule       Call MD With Problems / Concerns   As directed      Please seek medical attention for any of the following: Difficulty breathing, chest pain, vomiting, inability to tolerate oral diet, worsening abdominal pain, and/or any other concerning symptoms    Order Comments: Please seek medical attention for any of the following: Difficulty breathing, chest pain, vomiting, inability to tolerate oral diet, worsening abdominal pain, and/or any other concerning symptoms         Discharge Follow-up with PCP   As directed       Currently Documented PCP:    Gina Green APRN    PCP Phone Number:    [phone]     Follow Up Details: Follow-up with primary care doctor in 5 to 7 days regarding this hospitalization, chronic disease management, repeat labs (CBC, CMP)        Discharge Follow-up with Specified Provider: Follow-up with Sallie Mata in 1 week   As directed      To: Follow-up with Sallie Mata in 1 week                      Alison Lynn MD  06/11/24      Time Spent on Discharge:  I spent  35  minutes on this discharge activity which included: face-to-face encounter with the patient, reviewing the data in the system, coordination of the care with the nursing staff as well as consultants, documentation, and entering orders.

## 2024-06-12 ENCOUNTER — TRANSITIONAL CARE MANAGEMENT TELEPHONE ENCOUNTER (OUTPATIENT)
Dept: CALL CENTER | Facility: HOSPITAL | Age: 34
End: 2024-06-12
Payer: COMMERCIAL

## 2024-06-12 NOTE — OUTREACH NOTE
Call Center TCM Note      Flowsheet Row Responses   [facility] patient discharged from? [facility]   Does the patient have one of the following disease processes/diagnoses(primary or secondary)? Other   TCM attempt successful? Yes  []   Call start time 1138   Call end time 1139   Discharge diagnosis Post-op pain   Meds reviewed with patient/caregiver? Yes   Is the patient having any side effects they believe may be caused by any medication additions or changes? No   Does the patient have all medications ordered at discharge? Yes   Is the patient taking all medications as directed (includes completed medication regime)? Yes   Comments 6/18/2024  1:15 PM   Does the patient have an appointment with their PCP within 7-14 days of discharge? Yes   Has home health visited the patient within 72 hours of discharge? N/A   Psychosocial issues? No   What is the patient's perception of their health status since discharge? Improving   TCM call completed? Yes   Call end time 1139            Adrianna Patel RN    6/12/2024, 11:39 EDT

## 2024-06-13 ENCOUNTER — APPOINTMENT (OUTPATIENT)
Dept: CT IMAGING | Facility: HOSPITAL | Age: 34
End: 2024-06-13
Payer: COMMERCIAL

## 2024-06-13 LAB
ALBUMIN SERPL-MCNC: 4.3 G/DL (ref 3.5–5.2)
ALBUMIN/GLOB SERPL: 1.4 G/DL
ALP SERPL-CCNC: 82 U/L (ref 39–117)
ALT SERPL W P-5'-P-CCNC: 11 U/L (ref 1–33)
ANION GAP SERPL CALCULATED.3IONS-SCNC: 12 MMOL/L (ref 5–15)
AST SERPL-CCNC: 20 U/L (ref 1–32)
BASOPHILS # BLD AUTO: 0.06 10*3/MM3 (ref 0–0.2)
BASOPHILS NFR BLD AUTO: 1.1 % (ref 0–1.5)
BILIRUB SERPL-MCNC: 0.3 MG/DL (ref 0–1.2)
BUN SERPL-MCNC: 8 MG/DL (ref 6–20)
BUN/CREAT SERPL: 9.1 (ref 7–25)
CALCIUM SPEC-SCNC: 9.8 MG/DL (ref 8.6–10.5)
CHLORIDE SERPL-SCNC: 104 MMOL/L (ref 98–107)
CO2 SERPL-SCNC: 27 MMOL/L (ref 22–29)
CREAT SERPL-MCNC: 0.88 MG/DL (ref 0.57–1)
D-LACTATE SERPL-SCNC: 1 MMOL/L (ref 0.5–2)
DEPRECATED RDW RBC AUTO: 49.7 FL (ref 37–54)
EGFRCR SERPLBLD CKD-EPI 2021: 89.1 ML/MIN/1.73
EOSINOPHIL # BLD AUTO: 0.13 10*3/MM3 (ref 0–0.4)
EOSINOPHIL NFR BLD AUTO: 2.3 % (ref 0.3–6.2)
ERYTHROCYTE [DISTWIDTH] IN BLOOD BY AUTOMATED COUNT: 16 % (ref 12.3–15.4)
GLOBULIN UR ELPH-MCNC: 3 GM/DL
GLUCOSE SERPL-MCNC: 85 MG/DL (ref 65–99)
HCT VFR BLD AUTO: 38.4 % (ref 34–46.6)
HGB BLD-MCNC: 11.9 G/DL (ref 12–15.9)
IMM GRANULOCYTES # BLD AUTO: 0.01 10*3/MM3 (ref 0–0.05)
IMM GRANULOCYTES NFR BLD AUTO: 0.2 % (ref 0–0.5)
LIPASE SERPL-CCNC: 96 U/L (ref 13–60)
LYMPHOCYTES # BLD AUTO: 1.33 10*3/MM3 (ref 0.7–3.1)
LYMPHOCYTES NFR BLD AUTO: 24 % (ref 19.6–45.3)
MCH RBC QN AUTO: 26.5 PG (ref 26.6–33)
MCHC RBC AUTO-ENTMCNC: 31 G/DL (ref 31.5–35.7)
MCV RBC AUTO: 85.5 FL (ref 79–97)
MONOCYTES # BLD AUTO: 0.53 10*3/MM3 (ref 0.1–0.9)
MONOCYTES NFR BLD AUTO: 9.5 % (ref 5–12)
NEUTROPHILS NFR BLD AUTO: 3.49 10*3/MM3 (ref 1.7–7)
NEUTROPHILS NFR BLD AUTO: 62.9 % (ref 42.7–76)
NRBC BLD AUTO-RTO: 0 /100 WBC (ref 0–0.2)
PLATELET # BLD AUTO: 290 10*3/MM3 (ref 140–450)
PMV BLD AUTO: 11.4 FL (ref 6–12)
POTASSIUM SERPL-SCNC: 3.6 MMOL/L (ref 3.5–5.2)
PROT SERPL-MCNC: 7.3 G/DL (ref 6–8.5)
RBC # BLD AUTO: 4.49 10*6/MM3 (ref 3.77–5.28)
SODIUM SERPL-SCNC: 143 MMOL/L (ref 136–145)
WBC NRBC COR # BLD AUTO: 5.55 10*3/MM3 (ref 3.4–10.8)

## 2024-06-13 PROCEDURE — 25810000003 LACTATED RINGERS SOLUTION: Performed by: EMERGENCY MEDICINE

## 2024-06-13 PROCEDURE — 80053 COMPREHEN METABOLIC PANEL: CPT | Performed by: EMERGENCY MEDICINE

## 2024-06-13 PROCEDURE — 25510000001 IOPAMIDOL 61 % SOLUTION: Performed by: EMERGENCY MEDICINE

## 2024-06-13 PROCEDURE — 85025 COMPLETE CBC W/AUTO DIFF WBC: CPT | Performed by: EMERGENCY MEDICINE

## 2024-06-13 PROCEDURE — 99285 EMERGENCY DEPT VISIT HI MDM: CPT

## 2024-06-13 PROCEDURE — 25010000002 ONDANSETRON PER 1 MG: Performed by: EMERGENCY MEDICINE

## 2024-06-13 PROCEDURE — 74177 CT ABD & PELVIS W/CONTRAST: CPT

## 2024-06-13 PROCEDURE — 96374 THER/PROPH/DIAG INJ IV PUSH: CPT

## 2024-06-13 PROCEDURE — 83690 ASSAY OF LIPASE: CPT | Performed by: EMERGENCY MEDICINE

## 2024-06-13 PROCEDURE — 83605 ASSAY OF LACTIC ACID: CPT | Performed by: EMERGENCY MEDICINE

## 2024-06-13 RX ORDER — ALUMINA, MAGNESIA, AND SIMETHICONE 2400; 2400; 240 MG/30ML; MG/30ML; MG/30ML
15 SUSPENSION ORAL ONCE
Status: COMPLETED | OUTPATIENT
Start: 2024-06-13 | End: 2024-06-13

## 2024-06-13 RX ORDER — ONDANSETRON 2 MG/ML
4 INJECTION INTRAMUSCULAR; INTRAVENOUS ONCE
Status: COMPLETED | OUTPATIENT
Start: 2024-06-13 | End: 2024-06-13

## 2024-06-13 RX ADMIN — ONDANSETRON 4 MG: 2 INJECTION INTRAMUSCULAR; INTRAVENOUS at 23:34

## 2024-06-13 RX ADMIN — IOPAMIDOL 78 ML: 612 INJECTION, SOLUTION INTRAVENOUS at 23:44

## 2024-06-13 RX ADMIN — ALUMINUM HYDROXIDE, MAGNESIUM HYDROXIDE, AND DIMETHICONE 15 ML: 400; 400; 40 SUSPENSION ORAL at 23:34

## 2024-06-13 RX ADMIN — SODIUM CHLORIDE, POTASSIUM CHLORIDE, SODIUM LACTATE AND CALCIUM CHLORIDE 1000 ML: 600; 310; 30; 20 INJECTION, SOLUTION INTRAVENOUS at 23:33

## 2024-06-14 ENCOUNTER — ANESTHESIA EVENT (OUTPATIENT)
Dept: GASTROENTEROLOGY | Facility: HOSPITAL | Age: 34
End: 2024-06-14
Payer: COMMERCIAL

## 2024-06-14 ENCOUNTER — HOSPITAL ENCOUNTER (OUTPATIENT)
Facility: HOSPITAL | Age: 34
LOS: 1 days | Discharge: HOME | End: 2024-06-15
Attending: EMERGENCY MEDICINE | Admitting: SURGERY
Payer: COMMERCIAL

## 2024-06-14 ENCOUNTER — APPOINTMENT (OUTPATIENT)
Dept: CARDIOLOGY | Facility: HOSPITAL | Age: 34
End: 2024-06-14
Payer: COMMERCIAL

## 2024-06-14 ENCOUNTER — ANESTHESIA (OUTPATIENT)
Dept: GASTROENTEROLOGY | Facility: HOSPITAL | Age: 34
End: 2024-06-14
Payer: COMMERCIAL

## 2024-06-14 DIAGNOSIS — R11.2 NAUSEA AND VOMITING, UNSPECIFIED VOMITING TYPE: ICD-10-CM

## 2024-06-14 DIAGNOSIS — K91.89 ANASTOMOTIC STRICTURE OF SMALL INTESTINE: ICD-10-CM

## 2024-06-14 DIAGNOSIS — K91.30 ANASTOMOTIC STRICTURE OF SMALL INTESTINE: ICD-10-CM

## 2024-06-14 DIAGNOSIS — I82.612 SUPERFICIAL VENOUS THROMBOSIS OF ARM, LEFT: Primary | ICD-10-CM

## 2024-06-14 DIAGNOSIS — K91.89 GASTROJEJUNAL ANASTOMOTIC STRICTURE: ICD-10-CM

## 2024-06-14 PROBLEM — R11.10 VOMITING: Status: ACTIVE | Noted: 2024-06-14

## 2024-06-14 PROBLEM — I82.622 ACUTE DEEP VEIN THROMBOSIS (DVT) OF LEFT UPPER EXTREMITY: Status: ACTIVE | Noted: 2024-06-14

## 2024-06-14 LAB
BH CV UPPER VENOUS LEFT AXILLARY AUGMENT: NORMAL
BH CV UPPER VENOUS LEFT AXILLARY COMPRESS: NORMAL
BH CV UPPER VENOUS LEFT AXILLARY PHASIC: NORMAL
BH CV UPPER VENOUS LEFT AXILLARY SPONT: NORMAL
BH CV UPPER VENOUS LEFT BASILIC FOREARM COMPRESS: NORMAL
BH CV UPPER VENOUS LEFT BASILIC UPPER COMPRESS: NORMAL
BH CV UPPER VENOUS LEFT BRACHIAL COMPRESS: NORMAL
BH CV UPPER VENOUS LEFT CEPHALIC FOREARM COLOR: 1
BH CV UPPER VENOUS LEFT CEPHALIC FOREARM COMPRESS: NORMAL
BH CV UPPER VENOUS LEFT CEPHALIC UPPER COLOR: 1
BH CV UPPER VENOUS LEFT CEPHALIC UPPER COMPRESS: NORMAL
BH CV UPPER VENOUS LEFT INTERNAL JUGULAR AUGMENT: NORMAL
BH CV UPPER VENOUS LEFT INTERNAL JUGULAR COMPRESS: NORMAL
BH CV UPPER VENOUS LEFT INTERNAL JUGULAR PHASIC: NORMAL
BH CV UPPER VENOUS LEFT INTERNAL JUGULAR SPONT: NORMAL
BH CV UPPER VENOUS LEFT RADIAL COMPRESS: NORMAL
BH CV UPPER VENOUS LEFT SUBCLAVIAN AUGMENT: NORMAL
BH CV UPPER VENOUS LEFT SUBCLAVIAN COMPRESS: NORMAL
BH CV UPPER VENOUS LEFT SUBCLAVIAN PHASIC: NORMAL
BH CV UPPER VENOUS LEFT SUBCLAVIAN SPONT: NORMAL
BH CV UPPER VENOUS LEFT ULNAR COMPRESS: NORMAL
BH CV UPPER VENOUS RIGHT SUBCLAVIAN AUGMENT: NORMAL
BH CV UPPER VENOUS RIGHT SUBCLAVIAN COMPRESS: NORMAL
BH CV UPPER VENOUS RIGHT SUBCLAVIAN PHASIC: NORMAL
BH CV UPPER VENOUS RIGHT SUBCLAVIAN SPONT: NORMAL
BH CV VAS PRELIMINARY FINDINGS SCRIPTING: 1

## 2024-06-14 PROCEDURE — 96375 TX/PRO/DX INJ NEW DRUG ADDON: CPT

## 2024-06-14 PROCEDURE — 99213 OFFICE O/P EST LOW 20 MIN: CPT | Performed by: NURSE PRACTITIONER

## 2024-06-14 PROCEDURE — 25010000002 POTASSIUM CHLORIDE PER 2 MEQ: Performed by: SURGERY

## 2024-06-14 PROCEDURE — 93971 EXTREMITY STUDY: CPT | Performed by: INTERNAL MEDICINE

## 2024-06-14 PROCEDURE — 25010000002 PROMETHAZINE PER 50 MG: Performed by: PHYSICIAN ASSISTANT

## 2024-06-14 PROCEDURE — 25010000002 SUCCINYLCHOLINE PER 20 MG: Performed by: NURSE ANESTHETIST, CERTIFIED REGISTERED

## 2024-06-14 PROCEDURE — 25810000003 LACTATED RINGERS PER 1000 ML: Performed by: NURSE ANESTHETIST, CERTIFIED REGISTERED

## 2024-06-14 PROCEDURE — 25010000002 PROPOFOL 10 MG/ML EMULSION: Performed by: NURSE ANESTHETIST, CERTIFIED REGISTERED

## 2024-06-14 PROCEDURE — 43245 EGD DILATE STRICTURE: CPT | Performed by: SURGERY

## 2024-06-14 PROCEDURE — 25810000003 SODIUM CHLORIDE 0.9 % SOLUTION: Performed by: EMERGENCY MEDICINE

## 2024-06-14 PROCEDURE — C1726 CATH, BAL DIL, NON-VASCULAR: HCPCS | Performed by: SURGERY

## 2024-06-14 PROCEDURE — 25010000002 HYDROMORPHONE PER 4 MG: Performed by: SURGERY

## 2024-06-14 PROCEDURE — 93971 EXTREMITY STUDY: CPT

## 2024-06-14 RX ORDER — LIDOCAINE HYDROCHLORIDE 10 MG/ML
INJECTION, SOLUTION EPIDURAL; INFILTRATION; INTRACAUDAL; PERINEURAL AS NEEDED
Status: DISCONTINUED | OUTPATIENT
Start: 2024-06-14 | End: 2024-06-14 | Stop reason: SURG

## 2024-06-14 RX ORDER — PANTOPRAZOLE SODIUM 40 MG/10ML
80 INJECTION, POWDER, LYOPHILIZED, FOR SOLUTION INTRAVENOUS ONCE
Status: COMPLETED | OUTPATIENT
Start: 2024-06-14 | End: 2024-06-14

## 2024-06-14 RX ORDER — ONDANSETRON 2 MG/ML
4 INJECTION INTRAMUSCULAR; INTRAVENOUS ONCE AS NEEDED
Status: DISCONTINUED | OUTPATIENT
Start: 2024-06-14 | End: 2024-06-14 | Stop reason: HOSPADM

## 2024-06-14 RX ORDER — SUCCINYLCHOLINE CHLORIDE 20 MG/ML
INJECTION INTRAMUSCULAR; INTRAVENOUS AS NEEDED
Status: DISCONTINUED | OUTPATIENT
Start: 2024-06-14 | End: 2024-06-14 | Stop reason: SURG

## 2024-06-14 RX ORDER — MULTIPLE VITAMINS W/ MINERALS TAB 9MG-400MCG
1 TAB ORAL DAILY
Status: DISCONTINUED | OUTPATIENT
Start: 2024-06-14 | End: 2024-06-15 | Stop reason: HOSPADM

## 2024-06-14 RX ORDER — SODIUM CHLORIDE 9 MG/ML
150 INJECTION, SOLUTION INTRAVENOUS CONTINUOUS
Status: DISCONTINUED | OUTPATIENT
Start: 2024-06-14 | End: 2024-06-14

## 2024-06-14 RX ORDER — PANTOPRAZOLE SODIUM 40 MG/10ML
40 INJECTION, POWDER, LYOPHILIZED, FOR SOLUTION INTRAVENOUS
Status: DISCONTINUED | OUTPATIENT
Start: 2024-06-14 | End: 2024-06-15 | Stop reason: HOSPADM

## 2024-06-14 RX ORDER — ONDANSETRON 2 MG/ML
4 INJECTION INTRAMUSCULAR; INTRAVENOUS EVERY 6 HOURS PRN
Status: DISCONTINUED | OUTPATIENT
Start: 2024-06-14 | End: 2024-06-15 | Stop reason: HOSPADM

## 2024-06-14 RX ORDER — PROPOFOL 10 MG/ML
VIAL (ML) INTRAVENOUS AS NEEDED
Status: DISCONTINUED | OUTPATIENT
Start: 2024-06-14 | End: 2024-06-14 | Stop reason: SURG

## 2024-06-14 RX ORDER — FERROUS SULFATE 325(65) MG
325 TABLET ORAL DAILY
Status: DISCONTINUED | OUTPATIENT
Start: 2024-06-14 | End: 2024-06-15 | Stop reason: HOSPADM

## 2024-06-14 RX ORDER — ARIPIPRAZOLE 10 MG/1
5 TABLET ORAL DAILY
Status: DISCONTINUED | OUTPATIENT
Start: 2024-06-14 | End: 2024-06-15 | Stop reason: HOSPADM

## 2024-06-14 RX ORDER — SODIUM CHLORIDE, SODIUM LACTATE, POTASSIUM CHLORIDE, CALCIUM CHLORIDE 600; 310; 30; 20 MG/100ML; MG/100ML; MG/100ML; MG/100ML
INJECTION, SOLUTION INTRAVENOUS CONTINUOUS PRN
Status: DISCONTINUED | OUTPATIENT
Start: 2024-06-14 | End: 2024-06-14 | Stop reason: SURG

## 2024-06-14 RX ORDER — HYDROMORPHONE HYDROCHLORIDE 1 MG/ML
0.5 INJECTION, SOLUTION INTRAMUSCULAR; INTRAVENOUS; SUBCUTANEOUS
Status: DISCONTINUED | OUTPATIENT
Start: 2024-06-14 | End: 2024-06-15 | Stop reason: HOSPADM

## 2024-06-14 RX ORDER — IPRATROPIUM BROMIDE AND ALBUTEROL SULFATE 2.5; .5 MG/3ML; MG/3ML
3 SOLUTION RESPIRATORY (INHALATION) ONCE AS NEEDED
Status: DISCONTINUED | OUTPATIENT
Start: 2024-06-14 | End: 2024-06-14 | Stop reason: HOSPADM

## 2024-06-14 RX ORDER — SODIUM CHLORIDE AND POTASSIUM CHLORIDE 150; 450 MG/100ML; MG/100ML
75 INJECTION, SOLUTION INTRAVENOUS CONTINUOUS
Status: DISCONTINUED | OUTPATIENT
Start: 2024-06-14 | End: 2024-06-15 | Stop reason: HOSPADM

## 2024-06-14 RX ORDER — MONTELUKAST SODIUM 10 MG/1
10 TABLET ORAL NIGHTLY
Status: DISCONTINUED | OUTPATIENT
Start: 2024-06-14 | End: 2024-06-15 | Stop reason: HOSPADM

## 2024-06-14 RX ORDER — LAMOTRIGINE 100 MG/1
150 TABLET ORAL DAILY
Status: DISCONTINUED | OUTPATIENT
Start: 2024-06-14 | End: 2024-06-15 | Stop reason: HOSPADM

## 2024-06-14 RX ORDER — LANOLIN ALCOHOL/MO/W.PET/CERES
500 CREAM (GRAM) TOPICAL DAILY
Status: DISCONTINUED | OUTPATIENT
Start: 2024-06-14 | End: 2024-06-15 | Stop reason: HOSPADM

## 2024-06-14 RX ADMIN — PANTOPRAZOLE SODIUM 40 MG: 40 INJECTION, POWDER, FOR SOLUTION INTRAVENOUS at 05:28

## 2024-06-14 RX ADMIN — APIXABAN 10 MG: 5 TABLET, FILM COATED ORAL at 21:28

## 2024-06-14 RX ADMIN — PROPOFOL 120 MG: 10 INJECTION, EMULSION INTRAVENOUS at 13:37

## 2024-06-14 RX ADMIN — FERROUS SULFATE TAB 325 MG (65 MG ELEMENTAL FE) 325 MG: 325 (65 FE) TAB at 16:36

## 2024-06-14 RX ADMIN — SODIUM CHLORIDE, POTASSIUM CHLORIDE, SODIUM LACTATE AND CALCIUM CHLORIDE: 600; 310; 30; 20 INJECTION, SOLUTION INTRAVENOUS at 13:36

## 2024-06-14 RX ADMIN — PROMETHAZINE HYDROCHLORIDE 12.5 MG: 25 INJECTION INTRAMUSCULAR; INTRAVENOUS at 06:01

## 2024-06-14 RX ADMIN — PANTOPRAZOLE SODIUM 80 MG: 40 INJECTION, POWDER, FOR SOLUTION INTRAVENOUS at 01:41

## 2024-06-14 RX ADMIN — PROMETHAZINE HYDROCHLORIDE 12.5 MG: 25 INJECTION INTRAMUSCULAR; INTRAVENOUS at 20:28

## 2024-06-14 RX ADMIN — SUCCINYLCHOLINE CHLORIDE 120 MG: 20 INJECTION, SOLUTION INTRAMUSCULAR; INTRAVENOUS at 13:39

## 2024-06-14 RX ADMIN — POTASSIUM CHLORIDE AND SODIUM CHLORIDE 75 ML/HR: 450; 150 INJECTION, SOLUTION INTRAVENOUS at 16:44

## 2024-06-14 RX ADMIN — HYDROMORPHONE HYDROCHLORIDE 0.5 MG: 1 INJECTION, SOLUTION INTRAMUSCULAR; INTRAVENOUS; SUBCUTANEOUS at 08:12

## 2024-06-14 RX ADMIN — LIDOCAINE HYDROCHLORIDE 100 MG: 10 INJECTION, SOLUTION EPIDURAL; INFILTRATION; INTRACAUDAL; PERINEURAL at 13:37

## 2024-06-14 RX ADMIN — ARIPIPRAZOLE 5 MG: 10 TABLET ORAL at 16:36

## 2024-06-14 RX ADMIN — LAMOTRIGINE 150 MG: 100 TABLET ORAL at 16:37

## 2024-06-14 RX ADMIN — Medication 1 TABLET: at 16:35

## 2024-06-14 RX ADMIN — Medication 5000 UNITS: at 16:36

## 2024-06-14 RX ADMIN — MONTELUKAST 10 MG: 10 TABLET, FILM COATED ORAL at 21:29

## 2024-06-14 RX ADMIN — CYANOCOBALAMIN TAB 1000 MCG 500 MCG: 1000 TAB at 16:35

## 2024-06-14 RX ADMIN — PROPOFOL 100 MG: 10 INJECTION, EMULSION INTRAVENOUS at 13:39

## 2024-06-14 RX ADMIN — SODIUM CHLORIDE 150 ML/HR: 9 INJECTION, SOLUTION INTRAVENOUS at 02:33

## 2024-06-14 NOTE — CONSULTS
[facility] Medicine Services  CONSULT NOTE      Patient Name: Theresa Maya  : 1990  MRN: [id number]    Primary Care Physician: Gina Green APRN  Provider requesting consultation: No ref. provider found    Subjective   Subjective     Reason for Consultation:  Medical management    HPI:  Theresa Maya is a 33 y.o. female PMH Hx DVT right leg () with MTHFR gene mutation, anxiety, asthma, anemia, LBP, bipolar disorder, s/p lap sleeve gastrectomy presenting with gastrojejunal anastomotic stricture and symptomatic superficial thrombus of the LUE. Pt is now s/p EGD with dilatation to 14 mm and food disimpaction . Pt recently finished prophylaxis for DVT with eliquis on 2024. Pt states she noticed symptoms on  where the IV access was placed that included swelling and pain. Because the patient is symptomatic with the superficial blood clot and has history of MTHFR gene mutation, will start Eliquis 10 mg BID x7 days, followed by Eliquis 5 mg BID for 1 month. Recommend repeating Venous Duplex in 1 month to reassess. Will apply warm compresses for comfort.       Review of Systems   Constitutional:  Positive for fatigue.   HENT: Negative.     Eyes: Negative.    Respiratory: Negative.     Cardiovascular: Negative.    Gastrointestinal: Negative.    Endocrine: Negative.    Genitourinary: Negative.    Musculoskeletal:         Swelling and pain of the left arm.    Skin: Negative.    Allergic/Immunologic: Negative.    Neurological: Negative.    Hematological: Negative.    Psychiatric/Behavioral: Negative.       )    Otherwise complete ROS reviewed and is negative except as mentioned in the HPI.    Past Medical History:   Diagnosis Date    Abnormal Pap smear of cervix     ADHD (attention deficit hyperactivity disorder)     Working with a therapy at     Allergic     Take montalukast    Anesthesia complication     SLOW TO WAKE UP AFTER GASTRO AND  HYSTERECTOMY    Anxiety     Arthritis     Clotting disorder     Deep vein thrombosis     2014, (R) leg while pregnant, dx w/ MTHFR    Depression     Dermatitis     UNDER BREAST AT TIMES    Dyspepsia     Dyspnea on exertion     Elevated hemoglobin A1c     Fatigue     Gallbladder abscess     s/p lap nicolas 2006    GERD (gastroesophageal reflux disease)     UGI 9/23 - small recurrent HH, EGD Dr. Ricardo  11/23 small recurrent HH    Heartburn     chronic/episodic, depends on what she eats, takes Pepcid prn, EGD Dr. Ricardo 11/21    Hyperemesis gravidarum     Hyperlipidemia     Incomplete right bundle branch block     Iron deficiency anemia     Migraines     Miscarriage     x 4 prior to 1st pregnancy, presumably from undiagnosed MTHFR mutation    Morbid obesity with body mass index (BMI) of 40.0 to 44.9 in adult 04/19/2022    MTHFR deficiency complicating pregnancy     says clotting d/o only an issue when pregnant, advised to use heparin shots during pregnancy    Obesity     Ovarian cyst     Recurrent pregnancy loss, antepartum condition or complication     had a [facility] and lost the pregnancy at 6 months    Strep pharyngitis     Urinary tract infection     Wears glasses        Past Surgical History:   Procedure Laterality Date    ENDOSCOPY N/A 05/05/2022    Procedure: ESOPHAGOGASTRODUODENOSCOPY;  Surgeon: Cristiano Ricardo MD;  Location:  [facility];  Service: Bariatric;  Laterality: N/A;    ENDOSCOPY N/A 5/24/2024    Procedure: ESOPHAGOGASTRODUODENOSCOPY;  Surgeon: Cristiano Ricardo MD;  Location:  [facility];  Service: Robotics - DaVinci;  Laterality: N/A;  EGD # 752 SCOPE USED    ENDOSCOPY N/A 6/10/2024    Procedure: ESOPHAGOGASTRODUODENOSCOPY WITH DILATATION;  Surgeon: Cristiano Ricardo MD;  Location:  [facility];  Service: General;  Laterality: N/A;  BALLOON DILATION UP TO 14MM    GASTRECTOMY N/A 05/05/2022    Procedure: GASTRECTOMY LAPAROSCOPIC;  Surgeon: Cristiano Ricardo MD;  Location:  [facility];  Service:  "Bariatric;  Laterality: N/A;    GASTRIC BYPASS N/A 5/24/2024    Procedure: GASTRIC BYPASS LAPAROSCOPIC WITH DAVINCI ROBOT;  Surgeon: Cristiano Ricardo MD;  Location:  [facility];  Service: Robotics - DaVinci;  Laterality: N/A;    HIATAL HERNIA REPAIR N/A 05/05/2022    Procedure: HIATAL HERNIA REPAIR LAPAROSCOPIC;  Surgeon: Cristiano Ricardo MD;  Location:  [facility];  Service: Bariatric;  Laterality: N/A;    HIATAL HERNIA REPAIR N/A 5/24/2024    Procedure: RECURRENT HIATAL HERNIA REPAIR LAPAROSCOPIC WITH DAVINCI ROBOT;  Surgeon: Cristiano Ricardo MD;  Location:  [facility];  Service: Robotics - DaVinci;  Laterality: N/A;    HYSTERECTOMY  01/23/2024    [facility] Dr. Elizabeth- TOTAL    LAPAROSCOPIC CHOLECYSTECTOMY  2006    no stones, was told she had \"three gallbladders\"- all removed    SINUS SURGERY Bilateral 2006    TUBAL COAGULATION LAPAROSCOPIC Bilateral 09/15/2017    Procedure: BILATERAL TUBAL FALLOPE FILSHIE CLIPPING LAPAROSCOPIC;  Surgeon: Leo Posey III, MD;  Location:  [facility];  Service:     VAGINAL DELIVERY  14; 16; 17    female,male, male.  She said she had subcutaneous Lovenox injections throughout the second and third pregnancies until delivery    WISDOM TOOTH EXTRACTION         Family History: family history includes Alcohol abuse in her father; Asthma in her maternal grandmother, mother, and sister; Breast cancer (age of onset: 54) in her maternal grandmother; COPD in her mother; Cancer in her maternal grandmother, paternal grandfather, and paternal grandmother; Diabetes in her maternal grandmother; Heart attack in her mother; Hypertension in her maternal grandfather, maternal grandmother, and mother; Lung cancer in her maternal grandmother and paternal grandmother; Lupus in her maternal grandmother and sister; Mental illness in her mother; Migraines in her mother; Miscarriages / Stillbirths in her maternal aunt; Obesity in her mother and paternal grandmother; Ovarian cancer in her sister; Prostate " cancer in her paternal grandfather; Stroke in her maternal grandmother; Thyroid disease in her maternal grandmother and mother. Otherwise pertinent FHx was reviewed and unremarkable.     Social History:  reports that she has never smoked. She has never used smokeless tobacco. She reports that she does not currently use alcohol. She reports that she does not use drugs.    Medications:  Medications Prior to Admission   Medication Sig Dispense Refill Last Dose    ARIPiprazole (ABILIFY) 5 MG tablet Take 1 tablet by mouth Daily. 30 tablet 1 6/12/2024    ferrous sulfate 325 (65 FE) MG tablet Take 1 tablet by mouth Daily.   6/12/2024    Elidel 1 % cream Apply  topically to the appropriate area as directed 2 (Two) Times a Day. 100 g 2 6/11/2024    esomeprazole (nexIUM) 40 MG capsule Take 1 capsule by mouth 2 (Two) Times a Day. 60 capsule 0 6/12/2024    lamoTRIgine (LaMICtal) 150 MG tablet Take 1 tablet by mouth Daily. 30 tablet 1 6/12/2024    montelukast (Singulair) 10 MG tablet Take 1 tablet by mouth Every Night. 30 tablet 3 6/12/2024    multivitamin with minerals tablet tablet Take 1 tablet by mouth Daily.   6/12/2024    prochlorperazine (COMPAZINE) 10 MG tablet Take 1 tablet by mouth Every 6 (Six) Hours As Needed for Nausea or Vomiting. 30 tablet 1 6/11/2024    Saw Palmetto 80 MG capsule Take 1 capsule by mouth Daily.   6/11/2024    sucralfate (CARAFATE) 1 g tablet Take 1 tablet by mouth 4 (Four) Times a Day Before Meals & at Bedtime. Crush tablets and mix with 10-15mL water and take it as a slurry 40 tablet 0 6/12/2024    triamcinolone (KENALOG) 0.1 % ointment Apply  topically to the appropriate area as directed 2 (Two) Times a Day. 453.6 g 1 More than a month    vitamin B-12 (CYANOCOBALAMIN) 500 MCG tablet Take 1 tablet by mouth Daily.   6/11/2024    vitamin D3 125 MCG (5000 UT) capsule capsule Take 1 capsule by mouth Daily. AND K 12   6/11/2024       Scheduled Meds:apixaban, 10 mg, Oral, Q12H  [START ON 6/21/2024]  apixaban, 5 mg, Oral, Q12H  ARIPiprazole, 5 mg, Oral, Daily  ferrous sulfate, 325 mg, Oral, Daily  lamoTRIgine, 150 mg, Oral, Daily  montelukast, 10 mg, Oral, Nightly  multivitamin with minerals, 1 tablet, Oral, Daily  pantoprazole, 40 mg, Intravenous, Q AM  vitamin B-12, 500 mcg, Oral, Daily  vitamin D3, 5,000 Units, Oral, Daily      Continuous Infusions:sodium chloride 0.45 % with KCl 20 mEq, 75 mL/hr      PRN Meds:.  HYDROmorphone    ondansetron    promethazine    Allergies   Allergen Reactions    Amoxicillin Diarrhea and GI Intolerance    Doxycycline Other (See Comments)     Vaginal swelling     Latex Hives and Itching    Penicillins Rash     Says Keflex OK as long as she takes w/ food. Has tolerated cefazolin, ceftriaxone    Beta lactam allergy details  Antibiotic reaction: rash, hives, other (ITCHING)  Age at reaction: adult (2017)  Dose to reaction time: (!) hours  Reason for antibiotic: sore throat (STREP)  Epinephrine required for reaction?: no  Tolerated antibiotics: cephalexin (KEFLEX), Cefazolin, ceftriaxone           Objective   Objective     Vital Signs:   Temp:  [97 °F (36.1 °C)-99.5 °F (37.5 °C)] 97.8 °F (36.6 °C)  Heart Rate:  [59-92] 79  Resp:  [16-20] 17  BP: ()/() 153/87     Physical Exam  Constitutional:       General: She is not in acute distress.  HENT:      Head: Normocephalic and atraumatic.      Mouth/Throat:      Mouth: Mucous membranes are moist.   Eyes:      Conjunctiva/sclera: Conjunctivae normal.   Cardiovascular:      Rate and Rhythm: Normal rate and regular rhythm.      Heart sounds: No murmur heard.     No friction rub. No gallop.   Pulmonary:      Effort: Pulmonary effort is normal.      Breath sounds: Normal breath sounds.   Abdominal:      General: Bowel sounds are normal. There is no distension.      Tenderness: There is no abdominal tenderness.   Musculoskeletal:      Left upper arm: Swelling and tenderness present.      Cervical back: Neck supple.      Right lower  "leg: No edema.      Left lower leg: No edema.   Skin:     General: Skin is warm and dry.   Neurological:      General: No focal deficit present.      Mental Status: She is alert and oriented to person, place, and time.   Psychiatric:         Mood and Affect: Mood normal.         Behavior: Behavior normal.           Results Reviewed:  I have personally reviewed current lab, radiology, and data and agree.    Results from last 7 days   Lab Units 06/13/24  2323   WBC 10*3/mm3 5.55   HEMOGLOBIN g/dL 11.9*   HEMATOCRIT % 38.4   PLATELETS 10*3/mm3 290     Results from last 7 days   Lab Units 06/13/24  2323   SODIUM mmol/L 143   POTASSIUM mmol/L 3.6   CHLORIDE mmol/L 104   CO2 mmol/L 27.0   BUN mg/dL 8   CREATININE mg/dL 0.88   GLUCOSE mg/dL 85   CALCIUM mg/dL 9.8   ALK PHOS U/L 82   ALT (SGPT) U/L 11   AST (SGOT) U/L 20     Estimated Creatinine Clearance: 95.5 mL/min (by C-G formula based on SCr of 0.88 mg/dL).  Brief Urine Lab Results  (Last result in the past 365 days)        Color   Clarity   Blood   Leuk Est   Nitrite   Protein   CREAT   Urine HCG        06/09/24 2147 Yellow   Turbid   Negative   Negative   Negative   Trace                 No results found for: \"BNP\"    Microbiology Results Abnormal       None            Imaging Results (Last 24 Hours)       Procedure Component Value Units Date/Time    CT Abdomen Pelvis With Contrast [[id number]] Collected: 06/14/24 0011     Updated: 06/14/24 0023    Narrative:      CT ABDOMEN PELVIS W CONTRAST    Date of Exam: 6/13/2024 11:42 PM EDT    Indication: Recent GJ dilation with abdominal pain, vomiting.    Comparison: None available.    Technique: Axial CT images were obtained of the abdomen and pelvis following the uneventful intravenous administration of 78 mL Isovue-300. Reconstructed coronal and sagittal images were also obtained. Automated exposure control and iterative   construction methods were used.    Findings:  Lung Bases:     The visualized lung bases and lower " mediastinal structures are unremarkable.    Liver:  There is a stable 1.7 cm low-density lesion at the junction of segment 7 and segment 8, most likely a small hemangioma. There are no solid liver lesions of significance.    Biliary/Gallbladder:    The gallbladder is surgically absent. The biliary tree is nondilated.    Spleen:  Spleen is normal in size and CT density.    Pancreas:    Pancreas is normal. There is no evidence of pancreatic mass or peripancreatic fluid.    Kidneys:    Kidneys are normal in size. There are no stones or hydronephrosis.    Adrenals:    Adrenal glands are unremarkable.    Retroperitoneal/Lymph Nodes/Vasculature:    No retroperitoneal adenopathy is identified.    Gastrointestinal/Mesentery:    There is no evidence of dilated small bowel or colon. There is no abdominal abscess or pathologic fluid collection. There are postsurgical changes related to hiatal hernia repair with gastric bypass procedure. There is inflammatory change surrounding the   residual stomach proximal to the gastrojejunostomy similar to the prior study.    Bladder:    The bladder is normal.    Genital:     Unremarkable          Bony Structures:     Visualized bony structures are consistent with the patient's age.      Impression:      Impression:  Postsurgical changes related to gastric bypass procedure. There is inflammatory change surrounding the residual stomach proximal to the gastrojejunostomy similar to the prior study. There is no evidence of bowel obstruction.        Electronically Signed: Fortino Turner MD    6/14/2024 12:19 AM EDT    Workstation ID: [id number]          Results for orders placed during the hospital encounter of 02/10/17    Adult Transthoracic Echo Complete    Interpretation Summary  · Normal left ventricular cavity size and wall thickness noted. All left ventricular wall segments contract normally.  · Estimated EF appears to be in the range of 61 - 65%.  · Left ventricular diastolic dysfunction is  noted (grade I) consistent with impaired relaxation.  · The aortic valve is structurally normal. No aortic valve regurgitation is present. No aortic valve stenosis is present.  · The mitral valve is normal in structure. No mitral valve regurgitation is present. No significant mitral valve stenosis is present.  · The tricuspid valve is normal. No tricuspid valve stenosis is present. No tricuspid valve regurgitation is present.  · There is no evidence of pericardial effusion.      Assessment & Plan   Assessment / Plan     Active Hospital Problems    Diagnosis  POA    **Vomiting [R11.10]  Yes    Acute deep vein thrombosis (DVT) of left upper extremity [I82.622]  Unknown    Gastrojejunal anastomotic stricture [K91.89]  Unknown     Hospital Course to date:  Theresa Maya is a 33 y.o. female PMH Hx DVT right leg (2014) with MTHFR gene mutation, anxiety, anemia, asthma, LBP, bipolar disorder, s/p lap sleeve gastrectomy presenting with gastrojejunal anastomotic stricture and symptomatic superficial thrombus of the LUE.       Superficial thrombus of the Left upper extremity  Hx of DVT RLE (2014)  - starting Eliquis 10 mg bid for 7 days, followed by eliquis 5 mg bid for at least 1 month. Recommend Venous Doppler in 1 month to see if the superficial thrombus has resolved.   - Pt high risk for DVT due to MTHFR gene mutation  - warm compresses for comfort    gastrojejunal anastomotic stricture   - s/p EGD with dilatation to 14 mm and food disimpaction 06/14    Obesity/Hiatal Hernia  -s/p lap sleeve gastrectomy and Hiatal hernia repair 2022    Recurrent hiatal hernia and severe GE reflux   - 5/24/24 s/p recurrent hiatal hernia repair with falciform ligament reinforcement.     Bipolar disorder  Anxiety  - abilify, lamictal    Asthma  - singular      Thank you for allowing [facility] to provide consultative care for your patient, we will continue to follow while clinically appropriate.    Electronically  signed by PHYLICIA Higgins, 06/14/24, 3:32 PM EDT.

## 2024-06-14 NOTE — ANESTHESIA PROCEDURE NOTES
Airway  Urgency: elective    Date/Time: 6/14/2024 1:39 PM  Airway not difficult    General Information and Staff    Patient location during procedure: OR  CRNA/CAA: Kayleen Nixon CRNA    Indications and Patient Condition  Indications for airway management: airway protection    Preoxygenated: yes  MILS not maintained throughout  Mask difficulty assessment: 0 - not attempted    Final Airway Details  Final airway type: endotracheal airway      Successful airway: ETT  Cuffed: yes   Successful intubation technique: video laryngoscopy and RSI  Facilitating devices/methods: intubating stylet  Endotracheal tube insertion site: oral  Blade: Gregg  Blade size: 3  ETT size (mm): 7.0  Cormack-Lehane Classification: grade I - full view of glottis  Placement verified by: chest auscultation and capnometry   Measured from: lips  ETT/EBT  to lips (cm): 21  Number of attempts at approach: 1  Assessment: lips, teeth, and gum same as pre-op and atraumatic intubation    Additional Comments  Negative epigastric sounds, Breath sound equal bilaterally with symmetric chest rise and fall    Elective use of gregg

## 2024-06-14 NOTE — ANESTHESIA POSTPROCEDURE EVALUATION
Patient: Theresa Maya    Procedure Summary       Date: 06/14/24 Room / Location:  [facility]    Anesthesia Start: 1334 Anesthesia Stop: 1417    Procedure: ESOPHAGOGASTRODUODENOSCOPY WITH DILATATION Diagnosis:       Gastrojejunal anastomotic stricture      (Gastrojejunal anastomotic stricture [K91.89])    Surgeons: Cristiano Ricardo MD Provider: Joe Mcallister MD    Anesthesia Type: general, MAC ASA Status: 2            Anesthesia Type: general, MAC    Vitals  Vitals Value Taken Time   /82 06/14/24 1413   Temp     Pulse 92 06/14/24 1416   Resp     SpO2 96 % 06/14/24 1415   Vitals shown include unfiled device data.        Post Anesthesia Care and Evaluation    Patient location during evaluation: PACU  Patient participation: complete - patient participated  Level of consciousness: awake and alert  Pain management: adequate    Airway patency: patent  Anesthetic complications: No anesthetic complications  PONV Status: none  Cardiovascular status: hemodynamically stable and acceptable  Respiratory status: nonlabored ventilation, acceptable and nasal cannula  Hydration status: acceptable

## 2024-06-14 NOTE — PLAN OF CARE
Goal Outcome Evaluation:Has had EGD and has required no pain medication since her return.  Tolerating PO liquid diet this evening. Heating pad applied to left arm and she is instructed in its' use.

## 2024-06-14 NOTE — H&P
Cc:  N/V    HPI: 33-year-old female status post revision of her previous sleeve gastrectomy to Teresa-en-Y gastric bypass with recurrent hiatal hernia repair on 5/24/2024 (3 weeks ago, postoperative day #21) for a recurrent hiatal hernia and severe reflux not well-controlled with maximal medical therapy status post laparoscopic sleeve gastrectomy and hiatal hernia repair in May 2022.  She was admitted last week with nausea vomiting dysphagia due to a gastrojejunostomy stricture which I balloon dilated to 13.5 mm.  Some superficial ulcerations noted around some visible nonobstructing suture.  She did well and was discharged home the following day.  She said she did well for about 36 to 48 hours and then had some puréed chicken and has had nausea vomiting and substernal and epigastric pain since and came back to the emergency room with inability to tolerate p.o. and was readmitted for repeat EGD with dilatation.  Her  is in the room.  Her last bowel movement was 3 days ago.    Past history:   history of DVT right lower extremity 2014, MTHFR mutation, dyspnea exertion, elevated hemoglobin A1c, fatigue, hyperlipidemia, incomplete right bundle branch block, iron deficiency anemia, migraine headaches, ovarian cyst, history of UTIs, hysterectomy January 2024, lap nicolas 2006, sinus surgery, tubal ligation, allergies to amoxicillin, doxycycline, latex, and penicillins (Keflex okay if she takes with food), status post laparoscopic sleeve gastrectomy and hiatal hernia repair May 2022, status post laparoscopic revision of sleeve gastrectomy to Teresa-en-Y gastric bypass and recurrent hiatal hernia repair May 2024.     ROS: As above no fever or chills.  Complains of some dysuria.  No pulmonary complaints.  No melena or hematemesis.     SH: .  Lives in [place].  No nicotine use.  Rare EtOH.    PE: She is pleasant, conversant in no apparent distress.  Temperature 97.6 pulse 59 respiration 17 blood pressure 97/59  saturation 96%. UO NR.  Abdomen soft, nontender, nondistended, bowel sounds active, wounds healed.    CMP normal.  Lipase 96.  Lactate 1.0.  White count 5.6 with a normal differential.  H&H 11.9 and 30.4 with microcytic indices.  Prealbumin on 611 2418.5.    Impression: 3-week status post revision sleeve gastrectomy to Teresa-en-Y gastric bypass with recurrent hiatal hernia repair.  Good response to EGD with dilatation for gastrojejunostomy stricture 4 days ago now readmitted with recurrent symptoms.    Plan: Admission, intravenous fluids, IV PPI, pain and nausea medication as needed.  Risks, benefits, and alternative therapies were discussed and she wishes to proceed with repeat EGD with dilatation once again with the understanding this could lead to perforation possibly requiring surgical intervention.  See orders

## 2024-06-14 NOTE — ED NOTES
Theresa Maya    Nursing Report ED to Floor:  Mental status: A&Ox4  Ambulatory status: IND  Oxygen Therapy:  RA  Cardiac Rhythm: NS  Admitted from: Home  Safety Concerns:  n/a  Social Issues: n/a  ED Room #:  20    ED Nurse Phone Extension - [phone] or may call [phone].      HPI:   Chief Complaint   Patient presents with    Difficulty Swallowing       Past Medical History:  Past Medical History:   Diagnosis Date    Abnormal Pap smear of cervix 2019    ADHD (attention deficit hyperactivity disorder) 2022    Working with a therapy at     Allergic 2011    Take montalukast    Anesthesia complication     SLOW TO WAKE UP AFTER GASTRO AND HYSTERECTOMY    Anxiety     Arthritis     Clotting disorder     Deep vein thrombosis     2014, (R) leg while pregnant, dx w/ MTHFR    Depression     Dermatitis     UNDER BREAST AT TIMES    Dyspepsia     Dyspnea on exertion     Elevated hemoglobin A1c     Fatigue     Gallbladder abscess     s/p lap nicolas 2006    GERD (gastroesophageal reflux disease)     UGI 9/23 - small recurrent HH, EGD Dr. Ricardo  11/23 small recurrent HH    Heartburn     chronic/episodic, depends on what she eats, takes Pepcid prn, EGD Dr. Ricardo 11/21    Hyperemesis gravidarum     Hyperlipidemia     Incomplete right bundle branch block     Iron deficiency anemia     Migraines     Miscarriage     x 4 prior to 1st pregnancy, presumably from undiagnosed MTHFR mutation    Morbid obesity with body mass index (BMI) of 40.0 to 44.9 in adult 04/19/2022    MTHFR deficiency complicating pregnancy     says clotting d/o only an issue when pregnant, advised to use heparin shots during pregnancy    Obesity     Ovarian cyst     Recurrent pregnancy loss, antepartum condition or complication     had a [facility] and lost the pregnancy at 6 months    Strep pharyngitis     Urinary tract infection     Wears glasses         Past Surgical History:  Past Surgical History:   Procedure Laterality Date    ENDOSCOPY N/A 05/05/2022    Procedure:  "ESOPHAGOGASTRODUODENOSCOPY;  Surgeon: Cristiano Ricardo MD;  Location:  [facility];  Service: Bariatric;  Laterality: N/A;    ENDOSCOPY N/A 5/24/2024    Procedure: ESOPHAGOGASTRODUODENOSCOPY;  Surgeon: Cristiano Ricardo MD;  Location:  [facility];  Service: Robotics - DaVinci;  Laterality: N/A;  EGD # 752 SCOPE USED    ENDOSCOPY N/A 6/10/2024    Procedure: ESOPHAGOGASTRODUODENOSCOPY WITH DILATATION;  Surgeon: Cristiano Ricardo MD;  Location:  [facility];  Service: General;  Laterality: N/A;  BALLOON DILATION UP TO 14MM    GASTRECTOMY N/A 05/05/2022    Procedure: GASTRECTOMY LAPAROSCOPIC;  Surgeon: Cristiano Ricardo MD;  Location:  [facility];  Service: Bariatric;  Laterality: N/A;    GASTRIC BYPASS N/A 5/24/2024    Procedure: GASTRIC BYPASS LAPAROSCOPIC WITH DAVINCI ROBOT;  Surgeon: Cristiano Ricardo MD;  Location:  [facility];  Service: Robotics - DaVinci;  Laterality: N/A;    HIATAL HERNIA REPAIR N/A 05/05/2022    Procedure: HIATAL HERNIA REPAIR LAPAROSCOPIC;  Surgeon: Cristiano Ricardo MD;  Location:  [facility];  Service: Bariatric;  Laterality: N/A;    HIATAL HERNIA REPAIR N/A 5/24/2024    Procedure: RECURRENT HIATAL HERNIA REPAIR LAPAROSCOPIC WITH DAVINCI ROBOT;  Surgeon: Cristiano Ricardo MD;  Location:  [facility];  Service: Robotics - DaVinci;  Laterality: N/A;    HYSTERECTOMY  01/23/2024    [facility] Dr[facility]- TOTAL    LAPAROSCOPIC CHOLECYSTECTOMY  2006    no stones, was told she had \"three gallbladders\"- all removed    SINUS SURGERY Bilateral 2006    TUBAL COAGULATION LAPAROSCOPIC Bilateral 09/15/2017    Procedure: BILATERAL TUBAL FALLOPE FILSHIE CLIPPING LAPAROSCOPIC;  Surgeon: Leo Posey III, MD;  Location:  [facility];  Service:     VAGINAL DELIVERY  14; 16; 17    female,male, male.  She said she had subcutaneous Lovenox injections throughout the second and third pregnancies until delivery    WISDOM TOOTH EXTRACTION          Admitting Doctor:   No admitting provider for patient " encounter.    Consulting Provider(s):  Consults       Date and Time Order Name Status Description    5/28/2024  5:21 PM Inpatient Hospitalist Consult Completed              Admitting Diagnosis:   The primary encounter diagnosis was Nausea and vomiting, unspecified vomiting type. A diagnosis of Anastomotic stricture of small intestine was also pertinent to this visit.    Most Recent Vitals:   Vitals:    06/14/24 0242 06/14/24 0247 06/14/24 0252 06/14/24 0257   BP:       BP Location:       Patient Position:       Pulse: 65 62 66 69   Resp:       Temp:       TempSrc:       SpO2: 97% 98% 97% 99%   Weight:       Height:           Active LDAs/IV Access:   Lines, Drains & Airways       Active LDAs       Name Placement date Placement time Site Days    Peripheral IV 06/13/24 2322 Anterior;Right Forearm 06/13/24 2322  Forearm  less than 1                    Labs (abnormal labs have a star):   Labs Reviewed   LIPASE - Abnormal; Notable for the following components:       Result Value    Lipase 96 (*)     All other components within normal limits   CBC WITH AUTO DIFFERENTIAL - Abnormal; Notable for the following components:    Hemoglobin 11.9 (*)     MCH 26.5 (*)     MCHC 31.0 (*)     RDW 16.0 (*)     All other components within normal limits   LACTIC ACID, PLASMA - Normal   COMPREHENSIVE METABOLIC PANEL    Narrative:     GFR Normal >60  Chronic Kidney Disease <60  Kidney Failure <15     CBC AND DIFFERENTIAL    Narrative:     The following orders were created for panel order CBC & Differential.  Procedure                               Abnormality         Status                     ---------                               -----------         ------                     CBC Auto Differential[[id number]]        Abnormal            Final result                 Please view results for these tests on the individual orders.       Meds Given in ED:   Medications   sodium chloride 0.9 % infusion (150 mL/hr Intravenous New Bag 6/14/24  0233)   lactated ringers bolus 1,000 mL (0 mL Intravenous Stopped 6/14/24 0030)   ondansetron (ZOFRAN) injection 4 mg (4 mg Intravenous Given 6/13/24 2334)   aluminum-magnesium hydroxide-simethicone (MAALOX MAX) 400-400-40 MG/5ML suspension 15 mL (15 mL Oral Given 6/13/24 2334)   iopamidol (ISOVUE-300) 61 % injection 78 mL (78 mL Intravenous Given 6/13/24 2344)   pantoprazole (PROTONIX) injection 80 mg (80 mg Intravenous Given 6/14/24 0141)     sodium chloride, 150 mL/hr, Last Rate: 150 mL/hr (06/14/24 0233)         Last NIH score:                                                          Dysphagia screening results:        Jane Lew Coma Scale:  No data recorded     CIWA:        Restraint Type:            Isolation Status:  No active isolations

## 2024-06-14 NOTE — CASE MANAGEMENT/SOCIAL WORK
Discharge Planning Assessment  [facility]     Patient Name: Theresa Maya  MRN: [id number]  Today's Date: 6/14/2024    Admit Date: 6/14/2024    Plan: Home   Discharge Needs Assessment       Row Name 06/14/24 1102       Living Environment    People in Home spouse;child(maninder), dependent    Current Living Arrangements other (see comments)    Potentially Unsafe Housing Conditions none    Primary Care Provided by self    Provides Primary Care For child(maninder)    Family Caregiver if Needed spouse    Quality of Family Relationships unable to assess    Able to Return to Prior Arrangements yes       Resource/Environmental Concerns    Resource/Environmental Concerns none       Transition Planning    Patient/Family Anticipates Transition to home with family    Patient/Family Anticipated Services at Transition     Transportation Anticipated family or friend will provide       Discharge Needs Assessment    Readmission Within the Last 30 Days no previous admission in last 30 days    Equipment Currently Used at Home none    Concerns to be Addressed discharge planning    Anticipated Changes Related to Illness none    Equipment Needed After Discharge none                   Discharge Plan       Row Name 06/14/24 1103       Plan    Plan Home    Patient/Family in Agreement with Plan yes    Plan Comments Spoke with patient in room to initiate discharge planning.  She lives with her  and children in [facility].  She is independent with ADL's.  She has no DME and is not current with home health.  Her PCP is Gina Green.  She does not have an advanced directive.  Verified that she has [employer].  Ms. Maya has RX coverage and has her scripts filled at [facility].  Her plan is to return home at discharge.  No needs noted at this time.CM will continue to follow.    Final Discharge Disposition Code 01 - home or self-care                  Continued Care and Services - Admitted Since 6/14/2024    No active  coordination exists for this encounter.       Expected Discharge Date and Time       Expected Discharge Date Expected Discharge Time    Jun 16, 2024            Demographic Summary       Row Name 06/14/24 1102       General Information    Admission Type inpatient    Arrived From emergency department    Referral Source admission list    Reason for Consult discharge planning    Preferred Language English                   Functional Status       Row Name 06/14/24 1102       Functional Status    Usual Activity Tolerance good    Current Activity Tolerance good       Functional Status, IADL    Medications independent    Meal Preparation independent    Housekeeping independent    Laundry independent    Shopping independent                   Psychosocial    No documentation.                  Abuse/Neglect    No documentation.                  Legal    No documentation.                  Substance Abuse    No documentation.                  Patient Forms    No documentation.                     Kendy Funez RN

## 2024-06-14 NOTE — OP NOTE
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  Gastrojejunostomy stricture improved, only mildly strictured, some associated superficial ulceration, balloon dilated to 14 mm.    Indications:   This is a 33-year-old female known to me 3-week status post revision of her previous sleeve gastrectomy to Teresa-en-Y gastric bypass recurrent hiatal hernia repair for recurrent hiatal hernia severe reflux not controlled with maximal medical therapy after sleeve gastrectomy and hiatal hernia repair in May 2022.  She presented recently with a gastrojejunostomy stricture and several days underwent EGD with balloon dilatation to 13-1/2 mm.  The stricture was fairly tight.  She felt better the next day was tolerating liquids and went home.  She said she tried puréed chicken a couple of days ago and since then has had nausea vomiting dysphagia and epigastric pain and came to the ER and was readmitted.  Please see my admission H&P.  Risks, benefits and alternative therapies were discussed and the patient wishes to proceed with repeat EGD with dilatation.    Operative Technique:  The patient was brought to the endoscopy suite and placed supine upon the stretcher. A bite block was placed.  The patient was sedated by the anesthesiology staff and the standard flexible endoscope was advanced under direct visualization into the posterior pharynx, and the esophagus intubated.  Solid food material noted throughout the esophagus and the endoscope was withdrawn.  The patient then underwent uneventful rapid sequence general endotracheal  esthesia with anesthesiology staff, the bite-block was replaced, and the endoscope readvanced.  I was able to get past the food into the gastric pouch to the gastrojejunostomy.  It appeared less strictured than prior.  Several photos obtained throughout the procedure.  The endoscope advanced without resistance into the Teresa limb.  Once again it was more of a straight shot to the blind pouch than to the Teresa limb itself.  No visible suture material.  There was some superficial ulceration noted.  I passed the 12 to 15 mm balloon dilator across the anastomosis and began sequentially dilating it.  Previously I cannot get the balloon beyond about 5 which I felt equated to around 13-1/2 mm without undue resistance.  This time I was able to get it to 7 before reaching similar resistance, I felt it would be too unsafe to get it to the full 8 which would equate to 15 mm.  I left it at the 7 level for a full minute, the balloon was desufflated and removed.  Minimal balloon trauma noted.  Photos obtained after dilatation as well.  I then spent some time with multiple passes with the basket retrieving foul-smelling retained food impaction until essentially cleared.  The endoscope was withdrawn a final time.      The patient tolerated procedure well without complication was taken the recovery in stable condition.          The patient tolerated the procedure well without complication and was taken to the recovery room in stable condition.

## 2024-06-14 NOTE — ANESTHESIA PREPROCEDURE EVALUATION
Anesthesia Evaluation     Patient summary reviewed and Nursing notes reviewed   NPO Solid Status: > 8 hours  NPO Liquid Status: > 2 hours           Airway   Mallampati: II  TM distance: >3 FB  Neck ROM: full  No difficulty expected  Dental      Pulmonary    (+) ,shortness of breath, recent URI  (-) pneumonia, asthma, sleep apnea, not a smoker  Cardiovascular     ECG reviewed    (+) dysrhythmias, DVT (clotting disorder), hyperlipidemia  (-) past MI, angina, cardiac stents    ROS comment: ECG SR  normal ECHO 2017    Neuro/Psych  (+) headaches, psychiatric history  (-) seizures, CVA  GI/Hepatic/Renal/Endo    (+) obesity, GERD  (-) morbid obesity, no renal disease, diabetes, no thyroid disorder    Musculoskeletal     Abdominal    Substance History      OB/GYN      Comment: Sp BRAYAN       Other   arthritis,     ROS/Med Hx Other: Nauseated after food       Phys Exam Other: Mac 4 G1V   5/24  Bebeto                   Anesthesia Plan    ASA 2     general and MAC     (TOP POM PFL)  intravenous induction     Anesthetic plan, risks, benefits, and alternatives have been provided, discussed and informed consent has been obtained with: patient.    Plan discussed with CRNA.        CODE STATUS:    Level Of Support Discussed With: Patient  Code Status (Patient has no pulse and is not breathing): CPR (Attempt to Resuscitate)  Medical Interventions (Patient has pulse or is breathing): Full Support       Problem: POSTPARTUM  Goal: Long Term Goal:Experiences normal postpartum course  Description: INTERVENTIONS:  - Assess and monitor vital signs and lab values. - Assess fundus and lochia. - Provide ice/sitz baths for perineum discomfort. - Monitor healing of incision/episiotomy/laceration, and assess for signs and symptoms of infection and hematoma. - Assess bladder function and monitor for bladder distention.  - Provide/instruct/assist with pericare as needed. - Provide VTE prophylaxis as needed. - Monitor bowel function.  - Encourage ambulation and provide assistance as needed. - Assess and monitor emotional status and provide social service/psych resources as needed. - Utilize standard precautions and use personal protective equipment as indicated. Ensure aseptic care of all intravenous lines and invasive tubes/drains.  - Obtain immunization and exposure to communicable diseases history. 8/8/2023 0911 by Fredi Lea RN  Outcome: Progressing  8/8/2023 0810 by Fredi Lea RN  Outcome: Progressing  Goal: Optimize infant feeding at the breast  Description: INTERVENTIONS:  - Initiate breast feeding within first hour after birth. - Monitor effectiveness of current breast feeding efforts. - Assess support systems available to mother/family.  - Identify cultural beliefs/practices regarding lactation, letdown techniques, maternal food preferences. - Assess mother's knowledge and previous experience with breast feeding.  - Provide information as needed about early infant feeding cues (e.g., rooting, lip smacking, sucking fingers/hand) versus late cue of crying.  - Discuss/demonstrate breast feeding aids (e.g., infant sling, nursing footstool/pillows, and breast pumps). - Encourage mother/other family members to express feelings/concerns, and actively listen. - Educate father/SO about benefits of breast feeding and how to manage common lactation challenges.   - Recommend avoidance of specific medications or substances incompatible with breast feeding.  - Assess and monitor for signs of nipple pain/trauma. - Instruct and provide assistance with proper latch. - Review techniques for milk expression (breast pumping) and storage of breast milk. Provide pumping equipment/supplies, instructions and assistance, as needed. - Encourage rooming-in and breast feeding on demand.  - Encourage skin-to-skin contact. - Provide LC support as needed. - Assess for and manage engorgement. - Provide breast feeding education handouts and information on community breast feeding support. 2023 by Iván Laws RN  Outcome: Progressing  2023 by Iván Laws RN  Outcome: Progressing  Goal: Establishment of adequate milk supply with medication/procedure interruptions  Description: INTERVENTIONS:  - Review techniques for milk expression (breast pumping). - Provide pumping equipment/supplies, instructions, and assistance until it is safe to breastfeed infant. 2023 by Iván Laws RN  Outcome: Progressing  2023 by Iván Laws RN  Outcome: Progressing  Goal: Appropriate maternal -  bonding  Description: INTERVENTIONS:  - Assess caregiver- interactions. - Assess caregiver's emotional status and coping mechanisms. - Encourage caregiver to participate in  daily care. - Assess support systems available to mother/family.  - Provide /case management support as needed.   2023 by Iván Laws RN  Outcome: Progressing  2023 by Iván Laws RN  Outcome: Progressing

## 2024-06-14 NOTE — ED PROVIDER NOTES
Subjective   History of Present Illness  Patient presents for evaluation of epigastric abdominal pain radiating into the chest described as a burning sensation with associated nausea and vomiting that started today.  Patient has a history of bariatric surgery from Dr. Ricardo, developed an anastomotic stricture and recently underwent a balloon dilation at the GJ junction at the site of that stricture.  Patient left the hospital 2 to 3 days ago was initially doing well until she had recurrence of similar symptoms she was experiencing from the stricture today and so he presents to the ER for further evaluation.  She does not believe there was any blood in her vomit        Review of Systems    Past Medical History:   Diagnosis Date    Abnormal Pap smear of cervix 2019    ADHD (attention deficit hyperactivity disorder) 2022    Working with a therapy at     Allergic 2011    Take montalukast    Anesthesia complication     SLOW TO WAKE UP AFTER GASTRO AND HYSTERECTOMY    Anxiety     Arthritis     Clotting disorder     Deep vein thrombosis     2014, (R) leg while pregnant, dx w/ MTHFR    Depression     Dermatitis     UNDER BREAST AT TIMES    Dyspepsia     Dyspnea on exertion     Elevated hemoglobin A1c     Fatigue     Gallbladder abscess     s/p lap nicolas 2006    GERD (gastroesophageal reflux disease)     UGI 9/23 - small recurrent HH, EGD Dr. Ricardo  11/23 small recurrent HH    Heartburn     chronic/episodic, depends on what she eats, takes Pepcid prn, EGD Dr. Ricardo 11/21    Hyperemesis gravidarum     Hyperlipidemia     Incomplete right bundle branch block     Iron deficiency anemia     Migraines     Miscarriage     x 4 prior to 1st pregnancy, presumably from undiagnosed MTHFR mutation    Morbid obesity with body mass index (BMI) of 40.0 to 44.9 in adult 04/19/2022    MTHFR deficiency complicating pregnancy     says clotting d/o only an issue when pregnant, advised to use heparin shots during pregnancy    Obesity      Ovarian cyst     Recurrent pregnancy loss, antepartum condition or complication     had a [facility] and lost the pregnancy at 6 months    Strep pharyngitis     Urinary tract infection     Wears glasses        Allergies   Allergen Reactions    Amoxicillin Diarrhea and GI Intolerance    Doxycycline Other (See Comments)     Vaginal swelling     Latex Hives and Itching    Penicillins Rash     Says Keflex OK as long as she takes w/ food. Has tolerated cefazolin, ceftriaxone    Beta lactam allergy details  Antibiotic reaction: rash, hives, other (ITCHING)  Age at reaction: adult (2017)  Dose to reaction time: (!) hours  Reason for antibiotic: sore throat (STREP)  Epinephrine required for reaction?: no  Tolerated antibiotics: cephalexin (KEFLEX), Cefazolin, ceftriaxone           Past Surgical History:   Procedure Laterality Date    ENDOSCOPY N/A 05/05/2022    Procedure: ESOPHAGOGASTRODUODENOSCOPY;  Surgeon: Cristiano Ricardo MD;  Location:  [facility];  Service: Bariatric;  Laterality: N/A;    ENDOSCOPY N/A 5/24/2024    Procedure: ESOPHAGOGASTRODUODENOSCOPY;  Surgeon: Cristiano Ricardo MD;  Location:  [facility];  Service: Robotics - DaVinci;  Laterality: N/A;  EGD # 752 SCOPE USED    ENDOSCOPY N/A 6/10/2024    Procedure: ESOPHAGOGASTRODUODENOSCOPY WITH DILATATION;  Surgeon: Cristiano Ricardo MD;  Location:  [facility];  Service: General;  Laterality: N/A;  BALLOON DILATION UP TO 14MM    GASTRECTOMY N/A 05/05/2022    Procedure: GASTRECTOMY LAPAROSCOPIC;  Surgeon: Cristiano Ricardo MD;  Location:  [facility];  Service: Bariatric;  Laterality: N/A;    GASTRIC BYPASS N/A 5/24/2024    Procedure: GASTRIC BYPASS LAPAROSCOPIC WITH DAVINCI ROBOT;  Surgeon: Cristaino Ricardo MD;  Location:  [facility];  Service: Robotics - DaVinci;  Laterality: N/A;    HIATAL HERNIA REPAIR N/A 05/05/2022    Procedure: HIATAL HERNIA REPAIR LAPAROSCOPIC;  Surgeon: Cristiano Ricardo MD;  Location:  [facility];  Service: Bariatric;  Laterality:  "N/A;    HIATAL HERNIA REPAIR N/A 5/24/2024    Procedure: RECURRENT HIATAL HERNIA REPAIR LAPAROSCOPIC WITH DAVINCI ROBOT;  Surgeon: Cristiano Ricardo MD;  Location:  [facility];  Service: Robotics - DaVinci;  Laterality: N/A;    HYSTERECTOMY  01/23/2024    [facility] Dr. Elizabeth- TOTAL    LAPAROSCOPIC CHOLECYSTECTOMY  2006    no stones, was told she had \"three gallbladders\"- all removed    SINUS SURGERY Bilateral 2006    TUBAL COAGULATION LAPAROSCOPIC Bilateral 09/15/2017    Procedure: BILATERAL TUBAL FALLOPE FILSHIE CLIPPING LAPAROSCOPIC;  Surgeon: Leo Posey III, MD;  Location:  [facility];  Service:     VAGINAL DELIVERY  14; 16; 17    female,male, male.  She said she had subcutaneous Lovenox injections throughout the second and third pregnancies until delivery    WISDOM TOOTH EXTRACTION         Family History   Problem Relation Age of Onset    Asthma Mother         effected after stroke    Thyroid disease Mother     Heart attack Mother     Obesity Mother     Migraines Mother     Hypertension Mother     COPD Mother     Mental illness Mother         depression    Alcohol abuse Father         abused since childhood    Lupus Sister     Ovarian cancer Sister     Asthma Sister     Breast cancer Maternal Grandmother 54    Lung cancer Maternal Grandmother     Asthma Maternal Grandmother     Hypertension Maternal Grandmother     Lupus Maternal Grandmother     Thyroid disease Maternal Grandmother     Diabetes Maternal Grandmother     Stroke Maternal Grandmother     Cancer Maternal Grandmother         lung    Hypertension Maternal Grandfather     Lung cancer Paternal Grandmother     Obesity Paternal Grandmother     Cancer Paternal Grandmother     Prostate cancer Paternal Grandfather     Cancer Paternal Grandfather         lung    Miscarriages / Stillbirths Maternal Aunt        Social History     Socioeconomic History    Marital status:    Tobacco Use    Smoking status: Never    Smokeless tobacco: Never    Tobacco " comments:     disgust me never touched   Vaping Use    Vaping status: Never Used   Substance and Sexual Activity    Alcohol use: Not Currently     Comment: do not drink weekly only on holidays/ birtdays    Drug use: No    Sexual activity: Yes     Partners: Male     Birth control/protection: Surgical     Comment: bilatiral tubal           Objective   Physical Exam  Constitutional:       General: She is not in acute distress.  HENT:      Head: Normocephalic and atraumatic.   Eyes:      Conjunctiva/sclera: Conjunctivae normal.      Pupils: Pupils are equal, round, and reactive to light.   Cardiovascular:      Rate and Rhythm: Normal rate and regular rhythm.      Pulses: Normal pulses.      Heart sounds: No murmur heard.     No gallop.   Pulmonary:      Effort: Pulmonary effort is normal. No respiratory distress.   Abdominal:      General: Abdomen is flat. There is no distension.      Tenderness: There is abdominal tenderness.      Comments: Epigastric tenderness without guarding or rebound   Musculoskeletal:         General: No swelling or deformity. Normal range of motion.   Skin:     General: Skin is warm and dry.      Capillary Refill: Capillary refill takes less than 2 seconds.   Neurological:      General: No focal deficit present.      Mental Status: She is alert and oriented to person, place, and time.   Psychiatric:         Mood and Affect: Mood normal.         Behavior: Behavior normal.         Procedures           ED Course  ED Course as of 06/14/24 0130   Fri Jun 14, 2024   0057 Laboratory workup independently interpreted by myself notable only for an elevated lipase not meeting criteria for acute pancreatitis. [KB]   0057 CT scan of the abdomen pelvis independently interpreted by myself demonstrates postsurgical changes and inflammation that appears similar to prior CT scans [KB]      ED Course User Index  [KB] Wilfredo Garcia MD                                             Medical Decision  Making  Differential diagnosis includes gastritis, gastroesophageal reflux, complications of balloon dilation such as bowel perforation, bleeding from the anastomotic site, infection.  Lab and imaging studies were conducted.  1 L IV fluid bolus and 4 mg IV Zofran and GI cocktail were given.    Reassessment patient has no ongoing nausea or vomiting.  Discussed recommendations from bariatric surgeon as well as imaging and lab studies.  Patient will be admitted for further management    Problems Addressed:  Anastomotic stricture of small intestine: complicated acute illness or injury  Nausea and vomiting, unspecified vomiting type: complicated acute illness or injury    Amount and/or Complexity of Data Reviewed  External Data Reviewed: notes.     Details: 6/11/2024 reviewed most recent discharge summary where patient was treated for GJ stricture  Labs: ordered.  Radiology: ordered.  Discussion of management or test interpretation with external provider(s): I consulted with on-call bariatric surgeon who recommends admission, n.p.o. status, plans for potential repeat endoscopy tomorrow.  Patient was informed of this plan.  They also recommended administration of IV PPI and maintenance IV fluids which were started    Risk  OTC drugs.  Prescription drug management.  Decision regarding hospitalization.        Final diagnoses:   Nausea and vomiting, unspecified vomiting type   Anastomotic stricture of small intestine       ED Disposition  ED Disposition       ED Disposition   Decision to Admit    Condition   --    Comment   --             Recent Results (from the past 24 hour(s))   Comprehensive Metabolic Panel    Collection Time: 06/13/24 11:23 PM    Specimen: Blood   Result Value Ref Range    Glucose 85 65 - 99 mg/dL    BUN 8 6 - 20 mg/dL    Creatinine 0.88 0.57 - 1.00 mg/dL    Sodium 143 136 - 145 mmol/L    Potassium 3.6 3.5 - 5.2 mmol/L    Chloride 104 98 - 107 mmol/L    CO2 27.0 22.0 - 29.0 mmol/L    Calcium 9.8 8.6 -  10.5 mg/dL    Total Protein 7.3 6.0 - 8.5 g/dL    Albumin 4.3 3.5 - 5.2 g/dL    ALT (SGPT) 11 1 - 33 U/L    AST (SGOT) 20 1 - 32 U/L    Alkaline Phosphatase 82 39 - 117 U/L    Total Bilirubin 0.3 0.0 - 1.2 mg/dL    Globulin 3.0 gm/dL    A/G Ratio 1.4 g/dL    BUN/Creatinine Ratio 9.1 7.0 - 25.0    Anion Gap 12.0 5.0 - 15.0 mmol/L    eGFR 89.1 >60.0 mL/min/1.73   Lipase    Collection Time: 06/13/24 11:23 PM    Specimen: Blood   Result Value Ref Range    Lipase 96 (H) 13 - 60 U/L   Lactic Acid, Plasma    Collection Time: 06/13/24 11:23 PM    Specimen: Blood   Result Value Ref Range    Lactate 1.0 0.5 - 2.0 mmol/L   CBC Auto Differential    Collection Time: 06/13/24 11:23 PM    Specimen: Blood   Result Value Ref Range    WBC 5.55 3.40 - 10.80 10*3/mm3    RBC 4.49 3.77 - 5.28 10*6/mm3    Hemoglobin 11.9 (L) 12.0 - 15.9 g/dL    Hematocrit 38.4 34.0 - 46.6 %    MCV 85.5 79.0 - 97.0 fL    MCH 26.5 (L) 26.6 - 33.0 pg    MCHC 31.0 (L) 31.5 - 35.7 g/dL    RDW 16.0 (H) 12.3 - 15.4 %    RDW-SD 49.7 37.0 - 54.0 fl    MPV 11.4 6.0 - 12.0 fL    Platelets 290 140 - 450 10*3/mm3    Neutrophil % 62.9 42.7 - 76.0 %    Lymphocyte % 24.0 19.6 - 45.3 %    Monocyte % 9.5 5.0 - 12.0 %    Eosinophil % 2.3 0.3 - 6.2 %    Basophil % 1.1 0.0 - 1.5 %    Immature Grans % 0.2 0.0 - 0.5 %    Neutrophils, Absolute 3.49 1.70 - 7.00 10*3/mm3    Lymphocytes, Absolute 1.33 0.70 - 3.10 10*3/mm3    Monocytes, Absolute 0.53 0.10 - 0.90 10*3/mm3    Eosinophils, Absolute 0.13 0.00 - 0.40 10*3/mm3    Basophils, Absolute 0.06 0.00 - 0.20 10*3/mm3    Immature Grans, Absolute 0.01 0.00 - 0.05 10*3/mm3    nRBC 0.0 0.0 - 0.2 /100 WBC     Note: In addition to lab results from this visit, the labs listed above may include labs taken at another facility or during a different encounter within the last 24 hours. Please correlate lab times with ED admission and discharge times for further clarification of the services performed during this visit.    CT Abdomen Pelvis  "With Contrast   Final Result   Impression:   Postsurgical changes related to gastric bypass procedure. There is inflammatory change surrounding the residual stomach proximal to the gastrojejunostomy similar to the prior study. There is no evidence of bowel obstruction.            Electronically Signed: Fortino Turner MD     6/14/2024 12:19 AM EDT     Workstation ID: [id number]        Vitals:    06/13/24 2232   BP: 136/96   BP Location: Left arm   Patient Position: Sitting   Pulse: 75   Resp: 18   Temp: 98.7 °F (37.1 °C)   TempSrc: Oral   SpO2: 100%   Weight: 91.2 kg (201 lb)   Height: 157.5 cm (62\")     Medications   pantoprazole (PROTONIX) injection 80 mg (has no administration in time range)   sodium chloride 0.9 % infusion (has no administration in time range)   lactated ringers bolus 1,000 mL (0 mL Intravenous Stopped 6/14/24 0030)   ondansetron (ZOFRAN) injection 4 mg (4 mg Intravenous Given 6/13/24 2334)   aluminum-magnesium hydroxide-simethicone (MAALOX MAX) 400-400-40 MG/5ML suspension 15 mL (15 mL Oral Given 6/13/24 2334)   iopamidol (ISOVUE-300) 61 % injection 78 mL (78 mL Intravenous Given 6/13/24 2344)     ECG/EMG Results (last 24 hours)       ** No results found for the last 24 hours. **          No orders to display           No follow-up provider specified.       Medication List      No changes were made to your prescriptions during this visit.            Wilfredo Garcia MD  06/14/24 0130    "

## 2024-06-14 NOTE — PLAN OF CARE
"Goal Outcome Evaluation:  Plan of Care Reviewed With: patient        Progress: no change  Outcome Evaluation: VSS on RA. PRN phenergan given. C/o moderate upper abdominal pain that pt reports \"feels like a tight band\" and radiates up esophagus/sternal area. Hypoactive bowel sounds noted, abdomen soft and tender, pt reports unable to belch or pass gas. NPO. Up ad gail.                               "

## 2024-06-15 ENCOUNTER — READMISSION MANAGEMENT (OUTPATIENT)
Dept: CALL CENTER | Facility: HOSPITAL | Age: 34
End: 2024-06-15
Payer: COMMERCIAL

## 2024-06-15 VITALS
DIASTOLIC BLOOD PRESSURE: 74 MMHG | HEIGHT: 62 IN | OXYGEN SATURATION: 98 % | BODY MASS INDEX: 37 KG/M2 | TEMPERATURE: 97.8 F | RESPIRATION RATE: 18 BRPM | HEART RATE: 76 BPM | WEIGHT: 201.06 LBS | SYSTOLIC BLOOD PRESSURE: 108 MMHG

## 2024-06-15 PROBLEM — I82.612 SUPERFICIAL VENOUS THROMBOSIS OF ARM, LEFT: Status: ACTIVE | Noted: 2024-06-15

## 2024-06-15 PROBLEM — K91.89 GASTROJEJUNAL ANASTOMOTIC STRICTURE: Status: RESOLVED | Noted: 2024-06-13 | Resolved: 2024-06-15

## 2024-06-15 PROBLEM — R11.10 VOMITING: Status: RESOLVED | Noted: 2024-06-14 | Resolved: 2024-06-15

## 2024-06-15 LAB
IRON 24H UR-MRATE: 33 MCG/DL (ref 37–145)
PREALB SERPL-MCNC: 16.7 MG/DL (ref 20–40)

## 2024-06-15 PROCEDURE — 25010000002 POTASSIUM CHLORIDE PER 2 MEQ: Performed by: SURGERY

## 2024-06-15 PROCEDURE — 84134 ASSAY OF PREALBUMIN: CPT | Performed by: SURGERY

## 2024-06-15 PROCEDURE — 92610 EVALUATE SWALLOWING FUNCTION: CPT

## 2024-06-15 PROCEDURE — 25010000002 ONDANSETRON PER 1 MG: Performed by: PHYSICIAN ASSISTANT

## 2024-06-15 PROCEDURE — 25810000003 LACTATED RINGERS PER 1000 ML: Performed by: ANESTHESIOLOGY

## 2024-06-15 PROCEDURE — 99213 OFFICE O/P EST LOW 20 MIN: CPT | Performed by: STUDENT IN AN ORGANIZED HEALTH CARE EDUCATION/TRAINING PROGRAM

## 2024-06-15 PROCEDURE — 83540 ASSAY OF IRON: CPT | Performed by: SURGERY

## 2024-06-15 RX ORDER — SODIUM CHLORIDE, SODIUM LACTATE, POTASSIUM CHLORIDE, CALCIUM CHLORIDE 600; 310; 30; 20 MG/100ML; MG/100ML; MG/100ML; MG/100ML
9 INJECTION, SOLUTION INTRAVENOUS CONTINUOUS
Status: DISCONTINUED | OUTPATIENT
Start: 2024-06-15 | End: 2024-06-15 | Stop reason: HOSPADM

## 2024-06-15 RX ORDER — LIDOCAINE HYDROCHLORIDE 10 MG/ML
0.5 INJECTION, SOLUTION EPIDURAL; INFILTRATION; INTRACAUDAL; PERINEURAL ONCE AS NEEDED
Status: DISCONTINUED | OUTPATIENT
Start: 2024-06-15 | End: 2024-06-15 | Stop reason: HOSPADM

## 2024-06-15 RX ORDER — FAMOTIDINE 10 MG/ML
20 INJECTION, SOLUTION INTRAVENOUS ONCE
Status: COMPLETED | OUTPATIENT
Start: 2024-06-15 | End: 2024-06-15

## 2024-06-15 RX ORDER — SODIUM CHLORIDE 9 MG/ML
40 INJECTION, SOLUTION INTRAVENOUS AS NEEDED
Status: DISCONTINUED | OUTPATIENT
Start: 2024-06-15 | End: 2024-06-15 | Stop reason: HOSPADM

## 2024-06-15 RX ORDER — MIDAZOLAM HYDROCHLORIDE 1 MG/ML
1 INJECTION INTRAMUSCULAR; INTRAVENOUS
Status: DISCONTINUED | OUTPATIENT
Start: 2024-06-15 | End: 2024-06-15 | Stop reason: HOSPADM

## 2024-06-15 RX ORDER — FAMOTIDINE 20 MG/1
20 TABLET, FILM COATED ORAL ONCE
Status: DISCONTINUED | OUTPATIENT
Start: 2024-06-15 | End: 2024-06-15 | Stop reason: HOSPADM

## 2024-06-15 RX ORDER — SODIUM CHLORIDE 0.9 % (FLUSH) 0.9 %
10 SYRINGE (ML) INJECTION EVERY 12 HOURS SCHEDULED
Status: DISCONTINUED | OUTPATIENT
Start: 2024-06-15 | End: 2024-06-15 | Stop reason: HOSPADM

## 2024-06-15 RX ORDER — SODIUM CHLORIDE 0.9 % (FLUSH) 0.9 %
10 SYRINGE (ML) INJECTION AS NEEDED
Status: DISCONTINUED | OUTPATIENT
Start: 2024-06-15 | End: 2024-06-15 | Stop reason: HOSPADM

## 2024-06-15 RX ADMIN — ONDANSETRON 4 MG: 2 INJECTION INTRAMUSCULAR; INTRAVENOUS at 11:57

## 2024-06-15 RX ADMIN — APIXABAN 10 MG: 5 TABLET, FILM COATED ORAL at 08:29

## 2024-06-15 RX ADMIN — FAMOTIDINE 20 MG: 10 INJECTION, SOLUTION INTRAVENOUS at 11:57

## 2024-06-15 RX ADMIN — LAMOTRIGINE 150 MG: 100 TABLET ORAL at 08:29

## 2024-06-15 RX ADMIN — FERROUS SULFATE TAB 325 MG (65 MG ELEMENTAL FE) 325 MG: 325 (65 FE) TAB at 08:27

## 2024-06-15 RX ADMIN — CYANOCOBALAMIN TAB 1000 MCG 500 MCG: 1000 TAB at 08:28

## 2024-06-15 RX ADMIN — ARIPIPRAZOLE 5 MG: 10 TABLET ORAL at 08:27

## 2024-06-15 RX ADMIN — POTASSIUM CHLORIDE AND SODIUM CHLORIDE 75 ML/HR: 450; 150 INJECTION, SOLUTION INTRAVENOUS at 07:05

## 2024-06-15 RX ADMIN — Medication 5000 UNITS: at 08:28

## 2024-06-15 RX ADMIN — Medication 1 TABLET: at 08:27

## 2024-06-15 RX ADMIN — PANTOPRAZOLE SODIUM 40 MG: 40 INJECTION, POWDER, FOR SOLUTION INTRAVENOUS at 06:01

## 2024-06-15 RX ADMIN — Medication 10 ML: at 11:58

## 2024-06-15 NOTE — ADDENDUM NOTE
Addendum  created 06/15/24 1501 by Joe Mcallister MD    Flowsheet accepted      
Injury to right wrist and hand

## 2024-06-15 NOTE — PLAN OF CARE
Goal Outcome Evaluation:  Plan of Care Reviewed With: patient        Progress:  (eval)         Anticipated Discharge Disposition (SLP): home          SLP Swallowing Diagnosis: esophageal dysphagia (06/15/24 1669)

## 2024-06-15 NOTE — PROGRESS NOTES
"Cc: HD#2  \"feel fine\"    She is sitting up in bed,  and young daughter in the room.  Looks and feels fine, no pain/N/V, brady liquids, ready to go home.      AVSS NAD Abd b9.  LUE forearm slighly warm mild edema, mild tender, no palpable cords    Iron33, Prealbumin 16.7    Impression:  GJ stricture with mild associated ulceration and food impaction s/p repeat dilatation and disimpaction doing well.  Wishes to go home and meets discharge criteria. LUE superficial thrombus at previous IV site, appreciate hospitalists' assistance, discussed with them yesterday.    Plan:  DC home.  DC instructions discussed.  No solid food for about 1-2 weeks. Continue PPI BID. Eliquis per medicine recs.  Pt says no new prescriptions needed other than Eliquis. See orders.  Call w any probs/?/concerns.  "

## 2024-06-15 NOTE — PLAN OF CARE
Problem: Adult Inpatient Plan of Care  Goal: Plan of Care Review  Outcome: Ongoing, Progressing  Flowsheets (Taken 6/15/2024 9347)  Progress: improving  Plan of Care Reviewed With: patient  Outcome Evaluation: VSS. Adequate I&O. c/o nausea. Phenergan prn x1 with relief. Denies pain. Started on Eliquis.  LFA/elbow appears more edematous this am.  Rested well. Anticipate DC in am.   Goal Outcome Evaluation:  Plan of Care Reviewed With: patient        Progress: improving  Outcome Evaluation: VSS. Adequate I&O. c/o nausea. Phenergan prn x1 with relief. Denies pain. Started on Eliquis.  LFA/elbow appears more edematous this am.  Rested well. Anticipate DC in am.

## 2024-06-15 NOTE — PROGRESS NOTES
[facility] Medicine Services  PROGRESS NOTE    Patient Name: Theresa Maya  : 1990  MRN: [id number]    Date of Admission: 2024  Primary Care Physician: Gina Green APRN    Subjective   Subjective     CC:  F/u LUE swelling    HPI:  Swelling is worse today, tolerating some diet      Objective   Objective     Vital Signs:   Temp:  [97.5 °F (36.4 °C)-99.5 °F (37.5 °C)] 98.2 °F (36.8 °C)  Heart Rate:  [70-89] 81  Resp:  [17-18] 18  BP: ()/(52-87) 118/86     Physical Exam:  Constitutional: No acute distress, awake, alert  HENT: NCAT, mucous membranes moist  Respiratory: Clear to auscultation bilaterally, respiratory effort normal   Cardiovascular: RRR, no murmurs, rubs, or gallops  Gastrointestinal: Positive bowel sounds, soft, nontender, nondistended  Musculoskeletal: No bilateral ankle edema  Psychiatric: Appropriate affect, cooperative  Neurologic: Oriented x 3, strength symmetric in all extremities, Cranial Nerves grossly intact to confrontation, speech clear  Skin: erythema + swelling LUE      Results Reviewed:  LAB RESULTS:      Lab 24  0725 06/10/24  0556 24  1633   WBC 5.55 5.19 3.64 4.87   HEMOGLOBIN 11.9* 10.4* 9.6* 12.2   HEMATOCRIT 38.4 33.3* 31.7* 40.2   PLATELETS 290 293 249 337   NEUTROS ABS 3.49  --  1.75 3.02   IMMATURE GRANS (ABS) 0.01  --  0.01 0.02   LYMPHS ABS 1.33  --  1.33 1.26   MONOS ABS 0.53  --  0.37 0.34   EOS ABS 0.13  --  0.14 0.17   MCV 85.5 84.1 87.1 88.0   CRP  --   --   --  1.08*   LACTATE 1.0  --   --  0.9         Lab 243 24  0725 06/10/24  0556 24  1633   SODIUM 143 136 139 139   POTASSIUM 3.6 4.2 3.9 3.5   CHLORIDE 104 99 104 97*   CO2 27.0 23.0 24.0 26.0   ANION GAP 12.0 14.0 11.0 16.0*   BUN 8 4* 7 8   CREATININE 0.88 0.72 0.69 0.86   EGFR 89.1 113.4 117.7 91.6   GLUCOSE 85 75 80 82   CALCIUM 9.8 9.3 8.4* 9.3   MAGNESIUM  --  1.9  --  2.3   PHOSPHORUS  --   --   --  3.4   TSH   --   --  3.790  --          Lab 06/13/24  2323 06/09/24  1633   TOTAL PROTEIN 7.3 8.4   ALBUMIN 4.3 4.2   GLOBULIN 3.0 4.2   ALT (SGPT) 11 8   AST (SGOT) 20 25   BILIRUBIN 0.3 0.3   ALK PHOS 82 94   LIPASE 96* 159*                 Lab 06/15/24  0830 06/10/24  0556   IRON 33* 24*   IRON SATURATION (TSAT)  --  10*   TIBC  --  252*   TRANSFERRIN  --  169*   FERRITIN  --  84.92         Brief Urine Lab Results  (Last result in the past 365 days)        Color   Clarity   Blood   Leuk Est   Nitrite   Protein   CREAT   Urine HCG        06/09/24 2147 Yellow   Turbid   Negative   Negative   Negative   Trace                   Microbiology Results Abnormal       None            Duplex Venous Upper Extremity - LEFT    Result Date: 6/14/2024    Acute left upper extremity superficial thrombophlebitis noted in the cephalic (upper arm) and cephalic (forearm).   All other left sided vessels appear normal.     CT Abdomen Pelvis With Contrast    Result Date: 6/14/2024  CT ABDOMEN PELVIS W CONTRAST Date of Exam: 6/13/2024 11:42 PM EDT Indication: Recent GJ dilation with abdominal pain, vomiting. Comparison: None available. Technique: Axial CT images were obtained of the abdomen and pelvis following the uneventful intravenous administration of 78 mL Isovue-300. Reconstructed coronal and sagittal images were also obtained. Automated exposure control and iterative construction methods were used. Findings: Lung Bases:   The visualized lung bases and lower mediastinal structures are unremarkable. Liver: There is a stable 1.7 cm low-density lesion at the junction of segment 7 and segment 8, most likely a small hemangioma. There are no solid liver lesions of significance. Biliary/Gallbladder:  The gallbladder is surgically absent. The biliary tree is nondilated. Spleen: Spleen is normal in size and CT density. Pancreas:  Pancreas is normal. There is no evidence of pancreatic mass or peripancreatic fluid. Kidneys:  Kidneys are normal in size.  There are no stones or hydronephrosis. Adrenals:  Adrenal glands are unremarkable. Retroperitoneal/Lymph Nodes/Vasculature:  No retroperitoneal adenopathy is identified. Gastrointestinal/Mesentery:  There is no evidence of dilated small bowel or colon. There is no abdominal abscess or pathologic fluid collection. There are postsurgical changes related to hiatal hernia repair with gastric bypass procedure. There is inflammatory change surrounding the  residual stomach proximal to the gastrojejunostomy similar to the prior study. Bladder:  The bladder is normal. Genital:   Unremarkable       Bony Structures:   Visualized bony structures are consistent with the patient's age.     Impression: Impression: Postsurgical changes related to gastric bypass procedure. There is inflammatory change surrounding the residual stomach proximal to the gastrojejunostomy similar to the prior study. There is no evidence of bowel obstruction. Electronically Signed: Fortino uTrner MD  6/14/2024 12:19 AM EDT  Workstation ID: [id number]     Results for orders placed during the hospital encounter of 02/10/17    Adult Transthoracic Echo Complete    Interpretation Summary  · Normal left ventricular cavity size and wall thickness noted. All left ventricular wall segments contract normally.  · Estimated EF appears to be in the range of 61 - 65%.  · Left ventricular diastolic dysfunction is noted (grade I) consistent with impaired relaxation.  · The aortic valve is structurally normal. No aortic valve regurgitation is present. No aortic valve stenosis is present.  · The mitral valve is normal in structure. No mitral valve regurgitation is present. No significant mitral valve stenosis is present.  · The tricuspid valve is normal. No tricuspid valve stenosis is present. No tricuspid valve regurgitation is present.  · There is no evidence of pericardial effusion.      Current medications:  Scheduled Meds:apixaban, 10 mg, Oral, Q12H  [START ON 6/21/2024]  apixaban, 5 mg, Oral, Q12H  ARIPiprazole, 5 mg, Oral, Daily  famotidine, 20 mg, Oral, Once  ferrous sulfate, 325 mg, Oral, Daily  lamoTRIgine, 150 mg, Oral, Daily  montelukast, 10 mg, Oral, Nightly  multivitamin with minerals, 1 tablet, Oral, Daily  pantoprazole, 40 mg, Intravenous, Q AM  sodium chloride, 10 mL, Intravenous, Q12H  vitamin B-12, 500 mcg, Oral, Daily  vitamin D3, 5,000 Units, Oral, Daily      Continuous Infusions:lactated ringers, 9 mL/hr  sodium chloride 0.45 % with KCl 20 mEq, 75 mL/hr, Last Rate: 75 mL/hr (06/15/24 0705)      PRN Meds:.  HYDROmorphone    lidocaine PF 1%    midazolam    ondansetron    promethazine    sodium chloride    sodium chloride    Assessment & Plan   Assessment & Plan     Active Hospital Problems    Diagnosis  POA    **Vomiting [R11.10]  Yes    Acute deep vein thrombosis (DVT) of left upper extremity [I82.622]  Unknown    Gastrojejunal anastomotic stricture [K91.89]  Unknown      Resolved Hospital Problems   No resolved problems to display.        Brief Hospital Course to date:  Theresa Maya is a 33 y.o. female 33 y.o. female PMH Hx DVT right leg (2014) with MTHFR gene mutation, anxiety, anemia, asthma, LBP, bipolar disorder, s/p lap sleeve gastrectomy presenting with gastrojejunal anastomotic stricture and symptomatic superficial thrombus of the LUE.         Superficial thrombus of the Left upper extremity  Hx of DVT RLE (2014)  - started Eliquis 10 mg bid for 7 days, followed by eliquis 5 mg bid for at least 1 month. Recommend Venous Doppler in 1 month to see if the superficial thrombus has resolved.   - Pt high risk for DVT due to MTHFR gene mutation  - warm compresses for comfort     gastrojejunal anastomotic stricture   - s/p EGD with dilatation to 14 mm and food disimpaction 06/14     Obesity/Hiatal Hernia  -s/p lap sleeve gastrectomy and Hiatal hernia repair 2022     Recurrent hiatal hernia and severe GE reflux   - 5/24/24 s/p recurrent hiatal hernia repair with  falciform ligament reinforcement.      Bipolar disorder  Anxiety  - abilify, lamictal     Asthma  - singular    Expected Discharge Location and Transportation: home  Expected Discharge   Expected Discharge Date: 6/16/2024; Expected Discharge Time:      VTE Prophylaxis:  Pharmacologic & mechanical VTE prophylaxis orders are present.         AM-PAC 6 Clicks Score (PT): 24 (06/15/24 0800)    CODE STATUS:   Code Status and Medical Interventions:   Ordered at: 06/14/24 0830     Level Of Support Discussed With:    Patient     Code Status (Patient has no pulse and is not breathing):    CPR (Attempt to Resuscitate)     Medical Interventions (Patient has pulse or is breathing):    Full Support       Katie Barlow MD  06/15/24

## 2024-06-15 NOTE — THERAPY EVALUATION
Acute Care - Speech Language Pathology   Swallow Initial Evaluation  Shirley  Clinical Swallow Evaluation       Patient Name: Theresa Maya  : 1990  MRN: [id number]  Today's Date: 6/15/2024               Admit Date: 2024    Visit Dx:     ICD-10-CM ICD-9-CM   1. Nausea and vomiting, unspecified vomiting type  R11.2 787.01   2. Anastomotic stricture of small intestine  K91.89 997.49    K91.30    3. Gastrojejunal anastomotic stricture  K91.89 997.49     Patient Active Problem List   Diagnosis    Previous stillbirth or demise, antepartum    Previous deep vein thrombosis (DVT) affecting pregnancy    H/O abnormal cervical Papanicolaou smear    Anxiety    MTHFR gene mutation    Heartburn    Fatigue    Dyspepsia    Dyspnea on exertion    Right leg pain    Lumbar back pain    Bipolar disorder in partial remission    History of suicidal ideation    Constipation    S/P laparoscopic sleeve gastrectomy    Pneumonia involving right lung    Pneumonia due to other gram-negative bacteria    Post-op pain    Vomiting    Gastrojejunal anastomotic stricture    Acute deep vein thrombosis (DVT) of left upper extremity     Past Medical History:   Diagnosis Date    Abnormal Pap smear of cervix     ADHD (attention deficit hyperactivity disorder)     Working with a therapy at     Allergic 2011    Take montalukast    Anesthesia complication     SLOW TO WAKE UP AFTER GASTRO AND HYSTERECTOMY    Anxiety     Arthritis     Clotting disorder     Deep vein thrombosis     2014, (R) leg while pregnant, dx w/ MTHFR    Depression     Dermatitis     UNDER BREAST AT TIMES    Dyspepsia     Dyspnea on exertion     Elevated hemoglobin A1c     Fatigue     Gallbladder abscess     s/p lap nicolas     GERD (gastroesophageal reflux disease)     UGI  - small recurrent HH, EGD Dr. Ricardo   small recurrent HH    Heartburn     chronic/episodic, depends on what she eats, takes Pepcid prn, EGD Dr. Ricardo     Hyperemesis  gravidarum     Hyperlipidemia     Incomplete right bundle branch block     Iron deficiency anemia     Migraines     Miscarriage     x 4 prior to 1st pregnancy, presumably from undiagnosed MTHFR mutation    Morbid obesity with body mass index (BMI) of 40.0 to 44.9 in adult 04/19/2022    MTHFR deficiency complicating pregnancy     says clotting d/o only an issue when pregnant, advised to use heparin shots during pregnancy    Obesity     Ovarian cyst     Recurrent pregnancy loss, antepartum condition or complication     had a [facility] and lost the pregnancy at 6 months    Strep pharyngitis     Urinary tract infection     Wears glasses      Past Surgical History:   Procedure Laterality Date    ENDOSCOPY N/A 05/05/2022    Procedure: ESOPHAGOGASTRODUODENOSCOPY;  Surgeon: Cristiano Ricardo MD;  Location:  [facility];  Service: Bariatric;  Laterality: N/A;    ENDOSCOPY N/A 5/24/2024    Procedure: ESOPHAGOGASTRODUODENOSCOPY;  Surgeon: Cristiano iRcardo MD;  Location:  [facility];  Service: Robotics - DaVinci;  Laterality: N/A;  EGD # 752 SCOPE USED    ENDOSCOPY N/A 6/10/2024    Procedure: ESOPHAGOGASTRODUODENOSCOPY WITH DILATATION;  Surgeon: Cristiano Ricardo MD;  Location:  [facility];  Service: General;  Laterality: N/A;  BALLOON DILATION UP TO 14MM    GASTRECTOMY N/A 05/05/2022    Procedure: GASTRECTOMY LAPAROSCOPIC;  Surgeon: Cristiano Ricardo MD;  Location:  [facility];  Service: Bariatric;  Laterality: N/A;    GASTRIC BYPASS N/A 5/24/2024    Procedure: GASTRIC BYPASS LAPAROSCOPIC WITH DAVINCI ROBOT;  Surgeon: Cristiano Ricardo MD;  Location:  [facility];  Service: Robotics - DaVinci;  Laterality: N/A;    HIATAL HERNIA REPAIR N/A 05/05/2022    Procedure: HIATAL HERNIA REPAIR LAPAROSCOPIC;  Surgeon: Cristiano Ricardo MD;  Location:  [facility];  Service: Bariatric;  Laterality: N/A;    HIATAL HERNIA REPAIR N/A 5/24/2024    Procedure: RECURRENT HIATAL HERNIA REPAIR LAPAROSCOPIC WITH DAVINCI ROBOT;  Surgeon: Haleigh  "Cristiano Garza MD;  Location:  [facility];  Service: Robotics - DaVinci;  Laterality: N/A;    HYSTERECTOMY  01/23/2024    [facility]- TOTAL    LAPAROSCOPIC CHOLECYSTECTOMY  2006    no stones, was told she had \"three gallbladders\"- all removed    SINUS SURGERY Bilateral 2006    TUBAL COAGULATION LAPAROSCOPIC Bilateral 09/15/2017    Procedure: BILATERAL TUBAL FALLOPE FILSHIE CLIPPING LAPAROSCOPIC;  Surgeon: Leo Posey III, MD;  [facility]:  [facility];  Service:     VAGINAL DELIVERY  14; 16; 17    female,male, male.  She said she had subcutaneous Lovenox injections throughout the second and third pregnancies until delivery    WISDOM TOOTH EXTRACTION         SLP Recommendation and Plan  SLP Swallowing Diagnosis: esophageal dysphagia (06/15/24 1115)  SLP Diet Recommendation:  (cont post op diet per MD orders) (06/15/24 1115)  Recommended Precautions and Strategies: upright posture during/after eating, small bites of food and sips of liquid (06/15/24 1115)  SLP Rec. for Method of Medication Administration: meds whole, as tolerated (06/15/24 1115)           Swallow Criteria for Skilled Therapeutic Interventions Met: baseline status (06/15/24 1115)  Anticipated Discharge Disposition (SLP): home (06/15/24 1115)  Rehab Potential/Prognosis, Swallowing: good, to achieve stated therapy goals (06/15/24 1115)  Therapy Frequency (Swallow): evaluation only (06/15/24 1115)     Oral Care Recommendations: Oral Care BID/PRN, Toothbrush (06/15/24 1115)                                        Plan of Care Reviewed With: patient  Progress:  (eval)      SWALLOW EVALUATION (Last 72 Hours)       SLP Adult Swallow Evaluation       Row Name 06/15/24 1115                   Rehab Evaluation    Document Type evaluation  -AV        Subjective Information no complaints  -AV        Patient Observations alert;cooperative  -AV        Patient/Family/Caregiver Comments/Observations none  -AV        Patient Effort good  -AV           General Information "    Patient Profile Reviewed yes  -AV        Pertinent History Of Current Problem sleeve in 2022, gastric bypass in May 24  -AV        Current Method of Nutrition pureed;thin liquids  -AV        Precautions/Limitations, Vision WFL with corrective lenses  -AV        Precautions/Limitations, Hearing WFL;for purposes of eval  -AV        Prior Level of Function-Communication WFL  -AV        Prior Level of Function-Swallowing --  post op diet  -AV        Plans/Goals Discussed with patient  -AV        Barriers to Rehab none identified  -AV        Patient's Goals for Discharge return home  -AV           Pain    Additional Documentation Pain Scale: FACES Pre/Post-Treatment (Group)  -AV           Pain Scale: FACES Pre/Post-Treatment    Pain: FACES Scale, Pretreatment 0-->no hurt  -AV        Posttreatment Pain Rating 0-->no hurt  -AV           Oral Motor Structure and Function    Dentition Assessment natural, present and adequate  -AV        Secretion Management WNL/WFL  -AV        Mucosal Quality moist, healthy  -AV        Gag Response WFL  -AV        Volitional Swallow WFL  -AV        Volitional Cough WFL  -AV           Oral Musculature and Cranial Nerve Assessment    Oral Motor General Assessment WFL  -AV           General Eating/Swallowing Observations    Respiratory Support Currently in Use room air  -AV        Eating/Swallowing Skills self-fed  -AV        Positioning During Eating upright in bed  -AV        Utensils Used spoon;straw;cup  -AV        Consistencies Trialed pureed;thin liquids  -AV           Clinical Swallow Eval    Oral Prep Phase WFL  -AV        Oral Transit WFL  -AV        Oral Residue WFL  -AV        Pharyngeal Phase no overt signs/symptoms of pharyngeal impairment  -AV        Esophageal Phase suspected esophageal impairment  -AV           SLP Evaluation Clinical Impression    SLP Swallowing Diagnosis esophageal dysphagia  -AV        Functional Impact no impact on function  -AV        Rehab  Potential/Prognosis, Swallowing good, to achieve stated therapy goals  -AV        Swallow Criteria for Skilled Therapeutic Interventions Met baseline status  -AV           Recommendations    Therapy Frequency (Swallow) evaluation only  -AV        SLP Diet Recommendation --  cont post op diet per MD orders  -AV        Recommended Precautions and Strategies upright posture during/after eating;small bites of food and sips of liquid  -AV        Oral Care Recommendations Oral Care BID/PRN;Toothbrush  -AV        SLP Rec. for Method of Medication Administration meds whole;as tolerated  -AV        Anticipated Discharge Disposition (SLP) home  -AV                  User Key  (r) = Recorded By, (t) = Taken By, (c) = Cosigned By      Initials Name Effective Dates    AV Sueztte Reid MS CCC-SLP 06/16/21 -                     EDUCATION  The patient has been educated in the following areas:   Dysphagia (Swallowing Impairment) Oral Care/Hydration.                Time Calculation:    Time Calculation- SLP       Row Name 06/15/24 1334             Time Calculation- SLP    SLP Start Time 1130  -AV      SLP Received On 06/15/24  -AV         Untimed Charges    SLP Eval/Re-eval  ST Eval Oral Pharyng Swallow - 86246  -AV      96140-AM Eval Oral Pharyng Swallow Minutes 38  -AV         Total Minutes    Untimed Charges Total Minutes 38  -AV       Total Minutes 38  -AV                User Key  (r) = Recorded By, (t) = Taken By, (c) = Cosigned By      Initials Name Provider Type    AV Suzette Reid MS CCC-SLP Speech and Language Pathologist                    Therapy Charges for Today       Code Description Service Date Service Provider Modifiers Qty    [id number] HC ST EVAL ORAL PHARYNG SWALLOW 3 6/15/2024 Suzette Reid MS CCC-SLP GN 1                 Suzette Person MS CCC-SLP  6/15/2024

## 2024-06-16 NOTE — OUTREACH NOTE
Prep Survey      Flowsheet Row Responses   [facility] facility patient discharged from? [place]   Is LACE score < 7 ? No   Eligibility [facility]   Date of Admission 06/14/24   Date of Discharge 06/15/24   Discharge Disposition Home or Self Care   Discharge diagnosis Vomiting-EGD this visit   Does the patient have one of the following disease processes/diagnoses(primary or secondary)? Other   Does the patient have Home health ordered? No   Is there a DME ordered? No   Prep survey completed? Yes            PRASHANTH MARTINEZ - Registered Nurse

## 2024-06-17 ENCOUNTER — TRANSITIONAL CARE MANAGEMENT TELEPHONE ENCOUNTER (OUTPATIENT)
Dept: CALL CENTER | Facility: HOSPITAL | Age: 34
End: 2024-06-17
Payer: COMMERCIAL

## 2024-06-17 NOTE — OUTREACH NOTE
Call Center TCM Note      Flowsheet Row Responses   [facility] patient discharged from? [place]   Does the patient have one of the following disease processes/diagnoses(primary or secondary)? Other   TCM attempt successful? Yes   Call start time 1214   Call end time 1215   Discharge diagnosis Vomiting-EGD this visit   Meds reviewed with patient/caregiver? Yes   Is the patient having any side effects they believe may be caused by any medication additions or changes? No   Does the patient have all medications ordered at discharge? Yes   Is the patient taking all medications as directed (includes completed medication regime)? Yes   Comments 6/18/2024  1:15 PM   Does the patient have an appointment with their PCP within 7-14 days of discharge? Yes   Has home health visited the patient within 72 hours of discharge? N/A   Psychosocial issues? No   What is the patient's perception of their health status since discharge? Improving   TCM call completed? Yes   Call end time 1215            Adrianna Patel RN    6/17/2024, 12:15 EDT

## 2024-06-25 ENCOUNTER — OFFICE VISIT (OUTPATIENT)
Dept: BARIATRICS/WEIGHT MGMT | Facility: CLINIC | Age: 34
End: 2024-06-25
Payer: COMMERCIAL

## 2024-06-25 VITALS
WEIGHT: 191.8 LBS | SYSTOLIC BLOOD PRESSURE: 101 MMHG | DIASTOLIC BLOOD PRESSURE: 64 MMHG | TEMPERATURE: 97.8 F | HEART RATE: 79 BPM | HEIGHT: 64 IN | BODY MASS INDEX: 32.74 KG/M2

## 2024-06-25 DIAGNOSIS — K91.89 GASTROJEJUNAL ANASTOMOTIC STRICTURE: Primary | ICD-10-CM

## 2024-06-25 DIAGNOSIS — R79.0 ABNORMAL BLOOD LEVEL OF IRON: ICD-10-CM

## 2024-06-25 DIAGNOSIS — Z13.21 ENCOUNTER FOR VITAMIN DEFICIENCY SCREENING: ICD-10-CM

## 2024-06-25 DIAGNOSIS — E55.9 VITAMIN D DEFICIENCY: ICD-10-CM

## 2024-06-25 DIAGNOSIS — R10.13 DYSPEPSIA: ICD-10-CM

## 2024-06-25 DIAGNOSIS — I82.612 SUPERFICIAL VENOUS THROMBOSIS OF ARM, LEFT: ICD-10-CM

## 2024-06-25 DIAGNOSIS — K90.9 INTESTINAL MALABSORPTION, UNSPECIFIED TYPE: ICD-10-CM

## 2024-06-25 PROCEDURE — 1159F MED LIST DOCD IN RCRD: CPT | Performed by: PHYSICIAN ASSISTANT

## 2024-06-25 PROCEDURE — 99024 POSTOP FOLLOW-UP VISIT: CPT | Performed by: PHYSICIAN ASSISTANT

## 2024-06-25 PROCEDURE — 1160F RVW MEDS BY RX/DR IN RCRD: CPT | Performed by: PHYSICIAN ASSISTANT

## 2024-06-25 RX ORDER — ONDANSETRON 4 MG/1
4 TABLET, ORALLY DISINTEGRATING ORAL EVERY 8 HOURS PRN
Qty: 30 TABLET | Refills: 0 | Status: SHIPPED | OUTPATIENT
Start: 2024-06-25

## 2024-06-25 RX ORDER — PROCHLORPERAZINE MALEATE 10 MG
10 TABLET ORAL EVERY 6 HOURS PRN
Qty: 30 TABLET | Refills: 0 | Status: SHIPPED | OUTPATIENT
Start: 2024-06-25

## 2024-06-25 NOTE — PROGRESS NOTES
"[facility]  [address]  [address]  [facility] [id number] [phone]      Patient Name:  Theresa Maya  :  1990      Date of Visit: 2024      Reason for Visit:  1 month postop      HPI:  Theresa Maya is a 33 y.o. female s/p robotic RNY/BPD/rHHR 24 GDW for uncontrolled DAVID (hx LSG/HHR 2022 GDW)    s/p EGJ w/ GJ stricture balloon dilation to 13.5mm 6/10/24 GDW, s/p repeat EGJ w/ food disimpaction + GJ stricture dilation to 14mm 24 GDW.      Discharged 6/15/24 on Eliquis for LUE superficial thrombus at previous IV site.  Needs repeat Venous Doppler in 1 month for further eval.  Continues on Eliquis BID.     Only tolerating puree/baby food diet w/ protein powders + scrambled eggs.  Has not tried anything more solid, \"afraid to\".  Still has some dysphagia.  Yesterday tried adding cheese to scrambled eggs, could not tolerate, dry heaved for 40 minutes.  Has nausea + epigastric pain w/ any other soft solid intake (banana, cooked/smashed broccoli).  Sipping on protein shakes throughout the day.  Getting 70g prot/day.      Voiding w/out issue.  Looks well in office.  AVSS.  Denies fevers/chills.      Taking Nexium + Carafate.  Taking MVI, PNV, iron, B12, Vit D+K.         PreRNY weight:  212 pounds. Today's weight is 87 kg (191 lb 12.8 oz) pounds, today's Body mass index is 33.44 kg/m²., and weight loss since surgery is 21 pounds.       Past Medical History:   Diagnosis Date    Abnormal Pap smear of cervix     ADHD (attention deficit hyperactivity disorder)     Working with a therapy at     Allergic 2011    Take montalukast    Anesthesia complication     SLOW TO WAKE UP AFTER GASTRO AND HYSTERECTOMY    Anxiety     Arthritis     Clotting disorder     Deep vein thrombosis     2014, (R) leg while pregnant, dx w/ MTHFR    Depression     Dermatitis     UNDER BREAST AT TIMES    Dyspepsia     Dyspnea on exertion     Elevated hemoglobin A1c  "    Fatigue     Gallbladder abscess     s/p lap nicolas 2006    GERD (gastroesophageal reflux disease)     UGI 9/23 - small recurrent HH, EGD Dr. Ricardo  11/23 small recurrent HH    Heartburn     chronic/episodic, depends on what she eats, takes Pepcid prn, EGD Dr. Ricardo 11/21    Hyperemesis gravidarum     Hyperlipidemia     Incomplete right bundle branch block     Iron deficiency anemia     Migraines     Miscarriage     x 4 prior to 1st pregnancy, presumably from undiagnosed MTHFR mutation    Morbid obesity with body mass index (BMI) of 40.0 to 44.9 in adult 04/19/2022    MTHFR deficiency complicating pregnancy     says clotting d/o only an issue when pregnant, advised to use heparin shots during pregnancy    Obesity     Ovarian cyst     Recurrent pregnancy loss, antepartum condition or complication     had a [facility] and lost the pregnancy at 6 months    Strep pharyngitis     Urinary tract infection     Wears glasses      Past Surgical History:   Procedure Laterality Date    ENDOSCOPY N/A 05/05/2022    Procedure: ESOPHAGOGASTRODUODENOSCOPY;  Surgeon: Cristiano Ricardo MD;  Location:  [facility];  Service: Bariatric;  Laterality: N/A;    ENDOSCOPY N/A 5/24/2024    Procedure: ESOPHAGOGASTRODUODENOSCOPY;  Surgeon: Cristiano Ricardo MD;  Location:  [facility];  Service: Robotics - DaVinci;  Laterality: N/A;  EGD # 752 SCOPE USED    ENDOSCOPY N/A 6/10/2024    Procedure: ESOPHAGOGASTRODUODENOSCOPY WITH DILATATION;  Surgeon: Cristiano Ricardo MD;  Location:  [facility];  Service: General;  Laterality: N/A;  BALLOON DILATION UP TO 14MM    ENDOSCOPY N/A 6/14/2024    Procedure: ESOPHAGOGASTRODUODENOSCOPY WITH DILATATION;  Surgeon: Cristiano Ricardo MD;  Location:  [facility];  Service: General;  Laterality: N/A;  dilated to 14 mm with 12-15 mm balloon dilators, food removed from esophagus.    GASTRECTOMY N/A 05/05/2022    Procedure: GASTRECTOMY LAPAROSCOPIC;  Surgeon: Cristiano Ricardo MD;  Location:  [facility];   "Service: Bariatric;  Laterality: N/A;    GASTRIC BYPASS N/A 5/24/2024    Procedure: GASTRIC BYPASS LAPAROSCOPIC WITH DAVINCI ROBOT;  Surgeon: Cristiano Ricardo MD;  Location:  [facility];  Service: Robotics - DaVinci;  Laterality: N/A;    HIATAL HERNIA REPAIR N/A 05/05/2022    Procedure: HIATAL HERNIA REPAIR LAPAROSCOPIC;  Surgeon: Cristiano Ricardo MD;  Location:  [facility];  Service: Bariatric;  Laterality: N/A;    HIATAL HERNIA REPAIR N/A 5/24/2024    Procedure: RECURRENT HIATAL HERNIA REPAIR LAPAROSCOPIC WITH DAVINCI ROBOT;  Surgeon: Cristiano Ricardo MD;  Location:  [facility];  Service: Robotics - DaVinci;  Laterality: N/A;    HYSTERECTOMY  01/23/2024    [facility]- TOTAL    LAPAROSCOPIC CHOLECYSTECTOMY  2006    no stones, was told she had \"three gallbladders\"- all removed    SINUS SURGERY Bilateral 2006    TUBAL COAGULATION LAPAROSCOPIC Bilateral 09/15/2017    Procedure: BILATERAL TUBAL FALLOPE FILSHIE CLIPPING LAPAROSCOPIC;  Surgeon: Leo Posey III, MD;  [facility]  [facility];  Service:     VAGINAL DELIVERY  14; 16; 17    female,male, male.  She said she had subcutaneous Lovenox injections throughout the second and third pregnancies until delivery    WISDOM TOOTH EXTRACTION       Outpatient Medications Marked as Taking for the 6/25/24 encounter (Office Visit) with Sallie Rutherford PA   Medication Sig Dispense Refill    apixaban (ELIQUIS) 5 MG tablet tablet Take 1 tablet by mouth Every 12 (Twelve) Hours. Indications: DVT/PE (active thrombosis) 60 tablet 0    ARIPiprazole (ABILIFY) 5 MG tablet Take 1 tablet by mouth Daily. 30 tablet 1    Elidel 1 % cream Apply  topically to the appropriate area as directed 2 (Two) Times a Day. 100 g 2    esomeprazole (nexIUM) 40 MG capsule Take 1 capsule by mouth 2 (Two) Times a Day. (Patient taking differently: Take 1 capsule by mouth Daily.) 60 capsule 0    ferrous sulfate 325 (65 FE) MG tablet Take 1 tablet by mouth Daily.      lamoTRIgine (LaMICtal) 150 MG " tablet Take 1 tablet by mouth Daily. 30 tablet 1    montelukast (Singulair) 10 MG tablet Take 1 tablet by mouth Every Night. 30 tablet 3    multivitamin with minerals tablet tablet Take 1 tablet by mouth Daily.      prochlorperazine (COMPAZINE) 10 MG tablet Take 1 tablet by mouth Every 6 (Six) Hours As Needed for Nausea or Vomiting. 30 tablet 0    Saw Palmetto 80 MG capsule Take 1 capsule by mouth Daily.      sucralfate (CARAFATE) 1 g tablet Take 1 tablet by mouth 4 (Four) Times a Day Before Meals & at Bedtime. Crush tablets and mix with 10-15mL water and take it as a slurry 40 tablet 0    triamcinolone (KENALOG) 0.1 % ointment Apply  topically to the appropriate area as directed 2 (Two) Times a Day. 453.6 g 1    vitamin B-12 (CYANOCOBALAMIN) 500 MCG tablet Take 1 tablet by mouth Daily.      vitamin D3 125 MCG (5000 UT) capsule capsule Take 1 capsule by mouth Daily. AND K 12      [DISCONTINUED] prochlorperazine (COMPAZINE) 10 MG tablet Take 1 tablet by mouth Every 6 (Six) Hours As Needed for Nausea or Vomiting. 30 tablet 1     Allergies   Allergen Reactions    Amoxicillin Diarrhea and GI Intolerance    Doxycycline Other (See Comments)     Vaginal swelling     Latex Hives and Itching    Penicillins Rash     Says Keflex OK as long as she takes w/ food. Has tolerated cefazolin, ceftriaxone    Beta lactam allergy details  Antibiotic reaction: rash, hives, other (ITCHING)  Age at reaction: adult (2017)  Dose to reaction time: (!) hours  Reason for antibiotic: sore throat (STREP)  Epinephrine required for reaction?: no  Tolerated antibiotics: cephalexin (KEFLEX), Cefazolin, ceftriaxone           Social History     Socioeconomic History    Marital status:    Tobacco Use    Smoking status: Never    Smokeless tobacco: Never    Tobacco comments:     disgust me never touched   Vaping Use    Vaping status: Never Used   Substance and Sexual Activity    Alcohol use: Not Currently     Comment: do not drink weekly only on  "holidays/ birtdays    Drug use: No    Sexual activity: Yes     Partners: Male     Birth control/protection: Surgical     Comment: bilatiral tubal     Social History     Social History Narrative     with three children.  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       /64 (BP Location: Right arm, Patient Position: Sitting)   Pulse 79   Temp 97.8 °F (36.6 °C)   Ht 161.3 cm (63.5\")   Wt 87 kg (191 lb 12.8 oz)   LMP  (LMP Unknown)   BMI 33.44 kg/m²     Physical Exam  Constitutional:       Appearance: She is well-developed. She is not ill-appearing.   Eyes:      General: No scleral icterus.  Cardiovascular:      Rate and Rhythm: Normal rate.   Pulmonary:      Effort: Pulmonary effort is normal.   Abdominal:      Palpations: Abdomen is soft.      Tenderness: There is no abdominal tenderness.      Comments: incisions healing well   Musculoskeletal:         General: Normal range of motion.   Skin:     General: Skin is warm and dry.      Findings: No rash.   Neurological:      Mental Status: She is alert.   Psychiatric:         Behavior: Behavior is cooperative.         Judgment: Judgment normal.           Assessment:  1 month s/p robotic RNY/BPD/rHHR 5/24/24 GDW for uncontrolled DAVID (hx LSG/HHR 5/2022 GDW)      ICD-10-CM ICD-9-CM   1. Gastrojejunal anastomotic stricture  K91.89 997.49   2. Intestinal malabsorption, unspecified type  K90.9 579.9   3. Vitamin D deficiency  E55.9 268.9   4. Abnormal blood level of iron  R79.0 790.6   5. Dyspepsia  R10.13 536.8   6. Encounter for vitamin deficiency screening  Z13.21 V77.99   7. Superficial venous thrombosis of arm, left  I82.612 453.81       BMI is >= 30 and <35. (Class 1 Obesity). The following options were offered after discussion;: see plan.         Plan:  Doing better.  Continue BID PPI + Carafate.  Repeat LUE Venous Doppler in 3-4 wks to see if the " superficial thrombus has resolved.  Continue Eliquis 5mg BID, as prescribed.  Continue to slowly advance diet as tolerated.  Continue protein 100g/day.  Increase routine physical activity as tolerated - no restrictions.  Routine bariatric labs ordered.  Discussed necessary vitamins s/p RNY.  Additional vitamin recommendations pending lab results.  Continue to avoid ASA/NSAIDS/steroids/tobacco for life.   Call w/ problems/concerns.    Addendum:  Discussed further w/ Dr. Ricardo.  [facility].  Additional input to follow.      The patient was instructed to follow up pending results, sooner if needed.

## 2024-06-28 ENCOUNTER — TELEPHONE (OUTPATIENT)
Dept: BARIATRICS/WEIGHT MGMT | Facility: CLINIC | Age: 34
End: 2024-06-28
Payer: COMMERCIAL

## 2024-06-28 RX ORDER — SODIUM CHLORIDE 9 MG/ML
150 INJECTION, SOLUTION INTRAVENOUS CONTINUOUS
OUTPATIENT
Start: 2024-06-28

## 2024-06-28 RX ORDER — SODIUM CHLORIDE 9 MG/ML
40 INJECTION, SOLUTION INTRAVENOUS AS NEEDED
OUTPATIENT
Start: 2024-06-28

## 2024-06-28 RX ORDER — SODIUM CHLORIDE 0.9 % (FLUSH) 0.9 %
3 SYRINGE (ML) INJECTION EVERY 12 HOURS SCHEDULED
OUTPATIENT
Start: 2024-06-28

## 2024-06-28 RX ORDER — SODIUM CHLORIDE 0.9 % (FLUSH) 0.9 %
3-10 SYRINGE (ML) INJECTION AS NEEDED
OUTPATIENT
Start: 2024-06-28

## 2024-06-28 NOTE — TELEPHONE ENCOUNTER
"----- Message from Cristiano Ricardo sent at 6/28/2024  8:06 AM EDT -----  Regarding: RE: please advise    Yes, continue meds and repeat EGD with dilatation, thanks!    ----- Message -----  From: Sallie Rutherford PA  Sent: 6/27/2024   7:40 AM EDT  To: Cristiano Ricardo MD  Subject: please advise                                    1 month s/p robotic RNY/BPD/rHHR 5/24/24 GDW for uncontrolled DAVID (hx LSG/HHR 5/2022 GDW)    s/p EGJ w/ GJ stricture balloon dilation to 13.5mm 6/10/24 GDW, s/p repeat EGJ w/ food disimpaction + GJ stricture dilation to 14mm 6/14/24 GDW.      Discharged 6/15/24 on Eliquis for LUE superficial thrombus at previous IV site.  Needs repeat Venous Doppler in 1 month for further eval.  Continues on Eliquis BID.     Only tolerating puree/baby food diet w/ protein powders + scrambled eggs.  Has not tried anything more solid, \"afraid to\".  Still has some dysphagia.  Yesterday tried adding cheese to scrambled eggs, could not tolerate, dry heaved for 40 minutes.  Has nausea + epigastric pain w/ any other soft solid intake (banana, cooked/smashed broccoli).  Sipping on protein shakes throughout the day.  Getting 70g prot/day.      Voiding w/out issue.  Looks well in office.  AVSS.  Denies fevers/chills.      Taking Nexium + Carafate.  Taking MVI, PNV, iron, B12, Vit D+K.      ---- routine labs ordered.  Compazine + Zofran RF.  Continue PPI + Carafate.  Do you want to repeat EGD w/ dilatation?  Or continue to monitor?  Thanks!  "

## 2024-06-30 LAB
25(OH)D3+25(OH)D2 SERPL-MCNC: 37.8 NG/ML (ref 30–100)
A-TOCOPHEROL VIT E SERPL-MCNC: 11.6 MG/L (ref 5.9–19.4)
ALBUMIN SERPL-MCNC: 3.9 G/DL (ref 3.9–4.9)
ALP SERPL-CCNC: 82 IU/L (ref 44–121)
ALT SERPL-CCNC: 19 IU/L (ref 0–32)
AST SERPL-CCNC: 23 IU/L (ref 0–40)
BASOPHILS # BLD AUTO: 0 X10E3/UL (ref 0–0.2)
BASOPHILS NFR BLD AUTO: 1 %
BILIRUB SERPL-MCNC: 0.3 MG/DL (ref 0–1.2)
BUN SERPL-MCNC: 7 MG/DL (ref 6–20)
BUN/CREAT SERPL: 9 (ref 9–23)
CALCIUM SERPL-MCNC: 9.2 MG/DL (ref 8.7–10.2)
CHLORIDE SERPL-SCNC: 108 MMOL/L (ref 96–106)
CO2 SERPL-SCNC: 22 MMOL/L (ref 20–29)
COPPER SERPL-MCNC: 119 UG/DL (ref 80–158)
CREAT SERPL-MCNC: 0.8 MG/DL (ref 0.57–1)
EGFRCR SERPLBLD CKD-EPI 2021: 100 ML/MIN/1.73
EOSINOPHIL # BLD AUTO: 0.2 X10E3/UL (ref 0–0.4)
EOSINOPHIL NFR BLD AUTO: 4 %
ERYTHROCYTE [DISTWIDTH] IN BLOOD BY AUTOMATED COUNT: 17.1 % (ref 11.7–15.4)
FERRITIN SERPL-MCNC: 64 NG/ML (ref 15–150)
FOLATE SERPL-MCNC: 11.6 NG/ML
GAMMA TOCOPHEROL SERPL-MCNC: 0.9 MG/L (ref 0.7–4.9)
GLOBULIN SER CALC-MCNC: 2.5 G/DL (ref 1.5–4.5)
GLUCOSE SERPL-MCNC: 88 MG/DL (ref 70–99)
HCT VFR BLD AUTO: 38 % (ref 34–46.6)
HGB BLD-MCNC: 11.7 G/DL (ref 11.1–15.9)
IMM GRANULOCYTES # BLD AUTO: 0 X10E3/UL (ref 0–0.1)
IMM GRANULOCYTES NFR BLD AUTO: 0 %
IRON SERPL-MCNC: 42 UG/DL (ref 27–159)
LYMPHOCYTES # BLD AUTO: 1.3 X10E3/UL (ref 0.7–3.1)
LYMPHOCYTES NFR BLD AUTO: 28 %
MAGNESIUM SERPL-MCNC: 2.2 MG/DL (ref 1.6–2.3)
MCH RBC QN AUTO: 26.9 PG (ref 26.6–33)
MCHC RBC AUTO-ENTMCNC: 30.8 G/DL (ref 31.5–35.7)
MCV RBC AUTO: 87 FL (ref 79–97)
METHYLMALONATE SERPL-SCNC: 176 NMOL/L (ref 0–378)
MONOCYTES # BLD AUTO: 0.5 X10E3/UL (ref 0.1–0.9)
MONOCYTES NFR BLD AUTO: 10 %
NEUTROPHILS # BLD AUTO: 2.5 X10E3/UL (ref 1.4–7)
NEUTROPHILS NFR BLD AUTO: 57 %
PHYTONADIONE SERPL-MCNC: <0.1 NG/ML (ref 0.1–2.2)
PLATELET # BLD AUTO: 177 X10E3/UL (ref 150–450)
POTASSIUM SERPL-SCNC: 4.2 MMOL/L (ref 3.5–5.2)
PREALB SERPL-MCNC: 21 MG/DL (ref 14–35)
PROT SERPL-MCNC: 6.4 G/DL (ref 6–8.5)
PTH-INTACT SERPL-MCNC: 36 PG/ML (ref 15–65)
RBC # BLD AUTO: 4.35 X10E6/UL (ref 3.77–5.28)
SODIUM SERPL-SCNC: 142 MMOL/L (ref 134–144)
VIT A SERPL-MCNC: 47.5 UG/DL (ref 18.9–57.3)
VIT B1 BLD-SCNC: 88.3 NMOL/L (ref 66.5–200)
WBC # BLD AUTO: 4.5 X10E3/UL (ref 3.4–10.8)
ZINC SERPL-MCNC: 85 UG/DL (ref 44–115)

## 2024-07-15 ENCOUNTER — OFFICE VISIT (OUTPATIENT)
Dept: BEHAVIORAL HEALTH | Facility: CLINIC | Age: 34
End: 2024-07-15
Payer: COMMERCIAL

## 2024-07-15 VITALS
DIASTOLIC BLOOD PRESSURE: 74 MMHG | HEIGHT: 64 IN | HEART RATE: 64 BPM | BODY MASS INDEX: 32.44 KG/M2 | SYSTOLIC BLOOD PRESSURE: 118 MMHG | OXYGEN SATURATION: 98 % | WEIGHT: 190 LBS

## 2024-07-15 DIAGNOSIS — F31.70 BIPOLAR DISORDER IN PARTIAL REMISSION, MOST RECENT EPISODE UNSPECIFIED TYPE: ICD-10-CM

## 2024-07-15 PROCEDURE — 99213 OFFICE O/P EST LOW 20 MIN: CPT | Performed by: REGISTERED NURSE

## 2024-07-15 PROCEDURE — 1160F RVW MEDS BY RX/DR IN RCRD: CPT | Performed by: REGISTERED NURSE

## 2024-07-15 PROCEDURE — 1159F MED LIST DOCD IN RCRD: CPT | Performed by: REGISTERED NURSE

## 2024-07-15 RX ORDER — ARIPIPRAZOLE 5 MG/1
5 TABLET ORAL DAILY
Qty: 90 TABLET | Refills: 1 | Status: SHIPPED | OUTPATIENT
Start: 2024-07-15

## 2024-07-15 RX ORDER — LAMOTRIGINE 150 MG/1
150 TABLET ORAL DAILY
Qty: 90 TABLET | Refills: 1 | Status: SHIPPED | OUTPATIENT
Start: 2024-07-15

## 2024-07-15 NOTE — PROGRESS NOTES
Follow Up Office Visit      Patient Name: Theresa Maya  : 1990   MRN: [id number]     Referring Provider: Gina Green APRN    Chief Complaint:      ICD-10-CM ICD-9-CM   1. Bipolar disorder in partial remission, most recent episode unspecified type  F31.70 296.80        History of Present Illness:   Theresa Maya is a 33 y.o. 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 She reports that her  got accepted to [place] to [facility] program and they are going to move, but she has to work at [employer] for 2 days a week and plans to come back Friday and Saturday, to fulfill her commitment at . She reports that she did have her gastric bypass and was in the hospital for 21 days due to complications. She reports that she hasn't lost as much weight as she would have liked. She reports that the gastric bypass provider told her to take her iron supplement separate from other meds, but not to make any other med changes. She reports that she is taking her meds every morning at 6 am. She reports that she is just stressed related to everything that is going on in her life, but has been able to function well. Denies side effects. Denies SI/HI/AVH.    Review of Systems:   Review of Systems   Constitutional:  Negative for appetite change and unexpected weight change.        Decreased appetite due to gastric bypass   Eyes:  Negative for visual disturbance.   Respiratory:  Negative for chest tightness and shortness of breath.    Cardiovascular:  Negative for chest pain.   Musculoskeletal:  Negative for gait problem.   Skin:  Negative for rash and wound.   Neurological:  Positive for headaches. Negative for dizziness, tremors, seizures, weakness and light-headedness.   Psychiatric/Behavioral:  Negative for agitation, behavioral problems,  confusion, decreased concentration, hallucinations and self-injury. The patient is nervous/anxious. The patient is not hyperactive.      Sleep pattern: 6 hrs, feels exhausted  Appetite: can't eat solid foods, gastric bypass     PHQ-9 Depression Screening  Little interest or pleasure in doing things? 2-->more than half the days   Feeling down, depressed, or hopeless? 0-->not at all   Trouble falling or staying asleep, or sleeping too much? 2-->more than half the days   Feeling tired or having little energy? 3-->nearly every day   Poor appetite or overeating? 3-->nearly every day   Feeling bad about yourself - or that you are a failure or have let yourself or your family down? 0-->not at all   Trouble concentrating on things, such as reading the newspaper or watching television? 3-->nearly every day   Moving or speaking so slowly that other people could have noticed? Or the opposite - being so fidgety or restless that you have been moving around a lot more than usual? 1-->several days   Thoughts that you would be better off dead, or of hurting yourself in some way? 0-->not at all   PHQ-9 Total Score 14   If you checked off any problems, how difficult have these problems made it for you to do your work, take care of things at home, or get along with other people? very difficult     ALICIA-7 Anxiety Screening  Over the last two weeks, how often have you been bothered by the following problems?  Feeling nervous, anxious or on edge: More than half the days  Not being able to stop or control worrying: More than half the days  Worrying too much about different things: Nearly every day  Trouble Relaxing: Nearly every day  Being so restless that it is hard to sit still: Nearly every day  Becoming easily annoyed or irritable: Nearly every day  Feeling afraid as if something awful might happen: Not at all  ALICIA 7 Total Score: 16  If you checked any problems, how difficult have these problems made it for you to do your work, take  care of things at home, or get along with other people: Very difficult    RISK ASSESSMENT:  Patient denies any thoughts or intent of suicide today. Patient denies any impulsive behavior today.     Medications:     Current Outpatient Medications:     ARIPiprazole (ABILIFY) 5 MG tablet, Take 1 tablet by mouth Daily., Disp: 90 tablet, Rfl: 1    lamoTRIgine (LaMICtal) 150 MG tablet, Take 1 tablet by mouth Daily., Disp: 90 tablet, Rfl: 1    apixaban (ELIQUIS) 5 MG tablet tablet, Take 1 tablet by mouth Every 12 (Twelve) Hours. Indications: DVT/PE (active thrombosis), Disp: 60 tablet, Rfl: 0    Elidel 1 % cream, Apply  topically to the appropriate area as directed 2 (Two) Times a Day., Disp: 100 g, Rfl: 2    esomeprazole (nexIUM) 40 MG capsule, Take 1 capsule by mouth 2 (Two) Times a Day. (Patient taking differently: Take 1 capsule by mouth Daily.), Disp: 60 capsule, Rfl: 0    ferrous sulfate 325 (65 FE) MG tablet, Take 1 tablet by mouth Daily., Disp: , Rfl:     montelukast (Singulair) 10 MG tablet, Take 1 tablet by mouth Every Night., Disp: 30 tablet, Rfl: 3    multivitamin with minerals tablet tablet, Take 1 tablet by mouth Daily., Disp: , Rfl:     ondansetron ODT (ZOFRAN-ODT) 4 MG disintegrating tablet, Place 1 tablet on the tongue Every 8 (Eight) Hours As Needed for Nausea or Vomiting., Disp: 30 tablet, Rfl: 0    prochlorperazine (COMPAZINE) 10 MG tablet, Take 1 tablet by mouth Every 6 (Six) Hours As Needed for Nausea or Vomiting., Disp: 30 tablet, Rfl: 0    Saw Palmetto 80 MG capsule, Take 1 capsule by mouth Daily., Disp: , Rfl:     sucralfate (CARAFATE) 1 g tablet, Take 1 tablet by mouth 4 (Four) Times a Day Before Meals & at Bedtime. Crush tablets and mix with 10-15mL water and take it as a slurry, Disp: 40 tablet, Rfl: 0    triamcinolone (KENALOG) 0.1 % ointment, Apply  topically to the appropriate area as directed 2 (Two) Times a Day., Disp: 453.6 g, Rfl: 1    vitamin B-12 (CYANOCOBALAMIN) 500 MCG tablet, Take 1  "tablet by mouth Daily., Disp: , Rfl:     vitamin D3 125 MCG (5000 UT) capsule capsule, Take 1 capsule by mouth Daily. AND K 12, Disp: , Rfl:     Medication Considerations:  ISAMAR reviewed and appropriate.      Allergies:   Allergies   Allergen Reactions    Amoxicillin Diarrhea and GI Intolerance    Doxycycline Other (See Comments)     Vaginal swelling     Latex Hives and Itching    Penicillins Rash     Says Keflex OK as long as she takes w/ food. Has tolerated cefazolin, ceftriaxone    Beta lactam allergy details  Antibiotic reaction: rash, hives, other (ITCHING)  Age at reaction: adult (2017)  Dose to reaction time: (!) hours  Reason for antibiotic: sore throat (STREP)  Epinephrine required for reaction?: no  Tolerated antibiotics: cephalexin (KEFLEX), Cefazolin, ceftriaxone               Objective     Physical Exam:  Vital Signs:   Vitals:    07/15/24 0815 07/15/24 0817   BP:  118/74   Pulse:  64   SpO2:  98%   Weight: 86.2 kg (190 lb)    Height: 161.3 cm (63.5\")      Body mass index is 33.13 kg/m².     Mental Status Exam:   Hygiene:   good  Cooperation:  Cooperative  Eye Contact:  Good  Psychomotor Behavior:  Appropriate  Affect:  Appropriate  Mood: normal  Speech:  Normal  Thought Process:  Goal directed and Linear  Thought Content:  Normal  Suicidal:  None  Homicidal:  None  Hallucinations:  None  Delusion:  None  Memory:  Intact  Orientation:  Person, Place, Time, and Situation  Reliability:  good  Insight:  Good  Judgement:  Good  Impulse Control:  Good  Physical/Medical Issues:   see problem list      Assessment / Plan      Visit Diagnosis/Orders Placed This Visit:  Diagnoses and all orders for this visit:    1. Bipolar disorder in partial remission, most recent episode unspecified type  -     lamoTRIgine (LaMICtal) 150 MG tablet; Take 1 tablet by mouth Daily.  Dispense: 90 tablet; Refill: 1  -     ARIPiprazole (ABILIFY) 5 MG tablet; Take 1 tablet by mouth Daily.  Dispense: 90 tablet; Refill: 1     "     Functional Status: No impairment    Prognosis: Good with Ongoing Treatment     Impression/Formulation:  Patient appeared alert and oriented.  Patient is voluntarily requesting to continue outpatient psychiatric treatment at [facility].  Patient is receptive to assistance with maintaining a stable lifestyle.  Patient presents with history of     ICD-10-CM ICD-9-CM   1. Bipolar disorder in partial remission, most recent episode unspecified type  F31.70 296.80     Reviewed patient's previous provider notes. Reviewed most recent labs. Patient meets DSM V diagnostic criteria for diagnoses. Diagnoses may be updated as more information becomes available.       Treatment Plan:   Continue lamotrigine 150 mg at this time  Continue abilify 5 mg at this time  Continue individual therapy  Follow up in 6 months or sooner if needed  Patient will continue supportive psychotherapy efforts and medications as indicated. Clinic will obtain release of information for current treatment team for continuity of care as needed. Patient will contact this office, call 911 or present to the nearest emergency room should suicidal or homicidal ideations occur.  Discussed medication options and treatment plan of prescribed medication(s) as well as the risks, benefits, and potential side effects. Patient acknowledged and verbally consented to continue with current treatment plan and was educated on the importance of compliance with treatment and follow-up appointments.     Patient instructions:  All risks/benefits and side effects discussed with patient, including risk for weight gain, increased lipids, hyperprolactemia, EPS symptoms, including restlessness, muscle  stiffness or contraction of head, face, neck, trunk and limbs, and TD (which can be irreversible). Pt verbalizes understanding and consents to treatment with these medications.   Medication risks and side effects discussed with patient including risk for  worsening mood, changes in behavior, thoughts of suicide or homicide, induction of kati, serotonin syndrome.   If any thoughts of SI or HI, worsening mood or changes in behavior, call 911 or crisis line [phone], or go to nearest ER at once. Pt.verbalizes understanding and consents to treatment with this medication.     Follow Up:   Return in about 6 months (around 1/15/2025) for Med Check.        PHYLICIA Greer, PMHNP-BC  [facility]

## 2024-07-23 PROBLEM — R11.2 NAUSEA AND VOMITING: Status: ACTIVE | Noted: 2024-06-13

## 2024-07-23 PROBLEM — K91.89 GASTROJEJUNAL ANASTOMOTIC STRICTURE: Status: ACTIVE | Noted: 2024-06-13

## 2024-07-26 ENCOUNTER — TELEMEDICINE (OUTPATIENT)
Dept: BARIATRICS/WEIGHT MGMT | Facility: CLINIC | Age: 34
End: 2024-07-26
Payer: COMMERCIAL

## 2024-07-26 DIAGNOSIS — K91.89 GASTROJEJUNAL ANASTOMOTIC STRICTURE: Primary | ICD-10-CM

## 2024-07-26 DIAGNOSIS — R13.10 DYSPHAGIA, UNSPECIFIED TYPE: ICD-10-CM

## 2024-07-26 NOTE — PROGRESS NOTES
[facility]  [address]  [address]  [facility] [address] [phone]        Patient Name: Theresa Maya.  YOB: 1990      Date of Visit: 07/26/2024        The patient presents today for telehealth service.  The service was conducted via HIPAA compliant Epic video platform.    The provider is located at her work address.  The patient is located in at home [place] and stated that they are in a secure environment for the session.  The patient's condition being diagnosed/treated is appropriate for telemedicine.       The use of a video visit has been reviewed with the patient and verbal informed consent has been obtained.     Reason for Visit:  preop EGD    HPI:  Theresa Maya is a 34 y.o. female s/p 1 month s/p robotic RNY/BPD/rHHR 5/24/24 [facility] for uncontrolled DAVID (hx LSG/HHR 5/2022 [facility])    s/p EGJ w/ GJ stricture balloon dilation to 13.5mm 6/10/24 [facility], s/p repeat EGJ w/ food disimpaction + GJ stricture dilation to 14mm 6/14/24 [facility].      Discharged 6/15/24 on Eliquis for LUE superficial thrombus at previous IV site.  Needs repeat Venous Doppler in 1 month for further eval.  Continues on Eliquis BID.     She had c/o of ongoing dysphagia at LOV in late June 2024 and was scheduled for repeat EGD with GJ dilation.      Continues on Nexium/carafate with PRN antiemetics.      Still has some vomiting if eats too quickly, but the last dilation did improve her symptoms some.  Tolerates liquid/pureed/soft diet and tender pulled chicken with BBQ Sauce on it.    Denies ulcerogenics.  Denies GLP-1.  She is compliant with vitamins except for B1, which she could not find.      Past Medical History:   Diagnosis Date    Abnormal Pap smear of cervix 2019    ADHD (attention deficit hyperactivity disorder) 2022    Working with a therapy at     Allergic 2011    Take montalukast    Anesthesia complication     SLOW TO WAKE UP AFTER GASTRO AND HYSTERECTOMY    Anxiety      Arthritis     Clotting disorder     Deep vein thrombosis     2014, (R) leg while pregnant, dx w/ MTHFR    Depression     Dermatitis     UNDER BREAST AT TIMES    Dyspepsia     Dyspnea on exertion     Elevated hemoglobin A1c     Fatigue     Gallbladder abscess     s/p lap nicolas 2006    GERD (gastroesophageal reflux disease)     UGI 9/23 - small recurrent HH, EGD Dr. Ricardo  11/23 small recurrent HH    Heartburn     chronic/episodic, depends on what she eats, takes Pepcid prn, EGD Dr. Ricardo 11/21    Hyperemesis gravidarum     Hyperlipidemia     Incomplete right bundle branch block     Iron deficiency anemia     Migraines     Miscarriage     x 4 prior to 1st pregnancy, presumably from undiagnosed MTHFR mutation    Morbid obesity with body mass index (BMI) of 40.0 to 44.9 in adult 04/19/2022    MTHFR deficiency complicating pregnancy     says clotting d/o only an issue when pregnant, advised to use heparin shots during pregnancy    Obesity     Ovarian cyst     Recurrent pregnancy loss, antepartum condition or complication     had a [facility] and lost the pregnancy at 6 months    Strep pharyngitis     Urinary tract infection     Wears glasses      Past Surgical History:   Procedure Laterality Date    ENDOSCOPY N/A 05/05/2022    Procedure: ESOPHAGOGASTRODUODENOSCOPY;  Surgeon: Cristiano Ricardo MD;  Location:  [facility];  Service: Bariatric;  Laterality: N/A;    ENDOSCOPY N/A 5/24/2024    Procedure: ESOPHAGOGASTRODUODENOSCOPY;  Surgeon: Cristiano Ricardo MD;  Location:  [facility];  Service: Robotics - DaVinci;  Laterality: N/A;  EGD # 752 SCOPE USED    ENDOSCOPY N/A 6/10/2024    Procedure: ESOPHAGOGASTRODUODENOSCOPY WITH DILATATION;  Surgeon: Cristiano Ricardo MD;  Location:  [facility];  Service: General;  Laterality: N/A;  BALLOON DILATION UP TO 14MM    ENDOSCOPY N/A 6/14/2024    Procedure: ESOPHAGOGASTRODUODENOSCOPY WITH DILATATION;  Surgeon: Cristiano Ricardo MD;  Location:  [facility];  Service:  "General;  Laterality: N/A;  dilated to 14 mm with 12-15 mm balloon dilators, food removed from esophagus.    GASTRECTOMY N/A 05/05/2022    Procedure: GASTRECTOMY LAPAROSCOPIC;  Surgeon: Cristiano Ricardo MD;  Location:  [facility];  Service: Bariatric;  Laterality: N/A;    GASTRIC BYPASS N/A 5/24/2024    Procedure: GASTRIC BYPASS LAPAROSCOPIC WITH DAVINCI ROBOT;  Surgeon: Cristiano Ricardo MD;  Location:  [facility];  Service: Robotics - DaVinci;  Laterality: N/A;    HIATAL HERNIA REPAIR N/A 05/05/2022    Procedure: HIATAL HERNIA REPAIR LAPAROSCOPIC;  Surgeon: Cristiano Ricardo MD;  Location:  [facility];  Service: Bariatric;  Laterality: N/A;    HIATAL HERNIA REPAIR N/A 5/24/2024    Procedure: RECURRENT HIATAL HERNIA REPAIR LAPAROSCOPIC WITH DAVINCI ROBOT;  Surgeon: Cristiano Ricardo MD;  Location:  [facility];  Service: Robotics - DaVinci;  Laterality: N/A;    HYSTERECTOMY  01/23/2024    [facility] Dr[facility]- TOTAL    LAPAROSCOPIC CHOLECYSTECTOMY  2006    no stones, was told she had \"three gallbladders\"- all removed    SINUS SURGERY Bilateral 2006    TUBAL COAGULATION LAPAROSCOPIC Bilateral 09/15/2017    Procedure: BILATERAL TUBAL FALLOPE FILSHIE CLIPPING LAPAROSCOPIC;  Surgeon: Leo Posey III, MD;  Location:  [facility];  Service:     VAGINAL DELIVERY  14; 16; 17    female,male, male.  She said she had subcutaneous Lovenox injections throughout the second and third pregnancies until delivery    WISDOM TOOTH EXTRACTION       Outpatient Medications Marked as Taking for the 7/26/24 encounter (Telemedicine) with Debbie Atwood MD   Medication Sig Dispense Refill    apixaban (ELIQUIS) 5 MG tablet tablet Take 1 tablet by mouth Every 12 (Twelve) Hours. Indications: DVT/PE (active thrombosis) 60 tablet 0    ARIPiprazole (ABILIFY) 5 MG tablet Take 1 tablet by mouth Daily. 90 tablet 1    Elidel 1 % cream Apply  topically to the appropriate area as directed 2 (Two) Times a Day. 100 g 2    esomeprazole (nexIUM) 40 " MG capsule Take 1 capsule by mouth 2 (Two) Times a Day. (Patient taking differently: Take 1 capsule by mouth Daily.) 60 capsule 0    ferrous sulfate 325 (65 FE) MG tablet Take 1 tablet by mouth Daily.      lamoTRIgine (LaMICtal) 150 MG tablet Take 1 tablet by mouth Daily. 90 tablet 1    montelukast (Singulair) 10 MG tablet Take 1 tablet by mouth Every Night. 30 tablet 3    multivitamin with minerals tablet tablet Take 1 tablet by mouth Daily.      ondansetron ODT (ZOFRAN-ODT) 4 MG disintegrating tablet Place 1 tablet on the tongue Every 8 (Eight) Hours As Needed for Nausea or Vomiting. 30 tablet 0    prochlorperazine (COMPAZINE) 10 MG tablet Take 1 tablet by mouth Every 6 (Six) Hours As Needed for Nausea or Vomiting. 30 tablet 0    Saw Palmetto 80 MG capsule Take 1 capsule by mouth Daily.      sucralfate (CARAFATE) 1 g tablet Take 1 tablet by mouth 4 (Four) Times a Day Before Meals & at Bedtime. Crush tablets and mix with 10-15mL water and take it as a slurry 40 tablet 0    triamcinolone (KENALOG) 0.1 % ointment Apply  topically to the appropriate area as directed 2 (Two) Times a Day. 453.6 g 1    vitamin B-12 (CYANOCOBALAMIN) 500 MCG tablet Take 1 tablet by mouth Daily.      vitamin D3 125 MCG (5000 UT) capsule capsule Take 1 capsule by mouth Daily. AND K 12       Allergies   Allergen Reactions    Amoxicillin Diarrhea and GI Intolerance    Doxycycline Other (See Comments)     Vaginal swelling     Latex Hives and Itching    Penicillins Rash     Says Keflex OK as long as she takes w/ food. Has tolerated cefazolin, ceftriaxone    Beta lactam allergy details  Antibiotic reaction: rash, hives, other (ITCHING)  Age at reaction: adult (2017)  Dose to reaction time: (!) hours  Reason for antibiotic: sore throat (STREP)  Epinephrine required for reaction?: no  Tolerated antibiotics: cephalexin (KEFLEX), Cefazolin, ceftriaxone           Social History     Socioeconomic History    Marital status:    Tobacco Use     Smoking status: Never    Smokeless tobacco: Never    Tobacco comments:     disgust me never touched   Vaping Use    Vaping status: Never Used   Substance and Sexual Activity    Alcohol use: Not Currently     Comment: do not drink weekly only on holidays/ birtdays    Drug use: No    Sexual activity: Yes     Partners: Male     Birth control/protection: Surgical     Comment: bilatiral tubal       There were no vitals filed for this visit.  Weight    There is no height or weight on file to calculate BMI.    Physical Exam  Constitutional:       General: She is not in acute distress.     Appearance: Normal appearance. She is not ill-appearing.   HENT:      Head: Normocephalic and atraumatic.      Nose: Nose normal.   Eyes:      General: No scleral icterus.     Extraocular Movements: Extraocular movements intact.      Conjunctiva/sclera: Conjunctivae normal.      Pupils: Pupils are equal, round, and reactive to light.   Pulmonary:      Effort: Pulmonary effort is normal. No respiratory distress.   Skin:     Coloration: Skin is not pale.   Neurological:      Mental Status: She is alert and oriented to person, place, and time.   Psychiatric:         Mood and Affect: Mood normal.         Behavior: Behavior normal.           Assessment:      ICD-10-CM ICD-9-CM   1. Gastrojejunal anastomotic stricture  K91.89 997.49   2. Dysphagia, unspecified type  R13.10 787.20       Plan:  EGD with dilation.  Pt instructed to adhere to NPO after midnight, full liquids for 24 hours before procedure.      The risks and benefits of the upper endoscopy were discussed with the patient in detail and all questions were answered.  Possibility of perforation, bleeding, aspiration, and anesthesia reaction were reviewed.  Patient agrees to proceed.

## 2024-07-26 NOTE — H&P (VIEW-ONLY)
[facility] Bariatric Surgery  [address]  [address]  [facility] [id number] [phone]        Patient Name: Theresa Maya.  YOB: 1990      Date of Visit: 07/26/2024        The patient presents today for telehealth service.  The service was conducted via HIPAA compliant Epic video platform.    The provider is located at her work address.  The patient is located in at home [place] and stated that they are in a secure environment for the session.  The patient's condition being diagnosed/treated is appropriate for telemedicine.       The use of a video visit has been reviewed with the patient and verbal informed consent has been obtained.     Reason for Visit:  preop EGD    HPI:  Theresa Maya is a 34 y.o. female s/p 1 month s/p robotic RNY/BPD/rHHR 5/24/24 [facility] for uncontrolled DAVID (hx LSG/HHR 5/2022 G[facility])    s/p EGJ w/ GJ stricture balloon dilation to 13.5mm 6/10/24 [facility], s/p repeat EGJ w/ food disimpaction + GJ stricture dilation to 14mm 6/14/24 G[facility].      Discharged 6/15/24 on Eliquis for LUE superficial thrombus at previous IV site.  Needs repeat Venous Doppler in 1 month for further eval.  Continues on Eliquis BID.     She had c/o of ongoing dysphagia at LOV in late June 2024 and was scheduled for repeat EGD with GJ dilation.      Continues on Nexium/carafate with PRN antiemetics.      Still has some vomiting if eats too quickly, but the last dilation did improve her symptoms some.  Tolerates liquid/pureed/soft diet and tender pulled chicken with BBQ Sauce on it.    Denies ulcerogenics.  Denies GLP-1.  She is compliant with vitamins except for B1, which she could not find.      Past Medical History:   Diagnosis Date    Abnormal Pap smear of cervix 2019    ADHD (attention deficit hyperactivity disorder) 2022    Working with a therapy at     Allergic 2011    Take montalukast    Anesthesia complication     SLOW TO WAKE UP AFTER GASTRO AND HYSTERECTOMY    Anxiety      Arthritis     Clotting disorder     Deep vein thrombosis     2014, (R) leg while pregnant, dx w/ MTHFR    Depression     Dermatitis     UNDER BREAST AT TIMES    Dyspepsia     Dyspnea on exertion     Elevated hemoglobin A1c     Fatigue     Gallbladder abscess     s/p lap nicolas 2006    GERD (gastroesophageal reflux disease)     UGI 9/23 - small recurrent HH, EGD Dr. Ricardo  11/23 small recurrent HH    Heartburn     chronic/episodic, depends on what she eats, takes Pepcid prn, EGD Dr. Ricardo 11/21    Hyperemesis gravidarum     Hyperlipidemia     Incomplete right bundle branch block     Iron deficiency anemia     Migraines     Miscarriage     x 4 prior to 1st pregnancy, presumably from undiagnosed MTHFR mutation    Morbid obesity with body mass index (BMI) of 40.0 to 44.9 in adult 04/19/2022    MTHFR deficiency complicating pregnancy     says clotting d/o only an issue when pregnant, advised to use heparin shots during pregnancy    Obesity     Ovarian cyst     Recurrent pregnancy loss, antepartum condition or complication     had a [facility] and lost the pregnancy at 6 months    Strep pharyngitis     Urinary tract infection     Wears glasses      Past Surgical History:   Procedure Laterality Date    ENDOSCOPY N/A 05/05/2022    Procedure: ESOPHAGOGASTRODUODENOSCOPY;  Surgeon: Cristiano Ricardo MD;  Location:  [facility];  Service: Bariatric;  Laterality: N/A;    ENDOSCOPY N/A 5/24/2024    Procedure: ESOPHAGOGASTRODUODENOSCOPY;  Surgeon: Cristiano Ricardo MD;  Location:  [facility];  Service: Robotics - DaVinci;  Laterality: N/A;  EGD # 752 SCOPE USED    ENDOSCOPY N/A 6/10/2024    Procedure: ESOPHAGOGASTRODUODENOSCOPY WITH DILATATION;  Surgeon: Cristiano Ricardo MD;  Location:  [facility];  Service: General;  Laterality: N/A;  BALLOON DILATION UP TO 14MM    ENDOSCOPY N/A 6/14/2024    Procedure: ESOPHAGOGASTRODUODENOSCOPY WITH DILATATION;  Surgeon: Cristiano Ricardo MD;  Location:  [facility];  Service:  "General;  Laterality: N/A;  dilated to 14 mm with 12-15 mm balloon dilators, food removed from esophagus.    GASTRECTOMY N/A 05/05/2022    Procedure: GASTRECTOMY LAPAROSCOPIC;  Surgeon: Cristiano Ricardo MD;  Location:  [facility];  Service: Bariatric;  Laterality: N/A;    GASTRIC BYPASS N/A 5/24/2024    Procedure: GASTRIC BYPASS LAPAROSCOPIC WITH DAVINCI ROBOT;  Surgeon: Cristiano Ricardo MD;  Location:  [facility];  Service: Robotics - DaVinci;  Laterality: N/A;    HIATAL HERNIA REPAIR N/A 05/05/2022    Procedure: HIATAL HERNIA REPAIR LAPAROSCOPIC;  Surgeon: Cristiano Ricardo MD;  Location:  [facility];  Service: Bariatric;  Laterality: N/A;    HIATAL HERNIA REPAIR N/A 5/24/2024    Procedure: RECURRENT HIATAL HERNIA REPAIR LAPAROSCOPIC WITH DAVINCI ROBOT;  Surgeon: Cristiano Ricardo MD;  Location:  [facility];  Service: Robotics - DaVinci;  Laterality: N/A;    HYSTERECTOMY  01/23/2024    [facility] Dr[facility]- TOTAL    LAPAROSCOPIC CHOLECYSTECTOMY  2006    no stones, was told she had \"three gallbladders\"- all removed    SINUS SURGERY Bilateral 2006    TUBAL COAGULATION LAPAROSCOPIC Bilateral 09/15/2017    Procedure: BILATERAL TUBAL FALLOPE FILSHIE CLIPPING LAPAROSCOPIC;  Surgeon: Leo Posey III, MD;  Location:  [facility];  Service:     VAGINAL DELIVERY  14; 16; 17    female,male, male.  She said she had subcutaneous Lovenox injections throughout the second and third pregnancies until delivery    WISDOM TOOTH EXTRACTION       Outpatient Medications Marked as Taking for the 7/26/24 encounter (Telemedicine) with Debbie Atwood MD   Medication Sig Dispense Refill    apixaban (ELIQUIS) 5 MG tablet tablet Take 1 tablet by mouth Every 12 (Twelve) Hours. Indications: DVT/PE (active thrombosis) 60 tablet 0    ARIPiprazole (ABILIFY) 5 MG tablet Take 1 tablet by mouth Daily. 90 tablet 1    Elidel 1 % cream Apply  topically to the appropriate area as directed 2 (Two) Times a Day. 100 g 2    esomeprazole (nexIUM) 40 " MG capsule Take 1 capsule by mouth 2 (Two) Times a Day. (Patient taking differently: Take 1 capsule by mouth Daily.) 60 capsule 0    ferrous sulfate 325 (65 FE) MG tablet Take 1 tablet by mouth Daily.      lamoTRIgine (LaMICtal) 150 MG tablet Take 1 tablet by mouth Daily. 90 tablet 1    montelukast (Singulair) 10 MG tablet Take 1 tablet by mouth Every Night. 30 tablet 3    multivitamin with minerals tablet tablet Take 1 tablet by mouth Daily.      ondansetron ODT (ZOFRAN-ODT) 4 MG disintegrating tablet Place 1 tablet on the tongue Every 8 (Eight) Hours As Needed for Nausea or Vomiting. 30 tablet 0    prochlorperazine (COMPAZINE) 10 MG tablet Take 1 tablet by mouth Every 6 (Six) Hours As Needed for Nausea or Vomiting. 30 tablet 0    Saw Palmetto 80 MG capsule Take 1 capsule by mouth Daily.      sucralfate (CARAFATE) 1 g tablet Take 1 tablet by mouth 4 (Four) Times a Day Before Meals & at Bedtime. Crush tablets and mix with 10-15mL water and take it as a slurry 40 tablet 0    triamcinolone (KENALOG) 0.1 % ointment Apply  topically to the appropriate area as directed 2 (Two) Times a Day. 453.6 g 1    vitamin B-12 (CYANOCOBALAMIN) 500 MCG tablet Take 1 tablet by mouth Daily.      vitamin D3 125 MCG (5000 UT) capsule capsule Take 1 capsule by mouth Daily. AND K 12       Allergies   Allergen Reactions    Amoxicillin Diarrhea and GI Intolerance    Doxycycline Other (See Comments)     Vaginal swelling     Latex Hives and Itching    Penicillins Rash     Says Keflex OK as long as she takes w/ food. Has tolerated cefazolin, ceftriaxone    Beta lactam allergy details  Antibiotic reaction: rash, hives, other (ITCHING)  Age at reaction: adult (2017)  Dose to reaction time: (!) hours  Reason for antibiotic: sore throat (STREP)  Epinephrine required for reaction?: no  Tolerated antibiotics: cephalexin (KEFLEX), Cefazolin, ceftriaxone           Social History     Socioeconomic History    Marital status:    Tobacco Use     Smoking status: Never    Smokeless tobacco: Never    Tobacco comments:     disgust me never touched   Vaping Use    Vaping status: Never Used   Substance and Sexual Activity    Alcohol use: Not Currently     Comment: do not drink weekly only on holidays/ birtdays    Drug use: No    Sexual activity: Yes     Partners: Male     Birth control/protection: Surgical     Comment: bilatiral tubal       There were no vitals filed for this visit.  Weight    There is no height or weight on file to calculate BMI.    Physical Exam  Constitutional:       General: She is not in acute distress.     Appearance: Normal appearance. She is not ill-appearing.   HENT:      Head: Normocephalic and atraumatic.      Nose: Nose normal.   Eyes:      General: No scleral icterus.     Extraocular Movements: Extraocular movements intact.      Conjunctiva/sclera: Conjunctivae normal.      Pupils: Pupils are equal, round, and reactive to light.   Pulmonary:      Effort: Pulmonary effort is normal. No respiratory distress.   Skin:     Coloration: Skin is not pale.   Neurological:      Mental Status: She is alert and oriented to person, place, and time.   Psychiatric:         Mood and Affect: Mood normal.         Behavior: Behavior normal.           Assessment:      ICD-10-CM ICD-9-CM   1. Gastrojejunal anastomotic stricture  K91.89 997.49   2. Dysphagia, unspecified type  R13.10 787.20       Plan:  EGD with dilation.  Pt instructed to adhere to NPO after midnight, full liquids for 24 hours before procedure.      The risks and benefits of the upper endoscopy were discussed with the patient in detail and all questions were answered.  Possibility of perforation, bleeding, aspiration, and anesthesia reaction were reviewed.  Patient agrees to proceed.

## 2024-07-29 ENCOUNTER — HOSPITAL ENCOUNTER (OUTPATIENT)
Dept: CARDIOLOGY | Facility: HOSPITAL | Age: 34
Discharge: HOME | End: 2024-07-29
Payer: COMMERCIAL

## 2024-07-31 ENCOUNTER — ANESTHESIA EVENT (OUTPATIENT)
Dept: GASTROENTEROLOGY | Facility: HOSPITAL | Age: 34
End: 2024-07-31
Payer: COMMERCIAL

## 2024-07-31 RX ORDER — DROPERIDOL 2.5 MG/ML
0.62 INJECTION, SOLUTION INTRAMUSCULAR; INTRAVENOUS ONCE AS NEEDED
Status: CANCELLED | OUTPATIENT
Start: 2024-07-31

## 2024-07-31 RX ORDER — FENTANYL CITRATE 50 UG/ML
50 INJECTION, SOLUTION INTRAMUSCULAR; INTRAVENOUS
Status: CANCELLED | OUTPATIENT
Start: 2024-07-31

## 2024-07-31 RX ORDER — HYDROMORPHONE HYDROCHLORIDE 1 MG/ML
0.5 INJECTION, SOLUTION INTRAMUSCULAR; INTRAVENOUS; SUBCUTANEOUS
Status: CANCELLED | OUTPATIENT
Start: 2024-07-31

## 2024-08-01 ENCOUNTER — ANESTHESIA (OUTPATIENT)
Dept: GASTROENTEROLOGY | Facility: HOSPITAL | Age: 34
End: 2024-08-01
Payer: COMMERCIAL

## 2024-08-01 ENCOUNTER — HOSPITAL ENCOUNTER (OUTPATIENT)
Facility: HOSPITAL | Age: 34
Setting detail: HOSPITAL OUTPATIENT SURGERY
Discharge: HOME | End: 2024-08-01
Attending: SURGERY | Admitting: SURGERY
Payer: COMMERCIAL

## 2024-08-01 VITALS
WEIGHT: 187 LBS | TEMPERATURE: 98.1 F | DIASTOLIC BLOOD PRESSURE: 84 MMHG | OXYGEN SATURATION: 100 % | SYSTOLIC BLOOD PRESSURE: 111 MMHG | HEART RATE: 63 BPM | BODY MASS INDEX: 33.13 KG/M2 | RESPIRATION RATE: 17 BRPM | HEIGHT: 63 IN

## 2024-08-01 PROBLEM — R11.2 NAUSEA AND VOMITING: Status: RESOLVED | Noted: 2024-06-13 | Resolved: 2024-08-01

## 2024-08-01 PROBLEM — K91.89 GASTROJEJUNAL ANASTOMOTIC STRICTURE: Status: RESOLVED | Noted: 2024-06-13 | Resolved: 2024-08-01

## 2024-08-01 PROCEDURE — 43245 EGD DILATE STRICTURE: CPT | Performed by: SURGERY

## 2024-08-01 PROCEDURE — C1726 CATH, BAL DIL, NON-VASCULAR: HCPCS | Performed by: SURGERY

## 2024-08-01 PROCEDURE — 25010000002 PROPOFOL 10 MG/ML EMULSION: Performed by: NURSE ANESTHETIST, CERTIFIED REGISTERED

## 2024-08-01 PROCEDURE — 25810000003 SODIUM CHLORIDE 0.9 % SOLUTION 1,000 ML FLEX CONT: Performed by: ANESTHESIOLOGY

## 2024-08-01 PROCEDURE — 25810000003 LACTATED RINGERS PER 1000 ML: Performed by: NURSE ANESTHETIST, CERTIFIED REGISTERED

## 2024-08-01 RX ORDER — SODIUM CHLORIDE 9 MG/ML
40 INJECTION, SOLUTION INTRAVENOUS AS NEEDED
Status: DISCONTINUED | OUTPATIENT
Start: 2024-08-01 | End: 2024-08-01 | Stop reason: HOSPADM

## 2024-08-01 RX ORDER — LIDOCAINE HYDROCHLORIDE 10 MG/ML
INJECTION, SOLUTION EPIDURAL; INFILTRATION; INTRACAUDAL; PERINEURAL AS NEEDED
Status: DISCONTINUED | OUTPATIENT
Start: 2024-08-01 | End: 2024-08-01 | Stop reason: SURG

## 2024-08-01 RX ORDER — FAMOTIDINE 10 MG/ML
20 INJECTION, SOLUTION INTRAVENOUS ONCE
Status: DISCONTINUED | OUTPATIENT
Start: 2024-08-01 | End: 2024-08-01 | Stop reason: HOSPADM

## 2024-08-01 RX ORDER — SODIUM CHLORIDE, SODIUM LACTATE, POTASSIUM CHLORIDE, CALCIUM CHLORIDE 600; 310; 30; 20 MG/100ML; MG/100ML; MG/100ML; MG/100ML
INJECTION, SOLUTION INTRAVENOUS CONTINUOUS PRN
Status: DISCONTINUED | OUTPATIENT
Start: 2024-08-01 | End: 2024-08-01 | Stop reason: SURG

## 2024-08-01 RX ORDER — SODIUM CHLORIDE 0.9 % (FLUSH) 0.9 %
10 SYRINGE (ML) INJECTION EVERY 12 HOURS SCHEDULED
Status: DISCONTINUED | OUTPATIENT
Start: 2024-08-01 | End: 2024-08-01 | Stop reason: HOSPADM

## 2024-08-01 RX ORDER — PROPOFOL 10 MG/ML
VIAL (ML) INTRAVENOUS AS NEEDED
Status: DISCONTINUED | OUTPATIENT
Start: 2024-08-01 | End: 2024-08-01 | Stop reason: SURG

## 2024-08-01 RX ORDER — FAMOTIDINE 20 MG/1
20 TABLET, FILM COATED ORAL ONCE
Status: DISCONTINUED | OUTPATIENT
Start: 2024-08-01 | End: 2024-08-01 | Stop reason: HOSPADM

## 2024-08-01 RX ORDER — SODIUM CHLORIDE 0.9 % (FLUSH) 0.9 %
10 SYRINGE (ML) INJECTION AS NEEDED
Status: DISCONTINUED | OUTPATIENT
Start: 2024-08-01 | End: 2024-08-01 | Stop reason: HOSPADM

## 2024-08-01 RX ORDER — SODIUM CHLORIDE, SODIUM LACTATE, POTASSIUM CHLORIDE, CALCIUM CHLORIDE 600; 310; 30; 20 MG/100ML; MG/100ML; MG/100ML; MG/100ML
9 INJECTION, SOLUTION INTRAVENOUS CONTINUOUS
Status: DISCONTINUED | OUTPATIENT
Start: 2024-08-01 | End: 2024-08-01 | Stop reason: HOSPADM

## 2024-08-01 RX ORDER — MIDAZOLAM HYDROCHLORIDE 1 MG/ML
1 INJECTION INTRAMUSCULAR; INTRAVENOUS
Status: DISCONTINUED | OUTPATIENT
Start: 2024-08-01 | End: 2024-08-01 | Stop reason: HOSPADM

## 2024-08-01 RX ORDER — LIDOCAINE HYDROCHLORIDE 10 MG/ML
0.5 INJECTION, SOLUTION EPIDURAL; INFILTRATION; INTRACAUDAL; PERINEURAL ONCE AS NEEDED
Status: DISCONTINUED | OUTPATIENT
Start: 2024-08-01 | End: 2024-08-01 | Stop reason: HOSPADM

## 2024-08-01 RX ADMIN — PROPOFOL 100 MG: 10 INJECTION, EMULSION INTRAVENOUS at 15:07

## 2024-08-01 RX ADMIN — PROPOFOL 50 MG: 10 INJECTION, EMULSION INTRAVENOUS at 15:11

## 2024-08-01 RX ADMIN — SODIUM CHLORIDE, POTASSIUM CHLORIDE, SODIUM LACTATE AND CALCIUM CHLORIDE: 600; 310; 30; 20 INJECTION, SOLUTION INTRAVENOUS at 15:00

## 2024-08-01 RX ADMIN — PROPOFOL 50 MG: 10 INJECTION, EMULSION INTRAVENOUS at 15:09

## 2024-08-01 RX ADMIN — LIDOCAINE HYDROCHLORIDE 50 MG: 10 SOLUTION INTRAVENOUS at 15:05

## 2024-08-01 RX ADMIN — PROPOFOL 100 MG: 10 INJECTION, EMULSION INTRAVENOUS at 15:05

## 2024-08-01 RX ADMIN — SODIUM CHLORIDE 1000 ML: 900 INJECTION, SOLUTION INTRAVENOUS at 13:39

## 2024-08-01 NOTE — BRIEF OP NOTE
ESOPHAGOGASTRODUODENOSCOPY WITH DILATATION  Progress Note    Theresa Maya  8/1/2024    Pre-op Diagnosis:   Nausea and vomiting, unspecified vomiting type [R11.2]  Gastrojejunal anastomotic stricture [K91.89]       Post-Op Diagnosis Codes:     * Nausea and vomiting, unspecified vomiting type [R11.2]     * Gastrojejunal anastomotic stricture [K91.89]     * S/P gastric bypass [Z98.84]    Procedure/CPT® Codes:  AR EGD DILATION GASTRIC/DUODENAL STRICTURE [72398]      Procedure(s):  ESOPHAGOGASTRODUODENOSCOPY WITH DILATATION              Surgeon(s):  Cristiano Ricardo MD    Anesthesia: Monitored Anesthesia Care    Staff:   Circulator: Catherine Beaver RN  Endo Technician: Estela Yun PCT         Estimated Blood Loss: none    Urine Voided: * No values recorded between 8/1/2024  3:00 PM and 8/1/2024  3:12 PM *    Specimens:                None          Drains: * No LDAs found *    Findings:         Complications: None          Cristiano Ricardo MD     Date: 8/1/2024  Time: 15:14 EDT

## 2024-08-01 NOTE — ANESTHESIA POSTPROCEDURE EVALUATION
Patient: Theresa Maya    Procedure Summary       Date: 08/01/24 Room / Location:  [facility]    Anesthesia Start: 1500 Anesthesia Stop: 1518    Procedure: ESOPHAGOGASTRODUODENOSCOPY WITH DILATATION Diagnosis:       Nausea and vomiting, unspecified vomiting type      Gastrojejunal anastomotic stricture      S/P gastric bypass      (Nausea and vomiting, unspecified vomiting type [R11.2])      (Gastrojejunal anastomotic stricture [K91.89])    Surgeons: Cristiano Ricardo MD Provider: Amol Sheffield MD    Anesthesia Type: general ASA Status: 2            Anesthesia Type: general    Vitals  Vitals Value Taken Time   /77 08/01/24 1518   Temp 98.5 °F (36.9 °C) 08/01/24 1518   Pulse 91 08/01/24 1518   Resp 16 08/01/24 1518   SpO2 95 % 08/01/24 1518           Post Anesthesia Care and Evaluation    Patient location during evaluation: PACU  Patient participation: waiting for patient participation  Level of consciousness: sleepy but conscious    Airway patency: patent  Anesthetic complications: No anesthetic complications  PONV Status: none  Cardiovascular status: blood pressure returned to baseline  Respiratory status: nasal cannula and spontaneous ventilation  Hydration status: acceptable  No anesthesia care post op

## 2024-08-01 NOTE — ANESTHESIA PREPROCEDURE EVALUATION
Anesthesia Evaluation     Patient summary reviewed and Nursing notes reviewed   history of anesthetic complications:  prolonged sedation               Airway   Mallampati: I  TM distance: >3 FB  Neck ROM: full  No difficulty expected  Dental - normal exam     Pulmonary - normal exam   (+) pneumonia ,shortness of breath, recent URI  Cardiovascular - normal exam    (+) dysrhythmias (INCOMPLETE RBBB), DVT, hyperlipidemia      Neuro/Psych  (+) headaches, psychiatric history Anxiety, Depression, Bipolar and ADHD  GI/Hepatic/Renal/Endo    (+) obesity, GERD    Musculoskeletal     Abdominal  - normal exam    Bowel sounds: normal.   Substance History - negative use     OB/GYN negative ob/gyn ROS         Other   arthritis,                   Anesthesia Plan    ASA 2     general     (PROPOFOL)  intravenous induction     Anesthetic plan, risks, benefits, and alternatives have been provided, discussed and informed consent has been obtained with: patient.    Plan discussed with CRNA.      CODE STATUS:

## 2024-08-01 NOTE — OP NOTE
Preoperative Diagnosis: Chronic/persistent gastrojejunostomy stricture status post revision sleeve gastrectomy to Teresa-en-Y gastric bypass and hiatal hernia repair for recurrent hiatal hernia and severe GE reflux 5/24/2024    Postoperative Diagnosis: Same    Procedure:   EGD with with balloon dilatation of the gastrojejunostomy to 18 mm    Surgeon:  Haleigh    Anesth:  MAC    EBL:  None    Specimens: None    Complications:  None    Findings: Mild stricture, standard flexible endoscopy scope passed without resistance.  Balloon trauma noted at 18 mm.    Indications:    This is a 33-year-old female known to me status post revision of her previous sleeve gastrectomy to Teresa-en-Y gastric bypass recurrent hiatal hernia repair for recurrent hiatal hernia severe reflux not controlled with maximal medical therapy after sleeve gastrectomy and hiatal hernia repair on 5/24/2024.  She has had issues with a chronic persistent gastrojejunostomy stricture for which she has undergone EGD with dilatation on a couple of occasions, most recently at 3 weeks postoperatively on 6/14/2024 where the stricture was noted to be improved and mild and dilated to 14 mm.  Her symptoms have improved but she still has dysphagia to solid foods and would like repeat dilatation.  Please see our office notes.  Risks, benefits and alternative therapies were discussed and the patient wishes to proceed with repeat EGD with dilatation.     Operative Technique:  The patient was brought to the endoscopy suite and placed supine upon the stretcher. A bite block was placed.  The patient was sedated by the anesthesiology staff and the standard flexible endoscope was advanced under direct visualization into the posterior pharynx and the esophagus intubated.  The endoscope advanced to the esophagus, no retained food, strictures, distention or any new changes.  Reaching the distal esophagus no visible hiatal hernia.  Gastric pouch unremarkable.  Mild stricture.  The  endoscope did advanced through the gastrojejunostomy without resistance or scope trauma.  Teresa limb unremarkable.  The 15 to 18 mm balloon was then passed and centered on the gastrojejunostomy which was sequentially dilated to 18 mm.  It was left at 18 mm for a full minute.  The balloon was desufflated and removed.  This did lead to mucosal disruption and some blood noted, no active bleeding.  The endoscope was slowly withdrawn, no new abnormalities noted.    The patient tolerated the procedure well without complication and was taken to the recovery room in stable condition.

## (undated) DEVICE — APPL CHLORAPREP W/TINT 26ML ORNG

## (undated) DEVICE — STRAP POSTN KN/BDY FM 5X72IN DISP

## (undated) DEVICE — TUBING,OXYGEN,CRUSH RES,7',CLEAR,UC: Brand: MEDLINE INDUSTRIES, INC.

## (undated) DEVICE — GOWN,NON-REINFORCED,SIRUS,SET IN SLV,XXL: Brand: MEDLINE

## (undated) DEVICE — ENDOPATH XCEL BLADELESS TROCARS WITH STABILITY SLEEVES: Brand: ENDOPATH XCEL

## (undated) DEVICE — INTENDED USE FOR SURGICAL MARKING ON INTACT SKIN, ALSO PROVIDES A PERMANENT METHOD OF IDENTIFYING OBJECTS IN THE OPERATING ROOM: Brand: WRITESITE® REGULAR TIP SKIN MARKER

## (undated) DEVICE — PK BARIATRIC 10

## (undated) DEVICE — BLANKT WARM UPPR/BDY ARM/OUT 57X196CM

## (undated) DEVICE — SHT AIR TRANSFR COMFRT GLIDE LAT 40X80IN

## (undated) DEVICE — COR GYN LAPAROSCOPY: Brand: MEDLINE INDUSTRIES, INC.

## (undated) DEVICE — SKIN AFFIX SURG ADHESIVE 72/CS 0.55ML: Brand: MEDLINE

## (undated) DEVICE — PENROSE DRAIN 18 X .5" SILICONE: Brand: MEDLINE

## (undated) DEVICE — "MH-443 SUCTION VALVE F/EVIS140 EVIS160": Brand: SUCTION VALVE

## (undated) DEVICE — ENDOGATOR HYBRID TUBING KIT FOR USE WITH ENDOGATOR IRRIGATION PUMP, OLYMPUS PUMP, GI4000 ESU, AND TORRENT IRRIGATION PUMP.: Brand: ENDOGATOR KIT

## (undated) DEVICE — THE BITE BLOCK MAXI, LATEX FREE STRAP IS USED TO PROTECT THE ENDOSCOPE INSERTION TUBE FROM BEING BITTEN BY THE PATIENT.

## (undated) DEVICE — DEV INFL CRE STERIFLATE 60CC DISP

## (undated) DEVICE — SUCTION IRRIGATOR: Brand: ENDOWRIST

## (undated) DEVICE — JP PERF DRN SIL FLT 10MM FULL: Brand: CARDINAL HEALTH

## (undated) DEVICE — "MH-438 A/W VLVE F/140 EVIS-140": Brand: AIR/WATER VALVE

## (undated) DEVICE — SUT ETHLN 2/0 PS 18IN 585H

## (undated) DEVICE — [HIGH FLOW INSUFFLATOR,  DO NOT USE IF PACKAGE IS DAMAGED,  KEEP DRY,  KEEP AWAY FROM SUNLIGHT,  PROTECT FROM HEAT AND RADIOACTIVE SOURCES.]: Brand: PNEUMOSURE

## (undated) DEVICE — CONTN GRAD MEAS TRIANG 32OZ BLK

## (undated) DEVICE — Device: Brand: AIR/WATER CHANNEL CLEANING ADAPTER

## (undated) DEVICE — Device

## (undated) DEVICE — REDUCER: Brand: ENDOWRIST

## (undated) DEVICE — Device: Brand: STANDARD BOUGIE, 38FR

## (undated) DEVICE — DEV WARMR SCPE LIQUIDSCOPEWARMOR 2BUTN SHT NON/DEHP/ALC STRL

## (undated) DEVICE — MINI ENDOCUT SCISSOR TIP, DISPOSABLE: Brand: RENEW

## (undated) DEVICE — TOTAL TRAY, 16FR 10ML SIL FOLEY, URN: Brand: MEDLINE

## (undated) DEVICE — GLV SURG SENSICARE PI MIC PF SZ8.5 LF STRL

## (undated) DEVICE — TROC STANDARDTROCAR FOR/TITANSGS 19MM DISP STRL

## (undated) DEVICE — APPL CHLORAPREP TINTED 26ML TEAL

## (undated) DEVICE — SYR LUERLOK 50ML

## (undated) DEVICE — BLADELESS OBTURATOR: Brand: WECK VISTA

## (undated) DEVICE — SOL ANTISTICK CAUTRY ELECTROLUBE LF

## (undated) DEVICE — 3M™ STERI-DRAPE™ OPERATION TAPE, 10 CM X 55 CM 9099: Brand: 3M™ STERI-DRAPE™

## (undated) DEVICE — SUT SILK 2/0 SH 30IN K833H

## (undated) DEVICE — KT CLN CLEANOR SCPE

## (undated) DEVICE — ENCORE® LATEX MICRO SIZE 7, STERILE LATEX POWDER-FREE SURGICAL GLOVE: Brand: ENCORE

## (undated) DEVICE — PAD,ARMBOARD,CONV,FOAM,2X8X20",12PR/CS: Brand: MEDLINE

## (undated) DEVICE — Device: Brand: DEFENDO AIR/WATER/SUCTION AND BIOPSY VALVE

## (undated) DEVICE — TROCAR: Brand: KII FIOS FIRST ENTRY

## (undated) DEVICE — ESOPHAGEAL/PYLORIC/COLONIC WIREGUIDED BALLOON DILATATION CATHETER: Brand: CRE WIREGUIDED

## (undated) DEVICE — ADHS SKIN PREMIERPRO EXOFIN TOPICAL HI/VISC .5ML

## (undated) DEVICE — GLV SURG SENSICARE PI MIC PF SZ9 LF STRL

## (undated) DEVICE — SINGLE-USE BIOPSY FORCEPS: Brand: RADIAL JAW 4

## (undated) DEVICE — PATIENT RETURN ELECTRODE, SINGLE-USE, CONTACT QUALITY MONITORING, ADULT, WITH 9FT CORD, FOR PATIENTS WEIGING OVER 33LBS. (15KG): Brand: MEGADYNE

## (undated) DEVICE — JACKSON-PRATT 100CC BULB RESERVOIR: Brand: CARDINAL HEALTH

## (undated) DEVICE — ENSEAL X1 TISSUE SEALER, CURVED JAW, 45 CM SHAFT LENGTH: Brand: ENSEAL

## (undated) DEVICE — ARM DRAPE

## (undated) DEVICE — KT WARM LAP LIQUIDSCOPE/HELPOR W/WARMOR/CLTH 2/TROC/SWAB

## (undated) DEVICE — UNDRPD COMFRT GLD DRYPAD 36X57IN

## (undated) DEVICE — SUT MNCRYL 4/0 PS2 18 IN

## (undated) DEVICE — ENDOPATH XCEL UNIVERSAL TROCAR STABLILITY SLEEVES: Brand: ENDOPATH XCEL

## (undated) DEVICE — LAPAROSCOPIC SMOKE FILTRATION SYSTEM: Brand: PALL LAPAROSHIELD® PLUS LAPAROSCOPIC SMOKE FILTRATION SYSTEM

## (undated) DEVICE — ST TBG AIRSEAL FLTR TRI LUM

## (undated) DEVICE — PAD SANI MAXI W/ADHS SNG WRP 11IN

## (undated) DEVICE — TIP COVER ACCESSORY

## (undated) DEVICE — VESSEL SEALER EXTEND: Brand: ENDOWRIST

## (undated) DEVICE — THE DISPOSABLE ROTH NET FOREIGN BODY STANDARD RETRIEVAL DEVICE IS USED IN THE ENDOSCOPIC RETRIEVAL OF FOREIGN BODY, FOOD BOLUS AND EXCISED TISSUE SUCH AS POLYPS.: Brand: ROTH NET

## (undated) DEVICE — COLUMN DRAPE

## (undated) DEVICE — SPNG GZ WOVN 4X4IN 12PLY 10/BX STRL

## (undated) DEVICE — STAPLER 60: Brand: SUREFORM

## (undated) DEVICE — SEAL

## (undated) DEVICE — DRAPE,TOP,102X53,STERILE: Brand: MEDLINE

## (undated) DEVICE — TROC BLADLES ANCHORPORT/OPTI LP 8X120MM 1P/U

## (undated) DEVICE — ANTIBACTERIAL VIOLET BRAIDED (POLYGLACTIN 910), SYNTHETIC ABSORBABLE SUTURE: Brand: COATED VICRYL

## (undated) DEVICE — ENDOPATH 5MM ENDOSCOPIC BLUNT TIP DISSECTORS (12 POUCHES CONTAINING 3 DISSECTORS EACH): Brand: ENDOPATH